# Patient Record
Sex: FEMALE | Race: WHITE | Employment: OTHER | URBAN - METROPOLITAN AREA
[De-identification: names, ages, dates, MRNs, and addresses within clinical notes are randomized per-mention and may not be internally consistent; named-entity substitution may affect disease eponyms.]

---

## 2017-01-10 ENCOUNTER — OFFICE VISIT (OUTPATIENT)
Dept: CARDIOLOGY CLINIC | Age: 77
End: 2017-01-10

## 2017-01-10 VITALS
RESPIRATION RATE: 17 BRPM | BODY MASS INDEX: 34.9 KG/M2 | WEIGHT: 197 LBS | SYSTOLIC BLOOD PRESSURE: 138 MMHG | DIASTOLIC BLOOD PRESSURE: 70 MMHG | OXYGEN SATURATION: 97 % | HEART RATE: 75 BPM

## 2017-01-10 DIAGNOSIS — G47.33 OSA (OBSTRUCTIVE SLEEP APNEA): ICD-10-CM

## 2017-01-10 DIAGNOSIS — K21.9 GASTROESOPHAGEAL REFLUX DISEASE WITHOUT ESOPHAGITIS: ICD-10-CM

## 2017-01-10 DIAGNOSIS — I51.7 LAE (LEFT ATRIAL ENLARGEMENT): Primary | ICD-10-CM

## 2017-01-10 DIAGNOSIS — I10 HTN (HYPERTENSION), BENIGN: ICD-10-CM

## 2017-01-10 DIAGNOSIS — I45.10 RBBB: ICD-10-CM

## 2017-01-10 DIAGNOSIS — I48.0 PAROXYSMAL ATRIAL FIBRILLATION (HCC): ICD-10-CM

## 2017-01-10 RX ORDER — AMOXICILLIN AND CLAVULANATE POTASSIUM 250; 62.5 MG/5ML; MG/5ML
POWDER, FOR SUSPENSION ORAL 3 TIMES DAILY
Status: ON HOLD | COMMUNITY
End: 2017-02-03 | Stop reason: CLARIF

## 2017-01-10 RX ORDER — CARVEDILOL 25 MG/1
12.5 TABLET ORAL 2 TIMES DAILY WITH MEALS
Qty: 180 TAB | Refills: 3
Start: 2017-01-10 | End: 2017-02-17 | Stop reason: DRUGHIGH

## 2017-01-10 RX ORDER — CLONIDINE HYDROCHLORIDE 0.1 MG/1
0.1 TABLET ORAL
Qty: 30 TAB | Refills: 3
Start: 2017-01-10 | End: 2017-03-15 | Stop reason: SDUPTHER

## 2017-01-10 RX ORDER — DILTIAZEM HYDROCHLORIDE EXTENDED-RELEASE TABLETS 180 MG/1
TABLET, EXTENDED RELEASE ORAL
COMMUNITY
End: 2017-05-03 | Stop reason: SDUPTHER

## 2017-01-10 NOTE — MR AVS SNAPSHOT
Visit Information Date & Time Provider Department Dept. Phone Encounter #  
 1/10/2017  1:20 PM Layla Thomas MD [facility] [phone] [id number] Follow-up Instructions Return in about 3 months (around 4/10/2017). Your Appointments 1/27/2017 10:40 AM  
New Patient with Lisseth Michael MD  
[facility] ([facility]) Appt Note: per dr Claude Rueda dx: ep eval  
 [address] [facility] [address]  
[phone]  
  
   
 [address] [facility] [address] [facility] [address]  
  
    
 4/28/2017  4:20 PM  
ESTABLISHED PATIENT with Layla Thomas MD  
[facility] ([facility]) Appt Note: 3 mo fup; 3 mo fup  
 [address] [place] [address] [facility]  
[address] [facility] [place] [address] [address] Upcoming Health Maintenance Date Due DTaP/Tdap/Td series (1 - Tdap) 2/12/1963 FOBT Q 1 YEAR AGE 50-75 2/12/1992 ZOSTER VACCINE AGE 60> 2/12/2002 GLAUCOMA SCREENING Q2Y 2/12/2007 OSTEOPOROSIS SCREENING (DEXA) 2/12/2007 Pneumococcal 65+ Low/Medium Risk (1 of 2 - PCV13) 2/12/2007 MEDICARE YEARLY EXAM 2/12/2007 INFLUENZA AGE 9 TO ADULT 8/1/2016 Allergies as of 1/10/2017  Review Complete On: 12/13/2016 By: Camila Perez  
  
 Severity Noted Reaction Type Reactions Citalopram Hydrobromide High 09/16/2014    Rash With itching. Chlorthalidone  04/03/2013    Rash Maxzide [Triamterene-hydrochlorothiazid]  04/03/2013    Palpitations Vicodin [Hydrocodone-acetaminophen]  04/03/2013    Palpitations Current Immunizations  Reviewed on 3/3/2012 No immunizations on file. Not reviewed this visit You Were Diagnosed With   
  
 Codes Comments LAE (left atrial enlargement)    -  Primary ICD-10-CM: I51.7 ICD-9-CM: 429.3 RBBB     ICD-10-CM: I45.10 ICD-9-CM: 426.4 HTN (hypertension), benign     ICD-10-CM: I10 
ICD-9-CM: 401.1 Paroxysmal atrial fibrillation (HCC)     ICD-10-CM: I48.0 ICD-9-CM: 427.31 Gastroesophageal reflux disease without esophagitis     ICD-10-CM: K21.9 ICD-9-CM: 530.81   
 ANÍBAL (obstructive sleep apnea)     ICD-10-CM: G47.33 
ICD-9-CM: 327.23 Vitals BP Pulse Resp Weight(growth percentile) SpO2 BMI  
 138/70 (BP 1 Location: Left arm, BP Patient Position: At rest) 75 17 197 lb (89.4 kg) 97% 34.9 kg/m2 OB Status Smoking Status Postmenopausal Former Smoker Vitals History BMI and BSA Data Body Mass Index Body Surface Area 34.9 kg/m 2 1.99 m 2 Preferred Pharmacy Pharmacy Name Phone [place], [address] [facility] [place] [phone] Your Updated Medication List  
  
   
This list is accurate as of: 1/10/17  2:18 PM.  Always use your most recent med list.  
  
  
  
  
 amoxicillin-clavulanate 250-62.5 mg/5 mL suspension Commonly known as:  AUGMENTIN Take  by mouth three (3) times daily. apixaban 5 mg tablet Commonly known as:  ELIQUIS  
take 1 tablet by mouth twice a day CALTRATE 600 + D PO Take  by mouth. carvedilol 25 mg tablet Commonly known as:  Macel Roberto Take 1 Tab by mouth two (2) times daily (with meals). CENTRUM SILVER PO Take  by mouth.  
  
 cloNIDine HCl 0.2 mg tablet Commonly known as:  CATAPRES Take 1 Tab by mouth two (2) times a day. Take at noon and bedtime  Indications: HYPERTENSION  
  
 diltiazem hcl 180 mg Tb24 Take  by mouth. NexIUM 40 mg capsule Generic drug:  esomeprazole Take 40 mg by mouth daily. TAKES IN AM  
  
 propafenone 225 mg tablet Commonly known as:  RYTHMOL Take 1 Tab by mouth two (2) times a day. Indications: PAROXYSMAL ATRIAL FIBRILLATION  
  
 telmisartan 80 mg tablet Commonly known as:  Luis Alberto [place] Take 80 mg by mouth daily. ZETIA 10 mg tablet Generic drug:  ezetimibe Take 10 mg by mouth daily. TAKES IN PM  
  
  
  
  
We Performed the Following AMB POC EKG ROUTINE W/ 12 LEADS, INTER & REP [40849 CPT(R)] Follow-up Instructions Return in about 3 months (around 4/10/2017). Patient Instructions 1. Change carvedilol half tablet twice daily. 2. Take 0.1mg Clonidine at bedtime if your blood pressure is greater that 383 systolic (top number). 3. Stay off Mycardis 4. Continue Diltiazem. 5. Continue Propafenone. 6. Keep a log of your blood pressure for the next couple of weeks. Call us back in 2 weeks with an update with medication adjustment and BP readings. 7. Bring in your pill bottles with you next office visit in 3 months. Introducing hospitals & HEALTH SERVICES! Dear Micah Zimmer: Thank you for requesting a [facility] account. Our records indicate that you already have an active [facility] account. You can access your account anytime at [email or web address] Did you know that you can access your hospital and ER discharge instructions at any time in [facility]? You can also review all of your test results from your hospital stay or ER visit. Additional Information If you have questions, please visit the Frequently Asked Questions section of the [facility] website at [email or web address] Remember, [facility] is NOT to be used for urgent needs. For medical emergencies, dial 911. Now available from your iPhone and Android! Please provide this summary of care documentation to your next provider. Your primary care clinician is listed as Radha Glynn. If you have any questions after today's visit, please call [phone].

## 2017-01-10 NOTE — PROGRESS NOTES
HISTORY OF PRESENT ILLNESS    Kash Brower is a 78 y.o. female. Last seen 3 weeks ago. Problem List  Date Reviewed: 11/28/2016          Codes Class Noted    Paroxysmal atrial fibrillation (HCC) ICD-10-CM: I48.0  ICD-9-CM: 427.31  8/28/2016        ANÍBAL (obstructive sleep apnea) ICD-10-CM: G47.33  ICD-9-CM: 327.23  7/26/2016        HTN (hypertension), benign ICD-10-CM: I10  ICD-9-CM: 401.1  7/26/2016        Gastroesophageal reflux disease without esophagitis ICD-10-CM: K21.9  ICD-9-CM: 530.81  7/3/2016        RBBB ICD-10-CM: I45.10  ICD-9-CM: 426.4  5/7/2015        LAE (left atrial enlargement) ICD-10-CM: I51.7  ICD-9-CM: 429.3  4/3/2013             Cardiac testing  Adenosine myoview 2011 - normal perfusion, EF 81%  Echocardiogram 2/22/13 - normal left ventricular size and function, normal appearing mitral, aortic, and tricuspid valves, with mild mitral and moderate tricuspid regurgitation, bi-atrial enlargement  Lexiscan 3/3/2014 - normal perfusion EF 83%  Echo: 5/15/15- 60%, mildly dilated LA, mild MR  Cardioversion 7/23/2015 - 200J  Lexiscan cardiolite 5/10/16 - normal perfusion, EF 78%  Renal Visceral Duplex 5/2016 - No evidence of ONOFRE    HPI    Struggling with labile BPs. She feels \"terrible\" at times. Her BPs at home have been in the 31-472Z systolic and 70-18O diastolic. She stopped taking Micardis one week ago. She reduced Coreg 25mg to once a day. She takes Clonidine (unknown dosage) one tablet daily. Dr. Sarah Cast started her on Diltiazem 180mg last week due to resting tachycardia of 101 bpm. She has ANÍBAL but wears her CPAP faithfully. Otherwise, she denies any exertional chest pain, dyspnea, syncope, orthopnea, edema or paroxysmal nocturnal dyspnea.     Past Medical History   Diagnosis Date    DDD (degenerative disc disease)     Gastroesophageal reflux disease without esophagitis 7/3/2016    GERD (gastroesophageal reflux disease)     Hiatal hernia     HTN (hypertension), benign 7/26/2016    Hypertension     ANÍBAL (obstructive sleep apnea) 7/26/2016    Paroxysmal atrial fibrillation ([facility] [address] 8/28/2016    Uterine prolapse       Current Outpatient Prescriptions on File Prior to Visit   Medication Sig Dispense Refill    propafenone (RYTHMOL) 225 mg tablet Take 1 Tab by mouth two (2) times a day. Indications: PAROXYSMAL ATRIAL FIBRILLATION 60 Tab 3    carvedilol (COREG) 25 mg tablet Take 1 Tab by mouth two (2) times daily (with meals). 180 Tab 3    cloNIDine HCl (CATAPRES) 0.2 mg tablet Take 1 Tab by mouth two (2) times a day. Take at noon and bedtime  Indications: HYPERTENSION (Patient taking differently: Take 0.2 mg by mouth See Admin Instructions. 0.1mg at 5pm. 0.2mg bedtime. 0.1mg prn. Indications: Hypertension) 200 Tab 3    apixaban (ELIQUIS) 5 mg tablet take 1 tablet by mouth twice a day 60 Tab 5    telmisartan (MICARDIS) 80 mg tablet Take 80 mg by mouth daily.  FOLIC ACID/MULTIVIT-MIN/LUTEIN (CENTRUM SILVER PO) Take  by mouth.  CALCIUM CARBONATE/VITAMIN D3 (CALTRATE 600 + D PO) Take  by mouth.  ezetimibe (ZETIA) 10 mg tablet Take 10 mg by mouth daily. TAKES IN PM      esomeprazole (NEXIUM) 40 mg capsule Take 40 mg by mouth daily. TAKES IN AM       No current facility-administered medications on file prior to visit. Allergies   Allergen Reactions    Citalopram Hydrobromide Rash     With itching.  Chlorthalidone Rash    Maxzide [Triamterene-Hydrochlorothiazid] Palpitations    Vicodin [Hydrocodone-Acetaminophen] Palpitations     , former smoker     Review of Systems  Constitutional: Negative for fever, chills, malaise/fatigue and diaphoresis. Respiratory: Negative for cough, hemoptysis, sputum production, and wheezing. Cardiovascular: Negative for chest pain, orthopnea, claudication,leg swelling and PND. Positive for palpitations. Gastrointestinal: Negative for heartburn, nausea, vomiting, blood in stool and melena.    Genitourinary: Negative for dysuria and flank pain. Musculoskeletal: Negative for joint pain and back pain. Skin: Negative for rash. Neurological: Negative for focal weakness, seizures, loss of consciousness, weakness. Positive for dizziness. Endo/Heme/Allergies: Does not bruise/bleed easily. Psychiatric/Behavioral: Negative for memory loss. Positive for anxiety. Visit Vitals    /70 (BP 1 Location: Left arm, BP Patient Position: At rest)    Pulse 75    Resp 17    Wt 197 lb (89.4 kg)    SpO2 97%    BMI 34.9 kg/m2      Wt Readings from Last 3 Encounters:   01/10/17 197 lb (89.4 kg)   12/13/16 200 lb (90.7 kg)   12/05/16 200 lb (90.7 kg)      Physical Exam   Constitutional: She is oriented to person, place, and time. She appears well-developed and well-nourished. Neck: Neck supple. No JVD present. Cardiovascular: Normal rate, regular rhythm, S1 normal, S2 normal and normal heart sounds. Exam reveals no gallop. No murmur heard. Pulses: Carotid pulses are 2+ on the right side, and 2+ on the left side. Dorsalis pedis pulses are 2+ on the right side, and 2+ on the left side. Pulmonary/Chest: Effort normal and breath sounds normal. She has no wheezes. She has no rales. Abdominal: Soft. Bowel sounds are normal. There is no tenderness. There is no rebound. Musculoskeletal: She exhibits no edema. Neurological: She is alert and oriented to person, place, and time. Skin: Skin is warm and dry. Psychiatric: She has a normal mood and affect.      Lab Results   Component Value Date/Time    Sodium 144 09/05/2016 01:34 AM    Potassium 3.8 09/05/2016 01:34 AM    Chloride 107 09/05/2016 01:34 AM    CO2 32 09/05/2016 01:34 AM    Anion gap 5 09/05/2016 01:34 AM    Glucose 122 09/05/2016 01:34 AM    BUN 12 09/05/2016 01:34 AM    Creatinine 0.86 09/05/2016 01:34 AM    BUN/Creatinine ratio 14 09/05/2016 01:34 AM    GFR est AA >60 09/05/2016 01:34 AM    GFR est non-AA >60 09/05/2016 01:34 AM    Calcium 9.3 09/05/2016 01:34 AM Bilirubin, total 0.2 04/29/2014 12:53 AM    ALT 26 04/29/2014 12:53 AM    AST 19 04/29/2014 12:53 AM    Alk. phosphatase 90 04/29/2014 12:53 AM    Protein, total 7.1 04/29/2014 12:53 AM    Albumin 3.9 04/29/2014 12:53 AM    Globulin 3.2 04/29/2014 12:53 AM    A-G Ratio 1.2 04/29/2014 12:53 AM     Cardiographics   EKG 4/13 - normal  EKG 5/6/15 - AF with ventricular rate 96, RBBB  EKG 6/3/15 - A-fib rate 70s, RBBB, Rightward axis  EKG 8/31/15 - SR, RBBB  EKG 12/7/15 - SR, RBBB  EKG 7/26/16 - AF 80s, RBBB, rightward axis, unchanged from 7/23/16   EKG 11/28/16 - SR 60   EKG 12/05/16-AF 80-90, RBBB   EKG 01/10/17- AF 70s    ASSESSMENT and PLAN  Encounter Diagnoses   Name Primary?  LAE (left atrial enlargement) Yes    RBBB     HTN (hypertension), benign     Paroxysmal atrial fibrillation (HCC)     Gastroesophageal reflux disease without esophagitis     ANÍBAL (obstructive sleep apnea)      Mrs. Ojeda has recurrent AF rate controlled in the setting of chronic HTN, minimally symptomatic. She has been adherent with Propafenone. It is unclear whether she is in and out or if this is persistent AF. She has no structural or ischemic heart disase. I favor consideration of AF ablation. She is in complete agreement - will refer to Dr. Emili Barnes. Continue Propafenone for now.,     Her BP has been somewhat liable at home, low to normal requiring multiple medication adjustment. Will change Coreg to 12.5mg BID continue Diltiazem, continue Clonidine 0.1 mg QHS, and keep her off Micardis. She will monitor her blood pressure at home anf make further adjustment as needed. Continue with CPAP. Melisa Valerio Follow-up Disposition:  Return in about 3 months (around 4/10/2017). See Dr. Emili Barnes ASAP  See me back in 3 months.      Written by Rinu Binnie Dubin, as dictated by Chitra Yoo MD.   Chitra Yoo MD

## 2017-01-10 NOTE — PATIENT INSTRUCTIONS
1. Change carvedilol half tablet twice daily. 2. Take 0.1mg Clonidine at bedtime if your blood pressure is greater that 305 systolic (top number). 3. Stay off Mycardis  4. Continue Diltiazem. 5. Continue Propafenone. 6. Keep a log of your blood pressure for the next couple of weeks. Call us back in 2 weeks with an update with medication adjustment and BP readings. 7. Bring in your pill bottles with you next office visit in 3 months.

## 2017-01-10 NOTE — PROGRESS NOTES
Extended / Orthostatic Vitals:  Patient Position 2: Supine  BP 2: 140/70  Pulse 2: 75  Patient Position 3: Sitting  BP 3: 138/70  Pulse 3: 70  Patient Position 4: Standing  BP 4: 140/70  Pulse 4: 84

## 2017-01-13 ENCOUNTER — TELEPHONE (OUTPATIENT)
Dept: CARDIOLOGY CLINIC | Age: 77
End: 2017-01-13

## 2017-01-13 NOTE — TELEPHONE ENCOUNTER
Labs received. Per ahslyn MOSELEY to d/c rythmol. Patient has an appointment with Dr. Madhuri Bardales 1/27/17.

## 2017-01-13 NOTE — TELEPHONE ENCOUNTER
Patient called regarding Rythmol, she saw her PCP and is concerned about her liver function and other side effects she is experiencing. Pt stated she is not currently taking, Please give her a call at [phone].  Thank you

## 2017-01-16 ENCOUNTER — TELEPHONE (OUTPATIENT)
Dept: CARDIOLOGY CLINIC | Age: 77
End: 2017-01-16

## 2017-01-16 NOTE — TELEPHONE ENCOUNTER
Please call the patient regarding her change in pressure medication. She stated that her NP has been running very high. Puneet clifford be reached at [phone]. Thanks!

## 2017-01-16 NOTE — TELEPHONE ENCOUNTER
BP lo/11 8am 108/76 77 -Took Rythmol 225mg, diltiazem 180mg, coreg 12.5mg   11am 90/64 73    2pm 109/69 58   10pm 157/87 78 -Took Clonidine 0.1mg, coreg 12.5mg   9am 134/89 71   12pm 125/84 71   3pm 136/81 69   9pm 151/91 80   10am 160/96 84   12pm 141/99 77   6pm 167/96        -Sx.  Headache, Took clonidine 0.1mg   9pm 170/104 81 -Took another clonidine 0.2mg (old script), coreg 12.5mg   10pm 137/90 88   -Confirmed Rythmol was d/c on 17 9am 147/90 90   -Took Coreg 25mg   3pm 138/90 94   8pm 183/115 97 -Took Clonidine 0.2mg, coreg 25mg   10pm 126/80 84  1/15 8am 173/106      -Took 1/2 tab Micardis 80mg (d/c 1/10/17), coreg 25mg   10am 128/80   5pm 159/99 82   -Took Clonidine 0.1mg   9pm 168/101 88 -Took Clonidine 0.2mg, coreg 25mg   9am 164/106 81 -Took 1/2 tab Micardis 80mg, coreg 25mg   12pm 138/88 86

## 2017-01-17 ENCOUNTER — TELEPHONE (OUTPATIENT)
Dept: CARDIOLOGY CLINIC | Age: 77
End: 2017-01-17

## 2017-01-18 ENCOUNTER — APPOINTMENT (OUTPATIENT)
Dept: CT IMAGING | Age: 77
End: 2017-01-18
Attending: PHYSICIAN ASSISTANT
Payer: MEDICARE

## 2017-01-18 ENCOUNTER — HOSPITAL ENCOUNTER (EMERGENCY)
Age: 77
Discharge: HOME | End: 2017-01-19
Attending: EMERGENCY MEDICINE
Payer: MEDICARE

## 2017-01-18 VITALS
HEART RATE: 100 BPM | BODY MASS INDEX: 34.91 KG/M2 | SYSTOLIC BLOOD PRESSURE: 166 MMHG | OXYGEN SATURATION: 94 % | TEMPERATURE: 98.2 F | WEIGHT: 197 LBS | HEIGHT: 63 IN | DIASTOLIC BLOOD PRESSURE: 98 MMHG | RESPIRATION RATE: 20 BRPM

## 2017-01-18 DIAGNOSIS — I48.91 ATRIAL FIBRILLATION, UNSPECIFIED TYPE (HCC): ICD-10-CM

## 2017-01-18 DIAGNOSIS — K44.9 HIATAL HERNIA: ICD-10-CM

## 2017-01-18 DIAGNOSIS — R10.13 ABDOMINAL PAIN, EPIGASTRIC: Primary | ICD-10-CM

## 2017-01-18 LAB
ALBUMIN SERPL BCP-MCNC: 3.7 G/DL (ref 3.5–5)
ALBUMIN/GLOB SERPL: 0.9 {RATIO} (ref 1.1–2.2)
ALP SERPL-CCNC: 109 U/L (ref 45–117)
ALT SERPL-CCNC: 40 U/L (ref 12–78)
ANION GAP BLD CALC-SCNC: 10 MMOL/L (ref 5–15)
APPEARANCE UR: CLEAR
AST SERPL W P-5'-P-CCNC: 17 U/L (ref 15–37)
BACTERIA URNS QL MICRO: NEGATIVE /HPF
BASOPHILS # BLD AUTO: 0 K/UL (ref 0–0.1)
BASOPHILS # BLD: 0 % (ref 0–1)
BILIRUB SERPL-MCNC: 0.8 MG/DL (ref 0.2–1)
BILIRUB UR QL: NEGATIVE
BUN SERPL-MCNC: 9 MG/DL (ref 6–20)
BUN/CREAT SERPL: 10 (ref 12–20)
CALCIUM SERPL-MCNC: 9 MG/DL (ref 8.5–10.1)
CHLORIDE SERPL-SCNC: 102 MMOL/L (ref 97–108)
CO2 SERPL-SCNC: 28 MMOL/L (ref 21–32)
COLOR UR: ABNORMAL
CREAT SERPL-MCNC: 0.89 MG/DL (ref 0.55–1.02)
EOSINOPHIL # BLD: 0 K/UL (ref 0–0.4)
EOSINOPHIL NFR BLD: 0 % (ref 0–7)
EPITH CASTS URNS QL MICRO: ABNORMAL /LPF
ERYTHROCYTE [DISTWIDTH] IN BLOOD BY AUTOMATED COUNT: 13.7 % (ref 11.5–14.5)
GLOBULIN SER CALC-MCNC: 3.9 G/DL (ref 2–4)
GLUCOSE SERPL-MCNC: 151 MG/DL (ref 65–100)
GLUCOSE UR STRIP.AUTO-MCNC: NEGATIVE MG/DL
HCT VFR BLD AUTO: 40.3 % (ref 35–47)
HGB BLD-MCNC: 12.9 G/DL (ref 11.5–16)
HGB UR QL STRIP: NEGATIVE
KETONES UR QL STRIP.AUTO: 15 MG/DL
LEUKOCYTE ESTERASE UR QL STRIP.AUTO: ABNORMAL
LIPASE SERPL-CCNC: 112 U/L (ref 73–393)
LYMPHOCYTES # BLD AUTO: 7 % (ref 12–49)
LYMPHOCYTES # BLD: 0.8 K/UL (ref 0.8–3.5)
MCH RBC QN AUTO: 29.9 PG (ref 26–34)
MCHC RBC AUTO-ENTMCNC: 32 G/DL (ref 30–36.5)
MCV RBC AUTO: 93.3 FL (ref 80–99)
MONOCYTES # BLD: 0.7 K/UL (ref 0–1)
MONOCYTES NFR BLD AUTO: 6 % (ref 5–13)
NEUTS SEG # BLD: 10.5 K/UL (ref 1.8–8)
NEUTS SEG NFR BLD AUTO: 87 % (ref 32–75)
NITRITE UR QL STRIP.AUTO: NEGATIVE
PH UR STRIP: 7.5 [PH] (ref 5–8)
PLATELET # BLD AUTO: 273 K/UL (ref 150–400)
POTASSIUM SERPL-SCNC: 3.5 MMOL/L (ref 3.5–5.1)
PROT SERPL-MCNC: 7.6 G/DL (ref 6.4–8.2)
PROT UR STRIP-MCNC: 30 MG/DL
RBC # BLD AUTO: 4.32 M/UL (ref 3.8–5.2)
RBC #/AREA URNS HPF: ABNORMAL /HPF (ref 0–5)
SODIUM SERPL-SCNC: 140 MMOL/L (ref 136–145)
SP GR UR REFRACTOMETRY: 1.02 (ref 1–1.03)
TROPONIN I SERPL-MCNC: <0.04 NG/ML
UA: UC IF INDICATED,UAUC: ABNORMAL
UROBILINOGEN UR QL STRIP.AUTO: 1 EU/DL (ref 0.2–1)
WBC # BLD AUTO: 12.1 K/UL (ref 3.6–11)
WBC URNS QL MICRO: ABNORMAL /HPF (ref 0–4)

## 2017-01-18 PROCEDURE — 36415 COLL VENOUS BLD VENIPUNCTURE: CPT | Performed by: EMERGENCY MEDICINE

## 2017-01-18 PROCEDURE — 83690 ASSAY OF LIPASE: CPT | Performed by: EMERGENCY MEDICINE

## 2017-01-18 PROCEDURE — 74011636320 HC RX REV CODE- 636/320: Performed by: RADIOLOGY

## 2017-01-18 PROCEDURE — 80053 COMPREHEN METABOLIC PANEL: CPT | Performed by: EMERGENCY MEDICINE

## 2017-01-18 PROCEDURE — 96361 HYDRATE IV INFUSION ADD-ON: CPT

## 2017-01-18 PROCEDURE — 74011250636 HC RX REV CODE- 250/636: Performed by: PHYSICIAN ASSISTANT

## 2017-01-18 PROCEDURE — 74177 CT ABD & PELVIS W/CONTRAST: CPT

## 2017-01-18 PROCEDURE — 93005 ELECTROCARDIOGRAM TRACING: CPT

## 2017-01-18 PROCEDURE — 81001 URINALYSIS AUTO W/SCOPE: CPT | Performed by: EMERGENCY MEDICINE

## 2017-01-18 PROCEDURE — 84484 ASSAY OF TROPONIN QUANT: CPT | Performed by: PHYSICIAN ASSISTANT

## 2017-01-18 PROCEDURE — 74011000250 HC RX REV CODE- 250: Performed by: PHYSICIAN ASSISTANT

## 2017-01-18 PROCEDURE — 85025 COMPLETE CBC W/AUTO DIFF WBC: CPT | Performed by: EMERGENCY MEDICINE

## 2017-01-18 PROCEDURE — 96374 THER/PROPH/DIAG INJ IV PUSH: CPT

## 2017-01-18 PROCEDURE — 99284 EMERGENCY DEPT VISIT MOD MDM: CPT

## 2017-01-18 RX ORDER — PANTOPRAZOLE SODIUM 40 MG/10ML
40 INJECTION, POWDER, LYOPHILIZED, FOR SOLUTION INTRAVENOUS
Status: DISCONTINUED | OUTPATIENT
Start: 2017-01-18 | End: 2017-01-18

## 2017-01-18 RX ORDER — FAMOTIDINE 10 MG/ML
20 INJECTION INTRAVENOUS
Status: COMPLETED | OUTPATIENT
Start: 2017-01-18 | End: 2017-01-18

## 2017-01-18 RX ADMIN — SODIUM CHLORIDE 1000 ML: 900 INJECTION, SOLUTION INTRAVENOUS at 22:38

## 2017-01-18 RX ADMIN — IOPAMIDOL 96 ML: 755 INJECTION, SOLUTION INTRAVENOUS at 23:26

## 2017-01-18 RX ADMIN — FAMOTIDINE 20 MG: 10 INJECTION, SOLUTION INTRAVENOUS at 22:38

## 2017-01-19 LAB
ATRIAL RATE: 85 BPM
CALCULATED R AXIS, ECG10: 69 DEGREES
CALCULATED T AXIS, ECG11: -19 DEGREES
DIAGNOSIS, 93000: NORMAL
Q-T INTERVAL, ECG07: 356 MS
QRS DURATION, ECG06: 132 MS
QTC CALCULATION (BEZET), ECG08: 452 MS
TROPONIN I BLD-MCNC: <0.04 NG/ML (ref 0–0.08)
VENTRICULAR RATE, ECG03: 97 BPM

## 2017-01-19 PROCEDURE — 84484 ASSAY OF TROPONIN QUANT: CPT

## 2017-01-19 NOTE — DISCHARGE INSTRUCTIONS
We hope that we have addressed all of your medical concerns. The examination and treatment you received in the Emergency Department were for an emergent problem and were not intended as complete care. It is important that you follow up with your healthcare provider(s) for ongoing care. If your symptoms worsen or do not improve as expected, and you are unable to reach your usual health care provider(s), you should return to the Emergency Department. Today's healthcare is undergoing tremendous change, and patient satisfaction surveys are one of the many tools to assess the quality of medical care. You may receive a survey from the [employer] regarding your experience in the Emergency Department. I hope that your experience has been completely positive, particularly the medical care that I provided. As such, please participate in the survey; anything less than excellent does not meet my expectations or intentions. [address] and [address] [place] [address] participate in nationally recognized quality of care measures. If your blood pressure is greater than 120/80, as reported below, we urge that you seek medical care to address the potential of high blood pressure, commonly known as hypertension. Hypertension can be hereditary or can be caused by certain medical conditions, pain, stress, or \"white coat syndrome. \"       Please make an appointment with your health care provider(s) for follow up of your Emergency Department visit. VITALS:   Patient Vitals for the past 8 hrs:   Temp Pulse Resp BP SpO2   01/18/17 2057 98.2 °F (36.8 °C) 100 20 (!) 166/98 94 %          Thank you for allowing us to provide you with medical care today. We realize that you have many choices for your emergency care needs. Please choose us in the future for any continued health care needs. Josey Bains, [address] [facility] [address] Office: [phone]            Recent Results (from the past 24 hour(s))   CBC WITH AUTOMATED DIFF    Collection Time: 01/18/17  9:58 PM   Result Value Ref Range    WBC 12.1 (H) 3.6 - 11.0 K/uL    RBC 4.32 3.80 - 5.20 M/uL    HGB 12.9 11.5 - 16.0 g/dL    HCT 40.3 35.0 - 47.0 %    MCV 93.3 80.0 - 99.0 FL    MCH 29.9 26.0 - 34.0 PG    MCHC 32.0 30.0 - 36.5 g/dL    RDW 13.7 11.5 - 14.5 %    PLATELET 833 789 - 876 K/uL    NEUTROPHILS 87 (H) 32 - 75 %    LYMPHOCYTES 7 (L) 12 - 49 %    MONOCYTES 6 5 - 13 %    EOSINOPHILS 0 0 - 7 %    BASOPHILS 0 0 - 1 %    ABS. NEUTROPHILS 10.5 (H) 1.8 - 8.0 K/UL    ABS. LYMPHOCYTES 0.8 0.8 - 3.5 K/UL    ABS. MONOCYTES 0.7 0.0 - 1.0 K/UL    ABS. EOSINOPHILS 0.0 0.0 - 0.4 K/UL    ABS. BASOPHILS 0.0 0.0 - 0.1 K/UL   METABOLIC PANEL, COMPREHENSIVE    Collection Time: 01/18/17  9:58 PM   Result Value Ref Range    Sodium 140 136 - 145 mmol/L    Potassium 3.5 3.5 - 5.1 mmol/L    Chloride 102 97 - 108 mmol/L    CO2 28 21 - 32 mmol/L    Anion gap 10 5 - 15 mmol/L    Glucose 151 (H) 65 - 100 mg/dL    BUN 9 6 - 20 MG/DL    Creatinine 0.89 0.55 - 1.02 MG/DL    BUN/Creatinine ratio 10 (L) 12 - 20      GFR est AA >60 >60 ml/min/1.73m2    GFR est non-AA >60 >60 ml/min/1.73m2    Calcium 9.0 8.5 - 10.1 MG/DL    Bilirubin, total 0.8 0.2 - 1.0 MG/DL    ALT 40 12 - 78 U/L    AST 17 15 - 37 U/L    Alk.  phosphatase 109 45 - 117 U/L    Protein, total 7.6 6.4 - 8.2 g/dL    Albumin 3.7 3.5 - 5.0 g/dL    Globulin 3.9 2.0 - 4.0 g/dL    A-G Ratio 0.9 (L) 1.1 - 2.2     LIPASE    Collection Time: 01/18/17  9:58 PM   Result Value Ref Range    Lipase 112 73 - 393 U/L   TROPONIN I    Collection Time: 01/18/17  9:58 PM   Result Value Ref Range    Troponin-I, Qt. <0.04 <0.05 ng/mL   URINALYSIS W/ REFLEX CULTURE    Collection Time: 01/18/17 10:43 PM   Result Value Ref Range    Color YELLOW/STRAW      Appearance CLEAR CLEAR      Specific gravity 1.023 1.003 - 1.030      pH (UA) 7.5 5.0 - 8.0      Protein 30 (A) NEG mg/dL Glucose NEGATIVE  NEG mg/dL    Ketone 15 (A) NEG mg/dL    Bilirubin NEGATIVE  NEG      Blood NEGATIVE  NEG      Urobilinogen 1.0 0.2 - 1.0 EU/dL    Nitrites NEGATIVE  NEG      Leukocyte Esterase TRACE (A) NEG      WBC 0-4 0 - 4 /hpf    RBC 0-5 0 - 5 /hpf    Epithelial cells FEW FEW /lpf    Bacteria NEGATIVE  NEG /hpf    UA:UC IF INDICATED CULTURE NOT INDICATED BY UA RESULT CNI     POC TROPONIN-I    Collection Time: 01/19/17 12:32 AM   Result Value Ref Range    Troponin-I (POC) <0.04 0.00 - 0.08 ng/mL       Ct Abd Pelv W Cont    Result Date: 1/18/2017  INDICATION: Abdominal pain; upper abd pain, nausea COMPARISON: 4/29/2014 TECHNIQUE: Following the uneventful intravenous administration of 100 cc Isovue-370, thin axial images were obtained through the abdomen and pelvis. Coronal and sagittal reconstructions were generated. Oral contrast was not administered. CT dose reduction was achieved through use of a standardized protocol tailored for this examination and automatic exposure control for dose modulation. FINDINGS: LUNG BASES: Clear. INCIDENTALLY IMAGED HEART AND MEDIASTINUM: Unremarkable. LIVER: No mass or biliary dilatation. GALLBLADDER: Unremarkable. SPLEEN: No mass. PANCREAS: No mass or ductal dilatation. ADRENALS: Unremarkable. KIDNEYS: No mass, calculus, or hydronephrosis. STOMACH: There is a large hiatal hernia. SMALL BOWEL: No dilatation or wall thickening. COLON: Sigmoid diverticulosis is noted. APPENDIX: Unremarkable. PERITONEUM: No ascites or pneumoperitoneum. RETROPERITONEUM: No lymphadenopathy or aortic aneurysm. REPRODUCTIVE ORGANS: The uterus is surgically absent. URINARY BLADDER: No mass or calculus. BONES: Degenerative changes are seen in the lumbar spine ADDITIONAL COMMENTS: N/A     IMPRESSION: Large hiatal hernia. No acute abnormality. Status post hysterectomy. Abdominal Pain: Care Instructions  Your Care Instructions    Abdominal pain has many possible causes.  Some aren't serious and get better on their own in a few days. Others need more testing and treatment. If your pain continues or gets worse, you need to be rechecked and may need more tests to find out what is wrong. You may need surgery to correct the problem. Don't ignore new symptoms, such as fever, nausea and vomiting, urination problems, pain that gets worse, and dizziness. These may be signs of a more serious problem. Your doctor may have recommended a follow-up visit in the next 8 to 12 hours. If you are not getting better, you may need more tests or treatment. The doctor has checked you carefully, but problems can develop later. If you notice any problems or new symptoms, get medical treatment right away. Follow-up care is a key part of your treatment and safety. Be sure to make and go to all appointments, and call your doctor if you are having problems. It's also a good idea to know your test results and keep a list of the medicines you take. How can you care for yourself at home? · Rest until you feel better. · To prevent dehydration, drink plenty of fluids, enough so that your urine is light yellow or clear like water. Choose water and other caffeine-free clear liquids until you feel better. If you have kidney, heart, or liver disease and have to limit fluids, talk with your doctor before you increase the amount of fluids you drink. · If your stomach is upset, eat mild foods, such as rice, dry toast or crackers, bananas, and applesauce. Try eating several small meals instead of two or three large ones. · Wait until 48 hours after all symptoms have gone away before you have spicy foods, alcohol, and drinks that contain caffeine. · Do not eat foods that are high in fat. · Avoid anti-inflammatory medicines such as aspirin, ibuprofen (Advil, Motrin), and naproxen (Aleve). These can cause stomach upset. Talk to your doctor if you take daily aspirin for another health problem. When should you call for help?   Call [phone] anytime you think you may need emergency care. For example, call if:  · You passed out (lost consciousness). · You pass maroon or very bloody stools. · You vomit blood or what looks like coffee grounds. · You have new, severe belly pain. Call your doctor now or seek immediate medical care if:  · Your pain gets worse, especially if it becomes focused in one area of your belly. · You have a new or higher fever. · Your stools are black and look like tar, or they have streaks of blood. · You have unexpected vaginal bleeding. · You have symptoms of a urinary tract infection. These may include:  ¨ Pain when you urinate. ¨ Urinating more often than usual.  ¨ Blood in your urine. · You are dizzy or lightheaded, or you feel like you may faint. Watch closely for changes in your health, and be sure to contact your doctor if:  · You are not getting better after 1 day (24 hours). Where can you learn more? Go to http://iker-isaac.info/. Enter P718 in the search box to learn more about \"Abdominal Pain: Care Instructions. \"  Current as of: May 27, 2016  Content Version: 11.1  © 5842-2188 [employer]. Care instructions adapted under license by [employer] (which disclaims liability or warranty for this information). If you have questions about a medical condition or this instruction, always ask your healthcare professional. [place] [address] any warranty or liability for your use of this information. Indigestion (Dyspepsia or Heartburn): Care Instructions  Your Care Instructions  Sometimes it can be hard to pinpoint the cause of indigestion (dyspepsia or heartburn). Most cases of an upset stomach with bloating, burning, burping, and nausea are minor and go away within several hours. Home treatment and over-the-counter medicine often are able to control symptoms.  But if you take medicine to relieve your indigestion without making diet and lifestyle changes, your symptoms are likely to return again and again. If you get indigestion often, it may be a sign of a more serious medical problem. Be sure to follow up with your doctor, who may want to do tests to be sure of the cause of your indigestion. Follow-up care is a key part of your treatment and safety. Be sure to make and go to all appointments, and call your doctor if you are having problems. Its also a good idea to know your test results and keep a list of the medicines you take. How can you care for yourself at home? · Your doctor may recommend over-the-counter medicine. For mild or occasional indigestion, antacids such as Tums, Gaviscon, Mylanta, or Maalox may help. Your doctor also may recommend over-the-counter acid reducers, such as Pepcid AC, Tagamet HB, Zantac 75, or Prilosec. Read and follow all instructions on the label. If you use these medicines often, talk with your doctor. · Change your eating habits. ¨ Its best to eat several small meals instead of two or three large meals. ¨ After you eat, wait 2 to 3 hours before you lie down. ¨ Chocolate, mint, and alcohol can make GERD worse. ¨ Spicy foods, foods that have a lot of acid (like tomatoes and oranges), and coffee can make GERD symptoms worse in some people. If your symptoms are worse after you eat a certain food, you may want to stop eating that food to see if your symptoms get better. · Do not smoke or chew tobacco. Smoking can make GERD worse. If you need help quitting, talk to your doctor about stop-smoking programs and medicines. These can increase your chances of quitting for good. · If you have GERD symptoms at night, raise the head of your bed 6 to 8 inches by putting the frame on blocks or placing a foam wedge under the head of your mattress. (Adding extra pillows does not work.)  · Do not wear tight clothing around your middle. · Lose weight if you need to. Losing just 5 to 10 pounds can help.   · Do not take anti-inflammatory medicines, such as aspirin, ibuprofen (Advil, Motrin), or naproxen (Aleve). These can irritate the stomach. If you need a pain medicine, try acetaminophen (Tylenol), which does not cause stomach upset. When should you call for help? Call 911 anytime you think you may need emergency care. For example, call if:  · You passed out (lost consciousness). · You vomit blood or what looks like coffee grounds. · You pass maroon or very bloody stools. · You have chest pain or pressure. This may occur with:  ¨ Sweating. ¨ Shortness of breath. ¨ Nausea or vomiting. ¨ Pain that spreads from the chest to the neck, jaw, or one or both shoulders or arms. ¨ Feeling dizzy or lightheaded. ¨ A fast or uneven pulse. After calling 911, chew 1 adult-strength aspirin. Wait for an ambulance. Do not try to drive yourself. Call your doctor now or seek immediate medical care if:  · You have severe belly pain. · Your stools are black and tarlike or have streaks of blood. · You have trouble swallowing. · You are losing weight and do not know why. Watch closely for changes in your health, and be sure to contact your doctor if:  · You do not get better as expected. Where can you learn more? Go to http://iker-isaac.info/. Enter W057 in the search box to learn more about \"Indigestion (Dyspepsia or Heartburn): Care Instructions. \"  Current as of: August 9, 2016  Content Version: 11.1  © 0890-7461 [employer]. Care instructions adapted under license by [email or web address] (which disclaims liability or warranty for this information). If you have questions about a medical condition or this instruction, always ask your healthcare professional. [address] any warranty or liability for your use of this information. Hiatal Hernia: Care Instructions  Your Care Instructions  A hiatal hernia occurs when part of the stomach bulges into the chest cavity.   A hiatal hernia may allow stomach acid and juices to back up into the esophagus (acid reflux). This can cause a feeling of burning, warmth, heat, or pain behind the breastbone. This feeling may often occur after you eat, soon after you lie down, or when you bend forward, and it may come and go. You also may have a sour taste in your mouth. These symptoms are commonly known as heartburn or reflux. But not all hiatal hernias cause symptoms. Follow-up care is a key part of your treatment and safety. Be sure to make and go to all appointments, and call your doctor if you are having problems. Its also a good idea to know your test results and keep a list of the medicines you take. How can you care for yourself at home? · Take your medicines exactly as prescribed. Call your doctor if you think you are having a problem with your medicine. · Do not take aspirin or other nonsteroidal anti-inflammatory drugs (NSAIDs), such as ibuprofen (Advil, Motrin) or naproxen (Aleve), unless your doctor says it is okay. Ask your doctor what you can take for pain. · Your doctor may recommend over-the-counter medicine. For mild or occasional indigestion, antacids such as Tums, Gaviscon, Maalox, or Mylanta may help. Your doctor also may recommend over-the-counter acid reducers, such as famotidine (Pepcid AC), cimetidine (Tagamet HB), ranitidine (Zantac 75 and Zantac 150), or omeprazole (Prilosec). Read and follow all instructions on the label. If you use these medicines often, talk with your doctor. · Change your eating habits. ¨ Its best to eat several small meals instead of two or three large meals. ¨ After you eat, wait 2 to 3 hours before you lie down. Late-night snacks arent a good idea. ¨ Chocolate, mint, and alcohol can make heartburn worse. They relax the valve between the esophagus and the stomach.   ¨ Spicy foods, foods that have a lot of acid (like tomatoes and oranges), and coffee can make heartburn symptoms worse in some people. If your symptoms are worse after you eat a certain food, you may want to stop eating that food to see if your symptoms get better. · Do not smoke or chew tobacco.  · If you get heartburn at night, raise the head of your bed 6 to 8 inches by putting the frame on blocks or placing a foam wedge under the head of your mattress. (Adding extra pillows does not work.)  · Do not wear tight clothing around your middle. · Lose weight if you need to. Losing just 5 to 10 pounds can help. When should you call for help? Call 911 anytime you think you may need emergency care. For example, call if:  · You have symptoms of a heart attack such as:  ¨ Chest pain or pressure. ¨ Sweating. ¨ Shortness of breath. ¨ Nausea or vomiting. ¨ Pain that spreads from the chest to the neck, jaw, or one or both shoulders or arms. ¨ Dizziness or lightheadedness. ¨ A fast or uneven pulse. After calling 911, chew 1 adult-strength aspirin. Wait for an ambulance. Do not try to drive yourself. · You vomit blood or what looks like coffee grounds. · You pass maroon or very bloody stools. Call your doctor now or seek immediate medical care if:  · You have new or different belly pain. · Your stools are black and tarlike or have streaks of blood. · Food seems to catch in your throat or chest.  Watch closely for changes in your health, and be sure to contact your doctor if:  · Your symptoms get worse. · Your symptoms have not improved after 2 days. Where can you learn more? Go to http://iker-isaac.info/. Enter Z941 in the search box to learn more about \"Hiatal Hernia: Care Instructions. \"  Current as of: August 9, 2016  Content Version: 11.1  © 6734-4791 "[employer]". Care instructions adapted under license by [employer] (which disclaims liability or warranty for this information).  If you have questions about a medical condition or this instruction, always ask your healthcare professional. [address] any warranty or liability for your use of this information.

## 2017-01-19 NOTE — ED PROVIDER NOTES
HPI Comments: Moe Singletary is a 78 y.o. Female with hx significant for GERD, hiatal hernia who presents ambulatory to ER with c/o upper abdominal pain and nausea x 4PM this afternoon. Pt states she was on her way to her PCP for her URI sx when she developed acute onset epigastric/generalized upper abdominal pain. Notes pain as been constant since onset. Notes pain seems to improving with lying down but then worsens again with standing. Notes associated nausea, denies vomiting. Denies similar pain in the past. Denies all other complaints  She specifically denies any fevers, chills, vomiting, chest pain, shortness of breath, headache, rash, diarrhea, urinary/bowel changes, sweating or weight loss. PCP: Feliberto Vanegas MD   PMHx significant for: Past Medical History:    DDD (degenerative disc disease)                               Gastroesophageal reflux disease without esopha* 7/3/2016      GERD (gastroesophageal reflux disease)                        Hiatal hernia                                                 HTN (hypertension), benign                      7/26/2016     Hypertension                                                  ANÍBAL (obstructive sleep apnea)                   7/26/2016     Paroxysmal atrial fibrillation ([facility] [address]            8/28/2016     Uterine prolapse                                              PSHx significant for: Past Surgical History:    HX GI                                                            Comment:COLONOSCOPY 7/14    HX GYN                                                           Comment:TUBAL LIGATION    HX HEENT                                                         Comment:WISDOM TEETH EXTRACTED.       -- Citalopram Hydrobromide -- Rash    --  With itching.   -- Chlorthalidone -- Rash   -- Maxzide (Triamterene-Hydrochlorothiazid) -- Palpitations   -- Vicodin (Hydrocodone-Acetaminophen) -- Palpitations    There are no other complaints, changes or physical findings at this time. The history is provided by the patient. Past Medical History:   Diagnosis Date    DDD (degenerative disc disease)     Gastroesophageal reflux disease without esophagitis 7/3/2016    GERD (gastroesophageal reflux disease)     Hiatal hernia     HTN (hypertension), benign 7/26/2016    Hypertension     ANÍBAL (obstructive sleep apnea) 7/26/2016    [facility] 8/28/2016    Uterine prolapse        Past Surgical History:   Procedure Laterality Date    Hx gi       COLONOSCOPY 7/14    Hx gyn       TUBAL LIGATION    Hx heent       WISDOM TEETH EXTRACTED. Family History:   Problem Relation Age of Onset    Diabetes Mother     Hypertension Mother     COPD Mother        Social History     Social History    Marital status:      Spouse name: N/A    Number of children: N/A    Years of education: N/A     Occupational History    Not on file. Social History Main Topics    Smoking status: Former Smoker     Packs/day: 1.50     Years: 30.00     Quit date: 3/3/1994    Smokeless tobacco: Never Used    Alcohol use 0.6 oz/week     1 Glasses of wine per week      Comment: rarely    Drug use: No    Sexual activity: Not on file     Other Topics Concern    Not on file     Social History Narrative         ALLERGIES: Citalopram hydrobromide; Chlorthalidone; Maxzide [triamterene-hydrochlorothiazid]; and Vicodin [hydrocodone-acetaminophen]    Review of Systems   Constitutional: Negative. Negative for appetite change, chills, fatigue and fever. HENT: Negative. Negative for congestion and sore throat. Eyes: Negative. Negative for visual disturbance. Respiratory: Negative. Negative for cough and shortness of breath. Cardiovascular: Negative. Negative for chest pain, palpitations and leg swelling. Gastrointestinal: Positive for abdominal pain and nausea. Negative for constipation, diarrhea and vomiting. Genitourinary: Negative.   Negative for dysuria, flank pain and hematuria. Musculoskeletal: Negative. Negative for back pain and neck pain. Skin: Negative. Negative for rash. Neurological: Negative. Negative for dizziness, syncope, weakness, numbness and headaches. Hematological: Negative. Psychiatric/Behavioral: Negative. All other systems reviewed and are negative. Vitals:    01/18/17 2057   BP: (!) 166/98   Pulse: 100   Resp: 20   Temp: 98.2 °F (36.8 °C)   SpO2: 94%   Weight: 89.4 kg (197 lb)   Height: 5' 3\" (1.6 m)            Physical Exam   Constitutional: She is oriented to person, place, and time. She appears well-developed and well-nourished. She appears distressed (uncomfortable appearing). HENT:   Head: Normocephalic and atraumatic. Mouth/Throat: Oropharynx is clear and moist.   Neck: Normal range of motion. Neck supple. Cardiovascular: Normal rate, S1 normal, S2 normal, normal heart sounds, intact distal pulses and normal pulses. Exam reveals no gallop and no friction rub. No murmur heard. Pulses:       Dorsalis pedis pulses are 2+ on the right side, and 2+ on the left side. Pulmonary/Chest: Effort normal and breath sounds normal. No accessory muscle usage. No respiratory distress. She has no decreased breath sounds. She has no wheezes. She has no rhonchi. She has no rales. Abdominal: Soft. Normal appearance and bowel sounds are normal. She exhibits no distension. There is no hepatosplenomegaly. There is tenderness (upper abd TTP, inc epigastric region). There is no rebound, no guarding and no CVA tenderness. Musculoskeletal: Normal range of motion. She exhibits no edema or tenderness. Neurological: She is alert and oriented to person, place, and time. She has normal strength. No sensory deficit. Skin: Skin is warm and dry. No rash noted. She is not diaphoretic. No erythema. No pallor. Psychiatric: She has a normal mood and affect. Her behavior is normal.   Nursing note and vitals reviewed. MDM  Number of Diagnoses or Management Options  Diagnosis management comments: DDx: GERD, PUD, pancreatitis, colitis       Amount and/or Complexity of Data Reviewed  Clinical lab tests: ordered and reviewed  Tests in the radiology section of CPT®: ordered and reviewed  Review and summarize past medical records: yes  Discuss the patient with other providers: yes (Dr. Shanel Brewer)    Patient Progress  Patient progress: stable    ED Course       Procedures              10:37 PM   Mary Rutledge PA-C discussed patient with Jennifer Preston DO who is in agreement with care plan as outlined. Will be in to see the patient. No further recommendations. Mary Rutledge PA-C        EKG interpretation: (Preliminary)  Rhythm: atrial fib and RBBB; and irregular. Rate (approx.): 97; Axis: normal; NJ interval: absent; QRS interval: normal ; ST/T wave: normal; Other findings: abnormal ekg. 12:01 AM  Jennifer Preston DO recommends POC trop and if normal, discharge home. Mary Rutledge PA-C     1:17 AM  Pt has been reevaluated. There are no new complaints, changes, or physical findings at this time. Medications have been reviewed w/ pt and/or family. Pt and/or family's questions have been answered. Pt and/or family expressed good understanding of the dx/tx/rx and is in agreement with plan of care. Pt instructed and agreed to f/u w/ PCP, cardiology and to return to ED upon further deterioration. Pt is ready for discharge.     LABORATORY TESTS:  Recent Results (from the past 12 hour(s))   CBC WITH AUTOMATED DIFF    Collection Time: 01/18/17  9:58 PM   Result Value Ref Range    WBC 12.1 (H) 3.6 - 11.0 K/uL    RBC 4.32 3.80 - 5.20 M/uL    HGB 12.9 11.5 - 16.0 g/dL    HCT 40.3 35.0 - 47.0 %    MCV 93.3 80.0 - 99.0 FL    MCH 29.9 26.0 - 34.0 PG    MCHC 32.0 30.0 - 36.5 g/dL    RDW 13.7 11.5 - 14.5 %    PLATELET 107 529 - 001 K/uL    NEUTROPHILS 87 (H) 32 - 75 %    LYMPHOCYTES 7 (L) 12 - 49 %    MONOCYTES 6 5 - 13 %    EOSINOPHILS 0 0 - 7 %    BASOPHILS 0 0 - 1 %    ABS. NEUTROPHILS 10.5 (H) 1.8 - 8.0 K/UL    ABS. LYMPHOCYTES 0.8 0.8 - 3.5 K/UL    ABS. MONOCYTES 0.7 0.0 - 1.0 K/UL    ABS. EOSINOPHILS 0.0 0.0 - 0.4 K/UL    ABS. BASOPHILS 0.0 0.0 - 0.1 K/UL   METABOLIC PANEL, COMPREHENSIVE    Collection Time: 01/18/17  9:58 PM   Result Value Ref Range    Sodium 140 136 - 145 mmol/L    Potassium 3.5 3.5 - 5.1 mmol/L    Chloride 102 97 - 108 mmol/L    CO2 28 21 - 32 mmol/L    Anion gap 10 5 - 15 mmol/L    Glucose 151 (H) 65 - 100 mg/dL    BUN 9 6 - 20 MG/DL    Creatinine 0.89 0.55 - 1.02 MG/DL    BUN/Creatinine ratio 10 (L) 12 - 20      GFR est AA >60 >60 ml/min/1.73m2    GFR est non-AA >60 >60 ml/min/1.73m2    Calcium 9.0 8.5 - 10.1 MG/DL    Bilirubin, total 0.8 0.2 - 1.0 MG/DL    ALT 40 12 - 78 U/L    AST 17 15 - 37 U/L    Alk.  phosphatase 109 45 - 117 U/L    Protein, total 7.6 6.4 - 8.2 g/dL    Albumin 3.7 3.5 - 5.0 g/dL    Globulin 3.9 2.0 - 4.0 g/dL    A-G Ratio 0.9 (L) 1.1 - 2.2     LIPASE    Collection Time: 01/18/17  9:58 PM   Result Value Ref Range    Lipase 112 73 - 393 U/L   TROPONIN I    Collection Time: 01/18/17  9:58 PM   Result Value Ref Range    Troponin-I, Qt. <0.04 <0.05 ng/mL   URINALYSIS W/ REFLEX CULTURE    Collection Time: 01/18/17 10:43 PM   Result Value Ref Range    Color YELLOW/STRAW      Appearance CLEAR CLEAR      Specific gravity 1.023 1.003 - 1.030      pH (UA) 7.5 5.0 - 8.0      Protein 30 (A) NEG mg/dL    Glucose NEGATIVE  NEG mg/dL    Ketone 15 (A) NEG mg/dL    Bilirubin NEGATIVE  NEG      Blood NEGATIVE  NEG      Urobilinogen 1.0 0.2 - 1.0 EU/dL    Nitrites NEGATIVE  NEG      Leukocyte Esterase TRACE (A) NEG      WBC 0-4 0 - 4 /hpf    RBC 0-5 0 - 5 /hpf    Epithelial cells FEW FEW /lpf    Bacteria NEGATIVE  NEG /hpf    UA:UC IF INDICATED CULTURE NOT INDICATED BY UA RESULT CNI     POC TROPONIN-I    Collection Time: 01/19/17 12:32 AM   Result Value Ref Range    Troponin-I (POC) <0.04 0.00 - 0.08 ng/mL       IMAGING RESULTS:  CT ABD PELV W CONT   Final Result        Ct Abd Pelv W Cont    Result Date: 1/18/2017  INDICATION: Abdominal pain; upper abd pain, nausea COMPARISON: 4/29/2014 TECHNIQUE: Following the uneventful intravenous administration of 100 cc Isovue-370, thin axial images were obtained through the abdomen and pelvis. Coronal and sagittal reconstructions were generated. Oral contrast was not administered. CT dose reduction was achieved through use of a standardized protocol tailored for this examination and automatic exposure control for dose modulation. FINDINGS: LUNG BASES: Clear. INCIDENTALLY IMAGED HEART AND MEDIASTINUM: Unremarkable. LIVER: No mass or biliary dilatation. GALLBLADDER: Unremarkable. SPLEEN: No mass. PANCREAS: No mass or ductal dilatation. ADRENALS: Unremarkable. KIDNEYS: No mass, calculus, or hydronephrosis. STOMACH: There is a large hiatal hernia. SMALL BOWEL: No dilatation or wall thickening. COLON: Sigmoid diverticulosis is noted. APPENDIX: Unremarkable. PERITONEUM: No ascites or pneumoperitoneum. RETROPERITONEUM: No lymphadenopathy or aortic aneurysm. REPRODUCTIVE ORGANS: The uterus is surgically absent. URINARY BLADDER: No mass or calculus. BONES: Degenerative changes are seen in the lumbar spine ADDITIONAL COMMENTS: N/A     IMPRESSION: Large hiatal hernia. No acute abnormality. Status post hysterectomy. MEDICATIONS GIVEN:  Medications   sodium chloride 0.9 % bolus infusion 1,000 mL (0 mL IntraVENous IV Completed 1/18/17 2340)   famotidine (PF) (PEPCID) injection 20 mg (20 mg IntraVENous Given 1/18/17 2238)   iopamidol (ISOVUE-370) 76 % injection 100 mL (96 mL IntraVENous Given 1/18/17 2326)       IMPRESSION:  1. Abdominal pain, epigastric    2. Hiatal hernia    3. Atrial fibrillation, unspecified type ([facility] [address]        PLAN:  1.    Current Discharge Medication List      CONTINUE these medications which have NOT CHANGED    Details   diltiazem hcl 180 mg Tb24 Take  by mouth.      cloNIDine HCl (CATAPRES) 0.1 mg tablet Take 1 Tab by mouth nightly. Hold sys BP < 656 systolic  Indications: Hypertension  Qty: 30 Tab, Refills: 3      carvedilol (COREG) 25 mg tablet Take 0.5 Tabs by mouth two (2) times daily (with meals). Qty: 180 Tab, Refills: 3    Associated Diagnoses: HTN (hypertension), benign      apixaban (ELIQUIS) 5 mg tablet take 1 tablet by mouth twice a day  Qty: 60 Tab, Refills: 5      CALCIUM CARBONATE/VITAMIN D3 (CALTRATE 600 + D PO) Take  by mouth.      esomeprazole (NEXIUM) 40 mg capsule Take 40 mg by mouth daily. TAKES IN AM      amoxicillin-clavulanate (AUGMENTIN) 250-62.5 mg/5 mL suspension Take  by mouth three (3) times daily. FOLIC ACID/MULTIVIT-MIN/LUTEIN (CENTRUM SILVER PO) Take  by mouth.      ezetimibe (ZETIA) 10 mg tablet Take 10 mg by mouth daily. TAKES IN PM           2.    Follow-up Information     Follow up With Details Comments Contact Info    Luis Asher MD Schedule an appointment as soon as possible for a visit in 3 days  [address] [address]      Gabby Walker MD Call in 1 day and schedule follow up appointment [address]      [facility] EMERGENCY DEPT  If symptoms worsen [address] [facility]  [phone]            Return to ED if worse

## 2017-01-19 NOTE — ED NOTES
I have reviewed discharge instructions with the patient. The patient verbalized understanding. Pt confirmed understanding of need for follow up with primary care provider. Pt is not in any current distress and shows no evidence of clinical instability. Pt left with  Pt family present. Pt left with all personal belongings. Pt states they are not driving. Pt states chief complaint has improved with treatment provided    PT is alert and oriented x 4, Pt is hemodynamically/respiratorily stable. Paperwork given by provider and reviewed with patient, opportunity for questions/clarification given.

## 2017-01-23 ENCOUNTER — TELEPHONE (OUTPATIENT)
Dept: CARDIOLOGY CLINIC | Age: 77
End: 2017-01-23

## 2017-01-23 NOTE — TELEPHONE ENCOUNTER
Patient states that she is returning your call. Also would like to discuss recent er visit. Can be reached at [phone]. Thanks!

## 2017-01-24 NOTE — TELEPHONE ENCOUNTER
BP lo/18 9am 156/105 92 Before meds   11am 138/87 90  After meds   2pm 141/80 80   11am 132/83 86   9pm 152/99 91 Took Clonidine 0.1mg   10am 135/90 91   6pm 131/82 92   10pm 139/86 89   11am 152/97 95 Before meds   12pm 138/88 98   11pm 130/84 91   10am 148/89 89   10pm 141/98 94   10am 147/105 93   12pm 121/83 88   8pm 120/75 92

## 2017-01-24 NOTE — TELEPHONE ENCOUNTER
Cuauhtemoc Mcguire, CANDIDO Reyna, GAGANDEEP        Caller: Unspecified (Yesterday, 11:12 AM)                     Her rate is likely a bit higher because Dr. Nan Blum just recently decreased her Coreg dose. Modesta Efrem BP's and rates look mostly within a normal range.  I would encouraged her to continue what she is currently taking and continue to monitor and record findings. Patient notified. She voices understanding.

## 2017-01-27 ENCOUNTER — OFFICE VISIT (OUTPATIENT)
Dept: CARDIOLOGY CLINIC | Age: 77
End: 2017-01-27

## 2017-01-27 VITALS
SYSTOLIC BLOOD PRESSURE: 158 MMHG | RESPIRATION RATE: 20 BRPM | HEART RATE: 73 BPM | HEIGHT: 63 IN | DIASTOLIC BLOOD PRESSURE: 92 MMHG | OXYGEN SATURATION: 94 % | BODY MASS INDEX: 34.38 KG/M2 | WEIGHT: 194 LBS

## 2017-01-27 DIAGNOSIS — I10 HTN (HYPERTENSION), BENIGN: ICD-10-CM

## 2017-01-27 DIAGNOSIS — I45.10 RBBB: ICD-10-CM

## 2017-01-27 DIAGNOSIS — K21.9 GASTROESOPHAGEAL REFLUX DISEASE WITHOUT ESOPHAGITIS: ICD-10-CM

## 2017-01-27 DIAGNOSIS — I51.7 LAE (LEFT ATRIAL ENLARGEMENT): ICD-10-CM

## 2017-01-27 DIAGNOSIS — G47.33 OSA (OBSTRUCTIVE SLEEP APNEA): ICD-10-CM

## 2017-01-27 DIAGNOSIS — I48.0 PAROXYSMAL ATRIAL FIBRILLATION (HCC): Primary | ICD-10-CM

## 2017-01-27 RX ORDER — AMIODARONE HYDROCHLORIDE 200 MG/1
200 TABLET ORAL DAILY
Qty: 30 TAB | Refills: 1 | Status: SHIPPED | OUTPATIENT
Start: 2017-01-27 | End: 2017-03-20 | Stop reason: SDUPTHER

## 2017-01-27 RX ORDER — AZITHROMYCIN 250 MG/1
250 TABLET, FILM COATED ORAL DAILY
Status: ON HOLD | COMMUNITY
End: 2017-02-03 | Stop reason: CLARIF

## 2017-01-27 NOTE — TELEPHONE ENCOUNTER
Please call Mrs. Ojeda at [phone].  She's due to start amidarone 200 mg today however her pharmacy is giving her a problem with filling it.     Thank you, Fide

## 2017-01-27 NOTE — PATIENT INSTRUCTIONS
Treatment Plan:  · Start amiodarone 200mg daily. · Cardioversion at [facility] on Friday, February 3rd at 11:00am  · Nothing eat midnight. · You will need a . · [facility] [address]  will contact you for scheduling date/time  · Afib Ablation will be scheduled for March 2nd at [facility]  · Hold Eliquis 2 days prior to procedure. Electrophysiology Study and Catheter Ablation: Before Your Procedure  What is an electrophysiology study and catheter ablation? An electrophysiology study is a test to see if there is a problem with your heart rhythm and to find out how to fix it. It is also called an EPS. Sometimes a procedure called catheter ablation is done during an EPS. This procedure destroys (ablates) small areas of your heart that are causing your heart rhythm problem. The doctor puts plastic tubes called catheters into blood vessels in your groin, arm, or neck. He or she then uses an X-ray machine to guide long wires through the tubes to your heart. The doctor can use these wires to record your heart's electrical signals. If the doctor thinks your problem can be fixed with ablation, he or she can use the wires to destroy a small part of your heart tissue. This is most often done with radio waves. You will probably be awake during the procedure. But you could be asleep. The doctor will give you medicines to help you feel relaxed and to numb the areas where the catheters go in. An EPS and ablation can take 2 to 6 hours. In rare cases, it can take longer. If you have EPS only and you do not need more treatment, you may go home the same day. But if you also have ablation, you may stay overnight in the hospital. How long you stay in the hospital depends on the type of ablation you have. Do not exercise hard or lift anything heavy for a week. You may be able to go back to work and to your normal routine in 1 or 2 days.   Follow-up care is a key part of your treatment and safety. Be sure to make and go to all appointments, and call your doctor if you are having problems. It's also a good idea to know your test results and keep a list of the medicines you take. What happens before the procedure? Procedures can be stressful. This information will help you understand what you can expect. And it will help you safely prepare for your procedure. Preparing for the procedure  · Understand exactly what procedure is planned, along with the risks, benefits, and other options. · Tell your doctors ALL the medicines, vitamins, supplements, and herbal remedies you take. Some of these can increase the risk of bleeding or interact with anesthesia. · If you take blood thinners, such as warfarin (Coumadin), clopidogrel (Plavix), or aspirin, be sure to talk to your doctor. He or she will tell you if you should stop taking these medicines before your procedure. Make sure that you understand exactly what your doctor wants you to do. · Your doctor will tell you which medicines to take or stop before your procedure. You may need to stop taking certain medicines a week or more before the procedure. So talk to your doctor as soon as you can. · If you have an advance directive, let your doctor know. It may include a living will and a durable power of  for health care. Bring a copy to the hospital. If you don't have one, you may want to prepare one. It lets your doctor and loved ones know your health care wishes. Doctors advise that everyone prepare these papers before any type of surgery or procedure. What happens on the day of the procedure? · Follow the instructions exactly about when to stop eating and drinking. If you don't, your procedure may be canceled. If your doctor told you to take your medicines on the day of the procedure, take them with only a sip of water. · Take a bath or shower before you come in for your procedure.  Do not apply lotions, perfumes, deodorants, or nail polish. · Take off all jewelry and piercings. And take out contact lenses, if you wear them. At the hospital or surgery center  · Bring a picture ID. · You will be kept comfortable and safe by your anesthesia provider. The anesthesia may make you sleep. Or it may just numb the area being worked on. · EPS by itself may take 1 to 3 hours. But if you also have ablation, the whole procedure may take 2 to 6 hours. · A nurse will apply pressure to the areas where the catheters were put in to help stop bleeding. After the bleeding stops, the doctor or nurse will put bandages on those areas. Going home  · Be sure you have someone to drive you home. Anesthesia and pain medicine make it unsafe for you to drive. · You will be given more specific instructions about recovering from your procedure. They will cover things like diet, wound care, follow-up care, driving, and getting back to your normal routine. When should you call your doctor? · You have questions or concerns. · You don't understand how to prepare for your procedure. · You become ill before the procedure (such as fever, flu, or a cold). · You need to reschedule or have changed your mind about having the procedure. Where can you learn more? Go to http://iker-isaac.info/. Enter A237 in the search box to learn more about \"Electrophysiology Study and Catheter Ablation: Before Your Procedure. \"  Current as of: January 27, 2016  Content Version: 11.1  © 5310-7804 [employer]. Care instructions adapted under license by [employer] (which disclaims liability or warranty for this information). If you have questions about a medical condition or this instruction, always ask your healthcare professional. [address] any warranty or liability for your use of this information.

## 2017-01-27 NOTE — TELEPHONE ENCOUNTER
PA completed over the phone as urgent. Representative states they have up to 24 hours to make a decision. Patient notified.  She has the option of purchasing 3 tablets for $10.

## 2017-01-27 NOTE — TELEPHONE ENCOUNTER
Please call Mrs. Ojeda at [phone]. She's due to start amidarone 200 mg today however her pharmacy is giving her a problem with filling it.      Thank you, Denis Costello

## 2017-01-27 NOTE — TELEPHONE ENCOUNTER
[facility] called and stated In order to process the prescription, physician must speak with insurance company [phone]. Thanks!

## 2017-01-27 NOTE — PROGRESS NOTES
HISTORY OF PRESENTING ILLNESS      Fatuma Beth is a 78 y.o. female with PAF, ANÍBAL, HTN, RBBB and LAE referred to discuss atrial fibrillation rhythm management options. She is on Propafenone and Eliquis and has persistent AF. Echo demonstrated preserved LV function with LA diameter of 3.4cm in May 2015. EKG today shows AF with RBBB. Patient had undergone several cardioversions in the past; she derived symptomatic improvement with restoration of sinus rhythm. She has been intolerant of other rhythm management medications in the past. She denies any bleeding issues with Eliquis and has not missed a dose in the past 30 days. ACTIVE PROBLEM LIST     Patient Active Problem List    Diagnosis Date Noted    Paroxysmal atrial fibrillation ([facility] [address] 08/28/2016    ANÍBAL (obstructive sleep apnea) 07/26/2016    HTN (hypertension), benign 07/26/2016    Gastroesophageal reflux disease without esophagitis 07/03/2016    RBBB 05/07/2015    LAE (left atrial enlargement) 04/03/2013       PAST MEDICAL HISTORY     Past Medical History   Diagnosis Date    DDD (degenerative disc disease)     Gastroesophageal reflux disease without esophagitis 7/3/2016    GERD (gastroesophageal reflux disease)     Hiatal hernia     HTN (hypertension), benign 7/26/2016    Hypertension     ANÍBAL (obstructive sleep apnea) 7/26/2016    Paroxysmal atrial fibrillation ([address] 8/28/2016    Uterine prolapse            PAST SURGICAL HISTORY     Past Surgical History   Procedure Laterality Date    Hx gi       COLONOSCOPY 7/14    Hx gyn       TUBAL LIGATION    Hx heent       WISDOM TEETH EXTRACTED. ALLERGIES     Allergies   Allergen Reactions    Citalopram Hydrobromide Rash     With itching.     Chlorthalidone Rash    Maxzide [Triamterene-Hydrochlorothiazid] Palpitations    Vicodin [Hydrocodone-Acetaminophen] Palpitations          FAMILY HISTORY     Family History   Problem Relation Age of Onset    Diabetes Mother    Ellie Bacon Hypertension Mother     COPD Mother     negative for cardiac disease       SOCIAL HISTORY     Social History     Social History    Marital status:      Spouse name: N/A    Number of children: N/A    Years of education: N/A     Social History Main Topics    Smoking status: Former Smoker     Packs/day: 1.50     Years: 30.00     Quit date: 3/3/1994    Smokeless tobacco: Never Used    Alcohol use 0.6 oz/week     1 Glasses of wine per week      Comment: rarely    Drug use: No    Sexual activity: Not Asked     Other Topics Concern    None     Social History Narrative         MEDICATIONS     Current Outpatient Prescriptions   Medication Sig    azithromycin (ZITHROMAX) 250 mg tablet Take 250 mg by mouth daily.  diltiazem hcl 180 mg Tb24 Take  by mouth.  cloNIDine HCl (CATAPRES) 0.1 mg tablet Take 1 Tab by mouth nightly. Hold sys BP < 119 systolic  Indications: Hypertension    carvedilol (COREG) 25 mg tablet Take 0.5 Tabs by mouth two (2) times daily (with meals).  apixaban (ELIQUIS) 5 mg tablet take 1 tablet by mouth twice a day    FOLIC ACID/MULTIVIT-MIN/LUTEIN (CENTRUM SILVER PO) Take  by mouth.  CALCIUM CARBONATE/VITAMIN D3 (CALTRATE 600 + D PO) Take  by mouth.  ezetimibe (ZETIA) 10 mg tablet Take 10 mg by mouth daily. TAKES IN PM    esomeprazole (NEXIUM) 40 mg capsule Take 40 mg by mouth daily. TAKES IN AM    amoxicillin-clavulanate (AUGMENTIN) 250-62.5 mg/5 mL suspension Take  by mouth three (3) times daily. No current facility-administered medications for this visit. I have reviewed the nurses notes, vitals, problem list, allergy list, medical history, family, social history and medications. REVIEW OF SYMPTOMS      General: Pt denies excessive weight gain or loss. Pt is able to conduct ADL's  HEENT: Denies blurred vision, headaches, hearing loss, epistaxis and difficulty swallowing.   Respiratory: Denies cough, congestion, shortness of breath, GOODE, wheezing or stridor. Cardiovascular: Denies precordial pain, edema or PND. Positive for palpitations. Gastrointestinal: Denies poor appetite, indigestion, abdominal pain or blood in stool  Genitourinary: Denies hematuria, dysuria, increased urinary frequency  Musculoskeletal: Denies joint pain or swelling from muscles or joints  Neurologic: Denies tremor, paresthesias, headache, or sensory motor disturbance  Psychiatric: Denies confusion, insomnia, depression  Integumentray: Denies rash, itching or ulcers. Hematologic: Denies easy bruising, bleeding     PHYSICAL EXAMINATION      Vitals:    01/27/17 1111   BP: (!) 158/92   Pulse: 73   Resp: 20   SpO2: 94%   Weight: 194 lb (88 kg)   Height: 5' 3\" (1.6 m)     General: Well developed, in no acute distress. HEENT: No jaundice, oral mucosa moist, no oral ulcers  Neck: Supple, no stiffness, no lymphadenopathy, supple  Heart:  Normal S1/S2 negative S3 or S4. Regular, no murmur, gallop or rub, no jugular venous distention  Respiratory: Clear bilaterally x 4, no wheezing or rales  Abdomen:   Soft, non-tender, bowel sounds are active.   Extremities:  No edema, normal cap refill, no cyanosis. Musculoskeletal: No clubbing, no deformities  Neuro: A&Ox3, speech clear, gait stable, cooperative, no focal neurologic deficits  Skin: Skin color is normal. No rashes or lesions.  Non diaphoretic, moist.  Vascular: 2+ pulses symmetric in all extremities     DIAGNOSTIC DATA      EKG 01/27/17: Atrial fibrillation with RBBB     LABORATORY DATA      Lab Results   Component Value Date/Time    WBC 12.1 01/18/2017 09:58 PM    HGB 12.9 01/18/2017 09:58 PM    HCT 40.3 01/18/2017 09:58 PM    PLATELET 747 52/66/8967 09:58 PM    MCV 93.3 01/18/2017 09:58 PM      Lab Results   Component Value Date/Time    Sodium 140 01/18/2017 09:58 PM    Potassium 3.5 01/18/2017 09:58 PM    Chloride 102 01/18/2017 09:58 PM    CO2 28 01/18/2017 09:58 PM    Anion gap 10 01/18/2017 09:58 PM    Glucose 151 01/18/2017 09:58 PM BUN 9 01/18/2017 09:58 PM    Creatinine 0.89 01/18/2017 09:58 PM    BUN/Creatinine ratio 10 01/18/2017 09:58 PM    GFR est AA >60 01/18/2017 09:58 PM    GFR est non-AA >60 01/18/2017 09:58 PM    Calcium 9.0 01/18/2017 09:58 PM    Bilirubin, total 0.8 01/18/2017 09:58 PM    ALT 40 01/18/2017 09:58 PM    AST 17 01/18/2017 09:58 PM    Alk. phosphatase 109 01/18/2017 09:58 PM    Protein, total 7.6 01/18/2017 09:58 PM    Albumin 3.7 01/18/2017 09:58 PM    Globulin 3.9 01/18/2017 09:58 PM    A-G Ratio 0.9 01/18/2017 09:58 PM           ASSESSMENT      1. Atrial fibrillation    A. Persistent   B. Symptomatic   2. Hiatal hernia  3. Hypertension   4. ANÍBAL  5. RBBB  6. GERD       PLAN     Discussed various options with the patient. Will plan for AF ablation. Will start a short term course of Amiodarone and plan for cardioversion with hope for LA remodeling. Will proceed with an AF ablation 4 weeks after Amiodarone loading. Plan for ANAI and cardiac MRI prior to AF ablation. Will hold Eliquis 2 days prior to AF ablation. FOLLOW-UP     Follow up in 4 weeks. Thank you,  Linda Lyles MD and Nadia Dunlap MD for involving me in the care of this extraordinarily pleasant female. Please do not hesitate to contact me for further questions/concerns. Written by rupert Swanson, as dictated by Dr. Cleveland Waters.      Cleveland Waters MD  Cardiac Electrophysiology / Cardiology    [address]  [address], [address] [facility], [address]  [facility] DamarisYuliana Burrell  [phone] / [phone] Fax   [phone] / [phone] Fax

## 2017-01-27 NOTE — PROGRESS NOTES
Visit Vitals    BP (!) 158/92 (BP 1 Location: Right arm, BP Patient Position: Sitting)    Pulse 73    Resp 20    Ht 5' 3\" (1.6 m)    Wt 194 lb (88 kg)    SpO2 94%    BMI 34.37 kg/m2

## 2017-01-27 NOTE — TELEPHONE ENCOUNTER
[address] called and stated In order to process the prescription, physician must speak with insurance company [phone]. Thanks!

## 2017-01-30 ENCOUNTER — DOCUMENTATION ONLY (OUTPATIENT)
Dept: CARDIOLOGY CLINIC | Age: 77
End: 2017-01-30

## 2017-01-30 RX ORDER — SODIUM CHLORIDE 0.9 % (FLUSH) 0.9 %
5-10 SYRINGE (ML) INJECTION AS NEEDED
Status: CANCELLED | OUTPATIENT
Start: 2017-02-03

## 2017-01-30 RX ORDER — SODIUM CHLORIDE 0.9 % (FLUSH) 0.9 %
5-10 SYRINGE (ML) INJECTION EVERY 8 HOURS
Status: CANCELLED | OUTPATIENT
Start: 2017-02-03

## 2017-01-30 NOTE — PROGRESS NOTES
Called patient, ID verified using two patient identifiers, notified patient that we had received approval from [facility] for her Amiodarone, through 04/28/2017. Patient verbalizes understanding of all information and started Amiodarone on Saturday 1/28/17.

## 2017-02-02 NOTE — PROGRESS NOTES
Pt chart accessed to review all areas including but not limited to pt history, test results, labs, and orders in preparation for possible Angio/Cath/EP procedure.

## 2017-02-03 ENCOUNTER — TELEPHONE (OUTPATIENT)
Dept: CARDIOLOGY CLINIC | Age: 77
End: 2017-02-03

## 2017-02-03 ENCOUNTER — HOSPITAL ENCOUNTER (OUTPATIENT)
Dept: NON INVASIVE DIAGNOSTICS | Age: 77
Discharge: HOME | End: 2017-02-03
Attending: INTERNAL MEDICINE | Admitting: INTERNAL MEDICINE
Payer: MEDICARE

## 2017-02-03 VITALS
OXYGEN SATURATION: 98 % | DIASTOLIC BLOOD PRESSURE: 74 MMHG | SYSTOLIC BLOOD PRESSURE: 138 MMHG | RESPIRATION RATE: 19 BRPM | BODY MASS INDEX: 34.38 KG/M2 | WEIGHT: 194 LBS | TEMPERATURE: 98.5 F | HEART RATE: 55 BPM | HEIGHT: 63 IN

## 2017-02-03 LAB — MAGNESIUM SERPL-MCNC: 1.9 MG/DL (ref 1.6–2.4)

## 2017-02-03 PROCEDURE — 83735 ASSAY OF MAGNESIUM: CPT | Performed by: INTERNAL MEDICINE

## 2017-02-03 PROCEDURE — 99152 MOD SED SAME PHYS/QHP 5/>YRS: CPT

## 2017-02-03 PROCEDURE — 77030018729 HC ELECTRD DEFIB PAD CARD -B

## 2017-02-03 PROCEDURE — 99156 MOD SED OTH PHYS/QHP 5/>YRS: CPT

## 2017-02-03 PROCEDURE — 36415 COLL VENOUS BLD VENIPUNCTURE: CPT | Performed by: INTERNAL MEDICINE

## 2017-02-03 PROCEDURE — 74011000250 HC RX REV CODE- 250: Performed by: INTERNAL MEDICINE

## 2017-02-03 PROCEDURE — 92960 CARDIOVERSION ELECTRIC EXT: CPT

## 2017-02-03 RX ORDER — SODIUM CHLORIDE 0.9 % (FLUSH) 0.9 %
5-10 SYRINGE (ML) INJECTION AS NEEDED
Status: DISCONTINUED | OUTPATIENT
Start: 2017-02-03 | End: 2017-02-03 | Stop reason: HOSPADM

## 2017-02-03 RX ORDER — SODIUM CHLORIDE 0.9 % (FLUSH) 0.9 %
5-10 SYRINGE (ML) INJECTION EVERY 8 HOURS
Status: DISCONTINUED | OUTPATIENT
Start: 2017-02-03 | End: 2017-02-03 | Stop reason: HOSPADM

## 2017-02-03 RX ADMIN — METHOHEXITAL SODIUM 52.8 MG: 500 INJECTION, POWDER, LYOPHILIZED, FOR SOLUTION INTRAMUSCULAR; INTRAVENOUS; RECTAL at 12:31

## 2017-02-03 NOTE — TELEPHONE ENCOUNTER
Patient call to verify she can take her morning medications prior to cardioversion. Informed patient to take all of her normal medications with a sip of water. Understanding expressed.

## 2017-02-03 NOTE — H&P
HISTORY OF PRESENTING ILLNESS       Charo Solis is a 78 y.o. female with PAF, ANÍBAL, HTN, RBBB and LAE referred to discuss atrial fibrillation rhythm management options. She is on Propafenone and Eliquis and has persistent AF. Echo demonstrated preserved LV function with LA diameter of 3.4cm in May 2015. EKG today shows AF with RBBB. Patient had undergone several cardioversions in the past; she derived symptomatic improvement with restoration of sinus rhythm. She has been intolerant of other rhythm management medications in the past. She denies any bleeding issues with Eliquis and has not missed a dose in the past 30 days.      ACTIVE PROBLEM LIST           Patient Active Problem List     Diagnosis Date Noted    Paroxysmal atrial fibrillation ([facility] [address] 08/28/2016    ANÍBAL (obstructive sleep apnea) 07/26/2016    HTN (hypertension), benign 07/26/2016    Gastroesophageal reflux disease without esophagitis 07/03/2016    RBBB 05/07/2015    LAE (left atrial enlargement) 04/03/2013      PAST MEDICAL HISTORY           Past Medical History   Diagnosis Date    DDD (degenerative disc disease)      Gastroesophageal reflux disease without esophagitis 7/3/2016    GERD (gastroesophageal reflux disease)      Hiatal hernia      HTN (hypertension), benign 7/26/2016    Hypertension      ANÍBAL (obstructive sleep apnea) 7/26/2016    Paroxysmal atrial fibrillation ([address] 8/28/2016    Uterine prolapse              PAST SURGICAL HISTORY             Past Surgical History   Procedure Laterality Date    Hx gi           COLONOSCOPY 7/14    Hx gyn           TUBAL LIGATION    Hx heent           WISDOM TEETH EXTRACTED.          ALLERGIES            Allergies   Allergen Reactions    Citalopram Hydrobromide Rash       With itching.     Chlorthalidone Rash    Maxzide [Triamterene-Hydrochlorothiazid] Palpitations    Vicodin [Hydrocodone-Acetaminophen] Palpitations            FAMILY HISTORY            Family History Problem Relation Age of Onset    Diabetes Mother      Hypertension Mother      COPD Mother      negative for cardiac disease        SOCIAL HISTORY       Social History                Social History    Marital status:        Spouse name: N/A    Number of children: N/A    Years of education: N/A              Social History Main Topics    Smoking status: Former Smoker       Packs/day: 1.50       Years: 30.00       Quit date: 3/3/1994    Smokeless tobacco: Never Used    Alcohol use 0.6 oz/week        1 Glasses of wine per week          Comment: rarely    Drug use: No    Sexual activity: Not Asked           Other Topics Concern    None      Social History Narrative               MEDICATIONS           Current Outpatient Prescriptions   Medication Sig    azithromycin (ZITHROMAX) 250 mg tablet Take 250 mg by mouth daily.  diltiazem hcl 180 mg Tb24 Take by mouth.  cloNIDine HCl (CATAPRES) 0.1 mg tablet Take 1 Tab by mouth nightly. Hold sys BP < 352 systolic Indications: Hypertension    carvedilol (COREG) 25 mg tablet Take 0.5 Tabs by mouth two (2) times daily (with meals).  apixaban (ELIQUIS) 5 mg tablet take 1 tablet by mouth twice a day    FOLIC ACID/MULTIVIT-MIN/LUTEIN (CENTRUM SILVER PO) Take by mouth.  CALCIUM CARBONATE/VITAMIN D3 (CALTRATE 600 + D PO) Take by mouth.  ezetimibe (ZETIA) 10 mg tablet Take 10 mg by mouth daily. TAKES IN PM    esomeprazole (NEXIUM) 40 mg capsule Take 40 mg by mouth daily. TAKES IN AM    amoxicillin-clavulanate (AUGMENTIN) 250-62.5 mg/5 mL suspension Take by mouth three (3) times daily.      No current facility-administered medications for this visit.          I have reviewed the nurses notes, vitals, problem list, allergy list, medical history, family, social history and medications.        REVIEW OF SYMPTOMS       General: Pt denies excessive weight gain or loss.  Pt is able to conduct ADL's  HEENT: Denies blurred vision, headaches, hearing loss, epistaxis and difficulty swallowing. Respiratory: Denies cough, congestion, shortness of breath, GOODE, wheezing or stridor. Cardiovascular: Denies precordial pain, edema or PND. Positive for palpitations. Gastrointestinal: Denies poor appetite, indigestion, abdominal pain or blood in stool  Genitourinary: Denies hematuria, dysuria, increased urinary frequency  Musculoskeletal: Denies joint pain or swelling from muscles or joints  Neurologic: Denies tremor, paresthesias, headache, or sensory motor disturbance  Psychiatric: Denies confusion, insomnia, depression  Integumentray: Denies rash, itching or ulcers. Hematologic: Denies easy bruising, bleeding     PHYSICAL EXAMINATION           Vitals:     01/27/17 1111   BP: (!) 158/92   Pulse: 73   Resp: 20   SpO2: 94%   Weight: 194 lb (88 kg)   Height: 5' 3\" (1.6 m)      General: Well developed, in no acute distress. HEENT: No jaundice, oral mucosa moist, no oral ulcers  Neck: Supple, no stiffness, no lymphadenopathy, supple  Heart:  Normal S1/S2 negative S3 or S4. Regular, no murmur, gallop or rub, no jugular venous distention  Respiratory: Clear bilaterally x 4, no wheezing or rales  Abdomen:   Soft, non-tender, bowel sounds are active.   Extremities: No edema, normal cap refill, no cyanosis. Musculoskeletal: No clubbing, no deformities  Neuro: A&Ox3, speech clear, gait stable, cooperative, no focal neurologic deficits  Skin: Skin color is normal. No rashes or lesions.  Non diaphoretic, moist.  Vascular: 2+ pulses symmetric in all extremities     DIAGNOSTIC DATA       EKG 01/27/17: Atrial fibrillation with RBBB     LABORATORY DATA             Lab Results   Component Value Date/Time     WBC 12.1 01/18/2017 09:58 PM     HGB 12.9 01/18/2017 09:58 PM     HCT 40.3 01/18/2017 09:58 PM     PLATELET 418 24/07/4627 09:58 PM     MCV 93.3 01/18/2017 09:58 PM            Lab Results   Component Value Date/Time     Sodium 140 01/18/2017 09:58 PM     Potassium 3.5 01/18/2017 09:58 PM     Chloride 102 01/18/2017 09:58 PM     CO2 28 01/18/2017 09:58 PM     Anion gap 10 01/18/2017 09:58 PM     Glucose 151 01/18/2017 09:58 PM     BUN 9 01/18/2017 09:58 PM     Creatinine 0.89 01/18/2017 09:58 PM     BUN/Creatinine ratio 10 01/18/2017 09:58 PM     GFR est AA >60 01/18/2017 09:58 PM     GFR est non-AA >60 01/18/2017 09:58 PM     Calcium 9.0 01/18/2017 09:58 PM     Bilirubin, total 0.8 01/18/2017 09:58 PM     ALT 40 01/18/2017 09:58 PM     AST 17 01/18/2017 09:58 PM     Alk. phosphatase 109 01/18/2017 09:58 PM     Protein, total 7.6 01/18/2017 09:58 PM     Albumin 3.7 01/18/2017 09:58 PM     Globulin 3.9 01/18/2017 09:58 PM     A-G Ratio 0.9 01/18/2017 09:58 PM            ASSESSMENT       1. Atrial fibrillation   A. Persistent  B. Symptomatic   2. Hiatal hernia  3. Hypertension   4. ANÍBAL  5. RBBB  6. GERD     PLAN      Discussed various options with the patient. Will plan for AF ablation. Will start a short term course of Amiodarone and plan for cardioversion with hope for LA remodeling. Will proceed with an AF ablation 4 weeks after Amiodarone loading. Plan for ANAI and cardiac MRI prior to AF ablation. Will hold Eliquis 2 days prior to AF ablation.      FOLLOW-UP      Follow up in 4 weeks.      Thank you, Debbie Guerrero MD and Rito Del Toro MD for involving me in the care of this extraordinarily pleasant female. Please do not hesitate to contact me for further questions/concerns.      Written by rupert Peterson, as dictated by Dr. Arpita Garvey MD  Cardiac Electrophysiology / Cardiology     [place] [address].  [address] [place] [address], [address] [place] [address], [address]  [address]  [phone] / [phone] Fax   [phone] / [phone] Fax       No changes since previous clinic visit < 1 month ago. Proceed with DCCV.      Elirodericka Gey, MD

## 2017-02-03 NOTE — IP AVS SNAPSHOT
[address] [facility], [place], operated by [facility] 
 
 
 [address] 
[phone] Patient: Jerri Dudley 
MRN: [id number] WOE:[id number] You are allergic to the following Allergen Reactions Citalopram Hydrobromide Rash With itching. Chlorthalidone Rash Maxzide (Triamterene-Hydrochlorothiazid) Palpitations Vicodin (Hydrocodone-Acetaminophen) Palpitations Recent Documentation Height Weight BMI OB Status Smoking Status 1.6 m 88 kg 34.37 kg/m2 Postmenopausal Former Smoker Emergency Contacts Name Discharge Info Relation Home Work Mobile LynetteMadhav DISCHARGE CAREGIVER [3] Spouse [3] [phone] About your hospitalization You were admitted on:  February 3, 2017 You last received care in the:  [facility] ELECTROPHYSIOLOGY You were discharged on:  February 3, 2017 Unit phone number:  [phone] Why you were hospitalized Your primary diagnosis was:  Not on File Providers Seen During Your Hospitalizations Provider Role Specialty Primary office phone Latia Aldrich MD Attending Provider Cardiology [phone] Your Primary Care Physician (PCP) Primary Care Physician Office Phone Office Fax [facility]  [phone] Follow-up Information Follow up With Details Comments Contact Info Luis Asher MD   [address] [facility] [address] [place] [address] 
[phone] Your Appointments Wednesday February 15, 2017 12:15 PM EST  
MRI CARD MORPH FUNC WWO CO with [facility] MRI 1 [facility] MRI ([address] [place] [address] Dr) [address]  
[phone] 1. Do not eat or drink after midnight.  2. No diuretics (fluid pills) the day of the exam. 3. Patient must be caffeine free 12 hours prior to exam. 4. Scan cannot be completed if patient has asthma and is actively using inhalers. 5. Please bring a list or a bag of your current medications to your appointment 6. If you have diabetes: if you take insulin to control your blood sugar, ask your physician how much insulin you should take the day of the test. Your doctor may tell you to take only half of your usual morning dose and to eat a light meal 4 hours before the test. If you take pills to control your blood sugar, do not take your medication until after the test is complete. You should skip taking your diabetes medication and skip the accompanying meal.  7. If you take heart medications: DO NOT take the following heart medications on the day of the test unless your doctor tells you otherwise: *Isosorbide mononitrate (e.g. Imdur®, ISMO®, Monoket®) *Nitorglycerin (e.g. Minitran®, Nitropatches®, Nitrostat®) 8. DO NOT take any over-the-counter medication that contains caffeine (e.g. Excedrin ®, Anacin®, diet pills, No Doz®) for 24 hours before the test. Ask your physician, if you have questions about other medications that may contain caffeine. 9. DO NOT SMOKE the day of your procedure, nicotine will interfere witht he test results. 10. Please be sure to remove ALL hair clips, pins, extensions, etc., prior to arriving for your MRI procedure. 11. Bring any non [facility] films or CDs pertaining to the area being imaged with you on the day of appointment. 12. A written order with a valid diagnosis and Physicians signature is required for all scheduled tests. 13. Check in at registration 30min before your appointment time unless you were instructed to do otherwise. Park in designated visitor/patient parking. Enter through the main entrance, which is just to the left of the fountain. Once inside, go around the corner to the left. You will register in Outpatient Registration.   
  
    
  
  
 
  
  
  
Current Discharge Medication List  
  
 CONTINUE these medications which have NOT CHANGED Dose & Instructions Dispensing Information Comments Morning Noon Evening Bedtime  
 amiodarone 200 mg tablet Commonly known as:  CORDARONE Your next dose is: Today, Tomorrow Other:  _________ Dose:  200 mg Take 1 Tab by mouth daily. Indications: PREVENTION OF RECURRENT ATRIAL FIBRILLATION Quantity:  30 Tab Refills:  1  
     
   
   
   
  
 apixaban 5 mg tablet Commonly known as:  Princella Lyon Your next dose is: Today, Tomorrow Other:  _________  
   
   
 take 1 tablet by mouth twice a day Quantity:  60 Tab Refills:  5 CALTRATE 600 + D PO Your next dose is: Today, Tomorrow Other:  _________ Take  by mouth. Refills:  0  
     
   
   
   
  
 carvedilol 25 mg tablet Commonly known as:  Mar Henderson Your next dose is: Today, Tomorrow Other:  _________ Dose:  12.5 mg Take 0.5 Tabs by mouth two (2) times daily (with meals). Quantity:  180 Tab Refills:  3 CENTRUM SILVER PO Your next dose is: Today, Tomorrow Other:  _________ Take  by mouth. Refills:  0  
     
   
   
   
  
 cloNIDine HCl 0.1 mg tablet Commonly known as:  CATAPRES Your next dose is: Today, Tomorrow Other:  _________ Dose:  0.1 mg Take 1 Tab by mouth nightly. Hold sys BP < 492 systolic  Indications: Hypertension Quantity:  30 Tab Refills:  3  
     
   
   
   
  
 diltiazem hcl 180 mg Tb24 Your next dose is: Today, Tomorrow Other:  _________ Take  by mouth. Refills:  0 NexIUM 40 mg capsule Generic drug:  esomeprazole Your next dose is: Today, Tomorrow Other:  _________ Dose:  40 mg Take 40 mg by mouth daily. TAKES IN AM  
 Refills:  0 ZETIA 10 mg tablet Generic drug:  ezetimibe Your next dose is: Today, Tomorrow Other:  _________ Dose:  10 mg Take 10 mg by mouth daily. TAKES IN PM  
 Refills:  0 Discharge Instructions Electrical Cardioversion: What to Expect at [facility] Your Recovery Electrical cardioversion is a treatment for an abnormal heartbeat, such as atrial fibrillation, supraventricular tachycardia, or ventricular tachycardia (VT). It uses a brief electrical shock to reset your heart's rhythm. After cardioversion, you may have redness, like a sunburn, where the patches were. The medicines you got to make you sleepy may make you feel drowsy for the rest of the day. Your doctor may have you take medicines to help the heart beat normally and to prevent blood clots. This care sheet gives you a general idea about how long it will take for you to recover. But each person recovers at a different pace. Follow the steps below to feel better as quickly as possible. How can you care for yourself at home? Medicines · Be safe with medicines. Take your medicines exactly as prescribed. Call your doctor if you think you are having a problem with your medicine. You may take one or more of the following medicines: 
¨ Rate-control medicines to slow the heart rate. These include beta-blockers, calcium channel blockers, and digoxin. ¨ Rhythm control medicines that help the heart keep a normal rhythm. ¨ Blood thinners, also called anticoagulants, which help prevent blood clots. You will get more details on the specific medicines your doctor prescribes. Be sure you know how to take your medicines safely. · Do not take any vitamins, over-the-counter medicines, or herbal products without talking to your doctor first. 
Exercise · Start light exercise if your doctor says that it is okay.  Even a small amount will help you get stronger, have more energy, and manage your stress. Walking is an easy way to get exercise. Start out by walking a little more than you did in the hospital. Bit by bit, increase the amount you walk. · When you exercise, watch for signs that your heart is working too hard. You are pushing too hard if you cannot talk while you are exercising. If you become short of breath or dizzy or have chest pain, sit down and rest right away. · Check your pulse regularly. Place two fingers on the artery at the palm side of your wrist in line with your thumb. If your heartbeat seems uneven or fast, talk to your doctor. Other instructions · Ask your doctor when you can drive again. · Do not smoke. If you need help quitting, talk to your doctor about stop-smoking programs and medicines. These can increase your chances of quitting for good. · Limit alcohol. Follow-up care is a key part of your treatment and safety. Be sure to make and go to all appointments, and call your doctor if you are having problems. It's also a good idea to know your test results and keep a list of the medicines you take. When should you call for help? Call 911 anytime you think you may need emergency care. For example, call if: 
· You have chest pain or pressure. This may occur with: ¨ Sweating. ¨ Shortness of breath. ¨ Nausea or vomiting. ¨ Pain that spreads from the chest to the neck, jaw, or one or both shoulders or arms. ¨ A fast or uneven pulse. After calling 911, the  may tell you to chew 1 adult-strength or 2 to 4 low-dose aspirin. Wait for an ambulance. Do not try to drive yourself. · You have symptoms of a stroke. These may include: 
¨ Sudden numbness, tingling, weakness, or loss of movement in your face, arm, or leg, especially on only one side of your body. ¨ Sudden vision changes. ¨ Sudden trouble speaking. ¨ Sudden confusion or trouble understanding simple statements. ¨ Sudden problems with walking or balance. ¨ A sudden, severe headache that is different from past headaches. · You vomit blood or what looks like coffee grounds. · You pass maroon or very bloody stools. · You passed out (lost consciousness). Call your doctor now or seek immediate medical care if: 
· You feel dizzy or lightheaded, or you feel like you may faint. · Your heart rate becomes irregular. · You have shortness of breath. · You have any unusual bleeding, such as: ¨ Bruises or blood spots under the skin. ¨ A nosebleed that you cannot stop. ¨ Bleeding gums when you brush your teeth. ¨ Blood in your urine. ¨ Vaginal bleeding when you are not having your period, or heavy period bleeding. ¨ Your stools are black and tarlike or have streaks of blood. Watch closely for any changes in your health, and be sure to contact your doctor if: 
· You do not get better as expected. Where can you learn more? Go to http://iker-isaac.info/. Enter A617 in the search box to learn more about \"Electrical Cardioversion: What to Expect at Home. \" Current as of: January 27, 2016 Content Version: 11.1 © 4629-0695 [employer]. Care instructions adapted under license by [employer] (which disclaims liability or warranty for this information). If you have questions about a medical condition or this instruction, always ask your healthcare professional. [address] any warranty or liability for your use of this information. Discharge Orders None Introducing [facility]! Dear Gerard [place]: Thank you for requesting a [employer] account. Our records indicate that you already have an active [employer] account. You can access your account anytime at [email or web address] Did you know that you can access your hospital and ER discharge instructions at any time in [employer]? You can also review all of your test results from your hospital stay or ER visit. Additional Information If you have questions, please visit the Frequently Asked Questions section of the MyChart website at [email or web address] Remember, MyChart is NOT to be used for urgent needs. For medical emergencies, dial 911. Now available from your iPhone and Android! General Information Please provide this summary of care documentation to your next provider. Patient Signature:  ____________________________________________________________ Date:  ____________________________________________________________  
  
Paulene Greener Provider Signature:  ____________________________________________________________ Date:  ____________________________________________________________

## 2017-02-03 NOTE — DISCHARGE INSTRUCTIONS
Electrical Cardioversion: What to Expect at Home  Your Recovery    Electrical cardioversion is a treatment for an abnormal heartbeat, such as atrial fibrillation, supraventricular tachycardia, or ventricular tachycardia (VT). It uses a brief electrical shock to reset your heart's rhythm. After cardioversion, you may have redness, like a sunburn, where the patches were. The medicines you got to make you sleepy may make you feel drowsy for the rest of the day. Your doctor may have you take medicines to help the heart beat normally and to prevent blood clots. This care sheet gives you a general idea about how long it will take for you to recover. But each person recovers at a different pace. Follow the steps below to feel better as quickly as possible. How can you care for yourself at home? Medicines  · Be safe with medicines. Take your medicines exactly as prescribed. Call your doctor if you think you are having a problem with your medicine. You may take one or more of the following medicines:  ¨ Rate-control medicines to slow the heart rate. These include beta-blockers, calcium channel blockers, and digoxin. ¨ Rhythm control medicines that help the heart keep a normal rhythm. ¨ Blood thinners, also called anticoagulants, which help prevent blood clots. You will get more details on the specific medicines your doctor prescribes. Be sure you know how to take your medicines safely. · Do not take any vitamins, over-the-counter medicines, or herbal products without talking to your doctor first.  Exercise  · Start light exercise if your doctor says that it is okay. Even a small amount will help you get stronger, have more energy, and manage your stress. Walking is an easy way to get exercise. Start out by walking a little more than you did in the hospital. Bit by bit, increase the amount you walk. · When you exercise, watch for signs that your heart is working too hard.  You are pushing too hard if you cannot talk while you are exercising. If you become short of breath or dizzy or have chest pain, sit down and rest right away. · Check your pulse regularly. Place two fingers on the artery at the palm side of your wrist in line with your thumb. If your heartbeat seems uneven or fast, talk to your doctor. Other instructions  · Ask your doctor when you can drive again. · Do not smoke. If you need help quitting, talk to your doctor about stop-smoking programs and medicines. These can increase your chances of quitting for good. · Limit alcohol. Follow-up care is a key part of your treatment and safety. Be sure to make and go to all appointments, and call your doctor if you are having problems. It's also a good idea to know your test results and keep a list of the medicines you take. When should you call for help? Call 911 anytime you think you may need emergency care. For example, call if:  · You have chest pain or pressure. This may occur with:  ¨ Sweating. ¨ Shortness of breath. ¨ Nausea or vomiting. ¨ Pain that spreads from the chest to the neck, jaw, or one or both shoulders or arms. ¨ A fast or uneven pulse. After calling 911, the  may tell you to chew 1 adult-strength or 2 to 4 low-dose aspirin. Wait for an ambulance. Do not try to drive yourself. · You have symptoms of a stroke. These may include:  ¨ Sudden numbness, tingling, weakness, or loss of movement in your face, arm, or leg, especially on only one side of your body. ¨ Sudden vision changes. ¨ Sudden trouble speaking. ¨ Sudden confusion or trouble understanding simple statements. ¨ Sudden problems with walking or balance. ¨ A sudden, severe headache that is different from past headaches. · You vomit blood or what looks like coffee grounds. · You pass maroon or very bloody stools. · You passed out (lost consciousness).   Call your doctor now or seek immediate medical care if:  · You feel dizzy or lightheaded, or you feel like you may faint.  · Your heart rate becomes irregular. · You have shortness of breath. · You have any unusual bleeding, such as:  ¨ Bruises or blood spots under the skin. ¨ A nosebleed that you cannot stop. ¨ Bleeding gums when you brush your teeth. ¨ Blood in your urine. ¨ Vaginal bleeding when you are not having your period, or heavy period bleeding. ¨ Your stools are black and tarlike or have streaks of blood. Watch closely for any changes in your health, and be sure to contact your doctor if:  · You do not get better as expected. Where can you learn more? Go to http://iker-isaac.info/. Enter A617 in the search box to learn more about \"Electrical Cardioversion: What to Expect at Home. \"  Current as of: January 27, 2016  Content Version: 11.1  © 7696-4129 [employer]. Care instructions adapted under license by [employer] (which disclaims liability or warranty for this information). If you have questions about a medical condition or this instruction, always ask your healthcare professional. [place] [address] any warranty or liability for your use of this information.

## 2017-02-03 NOTE — PROCEDURES
Cardiac Electrophysiology Report        PATIENT INFORMATION     Patient Name: Poornima Romero MRN: [id number]     Study Date: 2/3/2017    YOB: 1940   Age: 78 y.o. Gender: female      Procedure:  Wilmer Moeller    Referring Physician:  Olga Bernal MD and Dr. Wing Dsouza     Duty Name   Electrophysiologist Ninoska Jean-Baptiste MD   Monitor Codie Fernandez RN   Circulator Danelle Bloom RN; Jasmin Chill       PATIENT HISTORY     Poornima Romero is a 78 y.o. female with PAF, ANÍBAL, HTN, RBBB and LAE who was on Propafenone and Eliquis and has persistent AF. Echo demonstrated preserved LV function with LA diameter of 3.4cm in May 2015. EKG today shows AF with RBBB. Patient had undergone several cardioversions in the past; she derived symptomatic improvement with restoration of sinus rhythm. She has been intolerant of other rhythm management medications in the past. She denies any bleeding issues with Eliquis and has not missed a dose in the past 30 days. She is planned for AF ablation and was started on an amiodarone load with plan for cardioversion to allow for LA remodeling prior to AF ablation. PROCEDURE     The patient was brought to the Cardiac Electrophysiology laboratory in a post-absorptive, fasting state. Informed consent was obtained. A peripheral IV was in place. Continuous electrocardiographic, blood pressure, O2 saturation and  CO2 monitoring was initiated. Self-adhesive cardioversion patches were positioned on the chest and back. Once conscious sedation was achieved, a single biphasic, synchronized shock at 360 Joules was delivered with successful restoration of sinus rhythm. The patient remained hemodynamically stable, tolerated the procedure well and was transferred in stable condition. There were no immediate complications encountered during the procedure.  There was no blood loss and no specimen were removed. CARDIOVERSION DATA     Energy (Joules) Technique Result   360 Biphasic Success       MEDICATION SUMMARY     Medication Route Unit Total   Brevital IV mg 52.8             RADIOLOGY SUMMARY     N/A      CONCLUSIONS     1. Successful restoration of sinus rhythm. 2. Continue current drug therapy  3. Proceed with AF ablation as planned; plan to discontinue amiodarone post-ablation. 4. Follow up with  Deonna Meyer MD and Dr. Luz Marina Muhammad  as scheduled. 5. Follow up in EP clinic as scheduled. Mona Lorenzo MD  Cardiac Electrophysiology / Cardiology    [place] [address].  [address], [address] [facility], [place] [address] [address], [address] [place] [address]  [phone] / [phone] Fax      [phone] / [phone] Fax        Thank you, Deonna Meyer MD and Dr. Luz Marina Muhammad for involving me in the care of this extraordinarily pleasant female.

## 2017-02-03 NOTE — PROGRESS NOTES
11:16 AM Received patient from waiting area. Armband and allergies verbally confirmed with patient. Procedure explained and consents signed. 12:18 PM TRANSFER - OUT REPORT:    Verbal report given to Jovani tsang(name) on [facility]  being transferred to ep lab(unit) for routine progression of care       Report consisted of patients Situation, Background, Assessment and   Recommendations(SBAR). Information from the following report(s) Procedure Summary was reviewed with the receiving nurse. Lines:   Peripheral IV 02/03/17 Right Antecubital (Active)   Site Assessment Clean, dry, & intact 2/3/2017 11:35 AM        Opportunity for questions and clarification was provided. Patient transported with:   Registered Nurse  12:38 PM TRANSFER - IN REPORT:    Verbal report received from Saida(name) on [facility]  being received from ep lab(unit) for routine progression of care      Report consisted of patients Situation, Background, Assessment and   Recommendations(SBAR). Information from the following report(s) Procedure Summary was reviewed with the receiving nurse. Opportunity for questions and clarification was provided. Assessment completed upon patients arrival to unit and care assumed. 1350 Copy of printed discharge instructions given to patient and daughter,Patient escorted via wheelchair to entrance.  driving. Patient discharged into care of . Baldev Robles

## 2017-02-03 NOTE — IP AVS SNAPSHOT
Current Discharge Medication List  
  
Take these medications at their scheduled times Dose & Instructions Dispensing Information Comments Morning Noon Evening Bedtime  
 amiodarone 200 mg tablet Commonly known as:  CORDARONE Your next dose is: Today, Tomorrow Other:  ____________ Dose:  200 mg Take 1 Tab by mouth daily. Indications: PREVENTION OF RECURRENT ATRIAL FIBRILLATION Quantity:  30 Tab Refills:  1  
     
   
   
   
  
 carvedilol 25 mg tablet Commonly known as:  Michelle David Your next dose is: Today, Tomorrow Other:  ____________ Dose:  12.5 mg Take 0.5 Tabs by mouth two (2) times daily (with meals). Quantity:  180 Tab Refills:  3  
     
   
   
   
  
 cloNIDine HCl 0.1 mg tablet Commonly known as:  CATAPRES Your next dose is: Today, Tomorrow Other:  ____________ Dose:  0.1 mg Take 1 Tab by mouth nightly. Hold sys BP < 235 systolic  Indications: Hypertension Quantity:  30 Tab Refills:  3 NexIUM 40 mg capsule Generic drug:  esomeprazole Your next dose is: Today, Tomorrow Other:  ____________ Dose:  40 mg Take 40 mg by mouth daily. TAKES IN AM  
 Refills:  0 ZETIA 10 mg tablet Generic drug:  ezetimibe Your next dose is: Today, Tomorrow Other:  ____________ Dose:  10 mg Take 10 mg by mouth daily. TAKES IN PM  
 Refills:  0 Take these medications as directed Dose & Instructions Dispensing Information Comments Morning Noon Evening Bedtime  
 apixaban 5 mg tablet Commonly known as:  Katrina Shield Your next dose is: Today, Tomorrow Other:  ____________  
   
   
 take 1 tablet by mouth twice a day Quantity:  60 Tab Refills:  5 CALTRATE 600 + D PO Your next dose is: Today, Tomorrow Other:  ____________ Take  by mouth. Refills:  0 CENTRUM SILVER PO Your next dose is: Today, Tomorrow Other:  ____________ Take  by mouth. Refills:  0  
     
   
   
   
  
 diltiazem hcl 180 mg Tb24 Your next dose is: Today, Tomorrow Other:  ____________ Take  by mouth. Refills:  0

## 2017-02-03 NOTE — PROGRESS NOTES
TRANSFER - IN REPORT:    Verbal report received from prosper rn(name) on [facility]  being received from clpo(unit) for routine progression of care      Report consisted of patients Situation, Background, Assessment and   Recommendations(SBAR). Information from the following report(s) SBAR was reviewed with the receiving nurse. Opportunity for questions and clarification was provided. Assessment completed upon patients arrival to unit and care assumed.

## 2017-02-15 ENCOUNTER — HOSPITAL ENCOUNTER (OUTPATIENT)
Dept: MRI IMAGING | Age: 77
Discharge: HOME | End: 2017-02-15
Attending: INTERNAL MEDICINE
Payer: MEDICARE

## 2017-02-15 ENCOUNTER — TELEPHONE (OUTPATIENT)
Dept: CARDIOLOGY CLINIC | Age: 77
End: 2017-02-15

## 2017-02-15 VITALS — BODY MASS INDEX: 34.54 KG/M2 | WEIGHT: 195 LBS

## 2017-02-15 DIAGNOSIS — G47.33 OSA (OBSTRUCTIVE SLEEP APNEA): ICD-10-CM

## 2017-02-15 DIAGNOSIS — I45.10 RBBB: ICD-10-CM

## 2017-02-15 DIAGNOSIS — I51.7 LAE (LEFT ATRIAL ENLARGEMENT): ICD-10-CM

## 2017-02-15 DIAGNOSIS — K21.9 GASTROESOPHAGEAL REFLUX DISEASE WITHOUT ESOPHAGITIS: ICD-10-CM

## 2017-02-15 DIAGNOSIS — I10 HTN (HYPERTENSION), BENIGN: ICD-10-CM

## 2017-02-15 DIAGNOSIS — I10 HTN (HYPERTENSION), BENIGN: Primary | ICD-10-CM

## 2017-02-15 DIAGNOSIS — I48.0 PAROXYSMAL ATRIAL FIBRILLATION (HCC): ICD-10-CM

## 2017-02-15 PROCEDURE — 74011636320 HC RX REV CODE- 636/320: Performed by: INTERNAL MEDICINE

## 2017-02-15 PROCEDURE — 75561 CARDIAC MRI FOR MORPH W/DYE: CPT

## 2017-02-15 PROCEDURE — A9579 GAD-BASE MR CONTRAST NOS,1ML: HCPCS | Performed by: INTERNAL MEDICINE

## 2017-02-15 RX ADMIN — GADOPENTETATE DIMEGLUMINE 35 ML: 469.01 INJECTION INTRAVENOUS at 13:53

## 2017-02-15 NOTE — TELEPHONE ENCOUNTER
Patient calling to discuss her bp ad pulse readings. C/O fatigue. Requests a call back at [phone]. Thanks!

## 2017-02-16 NOTE — TELEPHONE ENCOUNTER
Patient is concerned that her HR may be dropping too low. She denies any dizziness but states her energy level has decreased. Confirmed that she is taking amiodarone 200mg daily, diltiazem 180 daily, and coreg 12.5mg BID.      BP lo/11 134/84 P53   170/88 P57 Took clonidine 0.1mg   129/71 P49   122/74 P56   139/80 P55   129/80 P51   125/76 P55   175/87 P53 Took clonidine 0.1mg   125/69 P52   123/67 P51   165/94 P51 Took clonidine 0.1mg   168/93 P54  2/15 136/77 P56   119/69 P45 After Cardiac MRI   123/59 P52  2/16 145/54 P51

## 2017-02-17 RX ORDER — CARVEDILOL 6.25 MG/1
6.25 TABLET ORAL 2 TIMES DAILY WITH MEALS
Qty: 60 TAB | Refills: 5 | Status: SHIPPED | OUTPATIENT
Start: 2017-02-17 | End: 2017-08-11 | Stop reason: SDUPTHER

## 2017-02-17 NOTE — TELEPHONE ENCOUNTER
CANDIDO Wheeler LPN        Caller: Unspecified (2 days ago,  4:35 PM)                     Please call her back and relay this change to her please.  Please have her call us back next Monday to see if her resting HR and symptoms have improved. CANDIDO Nicole NP; Carl Hester MD 21 hours ago (12:08 PM)                   Paul Payan,    Let's see if she gets improvement from Coreg 6.25BID. Let us know, thanks! Patient notified. Script sent to the pharmacy.

## 2017-02-21 DIAGNOSIS — Z01.818 PREOP TESTING: Primary | ICD-10-CM

## 2017-02-21 DIAGNOSIS — I48.0 PAROXYSMAL ATRIAL FIBRILLATION (HCC): ICD-10-CM

## 2017-02-27 RX ORDER — SODIUM CHLORIDE 0.9 % (FLUSH) 0.9 %
5-10 SYRINGE (ML) INJECTION AS NEEDED
Status: CANCELLED | OUTPATIENT
Start: 2017-03-02

## 2017-02-27 RX ORDER — SODIUM CHLORIDE 0.9 % (FLUSH) 0.9 %
5-10 SYRINGE (ML) INJECTION EVERY 8 HOURS
Status: CANCELLED | OUTPATIENT
Start: 2017-03-02

## 2017-03-01 LAB
BUN SERPL-MCNC: 12 MG/DL (ref 8–27)
BUN/CREAT SERPL: 15 (ref 11–26)
CALCIUM SERPL-MCNC: 9.1 MG/DL (ref 8.7–10.3)
CHLORIDE SERPL-SCNC: 100 MMOL/L (ref 96–106)
CO2 SERPL-SCNC: 23 MMOL/L (ref 18–29)
CREAT SERPL-MCNC: 0.82 MG/DL (ref 0.57–1)
ERYTHROCYTE [DISTWIDTH] IN BLOOD BY AUTOMATED COUNT: 14.7 % (ref 12.3–15.4)
GLUCOSE SERPL-MCNC: 99 MG/DL (ref 65–99)
HCT VFR BLD AUTO: 39.8 % (ref 34–46.6)
HGB BLD-MCNC: 12.8 G/DL (ref 11.1–15.9)
INR PPP: 1 (ref 0.8–1.2)
MCH RBC QN AUTO: 29.9 PG (ref 26.6–33)
MCHC RBC AUTO-ENTMCNC: 32.2 G/DL (ref 31.5–35.7)
MCV RBC AUTO: 93 FL (ref 79–97)
PLATELET # BLD AUTO: 219 X10E3/UL (ref 150–379)
POTASSIUM SERPL-SCNC: 4 MMOL/L (ref 3.5–5.2)
PROTHROMBIN TIME: 10.8 SEC (ref 9.1–12)
RBC # BLD AUTO: 4.28 X10E6/UL (ref 3.77–5.28)
SODIUM SERPL-SCNC: 140 MMOL/L (ref 134–144)
WBC # BLD AUTO: 9.5 X10E3/UL (ref 3.4–10.8)

## 2017-03-01 RX ORDER — APIXABAN 5 MG/1
TABLET, FILM COATED ORAL
Qty: 60 TAB | Refills: 11 | Status: SHIPPED | OUTPATIENT
Start: 2017-03-01 | End: 2018-03-03 | Stop reason: SDUPTHER

## 2017-03-02 ENCOUNTER — HOSPITAL ENCOUNTER (OUTPATIENT)
Dept: NON INVASIVE DIAGNOSTICS | Age: 77
Setting detail: OBSERVATION
Discharge: HOME | End: 2017-03-03
Attending: INTERNAL MEDICINE | Admitting: INTERNAL MEDICINE
Payer: MEDICARE

## 2017-03-02 ENCOUNTER — ANESTHESIA EVENT (OUTPATIENT)
Dept: NON INVASIVE DIAGNOSTICS | Age: 77
End: 2017-03-02
Payer: MEDICARE

## 2017-03-02 ENCOUNTER — ANESTHESIA (OUTPATIENT)
Dept: NON INVASIVE DIAGNOSTICS | Age: 77
End: 2017-03-02
Payer: MEDICARE

## 2017-03-02 LAB
ABO + RH BLD: NORMAL
ACT BLD: 167 SECS (ref 79–138)
ACT BLD: 188 SECS (ref 79–138)
ACT BLD: 188 SECS (ref 79–138)
ACT BLD: 291 SECS (ref 79–138)
ACT BLD: 317 SECS (ref 79–138)
BLOOD GROUP ANTIBODIES SERPL: NORMAL
SPECIMEN EXP DATE BLD: NORMAL

## 2017-03-02 PROCEDURE — 77030027107 HC PTCH EXT REF CARTO3 J&J -F

## 2017-03-02 PROCEDURE — 77030035291 HC TBNG PMP SMARTABLATE J&J -B

## 2017-03-02 PROCEDURE — 86900 BLOOD TYPING SEROLOGIC ABO: CPT | Performed by: INTERNAL MEDICINE

## 2017-03-02 PROCEDURE — C1731 CATH, EP, 20 OR MORE ELEC: HCPCS

## 2017-03-02 PROCEDURE — C1893 INTRO/SHEATH, FIXED,NON-PEEL: HCPCS

## 2017-03-02 PROCEDURE — 93656 COMPRE EP EVAL ABLTJ ATR FIB: CPT

## 2017-03-02 PROCEDURE — 74011250636 HC RX REV CODE- 250/636: Performed by: INTERNAL MEDICINE

## 2017-03-02 PROCEDURE — C1732 CATH, EP, DIAG/ABL, 3D/VECT: HCPCS

## 2017-03-02 PROCEDURE — C1894 INTRO/SHEATH, NON-LASER: HCPCS

## 2017-03-02 PROCEDURE — 99218 HC RM OBSERVATION: CPT

## 2017-03-02 PROCEDURE — 77030020506 HC NDL TRNSPTL NRG BAYL -F

## 2017-03-02 PROCEDURE — 77030026438 HC STYL ET INTUB CARD -A: Performed by: ANESTHESIOLOGY

## 2017-03-02 PROCEDURE — 76060000037 HC ANESTHESIA 3 TO 3.5 HR

## 2017-03-02 PROCEDURE — 36415 COLL VENOUS BLD VENIPUNCTURE: CPT | Performed by: INTERNAL MEDICINE

## 2017-03-02 PROCEDURE — 85347 COAGULATION TIME ACTIVATED: CPT

## 2017-03-02 PROCEDURE — C1776 JOINT DEVICE (IMPLANTABLE): HCPCS

## 2017-03-02 PROCEDURE — 77030034850

## 2017-03-02 PROCEDURE — 74011250637 HC RX REV CODE- 250/637: Performed by: INTERNAL MEDICINE

## 2017-03-02 PROCEDURE — 93312 ECHO TRANSESOPHAGEAL: CPT

## 2017-03-02 PROCEDURE — 74011000250 HC RX REV CODE- 250

## 2017-03-02 PROCEDURE — 94660 CPAP INITIATION&MGMT: CPT

## 2017-03-02 PROCEDURE — 77030008684 HC TU ET CUF COVD -B: Performed by: ANESTHESIOLOGY

## 2017-03-02 PROCEDURE — 74011250636 HC RX REV CODE- 250/636

## 2017-03-02 PROCEDURE — 77030013797 HC KT TRNSDUC PRSSR EDWD -A

## 2017-03-02 PROCEDURE — C1730 CATH, EP, 19 OR FEW ELECT: HCPCS

## 2017-03-02 PROCEDURE — 77030018729 HC ELECTRD DEFIB PAD CARD -B

## 2017-03-02 PROCEDURE — 77030020508 HC PD GRND GENRTR BAYL -A

## 2017-03-02 PROCEDURE — C1759 CATH, INTRA ECHOCARDIOGRAPHY: HCPCS

## 2017-03-02 RX ORDER — CLONIDINE HYDROCHLORIDE 0.1 MG/1
0.1 TABLET ORAL
Status: DISCONTINUED | OUTPATIENT
Start: 2017-03-02 | End: 2017-03-03 | Stop reason: HOSPADM

## 2017-03-02 RX ORDER — ESOMEPRAZOLE MAGNESIUM 40 MG/1
40 CAPSULE, DELAYED RELEASE ORAL DAILY
Status: DISCONTINUED | OUTPATIENT
Start: 2017-03-02 | End: 2017-03-02 | Stop reason: CLARIF

## 2017-03-02 RX ORDER — SODIUM CHLORIDE 9 MG/ML
INJECTION, SOLUTION INTRAVENOUS
Status: DISCONTINUED | OUTPATIENT
Start: 2017-03-02 | End: 2017-03-02 | Stop reason: HOSPADM

## 2017-03-02 RX ORDER — SUCCINYLCHOLINE CHLORIDE 20 MG/ML
INJECTION INTRAMUSCULAR; INTRAVENOUS AS NEEDED
Status: DISCONTINUED | OUTPATIENT
Start: 2017-03-02 | End: 2017-03-02 | Stop reason: HOSPADM

## 2017-03-02 RX ORDER — PROPOFOL 10 MG/ML
INJECTION, EMULSION INTRAVENOUS AS NEEDED
Status: DISCONTINUED | OUTPATIENT
Start: 2017-03-02 | End: 2017-03-02 | Stop reason: HOSPADM

## 2017-03-02 RX ORDER — ACETAMINOPHEN 325 MG/1
650 TABLET ORAL
Status: DISCONTINUED | OUTPATIENT
Start: 2017-03-02 | End: 2017-03-03 | Stop reason: HOSPADM

## 2017-03-02 RX ORDER — ONDANSETRON 2 MG/ML
4 INJECTION INTRAMUSCULAR; INTRAVENOUS
Status: DISCONTINUED | OUTPATIENT
Start: 2017-03-02 | End: 2017-03-03 | Stop reason: HOSPADM

## 2017-03-02 RX ORDER — HEPARIN SODIUM 1000 [USP'U]/ML
10000 INJECTION, SOLUTION INTRAVENOUS; SUBCUTANEOUS AS NEEDED
Status: DISCONTINUED | OUTPATIENT
Start: 2017-03-02 | End: 2017-03-02 | Stop reason: HOSPADM

## 2017-03-02 RX ORDER — SODIUM CHLORIDE 0.9 % (FLUSH) 0.9 %
5-10 SYRINGE (ML) INJECTION AS NEEDED
Status: DISCONTINUED | OUTPATIENT
Start: 2017-03-02 | End: 2017-03-03 | Stop reason: HOSPADM

## 2017-03-02 RX ORDER — MIDAZOLAM HYDROCHLORIDE 1 MG/ML
INJECTION, SOLUTION INTRAMUSCULAR; INTRAVENOUS AS NEEDED
Status: DISCONTINUED | OUTPATIENT
Start: 2017-03-02 | End: 2017-03-02 | Stop reason: HOSPADM

## 2017-03-02 RX ORDER — SODIUM CHLORIDE 0.9 % (FLUSH) 0.9 %
SYRINGE (ML) INJECTION
Status: DISCONTINUED
Start: 2017-03-02 | End: 2017-03-03 | Stop reason: HOSPADM

## 2017-03-02 RX ORDER — DEXAMETHASONE SODIUM PHOSPHATE 4 MG/ML
INJECTION, SOLUTION INTRA-ARTICULAR; INTRALESIONAL; INTRAMUSCULAR; INTRAVENOUS; SOFT TISSUE AS NEEDED
Status: DISCONTINUED | OUTPATIENT
Start: 2017-03-02 | End: 2017-03-02 | Stop reason: HOSPADM

## 2017-03-02 RX ORDER — EZETIMIBE 10 MG/1
10 TABLET ORAL
Status: DISCONTINUED | OUTPATIENT
Start: 2017-03-02 | End: 2017-03-03 | Stop reason: HOSPADM

## 2017-03-02 RX ORDER — ONDANSETRON 2 MG/ML
INJECTION INTRAMUSCULAR; INTRAVENOUS AS NEEDED
Status: DISCONTINUED | OUTPATIENT
Start: 2017-03-02 | End: 2017-03-02 | Stop reason: HOSPADM

## 2017-03-02 RX ORDER — CLONIDINE HYDROCHLORIDE 0.1 MG/1
0.1 TABLET ORAL
Status: DISCONTINUED | OUTPATIENT
Start: 2017-03-02 | End: 2017-03-02

## 2017-03-02 RX ORDER — FENTANYL CITRATE 50 UG/ML
INJECTION, SOLUTION INTRAMUSCULAR; INTRAVENOUS AS NEEDED
Status: DISCONTINUED | OUTPATIENT
Start: 2017-03-02 | End: 2017-03-02 | Stop reason: HOSPADM

## 2017-03-02 RX ORDER — CARVEDILOL 6.25 MG/1
6.25 TABLET ORAL 2 TIMES DAILY WITH MEALS
Status: DISCONTINUED | OUTPATIENT
Start: 2017-03-02 | End: 2017-03-03 | Stop reason: HOSPADM

## 2017-03-02 RX ORDER — ATROPINE SULFATE 0.1 MG/ML
1 INJECTION INTRAVENOUS ONCE
Status: DISCONTINUED | OUTPATIENT
Start: 2017-03-02 | End: 2017-03-02 | Stop reason: HOSPADM

## 2017-03-02 RX ORDER — ROCURONIUM BROMIDE 10 MG/ML
INJECTION, SOLUTION INTRAVENOUS AS NEEDED
Status: DISCONTINUED | OUTPATIENT
Start: 2017-03-02 | End: 2017-03-02 | Stop reason: HOSPADM

## 2017-03-02 RX ORDER — HEPARIN SODIUM 1000 [USP'U]/ML
INJECTION, SOLUTION INTRAVENOUS; SUBCUTANEOUS AS NEEDED
Status: DISCONTINUED | OUTPATIENT
Start: 2017-03-02 | End: 2017-03-02 | Stop reason: HOSPADM

## 2017-03-02 RX ORDER — HEPARIN SODIUM 200 [USP'U]/100ML
500 INJECTION, SOLUTION INTRAVENOUS CONTINUOUS
Status: DISCONTINUED | OUTPATIENT
Start: 2017-03-02 | End: 2017-03-02 | Stop reason: HOSPADM

## 2017-03-02 RX ORDER — PROTAMINE SULFATE 10 MG/ML
1-60 INJECTION, SOLUTION INTRAVENOUS ONCE
Status: COMPLETED | OUTPATIENT
Start: 2017-03-02 | End: 2017-03-02

## 2017-03-02 RX ORDER — SODIUM CHLORIDE 0.9 % (FLUSH) 0.9 %
5-10 SYRINGE (ML) INJECTION EVERY 8 HOURS
Status: DISCONTINUED | OUTPATIENT
Start: 2017-03-02 | End: 2017-03-03 | Stop reason: HOSPADM

## 2017-03-02 RX ORDER — HEPARIN SODIUM 200 [USP'U]/100ML
500 INJECTION, SOLUTION INTRAVENOUS ONCE
Status: COMPLETED | OUTPATIENT
Start: 2017-03-02 | End: 2017-03-02

## 2017-03-02 RX ORDER — PANTOPRAZOLE SODIUM 40 MG/1
40 TABLET, DELAYED RELEASE ORAL
Status: DISCONTINUED | OUTPATIENT
Start: 2017-03-03 | End: 2017-03-03 | Stop reason: HOSPADM

## 2017-03-02 RX ORDER — DILTIAZEM HYDROCHLORIDE 180 MG/1
180 CAPSULE, COATED, EXTENDED RELEASE ORAL DAILY
Status: DISCONTINUED | OUTPATIENT
Start: 2017-03-02 | End: 2017-03-03 | Stop reason: HOSPADM

## 2017-03-02 RX ORDER — FUROSEMIDE 10 MG/ML
40 INJECTION INTRAMUSCULAR; INTRAVENOUS ONCE
Status: COMPLETED | OUTPATIENT
Start: 2017-03-02 | End: 2017-03-02

## 2017-03-02 RX ORDER — SODIUM CHLORIDE, SODIUM LACTATE, POTASSIUM CHLORIDE, CALCIUM CHLORIDE 600; 310; 30; 20 MG/100ML; MG/100ML; MG/100ML; MG/100ML
INJECTION, SOLUTION INTRAVENOUS
Status: DISCONTINUED | OUTPATIENT
Start: 2017-03-02 | End: 2017-03-02 | Stop reason: HOSPADM

## 2017-03-02 RX ORDER — ADENOSINE 3 MG/ML
6 INJECTION, SOLUTION INTRAVENOUS
Status: DISCONTINUED | OUTPATIENT
Start: 2017-03-02 | End: 2017-03-02 | Stop reason: HOSPADM

## 2017-03-02 RX ORDER — LIDOCAINE HYDROCHLORIDE 20 MG/ML
INJECTION, SOLUTION EPIDURAL; INFILTRATION; INTRACAUDAL; PERINEURAL AS NEEDED
Status: DISCONTINUED | OUTPATIENT
Start: 2017-03-02 | End: 2017-03-02 | Stop reason: HOSPADM

## 2017-03-02 RX ADMIN — Medication 10 ML: at 21:26

## 2017-03-02 RX ADMIN — Medication 10 ML: at 14:00

## 2017-03-02 RX ADMIN — ONDANSETRON 4 MG: 2 INJECTION INTRAMUSCULAR; INTRAVENOUS at 08:57

## 2017-03-02 RX ADMIN — APIXABAN 5 MG: 5 TABLET, FILM COATED ORAL at 21:26

## 2017-03-02 RX ADMIN — FENTANYL CITRATE 50 MCG: 50 INJECTION, SOLUTION INTRAMUSCULAR; INTRAVENOUS at 08:36

## 2017-03-02 RX ADMIN — HEPARIN SODIUM 5000 UNITS: 1000 INJECTION, SOLUTION INTRAVENOUS; SUBCUTANEOUS at 10:13

## 2017-03-02 RX ADMIN — HEPARIN SODIUM 4000 UNITS: 1000 INJECTION, SOLUTION INTRAVENOUS; SUBCUTANEOUS at 10:38

## 2017-03-02 RX ADMIN — EZETIMIBE 10 MG: 10 TABLET ORAL at 21:26

## 2017-03-02 RX ADMIN — PROPOFOL 100 MG: 10 INJECTION, EMULSION INTRAVENOUS at 08:36

## 2017-03-02 RX ADMIN — ROCURONIUM BROMIDE 5 MG: 10 INJECTION, SOLUTION INTRAVENOUS at 08:36

## 2017-03-02 RX ADMIN — HEPARIN SODIUM 1000 UNITS: 200 INJECTION, SOLUTION INTRAVENOUS at 09:01

## 2017-03-02 RX ADMIN — SODIUM CHLORIDE, SODIUM LACTATE, POTASSIUM CHLORIDE, CALCIUM CHLORIDE: 600; 310; 30; 20 INJECTION, SOLUTION INTRAVENOUS at 09:41

## 2017-03-02 RX ADMIN — HEPARIN SODIUM 8000 UNITS: 1000 INJECTION, SOLUTION INTRAVENOUS; SUBCUTANEOUS at 09:20

## 2017-03-02 RX ADMIN — CARVEDILOL 6.25 MG: 6.25 TABLET, FILM COATED ORAL at 17:48

## 2017-03-02 RX ADMIN — FUROSEMIDE 40 MG: 10 INJECTION INTRAVENOUS at 11:13

## 2017-03-02 RX ADMIN — LIDOCAINE HYDROCHLORIDE 100 MG: 20 INJECTION, SOLUTION EPIDURAL; INFILTRATION; INTRACAUDAL; PERINEURAL at 08:36

## 2017-03-02 RX ADMIN — CLONIDINE HYDROCHLORIDE 0.1 MG: 0.1 TABLET ORAL at 15:13

## 2017-03-02 RX ADMIN — SODIUM CHLORIDE: 9 INJECTION, SOLUTION INTRAVENOUS at 08:26

## 2017-03-02 RX ADMIN — MIDAZOLAM HYDROCHLORIDE 2 MG: 1 INJECTION, SOLUTION INTRAMUSCULAR; INTRAVENOUS at 08:26

## 2017-03-02 RX ADMIN — DEXAMETHASONE SODIUM PHOSPHATE 6 MG: 4 INJECTION, SOLUTION INTRA-ARTICULAR; INTRALESIONAL; INTRAMUSCULAR; INTRAVENOUS; SOFT TISSUE at 08:57

## 2017-03-02 RX ADMIN — PROTAMINE SULFATE 40 MG: 10 INJECTION, SOLUTION INTRAVENOUS at 11:08

## 2017-03-02 RX ADMIN — HEPARIN SODIUM 5000 UNITS: 1000 INJECTION, SOLUTION INTRAVENOUS; SUBCUTANEOUS at 09:46

## 2017-03-02 RX ADMIN — SUCCINYLCHOLINE CHLORIDE 120 MG: 20 INJECTION INTRAMUSCULAR; INTRAVENOUS at 08:37

## 2017-03-02 NOTE — PROGRESS NOTES
1530: Bedside and Verbal shift change report given to SRUTHI Ashby  (oncoming nurse) by Anali Clemons RN (offgoing nurse). Report included the following information SBAR, Kardex, Intake/Output, MAR and Recent Results. 1800: Patient off bedrest. Walked around room. No pain or bleeding at groin site. Will continue to monitor. 1930: Bedside and Verbal shift change report given to Dereck Ashby RN (oncoming nurse) by Catia Kennedy RN (offgoing nurse). Report included the following information SBAR, Kardex, Intake/Output, MAR and Recent Results.

## 2017-03-02 NOTE — ROUTINE PROCESS
TRANSFER - OUT REPORT:    Verbal report given to SRUTHI Sheriff(name) on Radha Craig  being transferred to [facility](unit) for routine progression of care       Report consisted of patients Situation, Background, Assessment and   Recommendations(SBAR). Information from the following report(s) Procedure Summary, Intake/Output, MAR and Recent Results was reviewed with the receiving nurse. Lines:   Peripheral IV 03/02/17 Right Antecubital (Active)   Site Assessment Clean, dry, & intact 3/2/2017  7:57 AM   Phlebitis Assessment 0 3/2/2017  7:57 AM   Infiltration Assessment 0 3/2/2017  7:57 AM   Dressing Status Clean, dry, & intact 3/2/2017  7:57 AM   Dressing Type Tape;Transparent 3/2/2017  7:57 AM   Hub Color/Line Status Green;Flushed 3/2/2017  7:57 AM       Peripheral IV 03/02/17 Left Antecubital (Active)   Site Assessment Clean, dry, & intact 3/2/2017  7:57 AM   Phlebitis Assessment 0 3/2/2017  7:57 AM   Infiltration Assessment 0 3/2/2017  7:57 AM   Dressing Status Clean, dry, & intact 3/2/2017  7:57 AM   Dressing Type Tape;Transparent 3/2/2017  7:57 AM   Hub Color/Line Status Pink;Capped;Flushed 3/2/2017  7:57 AM        Opportunity for questions and clarification was provided. Patient transported with:   Monitor  Registered Nurse    [phone]:  Vasquez Will received patient in patient's room. Patient slide over to bed with a slide board. Right groin site without bleeding and no hematoma.

## 2017-03-02 NOTE — PROGRESS NOTES
1545: Bedside and Verbal shift change report given to Sony Ventura RN (oncoming nurse) by Teresa Ayon RN (offgoing nurse). Report included the following information SBAR, Kardex, OR Summary, Procedure Summary, Intake/Output, MAR, Recent Results, Med Rec Status and Cardiac Rhythm NSR.

## 2017-03-02 NOTE — IP AVS SNAPSHOT
Current Discharge Medication List  
  
Take these medications at their scheduled times Dose & Instructions Dispensing Information Comments Morning Noon Evening Bedtime  
 amiodarone 200 mg tablet Commonly known as:  CORDARONE Your next dose is: Today, Tomorrow Other:  ____________ Dose:  200 mg Take 1 Tab by mouth daily. Indications: PREVENTION OF RECURRENT ATRIAL FIBRILLATION Quantity:  30 Tab Refills:  1  
     
   
   
   
  
 carvedilol 6.25 mg tablet Commonly known as:  Mar [place] Your next dose is: Today, Tomorrow Other:  ____________ Dose:  6.25 mg Take 1 Tab by mouth two (2) times daily (with meals). Quantity:  60 Tab Refills:  5 This is a dose adjustment. cloNIDine HCl 0.1 mg tablet Commonly known as:  CATAPRES Your next dose is: Today, Tomorrow Other:  ____________ Dose:  0.1 mg Take 1 Tab by mouth nightly. Hold sys BP < 992 systolic  Indications: Hypertension Quantity:  30 Tab Refills:  3 NexIUM 40 mg capsule Generic drug:  esomeprazole Your next dose is: Today, Tomorrow Other:  ____________ Dose:  40 mg Take 40 mg by mouth daily. TAKES IN AM  
 Refills:  0 ZETIA 10 mg tablet Generic drug:  ezetimibe Your next dose is: Today, Tomorrow Other:  ____________ Dose:  10 mg Take 10 mg by mouth daily. TAKES IN PM  
 Refills:  0 Take these medications as directed Dose & Instructions Dispensing Information Comments Morning Noon Evening Bedtime CALTRATE 600 + D PO Your next dose is: Today, Tomorrow Other:  ____________ Take  by mouth. Refills:  0 CENTRUM SILVER PO Your next dose is: Today, Tomorrow Other:  ____________ Take  by mouth. Refills:  0 diltiazem hcl 180 mg Tb24 Your next dose is: Today, Tomorrow Other:  ____________ Take  by mouth. Refills:  0  
     
   
   
   
  
 ELIQUIS 5 mg tablet Generic drug:  apixaban Your next dose is: Today, Tomorrow Other:  ____________  
   
   
 take 1 tablet by mouth twice a day Quantity:  60 Tab Refills:  11

## 2017-03-02 NOTE — PROGRESS NOTES
Cardiac Cath Lab Procedure Area Arrival Note:    Dayanara Bundy arrived to Cardiac Cath Lab, Procedure Area. Patient identifiers verified with NAME and DATE OF BIRTH. Procedure verified with patient. Consent forms verified. Allergies verified. Patient informed of procedure and plan of care. Questions answered with review. Patient voiced understanding of procedure and plan of care. Patient on cardiac monitor, non-invasive blood pressure, SPO2 monitor. On roomair or O2 @ 2 lpm via NC.  IV of 0.9NS on pump at 25 ml/hr. Patient status doing well without problems. Patient is A&Ox 3. Patient reports no discomfort. Patient medicated during procedure with orders obtained and verified by Dr. Michelle Henley. Refer to patients Cardiac Cath Lab PROCEDURE REPORT for vital signs, assessment, status, and response during procedure, printed at end of case. Printed report on chart or scanned into chart.

## 2017-03-02 NOTE — ANESTHESIA PROCEDURE NOTES
Arterial Line Placement    Start time: 3/2/2017 8:01 AM  End time: 3/2/2017 8:10 AM  Performed by: Vivek Kohli  Authorized by: Vivek Kohli     Pre-Procedure  Indications:  Arterial pressure monitoring and blood sampling  Preanesthetic Checklist: patient identified, risks and benefits discussed, anesthesia consent, site marked, patient being monitored, timeout performed and patient being monitored      Procedure:   Prep:  Chlorhexidine  Seldinger Technique?: Yes    Orientation:  Left  Location:  Radial artery  Catheter size:  20 G  Number of attempts:  2  Cont Cardiac Output Sensor: Yes      Assessment:   Post-procedure:  Line secured and sterile dressing applied  Patient Tolerance:  Patient tolerated the procedure well with no immediate complications

## 2017-03-02 NOTE — ANESTHESIA POSTPROCEDURE EVALUATION
Post-Anesthesia Evaluation and Assessment    Patient: Kayli Rosas MRN: [id number]  SSN: [id number]    YOB: 1940  Age: 78 y.o. Sex: female       Cardiovascular Function/Vital Signs  Visit Vitals    /66 (BP 1 Location: Right arm, BP Patient Position: At rest)    Pulse 61    Temp 36.3 °C (97.4 °F)    Resp 16    Ht 5' 3\" (1.6 m)    Wt 88.5 kg (195 lb)    SpO2 96%    Breastfeeding No    BMI 34.54 kg/m2       Patient is status post general anesthesia for * No procedures listed *. Nausea/Vomiting: None    Postoperative hydration reviewed and adequate. Pain:  Pain Scale 1: Numeric (0 - 10) (03/02/17 1231)  Pain Intensity 1: 0 (03/02/17 1231)   Managed    Neurological Status: At baseline    Mental Status and Level of Consciousness: Arousable    Pulmonary Status:   O2 Device: Nasal cannula (03/02/17 1231)   Adequate oxygenation and airway patent    Complications related to anesthesia: None    Post-anesthesia assessment completed.  No concerns    Signed By: Amy Chappell MD     March 2, 2017

## 2017-03-02 NOTE — H&P
HISTORY OF PRESENTING ILLNESS       Cora Almonte is a 78 y.o. female with persistent AF, ANÍBAL, HTN, RBBB referred to discuss atrial fibrillation rhythm management options. She is on Propafenone and Eliquis and has persistent AF. Echo demonstrated preserved LV function with LA diameter of 3.4cm in May 2015. Patient had undergone several cardioversions in the past; she derived symptomatic improvement with restoration of sinus rhythm. She has been intolerant of other rhythm management medications in the past. She underwent cardioversion to restore sinus rhythm in preparation for her AF ablation and now presents for AF ablation to allow enhanced rhythm control as well as avoidance of long-term AAD therapy.        ACTIVE PROBLEM LIST           Patient Active Problem List     Diagnosis Date Noted    Paroxysmal atrial fibrillation ([facility] [address] 08/28/2016    ANÍBAL (obstructive sleep apnea) 07/26/2016    HTN (hypertension), benign 07/26/2016    Gastroesophageal reflux disease without esophagitis 07/03/2016    RBBB 05/07/2015    LAE (left atrial enlargement) 04/03/2013      PAST MEDICAL HISTORY           Past Medical History   Diagnosis Date    DDD (degenerative disc disease)      Gastroesophageal reflux disease without esophagitis 7/3/2016    GERD (gastroesophageal reflux disease)      Hiatal hernia      HTN (hypertension), benign 7/26/2016    Hypertension      ANÍBAL (obstructive sleep apnea) 7/26/2016    Paroxysmal atrial fibrillation ([address] 8/28/2016    Uterine prolapse              PAST SURGICAL HISTORY             Past Surgical History   Procedure Laterality Date    Hx gi           COLONOSCOPY 7/14    Hx gyn           TUBAL LIGATION    Hx heent           WISDOM TEETH EXTRACTED.          ALLERGIES            Allergies   Allergen Reactions    Citalopram Hydrobromide Rash       With itching.     Chlorthalidone Rash    Maxzide [Triamterene-Hydrochlorothiazid] Palpitations    Vicodin [Hydrocodone-Acetaminophen] Palpitations            FAMILY HISTORY            Family History   Problem Relation Age of Onset    Diabetes Mother      Hypertension Mother      COPD Mother      negative for cardiac disease        SOCIAL HISTORY       Social History                Social History    Marital status:        Spouse name: N/A    Number of children: N/A    Years of education: N/A              Social History Main Topics    Smoking status: Former Smoker       Packs/day: 1.50       Years: 30.00       Quit date: 3/3/1994    Smokeless tobacco: Never Used    Alcohol use 0.6 oz/week        1 Glasses of wine per week          Comment: rarely    Drug use: No    Sexual activity: Not Asked           Other Topics Concern    None      Social History Narrative               MEDICATIONS           Current Outpatient Prescriptions   Medication Sig    azithromycin (ZITHROMAX) 250 mg tablet Take 250 mg by mouth daily.  diltiazem hcl 180 mg Tb24 Take by mouth.  cloNIDine HCl (CATAPRES) 0.1 mg tablet Take 1 Tab by mouth nightly. Hold sys BP < 042 systolic Indications: Hypertension    carvedilol (COREG) 25 mg tablet Take 0.5 Tabs by mouth two (2) times daily (with meals).  apixaban (ELIQUIS) 5 mg tablet take 1 tablet by mouth twice a day    FOLIC ACID/MULTIVIT-MIN/LUTEIN (CENTRUM SILVER PO) Take by mouth.  CALCIUM CARBONATE/VITAMIN D3 (CALTRATE 600 + D PO) Take by mouth.  ezetimibe (ZETIA) 10 mg tablet Take 10 mg by mouth daily. TAKES IN PM    esomeprazole (NEXIUM) 40 mg capsule Take 40 mg by mouth daily.  TAKES IN AM    amoxicillin-clavulanate (AUGMENTIN) 250-62.5 mg/5 mL suspension Take by mouth three (3) times daily.      No current facility-administered medications for this visit.          I have reviewed the nurses notes, vitals, problem list, allergy list, medical history, family, social history and medications.        REVIEW OF SYMPTOMS       General: Pt denies excessive weight gain or loss. Pt is able to conduct ADL's  HEENT: Denies blurred vision, headaches, hearing loss, epistaxis and difficulty swallowing. Respiratory: Denies cough, congestion, shortness of breath, GOODE, wheezing or stridor. Cardiovascular: Denies precordial pain, edema or PND. Positive for palpitations. Gastrointestinal: Denies poor appetite, indigestion, abdominal pain or blood in stool  Genitourinary: Denies hematuria, dysuria, increased urinary frequency  Musculoskeletal: Denies joint pain or swelling from muscles or joints  Neurologic: Denies tremor, paresthesias, headache, or sensory motor disturbance  Psychiatric: Denies confusion, insomnia, depression  Integumentray: Denies rash, itching or ulcers. Hematologic: Denies easy bruising, bleeding     PHYSICAL EXAMINATION           Vitals:     01/27/17 1111   BP: (!) 158/92   Pulse: 73   Resp: 20   SpO2: 94%   Weight: 194 lb (88 kg)   Height: 5' 3\" (1.6 m)      General: Well developed, in no acute distress. HEENT: No jaundice, oral mucosa moist, no oral ulcers  Neck: Supple, no stiffness, no lymphadenopathy, supple  Heart:  Normal S1/S2 negative S3 or S4. Regular, no murmur, gallop or rub, no jugular venous distention  Respiratory: Clear bilaterally x 4, no wheezing or rales  Abdomen:   Soft, non-tender, bowel sounds are active.   Extremities: No edema, normal cap refill, no cyanosis. Musculoskeletal: No clubbing, no deformities  Neuro: A&Ox3, speech clear, gait stable, cooperative, no focal neurologic deficits  Skin: Skin color is normal. No rashes or lesions.  Non diaphoretic, moist.  Vascular: 2+ pulses symmetric in all extremities          LABORATORY DATA             Lab Results   Component Value Date/Time     WBC 12.1 01/18/2017 09:58 PM     HGB 12.9 01/18/2017 09:58 PM     HCT 40.3 01/18/2017 09:58 PM     PLATELET 686 84/75/7785 09:58 PM     MCV 93.3 01/18/2017 09:58 PM            Lab Results   Component Value Date/Time     Sodium 140 01/18/2017 09:58 PM     Potassium 3.5 01/18/2017 09:58 PM     Chloride 102 01/18/2017 09:58 PM     CO2 28 01/18/2017 09:58 PM     Anion gap 10 01/18/2017 09:58 PM     Glucose 151 01/18/2017 09:58 PM     BUN 9 01/18/2017 09:58 PM     Creatinine 0.89 01/18/2017 09:58 PM     BUN/Creatinine ratio 10 01/18/2017 09:58 PM     GFR est AA >60 01/18/2017 09:58 PM     GFR est non-AA >60 01/18/2017 09:58 PM     Calcium 9.0 01/18/2017 09:58 PM     Bilirubin, total 0.8 01/18/2017 09:58 PM     ALT 40 01/18/2017 09:58 PM     AST 17 01/18/2017 09:58 PM     Alk. phosphatase 109 01/18/2017 09:58 PM     Protein, total 7.6 01/18/2017 09:58 PM     Albumin 3.7 01/18/2017 09:58 PM     Globulin 3.9 01/18/2017 09:58 PM     A-G Ratio 0.9 01/18/2017 09:58 PM            ASSESSMENT       1. Atrial fibrillation    A. Persistent   B. Symptomatic   2. Hiatal hernia  3. Hypertension   4. ANÍBAL  5. RBBB  6. GERD     PLAN      Discussed various options with the patient. Will plan for AF ablation 4 weeks after Amiodarone loading.  Plan for ANAI and cardiac MRI prior to AF ablation.          Wes Vergara MD  Cardiac Electrophysiology / Cardiology     [facility]  [address] [place] [address]  [phone] / [phone] Fax   [phone] / [phone] Fax

## 2017-03-02 NOTE — PROGRESS NOTES
TRANSFER - IN REPORT:    Verbal [facility] received [facility] on [facility]aud  being received from procedure room for routine care. Report consisted of patients Situation, Background, Assessment and Recommendations(SBAR). Information from the following report(s) SBAR was reviewed with the receiving clinician. Opportunity for questions and clarification was provided. Assessment completed upon patients arrival to [address] [place] [address] care assumed. Cardiac Cath Lab Recovery Arrival Note:    Valery Michaud arrived to [facility] recovery area. Patient procedure= A-Fib ablation. Patient on cardiac monitor, non-invasive blood pressure, SPO2 monitor. On  O2 @ 3 lpm via NC.  IV  of NS on pump at [facility] ml/hr. Patient status doing well without problems. Patient is A&Ox 3. Patient reports no CP. PROCEDURE SITE CHECK:    Procedure site:without any bleeding and no hematoma, No pain/discomfort reported at procedure site. No change in patient status. Continue to monitor patient and status. 2 #8 1/2 long sheaths and 1 short # 8 sheath in right femoral vein noted.

## 2017-03-02 NOTE — DISCHARGE SUMMARY
Cardiology Discharge Summary     Patient ID:  Brendan Vincent  [id number]  60 y.o.  1940    Admit Date: 3/2/2017    Discharge Date: 03/03/17    Admitting Physician: Lisa Diana MD     Discharge Physician: Lisandro Phan. Sanjay Pruitt MD    Admission Diagnoses:    Atrial Fibrillation    Discharge Diagnoses:   Atrial fibrillation, resolved    Discharge Condition: Good    Cardiology Procedures this Admission:    3/2/17:   Atrial fibrillation ablation performed   Additional line of ablation performed   ANAI performed   Intracardiac echocardiogram performed   3d mapping peformed   Left atrial pacing/recording performed   Comprehensive electrophysiology study performed   Transseptal access x 2     Consults: None    Hospital Course:   Admitted to observation following successful ablation of afib  Without Afib or ventricular arrhythmias overnight. Occasional PACs noted on telemetry  Stable on morning of discharge. She did offer complaints for some discomfort in a band like fashion around her chest.  She was given 650 mg Tylenol PO, for which the discomfort resolved. Ambulated the hallways with NSG without difficulty or discomfort. Visit Vitals    /64 (BP 1 Location: Right arm, BP Patient Position: At rest)    Pulse 73    Temp 97.9 °F (36.6 °C)    Resp 16    Ht 5' 3\" (1.6 m)    Wt 88.5 kg (195 lb 1.7 oz)    SpO2 96%    Breastfeeding No    BMI 34.56 kg/m2       Physical Exam  Abdomen: soft, non-tender.  Bowel sounds normal. No masses,  no organomegaly  Extremities: no LE edema, + PP bilaterally   Heart: regular rate and rhythm, S1, S2 normal, no murmurs, clicks, rubs or gallops  Lungs: clear to auscultation bilaterally  Neck: supple, symmetrical, trachea midline, no adenopathy, no JVD, no carotid bruits  Neurologic: Grossly normal  Pulses: 2+ and symmetrical    Labs:   Recent Labs      02/28/17   1637   WBC  9.5   HGB  12.8   HCT  39.8   PLT  219     Recent Labs      02/28/17   1637   NA  140   K  4.0   CL 100   CO2  23   GLU  99   BUN  12   CREA  0.82   CA  9.1   INR  1.0       No results for input(s): TROIQ, CPK, CKMB in the last 72 hours. Disposition: home    Patient Instructions:   Current Discharge Medication List      CONTINUE these medications which have NOT CHANGED    Details   carvedilol (COREG) 6.25 mg tablet Take 1 Tab by mouth two (2) times daily (with meals). Qty: 60 Tab, Refills: 5    Comments: This is a dose adjustment. amiodarone (CORDARONE) 200 mg tablet Take 1 Tab by mouth daily. Indications: PREVENTION OF RECURRENT ATRIAL FIBRILLATION  Qty: 30 Tab, Refills: 1    Associated Diagnoses: Paroxysmal atrial fibrillation ([address] RBBB; HTN (hypertension), benign; ANÍBAL (obstructive sleep apnea); LAE (left atrial enlargement); Gastroesophageal reflux disease without esophagitis      diltiazem hcl 180 mg Tb24 Take  by mouth.      cloNIDine HCl (CATAPRES) 0.1 mg tablet Take 1 Tab by mouth nightly. Hold sys BP < 408 systolic  Indications: Hypertension  Qty: 30 Tab, Refills: 3      FOLIC ACID/MULTIVIT-MIN/LUTEIN (CENTRUM SILVER PO) Take  by mouth. CALCIUM CARBONATE/VITAMIN D3 (CALTRATE 600 + D PO) Take  by mouth.      ezetimibe (ZETIA) 10 mg tablet Take 10 mg by mouth daily. TAKES IN PM      esomeprazole (NEXIUM) 40 mg capsule Take 40 mg by mouth daily. TAKES IN AM      ELIQUIS 5 mg tablet take 1 tablet by mouth twice a day  Qty: 60 Tab, Refills: 11             Reference discharge instructions provided by nursing for diet and activity.     Follow-up with   Future Appointments  Date Time Provider Ray Zamora   4/19/2017 11:00 AM MD VENKAT Acevedo [facility]ED   4/28/2017 4:20 PM Thamas Dakin, MD [facility]ED       Signed:  Kendal Yao PA-C

## 2017-03-02 NOTE — ANESTHESIA PREPROCEDURE EVALUATION
Anesthetic History   No history of anesthetic complications            Review of Systems / Medical History  Patient summary reviewed, nursing notes reviewed and pertinent labs reviewed    Pulmonary        Sleep apnea           Neuro/Psych   Within defined limits           Cardiovascular    Hypertension        Dysrhythmias            GI/Hepatic/Renal     GERD           Endo/Other        Arthritis     Other Findings              Physical Exam    Airway  Mallampati: II  TM Distance: > 6 cm  Neck ROM: normal range of motion   Mouth opening: Normal     Cardiovascular  Regular rate and rhythm,  S1 and S2 normal,  no murmur, click, rub, or gallop             Dental  No notable dental hx       Pulmonary  Breath sounds clear to auscultation               Abdominal  GI exam deferred       Other Findings            Anesthetic Plan    ASA: 4  Anesthesia type: general    Monitoring Plan: Arterial line      Induction: Intravenous  Anesthetic plan and risks discussed with: Patient

## 2017-03-02 NOTE — PROCEDURES
Cardiac Electrophysiology Report      PATIENT INFORMATION        Patient Name: Charo Solis MRN: [id number]              Study Date: 3/2/2017    YOB: 1940   Age: 78 y.o. Gender: female      Procedure:  Atrial Fibrillation AblationRa    Referring Physician:  Nani Mathew MD and Dr. Joaquin Ruiz Name   Electrophysiologist Ben Terry MD   Monitor Anesthesia   Circulator Jose Guadalupe Pastrana RN; Angel Mueller RN; Danette Roberts RN; Salvatore Mckenzie RN; Paras Correia RN; Nick Kelly RN       PATIENT HISTORY     Charo Solis is a 78 y.o. female with persistent AF, ANÍBAL, HTN, RBBB referred to discuss atrial fibrillation rhythm management options. She is on Propafenone and Eliquis and has persistent AF. Echo demonstrated preserved LV function with LA diameter of 3.4cm in May 2015. Patient had undergone several cardioversions in the past; she derived symptomatic improvement with restoration of sinus rhythm. She has been intolerant of other rhythm management medications in the past. She underwent cardioversion to restore sinus rhythm in preparation for her AF ablation and now presents for AF ablation to allow enhanced rhythm control as well as avoidance of long-term AAD therapy. A previously performed transesophageal echocardiogram demonstrated the left atrium and left atrial appendage were free of visualized thrombus. PROCEDURE     TThe patient was brought to the Cardiac Electrophysiology laboratory in a post-absorptive, fasting state. Informed consent was obtained. A peripheral IV was in place. Continuous electrocardiographic, blood pressure, O2 saturation and  CO2 monitoring was initiated. Self-adhesive cardioversion patches were positioned on the chest. General anesthesia was effectuated by the anesthesia service. The patient was then prepped and draped in the usual sterile fashion.  Both groins were infiltrated with a 50/50 mixture of Lidocaine (1%) and bupivicaine (0.5%). Vascular access was obtained and an SL1 sheath, steerable sheath and an 8F sheath were placed in the right common femoral vein using the modified Seldinger technique. Through these sheaths, an 8F phased-array intracardiac ultrasound catheter was advanced to the right atrium where the surrounding structures were visualized and the fossa ovalis was localized. Through the long sheaths, two 0.032, 145-cm Risk Ident wires were advanced to the level of the superior vena cava. After the guidewire was withdrawn, a [place] transseptal needle was introduced into the sheath. The needle/sheath assembly was withdrawn under fluoroscopic and ICE guidance until the tip of the sheath prolapsed into the fossa ovalis. Appropriate positioning was confirmed with multiple fluoroscopic views and ICE imaging. Transseptal access was obtained following delivery of RF energy with the [facility] needle. The dilator was advanced over the wire, followed by the sheath. The dilator and wire were removed. A circular duodecapolar Lasso mapping catheter was advanced into the left atrium. Systemic heparinization was initiated and the sheath was flushed continuously with heparinized saline. Anticoagulation status was monitored with frequent ACT measurements. Heparin was given in interrupted doses to maintain an ACT > 300 sec. Using the CARTO3 mapping system, detailed mapping was performed and a 3D electroanatomical, geometric rendering of the left atrium was created. The Lasso catheter was then exchanged for a 3.5 mm, bi-directional, externally-irrigated contact-force sensing ablation catheter and further detailed mapping was completed. This electroanatomic map was then merged with a previously obtained cardiac MRI. Over the second 0.032  145-cm wire, a second transseptal access was obtained. Esophageal temperatures were monitored using a temperature probe.  The height of the esophageal temperature probe was adjusted throughout the procedure in relation to the position of the ablation catheter. Wide antral circumferential ablation around the left and right pulmonary veins was performed. Using the Lasso mapping catheter, sites of persistent conduction were sought and further ablation was performed when necessary. Pacing was then performed along the line of ablation around the pulmonary veins and further ablation was performed at sites where persistent atrial capture was demontrated until capture was no longer evident. Entrance and exit block across all pulmonary veins was demonstrated except the LSPV. Georgette ablation was performed which resulted in isolation of the LIPV. Pacing along the ablation line around the LSPV failed to demonstrate capture. Further ablation was deferred due to concern for heightened risk for PV stenosis. At the completion of the study, both transseptal sheaths were withdrawn to the RA. Catheters were placed in the RA and RV for recording and pacing as well as the His position for recording. A comprehensive electrophysiology study was performed. There was no pericardial effusion noted at the conclusion of the procedure. Following a test dose, protamine 50 mg was administered and once the ACT was found to be < 180 seconds, all catheters and sheaths were removed and hemostasis obtained by direct manual compression. The patient was extubated, hemodynamically stable, tolerated the procedure well and was transferred in stable condition. There were no immediate complications encountered during the procedure. There was minimal blood loss and no specimen were removed. CONDUCTION INTERVALS      See attached report      FINDINGS     1. The baseline ECG revealed sinus rhythm  2. All pulmonary veins were successfully mapped using 3D mapping and ICE imaging. 3. Wide antral circumferential ablation was performed sucessfully.   4. Entrance and exit block across all four pulmonary veins was demonstrated except the LSPV. Isolation of this vein could not be achieved despite extensive ablation including john ablation. RADIOLOGY SUMMARY     Total    Fluoro Time (minutes)  4.06    Dose Area Product (mGy)  206        CONCLUSIONS      1. Successful atrial fibrillation ablation however unable to achieve entrance/exit block of LSPV. 2. Monitor on telemetry overnight. 3. Resume anticoagulation with eliquis 5 mg q12h, first dose tonight  4. GI acid suppression for prophylactic esophageal protection (continue Nexium). 5. Resume all other home medications; consider discontinuation of amiodarone following healing phase. 6. For recurrent AF, will consider hybrid ablation/cryoballon ablation. 7. Follow up in 1 month in EP clinic with Dr. Gordo Nova   8. Follow up with Sandeep Stanton MD and Dr. Lizzy Anderson as scheduled. Thank you, Sandeep Stanton MD and Dr. Lizzy Anderson for allowing me to participate in the care of this extraordinarily pleasant female.        Nikhil Webb MD  Cardiac Electrophysiology / Cardiology    [address]  [address], [address], [address]  [facility]                Yuliana Rivera  [phone] / [phone] Fax   [phone] / [phone] Fax

## 2017-03-02 NOTE — IP AVS SNAPSHOT
[address] 
[phone] Patient: Kiara Ybarra 
MRN: [id number] CRK:[id number] You are allergic to the following Allergen Reactions Citalopram Hydrobromide Rash With itching. Chlorthalidone Rash Maxzide (Triamterene-Hydrochlorothiazid) Palpitations Vicodin (Hydrocodone-Acetaminophen) Palpitations Recent Documentation Height 1.6 m Emergency Contacts Name Discharge Info Relation Home Work Mobile WallerMadhav DISCHARGE CAREGIVER [3] Spouse [3] [phone] About your hospitalization You were admitted on:  March 2, 2017 You last received care in the:  [facility] 4 CV SERVICES UNIT You were discharged on:  March 3, 2017 Unit phone number:  [phone] Why you were hospitalized Your primary diagnosis was:  Not on File Providers Seen During Your Hospitalizations Provider Role Specialty Primary office phone Mimi Stephens MD Attending Provider Cardiology [phone] Your Primary Care Physician (PCP) Primary Care Physician Office Phone Office Fax [facility]  [phone] Follow-up Information Follow up With Details Comments Contact Info Mimi Stephens MD On 4/19/2017 11 AM [address] [place] [address] [address] [place] [address] 
[phone] Gerald Jay MD On 3/28/2017 4:20 PM [address] [place] [address] Rohith [phone] 1007 [facility] 
[phone] Sharon Shah MD On 3/8/2017 Hospital follow up PCP appointment on Wednesday, 3/8/17 @ 2:30 p.m. [address] [facility] [address] [place] [address] 
[phone] Current Discharge Medication List  
  
CONTINUE these medications which have NOT CHANGED Dose & Instructions Dispensing Information Comments Morning Noon Evening Bedtime amiodarone 200 mg tablet Commonly known as:  CORDARONE Your next dose is: Today, Tomorrow Other:  _________ Dose:  200 mg Take 1 Tab by mouth daily. Indications: PREVENTION OF RECURRENT ATRIAL FIBRILLATION Quantity:  30 Tab Refills:  1 CALTRATE 600 + D PO Your next dose is: Today, Tomorrow Other:  _________ Take  by mouth. Refills:  0  
     
   
   
   
  
 carvedilol 6.25 mg tablet Commonly known as:  Josselin Spry Your next dose is: Today, Tomorrow Other:  _________ Dose:  6.25 mg Take 1 Tab by mouth two (2) times daily (with meals). Quantity:  60 Tab Refills:  5 This is a dose adjustment. CENTRUM SILVER PO Your next dose is: Today, Tomorrow Other:  _________ Take  by mouth. Refills:  0  
     
   
   
   
  
 cloNIDine HCl 0.1 mg tablet Commonly known as:  CATAPRES Your next dose is: Today, Tomorrow Other:  _________ Dose:  0.1 mg Take 1 Tab by mouth nightly. Hold sys BP < 955 systolic  Indications: Hypertension Quantity:  30 Tab Refills:  3  
     
   
   
   
  
 diltiazem hcl 180 mg Tb24 Your next dose is: Today, Tomorrow Other:  _________ Take  by mouth. Refills:  0  
     
   
   
   
  
 ELIQUIS 5 mg tablet Generic drug:  apixaban Your next dose is: Today, Tomorrow Other:  _________  
   
   
 take 1 tablet by mouth twice a day Quantity:  60 Tab Refills:  11 NexIUM 40 mg capsule Generic drug:  esomeprazole Your next dose is: Today, Tomorrow Other:  _________ Dose:  40 mg Take 40 mg by mouth daily. TAKES IN AM  
 Refills:  0 ZETIA 10 mg tablet Generic drug:  ezetimibe Your next dose is: Today, Tomorrow Other:  _________  Dose:  10 mg  
 Take 10 mg by mouth daily. TAKES IN PM  
 Refills:  0 Discharge Instructions None Discharge Orders None Gojee Announcement We are excited to announce that we are making your provider's discharge notes available to you in Gojee. You will see these notes when they are completed and signed by the physician that discharged you from your recent hospital stay. If you have any questions or concerns about any information you see in Gojee, please call the Health Information Department where you were seen or reach out to your Primary Care Provider for more information about your plan of care. Introducing [facility]! Dear Eileen Bobby: Thank you for requesting a Gojee account. Our records indicate that you already have an active Gojee account. You can access your account anytime at [email or web address] Did you know that you can access your hospital and ER discharge instructions at any time in Gojee? You can also review all of your test results from your hospital stay or ER visit. Additional Information If you have questions, please visit the Frequently Asked Questions section of the Gojee website at [email or web address] Remember, Gojee is NOT to be used for urgent needs. For medical emergencies, dial 911. Now available from your iPhone and Android! General Information Please provide this summary of care documentation to your next provider. Patient Signature:  ____________________________________________________________ Date:  ____________________________________________________________  
  
Erin Rios Provider Signature:  ____________________________________________________________ Date:  ____________________________________________________________

## 2017-03-02 NOTE — PROGRESS NOTES
Cardiac Cath Lab Recovery Arrival Note:      Poornima Romero arrived to Cardiac Cath Lab, Recovery Area. Staff introduced to patient. Patient identifiers verified with NAME and DATE OF BIRTH. Procedure verified with patient. Consent forms reviewed and signed by patient or authorized representative and verified. Allergies verified. Patient informed of procedure and plan of care. Questions answered with review. Patient prepped for procedure, per orders from physician, prior to arrival.    Patient on cardiac monitor, non-invasive blood pressure, SPO2 monitor. Patient is A&Ox 4. Patient reports no complaints. Patient in stretcher, in low position, with side rails up, call bell within reach, patient instructed to call of assistance as needed. Patient prep in: [facility] [place] Recovery [facility], Bed# 6. Family in: waiting room. Prep by: GIN Cardona

## 2017-03-02 NOTE — ROUTINE PROCESS
1210:  SHEATH PULL NOTE:    Patient informed of procedure with questions answered with review. Sheath site prepped with Chloraprep swab. 8 fr and two 8.5 fr sheaths in right femoral vein pulled by GIN Sal Rn,RN. Hand hold and quick clot pad, with manual compression to site. No bleeding, no hematoma, no pain at site. Hemostasis obtained with hand hold/manual compression at site. Patient tolerated well. No change in status. Handhold for 10 minutes. No change at site. Sterile guaze and transparent dressing applied to site. No bleeding, no hematoma, no pain/discomfort at site. Groin instructions provided with review. Continue to monitor procedure site and patient status. *Advised patient to keep head flat and extremity flat to decrease risk of bleeding. *Recommended that patient not drink for ONE HOUR post sheath pull completion. *Recommended that patient not eat for TWO HOURS post sheath pull completion. *Instructed patient on rationale for delay of PO products to decrease risk for aspiration and if additional treatment to procedure site is required. Patient verbalized understanding of instructions with review.

## 2017-03-03 VITALS
BODY MASS INDEX: 34.57 KG/M2 | DIASTOLIC BLOOD PRESSURE: 64 MMHG | WEIGHT: 195.11 LBS | RESPIRATION RATE: 16 BRPM | OXYGEN SATURATION: 96 % | TEMPERATURE: 97.9 F | SYSTOLIC BLOOD PRESSURE: 146 MMHG | HEART RATE: 73 BPM | HEIGHT: 63 IN

## 2017-03-03 PROCEDURE — 99218 HC RM OBSERVATION: CPT

## 2017-03-03 PROCEDURE — 74011250637 HC RX REV CODE- 250/637: Performed by: INTERNAL MEDICINE

## 2017-03-03 PROCEDURE — 74011250637 HC RX REV CODE- 250/637: Performed by: SPECIALIST

## 2017-03-03 PROCEDURE — 94660 CPAP INITIATION&MGMT: CPT

## 2017-03-03 RX ORDER — IBUPROFEN 600 MG/1
600 TABLET ORAL ONCE
Status: DISCONTINUED | OUTPATIENT
Start: 2017-03-03 | End: 2017-03-03 | Stop reason: HOSPADM

## 2017-03-03 RX ADMIN — ACETAMINOPHEN 650 MG: 325 TABLET, FILM COATED ORAL at 10:52

## 2017-03-03 RX ADMIN — Medication 10 ML: at 07:03

## 2017-03-03 RX ADMIN — APIXABAN 5 MG: 5 TABLET, FILM COATED ORAL at 08:38

## 2017-03-03 RX ADMIN — CLONIDINE HYDROCHLORIDE 0.1 MG: 0.1 TABLET ORAL at 04:02

## 2017-03-03 RX ADMIN — PANTOPRAZOLE SODIUM 40 MG: 40 TABLET, DELAYED RELEASE ORAL at 07:03

## 2017-03-03 RX ADMIN — DILTIAZEM HYDROCHLORIDE 180 MG: 180 CAPSULE, COATED, EXTENDED RELEASE ORAL at 08:38

## 2017-03-03 RX ADMIN — CARVEDILOL 6.25 MG: 6.25 TABLET, FILM COATED ORAL at 08:38

## 2017-03-03 NOTE — PROGRESS NOTES
Patient with a pmh for afib, admitted for cardioversion. Care manager met with patient to explain role and discuss transitions of care. Per patient she was independent , no home health or equipment needs. Lives with her  Abel Villeda who will transport her home at discharge. No discharge needs identified. Lyubov Oliver RN,CRM  Care Management Interventions  PCP Verified by CM:  Yes (Dr Yolis Hinojosa)  [address] [place]: No  Discharge Durable Medical Equipment: No  Physical Therapy Consult: No  Occupational Therapy Consult: No  Speech Therapy Consult: No  Current Support Network: Lives with Spouse ([address] [place] [address] Road [phone])  Confirm Follow Up Transport: Family ( will transport)  Plan discussed with Pt/Family/Caregiver: Yes  Discharge Location  Discharge Placement: Home

## 2017-03-03 NOTE — PROGRESS NOTES
1930: Bedside shift change report given to Piter West (oncoming nurse) by Robin Carrasquillo (offgoing nurse). Report included the following information SBAR, Kardex, Procedure Summary, Intake/Output, MAR, Recent Results and Cardiac Rhythm NSR. Care of pt assumed. VSS. Uneventful night. 0730: Bedside shift change report given to Carolina (oncoming nurse) by [address] (offgoing nurse). Report included the following information SBAR, Kardex, Procedure Summary, Intake/Output, MAR, Recent Results and Cardiac Rhythm NSR;BBB; rare PAC's.

## 2017-03-03 NOTE — PROGRESS NOTES
Spiritual Care Partner Volunteer visited patient in [address] [place] [address] on 3/3/17.   Documented by:  Bijal Kilpatrick [address] ([id number])

## 2017-03-03 NOTE — ROUTINE PROCESS
Hospital follow up PCP appointment has been scheduled with Dr. Joshua Marmolejo on Wednesday, 3/8/17 at 2:30 p.m. Pending patient discharge.   Trisha Beckman, Care Management Specialist.

## 2017-03-03 NOTE — PROGRESS NOTES
0730: Bedside shift change report given to Octavia Amezquita (oncoming nurse) by Kristina Pemberton (offgoing nurse). Report included the following information SBAR, Kardex, MAR and Recent Results. 1155: I have reviewed discharge instructions with the patient. The patient verbalized understanding. Discharge medications reviewed with patient and appropriate educational materials and side effects teaching were provided.

## 2017-03-15 RX ORDER — CLONIDINE HYDROCHLORIDE 0.1 MG/1
0.1 TABLET ORAL
Qty: 30 TAB | Refills: 3 | Status: SHIPPED | OUTPATIENT
Start: 2017-03-15 | End: 2017-08-25 | Stop reason: SDUPTHER

## 2017-03-15 RX ORDER — CLONIDINE HYDROCHLORIDE 0.1 MG/1
TABLET ORAL
Qty: 60 TAB | Refills: 11 | OUTPATIENT
Start: 2017-03-15

## 2017-03-20 DIAGNOSIS — I45.10 RBBB: ICD-10-CM

## 2017-03-20 DIAGNOSIS — I51.7 LAE (LEFT ATRIAL ENLARGEMENT): ICD-10-CM

## 2017-03-20 DIAGNOSIS — K21.9 GASTROESOPHAGEAL REFLUX DISEASE WITHOUT ESOPHAGITIS: ICD-10-CM

## 2017-03-20 DIAGNOSIS — G47.33 OSA (OBSTRUCTIVE SLEEP APNEA): ICD-10-CM

## 2017-03-20 DIAGNOSIS — I10 HTN (HYPERTENSION), BENIGN: ICD-10-CM

## 2017-03-20 DIAGNOSIS — I48.0 PAROXYSMAL ATRIAL FIBRILLATION (HCC): ICD-10-CM

## 2017-03-20 RX ORDER — AMIODARONE HYDROCHLORIDE 200 MG/1
TABLET ORAL
Qty: 30 TAB | Refills: 1 | Status: SHIPPED | OUTPATIENT
Start: 2017-03-20 | End: 2017-04-10 | Stop reason: SINTOL

## 2017-03-20 NOTE — TELEPHONE ENCOUNTER
Requested Prescriptions     Signed Prescriptions Disp Refills    amiodarone (CORDARONE) 200 mg tablet 30 Tab 1     Sig: take 1 tablet by mouth once daily INDICATIONS: PREVENTION OF RECURRENT ATRIAL FIBRILLATION     Authorizing Provider: Cherelle Ureña     Ordering User: Leyla Mckinney

## 2017-03-30 ENCOUNTER — TELEPHONE (OUTPATIENT)
Dept: CARDIOLOGY CLINIC | Age: 77
End: 2017-03-30

## 2017-03-30 NOTE — TELEPHONE ENCOUNTER
Please call Mrs. Ojeda at [phone]. She's having side effects of amiodarone: sob, dizziness, lowered BP, constipation. Please call to advise. She'll be available after 3:00 today.      Thank you, Benigno Germain

## 2017-03-31 NOTE — TELEPHONE ENCOUNTER
Returned patient call, ID verified using two patient identifiers, advised patient per Dr. Joaquin Carlos order she is to stop Amiodarone. She will call our office if symptoms do not subside once Amiodarone is stopped. Patient verbalizes understanding of all information and is agreeable to this plan.

## 2017-04-10 ENCOUNTER — TELEPHONE (OUTPATIENT)
Dept: CARDIOLOGY CLINIC | Age: 77
End: 2017-04-10

## 2017-04-10 NOTE — TELEPHONE ENCOUNTER
Patient calling to discuss her recent lab work that was ordered by her pcp. Has medication questions. Requests a call back at [phone]. Thanks!

## 2017-04-10 NOTE — TELEPHONE ENCOUNTER
Returned call, ID verified using two patient identifiers, patient states she saw Dr. Renaldo Kaur at her PCP office on Saturday and she had labwork done which showed decreased kidney function and abnormal thyroid function. She wanted Dr. Pushpa Courtney to be aware. Patient has an appointment with Dr. Pushpa Courtney on 4/19/17. Advised patient that I would request lab results and office notes from Dr. Renaldo Kaur for Dr. Pushpa Courtney to review at  Her upcoming appointment. Patient verbalizes understanding and is agreeable to this plan.

## 2017-04-19 ENCOUNTER — OFFICE VISIT (OUTPATIENT)
Dept: CARDIOLOGY CLINIC | Age: 77
End: 2017-04-19

## 2017-04-19 VITALS
HEIGHT: 63 IN | RESPIRATION RATE: 20 BRPM | BODY MASS INDEX: 35.08 KG/M2 | DIASTOLIC BLOOD PRESSURE: 70 MMHG | WEIGHT: 198 LBS | SYSTOLIC BLOOD PRESSURE: 122 MMHG | HEART RATE: 72 BPM | OXYGEN SATURATION: 98 %

## 2017-04-19 DIAGNOSIS — I48.0 PAROXYSMAL ATRIAL FIBRILLATION (HCC): Primary | ICD-10-CM

## 2017-04-19 RX ORDER — LANSOPRAZOLE 30 MG/1
30 CAPSULE, DELAYED RELEASE ORAL AS NEEDED
COMMUNITY
End: 2021-09-14

## 2017-04-19 RX ORDER — LEVOTHYROXINE SODIUM 50 UG/1
TABLET ORAL
Refills: 0 | Status: ON HOLD | COMMUNITY
Start: 2017-04-10 | End: 2018-06-29

## 2017-04-19 RX ORDER — FUROSEMIDE 20 MG/1
TABLET ORAL
Refills: 0 | COMMUNITY
Start: 2017-04-10 | End: 2018-03-07

## 2017-04-19 NOTE — PROGRESS NOTES
HISTORY OF PRESENTING ILLNESS      Gracie Maher is a 78 y.o. female with persistent AF, ANÍBAL, HTN, RBBB referred to discuss atrial fibrillation rhythm management options. She was on Propafenone and Eliquis and has persistent AF. Echo demonstrated preserved LV function with LA diameter of 3.4cm in May 2015. Patient had undergone several cardioversions in the past; she derived symptomatic improvement with restoration of sinus rhythm. She has been intolerant of other rhythm management medications in the past. She underwent cardioversion to restore sinus rhythm in preparation for her AF ablation subsequently underwent AF ablation on 3/2/2017 and now presents for follow-up. She reported shortness of breath post ablation and her amiodarone was discontinued. EKG today shows sinus rhythm with a right bundle branch block and occasional PAC. She denies symptomatic recurrence of atrial fibrillation. She was recently started on levothyroxine for an elevated TSH level. She has had some bilateral lower extremity edema for which she was started on Lasix as well. She does have telangiectasias along both lower extremities. She wishes to discontinue diltiazem if possible as well. She has been compliant with her apixaban thus far. She otherwise denies chest pain, syncope, shortness of breath.      ACTIVE PROBLEM LIST     Patient Active Problem List    Diagnosis Date Noted    Paroxysmal atrial fibrillation ([facility] [address] 08/28/2016    ANÍBAL (obstructive sleep apnea) 07/26/2016    HTN (hypertension), benign 07/26/2016    Gastroesophageal reflux disease without esophagitis 07/03/2016    RBBB 05/07/2015    LAE (left atrial enlargement) 04/03/2013           PAST MEDICAL HISTORY     Past Medical History:   Diagnosis Date    DDD (degenerative disc disease)     Gastroesophageal reflux disease without esophagitis 7/3/2016    GERD (gastroesophageal reflux disease)     Hiatal hernia     HTN (hypertension), benign 7/26/2016    Hypertension     ANÍBAL (obstructive sleep apnea) 7/26/2016    Paroxysmal atrial fibrillation ([address] 8/28/2016    Uterine prolapse            PAST SURGICAL HISTORY     Past Surgical History:   Procedure Laterality Date    HX GI      COLONOSCOPY 7/14    HX GYN      TUBAL LIGATION    HX HEENT      WISDOM TEETH EXTRACTED. ALLERGIES     Allergies   Allergen Reactions    Citalopram Hydrobromide Rash     With itching.  Chlorthalidone Rash    Maxzide [Triamterene-Hydrochlorothiazid] Palpitations    Vicodin [Hydrocodone-Acetaminophen] Palpitations          FAMILY HISTORY     Family History   Problem Relation Age of Onset    Diabetes Mother     Hypertension Mother    [facility] COPD Mother     negative for cardiac disease       SOCIAL HISTORY     Social History     Social History    Marital status:      Spouse name: N/A    Number of children: N/A    Years of education: N/A     Social History Main Topics    Smoking status: Former Smoker     Packs/day: 1.50     Years: 30.00     Quit date: 3/3/1994    Smokeless tobacco: Never Used    Alcohol use No    Drug use: No    Sexual activity: Not Asked     Other Topics Concern    None     Social History Narrative         MEDICATIONS     Current Outpatient Prescriptions   Medication Sig    levothyroxine (SYNTHROID) 50 mcg tablet take 1 tablet by mouth once daily    furosemide (LASIX) 20 mg tablet take 1 tablet by mouth once daily    lansoprazole (PREVACID) 30 mg capsule Take 30 mg by mouth nightly.  cloNIDine HCl (CATAPRES) 0.1 mg tablet Take 1 Tab by mouth nightly. Hold sys BP < 737 systolic  Indications: hypertension    ELIQUIS 5 mg tablet take 1 tablet by mouth twice a day    carvedilol (COREG) 6.25 mg tablet Take 1 Tab by mouth two (2) times daily (with meals).  diltiazem hcl 180 mg Tb24 Take  by mouth.  FOLIC ACID/MULTIVIT-MIN/LUTEIN (CENTRUM SILVER PO) Take  by mouth.  CALCIUM CARBONATE/VITAMIN D3 (CALTRATE 600 + D PO) Take  by mouth.     ezetimibe (ZETIA) 10 mg tablet Take 10 mg by mouth daily. TAKES IN PM     No current facility-administered medications for this visit. I have reviewed the nurses notes, vitals, problem list, allergy list, medical history, family, social history and medications. REVIEW OF SYMPTOMS      General: Pt denies excessive weight gain or loss. Pt is able to conduct ADL's  HEENT: Denies blurred vision, headaches, hearing loss, epistaxis and difficulty swallowing. Respiratory: Denies cough, congestion, shortness of breath, GOODE, wheezing or stridor. Cardiovascular: Denies precordial pain, palpitations, edema or PND  Gastrointestinal: Denies poor appetite, indigestion, abdominal pain or blood in stool  Genitourinary: Denies hematuria, dysuria, increased urinary frequency  Musculoskeletal: Denies joint pain or swelling from muscles or joints  Neurologic: Denies tremor, paresthesias, headache, or sensory motor disturbance  Psychiatric: Denies confusion, insomnia, depression  Integumentray: Denies rash, itching or ulcers. Hematologic: Denies easy bruising, bleeding     PHYSICAL EXAMINATION      Vitals:    04/19/17 1128   BP: 122/70   Pulse: 72   Resp: 20   SpO2: 98%   Weight: 198 lb (89.8 kg)   Height: 5' 3\" (1.6 m)     General: Well developed, in no acute distress. HEENT: No jaundice, oral mucosa moist, no oral ulcers  Neck: Supple, no stiffness, no lymphadenopathy, supple  Heart:  Normal S1/S2 negative S3 or S4. Regular, no murmur, gallop or rub, no jugular venous distention  Respiratory: Clear bilaterally x 4, no wheezing or rales  Abdomen:   Soft, non-tender, bowel sounds are active.   Extremities:  No edema, normal cap refill, no cyanosis. Musculoskeletal: No clubbing, no deformities  Neuro: A&Ox3, speech clear, gait stable, cooperative, no focal neurologic deficits  Skin: Skin color is normal. No rashes or lesions.  Non diaphoretic, moist.  Vascular: 2+ pulses symmetric in all extremities       DIAGNOSTIC DATA EKG: Sinus rhythm with PAC and right bundle branch block       LABORATORY DATA      Lab Results   Component Value Date/Time    WBC 9.5 02/28/2017 04:37 PM    HGB 12.8 02/28/2017 04:37 PM    HCT 39.8 02/28/2017 04:37 PM    PLATELET 008 82/71/9741 04:37 PM    MCV 93 02/28/2017 04:37 PM      Lab Results   Component Value Date/Time    Sodium 140 02/28/2017 04:37 PM    Potassium 4.0 02/28/2017 04:37 PM    Chloride 100 02/28/2017 04:37 PM    CO2 23 02/28/2017 04:37 PM    Anion gap 10 01/18/2017 09:58 PM    Glucose 99 02/28/2017 04:37 PM    BUN 12 02/28/2017 04:37 PM    Creatinine 0.82 02/28/2017 04:37 PM    BUN/Creatinine ratio 15 02/28/2017 04:37 PM    GFR est AA 81 02/28/2017 04:37 PM    GFR est non-AA 70 02/28/2017 04:37 PM    Calcium 9.1 02/28/2017 04:37 PM    Bilirubin, total 0.8 01/18/2017 09:58 PM    AST (SGOT) 17 01/18/2017 09:58 PM    Alk. phosphatase 109 01/18/2017 09:58 PM    Protein, total 7.6 01/18/2017 09:58 PM    Albumin 3.7 01/18/2017 09:58 PM    Globulin 3.9 01/18/2017 09:58 PM    A-G Ratio 0.9 01/18/2017 09:58 PM    ALT (SGPT) 40 01/18/2017 09:58 PM           ASSESSMENT      1. Atrial fibrillation    A. Persistent   B. Symptomatic   C.  AF ablation on 3/2/2017 (unable to achieve LSPV block)  2. Hiatal hernia  3. Hypertension   4. ANÍBAL  5. RBBB  6. GERD        PLAN     Continue monitoring clinically for recurrence of atrial fibrillation during the healing phase. May consider discontinuation of diltiazem in the future however given our inability to achieve L SPV block, will likely favor continuing this for now. Continue Eliquis. Suspect patient has venous insufficiency therefore recommended compression socks     FOLLOW-UP     2 months      Thank you,  Jose Ovalle MD and Dr. Nae Wall involving me in the care of this extraordinarily pleasant female. Please do not hesitate to contact me for further questions/concerns.          Liz Kumari MD  Cardiac Electrophysiology / Cardiology    [facility], [address], [address] [place] [address] Drive    Lisa Riveramojocelyn  [phone] / [phone] Fax   [phone] / [phone] Fax

## 2017-04-28 ENCOUNTER — OFFICE VISIT (OUTPATIENT)
Dept: CARDIOLOGY CLINIC | Age: 77
End: 2017-04-28

## 2017-04-28 VITALS
DIASTOLIC BLOOD PRESSURE: 84 MMHG | HEIGHT: 63 IN | OXYGEN SATURATION: 94 % | RESPIRATION RATE: 20 BRPM | BODY MASS INDEX: 35.22 KG/M2 | SYSTOLIC BLOOD PRESSURE: 166 MMHG | WEIGHT: 198.8 LBS

## 2017-04-28 DIAGNOSIS — I48.0 PAROXYSMAL ATRIAL FIBRILLATION (HCC): Primary | ICD-10-CM

## 2017-04-28 DIAGNOSIS — I87.2 VENOUS INSUFFICIENCY: ICD-10-CM

## 2017-04-28 DIAGNOSIS — Z98.890 S/P ABLATION OF ATRIAL FIBRILLATION: ICD-10-CM

## 2017-04-28 DIAGNOSIS — Z86.79 S/P ABLATION OF ATRIAL FIBRILLATION: ICD-10-CM

## 2017-04-28 DIAGNOSIS — E03.9 ACQUIRED HYPOTHYROIDISM: ICD-10-CM

## 2017-04-28 DIAGNOSIS — I10 HTN (HYPERTENSION), BENIGN: ICD-10-CM

## 2017-04-28 DIAGNOSIS — I51.7 LAE (LEFT ATRIAL ENLARGEMENT): ICD-10-CM

## 2017-04-28 DIAGNOSIS — G47.33 OSA (OBSTRUCTIVE SLEEP APNEA): ICD-10-CM

## 2017-04-28 DIAGNOSIS — I45.10 RBBB: ICD-10-CM

## 2017-04-28 NOTE — PATIENT INSTRUCTIONS
Please continue your current medication regimen. Elevate your legs when you are seated. Keep eliminating your salt in your diet. Wear compression stockings (use 20-30mmHg). We will give you a Rx to [employer] and this may be covered by insurance. Keep using your CPAP machine. Stay active with exercise and eating a heart healthy diet to lose weight.

## 2017-04-28 NOTE — PROGRESS NOTES
Visit Vitals    /84 (BP 1 Location: Left arm, BP Patient Position: Sitting)    Resp 20    Ht 5' 3\" (1.6 m)    Wt 198 lb 12.8 oz (90.2 kg)    SpO2 94%    BMI 35.22 kg/m2

## 2017-04-28 NOTE — MR AVS SNAPSHOT
Visit Information Date & Time Provider Department Dept. Phone Encounter #  
 4/28/2017  4:20 PM Cristiano Ruffin MD [facility] [phone] [id number] Follow-up Instructions Return in about 6 months (around 10/28/2017). Your Appointments 6/28/2017 11:40 AM  
ESTABLISHED PATIENT with Roc Agarwal MD  
[facility] ([address]) Appt Note: 2 month follow up  
 [address] [facility] 600 1007 [facility]  
54 [facility] [id number] [address] Upcoming Health Maintenance Date Due DTaP/Tdap/Td series (1 - Tdap) 2/12/1963 ZOSTER VACCINE AGE 60> 2/12/2002 GLAUCOMA SCREENING Q2Y 2/12/2007 OSTEOPOROSIS SCREENING (DEXA) 2/12/2007 Pneumococcal 65+ Low/Medium Risk (1 of 2 - PCV13) 2/12/2007 MEDICARE YEARLY EXAM 2/12/2007 INFLUENZA AGE 9 TO ADULT 8/1/2016 Allergies as of 4/28/2017  Review Complete On: 4/28/2017 By: Shanell Tran NP Severity Noted Reaction Type Reactions Citalopram Hydrobromide High 09/16/2014    Rash With itching. Chlorthalidone  04/03/2013    Rash Maxzide [Triamterene-hydrochlorothiazid]  04/03/2013    Palpitations Vicodin [Hydrocodone-acetaminophen]  04/03/2013    Palpitations Current Immunizations  Reviewed on 3/3/2012 No immunizations on file. Not reviewed this visit You Were Diagnosed With   
  
 Codes Comments LAE (left atrial enlargement)    -  Primary ICD-10-CM: I51.7 ICD-9-CM: 429.3 RBBB     ICD-10-CM: I45.10 ICD-9-CM: 426.4 HTN (hypertension), benign     ICD-10-CM: I10 
ICD-9-CM: 401.1 ANÍBAL (obstructive sleep apnea)     ICD-10-CM: G47.33 
ICD-9-CM: 327.23 Paroxysmal atrial fibrillation (HCC)     ICD-10-CM: I48.0 ICD-9-CM: 427.31 Vitals BP Resp Height(growth percentile) Weight(growth percentile) SpO2 BMI 166/84 (BP 1 Location: Left arm, BP Patient Position: Sitting) 20 5' 3\" (1.6 m) 198 lb 12.8 oz (90.2 kg) 94% 35.22 kg/m2 OB Status Smoking Status Postmenopausal Former Smoker Vitals History BMI and BSA Data Body Mass Index Body Surface Area  
 35.22 kg/m 2 2 m 2 Preferred Pharmacy Pharmacy Name Phone [place], Lilian Ramos [phone] Your Updated Medication List  
  
   
This list is accurate as of: 4/28/17  4:51 PM.  Always use your most recent med list.  
  
  
  
  
 CALTRATE 600 + D PO Take  by mouth. carvedilol 6.25 mg tablet Commonly known as:  Yulisa Tsaile Take 1 Tab by mouth two (2) times daily (with meals). CENTRUM SILVER PO Take  by mouth.  
  
 cloNIDine HCl 0.1 mg tablet Commonly known as:  CATAPRES Take 1 Tab by mouth nightly. Hold sys BP < 953 systolic  Indications: hypertension  
  
 dilTIAZem  mg Tb24 tablet Commonly known as:  CARDIZEM LA Take  by mouth. ELIQUIS 5 mg tablet Generic drug:  apixaban  
take 1 tablet by mouth twice a day  
  
 furosemide 20 mg tablet Commonly known as:  LASIX  
take 1 tablet by mouth once daily  
  
 levothyroxine 50 mcg tablet Commonly known as:  SYNTHROID  
take 1 tablet by mouth once daily PREVACID 30 mg capsule Generic drug:  lansoprazole Take 30 mg by mouth nightly. ZETIA 10 mg tablet Generic drug:  ezetimibe Take 10 mg by mouth daily. TAKES IN PM  
  
  
  
  
Follow-up Instructions Return in about 6 months (around 10/28/2017). Introducing [facility]! Yue Wright Mode: Thank you for requesting a [employer] account. Our records indicate that you already have an active [employer] account. You can access your account anytime at [email or web address] Did you know that you can access your hospital and ER discharge instructions at any time in anfix? You can also review all of your test results from your hospital stay or ER visit. Additional Information If you have questions, please visit the Frequently Asked Questions section of the anfix website at [email or web address] Remember, anfix is NOT to be used for urgent needs. For medical emergencies, dial 911. Now available from your iPhone and Android! Please provide this summary of care documentation to your next provider. Your primary care clinician is listed as Brody Whitaker. If you have any questions after today's visit, please call [phone].

## 2017-04-28 NOTE — PROGRESS NOTES
HISTORY OF PRESENT ILLNESS    Robb Vincent is a 78 y.o. female. Last seen 3 months ago by Dr. Lam Martínez. Admitted to [facility] 3/2/17 for AF ablation - Amiodarone discontinued at discharge. Seen by Dr. Megan Zapata for follow up 4/19/17. Problem List  Date Reviewed: 4/28/2017          Codes Class Noted    S/P ablation of atrial fibrillation ICD-10-CM: Z98.890, Z86.79  ICD-9-CM: V45.89  4/28/2017        Paroxysmal atrial fibrillation (HCC) ICD-10-CM: I48.0  ICD-9-CM: 427.31  8/28/2016        ANÍBAL (obstructive sleep apnea) ICD-10-CM: G47.33  ICD-9-CM: 327.23  7/26/2016        HTN (hypertension), benign ICD-10-CM: I10  ICD-9-CM: 401.1  7/26/2016        Gastroesophageal reflux disease without esophagitis ICD-10-CM: K21.9  ICD-9-CM: 530.81  7/3/2016        RBBB ICD-10-CM: I45.10  ICD-9-CM: 426.4  5/7/2015        LAE (left atrial enlargement) ICD-10-CM: I51.7  ICD-9-CM: 429.3  4/3/2013             Cardiac testing  Adenosine myoview 2011 - normal perfusion, EF 81%  Echocardiogram 2/22/13 - normal left ventricular size and function, normal appearing mitral, aortic, and tricuspid valves, with mild mitral and moderate tricuspid regurgitation, bi-atrial enlargement  Lexiscan 3/3/2014 - normal perfusion EF 83%  Echo: 5/15/15- 60%, mildly dilated LA, mild MR  Cardioversion 7/23/2015 - 200J  Lexiscan cardiolite 5/10/16 - normal perfusion, EF 78%  Renal Visceral Duplex 5/2016 - No evidence of ONOFRE  3/2/17-. Successful atrial fibrillation ablation however unable to achieve entrance/exit block of LSPV per Dr. Amari Stockton    HPI  Ms. Ojeda denies any recurrent palpitations or tachycardia post ablation. She denies any bleeding concerns on Eliquis. She reports good BP control at home. She begrudgingly wears her CPAP nightly. She reports new onset ankle edema right more than left - Dr. Mellisa Smalls started Lasix which she takes every other day. She denies any improvement in edema with addition of lasix.  She denies any leg pain at rest or with exertion. Also, recently diagnosed with hypothyroidism and started on LTX. She denies any exertional chest pain, dyspnea, syncope, orthopnea, or paroxysmal nocturnal dyspnea. Past Medical History:   Diagnosis Date    DDD (degenerative disc disease)     Gastroesophageal reflux disease without esophagitis 7/3/2016    GERD (gastroesophageal reflux disease)     Hiatal hernia     HTN (hypertension), benign 7/26/2016    Hypertension     ANÍBAL (obstructive sleep apnea) 7/26/2016    Paroxysmal atrial fibrillation ([address] 8/28/2016    Uterine prolapse       Current Outpatient Prescriptions on File Prior to Visit   Medication Sig Dispense Refill    levothyroxine (SYNTHROID) 50 mcg tablet take 1 tablet by mouth once daily  0    furosemide (LASIX) 20 mg tablet take 1 tablet by mouth once daily  0    lansoprazole (PREVACID) 30 mg capsule Take 30 mg by mouth nightly.  cloNIDine HCl (CATAPRES) 0.1 mg tablet Take 1 Tab by mouth nightly. Hold sys BP < 637 systolic  Indications: hypertension 30 Tab 3    ELIQUIS 5 mg tablet take 1 tablet by mouth twice a day 60 Tab 11    carvedilol (COREG) 6.25 mg tablet Take 1 Tab by mouth two (2) times daily (with meals). 60 Tab 5    diltiazem hcl 180 mg Tb24 Take  by mouth.  FOLIC ACID/MULTIVIT-MIN/LUTEIN (CENTRUM SILVER PO) Take  by mouth.  CALCIUM CARBONATE/VITAMIN D3 (CALTRATE 600 + D PO) Take  by mouth.  ezetimibe (ZETIA) 10 mg tablet Take 10 mg by mouth daily. TAKES IN PM       No current facility-administered medications on file prior to visit. Allergies   Allergen Reactions    Citalopram Hydrobromide Rash     With itching.  Chlorthalidone Rash    Maxzide [Triamterene-Hydrochlorothiazid] Palpitations    Vicodin [Hydrocodone-Acetaminophen] Palpitations     , former smoker     Review of Systems  Constitutional: Negative for fever, chills, malaise/fatigue and diaphoresis.    Respiratory: Negative for cough, hemoptysis, sputum production, and wheezing. Cardiovascular: Negative for chest pain, orthopnea, claudication,leg swelling and PND. Gastrointestinal: Negative for heartburn, nausea, vomiting, blood in stool and melena. Genitourinary: Negative for dysuria and flank pain. Musculoskeletal: Negative for joint pain and back pain. Skin: Negative for rash. Neurological: Negative for focal weakness, seizures, loss of consciousness, weakness. Endo/Heme/Allergies: Does not bruise/bleed easily. Psychiatric/Behavioral: Negative for memory loss. Positive for anxiety. Visit Vitals    /84 (BP 1 Location: Left arm, BP Patient Position: Sitting)    Resp 20    Ht 5' 3\" (1.6 m)    Wt 198 lb 12.8 oz (90.2 kg)    SpO2 94%    BMI 35.22 kg/m2      Wt Readings from Last 3 Encounters:   04/28/17 198 lb 12.8 oz (90.2 kg)   04/19/17 198 lb (89.8 kg)   03/03/17 195 lb 1.7 oz (88.5 kg)      Physical Exam   Constitutional: She is oriented to person, place, and time. She appears well-developed and well-nourished. Neck: Neck supple. No JVD present. Cardiovascular: Normal rate, regular rhythm, S1 normal, S2 normal and normal heart sounds. Exam reveals no gallop. No murmur heard. Pulses: Carotid pulses are 2+ on the right side, and 2+ on the left side. Dorsalis pedis pulses are 2+ on the right side, and 2+ on the left side. Pulmonary/Chest: Effort normal and breath sounds normal. She has no wheezes. She has no rales. Abdominal: Soft. Bowel sounds are normal. There is no tenderness. There is no rebound. Musculoskeletal: She exhibits no edema. Neurological: She is alert and oriented to person, place, and time. Skin: Skin is warm and dry. Psychiatric: She has a normal mood and affect.      Lab Results   Component Value Date/Time    Sodium 140 02/28/2017 04:37 PM    Potassium 4.0 02/28/2017 04:37 PM    Chloride 100 02/28/2017 04:37 PM    CO2 23 02/28/2017 04:37 PM    Anion gap 10 01/18/2017 09:58 PM    Glucose 99 02/28/2017 04:37 PM BUN 12 02/28/2017 04:37 PM    Creatinine 0.82 02/28/2017 04:37 PM    BUN/Creatinine ratio 15 02/28/2017 04:37 PM    GFR est AA 81 02/28/2017 04:37 PM    GFR est non-AA 70 02/28/2017 04:37 PM    Calcium 9.1 02/28/2017 04:37 PM    Bilirubin, total 0.8 01/18/2017 09:58 PM    AST (SGOT) 17 01/18/2017 09:58 PM    Alk. phosphatase 109 01/18/2017 09:58 PM    Protein, total 7.6 01/18/2017 09:58 PM    Albumin 3.7 01/18/2017 09:58 PM    Globulin 3.9 01/18/2017 09:58 PM    A-G Ratio 0.9 01/18/2017 09:58 PM    ALT (SGPT) 40 01/18/2017 09:58 PM     Cardiographics   EKG 4/13 - normal  EKG 5/6/15 - AF with ventricular rate 96, RBBB  EKG 6/3/15 - A-fib rate 70s, RBBB, Rightward axis  EKG 8/31/15 - SR, RBBB  EKG 12/7/15 - SR, RBBB  EKG 7/26/16 - AF 80s, RBBB, rightward axis, unchanged from 7/23/16   EKG 11/28/16 - SR 60   EKG 12/05/16-AF 80-90, RBBB   EKG 01/10/17- AF 70s    ASSESSMENT and PLAN  Encounter Diagnoses   Name Primary?  LAE (left atrial enlargement)     RBBB     HTN (hypertension), benign     ANÍBAL (obstructive sleep apnea)     Paroxysmal atrial fibrillation (HCC) Yes    Acquired hypothyroidism     Venous insufficiency     S/P ablation of atrial fibrillation      Mrs. Bryn Coley has a hx of PAF in the setting of ANÍBAL and poorly controlled HTN. She is now s/p AF ablation. She remains in SR by exam today and denies any sxs concerning for recurrent AF. Continue Eliquis for stroke prevention. Follow up with Dr. Paola Johnson, as previously scheduled. She remains hypertensive in th office today, but reports stable BP's per home monitoring. Plan to continue current regimen. Encouraged low sodium diet and continued monitoring at home. Continue with CPAP. New onset edema is likely due to venous insufficiency. EF normal per stress imaging 5/2016. No other s/sxs of HF. Discussed holding Lasix and working on sodium restriction and leg elevation. Advised knee high compression stockings; Rx provided.       The patient was encouraged to continue current medications, exercise, lose weight and call with any new complaints or concerns. Follow-up Disposition:  Return in about 6 months (around 10/28/2017).     CANDIDO Jones MD

## 2017-05-01 PROBLEM — E03.9 ACQUIRED HYPOTHYROIDISM: Status: ACTIVE | Noted: 2017-05-01

## 2017-05-04 PROBLEM — I87.2 VENOUS INSUFFICIENCY: Status: ACTIVE | Noted: 2017-05-04

## 2017-06-28 ENCOUNTER — OFFICE VISIT (OUTPATIENT)
Dept: CARDIOLOGY CLINIC | Age: 77
End: 2017-06-28

## 2017-06-28 VITALS
WEIGHT: 203.8 LBS | DIASTOLIC BLOOD PRESSURE: 94 MMHG | HEIGHT: 63 IN | HEART RATE: 62 BPM | SYSTOLIC BLOOD PRESSURE: 140 MMHG | OXYGEN SATURATION: 97 % | RESPIRATION RATE: 16 BRPM | BODY MASS INDEX: 36.11 KG/M2

## 2017-06-28 DIAGNOSIS — I48.0 PAROXYSMAL ATRIAL FIBRILLATION (HCC): Primary | ICD-10-CM

## 2017-06-28 NOTE — PROGRESS NOTES
HISTORY OF PRESENTING ILLNESS      Hailey Cordon is a 78 y.o. female with ANÍBAL, HTN, RBBB and persistent AF who underwent AF ablation 3/2/2017. She had previously been on Propafenone and underwent several cardioversions resulting in symptomatic improvement with NSR. Echo demonstrated preserved LV function with LA diameter of 3.4cm in May 2015. She reported shortness of breath post ablation and her amiodarone was discontinued. She has had some bilateral lower extremity edema for which she was started on Lasix as well. EKG demonstrates sinus bradycardia with RBB. She reports fatigue and wishes to discontinue diltiazem if possible as well. She otherwise denies chest pain, syncope, shortness of breath. ACTIVE PROBLEM LIST     Patient Active Problem List    Diagnosis Date Noted    Venous insufficiency 05/04/2017    Acquired hypothyroidism 05/01/2017    S/P ablation of atrial fibrillation 04/28/2017    Paroxysmal atrial fibrillation ([facility] [address] 08/28/2016    ANÍBAL (obstructive sleep apnea) 07/26/2016    HTN (hypertension), benign 07/26/2016    Gastroesophageal reflux disease without esophagitis 07/03/2016    RBBB 05/07/2015    LAE (left atrial enlargement) 04/03/2013           PAST MEDICAL HISTORY     Past Medical History:   Diagnosis Date    DDD (degenerative disc disease)     Gastroesophageal reflux disease without esophagitis 7/3/2016    GERD (gastroesophageal reflux disease)     Hiatal hernia     HTN (hypertension), benign 7/26/2016    Hypertension     ANÍBAL (obstructive sleep apnea) 7/26/2016    Paroxysmal atrial fibrillation ([facility] [address] 8/28/2016    Uterine prolapse            PAST SURGICAL HISTORY     Past Surgical History:   Procedure Laterality Date    HX GI      COLONOSCOPY 7/14    HX GYN      TUBAL LIGATION    HX HEENT      WISDOM TEETH EXTRACTED. ALLERGIES     Allergies   Allergen Reactions    Citalopram Hydrobromide Rash     With itching.     Chlorthalidone Rash    Maxzide [Triamterene-Hydrochlorothiazid] Palpitations    Vicodin [Hydrocodone-Acetaminophen] Palpitations          FAMILY HISTORY     Family History   Problem Relation Age of Onset    Diabetes Mother     Hypertension Mother    [place] Amen COPD Mother     negative for cardiac disease       SOCIAL HISTORY     Social History     Social History    Marital status:      Spouse name: N/A    Number of children: N/A    Years of education: N/A     Social History Main Topics    Smoking status: Former Smoker     Packs/day: 1.50     Years: 30.00     Quit date: 3/3/1994    Smokeless tobacco: Never Used    Alcohol use No    Drug use: No    Sexual activity: Not Asked     Other Topics Concern    None     Social History Narrative         MEDICATIONS     Current Outpatient Prescriptions   Medication Sig    levothyroxine (SYNTHROID) 50 mcg tablet take 1 tablet by mouth once daily    furosemide (LASIX) 20 mg tablet take 1 tablet by mouth once daily    lansoprazole (PREVACID) 30 mg capsule Take 30 mg by mouth nightly.  cloNIDine HCl (CATAPRES) 0.1 mg tablet Take 1 Tab by mouth nightly. Hold sys BP < 298 systolic  Indications: hypertension    ELIQUIS 5 mg tablet take 1 tablet by mouth twice a day    carvedilol (COREG) 6.25 mg tablet Take 1 Tab by mouth two (2) times daily (with meals).  FOLIC ACID/MULTIVIT-MIN/LUTEIN (CENTRUM SILVER PO) Take  by mouth daily.  CALCIUM CARBONATE/VITAMIN D3 (CALTRATE 600 + D PO) Take  by mouth daily.  ezetimibe (ZETIA) 10 mg tablet Take 10 mg by mouth every evening. No current facility-administered medications for this visit. I have reviewed the nurses notes, vitals, problem list, allergy list, medical history, family, social history and medications. REVIEW OF SYMPTOMS      General: +fatigue, Pt denies excessive weight gain or loss. Pt is able to conduct ADL's  HEENT: Denies blurred vision, headaches, hearing loss, epistaxis and difficulty swallowing.   Respiratory: Denies cough, congestion, shortness of breath, GOODE, wheezing or stridor. Cardiovascular: Denies precordial pain, palpitations, edema or PND  Gastrointestinal: Denies poor appetite, indigestion, abdominal pain or blood in stool  Genitourinary: Denies hematuria, dysuria, increased urinary frequency  Musculoskeletal: Denies joint pain or swelling from muscles or joints  Neurologic: Denies tremor, paresthesias, headache, or sensory motor disturbance  Psychiatric: Denies confusion, insomnia, depression  Integumentray: Denies rash, itching or ulcers. Hematologic: Denies easy bruising, bleeding       PHYSICAL EXAMINATION      Vitals:    06/28/17 1132   BP: (!) 140/94   Pulse: 62   Resp: 16   SpO2: 97%   Weight: 203 lb 12.8 oz (92.4 kg)   Height: 5' 3\" (1.6 m)     General: Well developed, in no acute distress. HEENT: No jaundice, oral mucosa moist, no oral ulcers  Neck: Supple, no stiffness, no lymphadenopathy, supple  Heart:  Normal S1/S2 negative S3 or S4. Regular, no murmur, gallop or rub, no jugular venous distention  Respiratory: Clear bilaterally x 4, no wheezing or rales  Abdomen:   Soft, non-tender, bowel sounds are active.   Extremities:  No edema, normal cap refill, no cyanosis. Musculoskeletal: No clubbing, no deformities  Neuro: A&Ox3, speech clear, gait stable, cooperative, no focal neurologic deficits  Skin: Skin color is normal. No rashes or lesions.  Non diaphoretic, moist.  Vascular: 2+ pulses symmetric in all extremities       DIAGNOSTIC DATA      EKG: sinus bradycardia with RBBB       LABORATORY DATA      Lab Results   Component Value Date/Time    WBC 9.5 02/28/2017 04:37 PM    HGB 12.8 02/28/2017 04:37 PM    HCT 39.8 02/28/2017 04:37 PM    PLATELET 231 09/39/4427 04:37 PM    MCV 93 02/28/2017 04:37 PM      Lab Results   Component Value Date/Time    Sodium 140 02/28/2017 04:37 PM    Potassium 4.0 02/28/2017 04:37 PM    Chloride 100 02/28/2017 04:37 PM    CO2 23 02/28/2017 04:37 PM    Anion gap 10 01/18/2017 09:58 PM    Glucose 99 02/28/2017 04:37 PM    BUN 12 02/28/2017 04:37 PM    Creatinine 0.82 02/28/2017 04:37 PM    BUN/Creatinine ratio 15 02/28/2017 04:37 PM    GFR est AA 81 02/28/2017 04:37 PM    GFR est non-AA 70 02/28/2017 04:37 PM    Calcium 9.1 02/28/2017 04:37 PM    Bilirubin, total 0.8 01/18/2017 09:58 PM    AST (SGOT) 17 01/18/2017 09:58 PM    Alk. phosphatase 109 01/18/2017 09:58 PM    Protein, total 7.6 01/18/2017 09:58 PM    Albumin 3.7 01/18/2017 09:58 PM    Globulin 3.9 01/18/2017 09:58 PM    A-G Ratio 0.9 01/18/2017 09:58 PM    ALT (SGPT) 40 01/18/2017 09:58 PM           ASSESSMENT      1. Atrial fibrillation                         A. Persistent                        B. Symptomatic                        C.  AF ablation on 3/2/2017 (unable to achieve LSPV block)  2. Hiatal hernia  3. Hypertension   4. ANÍBAL  5. RBBB  6. GERD      PLAN     Will plan for trial of discontinuing Diltiazem and monitor clinically for recurrence of AF as well as improvement of fatigue. Will re-introduce this should she have breakthrough. She would like a second opinion regarding sleep apnea and her current CPAP settings, will refer her to Dr. Floyd Saunders for second evaluation. We discussed the option of Watchman device as an alternative to oral anticoagulation as she expressed the desire to eliminate medication if possible. She will d/w family and notify us if she would like to proceed. FOLLOW-UP   3 months. Thank you, Rehan Kinsey MD and Dr. Kaylyn Blancas for allowing me to participate in the care of this extraordinarily pleasant female. Please do not hesitate to contact me for further questions/concerns.      CANDIDO Le MD  Cardiac Electrophysiology / Cardiology    [facility]  [address] [facility], [facility], [address]  [phone] / [phone] Fax   [phone] / [phone] Fax

## 2017-08-08 ENCOUNTER — OFFICE VISIT (OUTPATIENT)
Dept: SLEEP MEDICINE | Age: 77
End: 2017-08-08

## 2017-08-08 VITALS
SYSTOLIC BLOOD PRESSURE: 125 MMHG | WEIGHT: 200 LBS | OXYGEN SATURATION: 91 % | DIASTOLIC BLOOD PRESSURE: 70 MMHG | BODY MASS INDEX: 35.44 KG/M2 | HEIGHT: 63 IN | TEMPERATURE: 98.9 F | HEART RATE: 58 BPM

## 2017-08-08 DIAGNOSIS — G47.33 OBSTRUCTIVE SLEEP APNEA (ADULT) (PEDIATRIC): Primary | ICD-10-CM

## 2017-08-08 DIAGNOSIS — I48.91 ATRIAL FIBRILLATION, UNSPECIFIED TYPE (HCC): ICD-10-CM

## 2017-08-08 NOTE — PROGRESS NOTES
[address] [facility] [place]. [place], [address]  Tel.  [phone]  Fax. [address] [facility] [address]  [place], [address] [facility]  Tel.  [phone]  Fax. [phone] [address] [facility] [address]  Tel.  [phone]  Fax. [phone]         Subjective:      Parris Pozo is an 78 y.o. female referred for evaluation for a sleep disorder. She complains of snoring, snorting, periods of not breathing associated with previous diagnosis of sleep apnea but she says she does not feel that CPAP helps her sleep quality and during her sleep study she felt that she did not sleep at all. She would like make sure she really has sleep apnea;. Symptoms began 3 years ago, stable since that time. She usually can fall asleep in 20 minutes. Family or house members note snoring, snorting, periods of not breathing. She denies falling asleep while driving. Parris Pozo   wake up frequently at night. She is (sometimes) bothered by waking up too early and left unable to get back to sleep. She actually sleeps about 6 (between 5-7 hours) hours at night and wakes up about 3 (between 1-3 (bathroom)) times during the night. She   work shifts:  .   Jaya Perkins indicates she does (sometimes) get too little sleep at night. Her bedtime is 2300. Carrolyn Grange She does not take naps. . She has the following observed behaviors:  ;  .  Other remarks:   she says she did not sleep at all the night of her sleep study so she is not sure if she really needs it. She does not like to use it and only started back using it because of her atrial fibrillation. She would like to repeat a sleep study to make sure she needs it. At the original pressure she found intolerable and it was turned down. she was diagnosed with sleep apnea 2 years ago. she was started on CPAP at a setting of 14 cm H20.  she is been using it regularly. Download from PAP machine reviewed.   Brand: resmed  Setting 9 cm H20    Estimated AHI 1/hour  Adherence 53% over past 3 month(s)      Winslow Sleepiness Score: 2         Allergies   Allergen Reactions    Citalopram Hydrobromide Rash     With itching.  Chlorthalidone Rash    Maxzide [Triamterene-Hydrochlorothiazid] Palpitations    Vicodin [Hydrocodone-Acetaminophen] Palpitations         Current Outpatient Prescriptions:     levothyroxine (SYNTHROID) 50 mcg tablet, take 1 tablet by mouth once daily, Disp: , Rfl: 0    lansoprazole (PREVACID) 30 mg capsule, Take 30 mg by mouth nightly., Disp: , Rfl:     cloNIDine HCl (CATAPRES) 0.1 mg tablet, Take 1 Tab by mouth nightly. Hold sys BP < 613 systolic  Indications: hypertension, Disp: 30 Tab, Rfl: 3    ELIQUIS 5 mg tablet, take 1 tablet by mouth twice a day, Disp: 60 Tab, Rfl: 11    carvedilol (COREG) 6.25 mg tablet, Take 1 Tab by mouth two (2) times daily (with meals). , Disp: 60 Tab, Rfl: 5    FOLIC ACID/MULTIVIT-MIN/LUTEIN (CENTRUM SILVER PO), Take  by mouth daily. , Disp: , Rfl:     CALCIUM CARBONATE/VITAMIN D3 (CALTRATE 600 + D PO), Take  by mouth daily. , Disp: , Rfl:     ezetimibe (ZETIA) 10 mg tablet, Take 10 mg by mouth every evening., Disp: , Rfl:     furosemide (LASIX) 20 mg tablet, take 1 tablet by mouth once daily, Disp: , Rfl: 0     She  has a past medical history of DDD (degenerative disc disease); Gastroesophageal reflux disease without esophagitis (7/3/2016); GERD (gastroesophageal reflux disease); Hiatal hernia; HTN (hypertension), benign (7/26/2016); Hypertension; ANÍBAL (obstructive sleep apnea) (7/26/2016); Paroxysmal atrial fibrillation (HCC) (8/28/2016); and Uterine prolapse. She  has a past surgical history that includes gi; gyn; heent; and afib ablation (03/2017). She family history includes COPD in her mother; Diabetes in her mother; Hypertension in her mother. She  reports that she quit smoking about 23 years ago. She has a 45.00 pack-year smoking history.  She has never used smokeless tobacco. She reports that she does not drink alcohol or use illicit drugs. Review of Systems:  Constitutional:  No significant weight loss or weight gain. Eyes:  No blurred vision. CVS:  No significant chest pain  Pulm:  No significant shortness of breath  GI:  No significant nausea or vomiting  :  No significant nocturia  Musculoskeletal:  No significant joint pain at night  Skin:  No significant rashes  Neuro:  No significant dizziness   Psych:  No active mood issues    Sleep Review of Systems: notable for no difficulty falling asleep; +frequent awakenings at night;  regular dreaming noted; no nightmares ; no early morning headaches; no memory problems; no concentration issues; no history of any automobile or occupational accidents due to daytime drowsiness. Objective:     Visit Vitals    /70    Pulse (!) 58    Temp 98.9 °F (37.2 °C)    Ht 5' 3\" (1.6 m)    Wt 200 lb (90.7 kg)    SpO2 91%    BMI 35.43 kg/m2         General:   Not in acute distress   Eyes:  Anicteric sclerae, no obvious strabismus   Nose:  No obvious nasal septum deviation    Oropharynx:   Class 3 oropharyngeal outlet, thick tongue base, enlarged and boggy uvula, low-lying soft palate, narrow tonsilo-pharyngeal pilars   Tonsils:   tonsils are present and normal   Neck:   Neck circ. in \"inches\": 15.5; midline trachea   Chest/Lungs:  Equal lung expansion, clear on auscultation    CVS:  Normal rate, regular rhythm; no JVD   Skin:  Warm to touch; no obvious rashes   Neuro:  No focal deficits ; no obvious tremor    Psych:  Normal affect,  normal countenance;          Assessment:       ICD-10-CM ICD-9-CM    1. Obstructive sleep apnea (adult) (pediatric) G47.33 327.23 POLYSOMNOGRAPHY 1 NIGHT   2. Atrial fibrillation, unspecified type ([facility] [address] I48.91 427.31          Plan:       Download today. PAP education on current machine. Humidification education today. * The patient currently has a High Risk for having sleep apnea.   STOP-BANG score 6.  * PSG was ordered for initial evaluation. We will follow the American Academy of Sleep Medicine protocol regarding split-night procedures and offering a trial of Positive Airway Pressure (CPAP, BPAP, etc.)  She will continue on her current setting for now. * She was provided information on sleep apnea including coresponding risk factors and the importance of proper treatment. * Counseling was provided regarding proper sleep hygiene and safe driving. * Return for follow-up shortly after sleep study to go over results and to discuss treatment options if indicated. 2. . Atrial fibrillation - he continues on his current regimen. I have reviewed the relationship between arrhythmias and sleep disordered breathing    Thank you for allowing us to participate in your patient's medical care. We'll keep you updated on these investigations.   Maryanne Arroyo MD  Diplomate in Sleep Medicine  [place]

## 2017-08-08 NOTE — Clinical Note
Thank you for the referral.  I will keep you informed of her progress.  [address], Radha Morales

## 2017-08-08 NOTE — PATIENT INSTRUCTIONS
[address] [facility] [place]. [place], [address]  Tel.  [phone]  Fax. [address] [facility] [address]  [place], [address] [facility]  Tel.  [phone]  Fax. [phone] [address] [facility] [address]  Tel.  [phone]  Fax. [phone]     Sleep Apnea: After Your Visit  Your Care Instructions  Sleep apnea occurs when you frequently stop breathing for 10 seconds or longer during sleep. It can be mild to severe, based on the number of times per hour that you stop breathing or have slowed breathing. Blocked or narrowed airways in your nose, mouth, or throat can cause sleep apnea. Your airway can become blocked when your throat muscles and tongue relax during sleep. Sleep apnea is common, occurring in 1 out of 20 individuals. Individuals having any of the following characteristics should be evaluated and treated right away due to high risk and detrimental consequences from untreated sleep apnea:  1. Obesity  2. Congestive Heart failure  3. Atrial Fibrillation  4. Uncontrolled Hypertension  5. Type II Diabetes  6. Night-time Arrhythmias  7. Stroke  8. Pulmonary Hypertension  9. High-risk Driving Populations (pilots, truck drivers, etc.)  10. Patients Considering Weight-loss Surgery    How do you know you have sleep apnea? You probably have sleep apnea if you answer 'yes' to 3 or more of the following questions:  S - Have you been told that you Snore? T - Are you often Tired during the day? O - Has anyone Observed you stop breathing while sleeping? P- Do you have (or are being treated for) high blood Pressure? B - Are you obese (Body Mass Index > 35)? A - Is your Age 48years old or older? N - Is your Neck size greater than 16 inches? G - Are you male Gender? A sleep physician can prescribe a breathing device that prevents tissues in the throat from blocking your airway.  Or your doctor may recommend using a dental device (oral breathing device) to help keep your airway open. In some cases, surgery may be needed to remove enlarged tissues in the throat. Follow-up care is a key part of your treatment and safety. Be sure to make and go to all appointments, and call your doctor if you are having problems. It's also a good idea to know your test results and keep a list of the medicines you take. How can you care for yourself at home? · Lose weight, if needed. It may reduce the number of times you stop breathing or have slowed breathing. · Go to bed at the same time every night. · Sleep on your side. It may stop mild apnea. If you tend to roll onto your back, sew a pocket in the back of your pajama top. Put a tennis ball into the pocket, and stitch the pocket shut. This will help keep you from sleeping on your back. · Avoid alcohol and medicines such as sleeping pills and sedatives before bed. · Do not smoke. Smoking can make sleep apnea worse. If you need help quitting, talk to your doctor about stop-smoking programs and medicines. These can increase your chances of quitting for good. · Prop up the head of your bed 4 to 6 inches by putting bricks under the legs of the bed. · Treat breathing problems, such as a stuffy nose, caused by a cold or allergies. · Use a continuous positive airway pressure (CPAP) breathing machine if lifestyle changes do not help your apnea and your doctor recommends it. The machine keeps your airway from closing when you sleep. · If CPAP does not help you, ask your doctor whether you should try other breathing machines. A bilevel positive airway pressure machine has two types of air pressureâone for breathing in and one for breathing out. Another device raises or lowers air pressure as needed while you breathe. · If your nose feels dry or bleeds when using one of these machines, talk with your doctor about increasing moisture in the air. A humidifier may help.   · If your nose is runny or stuffy from using a breathing machine, talk with your doctor about using decongestants or a corticosteroid nasal spray. When should you call for help? Watch closely for changes in your health, and be sure to contact your doctor if:  · You still have sleep apnea even though you have made lifestyle changes. · You are thinking of trying a device such as CPAP. · You are having problems using a CPAP or similar machine. Where can you learn more? Go to [employer]. Enter C207 in the search box to learn more about \"Sleep Apnea: After Your Visit. \"   © 8205-2619 Healthwise, [employer]orporated. Care instructions adapted under license by [facility] (which disclaims liability or warranty for this information). This care instruction is for use with your licensed healthcare professional. If you have questions about a medical condition or this instruction, always ask your healthcare professional. Michael Search any warranty or liability for your use of this information. PROPER SLEEP HYGIENE    What to avoid  · Do not have drinks with caffeine, such as coffee or black tea, for 8 hours before bed. · Do not smoke or use other types of tobacco near bedtime. Nicotine is a stimulant and can keep you awake. · Avoid drinking alcohol late in the evening, because it can cause you to wake in the middle of the night. · Do not eat a big meal close to bedtime. If you are hungry, eat a light snack. · Do not drink a lot of water close to bedtime, because the need to urinate may wake you up during the night. · Do not read or watch TV in bed. Use the bed only for sleeping and sexual activity. What to try  · Go to bed at the same time every night, and wake up at the same time every morning. Do not take naps during the day. · Keep your bedroom quiet, dark, and cool. · Get regular exercise, but not within 3 to 4 hours of your bedtime. .  · Sleep on a comfortable pillow and mattress.   · If watching the clock makes you anxious, turn it facing away from you so you cannot see the time. · If you worry when you lie down, start a worry book. Well before bedtime, write down your worries, and then set the book and your concerns aside. · Try meditation or other relaxation techniques before you go to bed. · If you cannot fall asleep, get up and go to another room until you feel sleepy. Do something relaxing. Repeat your bedtime routine before you go to bed again. · Make your house quiet and calm about an hour before bedtime. Turn down the lights, turn off the TV, log off the computer, and turn down the volume on music. This can help you relax after a busy day. Drowsy Driving  The [address] [place] [address] Road Traffic Safety Administration cites drowsiness as a causing factor in more than 691,243 police reported crashes annually, resulting in 76,000 injuries and 1,500 deaths. Other surveys suggest 55% of people polled have driven while drowsy in the past year, 23% had fallen asleep but not crashed, 3% crashed, and 2% had and accident due to drowsy driving. Who is at risk? Young Drivers: One study of drowsy driving accidents states that 55% of the drivers were under 25 years. Of those, 75% were male. Shift Workers and Travelers: People who work overnight or travel across time zones frequently are at higher risk of experiencing Circadian Rhythm Disorders. They are trying to work and function when their body is programed to sleep. Sleep Deprived: Lack of sleep has a serious impact on your ability to pay attention or focus on a task. Consistently getting less than the average of 8 hours your body needs creates partial or cumulative sleep deprivation. Untreated Sleep Disorders: Sleep Apnea, Narcolepsy, R.L.S., and other sleep disorders (untreated) prevent a person from getting enough restful sleep. This leads to excessive daytime sleepiness and increases the risk for drowsy driving accidents by up to 7 times.   Medications / Alcohol: Even over the counter medications can cause drowsiness. Medications that impair a drivers attention should have a warning label. Alcohol naturally makes you sleepy and on its own can cause accidents. Combined with excessive drowsiness its effects are amplified. Signs of Drowsy Driving:   * You don't remember driving the last few miles   * You may drift out of your nadir   * You are unable to focus and your thoughts wander   * You may yawn more often than normal   * You have difficulty keeping your eyes open / nodding off   * Missing traffic signs, speeding, or tailgating  Prevention-   Good sleep hygiene, lifestyle and behavioral choices have the most impact on drowsy driving. There is no substitute for sleep and the average person requires 8 hours nightly. If you find yourself driving drowsy, stop and sleep. Consider the sleep hygiene tips provided during your visit as well. Medication Refill Policy: Refills for all medications require 1 week advance notice. Please have your pharmacy fax a refill request. We are unable to fax, or call in \"controled substance\" medications and you will need to pick these prescriptions up from our office. [employer] Activation    Thank you for requesting access to [employer]. Please follow the instructions below to securely access and download your online medical record. [employer] allows you to send messages to your doctor, view your test results, renew your prescriptions, schedule appointments, and more. How Do I Sign Up? 1. In your internet browser, go to [email or web address]. 2. Click on the First Time User? Click Here link in the Sign In box. You will see the New Member Sign Up page. 3. Enter your [employer] No[employer] Access Code exactly as it appears below. You will not need to use this code after youve completed the sign-up process. If you do not sign up before the expiration date, you must request a new code. [employer] Access Code:  Activation code not generated  Current En Noir Status: Active (This is the date your [email or web address] access code will )    4. Enter the last four digits of your Social Security Number (xxxx) and Date of Birth (mm/dd/yyyy) as indicated and click Submit. You will be taken to the next sign-up page. 5. Create a [email or web address] ID. This will be your [email or web address] login ID and cannot be changed, so think of one that is secure and easy to remember. 6. Create a [email or web address] password. You can change your password at any time. 7. Enter your Password Reset Question and Answer. This can be used at a later time if you forget your password. 8. Enter your e-mail address. You will receive e-mail notification when new information is available in [address] 9. Click Sign Up. You can now view and download portions of your medical record. 10. Click the Download Summary menu link to download a portable copy of your medical information. Additional Information    If you have questions, please call [phone]. Remember, [email or web address] is NOT to be used for urgent needs. For medical emergencies, dial 911.

## 2017-08-13 ENCOUNTER — APPOINTMENT (OUTPATIENT)
Dept: CT IMAGING | Age: 77
End: 2017-08-13
Attending: EMERGENCY MEDICINE
Payer: MEDICARE

## 2017-08-13 ENCOUNTER — APPOINTMENT (OUTPATIENT)
Dept: GENERAL RADIOLOGY | Age: 77
End: 2017-08-13
Attending: EMERGENCY MEDICINE
Payer: MEDICARE

## 2017-08-13 ENCOUNTER — HOSPITAL ENCOUNTER (EMERGENCY)
Age: 77
Discharge: HOME | End: 2017-08-13
Attending: EMERGENCY MEDICINE
Payer: MEDICARE

## 2017-08-13 VITALS
RESPIRATION RATE: 25 BRPM | TEMPERATURE: 97.9 F | DIASTOLIC BLOOD PRESSURE: 84 MMHG | HEIGHT: 63 IN | OXYGEN SATURATION: 92 % | WEIGHT: 199.74 LBS | BODY MASS INDEX: 35.39 KG/M2 | HEART RATE: 65 BPM | SYSTOLIC BLOOD PRESSURE: 185 MMHG

## 2017-08-13 DIAGNOSIS — I10 ESSENTIAL HYPERTENSION: Primary | ICD-10-CM

## 2017-08-13 LAB
ALBUMIN SERPL BCP-MCNC: 4 G/DL (ref 3.5–5)
ALBUMIN/GLOB SERPL: 1.1 {RATIO} (ref 1.1–2.2)
ALP SERPL-CCNC: 85 U/L (ref 45–117)
ALT SERPL-CCNC: 27 U/L (ref 12–78)
ANION GAP BLD CALC-SCNC: 5 MMOL/L (ref 5–15)
AST SERPL W P-5'-P-CCNC: 21 U/L (ref 15–37)
BASOPHILS # BLD AUTO: 0 K/UL (ref 0–0.1)
BASOPHILS # BLD: 0 % (ref 0–1)
BILIRUB SERPL-MCNC: 0.5 MG/DL (ref 0.2–1)
BNP SERPL-MCNC: 270 PG/ML (ref 0–450)
BUN SERPL-MCNC: 15 MG/DL (ref 6–20)
BUN/CREAT SERPL: 13 (ref 12–20)
CALCIUM SERPL-MCNC: 9.3 MG/DL (ref 8.5–10.1)
CHLORIDE SERPL-SCNC: 105 MMOL/L (ref 97–108)
CO2 SERPL-SCNC: 32 MMOL/L (ref 21–32)
CREAT SERPL-MCNC: 1.18 MG/DL (ref 0.55–1.02)
EOSINOPHIL # BLD: 0.2 K/UL (ref 0–0.4)
EOSINOPHIL NFR BLD: 4 % (ref 0–7)
ERYTHROCYTE [DISTWIDTH] IN BLOOD BY AUTOMATED COUNT: 13.5 % (ref 11.5–14.5)
GLOBULIN SER CALC-MCNC: 3.5 G/DL (ref 2–4)
GLUCOSE SERPL-MCNC: 115 MG/DL (ref 65–100)
HCT VFR BLD AUTO: 40.8 % (ref 35–47)
HGB BLD-MCNC: 13.5 G/DL (ref 11.5–16)
LYMPHOCYTES # BLD AUTO: 32 % (ref 12–49)
LYMPHOCYTES # BLD: 1.7 K/UL (ref 0.8–3.5)
MCH RBC QN AUTO: 30.4 PG (ref 26–34)
MCHC RBC AUTO-ENTMCNC: 33.1 G/DL (ref 30–36.5)
MCV RBC AUTO: 91.9 FL (ref 80–99)
MONOCYTES # BLD: 0.6 K/UL (ref 0–1)
MONOCYTES NFR BLD AUTO: 11 % (ref 5–13)
NEUTS SEG # BLD: 2.9 K/UL (ref 1.8–8)
NEUTS SEG NFR BLD AUTO: 53 % (ref 32–75)
PLATELET # BLD AUTO: 189 K/UL (ref 150–400)
POTASSIUM SERPL-SCNC: 3.8 MMOL/L (ref 3.5–5.1)
PROT SERPL-MCNC: 7.5 G/DL (ref 6.4–8.2)
RBC # BLD AUTO: 4.44 M/UL (ref 3.8–5.2)
SODIUM SERPL-SCNC: 142 MMOL/L (ref 136–145)
TROPONIN I SERPL-MCNC: <0.04 NG/ML
WBC # BLD AUTO: 5.4 K/UL (ref 3.6–11)

## 2017-08-13 PROCEDURE — 84484 ASSAY OF TROPONIN QUANT: CPT | Performed by: EMERGENCY MEDICINE

## 2017-08-13 PROCEDURE — 36415 COLL VENOUS BLD VENIPUNCTURE: CPT | Performed by: EMERGENCY MEDICINE

## 2017-08-13 PROCEDURE — 85025 COMPLETE CBC W/AUTO DIFF WBC: CPT | Performed by: EMERGENCY MEDICINE

## 2017-08-13 PROCEDURE — 74011250637 HC RX REV CODE- 250/637: Performed by: EMERGENCY MEDICINE

## 2017-08-13 PROCEDURE — 93005 ELECTROCARDIOGRAM TRACING: CPT

## 2017-08-13 PROCEDURE — 71020 XR CHEST PA LAT: CPT

## 2017-08-13 PROCEDURE — 83880 ASSAY OF NATRIURETIC PEPTIDE: CPT | Performed by: EMERGENCY MEDICINE

## 2017-08-13 PROCEDURE — 99284 EMERGENCY DEPT VISIT MOD MDM: CPT

## 2017-08-13 PROCEDURE — 80053 COMPREHEN METABOLIC PANEL: CPT | Performed by: EMERGENCY MEDICINE

## 2017-08-13 PROCEDURE — 70450 CT HEAD/BRAIN W/O DYE: CPT

## 2017-08-13 RX ORDER — CLONIDINE HYDROCHLORIDE 0.1 MG/1
0.2 TABLET ORAL
Status: COMPLETED | OUTPATIENT
Start: 2017-08-13 | End: 2017-08-13

## 2017-08-13 RX ADMIN — CLONIDINE HYDROCHLORIDE 0.2 MG: 0.1 TABLET ORAL at 19:09

## 2017-08-13 NOTE — ED PROVIDER NOTES
HPI Comments: 78 y.o. female with past medical history significant for HTN, hiatal hernia, GERD, DDD, uterine prolapse and a-fib who presents with chief complaint of hypertension. Pt presents with BP of 232/98 upon arriving to the ED. Pt states she does not know the cause of the hypertension and explains that her BP has been recently well controlled. Pt states she is currently feeling at her baseline at rest in the ED. Pt reports today she woke up with a HA and then realized she was hypertensive after taking BP reading. Pt states her pressure was up yesterday so she took an additional dose of antihypertensives, which brought her BP down. Pt reports that she remained hypertensive throughout today and she was prompted by her doctor to visit the ED. Pt reports she has been taking . 1 mg of clonidine when her systolic is >789, but states BP was well controlled before current onset of hypertension. Pt reports that since cardiac ablation 5 months PTA she has been taking her BP reading daily. Pt reports chronic SOB which remains unchanged today. Pt denies CP, visual disturbances and vomiting. There are no other acute medical concerns at this time. Social hx: former smoker (quit 1994), +  PCP: Angela Mosquera MD  Cardiologist: Aditi Lott MD     Old Chart Review:  3/2/17: a-fib and additional line of ablation preformed. Note written by Jannet Bland, as dictated by Sona Diaz MD 6:40 PM      The history is provided by the patient.         Past Medical History:   Diagnosis Date    DDD (degenerative disc disease)     Gastroesophageal reflux disease without esophagitis 7/3/2016    GERD (gastroesophageal reflux disease)     Hiatal hernia     HTN (hypertension), benign 7/26/2016    Hypertension     ANÍBAL (obstructive sleep apnea) 7/26/2016    Paroxysmal atrial fibrillation ([facility] [address] 8/28/2016    Uterine prolapse        Past Surgical History:   Procedure Laterality Date    HX AFIB ABLATION 03/2017    HX GI      COLONOSCOPY 7/14    HX GYN      TUBAL LIGATION    HX HEENT      WISDOM TEETH EXTRACTED. Family History:   Problem Relation Age of Onset    Diabetes Mother     Hypertension Mother     COPD Mother        Social History     Social History    Marital status:      Spouse name: N/A    Number of children: N/A    Years of education: N/A     Occupational History    Not on file. Social History Main Topics    Smoking status: Former Smoker     Packs/day: 1.50     Years: 30.00     Quit date: 3/3/1994    Smokeless tobacco: Never Used    Alcohol use No    Drug use: No    Sexual activity: Not on file     Other Topics Concern    Not on file     Social History Narrative         ALLERGIES: Citalopram hydrobromide; Chlorthalidone; Maxzide [triamterene-hydrochlorothiazid]; and Vicodin [hydrocodone-acetaminophen]    Review of Systems   Constitutional: Negative for activity change, chills and fever. HENT: Negative for nosebleeds, sore throat, trouble swallowing and voice change. Eyes: Negative for visual disturbance. Respiratory: Positive for shortness of breath. Cardiovascular: Negative for chest pain and palpitations. Gastrointestinal: Negative for abdominal pain, constipation, diarrhea and nausea. Genitourinary: Negative for difficulty urinating, dysuria, hematuria and urgency. Musculoskeletal: Negative for back pain, neck pain and neck stiffness. Skin: Negative for color change. Allergic/Immunologic: Negative for immunocompromised state. Neurological: Positive for headaches. Negative for dizziness, seizures, syncope, weakness, light-headedness and numbness. Psychiatric/Behavioral: Negative for behavioral problems, confusion, hallucinations, self-injury and suicidal ideas. All other systems reviewed and are negative.       Vitals:    08/13/17 1818 08/13/17 1832   BP: (!) 244/109 (!) 232/98   Pulse: 62 64   Resp: 18 15   Temp: 97.9 °F (36.6 °C)    SpO2: 97% 98%   Weight: 90.6 kg (199 lb 11.8 oz)    Height: 5' 3\" (1.6 m)             Physical Exam   Constitutional: She is oriented to person, place, and time. She appears well-developed and well-nourished. No distress. HENT:   Head: Normocephalic and atraumatic. Eyes: Pupils are equal, round, and reactive to light. Neck: Normal range of motion. Neck supple. Cardiovascular: Normal rate, regular rhythm and normal heart sounds. Exam reveals no gallop and no friction rub. No murmur heard. Pulmonary/Chest: Effort normal and breath sounds normal. No respiratory distress. She has no wheezes. Abdominal: Soft. Bowel sounds are normal. She exhibits no distension. There is no tenderness. There is no rebound and no guarding. Musculoskeletal: Normal range of motion. Neurological: She is alert and oriented to person, place, and time. Neurologically exam nl    Skin: Skin is warm. No rash noted. She is not diaphoretic. Psychiatric: She has a normal mood and affect. Her behavior is normal. Judgment and thought content normal.   Nursing note and vitals reviewed. Note written by Jannet De La Cruz, as dictated by Deirdre Alan MD 6:40 PM    [facility]  ED Course   This is a 81-year-old female past medical history hypertension, hernia, GERD, failed, presenting with complaints of hypertension. She states she is compliant with her antihypertensives, states that she checks her pressures several times a day, noticed increasing his and her blood pressure earlier this morning. Patient states she contacted her cardiologist who recommended she take additional doses of her antihypertensives, with her but pressures continued to rise upon which time she was referred to the emergency department. Patient states she woke with a headache this morning, which is now gone, she denies chest pain, shortness of breath, difficulty ambulating, focal weakness, or paresthesias.  Distal exam remarkable for well-appearing individual, in no acute distress, with normal physical and neurologic exam, noted to be profoundly hypertensive. Obtain CMP, CBC, BMP, cardiac enzymes, EKG, chest x-ray and head CT for evaluating end organ dysfunction. Will attempt to administer her one additional dose of oral antihypertensives, before resorting to IV antihypertensives and admission. Procedures    PROGRESS NOTE:  7:53 PM  BP has improved to 185/84. No sign of end organ dysfunction.  Planned discharge to f/u with cardiologist.

## 2017-08-13 NOTE — DISCHARGE INSTRUCTIONS
High Blood Pressure: Care Instructions  Your Care Instructions  If your blood pressure is usually above 140/90, you have high blood pressure, or hypertension. That means the top number is 140 or higher or the bottom number is 90 or higher, or both. Despite what a lot of people think, high blood pressure usually doesn't cause headaches or make you feel dizzy or lightheaded. It usually has no symptoms. But it does increase your risk for heart attack, stroke, and kidney or eye damage. The higher your blood pressure, the more your risk increases. Your doctor will give you a goal for your blood pressure. Your goal will be based on your health and your age. An example of a goal is to keep your blood pressure below 140/90. Lifestyle changes, such as eating healthy and being active, are always important to help lower blood pressure. You might also take medicine to reach your blood pressure goal.  Follow-up care is a key part of your treatment and safety. Be sure to make and go to all appointments, and call your doctor if you are having problems. It's also a good idea to know your test results and keep a list of the medicines you take. How can you care for yourself at home? Medical treatment  · If you stop taking your medicine, your blood pressure will go back up. You may take one or more types of medicine to lower your blood pressure. Be safe with medicines. Take your medicine exactly as prescribed. Call your doctor if you think you are having a problem with your medicine. · Talk to your doctor before you start taking aspirin every day. Aspirin can help certain people lower their risk of a heart attack or stroke. But taking aspirin isn't right for everyone, because it can cause serious bleeding. · See your doctor regularly. You may need to see the doctor more often at first or until your blood pressure comes down.   · If you are taking blood pressure medicine, talk to your doctor before you take decongestants or anti-inflammatory medicine, such as ibuprofen. Some of these medicines can raise blood pressure. · Learn how to check your blood pressure at home. Lifestyle changes  · Stay at a healthy weight. This is especially important if you put on weight around the waist. Losing even 10 pounds can help you lower your blood pressure. · If your doctor recommends it, get more exercise. Walking is a good choice. Bit by bit, increase the amount you walk every day. Try for at least 30 minutes on most days of the week. You also may want to swim, bike, or do other activities. · Avoid or limit alcohol. Talk to your doctor about whether you can drink any alcohol. · Try to limit how much sodium you eat to less than 2,300 milligrams (mg) a day. Your doctor may ask you to try to eat less than 1,500 mg a day. · Eat plenty of fruits (such as bananas and oranges), vegetables, legumes, whole grains, and low-fat dairy products. · Lower the amount of saturated fat in your diet. Saturated fat is found in animal products such as milk, cheese, and meat. Limiting these foods may help you lose weight and also lower your risk for heart disease. · Do not smoke. Smoking increases your risk for heart attack and stroke. If you need help quitting, talk to your doctor about stop-smoking programs and medicines. These can increase your chances of quitting for good. When should you call for help? Call 911 anytime you think you may need emergency care. This may mean having symptoms that suggest that your blood pressure is causing a serious heart or blood vessel problem. Your blood pressure may be over 180/110. For example, call 911 if:  · You have symptoms of a heart attack. These may include:  ¨ Chest pain or pressure, or a strange feeling in the chest.  ¨ Sweating. ¨ Shortness of breath. ¨ Nausea or vomiting. ¨ Pain, pressure, or a strange feeling in the back, neck, jaw, or upper belly or in one or both shoulders or arms.   ¨ Lightheadedness or sudden weakness. ¨ A fast or irregular heartbeat. · You have symptoms of a stroke. These may include:  ¨ Sudden numbness, tingling, weakness, or loss of movement in your face, arm, or leg, especially on only one side of your body. ¨ Sudden vision changes. ¨ Sudden trouble speaking. ¨ Sudden confusion or trouble understanding simple statements. ¨ Sudden problems with walking or balance. ¨ A sudden, severe headache that is different from past headaches. · You have severe back or belly pain. Do not wait until your blood pressure comes down on its own. Get help right away. Call your doctor now or seek immediate care if:  · Your blood pressure is much higher than normal (such as 180/110 or higher), but you don't have symptoms. · You think high blood pressure is causing symptoms, such as:  ¨ Severe headache. ¨ Blurry vision. Watch closely for changes in your health, and be sure to contact your doctor if:  · Your blood pressure measures 140/90 or higher at least 2 times. That means the top number is 140 or higher or the bottom number is 90 or higher, or both. · You think you may be having side effects from your blood pressure medicine. · Your blood pressure is usually normal, but it goes above normal at least 2 times. Where can you learn more? Go to http://iker-isaac.info/. Enter R582 in the search box to learn more about \"High Blood Pressure: Care Instructions. \"  Current as of: August 8, 2016  Content Version: 11.3  © 8675-4175 [employer]. Care instructions adapted under license by [employer] (which disclaims liability or warranty for this information). If you have questions about a medical condition or this instruction, always ask your healthcare professional. [place] [address] any warranty or liability for your use of this information. We hope that we have addressed all of your medical concerns.  The examination and treatment you received in the Emergency Department were for an emergent problem and were not intended as complete care. It is important that you follow up with your healthcare provider(s) for ongoing care. If your symptoms worsen or do not improve as expected, and you are unable to reach your usual health care provider(s), you should return to the Emergency Department. Today's healthcare is undergoing tremendous change, and patient satisfaction surveys are one of the many tools to assess the quality of medical care. You may receive a survey from the [employer] regarding your experience in the Emergency Department. I hope that your experience has been completely positive, particularly the medical care that I provided. As such, please participate in the survey; anything less than excellent does not meet my expectations or intentions. [facility] and [facility] participate in nationally recognized quality of care measures. If your blood pressure is greater than 120/80, as reported below, we urge that you seek medical care to address the potential of high blood pressure, commonly known as hypertension. Hypertension can be hereditary or can be caused by certain medical conditions, pain, stress, or \"white coat syndrome. \"       Please make an appointment with your health care provider(s) for follow up of your Emergency Department visit. VITALS:   Patient Vitals for the past 8 hrs:   Temp Pulse Resp BP SpO2   08/13/17 1909 - 68 - (!) 216/95 -   08/13/17 1906 - 74 (!) 33 (!) 216/95 97 %   08/13/17 1832 - 64 15 (!) 232/98 98 %   08/13/17 1818 97.9 °F (36.6 °C) 62 18 (!) 244/109 97 %          Thank you for allowing us to provide you with medical care today. We realize that you have many choices for your emergency care needs. Please choose us in the future for any continued health care needs. Risa Reza  [address], [address] [facility] [address] [facility].   Office: [phone]            Recent Results (from the past 24 hour(s))   CBC WITH AUTOMATED DIFF    Collection Time: 08/13/17  6:40 PM   Result Value Ref Range    WBC 5.4 3.6 - 11.0 K/uL    RBC 4.44 3.80 - 5.20 M/uL    HGB 13.5 11.5 - 16.0 g/dL    HCT 40.8 35.0 - 47.0 %    MCV 91.9 80.0 - 99.0 FL    MCH 30.4 26.0 - 34.0 PG    MCHC 33.1 30.0 - 36.5 g/dL    RDW 13.5 11.5 - 14.5 %    PLATELET 405 168 - 631 K/uL    NEUTROPHILS 53 32 - 75 %    LYMPHOCYTES 32 12 - 49 %    MONOCYTES 11 5 - 13 %    EOSINOPHILS 4 0 - 7 %    BASOPHILS 0 0 - 1 %    ABS. NEUTROPHILS 2.9 1.8 - 8.0 K/UL    ABS. LYMPHOCYTES 1.7 0.8 - 3.5 K/UL    ABS. MONOCYTES 0.6 0.0 - 1.0 K/UL    ABS. EOSINOPHILS 0.2 0.0 - 0.4 K/UL    ABS. BASOPHILS 0.0 0.0 - 0.1 K/UL   METABOLIC PANEL, COMPREHENSIVE    Collection Time: 08/13/17  6:40 PM   Result Value Ref Range    Sodium 142 136 - 145 mmol/L    Potassium 3.8 3.5 - 5.1 mmol/L    Chloride 105 97 - 108 mmol/L    CO2 32 21 - 32 mmol/L    Anion gap 5 5 - 15 mmol/L    Glucose 115 (H) 65 - 100 mg/dL    BUN 15 6 - 20 MG/DL    Creatinine 1.18 (H) 0.55 - 1.02 MG/DL    BUN/Creatinine ratio 13 12 - 20      GFR est AA 54 (L) >60 ml/min/1.73m2    GFR est non-AA 45 (L) >60 ml/min/1.73m2    Calcium 9.3 8.5 - 10.1 MG/DL    Bilirubin, total 0.5 0.2 - 1.0 MG/DL    ALT (SGPT) 27 12 - 78 U/L    AST (SGOT) 21 15 - 37 U/L    Alk.  phosphatase 85 45 - 117 U/L    Protein, total 7.5 6.4 - 8.2 g/dL    Albumin 4.0 3.5 - 5.0 g/dL    Globulin 3.5 2.0 - 4.0 g/dL    A-G Ratio 1.1 1.1 - 2.2     TROPONIN I    Collection Time: 08/13/17  6:40 PM   Result Value Ref Range    Troponin-I, Qt. <0.04 <0.05 ng/mL   NT-PRO BNP    Collection Time: 08/13/17  6:40 PM   Result Value Ref Range    NT pro- 0 - 450 PG/ML   EKG, 12 LEAD, INITIAL    Collection Time: 08/13/17  7:00 PM   Result Value Ref Range    Ventricular Rate 65 BPM    Atrial Rate 65 BPM    P-R Interval 166 ms    QRS Duration 126 ms    Q-T Interval 454 ms    QTC Calculation (Bezet) 472 ms    Calculated P Axis 111 degrees    Calculated R Axis 67 degrees    Calculated T Axis 9 degrees    Diagnosis       Sinus rhythm with premature supraventricular complexes  Right bundle branch block  Abnormal ECG  When compared with ECG of 18-JAN-2017 23:10,  Sinus rhythm has replaced Atrial fibrillation  Vent. rate has decreased BY  32 BPM  Nonspecific T wave abnormality has replaced inverted T waves in Anterior   leads         Xr Chest Pa Lat    Result Date: 8/13/2017  Clinical indication: Fever and shortness of breath. Frontal and lateral views of the chest obtained and compared to 2016. There is chronic elevation of the right hemidiaphragm. No focal infiltrate is suspected. impression: No acute findings suspected. Ct Head Wo Cont    Result Date: 8/13/2017  EXAM:  CT HEAD WO CONT INDICATION:   HTN COMPARISON: 2012. CONTRAST:  None. TECHNIQUE: Unenhanced CT of the head was performed using 5 mm images. Brain and bone windows were generated. CT dose reduction was achieved through use of a standardized protocol tailored for this examination and automatic exposure control for dose modulation. FINDINGS: There is no extra-axial fluid collection hemorrhage or shift. There Is mild atrophy. No masses. impression: No acute changes .

## 2017-08-13 NOTE — ED NOTES
Assumed care of patient. Patient resting in stretcher in low and locked position, call bell within reach. Introduced self as primary nurse. Discussed plan of care with patient. Opportunity to ask questions given. Patient advised that medical information will be discussed and it is their own responsibility to let nurse know if such conversation should not take place in presence of visitors. Patient verbalizes understanding and denies any additional complaints at this time, family at bedside.

## 2017-08-14 ENCOUNTER — TELEPHONE (OUTPATIENT)
Dept: CARDIOLOGY CLINIC | Age: 77
End: 2017-08-14

## 2017-08-14 NOTE — ED NOTES
Patient given discharge instructions & prescription (if applicable) by provider. Patient ambulatory out of facility.

## 2017-08-14 NOTE — TELEPHONE ENCOUNTER
Please call Mrs. Ojeda at [phone]. She was seen at [facility]. ED yesterday, 8/13/17. She's presently scheduled for tomorrow, 8/15/17 at 3:20 with Dr. Lucero Kimball however she'd also like a call today, 8/14/17 if possible due to the up and down extremes of her BP.      Thank you, Aida Pisano

## 2017-08-15 ENCOUNTER — OFFICE VISIT (OUTPATIENT)
Dept: CARDIOLOGY CLINIC | Age: 77
End: 2017-08-15

## 2017-08-15 VITALS
SYSTOLIC BLOOD PRESSURE: 180 MMHG | WEIGHT: 197 LBS | DIASTOLIC BLOOD PRESSURE: 90 MMHG | BODY MASS INDEX: 34.9 KG/M2 | OXYGEN SATURATION: 97 % | HEART RATE: 62 BPM

## 2017-08-15 DIAGNOSIS — Z86.79 S/P ABLATION OF ATRIAL FIBRILLATION: ICD-10-CM

## 2017-08-15 DIAGNOSIS — I51.7 LAE (LEFT ATRIAL ENLARGEMENT): Primary | ICD-10-CM

## 2017-08-15 DIAGNOSIS — G47.33 OSA (OBSTRUCTIVE SLEEP APNEA): ICD-10-CM

## 2017-08-15 DIAGNOSIS — Z98.890 S/P ABLATION OF ATRIAL FIBRILLATION: ICD-10-CM

## 2017-08-15 DIAGNOSIS — I87.2 VENOUS INSUFFICIENCY: ICD-10-CM

## 2017-08-15 DIAGNOSIS — I45.10 RBBB: ICD-10-CM

## 2017-08-15 DIAGNOSIS — I48.0 PAROXYSMAL ATRIAL FIBRILLATION (HCC): ICD-10-CM

## 2017-08-15 DIAGNOSIS — I10 HTN (HYPERTENSION), BENIGN: ICD-10-CM

## 2017-08-15 LAB
ATRIAL RATE: 65 BPM
CALCULATED P AXIS, ECG09: 111 DEGREES
CALCULATED R AXIS, ECG10: 67 DEGREES
CALCULATED T AXIS, ECG11: 9 DEGREES
DIAGNOSIS, 93000: NORMAL
P-R INTERVAL, ECG05: 166 MS
Q-T INTERVAL, ECG07: 454 MS
QRS DURATION, ECG06: 126 MS
QTC CALCULATION (BEZET), ECG08: 472 MS
VENTRICULAR RATE, ECG03: 65 BPM

## 2017-08-15 RX ORDER — CARVEDILOL 6.25 MG/1
TABLET ORAL
Qty: 60 TAB | Refills: 11 | Status: SHIPPED | OUTPATIENT
Start: 2017-08-15 | End: 2017-11-13 | Stop reason: SDUPTHER

## 2017-08-15 RX ORDER — VALSARTAN 80 MG/1
80 TABLET ORAL 2 TIMES DAILY
Qty: 60 TAB | Refills: 3 | Status: CANCELLED | OUTPATIENT
Start: 2017-08-15

## 2017-08-15 NOTE — PROGRESS NOTES
HISTORY OF PRESENT ILLNESS    Vanesa Mcghee is a 78 y.o. female. Last seen 4 months ago    Problem List  Date Reviewed: 8/8/2017          Codes Class Noted    Venous insufficiency ICD-10-CM: I87.2  ICD-9-CM: 459.81  5/4/2017        Acquired hypothyroidism ICD-10-CM: E03.9  ICD-9-CM: 244.9  5/1/2017        S/P ablation of atrial fibrillation ICD-10-CM: Z98.890, Z86.79  ICD-9-CM: V45.89  4/28/2017        Paroxysmal atrial fibrillation (HCC) ICD-10-CM: I48.0  ICD-9-CM: 427.31  8/28/2016        ANÍBAL (obstructive sleep apnea) ICD-10-CM: G47.33  ICD-9-CM: 327.23  7/26/2016        HTN (hypertension), benign ICD-10-CM: I10  ICD-9-CM: 401.1  7/26/2016        Gastroesophageal reflux disease without esophagitis ICD-10-CM: K21.9  ICD-9-CM: 530.81  7/3/2016        RBBB ICD-10-CM: I45.10  ICD-9-CM: 426.4  5/7/2015        LAE (left atrial enlargement) ICD-10-CM: I51.7  ICD-9-CM: 429.3  4/3/2013             Cardiac testing  Adenosine myoview 2011 - normal perfusion, EF 81%  Echocardiogram 2/22/13 - normal left ventricular size and function, normal appearing mitral, aortic, and tricuspid valves, with mild mitral and moderate tricuspid regurgitation, bi-atrial enlargement  Lexiscan 3/3/2014 - normal perfusion EF 83%  Echo: 5/15/15- 60%, mildly dilated LA, mild MR  Cardioversion 7/23/2015 - 200J  Lexiscan cardiolite 5/10/16 - normal perfusion, EF 78%  Renal Visceral Duplex 5/2016 - No evidence of ONOFRE  3/2/17-. Successful atrial fibrillation ablation however unable to achieve entrance/exit block of LSPV per Dr. Michelle Hahn    HPI  Ms. Ojeda saw Dr. Terrance Daley in late June- Diltiazem was discontinued due to fatigue. However, she underwent ED evaluation on 8/13/17 due to accelerated HTN (232/98). ED evaluation including CT scan was negative. Patient was discharged with a Clonidine rx which she has been taking as needed when BP is over 150. She reports having a headache the day before going to ED. Pt went home from ED with BP of 185/85. Pt states she has not tried Losartan in the past. Patient is adherent with CPAP. She is scheduled to see Dr. Tello Mcqueen on 9/13 for second evaluation of ANÍBAL. Patient denies any exertional chest pain, , palpitations, syncope, orthopnea, edema or paroxysmal nocturnal dyspnea. Past Medical History:   Diagnosis Date    DDD (degenerative disc disease)     Gastroesophageal reflux disease without esophagitis 7/3/2016    GERD (gastroesophageal reflux disease)     Hiatal hernia     HTN (hypertension), benign 7/26/2016    Hypertension     ANÍBAL (obstructive sleep apnea) 7/26/2016    Paroxysmal atrial fibrillation ([facility] [address] 8/28/2016    Uterine prolapse       Current Outpatient Prescriptions on File Prior to Visit   Medication Sig Dispense Refill    levothyroxine (SYNTHROID) 50 mcg tablet take 1 tablet by mouth once daily  0    lansoprazole (PREVACID) 30 mg capsule Take 30 mg by mouth nightly.  cloNIDine HCl (CATAPRES) 0.1 mg tablet Take 1 Tab by mouth nightly. Hold sys BP < 476 systolic  Indications: hypertension 30 Tab 3    ELIQUIS 5 mg tablet take 1 tablet by mouth twice a day 60 Tab 11    carvedilol (COREG) 6.25 mg tablet Take 1 Tab by mouth two (2) times daily (with meals). 60 Tab 5    FOLIC ACID/MULTIVIT-MIN/LUTEIN (CENTRUM SILVER PO) Take  by mouth daily.  CALCIUM CARBONATE/VITAMIN D3 (CALTRATE 600 + D PO) Take  by mouth daily.  ezetimibe (ZETIA) 10 mg tablet Take 10 mg by mouth every evening.  furosemide (LASIX) 20 mg tablet take 1 tablet by mouth once daily  0     No current facility-administered medications on file prior to visit. Allergies   Allergen Reactions    Citalopram Hydrobromide Rash     With itching.  Chlorthalidone Rash    Maxzide [Triamterene-Hydrochlorothiazid] Palpitations    Vicodin [Hydrocodone-Acetaminophen] Palpitations     , former smoker     Review of Systems  Constitutional: Negative for fever, chills, malaise/fatigue and diaphoresis.    Respiratory: Negative for cough, hemoptysis, sputum production, and wheezing. Cardiovascular: Negative for chest pain, orthopnea, claudication,leg swelling and PND. Gastrointestinal: Negative for heartburn, nausea, vomiting, blood in stool and melena. Genitourinary: Negative for dysuria and flank pain. Musculoskeletal: Negative for joint pain and back pain. Skin: Negative for rash. Neurological: Negative for focal weakness, seizures, loss of consciousness, weakness. Endo/Heme/Allergies: Does not bruise/bleed easily. Psychiatric/Behavioral: Negative for memory loss. Positive for anxiety. Visit Vitals    /90 (BP 1 Location: Left arm, BP Patient Position: Sitting)    Pulse 62    Wt 197 lb (89.4 kg)    SpO2 97%    BMI 34.9 kg/m2      Wt Readings from Last 3 Encounters:   08/15/17 197 lb (89.4 kg)   08/13/17 199 lb 11.8 oz (90.6 kg)   08/08/17 200 lb (90.7 kg)      Physical Exam   Constitutional: She is oriented to person, place, and time. She appears well-developed and well-nourished. Neck: Neck supple. No JVD present. Cardiovascular: Normal rate, regular rhythm, S1 normal, S2 normal and normal heart sounds. Exam reveals no gallop. No murmur heard. Pulses: Carotid pulses are 2+ on the right side, and 2+ on the left side. Dorsalis pedis pulses are 2+ on the right side, and 2+ on the left side. Pulmonary/Chest: Effort normal and breath sounds normal. She has no wheezes. She has no rales. Abdominal: Soft. Bowel sounds are normal. There is no tenderness. There is no rebound. Musculoskeletal: She exhibits no edema. Neurological: She is alert and oriented to person, place, and time. Skin: Skin is warm and dry. Psychiatric: She has a normal mood and affect.      Lab Results   Component Value Date/Time    Sodium 142 08/13/2017 06:40 PM    Potassium 3.8 08/13/2017 06:40 PM    Chloride 105 08/13/2017 06:40 PM    CO2 32 08/13/2017 06:40 PM    Anion gap 5 08/13/2017 06:40 PM    Glucose 115 08/13/2017 06:40 PM    BUN 15 08/13/2017 06:40 PM    Creatinine 1.18 08/13/2017 06:40 PM    BUN/Creatinine ratio 13 08/13/2017 06:40 PM    GFR est AA 54 08/13/2017 06:40 PM    GFR est non-AA 45 08/13/2017 06:40 PM    Calcium 9.3 08/13/2017 06:40 PM    Bilirubin, total 0.5 08/13/2017 06:40 PM    AST (SGOT) 21 08/13/2017 06:40 PM    Alk. phosphatase 85 08/13/2017 06:40 PM    Protein, total 7.5 08/13/2017 06:40 PM    Albumin 4.0 08/13/2017 06:40 PM    Globulin 3.5 08/13/2017 06:40 PM    A-G Ratio 1.1 08/13/2017 06:40 PM    ALT (SGPT) 27 08/13/2017 06:40 PM     Cardiographics   EKG 4/13 - normal  EKG 5/6/15 - AF with ventricular rate 96, RBBB  EKG 6/3/15 - A-fib rate 70s, RBBB, Rightward axis  EKG 8/31/15 - SR, RBBB  EKG 12/7/15 - SR, RBBB  EKG 7/26/16 - AF 80s, RBBB, rightward axis, unchanged from 7/23/16   EKG 11/28/16 - SR 60   EKG 12/05/16-AF 80-90, RBBB   EKG 01/10/17- AF 70s    ASSESSMENT and PLAN  Encounter Diagnoses   Name Primary?  LAE (left atrial enlargement) Yes    RBBB     HTN (hypertension), benign     Paroxysmal atrial fibrillation (HCC)     ANÍBAL (obstructive sleep apnea)     S/P ablation of atrial fibrillation     Venous insufficiency      Mrs. Ojeda has chronic HTN poorly controlled on a multidrug regimen. She has ANÍBAL on CPAP and is adherent with a low sodium diet. Will start Valsartan 80 mg BID. Continue Coreg. I have asked her to take Clonidine PRN systolic BP greater than 331. She will continue to track BPs at home. She has no sxs of HF. She has no sxs of AF s/p ablation 5 months ago. Continue anticoagulation with Eliquis. Follow up as scheduled in October.      Written by Fanny Hendricks, dictated by Fadumo Shin MD.    Fadumo Shin MD

## 2017-08-15 NOTE — TELEPHONE ENCOUNTER
Patient states she has not checked her BP this morning yet. She is due for her morning medications. Confirmed that she will keep her appointment this afternoon.

## 2017-08-15 NOTE — PATIENT INSTRUCTIONS
Start Valsartan 80 mg twice daily- take first dose at noon and second dose with evening Coreg. If top number on blood pressure is less than 100, hold Valsartan. Take Clonidine only if your top number is greater than 180.

## 2017-08-15 NOTE — MR AVS SNAPSHOT
Visit Information Date & Time Provider Department Dept. Phone Encounter #  
 8/15/2017  3:20 PM Gareth Dougherty MD [facility] Jazmyne Spangler [phone] [id number] Your Appointments 2017 11:20 AM  
Any with Jose F Chappell MD  
[address] [facility] ([address]) Appt Note: Discuss PSG results [address] [place] [phone] 9191 [facility] [phone]  
  
    
 10/27/2017  1:40 PM  
ESTABLISHED PATIENT with Gareth Dougherty MD  
[facility] ([facility] SCHEDULING) Appt Note: 6 mo fup  
 [address] [facility] [address] [place] [address]  
[phone]  
  
   
 [address] [facility] [address]  
  
    
 1/3/2018 11:40 AM  
ESTABLISHED PATIENT with Amadeo Zhu MD  
[facility] ([address]) Appt Note: 6 mo fu  
 [address] [facility] [address] [place] [address] [id number] [place] [address] Upcoming Health Maintenance Date Due DTaP/Tdap/Td series (1 - Tdap) 1961 ZOSTER VACCINE AGE 60> 2001 GLAUCOMA SCREENING Q2Y 2007 OSTEOPOROSIS SCREENING (DEXA) 2007 Pneumococcal 65+ Low/Medium Risk (1 of 2 - PCV13) 2007 MEDICARE YEARLY EXAM 2007 INFLUENZA AGE 9 TO ADULT 2017 Allergies as of 8/15/2017  Review Complete On: 8/15/2017 By: Jj Portillo LPN Severity Noted Reaction Type Reactions Citalopram Hydrobromide High 2014    Rash With itching. Chlorthalidone  2013    Rash Maxzide [Triamterene-hydrochlorothiazid]  2013    Palpitations Vicodin [Hydrocodone-acetaminophen]  2013    Palpitations Current Immunizations  Reviewed on 3/3/2012 No immunizations on file. Not reviewed this visit You Were Diagnosed With   
  
 Codes Comments LAE (left atrial enlargement)    -  Primary ICD-10-CM: I51.7 ICD-9-CM: 429.3 RBBB     ICD-10-CM: I45.10 ICD-9-CM: 426.4 HTN (hypertension), benign     ICD-10-CM: I10 
ICD-9-CM: 401.1 Paroxysmal atrial fibrillation (HCC)     ICD-10-CM: I48.0 ICD-9-CM: 427.31   
 ANÍBAL (obstructive sleep apnea)     ICD-10-CM: G47.33 
ICD-9-CM: 327.23 S/P ablation of atrial fibrillation     ICD-10-CM: Z98.890, Z86.79 
ICD-9-CM: V45.89 Venous insufficiency     ICD-10-CM: I87.2 ICD-9-CM: 459.81 Vitals BP Pulse Weight(growth percentile) SpO2 BMI OB Status 180/90 (BP 1 Location: Left arm, BP Patient Position: Sitting) 62 197 lb (89.4 kg) 97% 34.9 kg/m2 Postmenopausal  
 Smoking Status Former Smoker Vitals History BMI and BSA Data Body Mass Index Body Surface Area 34.9 kg/m 2 1.99 m 2 Preferred Pharmacy Pharmacy Name Phone [place], [facility] [phone] Your Updated Medication List  
  
   
This list is accurate as of: 8/15/17  4:07 PM.  Always use your most recent med list.  
  
  
  
  
 CALTRATE 600 + D PO Take  by mouth daily. carvedilol 6.25 mg tablet Commonly known as:  Chaz Damico Take 1 Tab by mouth two (2) times daily (with meals). CENTRUM SILVER PO Take  by mouth daily. cloNIDine HCl 0.1 mg tablet Commonly known as:  CATAPRES Take 1 Tab by mouth nightly. Hold sys BP < 407 systolic  Indications: hypertension ELIQUIS 5 mg tablet Generic drug:  apixaban  
take 1 tablet by mouth twice a day  
  
 furosemide 20 mg tablet Commonly known as:  LASIX  
take 1 tablet by mouth once daily  
  
 levothyroxine 50 mcg tablet Commonly known as:  SYNTHROID  
take 1 tablet by mouth once daily PREVACID 30 mg capsule Generic drug:  lansoprazole Take 30 mg by mouth nightly. ZETIA 10 mg tablet Generic drug:  ezetimibe Take 10 mg by mouth every evening. To-Do List   
 09/13/2017 9:30 PM  
  Appointment with BEDROOM [address] [place] [address] at [address] ([phone]) 1. Do not take a nap the day of the study  2. No caffeine after 12 noon the day of the study  3. Bring a 2 piece sleeping garment  4. Hair should be clean and dry, no oils, sprays, powders and remove wigs, weaves or other hair accessories  6. Patient should eat dinner prior to arriving for the test and a light breakfast will be provided upon discharge in the morning  7. Patient encouraged to bring activity items such as books, magazines, laptop, IPAD, etc, as well as toiletry items needed for the next morning  8. Bring all medications with you to the center  9. For specific questions please contact the sleep center directly, 830a to 5p  10. Do not arrive more than 15 minutes prior to appt time and use the doorbell to enter the sleep center. Patient Instructions Start Valsartan 80 mg twice daily- take first dose at noon and second dose with evening Coreg. If top number on blood pressure is less than 100, hold Valsartan. Take Clonidine only if your top number is greater than 180. Introducing [facility] & HEALTH SERVICES! Dear [facility]: Thank you for requesting a TechPepper account. Our records indicate that you already have an active TechPepper account. You can access your account anytime at [email or web address] Did you know that you can access your hospital and ER discharge instructions at any time in TechPepper? You can also review all of your test results from your hospital stay or ER visit. Additional Information If you have questions, please visit the Frequently Asked Questions section of the TechPepper website at [email or web address] Remember, TechPepper is NOT to be used for urgent needs. For medical emergencies, dial 911. Now available from your iPhone and Android! Please provide this summary of care documentation to your next provider. Your primary care clinician is listed as Gautam Jones. If you have any questions after today's visit, please call [phone].

## 2017-08-16 ENCOUNTER — TELEPHONE (OUTPATIENT)
Dept: CARDIOLOGY CLINIC | Age: 77
End: 2017-08-16

## 2017-08-16 RX ORDER — VALSARTAN 80 MG/1
80 TABLET ORAL 2 TIMES DAILY
Qty: 60 TAB | Refills: 5 | Status: SHIPPED | OUTPATIENT
Start: 2017-08-16 | End: 2017-08-29 | Stop reason: SDUPTHER

## 2017-08-16 NOTE — TELEPHONE ENCOUNTER
Mrs. Musa Imelda states and notes also state that RX should've been sent for Valsartan. Please send to [facility] for 30 day supply with refills.      Thank you, Wisam Toney

## 2017-08-18 ENCOUNTER — TELEPHONE (OUTPATIENT)
Dept: CARDIOLOGY CLINIC | Age: 77
End: 2017-08-18

## 2017-08-18 NOTE — TELEPHONE ENCOUNTER
Patient states we need to contact the pharmacy due to hx of reaction to benicar. Patient states she only developed a small rash. Per MD, patient is to begin diovan 80mg BID. She will monitor for symptoms and notify us of any changes. Pharmacy notified.

## 2017-08-25 ENCOUNTER — CLINICAL SUPPORT (OUTPATIENT)
Dept: CARDIOLOGY CLINIC | Age: 77
End: 2017-08-25

## 2017-08-25 VITALS
RESPIRATION RATE: 18 BRPM | OXYGEN SATURATION: 98 % | HEART RATE: 60 BPM | BODY MASS INDEX: 34.8 KG/M2 | HEIGHT: 63 IN | WEIGHT: 196.4 LBS | DIASTOLIC BLOOD PRESSURE: 84 MMHG | SYSTOLIC BLOOD PRESSURE: 146 MMHG

## 2017-08-25 DIAGNOSIS — I48.0 PAROXYSMAL ATRIAL FIBRILLATION (HCC): Primary | ICD-10-CM

## 2017-08-25 RX ORDER — CLONIDINE HYDROCHLORIDE 0.1 MG/1
TABLET ORAL
Qty: 30 TAB | Refills: 3 | Status: SHIPPED | OUTPATIENT
Start: 2017-08-25 | End: 2017-12-13 | Stop reason: SDUPTHER

## 2017-08-25 RX ORDER — DILTIAZEM HYDROCHLORIDE 120 MG/1
CAPSULE, EXTENDED RELEASE ORAL DAILY
COMMUNITY
End: 2017-09-18 | Stop reason: SDUPTHER

## 2017-08-25 NOTE — PROGRESS NOTES
Visit Vitals    /84 (BP 1 Location: Left arm, BP Patient Position: Sitting)    Pulse 60    Resp 18    Ht 5' 3\" (1.6 m)    Wt 196 lb 6.4 oz (89.1 kg)    SpO2 98%    BMI 34.79 kg/m2

## 2017-08-28 ENCOUNTER — OFFICE VISIT (OUTPATIENT)
Dept: CARDIOLOGY CLINIC | Age: 77
End: 2017-08-28

## 2017-08-28 VITALS
HEIGHT: 63 IN | WEIGHT: 196.4 LBS | HEART RATE: 58 BPM | RESPIRATION RATE: 18 BRPM | DIASTOLIC BLOOD PRESSURE: 94 MMHG | BODY MASS INDEX: 34.8 KG/M2 | OXYGEN SATURATION: 98 % | SYSTOLIC BLOOD PRESSURE: 182 MMHG

## 2017-08-28 DIAGNOSIS — Z98.890 S/P ABLATION OF ATRIAL FIBRILLATION: ICD-10-CM

## 2017-08-28 DIAGNOSIS — G47.33 OSA (OBSTRUCTIVE SLEEP APNEA): ICD-10-CM

## 2017-08-28 DIAGNOSIS — I51.7 LAE (LEFT ATRIAL ENLARGEMENT): ICD-10-CM

## 2017-08-28 DIAGNOSIS — I10 HTN (HYPERTENSION), BENIGN: ICD-10-CM

## 2017-08-28 DIAGNOSIS — I87.2 VENOUS INSUFFICIENCY: ICD-10-CM

## 2017-08-28 DIAGNOSIS — I47.1 SVT (SUPRAVENTRICULAR TACHYCARDIA) (HCC): ICD-10-CM

## 2017-08-28 DIAGNOSIS — K44.9 HIATAL HERNIA: ICD-10-CM

## 2017-08-28 DIAGNOSIS — Z86.79 S/P ABLATION OF ATRIAL FIBRILLATION: ICD-10-CM

## 2017-08-28 DIAGNOSIS — I45.10 RBBB: ICD-10-CM

## 2017-08-28 DIAGNOSIS — I48.0 PAROXYSMAL ATRIAL FIBRILLATION (HCC): Primary | ICD-10-CM

## 2017-08-28 NOTE — PATIENT INSTRUCTIONS
Increase Diovan to 80 mg twice daily. Take clonidine at night only if your blood pressure is greater than 180.

## 2017-08-28 NOTE — PROGRESS NOTES
[id number] [facility], [address] [facility]    Office [phone]  Fax [phone]        Sheri Chicas is a 78 y.o. female. Last seen 2 weeks ago. Follow up from hospitalization. 8/21-8/22/17 at [address] [place] [address] with accelerated HTN and chest pain. Problem List  Date Reviewed: 8/8/2017          Codes Class Noted    SVT (supraventricular tachycardia) ([facility] [address] ICD-10-CM: I47.1  ICD-9-CM: 427.89  8/28/2017        Hiatal hernia ICD-10-CM: K44.9  ICD-9-CM: 553.3  8/28/2017        Venous insufficiency ICD-10-CM: I87.2  ICD-9-CM: 459.81  5/4/2017        Acquired hypothyroidism ICD-10-CM: E03.9  ICD-9-CM: 244.9  5/1/2017        S/P ablation of atrial fibrillation ICD-10-CM: Z98.890, Z86.79  ICD-9-CM: V45.89  4/28/2017        Paroxysmal atrial fibrillation (HCC) ICD-10-CM: I48.0  ICD-9-CM: 427.31  8/28/2016        ANÍBAL (obstructive sleep apnea) ICD-10-CM: G47.33  ICD-9-CM: 327.23  7/26/2016        HTN (hypertension), benign ICD-10-CM: I10  ICD-9-CM: 401.1  7/26/2016        Gastroesophageal reflux disease without esophagitis ICD-10-CM: K21.9  ICD-9-CM: 530.81  7/3/2016        RBBB ICD-10-CM: I45.10  ICD-9-CM: 426.4  5/7/2015        LAE (left atrial enlargement) ICD-10-CM: I51.7  ICD-9-CM: 429.3  4/3/2013             Cardiac testing  Adenosine myoview 2011 - normal perfusion, EF 81%  Echocardiogram 2/22/13 - normal left ventricular size and function, normal appearing mitral, aortic, and tricuspid valves, with mild mitral and moderate tricuspid regurgitation, bi-atrial enlargement  Lexiscan 3/3/2014 - normal perfusion EF 83%  Echo: 5/15/15- 60%, mildly dilated LA, mild MR  Cardioversion 7/23/2015 - 200J  Lexiscan cardiolite 5/10/16 - normal perfusion, EF 78%  Renal Visceral Duplex 5/2016 - No evidence of ONOFRE  3/2/17-. Successful atrial fibrillation ablation however unable to achieve entrance/exit block of LSPV per Dr. Candice Cason    HPI  Records reviewed.  Her chest pain was felt to be due to a large hiatal hernia. She was evaluated by surgery with plans for outpatient follow up. She was noted to have a run of SVT that self terminated. Diltiazem 120 mg was added and Coreg increased  to 12.5 mg BID. Patient states she has two hiatal hernias and is unsure if she wants to have surgery. She takes medication for GERD with relief. BPs at home have been largely in the 609-175 systolic range. Patient states that she feels okay overall. She has been taking Diovan once daily. Patient denies any exertional chest pain, palpitations, syncope, orthopnea, edema or paroxysmal nocturnal dyspnea. She is here with her  today. Past Medical History:   Diagnosis Date    DDD (degenerative disc disease)     Gastroesophageal reflux disease without esophagitis 7/3/2016    GERD (gastroesophageal reflux disease)     Hiatal hernia     HTN (hypertension), benign 7/26/2016    Hypertension     ANÍBAL (obstructive sleep apnea) 7/26/2016    Paroxysmal atrial fibrillation ([facility] [address] 8/28/2016    Uterine prolapse       Current Outpatient Prescriptions on File Prior to Visit   Medication Sig Dispense Refill    dilTIAZem XR (DILACOR XR) 120 mg XR capsule Take  by mouth daily.  cloNIDine HCl (CATAPRES) 0.1 mg tablet take 1 tablet by mouth NIGHTLY. HOLD IF SYSTOLIC BLOOD PRESSURE IS LESS THAN 150 30 Tab 3    valsartan (DIOVAN) 80 mg tablet Take 1 Tab by mouth two (2) times a day. (Patient taking differently: Take 80 mg by mouth daily.) 60 Tab 5    carvedilol (COREG) 6.25 mg tablet take 1 tablet by mouth twice a day with food (Patient taking differently: take 12.5mg tablet by mouth twice a day with food) 60 Tab 11    levothyroxine (SYNTHROID) 50 mcg tablet take 1 tablet by mouth once daily  0    furosemide (LASIX) 20 mg tablet take 1 tablet by mouth once daily  0    lansoprazole (PREVACID) 30 mg capsule Take 30 mg by mouth nightly.       ELIQUIS 5 mg tablet take 1 tablet by mouth twice a day 60 Tab 11    FOLIC ACID/MULTIVIT-MIN/LUTEIN (CENTRUM SILVER PO) Take  by mouth daily.  CALCIUM CARBONATE/VITAMIN D3 (CALTRATE 600 + D PO) Take  by mouth daily.  ezetimibe (ZETIA) 10 mg tablet Take 10 mg by mouth every evening. No current facility-administered medications on file prior to visit. Allergies   Allergen Reactions    Citalopram Hydrobromide Rash     With itching.  Chlorthalidone Rash    Maxzide [Triamterene-Hydrochlorothiazid] Palpitations    Vicodin [Hydrocodone-Acetaminophen] Palpitations     , former smoker     Review of Systems  Constitutional: Negative for fever, chills, malaise/fatigue and diaphoresis. Respiratory: Negative for cough, hemoptysis, sputum production, and wheezing. Cardiovascular: Negative for orthopnea, claudication,leg swelling and PND. Positive for chest pain. Gastrointestinal: Negative for heartburn, nausea, vomiting, blood in stool and melena. Genitourinary: Negative for dysuria and flank pain. Musculoskeletal: Negative for joint pain and back pain. Skin: Negative for rash. Neurological: Negative for focal weakness, seizures, loss of consciousness, weakness. Endo/Heme/Allergies: Does not bruise/bleed easily. Psychiatric/Behavioral: Negative for memory loss. Positive for anxiety. Visit Vitals    BP (!) 182/94 (BP 1 Location: Left arm, BP Patient Position: Sitting)    Pulse (!) 58    Resp 18    Ht 5' 3\" (1.6 m)    Wt 196 lb 6.4 oz (89.1 kg)    SpO2 98%    BMI 34.79 kg/m2      Wt Readings from Last 3 Encounters:   08/28/17 196 lb 6.4 oz (89.1 kg)   08/25/17 196 lb 6.4 oz (89.1 kg)   08/15/17 197 lb (89.4 kg)      Physical Exam   Constitutional: She is oriented to person, place, and time. She appears well-developed and well-nourished. Neck: Neck supple. No JVD present. Cardiovascular: Normal rate, regular rhythm, S1 normal, S2 normal and normal heart sounds. Exam reveals no gallop. No murmur heard.   Pulses: Carotid pulses are 2+ on the right side, and 2+ on the left side. Dorsalis pedis pulses are 2+ on the right side, and 2+ on the left side. Pulmonary/Chest: Effort normal and breath sounds normal. She has no wheezes. She has no rales. Abdominal: Soft. Bowel sounds are normal. There is no tenderness. There is no rebound. Musculoskeletal: She exhibits no edema. Neurological: She is alert and oriented to person, place, and time. Skin: Skin is warm and dry. Psychiatric: She has a normal mood and affect. Lab Results   Component Value Date/Time    Sodium 142 08/13/2017 06:40 PM    Potassium 3.8 08/13/2017 06:40 PM    Chloride 105 08/13/2017 06:40 PM    CO2 32 08/13/2017 06:40 PM    Anion gap 5 08/13/2017 06:40 PM    Glucose 115 08/13/2017 06:40 PM    BUN 15 08/13/2017 06:40 PM    Creatinine 1.18 08/13/2017 06:40 PM    BUN/Creatinine ratio 13 08/13/2017 06:40 PM    GFR est AA 54 08/13/2017 06:40 PM    GFR est non-AA 45 08/13/2017 06:40 PM    Calcium 9.3 08/13/2017 06:40 PM    Bilirubin, total 0.5 08/13/2017 06:40 PM    AST (SGOT) 21 08/13/2017 06:40 PM    Alk. phosphatase 85 08/13/2017 06:40 PM    Protein, total 7.5 08/13/2017 06:40 PM    Albumin 4.0 08/13/2017 06:40 PM    Globulin 3.5 08/13/2017 06:40 PM    A-G Ratio 1.1 08/13/2017 06:40 PM    ALT (SGPT) 27 08/13/2017 06:40 PM     Cardiographics   EKG 4/13 - normal  EKG 5/6/15 - AF with ventricular rate 96, RBBB  EKG 6/3/15 - A-fib rate 70s, RBBB, Rightward axis  EKG 8/31/15 - SR, RBBB  EKG 12/7/15 - SR, RBBB  EKG 7/26/16 - AF 80s, RBBB, rightward axis, unchanged from 7/23/16   EKG 11/28/16 - SR 60   EKG 12/05/16-AF 80-90, RBBB   EKG 01/10/17- AF 70s  EKG 8/25/17- SR 60, RBBB rightward axis. ASSESSMENT and PLAN  Encounter Diagnoses   Name Primary?     Paroxysmal atrial fibrillation (HCC) Yes    HTN (hypertension), benign     RBBB     LAE (left atrial enlargement)     Venous insufficiency     S/P ablation of atrial fibrillation     ANÍBAL (obstructive sleep apnea)     SVT (supraventricular tachycardia) ([facility] [address]     Hiatal hernia      Mrs. Ojeda has chronic HTN in the setting of ANÍBAL complicated by PAF, most recently SVT. Her BP seems to be getting better on her multidrug regimen, recently intensified. Encouraged her to take Diovan 80 mg bid. She is following a low sodium diet. She takes low dose Lasix for venous insufficieny but has no hx of HF. She is s/p AF ablation March 2017. She had isolated SVT last week. Will need to watch for bradycardia given recent addition of Diltiazem and increased Coreg. Continue anticoagulation with Eliquis. Her hiatal hernia may have aggravated her BP and caused her chest pain. She would like to continue with conservative management for now. Follow up as scheduled in October.      Written by Anel Montero, dictated by Javan Erwin MD.    Javan Erwin MD

## 2017-08-28 NOTE — MR AVS SNAPSHOT
Visit Information Date & Time Provider Department Dept. Phone Encounter #  
 8/28/2017  9:20 AM Keenan Sadler MD [facility] [phone] [id number] Your Appointments 9/19/2017 11:20 AM  
Any with Carolyn Ragland MD  
454 [employer] ([address]) Appt Note: Discuss PSG results [address] [place] [id number] 9[address] [facility] [phone]  
  
    
 10/27/2017  1:40 PM  
ESTABLISHED PATIENT with Keenan Sadler MD  
[facility] ([facility] SCHEDULING) Appt Note: 6 mo fup  
 [address] [facility] [address] [facility] [address]  
[phone]  
  
   
 [address] [place] [address]  
  
    
 1/3/2018 11:40 AM  
ESTABLISHED PATIENT with Buck Langley MD  
[facility] ([address]) Appt Note: 6 mo fu  
 [address] [facility] [address] [place] [address] [id number] [place] [address] Upcoming Health Maintenance Date Due DTaP/Tdap/Td series (1 - Tdap) 2/12/1961 ZOSTER VACCINE AGE 60> 12/12/2001 GLAUCOMA SCREENING Q2Y 2/12/2007 OSTEOPOROSIS SCREENING (DEXA) 2/12/2007 Pneumococcal 65+ Low/Medium Risk (1 of 2 - PCV13) 2/12/2007 MEDICARE YEARLY EXAM 2/12/2007 INFLUENZA AGE 9 TO ADULT 8/1/2017 Allergies as of 8/28/2017  Review Complete On: 8/28/2017 By: Dallas Montana LPN Severity Noted Reaction Type Reactions Citalopram Hydrobromide High 09/16/2014    Rash With itching. Chlorthalidone  04/03/2013    Rash Maxzide [Triamterene-hydrochlorothiazid]  04/03/2013    Palpitations Vicodin [Hydrocodone-acetaminophen]  04/03/2013    Palpitations Current Immunizations  Reviewed on 3/3/2012 No immunizations on file. Not reviewed this visit You Were Diagnosed With   
  
 Codes Comments Paroxysmal atrial fibrillation (HCC)    -  Primary ICD-10-CM: I48.0 ICD-9-CM: 427.31   
 HTN (hypertension), benign     ICD-10-CM: I10 
ICD-9-CM: 401.1 RBBB     ICD-10-CM: I45.10 ICD-9-CM: 426.4 LAE (left atrial enlargement)     ICD-10-CM: I51.7 ICD-9-CM: 429.3 Venous insufficiency     ICD-10-CM: I87.2 ICD-9-CM: 459.81 S/P ablation of atrial fibrillation     ICD-10-CM: Z98.890, Z86.79 
ICD-9-CM: V45.89   
 ANÍBAL (obstructive sleep apnea)     ICD-10-CM: G47.33 
ICD-9-CM: 327.23   
 SVT (supraventricular tachycardia) (HCC)     ICD-10-CM: I47.1 ICD-9-CM: 427.89 Hiatal hernia     ICD-10-CM: K44.9 ICD-9-CM: 859. 3 Vitals BP Pulse Resp Height(growth percentile) Weight(growth percentile) SpO2  
 (!) 182/94 (BP 1 Location: Left arm, BP Patient Position: Sitting) (!) 58 18 5' 3\" (1.6 m) 196 lb 6.4 oz (89.1 kg) 98% BMI OB Status Smoking Status 34.79 kg/m2 Postmenopausal Former Smoker BMI and BSA Data Body Mass Index Body Surface Area 34.79 kg/m 2 1.99 m 2 Preferred Pharmacy Pharmacy Name Phone [place], [address] [place] [address] [facility] [phone] Your Updated Medication List  
  
   
This list is accurate as of: 8/28/17  9:57 AM.  Always use your most recent med list.  
  
  
  
  
 CALTRATE 600 + D PO Take  by mouth daily. carvedilol 6.25 mg tablet Commonly known as:  COREG  
take 1 tablet by mouth twice a day with food CENTRUM SILVER PO Take  by mouth daily. cloNIDine HCl 0.1 mg tablet Commonly known as:  CATAPRES  
take 1 tablet by mouth NIGHTLY. HOLD IF SYSTOLIC BLOOD PRESSURE IS LESS THAN 150  
  
 dilTIAZem  mg XR capsule Commonly known as:  DILACOR XR Take  by mouth daily. ELIQUIS 5 mg tablet Generic drug:  apixaban  
take 1 tablet by mouth twice a day  
  
 furosemide 20 mg tablet Commonly known as:  LASIX take 1 tablet by mouth once daily  
  
 levothyroxine 50 mcg tablet Commonly known as:  SYNTHROID  
take 1 tablet by mouth once daily PREVACID 30 mg capsule Generic drug:  lansoprazole Take 30 mg by mouth nightly. valsartan 80 mg tablet Commonly known as:  DIOVAN Take 1 Tab by mouth two (2) times a day. ZETIA 10 mg tablet Generic drug:  ezetimibe Take 10 mg by mouth every evening. To-Do List   
 09/13/2017 9:30 PM  
  Appointment with BEDROOM [address] [place] [address] at [address] ([phone]) 1. Do not take a nap the day of the study  2. No caffeine after 12 noon the day of the study  3. Bring a 2 piece sleeping garment  4. Hair should be clean and dry, no oils, sprays, powders and remove wigs, weaves or other hair accessories  6. Patient should eat dinner prior to arriving for the test and a light breakfast will be provided upon discharge in the morning  7. Patient encouraged to bring activity items such as books, magazines, laptop, IPAD, etc, as well as toiletry items needed for the next morning  8. Bring all medications with you to the center  9. For specific questions please contact the sleep center directly, 830a to 5p  10. Do not arrive more than 15 minutes prior to appt time and use the doorbell to enter the sleep center. Patient Instructions Increase Diovan to 80 mg twice daily. Take Coumadin at night only if your blood pressure is greater than 180. Introducing [facility] & HEALTH [facility]! Dear Tj Leger: Thank you for requesting a SwapBeats account. Our records indicate that you already have an active SwapBeats account. You can access your account anytime at [email or web address] Did you know that you can access your hospital and ER discharge instructions at any time in SwapBeats? You can also review all of your test results from your hospital stay or ER visit. Additional Information If you have questions, please visit the Frequently Asked Questions section of the Art[employer] website at [email or web address] Remember, "[employer] is NOT to be used for urgent needs. For medical emergencies, dial 911. Now available from your iPhone and Android! Please provide this summary of care documentation to your next provider. Your primary care clinician is listed as Amber Mckenzie. If you have any questions after today's visit, please call [phone].

## 2017-08-29 RX ORDER — VALSARTAN 80 MG/1
80 TABLET ORAL 2 TIMES DAILY
Qty: 60 TAB | Refills: 5
Start: 2017-08-29 | End: 2017-11-11 | Stop reason: SDUPTHER

## 2017-09-13 ENCOUNTER — HOSPITAL ENCOUNTER (OUTPATIENT)
Dept: SLEEP MEDICINE | Age: 77
Discharge: HOME | End: 2017-09-13
Payer: MEDICARE

## 2017-09-13 DIAGNOSIS — G47.33 OBSTRUCTIVE SLEEP APNEA (ADULT) (PEDIATRIC): ICD-10-CM

## 2017-09-13 PROCEDURE — 95810 POLYSOM 6/> YRS 4/> PARAM: CPT | Performed by: INTERNAL MEDICINE

## 2017-09-14 VITALS
WEIGHT: 197.8 LBS | DIASTOLIC BLOOD PRESSURE: 84 MMHG | OXYGEN SATURATION: 91 % | BODY MASS INDEX: 35.05 KG/M2 | HEART RATE: 68 BPM | SYSTOLIC BLOOD PRESSURE: 182 MMHG | TEMPERATURE: 100.1 F | HEIGHT: 63 IN

## 2017-09-15 ENCOUNTER — DOCUMENTATION ONLY (OUTPATIENT)
Dept: SLEEP MEDICINE | Age: 77
End: 2017-09-15

## 2017-09-15 ENCOUNTER — TELEPHONE (OUTPATIENT)
Dept: SLEEP MEDICINE | Age: 77
End: 2017-09-15

## 2017-09-15 NOTE — PROGRESS NOTES
This note is being routed to Denisse Jackson NP. Sleep Medicine consult note and sleep study report in patient's chart for review.     Thank you for the referral.

## 2017-09-18 RX ORDER — DILTIAZEM HYDROCHLORIDE 120 MG/1
120 CAPSULE, EXTENDED RELEASE ORAL DAILY
Qty: 90 CAP | Refills: 3 | Status: SHIPPED | OUTPATIENT
Start: 2017-09-18 | End: 2017-10-27 | Stop reason: ALTCHOICE

## 2017-10-27 ENCOUNTER — OFFICE VISIT (OUTPATIENT)
Dept: CARDIOLOGY CLINIC | Age: 77
End: 2017-10-27

## 2017-10-27 VITALS
DIASTOLIC BLOOD PRESSURE: 84 MMHG | BODY MASS INDEX: 34.73 KG/M2 | OXYGEN SATURATION: 97 % | HEART RATE: 82 BPM | SYSTOLIC BLOOD PRESSURE: 182 MMHG | WEIGHT: 196 LBS | HEIGHT: 63 IN | RESPIRATION RATE: 18 BRPM

## 2017-10-27 DIAGNOSIS — I51.7 LAE (LEFT ATRIAL ENLARGEMENT): ICD-10-CM

## 2017-10-27 DIAGNOSIS — I87.2 VENOUS INSUFFICIENCY: ICD-10-CM

## 2017-10-27 DIAGNOSIS — I10 HTN (HYPERTENSION), BENIGN: Primary | ICD-10-CM

## 2017-10-27 DIAGNOSIS — Z86.79 S/P ABLATION OF ATRIAL FIBRILLATION: ICD-10-CM

## 2017-10-27 DIAGNOSIS — E03.9 ACQUIRED HYPOTHYROIDISM: ICD-10-CM

## 2017-10-27 DIAGNOSIS — I48.0 PAROXYSMAL ATRIAL FIBRILLATION (HCC): ICD-10-CM

## 2017-10-27 DIAGNOSIS — Z98.890 S/P ABLATION OF ATRIAL FIBRILLATION: ICD-10-CM

## 2017-10-27 DIAGNOSIS — I45.10 RBBB: ICD-10-CM

## 2017-10-27 DIAGNOSIS — G47.33 OSA (OBSTRUCTIVE SLEEP APNEA): ICD-10-CM

## 2017-10-27 DIAGNOSIS — I47.1 SVT (SUPRAVENTRICULAR TACHYCARDIA) (HCC): ICD-10-CM

## 2017-10-27 NOTE — MR AVS SNAPSHOT
Visit Information Date & Time Provider Department Dept. Phone Encounter #  
 10/27/2017  1:40 PM Feroz Davis MD [facility] [place] [phone] [id number] Follow-up Instructions Return in about 4 months (around 2/27/2018). Your Appointments 1/3/2018 11:40 AM  
ESTABLISHED PATIENT with Marie Nunn MD  
[facility] ([address]) Appt Note: 6 mo fu  
 [address] [facility] 600 1007 [address] [address] [address] Upcoming Health Maintenance Date Due DTaP/Tdap/Td series (1 - Tdap) 2/12/1963 ZOSTER VACCINE AGE 60> 12/12/2001 GLAUCOMA SCREENING Q2Y 2/12/2007 OSTEOPOROSIS SCREENING (DEXA) 2/12/2007 Pneumococcal 65+ Low/Medium Risk (1 of 2 - PCV13) 2/12/2007 MEDICARE YEARLY EXAM 2/12/2007 INFLUENZA AGE 9 TO ADULT 8/1/2017 Allergies as of 10/27/2017  Review Complete On: 10/27/2017 By: Randolph Banks NP Severity Noted Reaction Type Reactions Citalopram Hydrobromide High 09/16/2014    Rash With itching. Chlorthalidone  04/03/2013    Rash Maxzide [Triamterene-hydrochlorothiazid]  04/03/2013    Palpitations Vicodin [Hydrocodone-acetaminophen]  04/03/2013    Palpitations Current Immunizations  Reviewed on 3/3/2012 No immunizations on file. Not reviewed this visit You Were Diagnosed With   
  
 Codes Comments HTN (hypertension), benign    -  Primary ICD-10-CM: I10 
ICD-9-CM: 401.1 LAE (left atrial enlargement)     ICD-10-CM: I51.7 ICD-9-CM: 429.3 Paroxysmal atrial fibrillation (HCC)     ICD-10-CM: I48.0 ICD-9-CM: 427.31 S/P ablation of atrial fibrillation     ICD-10-CM: Z98.890, Z86.79 
ICD-9-CM: V45.89   
 ANÍBAL (obstructive sleep apnea)     ICD-10-CM: G47.33 
ICD-9-CM: 327.23 Acquired hypothyroidism     ICD-10-CM: E03.9 ICD-9-CM: 244.9 Venous insufficiency     ICD-10-CM: I87.2 ICD-9-CM: 459.81   
 SVT (supraventricular tachycardia) (HCC)     ICD-10-CM: I47.1 ICD-9-CM: 427.89 RBBB     ICD-10-CM: I45.10 ICD-9-CM: 426.4 Vitals BP Pulse Resp Height(growth percentile) Weight(growth percentile) SpO2  
 182/84 (BP 1 Location: Left arm, BP Patient Position: Sitting) 82 18 5' 3\" (1.6 m) 196 lb (88.9 kg) 97% BMI OB Status Smoking Status 34.72 kg/m2 Postmenopausal Former Smoker BMI and BSA Data Body Mass Index Body Surface Area 34.72 kg/m 2 1.99 m 2 Preferred Pharmacy Pharmacy Name Phone [place], [facility] [phone] Your Updated Medication List  
  
   
This list is accurate as of: 10/27/17  3:02 PM.  Always use your most recent med list.  
  
  
  
  
 CALTRATE 600 + D PO Take  by mouth daily. carvedilol 6.25 mg tablet Commonly known as:  COREG  
take 1 tablet by mouth twice a day with food CENTRUM SILVER PO Take  by mouth daily. cloNIDine HCl 0.1 mg tablet Commonly known as:  CATAPRES  
take 1 tablet by mouth NIGHTLY. HOLD IF SYSTOLIC BLOOD PRESSURE IS LESS THAN 150  
  
 dilTIAZem  mg XR capsule Commonly known as:  DILACOR XR Take 1 Cap by mouth daily. ELIQUIS 5 mg tablet Generic drug:  apixaban  
take 1 tablet by mouth twice a day  
  
 furosemide 20 mg tablet Commonly known as:  LASIX  
take 1 tablet by mouth PRN  
  
 levothyroxine 50 mcg tablet Commonly known as:  SYNTHROID  
take 1 tablet by mouth once daily PREVACID 30 mg capsule Generic drug:  lansoprazole Take 30 mg by mouth nightly. valsartan 80 mg tablet Commonly known as:  DIOVAN Take 1 Tab by mouth two (2) times a day. ZETIA 10 mg tablet Generic drug:  ezetimibe Take 10 mg by mouth every evening. Follow-up Instructions Return in about 4 months (around 2/27/2018). Patient Instructions Continue Coreg to 6.25 twice daily, but change it to with meals. Stop Dilitazem. If you have any recurrence of a fast heart rhythms, please call us or Dr. Olga Schultz. Take Diovan 80 mg mid-day and then again at night. Check your blood 1-2 times per day for the next 2 weeks - check mid day and then intermittently either in morning or night for the 2nd check. Call me with any questions or concerns prior to your next visit. Introducing [facility] SERVICES! Dear Gerard [place]: Thank you for requesting a [employer] account. Our records indicate that you already have an active [employer] account. You can access your account anytime at [email or web address] Did you know that you can access your hospital and ER discharge instructions at any time in [employer]? You can also review all of your test results from your hospital stay or ER visit. Additional Information If you have questions, please visit the Frequently Asked Questions section of the [employer] website at [email or web address] Remember, [employer] is NOT to be used for urgent needs. For medical emergencies, dial 911. Now available from your iPhone and Android! Please provide this summary of care documentation to your next provider. Your primary care clinician is listed as Edelmira Henry. If you have any questions after today's visit, please call [phone].

## 2017-10-27 NOTE — PATIENT INSTRUCTIONS
Continue Coreg to 6.25 twice daily, but change it to with meals. Stop Dilitazem. If you have any recurrence of a fast heart rhythms, please call us or Dr. Abel Gardner. Take Diovan 80 mg mid-day and then again at night. Check your blood 1-2 times per day for the next 2 weeks - check mid day and then intermittently either in morning or night for the 2nd check. Call me with any questions or concerns prior to your next visit.

## 2017-11-08 ENCOUNTER — TELEPHONE (OUTPATIENT)
Dept: CARDIOLOGY CLINIC | Age: 77
End: 2017-11-08

## 2017-11-08 NOTE — TELEPHONE ENCOUNTER
Pt stated that she is using a new machine for her BP and it detects irregular HR and she stated both times today it showed it when she checked her BP. Pt can be reached at [phone].     Thank you,  Ascencion Neal

## 2017-11-13 RX ORDER — VALSARTAN 80 MG/1
TABLET ORAL
Qty: 180 TAB | Refills: 0 | Status: SHIPPED | OUTPATIENT
Start: 2017-11-13 | End: 2017-12-13 | Stop reason: ALTCHOICE

## 2017-11-14 RX ORDER — CARVEDILOL 6.25 MG/1
TABLET ORAL
Qty: 180 TAB | Refills: 3 | Status: SHIPPED | OUTPATIENT
Start: 2017-11-14 | End: 2018-02-28

## 2017-12-11 ENCOUNTER — TELEPHONE (OUTPATIENT)
Dept: CARDIOLOGY CLINIC | Age: 77
End: 2017-12-11

## 2017-12-11 NOTE — TELEPHONE ENCOUNTER
Patient states her BP yesterday morning was 138/79. She did not take diovan 80mg until 2pm due to lower morning BP. 1 hour after taking diovan, her BP was 176/89. She took clonidine 0.1mg that evening, and her second dose of diovan. BP was still elevated at 182/101, so she took clonidine 0.2mg. Her BP was 148/86 at 4am this morning. After she woke up, her BP was 156/98. She has not taken diovan today. She thinks this is causing her BP to be elevated. Reiterated that patient is to take medications as prescribed based off of physician's recommendation. Attempted to schedule an appointment for further evaluation. She is requesting to follow-up with her PCP for a sooner appointment.

## 2017-12-12 ENCOUNTER — HOSPITAL ENCOUNTER (EMERGENCY)
Age: 77
Discharge: HOME | End: 2017-12-12
Attending: EMERGENCY MEDICINE
Payer: MEDICARE

## 2017-12-12 ENCOUNTER — APPOINTMENT (OUTPATIENT)
Dept: GENERAL RADIOLOGY | Age: 77
End: 2017-12-12
Attending: FAMILY MEDICINE
Payer: MEDICARE

## 2017-12-12 VITALS
TEMPERATURE: 97.5 F | RESPIRATION RATE: 14 BRPM | SYSTOLIC BLOOD PRESSURE: 216 MMHG | DIASTOLIC BLOOD PRESSURE: 86 MMHG | WEIGHT: 194 LBS | HEART RATE: 66 BPM | OXYGEN SATURATION: 97 % | HEIGHT: 62 IN | BODY MASS INDEX: 35.7 KG/M2

## 2017-12-12 DIAGNOSIS — I16.0 HYPERTENSIVE URGENCY: Primary | ICD-10-CM

## 2017-12-12 LAB
ALBUMIN SERPL-MCNC: 3.9 G/DL (ref 3.5–5)
ALBUMIN/GLOB SERPL: 1.1 {RATIO} (ref 1.1–2.2)
ALP SERPL-CCNC: 82 U/L (ref 45–117)
ALT SERPL-CCNC: 37 U/L (ref 12–78)
ANION GAP SERPL CALC-SCNC: 13 MMOL/L (ref 5–15)
AST SERPL-CCNC: 28 U/L (ref 15–37)
ATRIAL RATE: 66 BPM
BASOPHILS # BLD: 0 K/UL (ref 0–0.1)
BASOPHILS NFR BLD: 0 % (ref 0–1)
BILIRUB SERPL-MCNC: 0.5 MG/DL (ref 0.2–1)
BUN SERPL-MCNC: 13 MG/DL (ref 6–20)
BUN/CREAT SERPL: 13 (ref 12–20)
CALCIUM SERPL-MCNC: 9.7 MG/DL (ref 8.5–10.1)
CALCULATED P AXIS, ECG09: 110 DEGREES
CALCULATED R AXIS, ECG10: 38 DEGREES
CALCULATED T AXIS, ECG11: 17 DEGREES
CHLORIDE SERPL-SCNC: 105 MMOL/L (ref 97–108)
CO2 SERPL-SCNC: 23 MMOL/L (ref 21–32)
CREAT SERPL-MCNC: 1.03 MG/DL (ref 0.55–1.02)
DIAGNOSIS, 93000: NORMAL
EOSINOPHIL # BLD: 0.1 K/UL (ref 0–0.4)
EOSINOPHIL NFR BLD: 2 % (ref 0–7)
ERYTHROCYTE [DISTWIDTH] IN BLOOD BY AUTOMATED COUNT: 13.1 % (ref 11.5–14.5)
GLOBULIN SER CALC-MCNC: 3.7 G/DL (ref 2–4)
GLUCOSE SERPL-MCNC: 125 MG/DL (ref 65–100)
HCT VFR BLD AUTO: 40.1 % (ref 35–47)
HGB BLD-MCNC: 13.2 G/DL (ref 11.5–16)
LYMPHOCYTES # BLD: 1.5 K/UL (ref 0.8–3.5)
LYMPHOCYTES NFR BLD: 21 % (ref 12–49)
MCH RBC QN AUTO: 30.3 PG (ref 26–34)
MCHC RBC AUTO-ENTMCNC: 32.9 G/DL (ref 30–36.5)
MCV RBC AUTO: 92.2 FL (ref 80–99)
MONOCYTES # BLD: 0.5 K/UL (ref 0–1)
MONOCYTES NFR BLD: 7 % (ref 5–13)
NEUTS SEG # BLD: 5.2 K/UL (ref 1.8–8)
NEUTS SEG NFR BLD: 70 % (ref 32–75)
P-R INTERVAL, ECG05: 150 MS
PLATELET # BLD AUTO: 185 K/UL (ref 150–400)
POTASSIUM SERPL-SCNC: 3.8 MMOL/L (ref 3.5–5.1)
PROT SERPL-MCNC: 7.6 G/DL (ref 6.4–8.2)
Q-T INTERVAL, ECG07: 464 MS
QRS DURATION, ECG06: 122 MS
QTC CALCULATION (BEZET), ECG08: 486 MS
RBC # BLD AUTO: 4.35 M/UL (ref 3.8–5.2)
SODIUM SERPL-SCNC: 141 MMOL/L (ref 136–145)
TROPONIN I SERPL-MCNC: <0.04 NG/ML
VENTRICULAR RATE, ECG03: 66 BPM
WBC # BLD AUTO: 7.3 K/UL (ref 3.6–11)

## 2017-12-12 PROCEDURE — 85025 COMPLETE CBC W/AUTO DIFF WBC: CPT | Performed by: FAMILY MEDICINE

## 2017-12-12 PROCEDURE — 36415 COLL VENOUS BLD VENIPUNCTURE: CPT | Performed by: FAMILY MEDICINE

## 2017-12-12 PROCEDURE — 84484 ASSAY OF TROPONIN QUANT: CPT | Performed by: FAMILY MEDICINE

## 2017-12-12 PROCEDURE — 71020 XR CHEST PA LAT: CPT

## 2017-12-12 PROCEDURE — 93005 ELECTROCARDIOGRAM TRACING: CPT

## 2017-12-12 PROCEDURE — 74011250636 HC RX REV CODE- 250/636: Performed by: FAMILY MEDICINE

## 2017-12-12 PROCEDURE — 96374 THER/PROPH/DIAG INJ IV PUSH: CPT

## 2017-12-12 PROCEDURE — 99283 EMERGENCY DEPT VISIT LOW MDM: CPT

## 2017-12-12 PROCEDURE — 80053 COMPREHEN METABOLIC PANEL: CPT | Performed by: FAMILY MEDICINE

## 2017-12-12 RX ORDER — FUROSEMIDE 10 MG/ML
20 INJECTION INTRAMUSCULAR; INTRAVENOUS
Status: COMPLETED | OUTPATIENT
Start: 2017-12-12 | End: 2017-12-12

## 2017-12-12 RX ADMIN — FUROSEMIDE 20 MG: 10 INJECTION, SOLUTION INTRAMUSCULAR; INTRAVENOUS at 16:19

## 2017-12-12 NOTE — DISCHARGE INSTRUCTIONS
Acute High Blood Pressure: Care Instructions  Your Care Instructions    Acute high blood pressure is very high blood pressure. It's a serious problem. Very high blood pressure can damage your brain, heart, eyes, and kidneys. You may have been given medicines to lower your blood pressure. You may have gotten them as pills or through a needle in one of your veins. This is called an IV. And maybe you were given other medicines too. These can be needed when high blood pressure causes other problems. To keep your blood pressure at a lower level, you may need to make healthy lifestyle changes. And you will probably need to take medicines. Be sure to follow up with your doctor about your blood pressure and what you can do about it. Follow-up care is a key part of your treatment and safety. Be sure to make and go to all appointments, and call your doctor if you are having problems. It's also a good idea to know your test results and keep a list of the medicines you take. How can you care for yourself at home? · See your doctor as often as he or she recommends. This is to make sure your blood pressure is under control. You will probably go at least 2 times a year. But it may be more often at first.  · Take your blood pressure medicine exactly as prescribed. You may take one or more types. They include diuretics, beta-blockers, ACE inhibitors, calcium channel blockers, and angiotensin II receptor blockers. Call your doctor if you think you are having a problem with your medicine. · If you take blood pressure medicine, talk to your doctor before you take decongestants or anti-inflammatory medicine, such as ibuprofen. These can raise blood pressure. · Learn how to check your blood pressure at home. Check it often. · Ask your doctor if it's okay to drink alcohol. · Talk to your doctor about lifestyle changes that can help blood pressure. These include being active and not smoking.   When should you call for help?  Call 911 anytime you think you may need emergency care. This may mean having symptoms that suggest that your blood pressure is causing a serious heart or blood vessel problem. Your blood pressure may be over 180/110. ? For example, call 911 if:  ? · You have symptoms of a heart attack. These may include:  ¨ Chest pain or pressure, or a strange feeling in the chest.  ¨ Sweating. ¨ Shortness of breath. ¨ Nausea or vomiting. ¨ Pain, pressure, or a strange feeling in the back, neck, jaw, or upper belly or in one or both shoulders or arms. ¨ Lightheadedness or sudden weakness. ¨ A fast or irregular heartbeat. ? · You have symptoms of a stroke. These may include:  ¨ Sudden numbness, tingling, weakness, or loss of movement in your face, arm, or leg, especially on only one side of your body. ¨ Sudden vision changes. ¨ Sudden trouble speaking. ¨ Sudden confusion or trouble understanding simple statements. ¨ Sudden problems with walking or balance. ¨ A sudden, severe headache that is different from past headaches. ? · You have severe back or belly pain. ?Do not wait until your blood pressure comes down on its own. Get help right away. ?Call your doctor now or seek immediate care if:  ? · Your blood pressure is much higher than normal (such as 180/110 or higher), but you don't have symptoms. ? · You think high blood pressure is causing symptoms, such as:  ¨ Severe headache. ¨ Blurry vision. ? Watch closely for changes in your health, and be sure to contact your doctor if:  ? · Your blood pressure measures 140/90 or higher at least 2 times. That means the top number is 140 or higher or the bottom number is 90 or higher, or both. ? · You think you may be having side effects from your blood pressure medicine. ? · Your blood pressure is usually normal, but it goes above normal at least 2 times. Where can you learn more? Go to http://iker-isaac.info/.   Enter L350 in the search box to learn more about \"Acute High Blood Pressure: Care Instructions. \"  Current as of: September 21, 2016  Content Version: 11.4  © 2708-9731 Healthwise, [employer] Care instructions adapted under license by [employer] (which disclaims liability or warranty for this information). If you have questions about a medical condition or this instruction, always ask your healthcare professional. [place] [address] any warranty or liability for your use of this information.

## 2017-12-12 NOTE — ED PROVIDER NOTES
HPI   Ms. Dennis Mercado is a 78year old female with medical history of fluctuating blood pressures and Afib s/p ablation who presents for hypertension. Her current BP regimen is carvedilol 6.25mg BID, valsartan 80mg in the afternoon and clonidine 0.2mg in the afternoon. Her valsartan is prescribed as BID (one in the afternoon and one in the evening due to her BP fluctuations), however patient is only taking it once daily because she thinks it causes her to be hypertensive. her bumex is prescribed as PRN and patient has not taken this in months. She was seen at her PCP's office today for HTN and was given an extra dose of 0.1mg clonidine but BP did not improve, hence patient was sent to the ER. She denies headache, vision changes, new onset SOB, chest pain, nausea or vomiting. On BP review, it appears that her BP peaks in there afternoon. History is provided by the patient. Past Medical History:   Diagnosis Date    DDD (degenerative disc disease)     Gastroesophageal reflux disease without esophagitis 7/3/2016    GERD (gastroesophageal reflux disease)     Hiatal hernia     HTN (hypertension), benign 7/26/2016    Hypertension     ANÍBAL (obstructive sleep apnea) 7/26/2016    Paroxysmal atrial fibrillation ([address] 8/28/2016    Uterine prolapse        Past Surgical History:   Procedure Laterality Date    HX AFIB ABLATION  03/2017    HX GI      COLONOSCOPY 7/14    HX GYN      TUBAL LIGATION    HX HEENT      WISDOM TEETH EXTRACTED. Family History:   Problem Relation Age of Onset    Diabetes Mother     Hypertension Mother     COPD Mother        Social History     Social History    Marital status:      Spouse name: N/A    Number of children: N/A    Years of education: N/A     Occupational History    Not on file.      Social History Main Topics    Smoking status: Former Smoker     Packs/day: 1.50     Years: 30.00     Quit date: 3/3/1994    Smokeless tobacco: Never Used    Alcohol use 0.6 oz/week     1 Glasses of wine per week      Comment: occ    Drug use: No    Sexual activity: Not on file     Other Topics Concern    Not on file     Social History Narrative         ALLERGIES: Citalopram hydrobromide; Chlorthalidone; Maxzide [triamterene-hydrochlorothiazid]; and Vicodin [hydrocodone-acetaminophen]    Review of Systems   Constitutional: Negative for activity change and fatigue. HENT: Negative. Negative for sinus pain and sinus pressure. Eyes: Negative for visual disturbance. Respiratory: Negative for chest tightness, shortness of breath and wheezing. Cardiovascular: Negative for chest pain, palpitations and leg swelling. Gastrointestinal: Negative for abdominal pain, diarrhea, nausea and vomiting. Genitourinary: Negative. Musculoskeletal: Negative for back pain and neck pain. Skin: Negative for color change. Neurological: Negative for dizziness, tremors, seizures, syncope, facial asymmetry, speech difficulty, weakness, light-headedness, numbness and headaches. Psychiatric/Behavioral: The patient is nervous/anxious. All other systems reviewed and are negative. Vitals:    12/12/17 1519 12/12/17 1619 12/12/17 1736   BP: (!) 201/108 (!) 216/86    Pulse: 72 66    Resp: 14     Temp: 97.5 °F (36.4 °C)     SpO2: 97%  97%   Weight: 88 kg (194 lb)     Height: 5' 2\" (1.575 m)              Physical Exam   Constitutional: She is oriented to person, place, and time. She appears well-developed and well-nourished. No distress. HENT:   Head: Normocephalic and atraumatic. Mouth/Throat: Oropharynx is clear and moist.   Eyes: Conjunctivae are normal.   Neck: Neck supple. Cardiovascular: Normal rate, regular rhythm and normal heart sounds. Pulmonary/Chest: Effort normal and breath sounds normal. No respiratory distress. She has no wheezes. Abdominal: Bowel sounds are normal. She exhibits no distension. There is no tenderness. Musculoskeletal: She exhibits no deformity. Neurological: She is alert and oriented to person, place, and time. She has normal strength. No cranial nerve deficit. Skin: Skin is warm and dry. Psychiatric: Her mood appears anxious. Nursing note and vitals reviewed. MDM   78year old female with hypertensive urgency  - CBC, CMP, EKG, troponins, CXR  - Give IV lasix 20 mg now      5:44 PM  BP much improved. 's, DBP 80's. Labs normal.  CXR normal.  EKG unremarkable. Follow up with PCP and Cardiology.     ED Course       Procedures

## 2017-12-12 NOTE — ED TRIAGE NOTES
Patient sent by PCP for HTN, patient reports no s/s. Patient keeps a log of BP and reports its hi. Patient received clonidine at PCP office. Patient appears anxious over BP and reports it keeps her awake at night.

## 2017-12-13 ENCOUNTER — OFFICE VISIT (OUTPATIENT)
Dept: CARDIOLOGY CLINIC | Age: 77
End: 2017-12-13

## 2017-12-13 VITALS
HEART RATE: 64 BPM | OXYGEN SATURATION: 98 % | HEIGHT: 62 IN | BODY MASS INDEX: 34.6 KG/M2 | RESPIRATION RATE: 16 BRPM | DIASTOLIC BLOOD PRESSURE: 86 MMHG | SYSTOLIC BLOOD PRESSURE: 192 MMHG | WEIGHT: 188 LBS

## 2017-12-13 DIAGNOSIS — I48.0 PAROXYSMAL ATRIAL FIBRILLATION (HCC): ICD-10-CM

## 2017-12-13 DIAGNOSIS — I47.1 SVT (SUPRAVENTRICULAR TACHYCARDIA) (HCC): ICD-10-CM

## 2017-12-13 DIAGNOSIS — I87.2 VENOUS INSUFFICIENCY: ICD-10-CM

## 2017-12-13 DIAGNOSIS — I45.10 RBBB: ICD-10-CM

## 2017-12-13 DIAGNOSIS — I10 HTN (HYPERTENSION), BENIGN: Primary | ICD-10-CM

## 2017-12-13 DIAGNOSIS — Z86.79 S/P ABLATION OF ATRIAL FIBRILLATION: ICD-10-CM

## 2017-12-13 DIAGNOSIS — I51.7 LAE (LEFT ATRIAL ENLARGEMENT): ICD-10-CM

## 2017-12-13 DIAGNOSIS — Z98.890 S/P ABLATION OF ATRIAL FIBRILLATION: ICD-10-CM

## 2017-12-13 RX ORDER — CLONIDINE HYDROCHLORIDE 0.1 MG/1
0.1 TABLET ORAL
Qty: 90 TAB | Refills: 5 | Status: SHIPPED | OUTPATIENT
Start: 2017-12-13 | End: 2018-01-23 | Stop reason: SDUPTHER

## 2017-12-13 NOTE — PROGRESS NOTES
[id number] [facility], [address] [facility]    Office [phone]  Fax [phone]        Nathalia Ernandez is a 78 y.o. female. Last seen 2 months ago. Problem List  Date Reviewed: 10/27/2017          Codes Class Noted    SVT (supraventricular tachycardia) ([address] ICD-10-CM: I47.1  ICD-9-CM: 427.89  8/28/2017        Hiatal hernia ICD-10-CM: K44.9  ICD-9-CM: 553.3  8/28/2017        Venous insufficiency ICD-10-CM: I87.2  ICD-9-CM: 459.81  5/4/2017        Acquired hypothyroidism ICD-10-CM: E03.9  ICD-9-CM: 244.9  5/1/2017        S/P ablation of atrial fibrillation ICD-10-CM: Z98.890, Z86.79  ICD-9-CM: V45.89  4/28/2017        Paroxysmal atrial fibrillation (HCC) ICD-10-CM: I48.0  ICD-9-CM: 427.31  8/28/2016        HTN (hypertension), benign ICD-10-CM: I10  ICD-9-CM: 401.1  7/26/2016        Gastroesophageal reflux disease without esophagitis ICD-10-CM: K21.9  ICD-9-CM: 530.81  7/3/2016        RBBB ICD-10-CM: I45.10  ICD-9-CM: 426.4  5/7/2015        LAE (left atrial enlargement) ICD-10-CM: I51.7  ICD-9-CM: 429.3  4/3/2013             Cardiac testing  Adenosine myoview 2011 - normal perfusion, EF 81%  Echocardiogram 2/22/13 - normal left ventricular size and function, normal appearing mitral, aortic, and tricuspid valves, with mild mitral and moderate tricuspid regurgitation, bi-atrial enlargement  Lexiscan 3/3/2014 - normal perfusion EF 83%  Echo: 5/15/15- 60%, mildly dilated LA, mild MR  Cardioversion 7/23/2015 - 200J  Lexiscan cardiolite 5/10/16 - normal perfusion, EF 78%  Renal Visceral Duplex 5/2016 - No evidence of ONOFRE  3/2/17-. Successful atrial fibrillation ablation however unable to achieve entrance/exit block of LSPV per Dr. Adán Bernal    HPI  Ms. Matias Patino was seen in the ER yesterday with SBP greater than 200. She received Lasix in ER. She is frequently anxious and reports multiple domestic stressors.  She has not been able to sleep well at night as she states she is scared her BP is high. She does not believe Diovan is working, states her BP increases 1/2 -1 hours after taking it. She does not add salt to her foods. She had a sleep study and does not have sleep apnea. Patient denies any exertional chest pain, dyspnea, palpitations, syncope, orthopnea, edema or paroxysmal nocturnal dyspnea. Past Medical History:   Diagnosis Date    DDD (degenerative disc disease)     Gastroesophageal reflux disease without esophagitis 7/3/2016    GERD (gastroesophageal reflux disease)     Hiatal hernia     HTN (hypertension), benign 7/26/2016    Hypertension     ANÍBAL (obstructive sleep apnea) 7/26/2016    Paroxysmal atrial fibrillation ([facility] [address] [facility] 8/28/2016    Uterine prolapse       Current Outpatient Prescriptions on File Prior to Visit   Medication Sig Dispense Refill    carvedilol (COREG) 6.25 mg tablet take 1 tablet by mouth twice a day with food 180 Tab 3    levothyroxine (SYNTHROID) 50 mcg tablet take 1 tablet by mouth once daily  0    furosemide (LASIX) 20 mg tablet take 1 tablet by mouth PRN  0    lansoprazole (PREVACID) 30 mg capsule Take 30 mg by mouth nightly.  FOLIC ACID/MULTIVIT-MIN/LUTEIN (CENTRUM SILVER PO) Take  by mouth daily.  CALCIUM CARBONATE/VITAMIN D3 (CALTRATE 600 + D PO) Take  by mouth daily.  ezetimibe (ZETIA) 10 mg tablet Take 10 mg by mouth every evening.  cloNIDine HCl (CATAPRES) 0.1 mg tablet take 1 tablet by mouth NIGHTLY. HOLD IF SYSTOLIC BLOOD PRESSURE IS LESS THAN 150 30 Tab 3    ELIQUIS 5 mg tablet take 1 tablet by mouth twice a day 60 Tab 11     Current Facility-Administered Medications on File Prior to Visit   Medication Dose Route Frequency Provider Last Rate Last Dose    [COMPLETED] furosemide (LASIX) injection 20 mg  20 mg IntraVENous NOW Gely Garcia MD   20 mg at 12/12/17 1619      Allergies   Allergen Reactions    Citalopram Hydrobromide Rash     With itching.     Chlorthalidone Rash    Maxzide [Triamterene-Hydrochlorothiazid] Palpitations    Vicodin [Hydrocodone-Acetaminophen] Palpitations     , former smoker     Review of Systems  Constitutional: Negative for fever, chills, malaise/fatigue and diaphoresis. Respiratory: Negative for cough, hemoptysis, sputum production, and wheezing. Cardiovascular: Negative for orthopnea, claudication,leg swelling and PND. Gastrointestinal: Negative for heartburn, nausea, vomiting, blood in stool and melena. Genitourinary: Negative for dysuria and flank pain. Musculoskeletal: Negative for joint pain and back pain. Skin: Negative for rash. Neurological: Negative for focal weakness, seizures, loss of consciousness, weakness. Endo/Heme/Allergies: Does not bruise/bleed easily. Psychiatric/Behavioral: Negative for memory loss. Positive for anxiety. Visit Vitals    /86 (BP 1 Location: Right arm, BP Patient Position: Sitting)    Pulse 64    Resp 16    Ht 5' 2\" (1.575 m)    Wt 188 lb (85.3 kg)    SpO2 98%    BMI 34.39 kg/m2      Wt Readings from Last 3 Encounters:   12/13/17 188 lb (85.3 kg)   12/12/17 194 lb (88 kg)   10/27/17 196 lb (88.9 kg)      Physical Exam   Constitutional: She is oriented to person, place, and time. She appears well-developed and well-nourished. Neck: Neck supple. No JVD present. Cardiovascular: Normal rate, regular rhythm, S1 normal, S2 normal and normal heart sounds. Exam reveals no gallop. No murmur heard. Pulses: Carotid pulses are 2+ on the right side, and 2+ on the left side. Dorsalis pedis pulses are 2+ on the right side, and 2+ on the left side. Pulmonary/Chest: Effort normal and breath sounds normal. She has no wheezes. She has no rales. Abdominal: Soft. Bowel sounds are normal. There is no tenderness. There is no rebound. Musculoskeletal: She exhibits no edema. Neurological: She is alert and oriented to person, place, and time. Skin: Skin is warm and dry.    Psychiatric: She has a normal mood and affect. Lab Results   Component Value Date/Time    Sodium 141 12/12/2017 04:15 PM    Potassium 3.8 12/12/2017 04:15 PM    Chloride 105 12/12/2017 04:15 PM    CO2 23 12/12/2017 04:15 PM    Anion gap 13 12/12/2017 04:15 PM    Glucose 125 12/12/2017 04:15 PM    BUN 13 12/12/2017 04:15 PM    Creatinine 1.03 12/12/2017 04:15 PM    BUN/Creatinine ratio 13 12/12/2017 04:15 PM    GFR est AA >60 12/12/2017 04:15 PM    GFR est non-AA 52 12/12/2017 04:15 PM    Calcium 9.7 12/12/2017 04:15 PM    Bilirubin, total 0.5 12/12/2017 04:15 PM    AST (SGOT) 28 12/12/2017 04:15 PM    Alk. phosphatase 82 12/12/2017 04:15 PM    Protein, total 7.6 12/12/2017 04:15 PM    Albumin 3.9 12/12/2017 04:15 PM    Globulin 3.7 12/12/2017 04:15 PM    A-G Ratio 1.1 12/12/2017 04:15 PM    ALT (SGPT) 37 12/12/2017 04:15 PM     Cardiographics   EKG 4/13 - normal  EKG 5/6/15 - AF with ventricular rate 96, RBBB  EKG 6/3/15 - A-fib rate 70s, RBBB, Rightward axis  EKG 8/31/15 - SR, RBBB  EKG 12/7/15 - SR, RBBB  EKG 7/26/16 - AF 80s, RBBB, rightward axis, unchanged from 7/23/16   EKG 11/28/16 - SR 60   EKG 12/05/16-AF 80-90, RBBB   EKG 01/10/17- AF 70s  EKG 8/25/17- SR 60, RBBB rightward axis. ASSESSMENT and PLAN  Encounter Diagnoses   Name Primary?  SVT (supraventricular tachycardia) (HCC) Yes    Paroxysmal atrial fibrillation (HCC)     HTN (hypertension), benign     RBBB     LAE (left atrial enlargement)     S/P ablation of atrial fibrillation     Venous insufficiency      Mrs. Ojeda has refractory HTN, likely aggravated by \"stress. \" She has been taking her medications somewhat erratically. She does not have ANÍBAL. It does not appear Valsartan is not working - stop. Continue Coreg 6.25 BID and start Clonidine on a scheduled basis- 0.1 mg TID, holding for systolic less than 222. We had a long discussion about stress management. We discussed daily walks. She will see Dr. Kellie Meckel to discuss pharm management as necessary. Follow-up Disposition:  Return in about 1 month (around 1/13/2018).     Written by Tobin Tijerina, dictated by Maylin Brasher MD.  Maylin Brasher MD

## 2017-12-13 NOTE — MR AVS SNAPSHOT
Visit Information Date & Time Provider Department Dept. Phone Encounter #  
 12/13/2017 11:00 AM Feroz Davis MD [facility] [place] [phone] [id number] Follow-up Instructions Return in about 1 month (around 1/13/2018). Your Appointments 1/3/2018 11:40 AM  
ESTABLISHED PATIENT with Marie Nunn MD  
[facility] ([address]) Appt Note: 6 mo fu  
 [address] [facility] [address] [place] [address] [facility] [address]  
[phone]  
  
   
 [address] [facility] [address] [place] [id number]  
  
    
 2/13/2018  2:00 PM  
ESTABLISHED PATIENT with Feroz Davis MD  
[facility] ([address]) Appt Note: 4 mo fup  
 [address] [facility] [address] [address] [place] [address]  
[facility] [address] [address] Upcoming Health Maintenance Date Due DTaP/Tdap/Td series (1 - Tdap) 2/12/1961 ZOSTER VACCINE AGE 60> 12/12/2001 GLAUCOMA SCREENING Q2Y 2/12/2007 OSTEOPOROSIS SCREENING (DEXA) 2/12/2007 Pneumococcal 65+ Low/Medium Risk (1 of 2 - PCV13) 2/12/2007 MEDICARE YEARLY EXAM 2/12/2007 Influenza Age 5 to Adult 8/1/2017 Allergies as of 12/13/2017  Review Complete On: 12/12/2017 By: Butch Yadav RN Severity Noted Reaction Type Reactions Citalopram Hydrobromide High 09/16/2014    Rash With itching. Chlorthalidone  04/03/2013    Rash Maxzide [Triamterene-hydrochlorothiazid]  04/03/2013    Palpitations Vicodin [Hydrocodone-acetaminophen]  04/03/2013    Palpitations Current Immunizations  Reviewed on 3/3/2012 No immunizations on file. Not reviewed this visit You Were Diagnosed With   
  
 Codes Comments SVT (supraventricular tachycardia) ([facility]    -  Primary ICD-10-CM: I47.1 ICD-9-CM: 427.89 Paroxysmal atrial fibrillation (HCC)     ICD-10-CM: I48.0 ICD-9-CM: 427.31   
 HTN (hypertension), benign     ICD-10-CM: I10 
ICD-9-CM: 401.1 RBBB     ICD-10-CM: I45.10 ICD-9-CM: 426.4 LAE (left atrial enlargement)     ICD-10-CM: I51.7 ICD-9-CM: 429.3 S/P ablation of atrial fibrillation     ICD-10-CM: Z98.890, Z86.79 
ICD-9-CM: V45.89 Venous insufficiency     ICD-10-CM: I87.2 ICD-9-CM: 459.81 Vitals BP Pulse Resp Height(growth percentile) Weight(growth percentile) SpO2  
 192/86 (BP 1 Location: Right arm, BP Patient Position: Sitting) 64 16 5' 2\" (1.575 m) 188 lb (85.3 kg) 98% BMI OB Status Smoking Status 34.39 kg/m2 Postmenopausal Former Smoker BMI and BSA Data Body Mass Index Body Surface Area  
 34.39 kg/m 2 1.93 m 2 Preferred Pharmacy Pharmacy Name Phone [place] [phone] Your Updated Medication List  
  
   
This list is accurate as of: 12/13/17 12:10 PM.  Always use your most recent med list.  
  
  
  
  
 CALTRATE 600 + D PO Take  by mouth daily. carvedilol 6.25 mg tablet Commonly known as:  COREG  
take 1 tablet by mouth twice a day with food CENTRUM SILVER PO Take  by mouth daily. cloNIDine HCl 0.1 mg tablet Commonly known as:  CATAPRES Take 1 Tab by mouth three (3) times daily (with meals). ELIQUIS 5 mg tablet Generic drug:  apixaban  
take 1 tablet by mouth twice a day  
  
 furosemide 20 mg tablet Commonly known as:  LASIX  
take 1 tablet by mouth PRN  
  
 levothyroxine 50 mcg tablet Commonly known as:  SYNTHROID  
take 1 tablet by mouth once daily PREVACID 30 mg capsule Generic drug:  lansoprazole Take 30 mg by mouth nightly. ZETIA 10 mg tablet Generic drug:  ezetimibe Take 10 mg by mouth every evening. Prescriptions Sent to Pharmacy Refills  
 cloNIDine HCl (CATAPRES) 0.1 mg tablet 5 Sig: Take 1 Tab by mouth three (3) times daily (with meals). Class: Normal  
 Pharmacy: [address]  #: [phone] Route: Oral  
  
Follow-up Instructions Return in about 1 month (around 1/13/2018). Patient Instructions Stop Diovan. Take Clonidine 0.1 mg three times daily with meals. Hold if systolic BP (top number) is less than 120. Introducing [facility] SERVICES! Dear Shantal Hardy: Thank you for requesting a [employer] account. Our records indicate that you already have an active [employer] account. You can access your account anytime at [email or web address] Did you know that you can access your hospital and ER discharge instructions at any time in [employer]? You can also review all of your test results from your hospital stay or ER visit. Additional Information If you have questions, please visit the Frequently Asked Questions section of the [employer] website at [email or web address] Remember, [employer] is NOT to be used for urgent needs. For medical emergencies, dial 911. Now available from your iPhone and Android! Please provide this summary of care documentation to your next provider. Your primary care clinician is listed as Aicha Leos. If you have any questions after today's visit, please call [phone].

## 2017-12-13 NOTE — PATIENT INSTRUCTIONS
Stop Diovan. Take Clonidine 0.1 mg three times daily with meals. Hold if systolic BP (top number) is less than 120.

## 2017-12-20 ENCOUNTER — HOSPITAL ENCOUNTER (EMERGENCY)
Age: 77
Discharge: HOME | End: 2017-12-21
Attending: EMERGENCY MEDICINE
Payer: MEDICARE

## 2017-12-20 DIAGNOSIS — I10 ESSENTIAL HYPERTENSION: Primary | ICD-10-CM

## 2017-12-20 LAB
ALBUMIN SERPL-MCNC: 3.9 G/DL (ref 3.5–5)
ALBUMIN/GLOB SERPL: 1.1 {RATIO} (ref 1.1–2.2)
ALP SERPL-CCNC: 83 U/L (ref 45–117)
ALT SERPL-CCNC: 36 U/L (ref 12–78)
ANION GAP SERPL CALC-SCNC: 5 MMOL/L (ref 5–15)
APTT PPP: 25.7 SEC (ref 22.1–32.5)
AST SERPL-CCNC: 27 U/L (ref 15–37)
BASOPHILS # BLD: 0 K/UL (ref 0–0.1)
BASOPHILS NFR BLD: 0 % (ref 0–1)
BILIRUB SERPL-MCNC: 0.5 MG/DL (ref 0.2–1)
BUN SERPL-MCNC: 13 MG/DL (ref 6–20)
BUN/CREAT SERPL: 11 (ref 12–20)
CALCIUM SERPL-MCNC: 9.5 MG/DL (ref 8.5–10.1)
CHLORIDE SERPL-SCNC: 103 MMOL/L (ref 97–108)
CO2 SERPL-SCNC: 29 MMOL/L (ref 21–32)
CREAT SERPL-MCNC: 1.19 MG/DL (ref 0.55–1.02)
DIFFERENTIAL METHOD BLD: NORMAL
EOSINOPHIL # BLD: 0.1 K/UL (ref 0–0.4)
EOSINOPHIL NFR BLD: 2 % (ref 0–7)
ERYTHROCYTE [DISTWIDTH] IN BLOOD BY AUTOMATED COUNT: 13 % (ref 11.5–14.5)
GLOBULIN SER CALC-MCNC: 3.6 G/DL (ref 2–4)
GLUCOSE SERPL-MCNC: 116 MG/DL (ref 65–100)
HCT VFR BLD AUTO: 40.9 % (ref 35–47)
HGB BLD-MCNC: 13.3 G/DL (ref 11.5–16)
INR PPP: 1.2 (ref 0.9–1.1)
LYMPHOCYTES # BLD: 2.4 K/UL (ref 0.8–3.5)
LYMPHOCYTES NFR BLD: 35 % (ref 12–49)
MCH RBC QN AUTO: 30.5 PG (ref 26–34)
MCHC RBC AUTO-ENTMCNC: 32.5 G/DL (ref 30–36.5)
MCV RBC AUTO: 93.8 FL (ref 80–99)
MONOCYTES # BLD: 0.8 K/UL (ref 0–1)
MONOCYTES NFR BLD: 12 % (ref 5–13)
NEUTS SEG # BLD: 3.5 K/UL (ref 1.8–8)
NEUTS SEG NFR BLD: 51 % (ref 32–75)
PLATELET # BLD AUTO: 177 K/UL (ref 150–400)
POTASSIUM SERPL-SCNC: 3.4 MMOL/L (ref 3.5–5.1)
PROT SERPL-MCNC: 7.5 G/DL (ref 6.4–8.2)
PROTHROMBIN TIME: 11.5 SEC (ref 9–11.1)
RBC # BLD AUTO: 4.36 M/UL (ref 3.8–5.2)
SODIUM SERPL-SCNC: 137 MMOL/L (ref 136–145)
THERAPEUTIC RANGE,PTTT: NORMAL SECS (ref 58–77)
TROPONIN I BLD-MCNC: <0.04 NG/ML (ref 0–0.08)
WBC # BLD AUTO: 6.8 K/UL (ref 3.6–11)

## 2017-12-20 PROCEDURE — 93005 ELECTROCARDIOGRAM TRACING: CPT

## 2017-12-20 PROCEDURE — 85730 THROMBOPLASTIN TIME PARTIAL: CPT | Performed by: EMERGENCY MEDICINE

## 2017-12-20 PROCEDURE — 84484 ASSAY OF TROPONIN QUANT: CPT

## 2017-12-20 PROCEDURE — 99285 EMERGENCY DEPT VISIT HI MDM: CPT

## 2017-12-20 PROCEDURE — 85610 PROTHROMBIN TIME: CPT | Performed by: EMERGENCY MEDICINE

## 2017-12-20 PROCEDURE — 36415 COLL VENOUS BLD VENIPUNCTURE: CPT | Performed by: EMERGENCY MEDICINE

## 2017-12-20 PROCEDURE — 85025 COMPLETE CBC W/AUTO DIFF WBC: CPT | Performed by: EMERGENCY MEDICINE

## 2017-12-20 PROCEDURE — 80053 COMPREHEN METABOLIC PANEL: CPT | Performed by: EMERGENCY MEDICINE

## 2017-12-20 NOTE — Clinical Note
- Please discuss your blood pressure at your PCP appointment today. - Please also discuss how anxiety could be contributing to your elevated blood pressure. 
- Return to ED for any concerns.

## 2017-12-21 ENCOUNTER — TELEPHONE (OUTPATIENT)
Dept: OTHER | Age: 77
End: 2017-12-21

## 2017-12-21 VITALS
TEMPERATURE: 98.1 F | DIASTOLIC BLOOD PRESSURE: 73 MMHG | HEART RATE: 59 BPM | SYSTOLIC BLOOD PRESSURE: 174 MMHG | WEIGHT: 194 LBS | RESPIRATION RATE: 14 BRPM | HEIGHT: 63 IN | OXYGEN SATURATION: 96 % | BODY MASS INDEX: 34.38 KG/M2

## 2017-12-21 LAB
ATRIAL RATE: 71 BPM
CALCULATED P AXIS, ECG09: 75 DEGREES
CALCULATED R AXIS, ECG10: 51 DEGREES
CALCULATED T AXIS, ECG11: 7 DEGREES
DIAGNOSIS, 93000: NORMAL
P-R INTERVAL, ECG05: 152 MS
Q-T INTERVAL, ECG07: 438 MS
QRS DURATION, ECG06: 130 MS
QTC CALCULATION (BEZET), ECG08: 475 MS
VENTRICULAR RATE, ECG03: 71 BPM

## 2017-12-21 PROCEDURE — 74011250636 HC RX REV CODE- 250/636

## 2017-12-21 PROCEDURE — 96374 THER/PROPH/DIAG INJ IV PUSH: CPT

## 2017-12-21 RX ORDER — LORAZEPAM 2 MG/ML
0.5 INJECTION INTRAMUSCULAR
Status: COMPLETED | OUTPATIENT
Start: 2017-12-21 | End: 2017-12-21

## 2017-12-21 RX ORDER — LORAZEPAM 2 MG/ML
INJECTION INTRAMUSCULAR
Status: COMPLETED
Start: 2017-12-21 | End: 2017-12-21

## 2017-12-21 RX ORDER — LORAZEPAM 2 MG/ML
1 INJECTION INTRAMUSCULAR
Status: DISCONTINUED | OUTPATIENT
Start: 2017-12-21 | End: 2017-12-21

## 2017-12-21 RX ADMIN — LORAZEPAM 0.5 MG: 2 INJECTION, SOLUTION INTRAMUSCULAR; INTRAVENOUS at 01:34

## 2017-12-21 RX ADMIN — LORAZEPAM 0.5 MG: 2 INJECTION INTRAMUSCULAR at 01:34

## 2017-12-21 NOTE — DISCHARGE INSTRUCTIONS
High Blood Pressure: Care Instructions  Your Care Instructions    If your blood pressure is usually above 140/90, you have high blood pressure, or hypertension. That means the top number is 140 or higher or the bottom number is 90 or higher, or both. Despite what a lot of people think, high blood pressure usually doesn't cause headaches or make you feel dizzy or lightheaded. It usually has no symptoms. But it does increase your risk for heart attack, stroke, and kidney or eye damage. The higher your blood pressure, the more your risk increases. Your doctor will give you a goal for your blood pressure. Your goal will be based on your health and your age. An example of a goal is to keep your blood pressure below 140/90. Lifestyle changes, such as eating healthy and being active, are always important to help lower blood pressure. You might also take medicine to reach your blood pressure goal.  Follow-up care is a key part of your treatment and safety. Be sure to make and go to all appointments, and call your doctor if you are having problems. It's also a good idea to know your test results and keep a list of the medicines you take. How can you care for yourself at home? Medical treatment  · If you stop taking your medicine, your blood pressure will go back up. You may take one or more types of medicine to lower your blood pressure. Be safe with medicines. Take your medicine exactly as prescribed. Call your doctor if you think you are having a problem with your medicine. · Talk to your doctor before you start taking aspirin every day. Aspirin can help certain people lower their risk of a heart attack or stroke. But taking aspirin isn't right for everyone, because it can cause serious bleeding. · See your doctor regularly. You may need to see the doctor more often at first or until your blood pressure comes down.   · If you are taking blood pressure medicine, talk to your doctor before you take decongestants or anti-inflammatory medicine, such as ibuprofen. Some of these medicines can raise blood pressure. · Learn how to check your blood pressure at home. Lifestyle changes  · Stay at a healthy weight. This is especially important if you put on weight around the waist. Losing even 10 pounds can help you lower your blood pressure. · If your doctor recommends it, get more exercise. Walking is a good choice. Bit by bit, increase the amount you walk every day. Try for at least 30 minutes on most days of the week. You also may want to swim, bike, or do other activities. · Avoid or limit alcohol. Talk to your doctor about whether you can drink any alcohol. · Try to limit how much sodium you eat to less than 2,300 milligrams (mg) a day. Your doctor may ask you to try to eat less than 1,500 mg a day. · Eat plenty of fruits (such as bananas and oranges), vegetables, legumes, whole grains, and low-fat dairy products. · Lower the amount of saturated fat in your diet. Saturated fat is found in animal products such as milk, cheese, and meat. Limiting these foods may help you lose weight and also lower your risk for heart disease. · Do not smoke. Smoking increases your risk for heart attack and stroke. If you need help quitting, talk to your doctor about stop-smoking programs and medicines. These can increase your chances of quitting for good. When should you call for help? Call 911 anytime you think you may need emergency care. This may mean having symptoms that suggest that your blood pressure is causing a serious heart or blood vessel problem. Your blood pressure may be over 180/110. ? For example, call 911 if:  ? · You have symptoms of a heart attack. These may include:  ¨ Chest pain or pressure, or a strange feeling in the chest.  ¨ Sweating. ¨ Shortness of breath. ¨ Nausea or vomiting.   ¨ Pain, pressure, or a strange feeling in the back, neck, jaw, or upper belly or in one or both shoulders or arms.  ¨ Lightheadedness or sudden weakness. ¨ A fast or irregular heartbeat. ? · You have symptoms of a stroke. These may include:  ¨ Sudden numbness, tingling, weakness, or loss of movement in your face, arm, or leg, especially on only one side of your body. ¨ Sudden vision changes. ¨ Sudden trouble speaking. ¨ Sudden confusion or trouble understanding simple statements. ¨ Sudden problems with walking or balance. ¨ A sudden, severe headache that is different from past headaches. ? · You have severe back or belly pain. ?Do not wait until your blood pressure comes down on its own. Get help right away. ?Call your doctor now or seek immediate care if:  ? · Your blood pressure is much higher than normal (such as 180/110 or higher), but you don't have symptoms. ? · You think high blood pressure is causing symptoms, such as:  ¨ Severe headache. ¨ Blurry vision. ? Watch closely for changes in your health, and be sure to contact your doctor if:  ? · Your blood pressure measures 140/90 or higher at least 2 times. That means the top number is 140 or higher or the bottom number is 90 or higher, or both. ? · You think you may be having side effects from your blood pressure medicine. ? · Your blood pressure is usually normal, but it goes above normal at least 2 times. Where can you learn more? Go to http://iker-isaac.info/. Enter D328 in the search box to learn more about \"High Blood Pressure: Care Instructions. \"  Current as of: September 21, 2016  Content Version: 11.4  © 7993-7907 [employer]. Care instructions adapted under license by [employer] (which disclaims liability or warranty for this information). If you have questions about a medical condition or this instruction, always ask your healthcare professional. [place] [address] any warranty or liability for your use of this information.        Anxiety Disorder: Care Instructions  Your Care Instructions    Anxiety is a normal reaction to stress. Difficult situations can cause you to have symptoms such as sweaty palms and a nervous feeling. In an anxiety disorder, the symptoms are far more severe. Constant worry, muscle tension, trouble sleeping, nausea and diarrhea, and other symptoms can make normal daily activities difficult or impossible. These symptoms may occur for no reason, and they can affect your work, school, or social life. Medicines, counseling, and self-care can all help. Follow-up care is a key part of your treatment and safety. Be sure to make and go to all appointments, and call your doctor if you are having problems. It's also a good idea to know your test results and keep a list of the medicines you take. How can you care for yourself at home? · Take medicines exactly as directed. Call your doctor if you think you are having a problem with your medicine. · Go to your counseling sessions and follow-up appointments. · Recognize and accept your anxiety. Then, when you are in a situation that makes you anxious, say to yourself, \"This is not an emergency. I feel uncomfortable, but I am not in danger. I can keep going even if I feel anxious. \"  · Be kind to your body:  ¨ Relieve tension with exercise or a massage. ¨ Get enough rest.  ¨ Avoid alcohol, caffeine, nicotine, and illegal drugs. They can increase your anxiety level and cause sleep problems. ¨ Learn and do relaxation techniques. See below for more about these techniques. · Engage your mind. Get out and do something you enjoy. Go to a funny movie, or take a walk or hike. Plan your day. Having too much or too little to do can make you anxious. · Keep a record of your symptoms. Discuss your fears with a good friend or family member, or join a support group for people with similar problems. Talking to others sometimes relieves stress. · Get involved in social groups, or volunteer to help others.  Being alone sometimes makes things seem worse than they are. · Get at least 30 minutes of exercise on most days of the week to relieve stress. Walking is a good choice. You also may want to do other activities, such as running, swimming, cycling, or playing tennis or team sports. Relaxation techniques  Do relaxation exercises 10 to 20 minutes a day. You can play soothing, relaxing music while you do them, if you wish. · Tell others in your house that you are going to do your relaxation exercises. Ask them not to disturb you. · Find a comfortable place, away from all distractions and noise. · Lie down on your back, or sit with your back straight. · Focus on your breathing. Make it slow and steady. · Breathe in through your nose. Breathe out through either your nose or mouth. · Breathe deeply, filling up the area between your navel and your rib cage. Breathe so that your belly goes up and down. · Do not hold your breath. · Breathe like this for 5 to 10 minutes. Notice the feeling of calmness throughout your whole body. As you continue to breathe slowly and deeply, relax by doing the following for another 5 to 10 minutes:  · Tighten and relax each muscle group in your body. You can begin at your toes and work your way up to your head. · Imagine your muscle groups relaxing and becoming heavy. · Empty your mind of all thoughts. · Let yourself relax more and more deeply. · Become aware of the state of calmness that surrounds you. · When your relaxation time is over, you can bring yourself back to alertness by moving your fingers and toes and then your hands and feet and then stretching and moving your entire body. Sometimes people fall asleep during relaxation, but they usually wake up shortly afterward. · Always give yourself time to return to full alertness before you drive a car or do anything that might cause an accident if you are not fully alert. Never play a relaxation tape while you drive a car.   When should you call for help? Call 911 anytime you think you may need emergency care. For example, call if:  ? · You feel you cannot stop from hurting yourself or someone else. ? Keep the numbers for these national suicide hotlines: 6-420-024-TALK (5-198.516.1801) and 5-930-HGCLAZJ (8-787.650.4298). If you or someone you know talks about suicide or feeling hopeless, get help right away. ? Watch closely for changes in your health, and be sure to contact your doctor if:  ? · You have anxiety or fear that affects your life. ? · You have symptoms of anxiety that are new or different from those you had before. Where can you learn more? Go to http://iker-isaac.info/. Enter P754 in the search box to learn more about \"Anxiety Disorder: Care Instructions. \"  Current as of: May 12, 2017  Content Version: 11.4  © 9552-9848 [employer]. Care instructions adapted under license by [employer] (which disclaims liability or warranty for this information). If you have questions about a medical condition or this instruction, always ask your healthcare professional. [address] any warranty or liability for your use of this information.

## 2017-12-21 NOTE — ED TRIAGE NOTES
Pt presents with family with complaint of elevated bp readings despite taking her bp meds as prescribed. Pt brings a dairy of bp readings throughout the month. Pt states that she does not understand why her bp is high. Pt denies cp, and shortness of breath. Family at bedside. Call bell within reach.

## 2017-12-21 NOTE — TELEPHONE ENCOUNTER
Patient is calling because her blood pressure is high and she is wondering if her prescriptions may be interfering. Patient is visiting her PCP today, but would like to know what Karina Cornelius suggest. Patient can be reached at [phone]. Thanks !

## 2017-12-21 NOTE — ED PROVIDER NOTES
HPI Comments: The patient presents to the ED with concern about her blood pressure being elevated. She has known history of hypertension and is followed by Ami Naqvi. She notes increasing trouble with blood pressure over the last 2 years. She saw her PCP on 12/12 and was referred to the ED for HTN. She followed up with Dr. Candance Corning on 12/13. He stopped her Diovan and placed her on Clonidine 0.1 mg tid. She notes BP is still elevated, so she has been taking Clonidine 0.2 mg po tid. She does have a follow-up appointment with her PCP tomorrow. She also notes having some anxiety and states she feels she may benefit from anxiety medications. SBP was >200 at home. She notes mild headache. She denies any chest pain or shortness of breath. She has no other complaints. Patient is a 78 y.o. female presenting with hypertension. The history is provided by the spouse and the patient. Hypertension    Associated symptoms include headaches. Pertinent negatives include no chest pain, no shortness of breath, no nausea and no vomiting. Past Medical History:   Diagnosis Date    DDD (degenerative disc disease)     Gastroesophageal reflux disease without esophagitis 7/3/2016    GERD (gastroesophageal reflux disease)     Hiatal hernia     HTN (hypertension), benign 7/26/2016    Hypertension     ANÍBAL (obstructive sleep apnea) 7/26/2016    Paroxysmal atrial fibrillation ([address] 8/28/2016    Uterine prolapse        Past Surgical History:   Procedure Laterality Date    HX AFIB ABLATION  03/2017    HX GI      COLONOSCOPY 7/14    HX GYN      TUBAL LIGATION    HX HEENT      WISDOM TEETH EXTRACTED. Family History:   Problem Relation Age of Onset    Diabetes Mother     Hypertension Mother     COPD Mother        Social History     Social History    Marital status:      Spouse name: N/A    Number of children: N/A    Years of education: N/A     Occupational History    Not on file.      Social History Main Topics    Smoking status: Former Smoker     Packs/day: 1.50     Years: 30.00     Quit date: 3/3/1994    Smokeless tobacco: Never Used    Alcohol use 0.6 oz/week     1 Glasses of wine per week      Comment: occ    Drug use: No    Sexual activity: Not on file     Other Topics Concern    Not on file     Social History Narrative         ALLERGIES: Citalopram hydrobromide; Chlorthalidone; Maxzide [triamterene-hydrochlorothiazid]; and Vicodin [hydrocodone-acetaminophen]    Review of Systems   Constitutional: Negative for appetite change, chills and fever. HENT: Negative for congestion, nosebleeds and sore throat. Eyes: Negative for pain and discharge. Respiratory: Negative for cough and shortness of breath. Cardiovascular: Negative for chest pain. Gastrointestinal: Negative for abdominal pain, diarrhea, nausea and vomiting. Genitourinary: Negative for dysuria. Musculoskeletal: Negative. Skin: Negative for rash. Neurological: Positive for headaches. Negative for weakness. Hematological: Negative for adenopathy. Psychiatric/Behavioral: The patient is nervous/anxious. All other systems reviewed and are negative. Vitals:    12/21/17 0115 12/21/17 0130 12/21/17 0145 12/21/17 0200   BP: (!) 201/81 183/80 173/74 174/73   Pulse: 70 64 66 (!) 59   Resp: 16 22 17 14   Temp:       SpO2: 99% 95% 95% 96%   Weight:       Height:                Physical Exam   Constitutional: She is oriented to person, place, and time. She appears well-developed and well-nourished. HENT:   Head: Normocephalic and atraumatic. Mouth/Throat: Oropharynx is clear and moist.   Eyes: Conjunctivae and EOM are normal. Pupils are equal, round, and reactive to light. Neck: Normal range of motion. Neck supple. Cardiovascular: Normal rate, regular rhythm and normal heart sounds. Pulmonary/Chest: Effort normal and breath sounds normal.   Abdominal: Soft. Bowel sounds are normal. There is no tenderness. Musculoskeletal: Normal range of motion. She exhibits no edema or tenderness. Neurological: She is alert and oriented to person, place, and time. Skin: Skin is warm and dry. Psychiatric: She has a normal mood and affect. Her behavior is normal.   Nursing note and vitals reviewed. [facility]  ED Course       Procedures    ED EKG interpretation:  Rhythm: normal sinus rhythm; and regular . Rate (approx.): 71; Axis: normal; P wave: RBBB; QRS interval: normal ; ST/T wave: normal; No significant change from 12/12/17. This EKG was interpreted by Rajendra Lainez MD,ED Provider. A/P:  1. HTN - improved in ED s/p 0.5 mg Ativan. I suspect anxiety is contributing. BP, however, is still elevated. I advised her to follow-up with Dr. Akin Trujillo for further recommendations. 2. Anxiety - advised to discuss if she would benefit from an SSRI at her PCP appointment tomorrow. 3. HA - resolved. Patient's results have been reviewed with them. Patient and/or family have verbally conveyed their understanding and agreement of the patient's signs, symptoms, diagnosis, treatment and prognosis and additionally agree to follow up as recommended or return to the Emergency Room should their condition change prior to follow-up. Discharge instructions have also been provided to the patient with some educational information regarding their diagnosis as well a list of reasons why they would want to return to the ER prior to their follow-up appointment should their condition change.

## 2017-12-21 NOTE — ED NOTES
The patient was discharged home by Dr. Michelet Finnegan and Mitali Smalls rn in stable condition, accompanied by family. The patient is alert and oriented, is in no respiratory distress and has vital signs within normal limits . The patient's diagnosis, condition and treatment were explained to patient. The patient expressed understanding. No prescriptions given to pt. No work/school note given to pt. A discharge plan has been developed. A  was not involved in the process. Aftercare instructions were given to the patient. pt's saline lock removed without complications. Family will transport pt home.

## 2017-12-21 NOTE — TELEPHONE ENCOUNTER
Pt calling again to see if she should start taking \"deltasone\" or \"deltisin\" due to high blood pressure? She says she's taken this medication before but I don't see it on her med list. Can you please give her a call back @ [phone]. Thanks!   Doris Barnhart

## 2017-12-21 NOTE — ED NOTES
Bedside, Verbal and Written shift change report given to Jeny sher by Grady Day rn. Report included the following information SBAR, Kardex, ED Summary, [facility] and Recent Results.

## 2017-12-22 NOTE — TELEPHONE ENCOUNTER
Pt just missed Clementina's phone call and is returning it. Please give her a call back @ [phone]. Thanks!   Mikie Reddy

## 2017-12-22 NOTE — TELEPHONE ENCOUNTER
Marica Lesch, NP Kallie Maul, LPN        Caller: Unspecified (Yesterday, 11:32 AM)                     Known AF, as PCP states in progress note from 12/21/17, she is rate controlled on current BB regimen. Anticoagulated on Eliquis.  No additional medication adjustment or interventions are needed at this time. Please reiterate that she should not take medications that are not currently prescribed to her. Detailed message left.

## 2017-12-22 NOTE — TELEPHONE ENCOUNTER
Patient's BP this morning is 157/93, P84. Last night her /83, P70. She states she took diltiazem XR 120mg this morning. Confirmed that she took clonidine 0.1mg and coreg 6.25mg this morning. She saw Dr. Eduardo MILLER yesterday. Will obtain records and follow-up.

## 2017-12-27 NOTE — TELEPHONE ENCOUNTER
Patient states she is still taking diltiazem XR 120mg in the mornings due to elevated HR. /87. HR 71. Patient notified to take prescriptions only as prescribed. She will continue to monitor BP.

## 2017-12-29 ENCOUNTER — HOSPITAL ENCOUNTER (EMERGENCY)
Age: 77
Discharge: HOME | End: 2017-12-29
Attending: EMERGENCY MEDICINE | Admitting: EMERGENCY MEDICINE
Payer: MEDICARE

## 2017-12-29 VITALS
OXYGEN SATURATION: 97 % | HEART RATE: 85 BPM | DIASTOLIC BLOOD PRESSURE: 82 MMHG | RESPIRATION RATE: 22 BRPM | HEIGHT: 63 IN | SYSTOLIC BLOOD PRESSURE: 125 MMHG | BODY MASS INDEX: 34.26 KG/M2 | TEMPERATURE: 97.7 F | WEIGHT: 193.34 LBS

## 2017-12-29 DIAGNOSIS — F41.9 ANXIETY: ICD-10-CM

## 2017-12-29 DIAGNOSIS — I10 HYPERTENSION, UNSPECIFIED TYPE: ICD-10-CM

## 2017-12-29 DIAGNOSIS — I48.0 PAROXYSMAL ATRIAL FIBRILLATION (HCC): Primary | ICD-10-CM

## 2017-12-29 LAB
ALBUMIN SERPL-MCNC: 3.9 G/DL (ref 3.5–5)
ALBUMIN/GLOB SERPL: 1.1 {RATIO} (ref 1.1–2.2)
ALP SERPL-CCNC: 94 U/L (ref 45–117)
ALT SERPL-CCNC: 34 U/L (ref 12–78)
ANION GAP SERPL CALC-SCNC: 9 MMOL/L (ref 5–15)
APPEARANCE UR: CLEAR
AST SERPL-CCNC: 22 U/L (ref 15–37)
BACTERIA URNS QL MICRO: NEGATIVE /HPF
BASOPHILS # BLD: 0 K/UL (ref 0–0.1)
BASOPHILS NFR BLD: 0 % (ref 0–1)
BILIRUB SERPL-MCNC: 0.3 MG/DL (ref 0.2–1)
BILIRUB UR QL: NEGATIVE
BUN SERPL-MCNC: 15 MG/DL (ref 6–20)
BUN/CREAT SERPL: 13 (ref 12–20)
CALCIUM SERPL-MCNC: 9 MG/DL (ref 8.5–10.1)
CHLORIDE SERPL-SCNC: 106 MMOL/L (ref 97–108)
CO2 SERPL-SCNC: 29 MMOL/L (ref 21–32)
COLOR UR: ABNORMAL
CREAT SERPL-MCNC: 1.14 MG/DL (ref 0.55–1.02)
DIFFERENTIAL METHOD BLD: NORMAL
EOSINOPHIL # BLD: 0.2 K/UL (ref 0–0.4)
EOSINOPHIL NFR BLD: 3 % (ref 0–7)
EPITH CASTS URNS QL MICRO: ABNORMAL /LPF
ERYTHROCYTE [DISTWIDTH] IN BLOOD BY AUTOMATED COUNT: 13.1 % (ref 11.5–14.5)
GLOBULIN SER CALC-MCNC: 3.5 G/DL (ref 2–4)
GLUCOSE SERPL-MCNC: 140 MG/DL (ref 65–100)
GLUCOSE UR STRIP.AUTO-MCNC: NEGATIVE MG/DL
HCT VFR BLD AUTO: 44.2 % (ref 35–47)
HGB BLD-MCNC: 14.1 G/DL (ref 11.5–16)
HGB UR QL STRIP: ABNORMAL
KETONES UR QL STRIP.AUTO: NEGATIVE MG/DL
LEUKOCYTE ESTERASE UR QL STRIP.AUTO: NEGATIVE
LYMPHOCYTES # BLD: 1.9 K/UL (ref 0.8–3.5)
LYMPHOCYTES NFR BLD: 29 % (ref 12–49)
MAGNESIUM SERPL-MCNC: 1.9 MG/DL (ref 1.6–2.4)
MCH RBC QN AUTO: 30.5 PG (ref 26–34)
MCHC RBC AUTO-ENTMCNC: 31.9 G/DL (ref 30–36.5)
MCV RBC AUTO: 95.5 FL (ref 80–99)
MONOCYTES # BLD: 0.7 K/UL (ref 0–1)
MONOCYTES NFR BLD: 10 % (ref 5–13)
MUCOUS THREADS URNS QL MICRO: ABNORMAL /LPF
NEUTS SEG # BLD: 3.9 K/UL (ref 1.8–8)
NEUTS SEG NFR BLD: 58 % (ref 32–75)
NITRITE UR QL STRIP.AUTO: NEGATIVE
PH UR STRIP: 6 [PH] (ref 5–8)
PLATELET # BLD AUTO: 186 K/UL (ref 150–400)
POTASSIUM SERPL-SCNC: 4.1 MMOL/L (ref 3.5–5.1)
PROT SERPL-MCNC: 7.4 G/DL (ref 6.4–8.2)
PROT UR STRIP-MCNC: NEGATIVE MG/DL
RBC # BLD AUTO: 4.63 M/UL (ref 3.8–5.2)
RBC #/AREA URNS HPF: ABNORMAL /HPF (ref 0–5)
SODIUM SERPL-SCNC: 144 MMOL/L (ref 136–145)
SP GR UR REFRACTOMETRY: 1.01 (ref 1–1.03)
TROPONIN I BLD-MCNC: <0.04 NG/ML (ref 0–0.08)
UR CULT HOLD, URHOLD: NORMAL
UROBILINOGEN UR QL STRIP.AUTO: 0.2 EU/DL (ref 0.2–1)
WBC # BLD AUTO: 6.7 K/UL (ref 3.6–11)
WBC URNS QL MICRO: ABNORMAL /HPF (ref 0–4)

## 2017-12-29 PROCEDURE — 99285 EMERGENCY DEPT VISIT HI MDM: CPT

## 2017-12-29 PROCEDURE — 74011000250 HC RX REV CODE- 250: Performed by: EMERGENCY MEDICINE

## 2017-12-29 PROCEDURE — 83735 ASSAY OF MAGNESIUM: CPT | Performed by: EMERGENCY MEDICINE

## 2017-12-29 PROCEDURE — 96374 THER/PROPH/DIAG INJ IV PUSH: CPT

## 2017-12-29 PROCEDURE — 74011250637 HC RX REV CODE- 250/637: Performed by: EMERGENCY MEDICINE

## 2017-12-29 PROCEDURE — 84484 ASSAY OF TROPONIN QUANT: CPT

## 2017-12-29 PROCEDURE — 85025 COMPLETE CBC W/AUTO DIFF WBC: CPT | Performed by: EMERGENCY MEDICINE

## 2017-12-29 PROCEDURE — 36415 COLL VENOUS BLD VENIPUNCTURE: CPT | Performed by: EMERGENCY MEDICINE

## 2017-12-29 PROCEDURE — 93005 ELECTROCARDIOGRAM TRACING: CPT

## 2017-12-29 PROCEDURE — 96361 HYDRATE IV INFUSION ADD-ON: CPT

## 2017-12-29 PROCEDURE — 80053 COMPREHEN METABOLIC PANEL: CPT | Performed by: EMERGENCY MEDICINE

## 2017-12-29 PROCEDURE — 74011250636 HC RX REV CODE- 250/636: Performed by: EMERGENCY MEDICINE

## 2017-12-29 PROCEDURE — 81001 URINALYSIS AUTO W/SCOPE: CPT | Performed by: EMERGENCY MEDICINE

## 2017-12-29 RX ORDER — ALPRAZOLAM 0.5 MG/1
0.25 TABLET ORAL
Status: COMPLETED | OUTPATIENT
Start: 2017-12-29 | End: 2017-12-29

## 2017-12-29 RX ORDER — DILTIAZEM HYDROCHLORIDE 5 MG/ML
20 INJECTION INTRAVENOUS ONCE
Status: COMPLETED | OUTPATIENT
Start: 2017-12-29 | End: 2017-12-29

## 2017-12-29 RX ADMIN — DILTIAZEM HYDROCHLORIDE 20 MG: 5 INJECTION INTRAVENOUS at 01:47

## 2017-12-29 RX ADMIN — ALPRAZOLAM 0.25 MG: 0.5 TABLET ORAL at 02:10

## 2017-12-29 RX ADMIN — SODIUM CHLORIDE 500 ML: 900 INJECTION, SOLUTION INTRAVENOUS at 01:48

## 2017-12-29 NOTE — ED PROVIDER NOTES
HPI Comments: Shavonne Byers is a 77 yo F with history of paroxysmal atrial fibrillation and hypertension who presents to the ED with increased anxiety, tachycardia and atrial fibrillation. She states that she has been having problems lately with her blood pressure and anxiety and atrial fibrillation that comes and goes. She was seen here on the 12th and 20th with similar symptoms. At the second visit it was recommended that she follow-up with PCP to be prescribed medication for anxiety. She now has a prescription for 0.25mg alprazolam but did not take any tonight when she started to feel anxious because she was not sure that it might interact with the clonidine that she took tonight. Patient states that she cannot tell when she is in a -fib but she states that she was last week when she saw her PCP and they had told her to follow-up with Dr. Patsie Boeck. She denies chest pain or shortness of breath or headache. Past Medical History:   Diagnosis Date    DDD (degenerative disc disease)     Gastroesophageal reflux disease without esophagitis 7/3/2016    GERD (gastroesophageal reflux disease)     Hiatal hernia     HTN (hypertension), benign 7/26/2016    Hypertension     ANÍBAL (obstructive sleep apnea) 7/26/2016    Paroxysmal atrial fibrillation ([facility] [address] 8/28/2016    Uterine prolapse        Past Surgical History:   Procedure Laterality Date    HX AFIB ABLATION  03/2017    HX GI      COLONOSCOPY 7/14    HX GYN      TUBAL LIGATION    HX HEENT      WISDOM TEETH EXTRACTED. Family History:   Problem Relation Age of Onset    Diabetes Mother     Hypertension Mother     COPD Mother        Social History     Social History    Marital status:      Spouse name: N/A    Number of children: N/A    Years of education: N/A     Occupational History    Not on file.      Social History Main Topics    Smoking status: Former Smoker     Packs/day: 1.50     Years: 30.00     Quit date: 3/3/1994   [facility] Smokeless tobacco: Never Used    Alcohol use 0.6 oz/week     1 Glasses of wine per week      Comment: occ    Drug use: No    Sexual activity: Not on file     Other Topics Concern    Not on file     Social History Narrative         ALLERGIES: Citalopram hydrobromide; Chlorthalidone; Maxzide [triamterene-hydrochlorothiazid]; and Vicodin [hydrocodone-acetaminophen]    Review of Systems   Constitutional: Negative for fever. HENT: Negative for sore throat. Eyes: Negative for visual disturbance. Respiratory: Negative for cough. Cardiovascular: Positive for palpitations. Negative for chest pain. Gastrointestinal: Negative for abdominal pain. Genitourinary: Negative for dysuria. Musculoskeletal: Negative for back pain. Skin: Negative for rash. Neurological: Negative for headaches. Psychiatric/Behavioral: The patient is nervous/anxious. Vitals:    12/29/17 0215 12/29/17 0230 12/29/17 0245 12/29/17 0300   BP: (!) 162/95 161/81 146/83 125/82   Pulse: 74 80 82 85   Resp: 23 25 21 22   Temp:       SpO2: 97% 96% 95% 97%   Weight:       Height:                Physical Exam   Constitutional: She appears well-developed and well-nourished. No distress. HENT:   Head: Normocephalic and atraumatic. Mouth/Throat: Oropharynx is clear and moist.   Eyes: Conjunctivae and EOM are normal.   Neck: Normal range of motion and phonation normal.   Cardiovascular: Intact distal pulses. An irregularly irregular rhythm present. Tachycardia present. Pulmonary/Chest: Effort normal. No respiratory distress. She has no wheezes. She has no rales. Abdominal: She exhibits no distension. Musculoskeletal: Normal range of motion. She exhibits no tenderness. Neurological: She is alert. She is not disoriented. She exhibits normal muscle tone. Skin: Skin is warm and dry. Nursing note and vitals reviewed. MDM  ED Course     A-fib with RVR - HR controled after 20mg diltiazem.   Electrolytes normal.  Uncertain how long patient has been in atrial fibrillation. Is on Eliquis. Hypertension - improved after diltiazem and alprazolam.    Anxiety - improved after giving patient 0.25mg alprazolam, which she was prescribed previously by PCP but did not take.   Advised patient to take medications as prescribed and follow-up with PCP    Procedures

## 2017-12-29 NOTE — ED NOTES
Patient discharged by MD. RN reviewed discharge instructions with patient. Patient and spouse able to verbalize understanding. VSS. No prescriptions given, no work note given. RN offered patient assistance with getting dressed. Patient declined. RN offered patient wheelchair, patient declined. Pt in stable condition and pain free at time of discharge. Patient's  will be driving her home.

## 2017-12-29 NOTE — DISCHARGE INSTRUCTIONS
Atrial Fibrillation: Care Instructions  Your Care Instructions    Atrial fibrillation is an irregular and often fast heartbeat. Treating this condition is important for several reasons. It can cause blood clots, which can travel from your heart to your brain and cause a stroke. If you have a fast heartbeat, you may feel lightheaded, dizzy, and weak. An irregular heartbeat can also increase your risk for heart failure. Atrial fibrillation is often the result of another heart condition, such as high blood pressure or coronary artery disease. Making changes to improve your heart condition will help you stay healthy and active. Follow-up care is a key part of your treatment and safety. Be sure to make and go to all appointments, and call your doctor if you are having problems. It's also a good idea to know your test results and keep a list of the medicines you take. How can you care for yourself at home? Medicines  ? · Take your medicines exactly as prescribed. Call your doctor if you think you are having a problem with your medicine. You will get more details on the specific medicines your doctor prescribes. ? · If your doctor has given you a blood thinner to prevent a stroke, be sure you get instructions about how to take your medicine safely. Blood thinners can cause serious bleeding problems. ? · Do not take any vitamins, over-the-counter drugs, or herbal products without talking to your doctor first.   ? Lifestyle changes  ? · Do not smoke. Smoking can increase your chance of a stroke and heart attack. If you need help quitting, talk to your doctor about stop-smoking programs and medicines. These can increase your chances of quitting for good. ? · Eat a heart-healthy diet. ? · Stay at a healthy weight. Lose weight if you need to.   ? · Limit alcohol to 2 drinks a day for men and 1 drink a day for women. Too much alcohol can cause health problems. ? · Avoid colds and flu.  Get a pneumococcal vaccine shot. If you have had one before, ask your doctor whether you need another dose. Get a flu shot every year. If you must be around people with colds or flu, wash your hands often. Activity  ? · If your doctor recommends it, get more exercise. Walking is a good choice. Bit by bit, increase the amount you walk every day. Try for at least 30 minutes on most days of the week. You also may want to swim, bike, or do other activities. Your doctor may suggest that you join a cardiac rehabilitation program so that you can have help increasing your physical activity safely. ? · Start light exercise if your doctor says it is okay. Even a small amount will help you get stronger, have more energy, and manage stress. Walking is an easy way to get exercise. Start out by walking a little more than you did in the hospital. Gradually increase the amount you walk. ? · When you exercise, watch for signs that your heart is working too hard. You are pushing too hard if you cannot talk while you are exercising. If you become short of breath or dizzy or have chest pain, sit down and rest immediately. ? · Check your pulse regularly. Place two fingers on the artery at the palm side of your wrist, in line with your thumb. If your heartbeat seems uneven or fast, talk to your doctor. When should you call for help? Call 911 anytime you think you may need emergency care. For example, call if:  ? · You have symptoms of a heart attack. These may include:  ¨ Chest pain or pressure, or a strange feeling in the chest.  ¨ Sweating. ¨ Shortness of breath. ¨ Nausea or vomiting. ¨ Pain, pressure, or a strange feeling in the back, neck, jaw, or upper belly or in one or both shoulders or arms. ¨ Lightheadedness or sudden weakness. ¨ A fast or irregular heartbeat. After you call 911, the  may tell you to chew 1 adult-strength or 2 to 4 low-dose aspirin. Wait for an ambulance. Do not try to drive yourself.    ? · You have symptoms of a stroke. These may include:  ¨ Sudden numbness, tingling, weakness, or loss of movement in your face, arm, or leg, especially on only one side of your body. ¨ Sudden vision changes. ¨ Sudden trouble speaking. ¨ Sudden confusion or trouble understanding simple statements. ¨ Sudden problems with walking or balance. ¨ A sudden, severe headache that is different from past headaches. ? · You passed out (lost consciousness). ?Call your doctor now or seek immediate medical care if:  ? · You have new or increased shortness of breath. ? · You feel dizzy or lightheaded, or you feel like you may faint. ? · Your heart rate becomes irregular. ? · You can feel your heart flutter in your chest or skip heartbeats. Tell your doctor if these symptoms are new or worse. ? Watch closely for changes in your health, and be sure to contact your doctor if you have any problems. Where can you learn more? Go to http://iker-isaac.info/. Enter U020 in the search box to learn more about \"Atrial Fibrillation: Care Instructions. \"  Current as of: September 21, 2016  Content Version: 11.4  © 5112-3971 [email or web address]. Care instructions adapted under license by [employer] (which disclaims liability or warranty for this information). If you have questions about a medical condition or this instruction, always ask your healthcare professional. [address] any warranty or liability for your use of this information. High Blood Pressure: Care Instructions  Your Care Instructions    If your blood pressure is usually above 140/90, you have high blood pressure, or hypertension. That means the top number is 140 or higher or the bottom number is 90 or higher, or both. Despite what a lot of people think, high blood pressure usually doesn't cause headaches or make you feel dizzy or lightheaded. It usually has no symptoms.  But it does increase your risk for heart attack, stroke, and kidney or eye damage. The higher your blood pressure, the more your risk increases. Your doctor will give you a goal for your blood pressure. Your goal will be based on your health and your age. An example of a goal is to keep your blood pressure below 140/90. Lifestyle changes, such as eating healthy and being active, are always important to help lower blood pressure. You might also take medicine to reach your blood pressure goal.  Follow-up care is a key part of your treatment and safety. Be sure to make and go to all appointments, and call your doctor if you are having problems. It's also a good idea to know your test results and keep a list of the medicines you take. How can you care for yourself at home? Medical treatment  · If you stop taking your medicine, your blood pressure will go back up. You may take one or more types of medicine to lower your blood pressure. Be safe with medicines. Take your medicine exactly as prescribed. Call your doctor if you think you are having a problem with your medicine. · Talk to your doctor before you start taking aspirin every day. Aspirin can help certain people lower their risk of a heart attack or stroke. But taking aspirin isn't right for everyone, because it can cause serious bleeding. · See your doctor regularly. You may need to see the doctor more often at first or until your blood pressure comes down. · If you are taking blood pressure medicine, talk to your doctor before you take decongestants or anti-inflammatory medicine, such as ibuprofen. Some of these medicines can raise blood pressure. · Learn how to check your blood pressure at home. Lifestyle changes  · Stay at a healthy weight. This is especially important if you put on weight around the waist. Losing even 10 pounds can help you lower your blood pressure. · If your doctor recommends it, get more exercise. Walking is a good choice. Bit by bit, increase the amount you walk every day.  Try for at least 30 minutes on most days of the week. You also may want to swim, bike, or do other activities. · Avoid or limit alcohol. Talk to your doctor about whether you can drink any alcohol. · Try to limit how much sodium you eat to less than 2,300 milligrams (mg) a day. Your doctor may ask you to try to eat less than 1,500 mg a day. · Eat plenty of fruits (such as bananas and oranges), vegetables, legumes, whole grains, and low-fat dairy products. · Lower the amount of saturated fat in your diet. Saturated fat is found in animal products such as milk, cheese, and meat. Limiting these foods may help you lose weight and also lower your risk for heart disease. · Do not smoke. Smoking increases your risk for heart attack and stroke. If you need help quitting, talk to your doctor about stop-smoking programs and medicines. These can increase your chances of quitting for good. When should you call for help? Call 911 anytime you think you may need emergency care. This may mean having symptoms that suggest that your blood pressure is causing a serious heart or blood vessel problem. Your blood pressure may be over 180/110. ? For example, call 911 if:  ? · You have symptoms of a heart attack. These may include:  ¨ Chest pain or pressure, or a strange feeling in the chest.  ¨ Sweating. ¨ Shortness of breath. ¨ Nausea or vomiting. ¨ Pain, pressure, or a strange feeling in the back, neck, jaw, or upper belly or in one or both shoulders or arms. ¨ Lightheadedness or sudden weakness. ¨ A fast or irregular heartbeat. ? · You have symptoms of a stroke. These may include:  ¨ Sudden numbness, tingling, weakness, or loss of movement in your face, arm, or leg, especially on only one side of your body. ¨ Sudden vision changes. ¨ Sudden trouble speaking. ¨ Sudden confusion or trouble understanding simple statements. ¨ Sudden problems with walking or balance.   ¨ A sudden, severe headache that is different from past headaches. ? · You have severe back or belly pain. ?Do not wait until your blood pressure comes down on its own. Get help right away. ?Call your doctor now or seek immediate care if:  ? · Your blood pressure is much higher than normal (such as 180/110 or higher), but you don't have symptoms. ? · You think high blood pressure is causing symptoms, such as:  ¨ Severe headache. ¨ Blurry vision. ? Watch closely for changes in your health, and be sure to contact your doctor if:  ? · Your blood pressure measures 140/90 or higher at least 2 times. That means the top number is 140 or higher or the bottom number is 90 or higher, or both. ? · You think you may be having side effects from your blood pressure medicine. ? · Your blood pressure is usually normal, but it goes above normal at least 2 times. Where can you learn more? Go to http://iker-isaac.info/. Enter F253 in the search box to learn more about \"High Blood Pressure: Care Instructions. \"  Current as of: September 21, 2016  Content Version: 11.4  © 4776-6962 [employer]. Care instructions adapted under license by [employer] (which disclaims liability or warranty for this information). If you have questions about a medical condition or this instruction, always ask your healthcare professional. [place] [address] any warranty or liability for your use of this information.

## 2017-12-29 NOTE — ED NOTES
Pt sitting on stretcher in no distress. VSS. Pt assisted by RN with repositioning stretcher. Patient denies pain. Patient denies needs at this time.  at bedside. Call light within reach.  Will continue to monitor

## 2017-12-29 NOTE — ED NOTES
Patient lying on stretcher at this time. Pt awake and alert. Denies pain. Pt given warm blanket for comfort. Lights dimmed.  at bedside. Pt denies additional needs at this time.

## 2017-12-30 LAB
ATRIAL RATE: 84 BPM
CALCULATED R AXIS, ECG10: 85 DEGREES
CALCULATED T AXIS, ECG11: -2 DEGREES
DIAGNOSIS, 93000: NORMAL
Q-T INTERVAL, ECG07: 340 MS
QRS DURATION, ECG06: 124 MS
QTC CALCULATION (BEZET), ECG08: 438 MS
VENTRICULAR RATE, ECG03: 100 BPM

## 2018-01-03 ENCOUNTER — OFFICE VISIT (OUTPATIENT)
Dept: CARDIOLOGY CLINIC | Age: 78
End: 2018-01-03

## 2018-01-03 ENCOUNTER — TELEPHONE (OUTPATIENT)
Dept: CARDIOLOGY CLINIC | Age: 78
End: 2018-01-03

## 2018-01-03 VITALS
HEIGHT: 63 IN | HEART RATE: 88 BPM | RESPIRATION RATE: 20 BRPM | WEIGHT: 193.2 LBS | DIASTOLIC BLOOD PRESSURE: 74 MMHG | SYSTOLIC BLOOD PRESSURE: 132 MMHG | OXYGEN SATURATION: 98 % | BODY MASS INDEX: 34.23 KG/M2

## 2018-01-03 DIAGNOSIS — F41.9 ANXIETY: ICD-10-CM

## 2018-01-03 DIAGNOSIS — Z86.79 S/P ABLATION OF ATRIAL FIBRILLATION: ICD-10-CM

## 2018-01-03 DIAGNOSIS — I48.0 PAROXYSMAL ATRIAL FIBRILLATION (HCC): Primary | ICD-10-CM

## 2018-01-03 DIAGNOSIS — I10 HTN (HYPERTENSION), BENIGN: ICD-10-CM

## 2018-01-03 DIAGNOSIS — Z98.890 S/P ABLATION OF ATRIAL FIBRILLATION: ICD-10-CM

## 2018-01-03 DIAGNOSIS — I47.1 SVT (SUPRAVENTRICULAR TACHYCARDIA) (HCC): ICD-10-CM

## 2018-01-03 RX ORDER — DILTIAZEM HYDROCHLORIDE 180 MG/1
180 CAPSULE, COATED, EXTENDED RELEASE ORAL DAILY
Qty: 30 CAP | Refills: 3 | Status: SHIPPED | OUTPATIENT
Start: 2018-01-03 | End: 2018-03-05

## 2018-01-03 NOTE — PROGRESS NOTES
HISTORY OF PRESENTING ILLNESS      Natalie Peng is a 78 y.o. female with ANÍBAL, HTN, RBBB and persistent AF s/p AF ablation 3/2/2017. She had previously been on Propafenone and underwent several cardioversions resulting in symptomatic improvement with NSR. Echo demonstrated preserved LV function with LA diameter of 3.4cm in May 2015.  She reported shortness of breath post ablation and her amiodarone was discontinued. Ishaan Never has had some bilateral lower extremity edema for which she was started on Lasix as well. EKG demonstrates sinus bradycardia with RBBB. Last visit, she reported fatigue and wished to discontinue diltiazem. Recently, she has had uncontrolled hypertension and ultimately experience recurrence of atrial fibrillation requiring presentation to the emergency room. She was given Cardizem IV for this and is now here for follow up.  She has restarted       ACTIVE PROBLEM LIST     Patient Active Problem List    Diagnosis Date Noted    SVT (supraventricular tachycardia) ([facility] 08/28/2017    Hiatal hernia 08/28/2017    Venous insufficiency 05/04/2017    Acquired hypothyroidism 05/01/2017    S/P ablation of atrial fibrillation 04/28/2017    [facility]ox[facility] ([facility] 08/28/2016    HTN (hypertension), benign 07/26/2016    Gastroesophageal reflux disease without esophagitis 07/03/2016    RBBB 05/07/2015    LAE (left atrial enlargement) 04/03/2013           PAST MEDICAL HISTORY     Past Medical History:   Diagnosis Date    DDD (degenerative disc disease)     Gastroesophageal reflux disease without esophagitis 7/3/2016    GERD (gastroesophageal reflux disease)     Hiatal hernia     HTN (hypertension), benign 7/26/2016    Hypertension     ANÍBAL (obstructive sleep apnea) 7/26/2016    Paroxysmal atrial fibrillation ([facility] 8/28/2016    Uterine prolapse            PAST SURGICAL HISTORY     Past Surgical History:   Procedure Laterality Date    HX AFIB ABLATION  03/2017    HX GI COLONOSCOPY 7/14    HX GYN      TUBAL LIGATION    HX HEENT      WISDOM TEETH EXTRACTED. ALLERGIES     Allergies   Allergen Reactions    Citalopram Hydrobromide Rash     With itching.  Chlorthalidone Rash    Maxzide [Triamterene-Hydrochlorothiazid] Palpitations    Vicodin [Hydrocodone-Acetaminophen] Palpitations          FAMILY HISTORY     Family History   Problem Relation Age of Onset    Diabetes Mother     Hypertension Mother    [facility] COPD Mother     negative for cardiac disease       SOCIAL HISTORY     Social History     Social History    Marital status:      Spouse name: N/A    Number of children: N/A    Years of education: N/A     Social History Main Topics    Smoking status: Former Smoker     Packs/day: 1.50     Years: 30.00     Quit date: 3/3/1994    Smokeless tobacco: Never Used    Alcohol use 0.6 oz/week     1 Glasses of wine per week      Comment: occ    Drug use: No    Sexual activity: Not on file     Other Topics Concern    Not on file     Social History Narrative         MEDICATIONS     Current Outpatient Prescriptions   Medication Sig    DILTIAZEM HCL PO Take 120 mg by mouth.  cloNIDine HCl (CATAPRES) 0.1 mg tablet Take 1 Tab by mouth three (3) times daily (with meals).  carvedilol (COREG) 6.25 mg tablet take 1 tablet by mouth twice a day with food    levothyroxine (SYNTHROID) 50 mcg tablet take 1 tablet by mouth once daily    furosemide (LASIX) 20 mg tablet take 1 tablet by mouth PRN    lansoprazole (PREVACID) 30 mg capsule Take 30 mg by mouth nightly.  ELIQUIS 5 mg tablet take 1 tablet by mouth twice a day    FOLIC ACID/MULTIVIT-MIN/LUTEIN (CENTRUM SILVER PO) Take  by mouth daily.  CALCIUM CARBONATE/VITAMIN D3 (CALTRATE 600 + D PO) Take  by mouth daily.  ezetimibe (ZETIA) 10 mg tablet Take 10 mg by mouth every evening. No current facility-administered medications for this visit.         I have reviewed the nurses notes, vitals, problem list, allergy list, medical history, family, social history and medications. REVIEW OF SYMPTOMS      General: Pt denies excessive weight gain or loss. Pt is able to conduct ADL's  HEENT: Denies blurred vision, headaches, hearing loss, epistaxis and difficulty swallowing. Respiratory: Denies cough, congestion, shortness of breath, GOODE, wheezing or stridor. Cardiovascular: Denies precordial pain, palpitations, edema or PND  Gastrointestinal: Denies poor appetite, indigestion, abdominal pain or blood in stool  Genitourinary: Denies hematuria, dysuria, increased urinary frequency  Musculoskeletal: Denies joint pain or swelling from muscles or joints  Neurologic: Denies tremor, paresthesias, headache, or sensory motor disturbance  Psychiatric: Denies confusion, insomnia, depression  Integumentray: Denies rash, itching or ulcers. Hematologic: Denies easy bruising, bleeding     PHYSICAL EXAMINATION      There were no vitals filed for this visit. General: Well developed, in no acute distress. HEENT: No jaundice, oral mucosa moist, no oral ulcers  Neck: Supple, no stiffness, no lymphadenopathy, supple  Heart:  Normal S1/S2 negative S3 or S4. Regular, no murmur, gallop or rub, no jugular venous distention  Respiratory: Clear bilaterally x 4, no wheezing or rales  Abdomen:   Soft, non-tender, bowel sounds are active.   Extremities:  No edema, normal cap refill, no cyanosis. Musculoskeletal: No clubbing, no deformities  Neuro: A&Ox3, speech clear, gait stable, cooperative, no focal neurologic deficits  Skin: Skin color is normal. No rashes or lesions.  Non diaphoretic, moist.  Vascular: 2+ pulses symmetric in all extremities       DIAGNOSTIC DATA      EKG: Atrial fibrillation        LABORATORY DATA      Lab Results   Component Value Date/Time    WBC 6.7 12/29/2017 01:43 AM    HGB 14.1 12/29/2017 01:43 AM    HCT 44.2 12/29/2017 01:43 AM    PLATELET 755 41/18/8272 01:43 AM    MCV 95.5 12/29/2017 01:43 AM      Lab Results Component Value Date/Time    Sodium 144 12/29/2017 01:43 AM    Potassium 4.1 12/29/2017 01:43 AM    Chloride 106 12/29/2017 01:43 AM    CO2 29 12/29/2017 01:43 AM    Anion gap 9 12/29/2017 01:43 AM    Glucose 140 12/29/2017 01:43 AM    BUN 15 12/29/2017 01:43 AM    Creatinine 1.14 12/29/2017 01:43 AM    BUN/Creatinine ratio 13 12/29/2017 01:43 AM    GFR est AA 56 12/29/2017 01:43 AM    GFR est non-AA 46 12/29/2017 01:43 AM    Calcium 9.0 12/29/2017 01:43 AM    Bilirubin, total 0.3 12/29/2017 01:43 AM    AST (SGOT) 22 12/29/2017 01:43 AM    Alk. phosphatase 94 12/29/2017 01:43 AM    Protein, total 7.4 12/29/2017 01:43 AM    Albumin 3.9 12/29/2017 01:43 AM    Globulin 3.5 12/29/2017 01:43 AM    A-G Ratio 1.1 12/29/2017 01:43 AM    ALT (SGPT) 34 12/29/2017 01:43 AM           ASSESSMENT      1. Atrial fibrillation                         A. Persistent                        W. Symptomatic                        C.  AF ablation on 3/2/2017 (unable to achieve LSPV block)  2. Hiatal hernia  3. Hypertension   4. AÍNBAL  5. RBBB  6. GERD        PLAN     After discussion re: treatment options, she would prefer to trial increased dose of Cardizem (she was previously on 180mg daily prior to discontinuation). Will consider repeat ablation should increased medical therapy fail to improve symptoms. FOLLOW-UP     1 month    Thank you,  Rayna Brooke MD and Dr. Matias Miller for involving me in the care of this extraordinarily pleasant female. Please do not hesitate to contact me for further questions/concerns.      Nehal Priyank, NP    [facility]  [address] [facility], [facility], [address], [address]    [facility]  [phone] / [phone] Fax   [phone] / [phone] Fax

## 2018-01-03 NOTE — PATIENT INSTRUCTIONS
Increase diltiazem to 180mg daily. Follow up in 1 month. Contact our office with any concerns. Atrial Fibrillation: Care Instructions  Your Care Instructions    Atrial fibrillation is an irregular and often fast heartbeat. Treating this condition is important for several reasons. It can cause blood clots, which can travel from your heart to your brain and cause a stroke. If you have a fast heartbeat, you may feel lightheaded, dizzy, and weak. An irregular heartbeat can also increase your risk for heart failure. Atrial fibrillation is often the result of another heart condition, such as high blood pressure or coronary artery disease. Making changes to improve your heart condition will help you stay healthy and active. Follow-up care is a key part of your treatment and safety. Be sure to make and go to all appointments, and call your doctor if you are having problems. It's also a good idea to know your test results and keep a list of the medicines you take. How can you care for yourself at home? Medicines  ? · Take your medicines exactly as prescribed. Call your doctor if you think you are having a problem with your medicine. You will get more details on the specific medicines your doctor prescribes. ? · If your doctor has given you a blood thinner to prevent a stroke, be sure you get instructions about how to take your medicine safely. Blood thinners can cause serious bleeding problems. ? · Do not take any vitamins, over-the-counter drugs, or herbal products without talking to your doctor first.   ? Lifestyle changes  ? · Do not smoke. Smoking can increase your chance of a stroke and heart attack. If you need help quitting, talk to your doctor about stop-smoking programs and medicines. These can increase your chances of quitting for good. ? · Eat a heart-healthy diet. ? · Stay at a healthy weight.  Lose weight if you need to.   ? · Limit alcohol to 2 drinks a day for men and 1 drink a day for women. Too much alcohol can cause health problems. ? · Avoid colds and flu. Get a pneumococcal vaccine shot. If you have had one before, ask your doctor whether you need another dose. Get a flu shot every year. If you must be around people with colds or flu, wash your hands often. Activity  ? · If your doctor recommends it, get more exercise. Walking is a good choice. Bit by bit, increase the amount you walk every day. Try for at least 30 minutes on most days of the week. You also may want to swim, bike, or do other activities. Your doctor may suggest that you join a cardiac rehabilitation program so that you can have help increasing your physical activity safely. ? · Start light exercise if your doctor says it is okay. Even a small amount will help you get stronger, have more energy, and manage stress. Walking is an easy way to get exercise. Start out by walking a little more than you did in the hospital. Gradually increase the amount you walk. ? · When you exercise, watch for signs that your heart is working too hard. You are pushing too hard if you cannot talk while you are exercising. If you become short of breath or dizzy or have chest pain, sit down and rest immediately. ? · Check your pulse regularly. Place two fingers on the artery at the palm side of your wrist, in line with your thumb. If your heartbeat seems uneven or fast, talk to your doctor. When should you call for help? Call 911 anytime you think you may need emergency care. For example, call if:  ? · You have symptoms of a heart attack. These may include:  ¨ Chest pain or pressure, or a strange feeling in the chest.  ¨ Sweating. ¨ Shortness of breath. ¨ Nausea or vomiting. ¨ Pain, pressure, or a strange feeling in the back, neck, jaw, or upper belly or in one or both shoulders or arms. ¨ Lightheadedness or sudden weakness. ¨ A fast or irregular heartbeat.   After you call 911, the  may tell you to chew 1 adult-strength or 2 to 4 low-dose aspirin. Wait for an ambulance. Do not try to drive yourself. ? · You have symptoms of a stroke. These may include:  ¨ Sudden numbness, tingling, weakness, or loss of movement in your face, arm, or leg, especially on only one side of your body. ¨ Sudden vision changes. ¨ Sudden trouble speaking. ¨ Sudden confusion or trouble understanding simple statements. ¨ Sudden problems with walking or balance. ¨ A sudden, severe headache that is different from past headaches. ? · You passed out (lost consciousness). ?Call your doctor now or seek immediate medical care if:  ? · You have new or increased shortness of breath. ? · You feel dizzy or lightheaded, or you feel like you may faint. ? · Your heart rate becomes irregular. ? · You can feel your heart flutter in your chest or skip heartbeats. Tell your doctor if these symptoms are new or worse. ? Watch closely for changes in your health, and be sure to contact your doctor if you have any problems. Where can you learn more? Go to http://iekr-isaac.info/. Enter U020 in the search box to learn more about \"Atrial Fibrillation: Care Instructions. \"  Current as of: September 21, 2016  Content Version: 11.4  © 8295-6830 [employer]. Care instructions adapted under license by [employer] (which disclaims liability or warranty for this information). If you have questions about a medical condition or this instruction, always ask your healthcare professional. [place] [address] any warranty or liability for your use of this information.

## 2018-01-03 NOTE — TELEPHONE ENCOUNTER
Patient's pharmacist called stating that patient is waiting at the pharmacy for her prescription Diltiazem to be filled.    Pharmacist-(Daria) [phone]  SK

## 2018-01-03 NOTE — PROGRESS NOTES
Visit Vitals    /74 (BP 1 Location: Left arm, BP Patient Position: Sitting)    Pulse 88    Resp 20    Ht 5' 3\" (1.6 m)    Wt 193 lb 3.2 oz (87.6 kg)    SpO2 98%    BMI 34.22 kg/m2

## 2018-02-13 ENCOUNTER — OFFICE VISIT (OUTPATIENT)
Dept: CARDIOLOGY CLINIC | Age: 78
End: 2018-02-13

## 2018-02-13 VITALS
DIASTOLIC BLOOD PRESSURE: 84 MMHG | HEIGHT: 63 IN | WEIGHT: 192 LBS | RESPIRATION RATE: 16 BRPM | SYSTOLIC BLOOD PRESSURE: 152 MMHG | OXYGEN SATURATION: 98 % | BODY MASS INDEX: 34.02 KG/M2 | HEART RATE: 96 BPM

## 2018-02-13 DIAGNOSIS — Z86.79 S/P ABLATION OF ATRIAL FIBRILLATION: ICD-10-CM

## 2018-02-13 DIAGNOSIS — I45.10 RBBB: ICD-10-CM

## 2018-02-13 DIAGNOSIS — I48.0 PAROXYSMAL ATRIAL FIBRILLATION (HCC): ICD-10-CM

## 2018-02-13 DIAGNOSIS — I51.7 LAE (LEFT ATRIAL ENLARGEMENT): Primary | ICD-10-CM

## 2018-02-13 DIAGNOSIS — I10 HTN (HYPERTENSION), BENIGN: ICD-10-CM

## 2018-02-13 DIAGNOSIS — I47.1 SVT (SUPRAVENTRICULAR TACHYCARDIA) (HCC): ICD-10-CM

## 2018-02-13 DIAGNOSIS — Z98.890 S/P ABLATION OF ATRIAL FIBRILLATION: ICD-10-CM

## 2018-02-13 DIAGNOSIS — I87.2 VENOUS INSUFFICIENCY: ICD-10-CM

## 2018-02-13 NOTE — PROGRESS NOTES
Patient is here for 4 month f/u on AFIB. Visit Vitals    /84 (BP 1 Location: Left arm, BP Patient Position: Sitting)    Pulse 96    Resp 16    Ht 5' 3\" (1.6 m)    Wt 192 lb (87.1 kg)    SpO2 98%    BMI 34.01 kg/m2     1. Have you been to the ER, urgent care clinic since your last visit? Hospitalized since your last visit? 3 times in Dec for high b/p    2. Have you seen or consulted any other health care providers outside of the [address] [place] [address] since your last visit? Include any pap smears or colon screening.  no

## 2018-02-13 NOTE — PROGRESS NOTES
[address] [place] [facility], [address] [facility]    Office [phone]  Fax [phone]        Harmony Roblero is a 70 y.o. female. Last seen 2 months ago. Problem List  Date Reviewed: 2/14/2018          Codes Class Noted    SVT (supraventricular tachycardia) ([address] ICD-10-CM: I47.1  ICD-9-CM: 427.89  8/28/2017        Hiatal hernia ICD-10-CM: K44.9  ICD-9-CM: 553.3  8/28/2017        Venous insufficiency ICD-10-CM: I87.2  ICD-9-CM: 459.81  5/4/2017        Acquired hypothyroidism ICD-10-CM: E03.9  ICD-9-CM: 244.9  5/1/2017        S/P ablation of atrial fibrillation ICD-10-CM: Z98.890, Z86.79  ICD-9-CM: V45.89  4/28/2017        Paroxysmal atrial fibrillation (HCC) ICD-10-CM: I48.0  ICD-9-CM: 427.31  8/28/2016        HTN (hypertension), benign ICD-10-CM: I10  ICD-9-CM: 401.1  7/26/2016        Gastroesophageal reflux disease without esophagitis ICD-10-CM: K21.9  ICD-9-CM: 530.81  7/3/2016        RBBB ICD-10-CM: I45.10  ICD-9-CM: 426.4  5/7/2015        LAE (left atrial enlargement) ICD-10-CM: I51.7  ICD-9-CM: 429.3  4/3/2013             Cardiac testing  Adenosine myoview 2011 - normal perfusion, EF 81%  Echocardiogram 2/22/13 - normal left ventricular size and function, normal appearing mitral, aortic, and tricuspid valves, with mild mitral and moderate tricuspid regurgitation, bi-atrial enlargement  Lexiscan 3/3/2014 - normal perfusion EF 83%  Echo: 5/15/15- 60%, mildly dilated LA, mild MR  Cardioversion 7/23/2015 - 200J  Lexiscan cardiolite 5/10/16 - normal perfusion, EF 78%  Renal Visceral Duplex 5/2016 - No evidence of ONOFRE  3/2/17-. Successful atrial fibrillation ablation however unable to achieve entrance/exit block of LSPV per Dr. Sin Marking    HPI  She was in the ER twice in December with accelerated HTN. The second time she also had recurrent AF. She was subsequently seen by Preet Louis NP from  on 1/3/18. Diltiazem CD was added to her regimen.  She will be seeing Dr. William Bloom tomorrow. She continues to be in AF today. She is aware when she is in AF- GOODE and fatigue. SBPs at home have mostly been in the 120-140 range with occasional spikes in the 150-160 range. She does deep breathing and goes on walks to help manage her stress. She was prescribed Lexapro by Dr. Ayaz Hammer, but she has not started it yet. Patient denies any exertional chest pain, palpitations, syncope, orthopnea, edema or paroxysmal nocturnal dyspnea. Past Medical History:   Diagnosis Date    DDD (degenerative disc disease)     Gastroesophageal reflux disease without esophagitis 7/3/2016    GERD (gastroesophageal reflux disease)     Hiatal hernia     HTN (hypertension), benign 7/26/2016    Hypertension     ANÍBAL (obstructive sleep apnea) 7/26/2016    Paroxysmal atrial fibrillation ([facility] 8/28/2016    Uterine prolapse       Current Outpatient Prescriptions on File Prior to Visit   Medication Sig Dispense Refill    cloNIDine HCl (CATAPRES) 0.1 mg tablet Take 1 Tab by mouth three (3) times daily. Hold for SBP <120  Indications: hypertension 270 Tab 3    dilTIAZem CD (CARDIZEM CD) 180 mg ER capsule Take 1 Cap by mouth daily. 30 Cap 3    carvedilol (COREG) 6.25 mg tablet take 1 tablet by mouth twice a day with food 180 Tab 3    levothyroxine (SYNTHROID) 50 mcg tablet take 1 tablet by mouth once daily  0    furosemide (LASIX) 20 mg tablet take 1 tablet by mouth PRN  0    lansoprazole (PREVACID) 30 mg capsule Take 30 mg by mouth nightly.  ELIQUIS 5 mg tablet take 1 tablet by mouth twice a day 60 Tab 11    FOLIC ACID/MULTIVIT-MIN/LUTEIN (CENTRUM SILVER PO) Take  by mouth daily.  CALCIUM CARBONATE/VITAMIN D3 (CALTRATE 600 + D PO) Take  by mouth daily.  ezetimibe (ZETIA) 10 mg tablet Take 10 mg by mouth every evening. No current facility-administered medications on file prior to visit. Allergies   Allergen Reactions    Citalopram Hydrobromide Rash     With itching.     Chlorthalidone Rash    Maxzide [Triamterene-Hydrochlorothiazid] Palpitations    Vicodin [Hydrocodone-Acetaminophen] Palpitations     , former smoker     Review of Systems  Constitutional: Negative for fever, chills, and diaphoresis. +fatigue  Respiratory: Negative for cough, hemoptysis, sputum production, and wheezing. +GOODE  Cardiovascular: Negative for orthopnea, claudication,leg swelling and PND. Gastrointestinal: Negative for heartburn, nausea, vomiting, blood in stool and melena. Genitourinary: Negative for dysuria and flank pain. Musculoskeletal: Negative for joint pain and back pain. Skin: Negative for rash. Neurological: Negative for focal weakness, seizures, loss of consciousness, weakness. Endo/Heme/Allergies: Does not bruise/bleed easily. Psychiatric/Behavioral: Negative for memory loss. Visit Vitals    /84 (BP 1 Location: Left arm, BP Patient Position: Sitting)    Pulse 96    Resp 16    Ht 5' 3\" (1.6 m)    Wt 192 lb (87.1 kg)    SpO2 98%    BMI 34.01 kg/m2      Wt Readings from Last 3 Encounters:   02/14/18 191 lb (86.6 kg)   02/13/18 192 lb (87.1 kg)   01/03/18 193 lb 3.2 oz (87.6 kg)      Physical Exam   Constitutional: She is oriented to person, place, and time. She appears well-developed and well-nourished. Neck: Neck supple. No JVD present. Cardiovascular: Irregularly irregular  Pulses: Carotid pulses are 2+ on the right side, and 2+ on the left side. Dorsalis pedis pulses are 2+ on the right side, and 2+ on the left side. Pulmonary/Chest: Effort normal and breath sounds normal. She has no wheezes. She has no rales. Abdominal: Soft. Bowel sounds are normal. There is no tenderness. There is no rebound. Musculoskeletal: She exhibits no edema. Neurological: She is alert and oriented to person, place, and time. Skin: Skin is warm and dry. Psychiatric: She has a normal mood and affect.      Lab Results   Component Value Date/Time    Sodium 144 12/29/2017 01:43 AM    Potassium 4.1 12/29/2017 01:43 AM    Chloride 106 12/29/2017 01:43 AM    CO2 29 12/29/2017 01:43 AM    Anion gap 9 12/29/2017 01:43 AM    Glucose 140 (H) 12/29/2017 01:43 AM    BUN 15 12/29/2017 01:43 AM    Creatinine 1.14 (H) 12/29/2017 01:43 AM    BUN/Creatinine ratio 13 12/29/2017 01:43 AM    GFR est AA 56 (L) 12/29/2017 01:43 AM    GFR est non-AA 46 (L) 12/29/2017 01:43 AM    Calcium 9.0 12/29/2017 01:43 AM    Bilirubin, total 0.3 12/29/2017 01:43 AM    AST (SGOT) 22 12/29/2017 01:43 AM    Alk. phosphatase 94 12/29/2017 01:43 AM    Protein, total 7.4 12/29/2017 01:43 AM    Albumin 3.9 12/29/2017 01:43 AM    Globulin 3.5 12/29/2017 01:43 AM    A-G Ratio 1.1 12/29/2017 01:43 AM    ALT (SGPT) 34 12/29/2017 01:43 AM     Cardiographics   EKG 4/13 - normal  EKG 5/6/15 - AF with ventricular rate 96, RBBB  EKG 6/3/15 - A-fib rate 70s, RBBB, Rightward axis  EKG 8/31/15 - SR, RBBB  EKG 12/7/15 - SR, RBBB  EKG 7/26/16 - AF 80s, RBBB, rightward axis, unchanged from 7/23/16   EKG 11/28/16 - SR 60   EKG 12/05/16-AF 80-90, RBBB   EKG 01/10/17- AF 70s  EKG 8/25/17- SR 60, RBBB rightward axis. ASSESSMENT and PLAN  Encounter Diagnoses   Name Primary?  LAE (left atrial enlargement) Yes    RBBB     HTN (hypertension), benign     Paroxysmal atrial fibrillation (HCC)     SVT (supraventricular tachycardia) (HCC)     S/P ablation of atrial fibrillation     Venous insufficiency      1. Chronic HTN. Reasonably well-controlled on a multidrug regimen. Stress/anxiety do play a role. She does not have ANÍBAL. I advised her to start Lexapro prescribed by Dr. Isma Ceja. Continue home BP monitoring. 2. Recurrent, persistent AF, rate-controlled after recent addition of Diltiazem almost 1 year following AF ablation. She will be seeing Dr. Fatmata Roberts tomorrow to discuss options. Continue Eliquis. She clearly is symptomatic with AF- GOODE and fatigue.        Follow-up Disposition:  Return in about 3 months (around 5/13/2018).     Written by Juana Núñez, dictated by Yevgeniy Turner MD.  Yevgeniy Turner MD

## 2018-02-14 ENCOUNTER — OFFICE VISIT (OUTPATIENT)
Dept: CARDIOLOGY CLINIC | Age: 78
End: 2018-02-14

## 2018-02-14 VITALS
DIASTOLIC BLOOD PRESSURE: 80 MMHG | OXYGEN SATURATION: 96 % | WEIGHT: 191 LBS | SYSTOLIC BLOOD PRESSURE: 140 MMHG | BODY MASS INDEX: 33.84 KG/M2 | HEART RATE: 83 BPM | HEIGHT: 63 IN | RESPIRATION RATE: 16 BRPM

## 2018-02-14 DIAGNOSIS — I48.0 PAROXYSMAL ATRIAL FIBRILLATION (HCC): Primary | ICD-10-CM

## 2018-02-14 RX ORDER — ALPRAZOLAM 0.25 MG/1
TABLET ORAL
Refills: 0 | COMMUNITY
Start: 2017-12-21 | End: 2018-03-07

## 2018-02-14 RX ORDER — ESCITALOPRAM OXALATE 10 MG/1
TABLET ORAL
Refills: 0 | COMMUNITY
Start: 2018-01-31 | End: 2018-03-07

## 2018-02-14 RX ORDER — FLECAINIDE ACETATE 50 MG/1
50 TABLET ORAL 2 TIMES DAILY
Qty: 60 TAB | Refills: 3 | Status: ON HOLD | OUTPATIENT
Start: 2018-02-14 | End: 2018-02-28

## 2018-02-14 NOTE — PROGRESS NOTES
Chief Complaint   Patient presents with    Follow-up     Persisitent Atrial Fibrillation     1. Have you been to the ER, urgent care clinic since your last visit? Hospitalized since your last visit? No    2. Have you seen or consulted any other health care providers outside of the [address] [place] [address] since your last visit? Include any pap smears or colon screening.  No

## 2018-02-14 NOTE — PATIENT INSTRUCTIONS
Treatment Plan:  Start Flecainide 50mg twice daily. Cardioversion    You are scheduled for a cardioversion at [facility] on Wednesday, February 28th. Please arrive at the patient registration desk located on the 2nd floor of the main building at 10:30am.    Do not eat or drink anything after midnight the night before your procedure. You will need a . You may take your normal medications with a sip of water the morning of your procedure. Lab instructions: Please have labs drawn 3-4 days prior to scheduled procedure at any [facility] office. Please call Jessica Blum or Olena Mcadams if you have any questions at [phone]. Please call the office if you develop any type of illness prior to your procedure. Electrical Cardioversion: Before Your Procedure  What is electrical cardioversion? Electrical cardioversion is a treatment for a heartbeat that isn't normal, such as atrial fibrillation. It uses a brief electric shock to reset your heart's rhythm. Before the treatment, you will get medicine to make you sleepy. You should not feel any pain. Your doctor will put patches on your chest. Or you might get them on both your chest and back. They send a brief electric current to your heart. In most cases, this restores the heart's normal rhythm right away. Cardioversion itself takes about 5 minutes. But the whole procedure will likely take about 30 to 45 minutes. That includes time to recover. Abnormal heart rhythms sometimes come back after the treatment. You may need to take medicines. These may help your heart keep its normal rhythm. Follow-up care is a key part of your treatment and safety. Be sure to make and go to all appointments, and call your doctor if you are having problems. It's also a good idea to know your test results and keep a list of the medicines you take. What happens before the procedure? ?Preparing for the procedure  ?  · Understand exactly what procedure is planned, along with the risks, benefits, and other options. · Tell your doctors ALL the medicines, vitamins, supplements, and herbal remedies you take. Some of these can increase the risk of bleeding or interact with anesthesia. ? · If you take blood thinners, such as warfarin (Coumadin), clopidogrel (Plavix), or aspirin, be sure to talk to your doctor. He or she will tell you if you should stop taking these medicines before your procedure. Make sure that you understand exactly what your doctor wants you to do.   ? · Your doctor will tell you which medicines to take or stop before your procedure. You may need to stop taking certain medicines a week or more before the procedure. So talk to your doctor as soon as you can.   ? · If you have an advance directive, let your doctor know. It may include a living will and a durable power of  for health care. Bring a copy to the hospital. If you don't have one, you may want to prepare one. It lets your doctor and loved ones know your health care wishes. Doctors advise that everyone prepare these papers before any type of surgery or procedure. Procedures can be stressful. This information will help you understand what you can expect. And it will help you safely prepare for your procedure. What happens on the day of the procedure? · Follow the instructions exactly about when to stop eating and drinking. If you don't, your procedure may be canceled. If your doctor told you to take your medicines on the day of the procedure, take them with only a sip of water. ? · Take a bath or shower before you come in for your procedure. Do not apply lotions, perfumes, deodorants, or nail polish. ? · Take off all jewelry and piercings. And take out contact lenses, if you wear them. ? At the hospital or surgery center   · Bring a picture ID. ? · You will get medicine to make you sleepy. ? · The procedure will take about 30 to 45 minutes.    Going home   · Be sure you have someone to drive you home. Anesthesia and pain medicine make it unsafe for you to drive. ? · Your doctor may have prescribed medicines to prevent blood clots (blood thinners). If so, you may need to take them for several months or longer. ? · You will be given more specific instructions about recovering from your procedure. They will cover things like diet, wound care, follow-up care, driving, and getting back to your normal routine. When should you call your doctor? · You have questions or concerns. ? · You don't understand how to prepare for your procedure. ? · You become ill before the procedure (such as fever, flu, or a cold). ? · You need to reschedule or have changed your mind about having the procedure. Where can you learn more? Go to http://iker-isaac.info/. Enter S549 in the search box to learn more about \"Electrical Cardioversion: Before Your Procedure. \"  Current as of: September 21, 2016  Content Version: 11.4  © 3856-8678 [employer]. Care instructions adapted under license by [employer] (which disclaims liability or warranty for this information). If you have questions about a medical condition or this instruction, always ask your healthcare professional. [place] [address] any warranty or liability for your use of this information.

## 2018-02-14 NOTE — PROGRESS NOTES
HISTORY OF PRESENTING ILLNESS      Jim Ojeda is a 70 y. o. female with ANÍBAL, HTN, RBBB and persistent AF s/p AF ablation 3/2/2017 (unable to isolate LSPV). She had previously been on Propafenone and underwent several cardioversions resulting in symptomatic improvement with NSR.  Echo demonstrated preserved LV function with LA diameter of 3.4cm in May 2015.  She reported shortness of breath post ablation and her amiodarone was discontinued. Ct Bell has had some bilateral lower extremity edema for which she was started on Lasix as well. EKG shows AF. ACTIVE PROBLEM LIST     Patient Active Problem List    Diagnosis Date Noted    SVT (supraventricular tachycardia) ([facility] [address] 08/28/2017    Hiatal hernia 08/28/2017    Venous insufficiency 05/04/2017    Acquired hypothyroidism 05/01/2017    S/P ablation of atrial fibrillation 04/28/2017    Paroxysmal atrial fibrillation ([facility] [address] 08/28/2016    HTN (hypertension), benign 07/26/2016    Gastroesophageal reflux disease without esophagitis 07/03/2016    RBBB 05/07/2015    LAE (left atrial enlargement) 04/03/2013           PAST MEDICAL HISTORY     Past Medical History:   Diagnosis Date    DDD (degenerative disc disease)     Gastroesophageal reflux disease without esophagitis 7/3/2016    GERD (gastroesophageal reflux disease)     Hiatal hernia     HTN (hypertension), benign 7/26/2016    Hypertension     ANÍBAL (obstructive sleep apnea) 7/26/2016    Paroxysmal atrial fibrillation ([facility] [address] 8/28/2016    Uterine prolapse            PAST SURGICAL HISTORY     Past Surgical History:   Procedure Laterality Date    HX AFIB ABLATION  03/2017    HX GI      COLONOSCOPY 7/14    HX GYN      TUBAL LIGATION    HX HEENT      WISDOM TEETH EXTRACTED. ALLERGIES     Allergies   Allergen Reactions    Citalopram Hydrobromide Rash     With itching.     Chlorthalidone Rash    Maxzide [Triamterene-Hydrochlorothiazid] Palpitations    Vicodin [Hydrocodone-Acetaminophen] Palpitations          FAMILY HISTORY     Family History   Problem Relation Age of Onset    Diabetes Mother     Hypertension Mother    [facility] COPD Mother     negative for cardiac disease       SOCIAL HISTORY     Social History     Social History    Marital status:      Spouse name: N/A    Number of children: N/A    Years of education: N/A     Social History Main Topics    Smoking status: Former Smoker     Packs/day: 1.50     Years: 30.00     Quit date: 3/3/1994    Smokeless tobacco: Never Used    Alcohol use No    Drug use: No    Sexual activity: Not on file     Other Topics Concern    Not on file     Social History Narrative         MEDICATIONS     Current Outpatient Prescriptions   Medication Sig    cloNIDine HCl (CATAPRES) 0.1 mg tablet Take 1 Tab by mouth three (3) times daily. Hold for SBP <120  Indications: hypertension    dilTIAZem CD (CARDIZEM CD) 180 mg ER capsule Take 1 Cap by mouth daily.  carvedilol (COREG) 6.25 mg tablet take 1 tablet by mouth twice a day with food    levothyroxine (SYNTHROID) 50 mcg tablet take 1 tablet by mouth once daily    furosemide (LASIX) 20 mg tablet take 1 tablet by mouth PRN    lansoprazole (PREVACID) 30 mg capsule Take 30 mg by mouth nightly.  ELIQUIS 5 mg tablet take 1 tablet by mouth twice a day    FOLIC ACID/MULTIVIT-MIN/LUTEIN (CENTRUM SILVER PO) Take  by mouth daily.  CALCIUM CARBONATE/VITAMIN D3 (CALTRATE 600 + D PO) Take  by mouth daily.  ezetimibe (ZETIA) 10 mg tablet Take 10 mg by mouth every evening. No current facility-administered medications for this visit. I have reviewed the nurses notes, vitals, problem list, allergy list, medical history, family, social history and medications. REVIEW OF SYMPTOMS      General: Pt denies excessive weight gain or loss. Pt is able to conduct ADL's  HEENT: Denies blurred vision, headaches, hearing loss, epistaxis and difficulty swallowing.   Respiratory: Denies cough, congestion, shortness of breath, GOODE, wheezing or stridor. Cardiovascular: Denies precordial pain, palpitations, edema or PND  Gastrointestinal: Denies poor appetite, indigestion, abdominal pain or blood in stool  Genitourinary: Denies hematuria, dysuria, increased urinary frequency  Musculoskeletal: Denies joint pain or swelling from muscles or joints  Neurologic: Denies tremor, paresthesias, headache, or sensory motor disturbance  Psychiatric: Denies confusion, insomnia, depression  Integumentray: Denies rash, itching or ulcers. Hematologic: Denies easy bruising, bleeding       PHYSICAL EXAMINATION      There were no vitals filed for this visit. General: Well developed, in no acute distress. HEENT: No jaundice, oral mucosa moist, no oral ulcers  Neck: Supple, no stiffness, no lymphadenopathy, supple  Heart:  irreg irregular, no murmur, gallop or rub, no jugular venous distention  Respiratory: Clear bilaterally x 4, no wheezing or rales  Abdomen:   Soft, non-tender, bowel sounds are active.   Extremities:  No edema, normal cap refill, no cyanosis. Musculoskeletal: No clubbing, no deformities  Neuro: A&Ox3, speech clear, gait stable, cooperative, no focal neurologic deficits  Skin: Skin color is normal. No rashes or lesions.  Non diaphoretic, moist.  Vascular: 2+ pulses symmetric in all extremities       DIAGNOSTIC DATA      EKG: atrial fibrillation       LABORATORY DATA      Lab Results   Component Value Date/Time    WBC 6.7 12/29/2017 01:43 AM    HGB 14.1 12/29/2017 01:43 AM    HCT 44.2 12/29/2017 01:43 AM    PLATELET 125 26/26/5297 01:43 AM    MCV 95.5 12/29/2017 01:43 AM      Lab Results   Component Value Date/Time    Sodium 144 12/29/2017 01:43 AM    Potassium 4.1 12/29/2017 01:43 AM    Chloride 106 12/29/2017 01:43 AM    CO2 29 12/29/2017 01:43 AM    Anion gap 9 12/29/2017 01:43 AM    Glucose 140 (H) 12/29/2017 01:43 AM    BUN 15 12/29/2017 01:43 AM    Creatinine 1.14 (H) 12/29/2017 01:43 AM BUN/Creatinine ratio 13 12/29/2017 01:43 AM    GFR est AA 56 (L) 12/29/2017 01:43 AM    GFR est non-AA 46 (L) 12/29/2017 01:43 AM    Calcium 9.0 12/29/2017 01:43 AM    Bilirubin, total 0.3 12/29/2017 01:43 AM    AST (SGOT) 22 12/29/2017 01:43 AM    Alk. phosphatase 94 12/29/2017 01:43 AM    Protein, total 7.4 12/29/2017 01:43 AM    Albumin 3.9 12/29/2017 01:43 AM    Globulin 3.5 12/29/2017 01:43 AM    A-G Ratio 1.1 12/29/2017 01:43 AM    ALT (SGPT) 34 12/29/2017 01:43 AM           ASSESSMENT      1. Atrial fibrillation                         A. Persistent                        W. Symptomatic                        C.  AF ablation on 3/2/2017 (unable to achieve LSPV block)  2. Hiatal hernia  3. Hypertension   4. ANÍBAL  5. RBBB  6. GERD        PLAN     Will start flecainide 50 mg bid and plan for DCCV. Has not missed eliquis in over 30 days. Will attempt to uptitrate dose if possible. May consider repeat ablation/hybrid ablation in future. FOLLOW-UP     After DCCV      Thank you, Katheryn Alexis MD and Dr. Saul Amos for allowing me to participate in the care of this extraordinarily pleasant female. Please do not hesitate to contact me for further questions/concerns.          Urban Majano MD  Cardiac Electrophysiology / Cardiology    [place] [id number].  [address] [place] [address], [address] Anthony JudiFult[facility] Hospitalsaritha[address] [facility]  [phone] / [phone] Fax   [phone] / [phone] Fax

## 2018-02-23 RX ORDER — SODIUM CHLORIDE 0.9 % (FLUSH) 0.9 %
5-10 SYRINGE (ML) INJECTION AS NEEDED
Status: CANCELLED | OUTPATIENT
Start: 2018-02-23

## 2018-02-23 RX ORDER — SODIUM CHLORIDE 0.9 % (FLUSH) 0.9 %
5-10 SYRINGE (ML) INJECTION EVERY 8 HOURS
Status: CANCELLED | OUTPATIENT
Start: 2018-02-23

## 2018-02-25 LAB
BUN SERPL-MCNC: 11 MG/DL (ref 8–27)
BUN/CREAT SERPL: 11 (ref 12–28)
CALCIUM SERPL-MCNC: 9.2 MG/DL (ref 8.7–10.3)
CHLORIDE SERPL-SCNC: 101 MMOL/L (ref 96–106)
CO2 SERPL-SCNC: 28 MMOL/L (ref 18–29)
CREAT SERPL-MCNC: 1 MG/DL (ref 0.57–1)
GFR SERPLBLD CREATININE-BSD FMLA CKD-EPI: 55 ML/MIN/1.73
GFR SERPLBLD CREATININE-BSD FMLA CKD-EPI: 63 ML/MIN/1.73
GLUCOSE SERPL-MCNC: 109 MG/DL (ref 65–99)
INTERPRETATION: NORMAL
MAGNESIUM SERPL-MCNC: 2.2 MG/DL (ref 1.6–2.3)
POTASSIUM SERPL-SCNC: 4.2 MMOL/L (ref 3.5–5.2)
SODIUM SERPL-SCNC: 143 MMOL/L (ref 134–144)

## 2018-02-26 ENCOUNTER — TELEPHONE (OUTPATIENT)
Dept: CARDIOLOGY CLINIC | Age: 78
End: 2018-02-26

## 2018-02-26 NOTE — TELEPHONE ENCOUNTER
Returned call, ID verified using two patient identifiers, patient asking if there is a limit to how many cardioversions you can have. Advised patient that there was not a limit to the number of Cardioversions but that the goal was to have her maintain a normal sinus rhythm. She has started Flecainide as prescribed. Reviewed pre procedure instructions with patient and answered all patient questions to her satisfaction.

## 2018-02-26 NOTE — TELEPHONE ENCOUNTER
Patient said shes having a procedure done this Wednesday and she has a few questions   Phone: [phone]

## 2018-02-28 ENCOUNTER — HOSPITAL ENCOUNTER (OUTPATIENT)
Dept: NON INVASIVE DIAGNOSTICS | Age: 78
Discharge: HOME | End: 2018-02-28
Attending: INTERNAL MEDICINE | Admitting: INTERNAL MEDICINE
Payer: MEDICARE

## 2018-02-28 VITALS
HEART RATE: 54 BPM | HEIGHT: 63 IN | BODY MASS INDEX: 33.59 KG/M2 | TEMPERATURE: 97.4 F | SYSTOLIC BLOOD PRESSURE: 121 MMHG | OXYGEN SATURATION: 96 % | RESPIRATION RATE: 20 BRPM | DIASTOLIC BLOOD PRESSURE: 68 MMHG | WEIGHT: 189.6 LBS

## 2018-02-28 DIAGNOSIS — I48.0 PAROXYSMAL ATRIAL FIBRILLATION (HCC): ICD-10-CM

## 2018-02-28 LAB
ANION GAP SERPL CALC-SCNC: 7 MMOL/L (ref 5–15)
BUN SERPL-MCNC: 12 MG/DL (ref 6–20)
BUN/CREAT SERPL: 12 (ref 12–20)
CALCIUM SERPL-MCNC: 9 MG/DL (ref 8.5–10.1)
CHLORIDE SERPL-SCNC: 106 MMOL/L (ref 97–108)
CO2 SERPL-SCNC: 28 MMOL/L (ref 21–32)
CREAT SERPL-MCNC: 0.97 MG/DL (ref 0.55–1.02)
GLUCOSE SERPL-MCNC: 122 MG/DL (ref 65–100)
MAGNESIUM SERPL-MCNC: 2 MG/DL (ref 1.6–2.4)
POTASSIUM SERPL-SCNC: 4.1 MMOL/L (ref 3.5–5.1)
SODIUM SERPL-SCNC: 141 MMOL/L (ref 136–145)

## 2018-02-28 PROCEDURE — 74011000250 HC RX REV CODE- 250: Performed by: INTERNAL MEDICINE

## 2018-02-28 PROCEDURE — 36415 COLL VENOUS BLD VENIPUNCTURE: CPT | Performed by: INTERNAL MEDICINE

## 2018-02-28 PROCEDURE — 80048 BASIC METABOLIC PNL TOTAL CA: CPT | Performed by: INTERNAL MEDICINE

## 2018-02-28 PROCEDURE — 77030018729 HC ELECTRD DEFIB PAD CARD -B

## 2018-02-28 PROCEDURE — 92960 CARDIOVERSION ELECTRIC EXT: CPT

## 2018-02-28 PROCEDURE — 83735 ASSAY OF MAGNESIUM: CPT | Performed by: INTERNAL MEDICINE

## 2018-02-28 PROCEDURE — 74011000258 HC RX REV CODE- 258: Performed by: INTERNAL MEDICINE

## 2018-02-28 RX ORDER — SODIUM CHLORIDE 0.9 % (FLUSH) 0.9 %
5-10 SYRINGE (ML) INJECTION AS NEEDED
Status: DISCONTINUED | OUTPATIENT
Start: 2018-02-28 | End: 2018-02-28 | Stop reason: HOSPADM

## 2018-02-28 RX ORDER — SODIUM CHLORIDE 0.9 % (FLUSH) 0.9 %
5-10 SYRINGE (ML) INJECTION EVERY 8 HOURS
Status: DISCONTINUED | OUTPATIENT
Start: 2018-02-28 | End: 2018-02-28 | Stop reason: HOSPADM

## 2018-02-28 RX ORDER — SODIUM CHLORIDE 0.9 % (FLUSH) 0.9 %
5-10 SYRINGE (ML) INJECTION AS NEEDED
Status: CANCELLED | OUTPATIENT
Start: 2018-02-28

## 2018-02-28 RX ORDER — ESCITALOPRAM OXALATE 5 MG/1
5 TABLET ORAL EVERY EVENING
COMMUNITY
End: 2018-03-09

## 2018-02-28 RX ORDER — SODIUM CHLORIDE 0.9 % (FLUSH) 0.9 %
5-10 SYRINGE (ML) INJECTION EVERY 8 HOURS
Status: CANCELLED | OUTPATIENT
Start: 2018-02-28

## 2018-02-28 RX ORDER — FLECAINIDE ACETATE 50 MG/1
100 TABLET ORAL 2 TIMES DAILY
Qty: 60 TAB | Refills: 3 | Status: SHIPPED | OUTPATIENT
Start: 2018-02-28 | End: 2018-02-28 | Stop reason: SDUPTHER

## 2018-02-28 RX ORDER — FLECAINIDE ACETATE 100 MG/1
100 TABLET ORAL 2 TIMES DAILY
Qty: 60 TAB | Refills: 3 | Status: SHIPPED | OUTPATIENT
Start: 2018-02-28 | End: 2018-03-07

## 2018-02-28 RX ADMIN — METHOHEXITAL SODIUM 51.6 MG: 500 INJECTION, POWDER, LYOPHILIZED, FOR SOLUTION INTRAMUSCULAR; INTRAVENOUS; RECTAL at 12:24

## 2018-02-28 NOTE — IP AVS SNAPSHOT
[facility] 
 
 
 [facility] 
[phone] Patient: Beba Rudd 
MRN: [id number] VSI:[id number] A check donnie indicates which time of day the medication should be taken. My Medications CHANGE how you take these medications Instructions Each Dose to Equal  
 Morning Noon Evening Bedtime  
 flecainide 50 mg tablet Commonly known as:  TAMBOCOR What changed:  how much to take Your last dose was: Your next dose is: Take 2 Tabs by mouth two (2) times a day. 100 mg CONTINUE taking these medications Instructions Each Dose to Equal  
 Morning Noon Evening Bedtime ALPRAZolam 0.25 mg tablet Commonly known as:  Sharonda Santamaria Your last dose was: Your next dose is:    
   
   
 take 1 tablet by mouth once daily if needed CALTRATE 600 + D PO Your last dose was: Your next dose is: Take  by mouth daily. CENTRUM SILVER PO Your last dose was: Your next dose is: Take  by mouth daily. cloNIDine HCl 0.1 mg tablet Commonly known as:  CATAPRES Your last dose was: Your next dose is: Take 1 Tab by mouth three (3) times daily. Hold for SBP <120  Indications: hypertension 0.1 mg  
    
   
   
   
  
 dilTIAZem  mg ER capsule Commonly known as:  CARDIZEM CD Your last dose was: Your next dose is: Take 1 Cap by mouth daily. 180 mg  
    
   
   
   
  
 ELIQUIS 5 mg tablet Generic drug:  apixaban Your last dose was: Your next dose is:    
   
   
 take 1 tablet by mouth twice a day * escitalopram oxalate 5 mg tablet Commonly known as:  Luca Pretty Your last dose was: Your next dose is: Take 5 mg by mouth daily. 5 mg * escitalopram oxalate 10 mg tablet Commonly known as:  Maia Gomez Your last dose was: Your next dose is:    
   
   
 take 1 tablet by mouth once daily  
     
   
   
   
  
 furosemide 20 mg tablet Commonly known as:  LASIX Your last dose was: Your next dose is:    
   
   
 take 1 tablet by mouth PRN  
     
   
   
   
  
 levothyroxine 50 mcg tablet Commonly known as:  SYNTHROID Your last dose was: Your next dose is:    
   
   
 take 1 tablet by mouth once daily PREVACID 30 mg capsule Generic drug:  lansoprazole Your last dose was: Your next dose is: Take 30 mg by mouth nightly. 30 mg  
    
   
   
   
  
 ZETIA 10 mg tablet Generic drug:  ezetimibe Your last dose was: Your next dose is: Take 10 mg by mouth every evening. 10 mg  
    
   
   
   
  
 * Notice: This list has 2 medication(s) that are the same as other medications prescribed for you. Read the directions carefully, and ask your doctor or other care provider to review them with you. STOP taking these medications   
 carvedilol 6.25 mg tablet Commonly known as:  Anthony Easton Where to Get Your Medications These medications were sent to [place] [address]  [address] [id number] Phone:  [phone]  
  flecainide 50 mg tablet

## 2018-02-28 NOTE — IP AVS SNAPSHOT
[facility] 
 
 
 [address] [facility] 
[phone] Patient: Maria Del Carmen Watts 
MRN: [id number] About your hospitalization You were admitted on:  February 28, 2018 You last received care in the:  [facility] PACU You were discharged on:  February 28, 2018 Why you were hospitalized Your primary diagnosis was:  Not on File Follow-up Information Follow up With Details Comments Contact Info Emili Foster MD   [address] [facility] [address] [facility] 
[phone] Discharge Orders None A check donnie indicates which time of day the medication should be taken. My Medications CHANGE how you take these medications Instructions Each Dose to Equal  
 Morning Noon Evening Bedtime  
 flecainide 50 mg tablet Commonly known as:  TAMBOCOR What changed:  how much to take Your last dose was: Your next dose is: Take 2 Tabs by mouth two (2) times a day. 100 mg CONTINUE taking these medications Instructions Each Dose to Equal  
 Morning Noon Evening Bedtime ALPRAZolam 0.25 mg tablet Commonly known as:  Yari Hams Your last dose was: Your next dose is:    
   
   
 take 1 tablet by mouth once daily if needed CALTRATE 600 + D PO Your last dose was: Your next dose is: Take  by mouth daily. CENTRUM SILVER PO Your last dose was: Your next dose is: Take  by mouth daily. cloNIDine HCl 0.1 mg tablet Commonly known as:  CATAPRES Your last dose was: Your next dose is: Take 1 Tab by mouth three (3) times daily. Hold for SBP <120  Indications: hypertension 0.1 mg  
    
   
   
   
  
 dilTIAZem  mg ER capsule Commonly known as:  CARDIZEM CD  
   
 Your last dose was: Your next dose is: Take 1 Cap by mouth daily. 180 mg  
    
   
   
   
  
 ELIQUIS 5 mg tablet Generic drug:  apixaban Your last dose was: Your next dose is:    
   
   
 take 1 tablet by mouth twice a day * escitalopram oxalate 5 mg tablet Commonly known as:  Veola Fujita Your last dose was: Your next dose is: Take 5 mg by mouth daily. 5 mg * escitalopram oxalate 10 mg tablet Commonly known as:  Veola Fujita Your last dose was: Your next dose is:    
   
   
 take 1 tablet by mouth once daily  
     
   
   
   
  
 furosemide 20 mg tablet Commonly known as:  LASIX Your last dose was: Your next dose is:    
   
   
 take 1 tablet by mouth PRN  
     
   
   
   
  
 levothyroxine 50 mcg tablet Commonly known as:  SYNTHROID Your last dose was: Your next dose is:    
   
   
 take 1 tablet by mouth once daily PREVACID 30 mg capsule Generic drug:  lansoprazole Your last dose was: Your next dose is: Take 30 mg by mouth nightly. 30 mg  
    
   
   
   
  
 ZETIA 10 mg tablet Generic drug:  ezetimibe Your last dose was: Your next dose is: Take 10 mg by mouth every evening. 10 mg  
    
   
   
   
  
 * Notice: This list has 2 medication(s) that are the same as other medications prescribed for you. Read the directions carefully, and ask your doctor or other care provider to review them with you. STOP taking these medications   
 carvedilol 6.25 mg tablet Commonly known as:  Griselda Rings Where to Get Your Medications These medications were sent to [place] [address]  [address] [facility] [address] [id number] Phone:  [phone]  
  flecainide 50 mg tablet Discharge Instructions Learning About Cardioversion What is cardioversion? Cardioversion helps your heart return to a normal rhythm. It treats problems like atrial fibrillation. It is also sometimes used in emergencies. It can correct a fast heartbeat that causes low blood pressure, chest pain, or heart failure. There are two types: · The electrical type uses an electric current. The current enters your body through patches on your chest or back. · The chemical type uses medicines. The medicine is usually put into your arm through a tube called an IV. How is cardioversion done? Your doctor may ask you to take medicines before the treatment. These help prevent blood clots. Your doctor will watch you closely to make sure that there are no problems. Electrical cardioversion The electrical procedure is done in a hospital. You will get medicine to help you relax and control the pain. Your doctor will put patches on your chest or back. The patches send an electric current to your heart. This resets your heart rhythm. The electrical part takes about 5 minutes. But you will probably be in the hospital for 1 to 2 hours. You will need to recover from the effects of the sedative medicine. Chemical cardioversion The chemical procedure is most often done in a hospital. In most cases, the medicine is put into your arm through a tube called an IV. But you may get medicines to take by mouth. You may feel a quick sting or pinch when the IV starts. The procedure usually takes about 4 to 8 hours. What can you expect after cardioversion? · You can usually go home the same day. You will need someone to drive you home. · Your doctor may have you take medicines daily. These help your heart beat normally and prevent blood clots. · After electrical cardioversion, you may have redness where the patches were. This looks and feels like a sunburn. · Abnormal heart rhythms sometimes come back after cardioversion. Follow-up care is a key part of your treatment and safety. Be sure to make and go to all appointments, and call your doctor if you are having problems. It's also a good idea to know your test results and keep a list of the medicines you take. Where can you learn more? Go to http://iker-isaac.info/. Enter B383 in the search box to learn more about \"Learning About Cardioversion. \" Current as of: September 21, 2016 Content Version: 11.4 © 0646-2485 Tot[employer]. Care instructions adapted under license by Seeder (which disclaims liability or warranty for this information). If you have questions about a medical condition or this instruction, always ask your healthcare professional. [address] any warranty or liability for your use of this information. Electrical Cardioversion: Care Instructions Your Care Instructions Electrical cardioversion is a treatment for an abnormal heartbeat. For example, it may be used to treat atrial fibrillation. In cardioversion, a brief electric shock is given to the heart to reset its rhythm. The shock comes through patches that are put on the outside of your chest. Cardioversion most often restores the heartbeat to normal. 
After the procedure, you may have redness where the patches were. (This may look like a sunburn.) Do not drive until the day after a cardioversion. You can eat and drink when you feel ready to. Your doctor may have you take medicines daily to help the heart beat in a normal way and to prevent blood clots. Your doctor may give you medicine before and after cardioversion. This is to help keep your heart in a normal rhythm after the procedure. Instead of electric cardioversion, your doctor may try to change your heartbeat to a normal rhythm by giving you medicine.  This is most often done in a clinic or hospital. 
 You may have had a sedative to help you relax. You may be unsteady after having sedation. It can take a few hours for the medicine's effects to wear off. Common side effects of sedation include nausea, vomiting, and feeling sleepy or tired. The doctor has checked you carefully, but problems can develop later. If you notice any problems or new symptoms, get medical treatment right away. Follow-up care is a key part of your treatment and safety. Be sure to make and go to all appointments, and call your doctor if you are having problems. It's also a good idea to know your test results and keep a list of the medicines you take. How can you care for yourself at home? Medicines ? · If the doctor gave you a sedative: ¨ For 24 hours, don't do anything that requires attention to detail. It takes time for the medicine's effects to completely wear off. ¨ For your safety, do not drive or operate any machinery that could be dangerous. Wait until the medicine wears off and you can think clearly and react easily. ? · Take your medicines exactly as prescribed. Call your doctor if you think you are having a problem with your medicine. You may take some of the following medicines: ¨ Antiarrhythmic medicines such as amiodarone (Cordarone). ¨ Beta-blockers such as propranolol (Inderal), metoprolol (Lopressor), or carvedilol (Coreg). ¨ Calcium channel blockers such as diltiazem (Cardizem) or verapamil (Calan). ¨ Digoxin (Lanoxin). You will get more details on the specific medicines your doctor prescribes. ? · If you take a blood thinner, be sure you get instructions about how to take your medicine safely. Blood thinners can cause serious bleeding problems. ? · Do not take any vitamins, over-the-counter medicines, or herbal products without talking to your doctor first. ? Lifestyle changes ? · Do not smoke. Smoking increases your risk of stroke and heart attack. If you need help quitting, talk to your doctor about stop-smoking programs and medicines. These can increase your chances of quitting for good. ? · Eat heart-healthy foods. ? · Limit alcohol to 2 drinks a day for men and 1 drink a day for women. Activity ? · If your doctor recommends it, get more exercise. Walking is a good choice. Bit by bit, increase the amount you walk every day. Try for 30 minutes on most days of the week. You also may want to swim, bike, or do other activities. ? · When you exercise, watch for signs that your heart is working too hard. You are pushing too hard if you cannot talk while you are exercising. If you become short of breath or dizzy or have chest pain, sit down and rest immediately. ? · Check your pulse regularly. Place two fingers on the artery at the palm side of your wrist in line with your thumb. If your heartbeat seems uneven or fast, talk to your doctor. When should you call for help? Call 911 anytime you think you may need emergency care. For example, call if: 
? · You have trouble breathing. ? · You passed out (lost consciousness). ? · You cough up pink, foamy mucus and you have trouble breathing. ? · You have symptoms of a heart attack. These may include: ¨ Chest pain or pressure, or a strange feeling in the chest. 
¨ Sweating. ¨ Shortness of breath. ¨ Nausea or vomiting. ¨ Pain, pressure, or a strange feeling in the back, neck, jaw, or upper belly or in one or both shoulders or arms. ¨ Lightheadedness or sudden weakness. ¨ A fast or irregular heartbeat. After you call 911, the  may tell you to chew 1 adult-strength or 2 to 4 low-dose aspirin. Wait for an ambulance. Do not try to drive yourself. ? · You have symptoms of a stroke. These may include: 
¨ Sudden numbness, tingling, weakness, or loss of movement in your face, arm, or leg, especially on only one side of your body. ¨ Sudden vision changes. ¨ Sudden trouble speaking. ¨ Sudden confusion or trouble understanding simple statements. ¨ Sudden problems with walking or balance. ¨ A sudden, severe headache that is different from past headaches. ?Call your doctor now or seek immediate medical care if: 
? · You have new or worse nausea or vomiting. ? · You have new or increased shortness of breath. ? · You are dizzy or lightheaded, or you feel like you may faint. ? · You have sudden weight gain, such as more than 2 to 3 pounds in a day or 5 pounds in a week. ? · You have increased swelling in your legs, ankles, or feet. ? Watch closely for changes in your health, and be sure to contact your doctor if you have any problems. Where can you learn more? Go to http://iker-isaac.info/. Enter L771 in the search box to learn more about \"Electrical Cardioversion: Care Instructions. \" Current as of: September 21, 2016 Content Version: 11.4 © 7719-5333 Mobile Games Company. Care instructions adapted under license by Qmerce (which disclaims liability or warranty for this information). If you have questions about a medical condition or this instruction, always ask your healthcare professional. [place] [address] any warranty or liability for your use of this information. Deciding Between Electrical Cardioversion and Rate Control Medicines for Atrial Fibrillation Deciding Between Electrical Cardioversion and Rate Control Medicines for Atrial Fibrillation What is atrial fibrillation? Atrial fibrillation (say \"AY-tree-curtis pga-jpdu-XBK-shun\") is a kind of uneven heartbeat. It can make you feel lightheaded and dizzy. You may feel weak. It also can make you more likely to have a stroke. Electrical cardioversion can return your heart to a normal rhythm. First you'll get medicines to make you sleepy and control pain. Then your doctor will use patches to send an electric current to your heart.  This resets the rhythm of your heart. Not everyone with atrial fibrillation needs this treatment. For some people, taking medicines may be better. Most people can live with an uneven heartbeat. It just has to be kept under control so the heart does not beat too fast. 
Use this information to help you and your doctor decide which treatment to choose for atrial fibrillation. What are ahn points about this decision? · Electrical cardioversion can return your heart to a normal rhythm. But the problem can come back. The longer you have had atrial fibrillation, the more likely it is to come back after this treatment. · Cardioversion may not work as well when an uneven heartbeat is caused by another heart disease, such as heart failure. · If your symptoms bother you a lot, you may want to try cardioversion. But even if it works, you may still need to take blood thinners to prevent a stroke. · If you don't have symptoms, or if they don't bother you much, you can try medicines to slow your heart rate. And you can take blood thinners to prevent a stroke. · Cardioversion does have risks, such as stroke. Discuss the risks with your doctor. Make sure you understand them. · You may have more than one heart problem. Cardioversion doesn't work as well if you have more than one heart problem. Why might you choose electrical cardioversion? · It restores the normal heart rhythm for most people. · The idea of having an electric shock does not bother you. · Your symptoms bother you a lot. · You have had atrial fibrillation just one time. · You do not have other heart problems. · You may not have to take as many medicines. Or you may not need to take them as long. Why might you choose rate-control medicines? · These medicines keep many people from having symptoms. · You prefer to take medicines rather than have an electric shock. · Your symptoms don't bother you much.  
· If these medicines don't work, you can still try electrical cardioversion. Your decision Thinking about the facts and your feelings can help you make a decision that is right for you. Be sure you understand the benefits and risks of your options. And think about what else you need to do before you make the decision. Where can you learn more? Go to http://iker-isaac.info/. Enter C170 in the search box to learn more about \"Deciding Between Electrical Cardioversion and Rate Control Medicines for Atrial Fibrillation. \" Current as of: September 21, 2016 Content Version: 11.4 © 2755-4832 Gastrofy. Care instructions adapted under license by FaceBuzz (which disclaims liability or warranty for this information). If you have questions about a medical condition or this instruction, always ask your healthcare professional. [address] any warranty or liability for your use of this information. Learning About Cardioversion What is cardioversion? Cardioversion helps your heart return to a normal rhythm. It treats problems like atrial fibrillation. It is also sometimes used in emergencies. It can correct a fast heartbeat that causes low blood pressure, chest pain, or heart failure. There are two types: · The electrical type uses an electric current. The current enters your body through patches on your chest or back. · The chemical type uses medicines. The medicine is usually put into your arm through a tube called an IV. How is cardioversion done? Your doctor may ask you to take medicines before the treatment. These help prevent blood clots. Your doctor will watch you closely to make sure that there are no problems. Electrical cardioversion The electrical procedure is done in a hospital. You will get medicine to help you relax and control the pain. Your doctor will put patches on your chest or back. The patches send an electric current to your heart. This resets your heart rhythm. The electrical part takes about 5 minutes. But you will probably be in the hospital for 1 to 2 hours. You will need to recover from the effects of the sedative medicine. Chemical cardioversion The chemical procedure is most often done in a hospital. In most cases, the medicine is put into your arm through a tube called an IV. But you may get medicines to take by mouth. You may feel a quick sting or pinch when the IV starts. The procedure usually takes about 4 to 8 hours. What can you expect after cardioversion? · You can usually go home the same day. You will need someone to drive you home. · Your doctor may have you take medicines daily. These help your heart beat normally and prevent blood clots. · After electrical cardioversion, you may have redness where the patches were. This looks and feels like a sunburn. · Abnormal heart rhythms sometimes come back after cardioversion. Follow-up care is a key part of your treatment and safety. Be sure to make and go to all appointments, and call your doctor if you are having problems. It's also a good idea to know your test results and keep a list of the medicines you take. Where can you learn more? Go to http://iker-isaac.info/. Enter F435 in the search box to learn more about \"Learning About Cardioversion. \" Current as of: September 21, 2016 Content Version: 11.4 © 2779-5618 UpWind Solutions. Care instructions adapted under license by SweetLabs (which disclaims liability or warranty for this information). If you have questions about a medical condition or this instruction, always ask your healthcare professional. [place] [address] any warranty or liability for your use of this information. Learning About Cardioversion What is cardioversion? Cardioversion helps your heart return to a normal rhythm. It treats problems like atrial fibrillation. It is also sometimes used in emergencies. It can correct a fast heartbeat that causes low blood pressure, chest pain, or heart failure. There are two types: · The electrical type uses an electric current. The current enters your body through patches on your chest or back. · The chemical type uses medicines. The medicine is usually put into your arm through a tube called an IV. How is cardioversion done? Your doctor may ask you to take medicines before the treatment. These help prevent blood clots. Your doctor will watch you closely to make sure that there are no problems. Electrical cardioversion The electrical procedure is done in a hospital. You will get medicine to help you relax and control the pain. Your doctor will put patches on your chest or back. The patches send an electric current to your heart. This resets your heart rhythm. The electrical part takes about 5 minutes. But you will probably be in the hospital for 1 to 2 hours. You will need to recover from the effects of the sedative medicine. Chemical cardioversion The chemical procedure is most often done in a hospital. In most cases, the medicine is put into your arm through a tube called an IV. But you may get medicines to take by mouth. You may feel a quick sting or pinch when the IV starts. The procedure usually takes about 4 to 8 hours. What can you expect after cardioversion? · You can usually go home the same day. You will need someone to drive you home. · Your doctor may have you take medicines daily. These help your heart beat normally and prevent blood clots. · After electrical cardioversion, you may have redness where the patches were. This looks and feels like a sunburn. · Abnormal heart rhythms sometimes come back after cardioversion. Follow-up care is a key part of your treatment and safety.  Be sure to make and go to all appointments, and call your doctor if you are having problems. It's also a good idea to know your test results and keep a list of the medicines you take. Where can you learn more? Go to http://iker-isaac.info/. Enter K010 in the search box to learn more about \"Learning About Cardioversion. \" Current as of: September 21, 2016 Content Version: 11.4 © 8502-5652 [employer]un[employer]. Care instructions adapted under license by Kallfly Pte Ltd (which disclaims liability or warranty for this information). If you have questions about a medical condition or this instruction, always ask your healthcare professional. [address] any warranty or liability for your use of this information. Introducing \[facility]\"" & HEALTH SERVICES! Dear [facility]: Thank you for requesting a [email or web address] account. Our records indicate that you already have an active [email or web address] account. You can access your account anytime at [email or web address] Did you know that you can access your hospital and ER discharge instructions at any time in [email or web address]? You can also review all of your test results from your hospital stay or ER visit. Additional Information If you have questions, please visit the Frequently Asked Questions section of the [email or web address] website at [email or web address] Remember, [email or web address] is NOT to be used for urgent needs. For medical emergencies, dial 911. Now available from your iPhone and Android! Providers Seen During Your Hospitalization Provider Specialty Primary office phone Loretta Leach MD Cardiology [phone] Your Primary Care Physician (PCP) Primary Care Physician Office Phone Office Fax Christina Engle [phone] You are allergic to the following Allergen Reactions Citalopram Hydrobromide Rash With itching. Chlorthalidone Rash Maxzide (Triamterene-Hydrochlorothiazid) Palpitations Vicodin (Hydrocodone-Acetaminophen) Palpitations Recent Documentation Height Weight Breastfeeding? BMI OB Status Smoking Status 1.6 m 86 kg No 33.59 kg/m2 Postmenopausal Former Smoker Emergency Contacts Name Discharge Info Relation Home Work Mobile Madhav Ojeda DISCHARGE CAREGIVER [3] Spouse [3] [phone] Patient Belongings The following personal items are in your possession at time of discharge: 
     Visual Aid: None Please provide this summary of care documentation to your next provider. Signatures-by signing, you are acknowledging that this After Visit Summary has been reviewed with you and you have received a copy. Patient Signature:  ____________________________________________________________ Date:  ____________________________________________________________  
  
[facility] Provider Signature:  ____________________________________________________________ Date:  ____________________________________________________________

## 2018-02-28 NOTE — TELEPHONE ENCOUNTER
Requested Prescriptions     Signed Prescriptions Disp Refills    flecainide (TAMBOCOR) 100 mg tablet 60 Tab 3     Sig: Take 1 Tab by mouth two (2) times a day. Authorizing Provider: Patrick Vasquez     Ordering User: Danna Vee     Prescription sent per verbal order Dr. Tasneem Becerra.

## 2018-02-28 NOTE — TELEPHONE ENCOUNTER
Pt calling to ask for flecainide 100 mg based on her instructions for the medication. Pt's  is at the pharmacy now. Requested Prescriptions     Pending Prescriptions Disp Refills    flecainide (TAMBOCOR) 50 mg tablet 60 Tab 3     Sig: Take 2 Tabs by mouth two (2) times a day. Thanks!   Nicola Dials

## 2018-02-28 NOTE — PROCEDURES
Cardiac Electrophysiology Report        PATIENT INFORMATION     Patient Name: Kathi Gross  MRN: [id number]      Study Date: 2018    YOB: 1940    Age: 70 y.o. Gender: female      Procedure:  Externalized [facility]    Referring Physician:  Elliot Guillory MD        STAFF     Duty Name   Electrophysiologist Charlie Horvath MD   Monitor Jonathan Baltazar, SRUTHI   Circulator Francisco Coulter, SRUTHI; Taz Enciso RN       PATIENT [address] [facility] [address] a 25 H. o. female with ANÍBAL, HTN, RBBB and persistent AF s/p AF ablation 3/2/2017 (unable to isolate LSPV). She had previously been on Propafenone and underwent several cardioversions resulting in symptomatic improvement with NSR. Echo demonstrated preserved LV function with LA diameter of 3.4 cm in May 2015. She reported shortness of breath post ablation and her amiodarone was discontinued. Glenny Miramontes has had some bilateral lower extremity edema for which she was started on Lasix as well. EKG shows AF. She was started on flecainide and now presents for cardioversion. PROCEDURE     The patient was brought to the Cardiac Electrophysiology laboratory in a post-absorptive, fasting state. Informed consent was obtained. A peripheral IV was in place. Continuous electrocardiographic, blood pressure, O2 saturation and  CO2 monitoring was initiated. Self-adhesive cardioversion patches were positioned on the chest and back. Once conscious sedation was achieved, a single biphasic, synchronized shock at 360 Joules was delivered with successful restoration of sinus rhythm. The patient remained hemodynamically stable, tolerated the procedure well and was transferred in stable condition. There were no immediate complications encountered during the procedure. There was no blood loss and no specimen were removed.       CARDIOVERSION DATA     Energy (Joules) Technique Result   360 Biphasic Success (ERAF)   360 Biphasic Success MEDICATION SUMMARY     Medication Route Unit Total   Brevital IV mcg 51       RADIOLOGY SUMMARY     N/A      CONCLUSIONS     1. Early recurrence of AF observed; repeat DCCV was then successful with restoration of sinus rhythm. 2. Continue flecainide/eliquis; increase flecainide dose to 100 mg bid; monitor for bradycardia; will DC bradycardia given HR in 50's post-DCCV   3. Follow up in 2 weeks in EP clinic. 4. Follow up with  Luigi Westbrook MD as scheduled. Thank you, Luigi Westbrook MD for involving me in the care of this extraordinarily pleasant female.          Sandie Lynn MD  Cardiac Electrophysiology / Cardiology    [address]  [address], [address] [facility], [address]  [address]                   Yuliana Rivera  [phone] / [phone] Fax      [phone] / [phone] Fax

## 2018-02-28 NOTE — DISCHARGE INSTRUCTIONS
Learning About Cardioversion  What is cardioversion? Cardioversion helps your heart return to a normal rhythm. It treats problems like atrial fibrillation. It is also sometimes used in emergencies. It can correct a fast heartbeat that causes low blood pressure, chest pain, or heart failure. There are two types:  · The electrical type uses an electric current. The current enters your body through patches on your chest or back. · The chemical type uses medicines. The medicine is usually put into your arm through a tube called an IV. How is cardioversion done? Your doctor may ask you to take medicines before the treatment. These help prevent blood clots. Your doctor will watch you closely to make sure that there are no problems. Electrical cardioversion  The electrical procedure is done in a hospital. You will get medicine to help you relax and control the pain. Your doctor will put patches on your chest or back. The patches send an electric current to your heart. This resets your heart rhythm. The electrical part takes about 5 minutes. But you will probably be in the hospital for 1 to 2 hours. You will need to recover from the effects of the sedative medicine. Chemical cardioversion  The chemical procedure is most often done in a hospital. In most cases, the medicine is put into your arm through a tube called an IV. But you may get medicines to take by mouth. You may feel a quick sting or pinch when the IV starts. The procedure usually takes about 4 to 8 hours. What can you expect after cardioversion? · You can usually go home the same day. You will need someone to drive you home. · Your doctor may have you take medicines daily. These help your heart beat normally and prevent blood clots. · After electrical cardioversion, you may have redness where the patches were. This looks and feels like a sunburn. · Abnormal heart rhythms sometimes come back after cardioversion.   Follow-up care is a key part of your treatment and safety. Be sure to make and go to all appointments, and call your doctor if you are having problems. It's also a good idea to know your test results and keep a list of the medicines you take. Where can you learn more? Go to http://iker-isaac.info/. Enter K190 in the search box to learn more about \"Learning About Cardioversion. \"  Current as of: September 21, 2016  Content Version: 11.4  © 4250-2844 [employer]. Care instructions adapted under license by [employer] (which disclaims liability or warranty for this information). If you have questions about a medical condition or this instruction, always ask your healthcare professional. [address] any warranty or liability for your use of this information. Electrical Cardioversion: Care Instructions  Your Care Instructions    Electrical cardioversion is a treatment for an abnormal heartbeat. For example, it may be used to treat atrial fibrillation. In cardioversion, a brief electric shock is given to the heart to reset its rhythm. The shock comes through patches that are put on the outside of your chest. Cardioversion most often restores the heartbeat to normal.  After the procedure, you may have redness where the patches were. (This may look like a sunburn.) Do not drive until the day after a cardioversion. You can eat and drink when you feel ready to. Your doctor may have you take medicines daily to help the heart beat in a normal way and to prevent blood clots. Your doctor may give you medicine before and after cardioversion. This is to help keep your heart in a normal rhythm after the procedure. Instead of electric cardioversion, your doctor may try to change your heartbeat to a normal rhythm by giving you medicine. This is most often done in a clinic or hospital.  You may have had a sedative to help you relax. You may be unsteady after having sedation.  It can take a few hours for the medicine's effects to wear off. Common side effects of sedation include nausea, vomiting, and feeling sleepy or tired. The doctor has checked you carefully, but problems can develop later. If you notice any problems or new symptoms, get medical treatment right away. Follow-up care is a key part of your treatment and safety. Be sure to make and go to all appointments, and call your doctor if you are having problems. It's also a good idea to know your test results and keep a list of the medicines you take. How can you care for yourself at home? Medicines  ? · If the doctor gave you a sedative:  ¨ For 24 hours, don't do anything that requires attention to detail. It takes time for the medicine's effects to completely wear off. ¨ For your safety, do not drive or operate any machinery that could be dangerous. Wait until the medicine wears off and you can think clearly and react easily. ? · Take your medicines exactly as prescribed. Call your doctor if you think you are having a problem with your medicine. You may take some of the following medicines:  ¨ Antiarrhythmic medicines such as amiodarone (Cordarone). ¨ Beta-blockers such as propranolol (Inderal), metoprolol (Lopressor), or carvedilol (Coreg). ¨ Calcium channel blockers such as diltiazem (Cardizem) or verapamil (Calan). ¨ Digoxin (Lanoxin). You will get more details on the specific medicines your doctor prescribes. ? · If you take a blood thinner, be sure you get instructions about how to take your medicine safely. Blood thinners can cause serious bleeding problems. ? · Do not take any vitamins, over-the-counter medicines, or herbal products without talking to your doctor first.   ? Lifestyle changes  ? · Do not smoke. Smoking increases your risk of stroke and heart attack. If you need help quitting, talk to your doctor about stop-smoking programs and medicines. These can increase your chances of quitting for good.    ? · Eat heart-healthy foods. ? · Limit alcohol to 2 drinks a day for men and 1 drink a day for women. Activity  ? · If your doctor recommends it, get more exercise. Walking is a good choice. Bit by bit, increase the amount you walk every day. Try for 30 minutes on most days of the week. You also may want to swim, bike, or do other activities. ? · When you exercise, watch for signs that your heart is working too hard. You are pushing too hard if you cannot talk while you are exercising. If you become short of breath or dizzy or have chest pain, sit down and rest immediately. ? · Check your pulse regularly. Place two fingers on the artery at the palm side of your wrist in line with your thumb. If your heartbeat seems uneven or fast, talk to your doctor. When should you call for help? Call 911 anytime you think you may need emergency care. For example, call if:  ? · You have trouble breathing. ? · You passed out (lost consciousness). ? · You cough up pink, foamy mucus and you have trouble breathing. ? · You have symptoms of a heart attack. These may include:  ¨ Chest pain or pressure, or a strange feeling in the chest.  ¨ Sweating. ¨ Shortness of breath. ¨ Nausea or vomiting. ¨ Pain, pressure, or a strange feeling in the back, neck, jaw, or upper belly or in one or both shoulders or arms. ¨ Lightheadedness or sudden weakness. ¨ A fast or irregular heartbeat. After you call 911, the  may tell you to chew 1 adult-strength or 2 to 4 low-dose aspirin. Wait for an ambulance. Do not try to drive yourself. ? · You have symptoms of a stroke. These may include:  ¨ Sudden numbness, tingling, weakness, or loss of movement in your face, arm, or leg, especially on only one side of your body. ¨ Sudden vision changes. ¨ Sudden trouble speaking. ¨ Sudden confusion or trouble understanding simple statements. ¨ Sudden problems with walking or balance.   ¨ A sudden, severe headache that is different from past headaches. ?Call your doctor now or seek immediate medical care if:  ? · You have new or worse nausea or vomiting. ? · You have new or increased shortness of breath. ? · You are dizzy or lightheaded, or you feel like you may faint. ? · You have sudden weight gain, such as more than 2 to 3 pounds in a day or 5 pounds in a week. ? · You have increased swelling in your legs, ankles, or feet. ? Watch closely for changes in your health, and be sure to contact your doctor if you have any problems. Where can you learn more? Go to http://iker-isaac.info/. Enter W893 in the search box to learn more about \"Electrical Cardioversion: Care Instructions. \"  Current as of: September 21, 2016  Content Version: 11.4  © 9688-0821 [employer]. Care instructions adapted under license by [employer] (which disclaims liability or warranty for this information). If you have questions about a medical condition or this instruction, always ask your healthcare professional. [place] [address] any warranty or liability for your use of this information. Deciding Between Electrical Cardioversion and Rate Control Medicines for Atrial Fibrillation  Deciding Between Electrical Cardioversion and Rate Control Medicines for Atrial Fibrillation    What is atrial fibrillation? Atrial fibrillation (say \"AY-tree-curtis mqa-fkie-IUP-shun\") is a kind of uneven heartbeat. It can make you feel lightheaded and dizzy. You may feel weak. It also can make you more likely to have a stroke. Electrical cardioversion can return your heart to a normal rhythm. First you'll get medicines to make you sleepy and control pain. Then your doctor will use patches to send an electric current to your heart. This resets the rhythm of your heart. Not everyone with atrial fibrillation needs this treatment. For some people, taking medicines may be better. Most people can live with an uneven heartbeat. It just has to be kept under control so the heart does not beat too fast.  Use this information to help you and your doctor decide which treatment to choose for atrial fibrillation. What are ahn points about this decision? · Electrical cardioversion can return your heart to a normal rhythm. But the problem can come back. The longer you have had atrial fibrillation, the more likely it is to come back after this treatment. · Cardioversion may not work as well when an uneven heartbeat is caused by another heart disease, such as heart failure. · If your symptoms bother you a lot, you may want to try cardioversion. But even if it works, you may still need to take blood thinners to prevent a stroke. · If you don't have symptoms, or if they don't bother you much, you can try medicines to slow your heart rate. And you can take blood thinners to prevent a stroke. · Cardioversion does have risks, such as stroke. Discuss the risks with your doctor. Make sure you understand them. · You may have more than one heart problem. Cardioversion doesn't work as well if you have more than one heart problem. Why might you choose electrical cardioversion? · It restores the normal heart rhythm for most people. · The idea of having an electric shock does not bother you. · Your symptoms bother you a lot. · You have had atrial fibrillation just one time. · You do not have other heart problems. · You may not have to take as many medicines. Or you may not need to take them as long. Why might you choose rate-control medicines? · These medicines keep many people from having symptoms. · You prefer to take medicines rather than have an electric shock. · Your symptoms don't bother you much. · If these medicines don't work, you can still try electrical cardioversion. Your decision  Thinking about the facts and your feelings can help you make a decision that is right for you.  Be sure you understand the benefits and risks of your options. And think about what else you need to do before you make the decision. Where can you learn more? Go to http://iker-isaac.info/. Enter Q292 in the search box to learn more about \"Deciding Between Electrical Cardioversion and Rate Control Medicines for Atrial Fibrillation. \"  Current as of: September 21, 2016  Content Version: 11.4  © 0638-9730 [employer]. Care instructions adapted under license by [employer] (which disclaims liability or warranty for this information). If you have questions about a medical condition or this instruction, always ask your healthcare professional. [place] [address] any warranty or liability for your use of this information. Learning About Cardioversion  What is cardioversion? Cardioversion helps your heart return to a normal rhythm. It treats problems like atrial fibrillation. It is also sometimes used in emergencies. It can correct a fast heartbeat that causes low blood pressure, chest pain, or heart failure. There are two types:  · The electrical type uses an electric current. The current enters your body through patches on your chest or back. · The chemical type uses medicines. The medicine is usually put into your arm through a tube called an IV. How is cardioversion done? Your doctor may ask you to take medicines before the treatment. These help prevent blood clots. Your doctor will watch you closely to make sure that there are no problems. Electrical cardioversion  The electrical procedure is done in a hospital. You will get medicine to help you relax and control the pain. Your doctor will put patches on your chest or back. The patches send an electric current to your heart. This resets your heart rhythm. The electrical part takes about 5 minutes. But you will probably be in the hospital for 1 to 2 hours. You will need to recover from the effects of the sedative medicine.   Chemical cardioversion  The chemical procedure is most often done in a hospital. In most cases, the medicine is put into your arm through a tube called an IV. But you may get medicines to take by mouth. You may feel a quick sting or pinch when the IV starts. The procedure usually takes about 4 to 8 hours. What can you expect after cardioversion? · You can usually go home the same day. You will need someone to drive you home. · Your doctor may have you take medicines daily. These help your heart beat normally and prevent blood clots. · After electrical cardioversion, you may have redness where the patches were. This looks and feels like a sunburn. · Abnormal heart rhythms sometimes come back after cardioversion. Follow-up care is a key part of your treatment and safety. Be sure to make and go to all appointments, and call your doctor if you are having problems. It's also a good idea to know your test results and keep a list of the medicines you take. Where can you learn more? Go to http://iker-isaac.info/. Enter Z694 in the search box to learn more about \"Learning About Cardioversion. \"  Current as of: September 21, 2016  Content Version: 11.4  © 1329-1202 [employer]. Care instructions adapted under license by [employer] (which disclaims liability or warranty for this information). If you have questions about a medical condition or this instruction, always ask your healthcare professional. [place] [address] any warranty or liability for your use of this information. Learning About Cardioversion  What is cardioversion? Cardioversion helps your heart return to a normal rhythm. It treats problems like atrial fibrillation. It is also sometimes used in emergencies. It can correct a fast heartbeat that causes low blood pressure, chest pain, or heart failure. There are two types:  · The electrical type uses an electric current.  The current enters your body through patches on your chest or back. · The chemical type uses medicines. The medicine is usually put into your arm through a tube called an IV. How is cardioversion done? Your doctor may ask you to take medicines before the treatment. These help prevent blood clots. Your doctor will watch you closely to make sure that there are no problems. Electrical cardioversion  The electrical procedure is done in a hospital. You will get medicine to help you relax and control the pain. Your doctor will put patches on your chest or back. The patches send an electric current to your heart. This resets your heart rhythm. The electrical part takes about 5 minutes. But you will probably be in the hospital for 1 to 2 hours. You will need to recover from the effects of the sedative medicine. Chemical cardioversion  The chemical procedure is most often done in a hospital. In most cases, the medicine is put into your arm through a tube called an IV. But you may get medicines to take by mouth. You may feel a quick sting or pinch when the IV starts. The procedure usually takes about 4 to 8 hours. What can you expect after cardioversion? · You can usually go home the same day. You will need someone to drive you home. · Your doctor may have you take medicines daily. These help your heart beat normally and prevent blood clots. · After electrical cardioversion, you may have redness where the patches were. This looks and feels like a sunburn. · Abnormal heart rhythms sometimes come back after cardioversion. Follow-up care is a key part of your treatment and safety. Be sure to make and go to all appointments, and call your doctor if you are having problems. It's also a good idea to know your test results and keep a list of the medicines you take. Where can you learn more? Go to http://iker-isaac.info/. Enter R385 in the search box to learn more about \"Learning About Cardioversion. \"  Current as of: September 21, 2016  Content Version: 11.4  © 3240-7744 [employer]. Care instructions adapted under license by [employer] (which disclaims liability or warranty for this information). If you have questions about a medical condition or this instruction, always ask your healthcare professional. [address] any warranty or liability for your use of this information.

## 2018-02-28 NOTE — H&P
HISTORY OF PRESENTING ILLNESS       Megan Ojeda is a 70 y. o. female with ANÍBAL, HTN, RBBB and persistent AF s/p AF ablation 3/2/2017 (unable to isolate LSPV). She had previously been on Propafenone and underwent several cardioversions resulting in symptomatic improvement with NSR.  Echo demonstrated preserved LV function with LA diameter of 3.4cm in May 2015.  She reported shortness of breath post ablation and her amiodarone was discontinued. Goldie Mcdowell has had some bilateral lower extremity edema for which she was started on Lasix as well. EKG shows AF.          ACTIVE PROBLEM LIST           Patient Active Problem List     Diagnosis Date Noted    SVT (supraventricular tachycardia) ([facility] 08/28/2017    Hiatal hernia 08/28/2017    Venous insufficiency 05/04/2017    Acquired hypothyroidism 05/01/2017    S/P ablation of atrial fibrillation 04/28/2017    Paroxysmal atrial fibrillation ([facility] 08/28/2016    HTN (hypertension), benign 07/26/2016    Gastroesophageal reflux disease without esophagitis 07/03/2016    RBBB 05/07/2015    LAE (left atrial enlargement) 04/03/2013             PAST MEDICAL HISTORY           Past Medical History:   Diagnosis Date    DDD (degenerative disc disease)      Gastroesophageal reflux disease without esophagitis 7/3/2016    GERD (gastroesophageal reflux disease)      Hiatal hernia      HTN (hypertension), benign 7/26/2016    Hypertension      ANÍBAL (obstructive sleep apnea) 7/26/2016    Paroxysmal atrial fibrillation ([facility] 8/28/2016    Uterine prolapse               PAST SURGICAL HISTORY            Past Surgical History:   Procedure Laterality Date    HX AFIB ABLATION   03/2017    HX GI         COLONOSCOPY 7/14    HX GYN         TUBAL LIGATION    HX HEENT         WISDOM TEETH EXTRACTED.           ALLERGIES            Allergies   Allergen Reactions    Citalopram Hydrobromide Rash       With itching.     Chlorthalidone Rash    Maxzide [Triamterene-Hydrochlorothiazid] Palpitations    Vicodin [Hydrocodone-Acetaminophen] Palpitations            FAMILY HISTORY      Family History   Problem Relation Age of Onset    Diabetes Mother      Hypertension Mother      COPD Mother      negative for cardiac disease         SOCIAL HISTORY       Social History                Social History    Marital status:        Spouse name: N/A    Number of children: N/A    Years of education: N/A            Social History Main Topics    Smoking status: Former Smoker       Packs/day: 1.50       Years: 30.00       Quit date: 3/3/1994    Smokeless tobacco: Never Used    Alcohol use No    Drug use: No    Sexual activity: Not on file           Other Topics Concern    Not on file      Social History Narrative               MEDICATIONS           Current Outpatient Prescriptions   Medication Sig    cloNIDine HCl (CATAPRES) 0.1 mg tablet Take 1 Tab by mouth three (3) times daily. Hold for SBP <120  Indications: hypertension    dilTIAZem CD (CARDIZEM CD) 180 mg ER capsule Take 1 Cap by mouth daily.  carvedilol (COREG) 6.25 mg tablet take 1 tablet by mouth twice a day with food    levothyroxine (SYNTHROID) 50 mcg tablet take 1 tablet by mouth once daily    furosemide (LASIX) 20 mg tablet take 1 tablet by mouth PRN    lansoprazole (PREVACID) 30 mg capsule Take 30 mg by mouth nightly.  ELIQUIS 5 mg tablet take 1 tablet by mouth twice a day    FOLIC ACID/MULTIVIT-MIN/LUTEIN (CENTRUM SILVER PO) Take  by mouth daily.  CALCIUM CARBONATE/VITAMIN D3 (CALTRATE 600 + D PO) Take  by mouth daily.  ezetimibe (ZETIA) 10 mg tablet Take 10 mg by mouth every evening.      No current facility-administered medications for this visit.          I have reviewed the nurses notes, vitals, problem list, allergy list, medical history, family, social history and medications.         REVIEW OF SYMPTOMS       General: Pt denies excessive weight gain or loss.  Pt is able to conduct ADL's  HEENT: Denies blurred vision, headaches, hearing loss, epistaxis and difficulty swallowing. Respiratory: Denies cough, congestion, shortness of breath, GOODE, wheezing or stridor. Cardiovascular: Denies precordial pain, palpitations, edema or PND  Gastrointestinal: Denies poor appetite, indigestion, abdominal pain or blood in stool  Genitourinary: Denies hematuria, dysuria, increased urinary frequency  Musculoskeletal: Denies joint pain or swelling from muscles or joints  Neurologic: Denies tremor, paresthesias, headache, or sensory motor disturbance  Psychiatric: Denies confusion, insomnia, depression  Integumentray: Denies rash, itching or ulcers. Hematologic: Denies easy bruising, bleeding         PHYSICAL EXAMINATION       There were no vitals filed for this visit. General: Well developed, in no acute distress. HEENT: No jaundice, oral mucosa moist, no oral ulcers  Neck: Supple, no stiffness, no lymphadenopathy, supple  Heart:  irreg irregular, no murmur, gallop or rub, no jugular venous distention  Respiratory: Clear bilaterally x 4, no wheezing or rales  Abdomen:   Soft, non-tender, bowel sounds are active.   Extremities:  No edema, normal cap refill, no cyanosis. Musculoskeletal: No clubbing, no deformities  Neuro: A&Ox3, speech clear, gait stable, cooperative, no focal neurologic deficits  Skin: Skin color is normal. No rashes or lesions.  Non diaphoretic, moist.  Vascular: 2+ pulses symmetric in all extremities         DIAGNOSTIC DATA       EKG: atrial fibrillation         LABORATORY DATA             Lab Results   Component Value Date/Time     WBC 6.7 12/29/2017 01:43 AM     HGB 14.1 12/29/2017 01:43 AM     HCT 44.2 12/29/2017 01:43 AM     PLATELET 607 90/48/2317 01:43 AM     MCV 95.5 12/29/2017 01:43 AM            Lab Results   Component Value Date/Time     Sodium 144 12/29/2017 01:43 AM     Potassium 4.1 12/29/2017 01:43 AM     Chloride 106 12/29/2017 01:43 AM     CO2 29 12/29/2017 01:43 AM     Anion gap 9 12/29/2017 01:43 AM     Glucose 140 (H) 12/29/2017 01:43 AM     BUN 15 12/29/2017 01:43 AM     Creatinine 1.14 (H) 12/29/2017 01:43 AM     BUN/Creatinine ratio 13 12/29/2017 01:43 AM     GFR est AA 56 (L) 12/29/2017 01:43 AM     GFR est non-AA 46 (L) 12/29/2017 01:43 AM     Calcium 9.0 12/29/2017 01:43 AM     Bilirubin, total 0.3 12/29/2017 01:43 AM     AST (SGOT) 22 12/29/2017 01:43 AM     Alk. phosphatase 94 12/29/2017 01:43 AM     Protein, total 7.4 12/29/2017 01:43 AM     Albumin 3.9 12/29/2017 01:43 AM     Globulin 3.5 12/29/2017 01:43 AM     A-G Ratio 1.1 12/29/2017 01:43 AM     ALT (SGPT) 34 12/29/2017 01:43 AM             ASSESSMENT       1. Atrial fibrillation                         A. Persistent                        K. Symptomatic                        C.  AF ablation on 3/2/2017 (unable to achieve LSPV block)  2. Hiatal hernia  3. Hypertension   4. ANÍBAL  5. RBBB  6.  GERD          PLAN      DCCV        Demian Alexandre MD  Cardiac Electrophysiology / Cardiology     [address], [address]  [facility]                                                                                 [facility]  [phone] / [phone] Fax                                                                  [phone] / [phone] Fax

## 2018-03-05 ENCOUNTER — TELEPHONE (OUTPATIENT)
Dept: CARDIOLOGY CLINIC | Age: 78
End: 2018-03-05

## 2018-03-05 RX ORDER — CARVEDILOL 6.25 MG/1
TABLET ORAL
Refills: 0 | COMMUNITY
Start: 2018-02-12 | End: 2018-03-07 | Stop reason: SDUPTHER

## 2018-03-05 RX ORDER — APIXABAN 5 MG/1
TABLET, FILM COATED ORAL
Qty: 60 TAB | Refills: 11 | Status: SHIPPED | OUTPATIENT
Start: 2018-03-05 | End: 2018-03-05 | Stop reason: SDUPTHER

## 2018-03-05 NOTE — TELEPHONE ENCOUNTER
Received call, ID verified using two patient identifiers, patient states she has been having low HR, feels fatigued and has GOODE. HR range has been 46-57 since Cardioversion on 2/28/18 and increasing Flecainide to 100 mg twice daily. Also c/o elevated BP highest reading 188/98 since stopping Coreg on 2/28/18 post cardioversion. Patient feels she needs to decrease Flecainide back to 50 mg twice daily and restart Coreg due to her low HR and elevated BP. Advised patient that I would relay information to JAMAR Hammond NP since Dr. Ray Johnson is out of the office this week. Patient is agreeable to this plan.

## 2018-03-05 NOTE — TELEPHONE ENCOUNTER
Called patient, ID verified using two patient identifiers,advised patient per JAMAR Hammond,CANDIDO she is to  [facility] and continue Flecainide for a few days and call us with bp/HR readings. Discontinue Cardizem which is lowering her HR also. If she continues to have bradycardia, will decrease Flec to 50mg BID. \"  Patient read back medication instructions and she is aware to call us on Friday am with her BP and HR readings. She will call the office sooner if necessary. Patient is agreeable to this plan.

## 2018-03-07 ENCOUNTER — HOSPITAL ENCOUNTER (INPATIENT)
Age: 78
LOS: 2 days | Discharge: HOME | DRG: 305 | End: 2018-03-09
Attending: EMERGENCY MEDICINE | Admitting: INTERNAL MEDICINE
Payer: MEDICARE

## 2018-03-07 ENCOUNTER — APPOINTMENT (OUTPATIENT)
Dept: CT IMAGING | Age: 78
DRG: 305 | End: 2018-03-07
Attending: PHYSICIAN ASSISTANT
Payer: MEDICARE

## 2018-03-07 ENCOUNTER — TELEPHONE (OUTPATIENT)
Dept: CARDIOLOGY CLINIC | Age: 78
End: 2018-03-07

## 2018-03-07 ENCOUNTER — APPOINTMENT (OUTPATIENT)
Dept: GENERAL RADIOLOGY | Age: 78
DRG: 305 | End: 2018-03-07
Attending: PHYSICIAN ASSISTANT
Payer: MEDICARE

## 2018-03-07 DIAGNOSIS — Z91.14 NONCOMPLIANCE WITH MEDICATION REGIMEN: ICD-10-CM

## 2018-03-07 DIAGNOSIS — R09.02 HYPOXIA: ICD-10-CM

## 2018-03-07 DIAGNOSIS — R06.02 SOB (SHORTNESS OF BREATH): ICD-10-CM

## 2018-03-07 DIAGNOSIS — I16.0 HYPERTENSIVE URGENCY: Primary | ICD-10-CM

## 2018-03-07 DIAGNOSIS — I27.20 PULMONARY HYPERTENSION (HCC): ICD-10-CM

## 2018-03-07 PROBLEM — I10 ACCELERATED HYPERTENSION: Status: ACTIVE | Noted: 2018-03-07

## 2018-03-07 PROBLEM — R51.9 HEADACHE: Status: ACTIVE | Noted: 2018-03-07

## 2018-03-07 LAB
ALBUMIN SERPL-MCNC: 3.8 G/DL (ref 3.5–5)
ALBUMIN/GLOB SERPL: 1 {RATIO} (ref 1.1–2.2)
ALP SERPL-CCNC: 95 U/L (ref 45–117)
ALT SERPL-CCNC: 23 U/L (ref 12–78)
ANION GAP SERPL CALC-SCNC: 9 MMOL/L (ref 5–15)
AST SERPL-CCNC: 21 U/L (ref 15–37)
BASOPHILS # BLD: 0 K/UL (ref 0–0.1)
BASOPHILS NFR BLD: 1 % (ref 0–1)
BILIRUB SERPL-MCNC: 0.4 MG/DL (ref 0.2–1)
BNP SERPL-MCNC: 453 PG/ML (ref 0–450)
BUN SERPL-MCNC: 17 MG/DL (ref 6–20)
BUN/CREAT SERPL: 18 (ref 12–20)
CALCIUM SERPL-MCNC: 9.5 MG/DL (ref 8.5–10.1)
CHLORIDE SERPL-SCNC: 104 MMOL/L (ref 97–108)
CO2 SERPL-SCNC: 28 MMOL/L (ref 21–32)
CREAT SERPL-MCNC: 0.96 MG/DL (ref 0.55–1.02)
DIFFERENTIAL METHOD BLD: NORMAL
EOSINOPHIL # BLD: 0.2 K/UL (ref 0–0.4)
EOSINOPHIL NFR BLD: 3 % (ref 0–7)
ERYTHROCYTE [DISTWIDTH] IN BLOOD BY AUTOMATED COUNT: 13.5 % (ref 11.5–14.5)
GLOBULIN SER CALC-MCNC: 3.9 G/DL (ref 2–4)
GLUCOSE SERPL-MCNC: 129 MG/DL (ref 65–100)
HCT VFR BLD AUTO: 42.8 % (ref 35–47)
HGB BLD-MCNC: 13.9 G/DL (ref 11.5–16)
IMM GRANULOCYTES # BLD: 0 K/UL (ref 0–0.04)
IMM GRANULOCYTES NFR BLD AUTO: 0 % (ref 0–0.5)
LYMPHOCYTES # BLD: 1.7 K/UL (ref 0.8–3.5)
LYMPHOCYTES NFR BLD: 26 % (ref 12–49)
MAGNESIUM SERPL-MCNC: 2.2 MG/DL (ref 1.6–2.4)
MCH RBC QN AUTO: 30.5 PG (ref 26–34)
MCHC RBC AUTO-ENTMCNC: 32.5 G/DL (ref 30–36.5)
MCV RBC AUTO: 94.1 FL (ref 80–99)
MONOCYTES # BLD: 0.7 K/UL (ref 0–1)
MONOCYTES NFR BLD: 11 % (ref 5–13)
NEUTS SEG # BLD: 3.8 K/UL (ref 1.8–8)
NEUTS SEG NFR BLD: 58 % (ref 32–75)
NRBC # BLD: 0 K/UL (ref 0–0.01)
NRBC BLD-RTO: 0 PER 100 WBC
PLATELET # BLD AUTO: 195 K/UL (ref 150–400)
PMV BLD AUTO: 10.1 FL (ref 8.9–12.9)
POTASSIUM SERPL-SCNC: 3.9 MMOL/L (ref 3.5–5.1)
PROT SERPL-MCNC: 7.7 G/DL (ref 6.4–8.2)
RBC # BLD AUTO: 4.55 M/UL (ref 3.8–5.2)
SODIUM SERPL-SCNC: 141 MMOL/L (ref 136–145)
TROPONIN I SERPL-MCNC: <0.04 NG/ML
WBC # BLD AUTO: 6.5 K/UL (ref 3.6–11)

## 2018-03-07 PROCEDURE — 80053 COMPREHEN METABOLIC PANEL: CPT | Performed by: PHYSICIAN ASSISTANT

## 2018-03-07 PROCEDURE — 77030013140 HC MSK NEB VYRM -A

## 2018-03-07 PROCEDURE — 36415 COLL VENOUS BLD VENIPUNCTURE: CPT | Performed by: PHYSICIAN ASSISTANT

## 2018-03-07 PROCEDURE — 71275 CT ANGIOGRAPHY CHEST: CPT

## 2018-03-07 PROCEDURE — 94640 AIRWAY INHALATION TREATMENT: CPT

## 2018-03-07 PROCEDURE — 84484 ASSAY OF TROPONIN QUANT: CPT | Performed by: PHYSICIAN ASSISTANT

## 2018-03-07 PROCEDURE — 65660000000 HC RM CCU STEPDOWN

## 2018-03-07 PROCEDURE — 74011000250 HC RX REV CODE- 250: Performed by: PHYSICIAN ASSISTANT

## 2018-03-07 PROCEDURE — 93005 ELECTROCARDIOGRAM TRACING: CPT

## 2018-03-07 PROCEDURE — 96374 THER/PROPH/DIAG INJ IV PUSH: CPT

## 2018-03-07 PROCEDURE — 74011250636 HC RX REV CODE- 250/636: Performed by: PHYSICIAN ASSISTANT

## 2018-03-07 PROCEDURE — 70450 CT HEAD/BRAIN W/O DYE: CPT

## 2018-03-07 PROCEDURE — 96361 HYDRATE IV INFUSION ADD-ON: CPT

## 2018-03-07 PROCEDURE — 99285 EMERGENCY DEPT VISIT HI MDM: CPT

## 2018-03-07 PROCEDURE — 65270000029 HC RM PRIVATE

## 2018-03-07 PROCEDURE — 83880 ASSAY OF NATRIURETIC PEPTIDE: CPT | Performed by: PHYSICIAN ASSISTANT

## 2018-03-07 PROCEDURE — 83735 ASSAY OF MAGNESIUM: CPT | Performed by: PHYSICIAN ASSISTANT

## 2018-03-07 PROCEDURE — 74011636320 HC RX REV CODE- 636/320: Performed by: RADIOLOGY

## 2018-03-07 PROCEDURE — 85025 COMPLETE CBC W/AUTO DIFF WBC: CPT | Performed by: PHYSICIAN ASSISTANT

## 2018-03-07 RX ORDER — AMLODIPINE BESYLATE 5 MG/1
5 TABLET ORAL DAILY
Status: DISCONTINUED | OUTPATIENT
Start: 2018-03-07 | End: 2018-03-08

## 2018-03-07 RX ORDER — DIPHENHYDRAMINE HCL 25 MG
25 CAPSULE ORAL ONCE
Status: COMPLETED | OUTPATIENT
Start: 2018-03-07 | End: 2018-03-08

## 2018-03-07 RX ORDER — SODIUM CHLORIDE 0.9 % (FLUSH) 0.9 %
5-10 SYRINGE (ML) INJECTION EVERY 8 HOURS
Status: DISCONTINUED | OUTPATIENT
Start: 2018-03-07 | End: 2018-03-09 | Stop reason: HOSPADM

## 2018-03-07 RX ORDER — HYDRALAZINE HYDROCHLORIDE 20 MG/ML
20 INJECTION INTRAMUSCULAR; INTRAVENOUS
Status: COMPLETED | OUTPATIENT
Start: 2018-03-07 | End: 2018-03-07

## 2018-03-07 RX ORDER — FERROUS SULFATE, DRIED 160(50) MG
1 TABLET, EXTENDED RELEASE ORAL EVERY EVENING
COMMUNITY

## 2018-03-07 RX ORDER — CARVEDILOL 6.25 MG/1
6.25 TABLET ORAL 2 TIMES DAILY
Qty: 60 TAB | Refills: 3
Start: 2018-03-07 | End: 2018-03-09

## 2018-03-07 RX ORDER — NALOXONE HYDROCHLORIDE 0.4 MG/ML
0.4 INJECTION, SOLUTION INTRAMUSCULAR; INTRAVENOUS; SUBCUTANEOUS AS NEEDED
Status: DISCONTINUED | OUTPATIENT
Start: 2018-03-07 | End: 2018-03-09 | Stop reason: HOSPADM

## 2018-03-07 RX ORDER — SODIUM CHLORIDE 0.9 % (FLUSH) 0.9 %
5-10 SYRINGE (ML) INJECTION AS NEEDED
Status: DISCONTINUED | OUTPATIENT
Start: 2018-03-07 | End: 2018-03-09 | Stop reason: HOSPADM

## 2018-03-07 RX ORDER — LABETALOL HYDROCHLORIDE 5 MG/ML
20 INJECTION, SOLUTION INTRAVENOUS
Status: COMPLETED | OUTPATIENT
Start: 2018-03-07 | End: 2018-03-07

## 2018-03-07 RX ORDER — FUROSEMIDE 10 MG/ML
20 INJECTION INTRAMUSCULAR; INTRAVENOUS
Status: DISCONTINUED | OUTPATIENT
Start: 2018-03-07 | End: 2018-03-07

## 2018-03-07 RX ADMIN — IOPAMIDOL 80 ML: 755 INJECTION, SOLUTION INTRAVENOUS at 22:10

## 2018-03-07 RX ADMIN — SODIUM CHLORIDE 1000 ML: 900 INJECTION, SOLUTION INTRAVENOUS at 21:33

## 2018-03-07 RX ADMIN — ALBUTEROL SULFATE 1 DOSE: 2.5 SOLUTION RESPIRATORY (INHALATION) at 22:40

## 2018-03-07 RX ADMIN — LABETALOL HYDROCHLORIDE 20 MG: 5 INJECTION, SOLUTION INTRAVENOUS at 21:30

## 2018-03-07 RX ADMIN — HYDRALAZINE HYDROCHLORIDE 20 MG: 20 INJECTION INTRAMUSCULAR; INTRAVENOUS at 23:48

## 2018-03-07 NOTE — TELEPHONE ENCOUNTER
Received call, ID verified using two patient identifiers, patient states she is not able to tolerate Flecainide 100 mg twice daily. She c/o feeling fatigued and sleepy all the time. HR is 47-49. BP- this am prior to medication 190/106, later it was 159/90 and then after taking Clonidine /68. Advised patient that per JAMAR Simpson NP's previous instructions she can reduce Flecainide to 50 mg twice daily. Patient states that even when she was taking the 50 mg Flecainide she was experiencing fatigue and extreme sleepiness. She wishes to resume Diltiazem 180 mg daily. Patient will take 50 mg Flecainide this evening. Advised patient that I would send information to JAMAR Hammond NP for her  recommendations.  Patient is expecting a call back tomorrow am.

## 2018-03-07 NOTE — IP AVS SNAPSHOT
[address] [place] [address] 
[phone] Patient: Asa Wilson 
MRN: [id number] GQA:[id number] A check donnie indicates which time of day the medication should be taken. My Medications START taking these medications Instructions Each Dose to Equal  
 Morning Noon Evening Bedtime  
 dilTIAZem 30 mg tablet Commonly known as:  CARDIZEM Your last dose was: Last dose given on 3/9/18 at 10 a.m. Take 1 Tab by mouth Before breakfast, lunch, and dinner. 30 mg  
    
   
   
   
  
 lisinopril 10 mg tablet Commonly known as:  Florence Drought Your last dose was: Last dose given on 3/9/18 at 10 a.m. Take 1 Tab by mouth daily. 10 mg CHANGE how you take these medications Instructions Each Dose to Equal  
 Morning Noon Evening Bedtime  
 flecainide 50 mg tablet Commonly known as:  TAMBOCOR What changed:   
- medication strength 
- how much to take - when to take this Your last dose was: Last dose given on 3/9/18 at 10 a.m. Take 1 Tab by mouth every twelve (12) hours. 50 mg CONTINUE taking these medications Instructions Each Dose to Equal  
 Morning Noon Evening Bedtime  
 apixaban 5 mg tablet Commonly known as:  Sanchez Cecilia Your last dose was: Last dose given on 3/9/18 at 10 a.m.   
   
 take 1 tablet by mouth twice a day  
     
   
   
   
  
 calcium-vitamin D 500 mg(1,250mg) -200 unit per tablet Commonly known as:  OYSTER SHELL Your last dose was: Last dose given on 3/9/18 at 10 a.m. Take 1 Tab by mouth daily. 1 Tab CENTRUM SILVER PO Your last dose was:  Not given in the hospital.   
   
 Take 1 Tab by mouth daily. 1 Tab  
    
   
   
   
  
 cloNIDine HCl 0.1 mg tablet Commonly known as:  CATAPRES Your last dose was: Last dose given on 3/9/18 at 10 a.m. Take 1 Tab by mouth three (3) times daily. Hold for SBP <120  Indications: hypertension 0.1 mg  
    
   
   
   
  
 levothyroxine 50 mcg tablet Commonly known as:  SYNTHROID Your last dose was: Last dose given on 3/9/18 at 7:15 a.m.   
   
 take 1 tablet by mouth once daily PREVACID 30 mg capsule Generic drug:  lansoprazole Your last dose was:  Given protonix on 3/8/18 at 9:30 p.m. Take 30 mg by mouth nightly. 30 mg  
    
   
   
   
  
 ZETIA 10 mg tablet Generic drug:  ezetimibe Your last dose was: Last dose given on 3/8/18 at 6 p.m. Take 10 mg by mouth every evening. 10 mg  
    
   
   
   
  
  
STOP taking these medications   
 carvedilol 6.25 mg tablet Commonly known as:  COREG  
   
  
 escitalopram oxalate 5 mg tablet Commonly known as:  Napolean Mantle Where to Get Your Medications Information on where to get these meds will be given to you by the nurse or doctor. ! Ask your nurse or doctor about these medications  
  dilTIAZem 30 mg tablet  
 flecainide 50 mg tablet  
 lisinopril 10 mg tablet

## 2018-03-07 NOTE — IP AVS SNAPSHOT
Bharath Ernandez 
 
 
 [address] 1007 [facility] 
[phone] Patient: Mj Louie 
MRN: [id number] ND About your hospitalization You were admitted on:  2018 You last received care in the:  [facility] [address] [place] [address] You were discharged on:  2018 Why you were hospitalized Your primary diagnosis was:  Not on File Your diagnoses also included:  Accelerated Hypertension, Paroxysmal Atrial Fibrillation (Hcc), S/P Ablation Of Atrial Fibrillation, Huey On Cpap, Gastroesophageal Reflux Disease Without Esophagitis, Acquired Hypothyroidism, Hiatal Hernia, Headache Follow-up Information Follow up With Details Comments Contact Info Janey Jason NP On 3/14/2018 120 pm [address] [place] [address] [address] [place] [address] 
[phone] Gareth Dougherty MD On 3/30/2018 2 PM [address] [place] [address] Rohith [phone] [address] [place] [address] 
[phone] Lisa Almonte MD In 1 week  [address] [place] [address] [address] [place] [address] 
[phone] Your Scheduled Appointments 2018  1:20 PM EDT  
ESTABLISHED PATIENT with Janey Jason NP  
[facility] ([address] [place] [address]) 320 [facility] [place] [address] [place] [address]  
[phone] 2018  2:00 PM EDT  
ESTABLISHED PATIENT with Gareth Dougherty MD  
[facility] ([address] [place] [address]) 320 [facility] [address] [place] [address]  
[phone] Discharge Orders None A check donnie indicates which time of day the medication should be taken. My Medications START taking these medications Instructions Each Dose to Equal  
 Morning Noon Evening Bedtime  
 dilTIAZem 30 mg tablet Commonly known as:  CARDIZEM Your last dose was: Last dose given on 3/9/18 at 10 a.m. Take 1 Tab by mouth Before breakfast, lunch, and dinner. 30 mg  
    
   
   
   
  
 lisinopril 10 mg tablet Commonly known as:  Therisa Semen Your last dose was: Last dose given on 3/9/18 at 10 a.m. Take 1 Tab by mouth daily. 10 mg CHANGE how you take these medications Instructions Each Dose to Equal  
 Morning Noon Evening Bedtime  
 flecainide 50 mg tablet Commonly known as:  TAMBOCOR What changed:   
- medication strength 
- how much to take - when to take this Your last dose was: Last dose given on 3/9/18 at 10 a.m. Take 1 Tab by mouth every twelve (12) hours. 50 mg CONTINUE taking these medications Instructions Each Dose to Equal  
 Morning Noon Evening Bedtime  
 apixaban 5 mg tablet Commonly known as:  Anny Copmayuri Your last dose was: Last dose given on 3/9/18 at 10 a.m.   
   
 take 1 tablet by mouth twice a day  
     
   
   
   
  
 calcium-vitamin D 500 mg(1,250mg) -200 unit per tablet Commonly known as:  OYSTER SHELL Your last dose was: Last dose given on 3/9/18 at 10 a.m. Take 1 Tab by mouth daily. 1 Tab CENTRUM SILVER PO Your last dose was:  Not given in the hospital.   
   
 Take 1 Tab by mouth daily. 1 Tab  
    
   
   
   
  
 cloNIDine HCl 0.1 mg tablet Commonly known as:  CATAPRES Your last dose was: Last dose given on 3/9/18 at 10 a.m. Take 1 Tab by mouth three (3) times daily. Hold for SBP <120  Indications: hypertension 0.1 mg  
    
   
   
   
  
 levothyroxine 50 mcg tablet Commonly known as:  SYNTHROID Your last dose was: Last dose given on 3/9/18 at 7:15 a.m.   
   
 take 1 tablet by mouth once daily PREVACID 30 mg capsule Generic drug:  lansoprazole Your last dose was:  Given protonix on 3/8/18 at 9:30 p.m. Take 30 mg by mouth nightly.   
 30 mg  
    
   
   
   
  
 ZETIA 10 mg tablet Generic drug:  ezetimibe Your last dose was: Last dose given on 3/8/18 at 6 p.m. Take 10 mg by mouth every evening. 10 mg  
    
   
   
   
  
  
STOP taking these medications   
 carvedilol 6.25 mg tablet Commonly known as:  COREG  
   
  
 escitalopram oxalate 5 mg tablet Commonly known as:  Cy Null Where to Get Your Medications Information on where to get these meds will be given to you by the nurse or doctor. ! Ask your nurse or doctor about these medications  
  dilTIAZem 30 mg tablet  
 flecainide 50 mg tablet  
 lisinopril 10 mg tablet Discharge Instructions Future Appointments Date Time Provider Ray Zamora 3/14/2018 1:20 PM CANDIDO Montaño  
3/30/2018 2:00 PM Roberto Brand MD [address] [place] [address]  
2018 2:00 PM Roberto Brand MD [address] [place] [address] HOSPITALIST DISCHARGE INSTRUCTIONS 
NAME: Ksenia James  
:  1940 MRN:  [id number] Date/Time:  3/9/2018 7:47 AM 
 
ADMIT DATE: 3/7/2018 DISCHARGE DATE: 3/9/2018 PRINCIPAL DISCHARGE DIAGNOSES: 
High blood pressure MEDICATIONS: 
· It is important that you take the medication exactly as they are prescribed. Note the changes and additions to your medications. Be sure you understand these changes before you are discharged today. · Keep your medication in the bottles provided by the pharmacist and keep a list of the medication names, dosages, and times to be taken in your wallet. · Do not take other medications without consulting your doctor. Pain Management: per above medications What to do at [facility] Recommended diet:  Cardiac Diet Recommended activity: Activity as tolerated If you experience any of the following symptoms then please call your primary care physician or return to the emergency room if you cannot get hold of your doctor: severe chest pain, shortness of breath, dizziness, heart palpitations or other severe concerning symptoms that brought you to the hospital in the first place Follow Up: Follow-up Information Follow up With Details Comments Contact Joann Nice NP On 3/14/2018 120 pm [address] 1007 [facility] 
[phone] Tre Hess MD On 3/30/2018 2 PM [address] [place] [address] Rohith [phone] 1007 [facility] 
[phone] Marry Rubin MD In 1 week  [address] [place] [address] [address] 1007 [facility] 
[phone] Information obtained by : 
I understand that if any problems occur once I am at home I am to contact my physician. I understand and acknowledge receipt of the instructions indicated above. Physician's or R.N.'s Signature                                                                  Date/Time Patient or Representative Signature                                                          Date/Time High Blood Pressure: Care Instructions Your Care Instructions If your blood pressure is usually above 140/90, you have high blood pressure, or hypertension. That means the top number is 140 or higher or the bottom number is 90 or higher, or both. Despite what a lot of people think, high blood pressure usually doesn't cause headaches or make you feel dizzy or lightheaded. It usually has no symptoms. But it does increase your risk for heart attack, stroke, and kidney or eye damage. The higher your blood pressure, the more your risk increases. Your doctor will give you a goal for your blood pressure.  Your goal will be based on your health and your age. An example of a goal is to keep your blood pressure below 140/90. Lifestyle changes, such as eating healthy and being active, are always important to help lower blood pressure. You might also take medicine to reach your blood pressure goal. 
Follow-up care is a key part of your treatment and safety. Be sure to make and go to all appointments, and call your doctor if you are having problems. It's also a good idea to know your test results and keep a list of the medicines you take. How can you care for yourself at home? Medical treatment · If you stop taking your medicine, your blood pressure will go back up. You may take one or more types of medicine to lower your blood pressure. Be safe with medicines. Take your medicine exactly as prescribed. Call your doctor if you think you are having a problem with your medicine. · Talk to your doctor before you start taking aspirin every day. Aspirin can help certain people lower their risk of a heart attack or stroke. But taking aspirin isn't right for everyone, because it can cause serious bleeding. · See your doctor regularly. You may need to see the doctor more often at first or until your blood pressure comes down. · If you are taking blood pressure medicine, talk to your doctor before you take decongestants or anti-inflammatory medicine, such as ibuprofen. Some of these medicines can raise blood pressure. · Learn how to check your blood pressure at home. Lifestyle changes · Stay at a healthy weight. This is especially important if you put on weight around the waist. Losing even 10 pounds can help you lower your blood pressure. · If your doctor recommends it, get more exercise. Walking is a good choice. Bit by bit, increase the amount you walk every day. Try for at least 30 minutes on most days of the week. You also may want to swim, bike, or do other activities. · Avoid or limit alcohol. Talk to your doctor about whether you can drink any alcohol. · Try to limit how much sodium you eat to less than 2,300 milligrams (mg) a day. Your doctor may ask you to try to eat less than 1,500 mg a day. · Eat plenty of fruits (such as bananas and oranges), vegetables, legumes, whole grains, and low-fat dairy products. · Lower the amount of saturated fat in your diet. Saturated fat is found in animal products such as milk, cheese, and meat. Limiting these foods may help you lose weight and also lower your risk for heart disease. · Do not smoke. Smoking increases your risk for heart attack and stroke. If you need help quitting, talk to your doctor about stop-smoking programs and medicines. These can increase your chances of quitting for good. When should you call for help? Call 911 anytime you think you may need emergency care. This may mean having symptoms that suggest that your blood pressure is causing a serious heart or blood vessel problem. Your blood pressure may be over 180/110. ? For example, call 911 if: 
? · You have symptoms of a heart attack. These may include: ¨ Chest pain or pressure, or a strange feeling in the chest. 
¨ Sweating. ¨ Shortness of breath. ¨ Nausea or vomiting. ¨ Pain, pressure, or a strange feeling in the back, neck, jaw, or upper belly or in one or both shoulders or arms. ¨ Lightheadedness or sudden weakness. ¨ A fast or irregular heartbeat. ? · You have symptoms of a stroke. These may include: 
¨ Sudden numbness, tingling, weakness, or loss of movement in your face, arm, or leg, especially on only one side of your body. ¨ Sudden vision changes. ¨ Sudden trouble speaking. ¨ Sudden confusion or trouble understanding simple statements. ¨ Sudden problems with walking or balance. ¨ A sudden, severe headache that is different from past headaches. ? · You have severe back or belly pain. ?Do not wait until your blood pressure comes down on its own. Get help right away. ?Call your doctor now or seek immediate care if: 
? · Your blood pressure is much higher than normal (such as 180/110 or higher), but you don't have symptoms. ? · You think high blood pressure is causing symptoms, such as: ¨ Severe headache. ¨ Blurry vision. ? Watch closely for changes in your health, and be sure to contact your doctor if: 
? · Your blood pressure measures 140/90 or higher at least 2 times. That means the top number is 140 or higher or the bottom number is 90 or higher, or both. ? · You think you may be having side effects from your blood pressure medicine. ? · Your blood pressure is usually normal, but it goes above normal at least 2 times. Where can you learn more? Go to http://iker-isaac.info/. Enter O465 in the search box to learn more about \"High Blood Pressure: Care Instructions. \" Current as of: September 21, 2016 Content Version: 11.4 © 7665-9403 [employer]. Care instructions adapted under license by [employer] (which disclaims liability or warranty for this information). If you have questions about a medical condition or this instruction, always ask your healthcare professional. [address] any warranty or liability for your use of this information. [employer] Announcement We are excited to announce that we are making your provider's discharge notes available to you in [employer]. You will see these notes when they are completed and signed by the physician that discharged you from your recent hospital stay. If you have any questions or concerns about any information you see in [employer], please call the Health Information Department where you were seen or reach out to your Primary Care Provider for more information about your plan of care. Introducing [facility]! Dear Tara Silverman: Thank you for requesting a Online [employer] account. Our records indicate that you already have an active Online Pr[employer] account. You can access your account anytime at [email or web address] Did you know that you can access your hospital and ER discharge instructions at any time in Online Prasad? You can also review all of your test results from your hospital stay or ER visit. Additional Information If you have questions, please visit the Frequently Asked Questions section of the Online Prasad website at [email or web address] Remember, Online Prasad is NOT to be used for urgent needs. For medical emergencies, dial 911. Now available from your iPhone and Android! Providers Seen During Your Hospitalization Provider Specialty Primary office phone Alejandro Mcelroy MD Emergency Medicine [phone] Freya Gotti MD Internal Medicine [phone] Madhuri Sinclair MD Internal Medicine [phone] Your Primary Care Physician (PCP) Primary Care Physician Office Phone Office Fax Veryl [place] [phone] You are allergic to the following Allergen Reactions Citalopram Hydrobromide Rash With itching. Chlorthalidone Rash Contrast Agent (Iodine) Other (comments) Wheezing/bronchospasm Maxzide (Triamterene-Hydrochlorothiazid) Palpitations Vicodin (Hydrocodone-Acetaminophen) Palpitations Recent Documentation Height Weight BMI OB Status Smoking Status 1.6 m 89.4 kg 34.91 kg/m2 Postmenopausal Former Smoker Emergency Contacts Name Discharge Info Relation Home Work Mobile Madhav Ojeda DISCHARGE CAREGIVER [3] Spouse [3] [phone] Patient Belongings The following personal items are in your possession at time of discharge: 
  Dental Appliances: None  Visual Aid: Glasses, At bedside      Home Medications: Kept at bedside   Jewelry: Necklace, Ring  Clothing:  At bedside    Other Valuables: None Discharge Instructions Attachments/References DILTIAZEM (BY MOUTH) (ENGLISH) LISINOPRIL (BY MOUTH) (ENGLISH) Patient Handouts Diltiazem (By mouth) Diltiazem ([id number]) Treats high blood pressure and angina (chest pain). This medicine is a calcium channel blocker. Brand Name(s): Cardizem, Cardizem CD, Cardizem LA, Cartia XT, Dilt-XR, Matzim LA, [place], [address],[facility] B There may be other brand names for this medicine. When This Medicine Should Not Be Used: This medicine is not right for everyone. Do not use it if you had an allergic reaction to diltiazem or similar medicines. How to Use This Medicine:  
Long Acting Capsule, 12 Hour Capsule, 24 Hour Capsule, Tablet, Long Acting Tablet · Take your medicine as directed. Your dose may need to be changed several times to find what works best for you. · It is best to take this medicine on an empty stomach. · Swallow this medicine whole. Do not crush, break, chew, or open the capsule or tablet. · Missed dose: Take a dose as soon as you remember. If it is almost time for your next dose, wait until then and take a regular dose. Do not take extra medicine to make up for a missed dose. · Store the medicine in a closed container at room temperature, away from heat, moisture, and direct light. Drugs and Foods to Avoid: Ask your doctor or pharmacist before using any other medicine, including over-the-counter medicines, vitamins, and herbal products. · Some medicines can affect how diltiazem works. Tell your doctor if you are using the following: 
¨ Buspirone, carbamazepine, cimetidine, clonidine, cyclosporine, digoxin, ivabradine, lovastatin, midazolam, quinidine, rifampin, simvastatin, triazolam 
¨ Other blood pressure medicine, including a beta-blocker Vandana Valencia Warnings While Using This Medicine: · Tell your doctor if you are pregnant or breastfeeding, or if you have kidney disease, liver disease, or digestion problems. Tell your doctor about all heart problems that you have, including heart failure or rhythm problems. · This medicine may cause the following problems: ¨ Slow heartbeat ¨ Worsening of heart failure ¨ Serious skin reactions · This medicine could lower your blood pressure too much, especially when you first use it or if you are dehydrated. Stand or sit up slowly if you feel lightheaded or dizzy. Do not drive or do anything else that could be dangerous until you know how this medicine affects you. · Do not stop using this medicine without asking your doctor, even if you feel well. This medicine will not cure high blood pressure, but it will help keep it in normal range. You may have to take blood pressure medicine for the rest of your life. · Your doctor will do lab tests at regular visits to check on the effects of this medicine. Keep all appointments. · Keep all medicine out of the reach of children. Never share your medicine with anyone. Possible Side Effects While Using This Medicine:  
Call your doctor right away if you notice any of these side effects: · Allergic reaction: Itching or hives, swelling in your face or hands, swelling or tingling in your mouth or throat, chest tightness, trouble breathing · Blistering, peeling, red skin rash · Dark urine or pale stools, nausea, vomiting, loss of appetite, stomach pain, yellow skin or eyes · Fast, slow, uneven, or pounding heartbeat · Rapid weight gain, swelling in your hands, ankles, or feet If you notice other side effects that you think are caused by this medicine, tell your doctor. Call your doctor for medical advice about side effects. You may report side effects to FDA at 5-052-FDA-1084 © 2017 [facility] Information is for End User's use only and may not be sold, redistributed or otherwise used for commercial purposes. The above information is an  only. It is not intended as medical advice for individual conditions or treatments. Talk to your doctor, nurse or pharmacist before following any medical regimen to see if it is safe and effective for you. Lisinopril (By mouth) Lisinopril (lye-SIN-oh-pril) Treats high blood pressure and heart failure. Also given to reduce the risk of death after a heart attack. This medicine is an ACE inhibitor. Brand Name(s): Prinivil, Qbrelis, Zestril There may be other brand names for this medicine. When This Medicine Should Not Be Used: This medicine is not right for everyone. Do not use it if you had an allergic reaction to lisinopril or another ACE inhibitor, or if you are pregnant. How to Use This Medicine:  
Liquid, Tablet · Take your medicine as directed. Your dose may need to be changed several times to find what works best for you. · Oral liquid: Measure the oral liquid medicine with a marked measuring spoon, oral syringe, or medicine cup. · Missed dose: Take a dose as soon as you remember. If it is almost time for your next dose, wait until then and take a regular dose. Do not take extra medicine to make up for a missed dose. · Store the medicine in a closed container at room temperature, away from heat, moisture, and direct light. Drugs and Foods to Avoid: Ask your doctor or pharmacist before using any other medicine, including over-the-counter medicines, vitamins, and herbal products. · Do not use this medicine together with aliskiren if you have diabetes. · Some foods and medicines may affect how lisinopril works. Tell your doctor if you are using any of the following: ¨ Aliskiren, everolimus, lithium, sirolimus, temsirolimus ¨ Another blood pressure medicine, including an angiotensin receptor blocker (ARB) ¨ Diuretic (water pill, including amiloride, spironolactone, triamterene) ¨ Insulin or diabetes medicine ¨ NSAID pain or arthritis medicine (including aspirin, celecoxib, diclofenac, ibuprofen, naproxen) · Ask your doctor before you use any medicine, supplement, or salt substitute that contains potassium. Warnings While Using This Medicine: · It is not safe to take this medicine during pregnancy. It could harm an unborn baby. Tell your doctor right away if you become pregnant. · Tell your doctor if you are breastfeeding, or if you have kidney disease, liver disease, diabetes, or heart or blood vessel disease. · This medicine may cause the following problems: ¨ Angioedema (severe swelling) ¨ Kidney problems ¨ Serious liver problems · This medicine could lower your blood pressure too much, especially when you first use it or if you are dehydrated. Stand or sit up slowly if you feel lightheaded or dizzy. · Do not stop using this medicine without asking your doctor, even if you feel well. This medicine will not cure your high blood pressure, but it will help keep it in a normal range. You may have to take blood pressure medicine for the rest of your life. · Tell any doctor or dentist who treats you that you are using this medicine. · Your doctor will do lab tests at regular visits to check on the effects of this medicine. Keep all appointments. · Keep all medicine out of the reach of children. Never share your medicine with anyone. Possible Side Effects While Using This Medicine:  
Call your doctor right away if you notice any of these side effects: · Allergic reaction: Itching or hives, swelling in your face or hands, swelling or tingling in your mouth or throat, chest tightness, trouble breathing · Blistering, peeling, or red skin rash · Change in how much or how often you urinate · Confusion, weakness, uneven heartbeat, trouble breathing, numbness or tingling in your hands, feet, or lips · Dark urine or pale stools, nausea, vomiting, loss of appetite, stomach pain, yellow skin or eyes · Fever, chills, sore throat, body aches · Lightheadedness, dizziness, fainting · Severe stomach pain (with or without nausea or vomiting) If you notice these less serious side effects, talk with your doctor: · Dry cough If you notice other side effects that you think are caused by this medicine, tell your doctor. Call your doctor for medical advice about side effects. You may report side effects to FDA at 9-686-FDA-9283 © 2017 [facility] Information is for End User's use only and may not be sold, redistributed or otherwise used for commercial purposes. The above information is an  only. It is not intended as medical advice for individual conditions or treatments. Talk to your doctor, nurse or pharmacist before following any medical regimen to see if it is safe and effective for you. Please provide this summary of care documentation to your next provider. Signatures-by signing, you are acknowledging that this After Visit Summary has been reviewed with you and you have received a copy. Patient Signature:  ____________________________________________________________ Date:  ____________________________________________________________  
  
Yodit Hay Provider Signature:  ____________________________________________________________ Date:  ____________________________________________________________

## 2018-03-08 LAB
ALBUMIN SERPL-MCNC: 3.3 G/DL (ref 3.5–5)
ALBUMIN/GLOB SERPL: 1 {RATIO} (ref 1.1–2.2)
ALP SERPL-CCNC: 79 U/L (ref 45–117)
ALT SERPL-CCNC: 20 U/L (ref 12–78)
ANION GAP SERPL CALC-SCNC: 6 MMOL/L (ref 5–15)
AST SERPL-CCNC: 22 U/L (ref 15–37)
ATRIAL RATE: 67 BPM
BASOPHILS # BLD: 0 K/UL (ref 0–0.1)
BASOPHILS NFR BLD: 0 % (ref 0–1)
BILIRUB SERPL-MCNC: 0.5 MG/DL (ref 0.2–1)
BUN SERPL-MCNC: 13 MG/DL (ref 6–20)
BUN/CREAT SERPL: 16 (ref 12–20)
CALCIUM SERPL-MCNC: 9.5 MG/DL (ref 8.5–10.1)
CALCULATED P AXIS, ECG09: 56 DEGREES
CALCULATED R AXIS, ECG10: 106 DEGREES
CALCULATED T AXIS, ECG11: -5 DEGREES
CHLORIDE SERPL-SCNC: 106 MMOL/L (ref 97–108)
CO2 SERPL-SCNC: 27 MMOL/L (ref 21–32)
CREAT SERPL-MCNC: 0.81 MG/DL (ref 0.55–1.02)
DIAGNOSIS, 93000: NORMAL
DIFFERENTIAL METHOD BLD: ABNORMAL
EOSINOPHIL # BLD: 0 K/UL (ref 0–0.4)
EOSINOPHIL NFR BLD: 0 % (ref 0–7)
ERYTHROCYTE [DISTWIDTH] IN BLOOD BY AUTOMATED COUNT: 13.5 % (ref 11.5–14.5)
GLOBULIN SER CALC-MCNC: 3.4 G/DL (ref 2–4)
GLUCOSE SERPL-MCNC: 148 MG/DL (ref 65–100)
HCT VFR BLD AUTO: 39.3 % (ref 35–47)
HGB BLD-MCNC: 12.9 G/DL (ref 11.5–16)
IMM GRANULOCYTES # BLD: 0 K/UL (ref 0–0.04)
IMM GRANULOCYTES NFR BLD AUTO: 0 % (ref 0–0.5)
LYMPHOCYTES # BLD: 1.1 K/UL (ref 0.8–3.5)
LYMPHOCYTES NFR BLD: 13 % (ref 12–49)
MAGNESIUM SERPL-MCNC: 2 MG/DL (ref 1.6–2.4)
MCH RBC QN AUTO: 30.5 PG (ref 26–34)
MCHC RBC AUTO-ENTMCNC: 32.8 G/DL (ref 30–36.5)
MCV RBC AUTO: 92.9 FL (ref 80–99)
MONOCYTES # BLD: 0.7 K/UL (ref 0–1)
MONOCYTES NFR BLD: 8 % (ref 5–13)
NEUTS SEG # BLD: 6 K/UL (ref 1.8–8)
NEUTS SEG NFR BLD: 77 % (ref 32–75)
NRBC # BLD: 0 K/UL (ref 0–0.01)
NRBC BLD-RTO: 0 PER 100 WBC
P-R INTERVAL, ECG05: 184 MS
PLATELET # BLD AUTO: 181 K/UL (ref 150–400)
PMV BLD AUTO: 9.9 FL (ref 8.9–12.9)
POTASSIUM SERPL-SCNC: 3.8 MMOL/L (ref 3.5–5.1)
PROT SERPL-MCNC: 6.7 G/DL (ref 6.4–8.2)
Q-T INTERVAL, ECG07: 468 MS
QRS DURATION, ECG06: 140 MS
QTC CALCULATION (BEZET), ECG08: 494 MS
RBC # BLD AUTO: 4.23 M/UL (ref 3.8–5.2)
SODIUM SERPL-SCNC: 139 MMOL/L (ref 136–145)
VENTRICULAR RATE, ECG03: 67 BPM
WBC # BLD AUTO: 7.8 K/UL (ref 3.6–11)

## 2018-03-08 PROCEDURE — 36415 COLL VENOUS BLD VENIPUNCTURE: CPT | Performed by: INTERNAL MEDICINE

## 2018-03-08 PROCEDURE — 74011250637 HC RX REV CODE- 250/637: Performed by: INTERNAL MEDICINE

## 2018-03-08 PROCEDURE — 80053 COMPREHEN METABOLIC PANEL: CPT | Performed by: INTERNAL MEDICINE

## 2018-03-08 PROCEDURE — 85025 COMPLETE CBC W/AUTO DIFF WBC: CPT | Performed by: INTERNAL MEDICINE

## 2018-03-08 PROCEDURE — 65660000000 HC RM CCU STEPDOWN

## 2018-03-08 PROCEDURE — 74011250637 HC RX REV CODE- 250/637: Performed by: NURSE PRACTITIONER

## 2018-03-08 PROCEDURE — 83735 ASSAY OF MAGNESIUM: CPT | Performed by: INTERNAL MEDICINE

## 2018-03-08 PROCEDURE — 93005 ELECTROCARDIOGRAM TRACING: CPT

## 2018-03-08 PROCEDURE — 74011250637 HC RX REV CODE- 250/637: Performed by: SPECIALIST

## 2018-03-08 PROCEDURE — 93306 TTE W/DOPPLER COMPLETE: CPT

## 2018-03-08 RX ORDER — CLONIDINE HYDROCHLORIDE 0.1 MG/1
0.1 TABLET ORAL ONCE
Status: COMPLETED | OUTPATIENT
Start: 2018-03-08 | End: 2018-03-08

## 2018-03-08 RX ORDER — CLONIDINE HYDROCHLORIDE 0.1 MG/1
0.1 TABLET ORAL 3 TIMES DAILY
Status: DISCONTINUED | OUTPATIENT
Start: 2018-03-08 | End: 2018-03-09 | Stop reason: HOSPADM

## 2018-03-08 RX ORDER — CARVEDILOL 6.25 MG/1
6.25 TABLET ORAL 2 TIMES DAILY WITH MEALS
Status: DISCONTINUED | OUTPATIENT
Start: 2018-03-08 | End: 2018-03-08

## 2018-03-08 RX ORDER — EZETIMIBE 10 MG/1
10 TABLET ORAL EVERY EVENING
Status: DISCONTINUED | OUTPATIENT
Start: 2018-03-08 | End: 2018-03-09 | Stop reason: HOSPADM

## 2018-03-08 RX ORDER — PANTOPRAZOLE SODIUM 40 MG/1
40 TABLET, DELAYED RELEASE ORAL
Status: DISCONTINUED | OUTPATIENT
Start: 2018-03-08 | End: 2018-03-09 | Stop reason: HOSPADM

## 2018-03-08 RX ORDER — ACETAMINOPHEN 325 MG/1
650 TABLET ORAL
Status: DISCONTINUED | OUTPATIENT
Start: 2018-03-08 | End: 2018-03-09 | Stop reason: HOSPADM

## 2018-03-08 RX ORDER — LEVOTHYROXINE SODIUM 50 UG/1
50 TABLET ORAL
Status: DISCONTINUED | OUTPATIENT
Start: 2018-03-08 | End: 2018-03-09 | Stop reason: HOSPADM

## 2018-03-08 RX ORDER — FERROUS SULFATE, DRIED 160(50) MG
1 TABLET, EXTENDED RELEASE ORAL DAILY
Status: DISCONTINUED | OUTPATIENT
Start: 2018-03-08 | End: 2018-03-09 | Stop reason: HOSPADM

## 2018-03-08 RX ORDER — DILTIAZEM HYDROCHLORIDE 30 MG/1
30 TABLET, FILM COATED ORAL
Status: DISCONTINUED | OUTPATIENT
Start: 2018-03-08 | End: 2018-03-09 | Stop reason: HOSPADM

## 2018-03-08 RX ORDER — ESCITALOPRAM OXALATE 10 MG/1
5 TABLET ORAL EVERY EVENING
Status: DISCONTINUED | OUTPATIENT
Start: 2018-03-08 | End: 2018-03-09 | Stop reason: HOSPADM

## 2018-03-08 RX ORDER — NITROGLYCERIN 0.4 MG/1
0.4 TABLET SUBLINGUAL ONCE
Status: COMPLETED | OUTPATIENT
Start: 2018-03-08 | End: 2018-03-08

## 2018-03-08 RX ORDER — FLECAINIDE ACETATE 50 MG/1
50 TABLET ORAL EVERY 12 HOURS
Status: DISCONTINUED | OUTPATIENT
Start: 2018-03-08 | End: 2018-03-09 | Stop reason: HOSPADM

## 2018-03-08 RX ORDER — LISINOPRIL 5 MG/1
10 TABLET ORAL DAILY
Status: DISCONTINUED | OUTPATIENT
Start: 2018-03-08 | End: 2018-03-09 | Stop reason: HOSPADM

## 2018-03-08 RX ADMIN — OYSTER SHELL CALCIUM WITH VITAMIN D 1 TABLET: 500; 200 TABLET, FILM COATED ORAL at 08:50

## 2018-03-08 RX ADMIN — AMLODIPINE BESYLATE 5 MG: 5 TABLET ORAL at 00:14

## 2018-03-08 RX ADMIN — PANTOPRAZOLE SODIUM 40 MG: 40 TABLET, DELAYED RELEASE ORAL at 21:35

## 2018-03-08 RX ADMIN — LEVOTHYROXINE SODIUM 50 MCG: 50 TABLET ORAL at 06:27

## 2018-03-08 RX ADMIN — FLECAINIDE ACETATE 50 MG: 50 TABLET ORAL at 10:00

## 2018-03-08 RX ADMIN — CLONIDINE HYDROCHLORIDE 0.1 MG: 0.1 TABLET ORAL at 21:35

## 2018-03-08 RX ADMIN — ESCITALOPRAM OXALATE 5 MG: 10 TABLET ORAL at 17:53

## 2018-03-08 RX ADMIN — Medication 10 ML: at 06:22

## 2018-03-08 RX ADMIN — ACETAMINOPHEN 650 MG: 325 TABLET ORAL at 23:37

## 2018-03-08 RX ADMIN — CLONIDINE HYDROCHLORIDE 0.1 MG: 0.1 TABLET ORAL at 08:50

## 2018-03-08 RX ADMIN — CLONIDINE HYDROCHLORIDE 0.1 MG: 0.1 TABLET ORAL at 23:23

## 2018-03-08 RX ADMIN — Medication 10 ML: at 21:35

## 2018-03-08 RX ADMIN — FLECAINIDE ACETATE 50 MG: 50 TABLET ORAL at 21:35

## 2018-03-08 RX ADMIN — LISINOPRIL 10 MG: 5 TABLET ORAL at 09:49

## 2018-03-08 RX ADMIN — CLONIDINE HYDROCHLORIDE 0.1 MG: 0.1 TABLET ORAL at 17:53

## 2018-03-08 RX ADMIN — ACETAMINOPHEN 650 MG: 325 TABLET ORAL at 08:50

## 2018-03-08 RX ADMIN — NITROGLYCERIN 0.4 MG: 0.4 TABLET SUBLINGUAL at 23:22

## 2018-03-08 RX ADMIN — Medication 10 ML: at 13:54

## 2018-03-08 RX ADMIN — DIPHENHYDRAMINE HYDROCHLORIDE 25 MG: 25 CAPSULE ORAL at 00:14

## 2018-03-08 RX ADMIN — EZETIMIBE 10 MG: 10 TABLET ORAL at 17:53

## 2018-03-08 RX ADMIN — APIXABAN 5 MG: 5 TABLET, FILM COATED ORAL at 17:53

## 2018-03-08 RX ADMIN — DILTIAZEM HYDROCHLORIDE 30 MG: 30 TABLET, FILM COATED ORAL at 13:53

## 2018-03-08 RX ADMIN — APIXABAN 5 MG: 5 TABLET, FILM COATED ORAL at 08:50

## 2018-03-08 NOTE — CONSULTS
Name: Mady Appl: [address]   : 1940 Admit Date: 3/7/2018   Phone: [phone]  Room: [facility]/01   PCP: Chester Jon MD  MRN: [id number]   Date: 3/8/2018  Code: Full Code          Chart and notes reviewed. Data reviewed. I review the patient's current medications in the medical record at each encounter. I have evaluated and examined the patient. HPI:    8:53 AM       History was obtained from patient. I was asked by Deloris Berumen MD to see Nigel Self in consultation for a chief complaint of pulmonary HTN. Mrs. Ghada Harrison is a very pleasant 70year old female with history of PAF, s/p ablation and cardioversion x 3 (most recently on ), HTN, GERD, and hiatal hernia who presented to the [facility] ED with dyspnea and fatigue. Patient reports her symptoms began afer her recent cardioversion and increase of Flecainide of 100mg BID. Has noticed very low HR in the 40s, even with activity. Has felt sluggish. Coreg and Cardizem had been stopped after cardioversion, but she also noticed increased BP along with bradycardia. Was advised by the cardiology office to remain off Cardizem, restart Coreg, and Flecainide dose cut back down to 50mg BID. Elevated BP continued along with bradycardia and HA so she presented to the ED. HAs history of ANÍBAL with AHI of 24,1 in 2015 at [address]. Patient states she slept very poorly that night. All events were hypopneas. Was prescribed CPAP which she was using as recently as last May. Was then evaluated by Dr. Lauryn Coffman and had repeat PSG in September of last year. AHI was noted to be zero and CPAP was not indicated. She did not have any REM sleep however. She is a former 1/5 PPD smoker for ~ 27 years, quitting age 50. Denies known history of lung disease and does not use home O2, nebs, or inhalers. Chest CTA personally visualized. No evidence of PE.   Enlarged main pulmonary arteries consistent with pulmonary arterial hypertension. Large hiatal hernia. Head CT with no acute intracranial process. Serum bicarb 27  Trop < 0.04  proBNP 453    Past Medical History:   Diagnosis Date    DDD (degenerative disc disease)     Gastroesophageal reflux disease without esophagitis 7/3/2016    GERD (gastroesophageal reflux disease)     Hiatal hernia     HTN (hypertension), benign 7/26/2016    Hypertension     Hypothyroidism     ANÍBAL (obstructive sleep apnea) 7/26/2016    Paroxysmal atrial fibrillation ([address] 8/28/2016    Uterine prolapse        Past Surgical History:   Procedure Laterality Date    HX AFIB ABLATION  03/2017    HX GI      COLONOSCOPY 7/14    HX GYN      TUBAL LIGATION    HX HEENT      WISDOM TEETH EXTRACTED. Family History   Problem Relation Age of Onset    Diabetes Mother     Hypertension Mother     COPD Mother        Social History   Substance Use Topics    Smoking status: Former Smoker     Packs/day: 1.50     Years: 30.00     Quit date: 3/3/1994    Smokeless tobacco: Never Used    Alcohol use No       Allergies   Allergen Reactions    Citalopram Hydrobromide Rash     With itching.     Chlorthalidone Rash    Contrast Agent [Iodine] Other (comments)     Wheezing/bronchospasm    Maxzide [Triamterene-Hydrochlorothiazid] Palpitations    Vicodin [Hydrocodone-Acetaminophen] Palpitations       Current Facility-Administered Medications   Medication Dose Route Frequency    apixaban (ELIQUIS) tablet 5 mg  5 mg Oral BID    calcium-vitamin D (OS-TIERRA) 500 mg-200 unit tablet  1 Tab Oral DAILY    carvedilol (COREG) tablet 6.25 mg  6.25 mg Oral BID WITH MEALS    cloNIDine HCl (CATAPRES) tablet 0.1 mg  0.1 mg Oral TID    escitalopram oxalate (LEXAPRO) tablet 5 mg  5 mg Oral QPM    ezetimibe (ZETIA) tablet 10 mg  10 mg Oral QPM    pantoprazole (PROTONIX) tablet 40 mg  40 mg Oral QHS    levothyroxine (SYNTHROID) tablet 50 mcg  50 mcg Oral ACB    acetaminophen (TYLENOL) tablet 650 mg  650 mg Oral Q6H PRN    sodium chloride (NS) flush 5-10 mL  5-10 mL IntraVENous Q8H    sodium chloride (NS) flush 5-10 mL  5-10 mL IntraVENous PRN    naloxone (NARCAN) injection 0.4 mg  0.4 mg IntraVENous PRN    amLODIPine (NORVASC) tablet 5 mg  5 mg Oral DAILY         REVIEW OF SYSTEMS   12 point ROS negative except as stated in the HPI. Physical Exam:   Visit Vitals    /69 (BP 1 Location: Right arm, BP Patient Position: At rest)    Pulse 66    Temp 97.7 °F (36.5 °C)    Resp 20    Ht 5' 3\" (1.6 m)    Wt 89.4 kg (197 lb 1.5 oz)    SpO2 96%    BMI 34.91 kg/m2       General:  Alert, cooperative, no distress, appears stated age. Head:  Normocephalic, without obvious abnormality, atraumatic. Eyes:  Conjunctivae/corneas clear. Nose: Nares normal. Septum midline. Mucosa normal.    Throat: Lips, mucosa, and tongue normal.    Neck: Supple, symmetrical, trachea midline, no adenopathy. Lungs:   Clear to auscultation bilaterally. Chest wall:  No tenderness or deformity. Heart:  Bradycardia, no murmur, click, rub or gallop. Abdomen:   Soft, non-tender. Bowel sounds normal. No masses,  No organomegaly. Extremities: Extremities normal, atraumatic, no cyanosis or edema. Pulses: 2+ and symmetric all extremities.    Skin: Skin color, texture, turgor normal. No rashes or lesions   Lymph nodes: Cervical, supraclavicular nodes normal.   Neurologic: Grossly nonfocal       Lab Results   Component Value Date/Time    Sodium 139 03/08/2018 03:55 AM    Potassium 3.8 03/08/2018 03:55 AM    Chloride 106 03/08/2018 03:55 AM    CO2 27 03/08/2018 03:55 AM    BUN 13 03/08/2018 03:55 AM    Creatinine 0.81 03/08/2018 03:55 AM    Glucose 148 (H) 03/08/2018 03:55 AM    Calcium 9.5 03/08/2018 03:55 AM    Magnesium 2.0 03/08/2018 03:55 AM       Lab Results   Component Value Date/Time    WBC 7.8 03/08/2018 03:55 AM    HGB 12.9 03/08/2018 03:55 AM    PLATELET 628 84/52/6638 03:55 AM    MCV 92.9 03/08/2018 03:55 AM Lab Results   Component Value Date/Time    INR 1.2 (H) 12/20/2017 11:25 PM    aPTT 25.7 12/20/2017 11:25 PM    AST (SGOT) 22 03/08/2018 03:55 AM    Alk.  phosphatase 79 03/08/2018 03:55 AM    Protein, total 6.7 03/08/2018 03:55 AM    Albumin 3.3 (L) 03/08/2018 03:55 AM    Globulin 3.4 03/08/2018 03:55 AM       No results found for: IRON, FE, TIBC, IBCT, PSAT, FERR    No results found for: SR, CRP, GINGER, ANAIGG, RA, RPR, RPRT, VDRLT, VDRLS, TSH, TSHEXT     No results found for: PH, PHI, PCO2, PCO2I, PO2, PO2I, HCO3, HCO3I, FIO2, FIO2I    Lab Results   Component Value Date/Time     09/05/2016 01:34 AM    CK-MB Index 1.3 09/05/2016 01:34 AM    Troponin-I, Qt. <0.04 03/07/2018 09:24 PM        Lab Results   Component Value Date/Time    Culture result: NO GROWTH 1 DAY 09/05/2016 02:01 AM    Culture result: MIXED UROGENITAL JAGUAR ISOLATED 07/26/2016 11:48 PM    Culture result: CITROBACTER FREUNDII 04/29/2014 01:04 AM       No results found for: TOXA1, RPR, HBCM, HBSAG, HAAB, HCAB1, HAAT, G6PD, CRYAC, HIVGT, HIVR, HIV1, HIV12, HIVPC, HIVRPI    Lab Results   Component Value Date/Time     09/05/2016 01:34 AM     (H) 06/08/2012 02:40 AM       Lab Results   Component Value Date/Time    Color YELLOW/STRAW 12/29/2017 01:43 AM    Appearance CLEAR 12/29/2017 01:43 AM    Specific gravity 1.010 12/29/2017 01:43 AM    pH (UA) 6.0 12/29/2017 01:43 AM    Protein NEGATIVE  12/29/2017 01:43 AM    Glucose NEGATIVE  12/29/2017 01:43 AM    Ketone NEGATIVE  12/29/2017 01:43 AM    Bilirubin NEGATIVE  12/29/2017 01:43 AM    Blood TRACE (A) 12/29/2017 01:43 AM    Urobilinogen 0.2 12/29/2017 01:43 AM    Nitrites NEGATIVE  12/29/2017 01:43 AM    Leukocyte Esterase NEGATIVE  12/29/2017 01:43 AM    WBC 0-4 12/29/2017 01:43 AM    RBC 0-5 12/29/2017 01:43 AM    Bacteria NEGATIVE  12/29/2017 01:43 AM       IMPRESSION  · Accelerated HTN  · Possible pulmonary HTN: enlarged pulm artery on chest CT  · PAF  · Hiatal hernia  · GERD  · Hypothyroidism  · Hx of ANÍBAL, but most recent PSG 9/2017 with AHI of 0; not on CPAP  · Former heavy smoker: 1.5 PPD x 30 years; quit age 50    PLAN  · Check ECHO  · BP control per primary team  · Cardiology consult pending; holding Coreg for now due to bradycardia  · Patient would benefit from outpatient pulmonary f/u with PFTs to eval for potential COPD given smoking history and dyspnea  · GI prophylaxis: Protonix  · DVT prophylaxis: Eliquis      Thank you for allowing us to participate in the care of this patient.      Chauncey Adan

## 2018-03-08 NOTE — ED NOTES
Assumed care of pt. Bed locked and in low position with call bell within reach. Using AIDET-Introduced self as Primary RN and plan discussed with pt with understanding was verbalized. Pt denies additional complaints at this time. White board updated with this nurse's name. Patient advised that medical information will be discussed and it is their own responsibility to tell nurse if such conversation should not take place in the presence of visitors. Pt verbalizes understanding. Pt's spouse waiting in lobby.

## 2018-03-08 NOTE — TELEPHONE ENCOUNTER
Called patient,  answered, patient ID verified using two patient identifiers. He states patient is currently admitted to [facility] due to Accelerated HTN.  Medication evaluation and changes to be made by physicians while patient is in the hospital.

## 2018-03-08 NOTE — ED PROVIDER NOTES
HPI Comments: Heather Cee is a 70 y.o. female with hx significant for HTN, paroxysmal Afib who presents ambulatory to ER with c/o elevated blood pressure, low HR, and headache. Pt states that she had a cardioversion for afib on 2/28 by Dr. Naa Man with successful conversion to NSR. States when she was discharged home she was advised to stop taking her diltiazem and carvedilol at that time and to continue flecainide 100mg BID and clonidine 0.1mg prn for her BP. States since then she has been having intermittent headache and steady increase in her BP as well. Also notes intermittent SOB particularly with exertion since the procedure. Notes significant worsening in her headache and SOB over the past few days. Notes started back on her carvedilol yx bc she was concerned about her low heart rate, states HR went into the 40's multiple times. States called office today bc BP was high and advised to reduce down to 50mg of flecainide at this time however she had already taken 100mg this morning. Notes checked her BP at [facility] and it was 190's/110's. Took 0.1mg clonidine at that time. Felt it was still high and rechecked at 730PM and was even higher at 220's/110's so took 0.2mg clonidine at that time. Notes still not coming down so came to ER. She specifically denies any fevers, chills, nausea, vomiting, chest pain, rash, diarrhea, abdominal pain, urinary/bowel changes, sweating or weight loss.     PCP: Dallas Pollock MD   PMHx significant for: Past Medical History:  No date: DDD (degenerative disc disease)  7/3/2016: Gastroesophageal reflux disease without esopha*  No date: GERD (gastroesophageal reflux disease)  No date: Hiatal hernia  7/26/2016: HTN (hypertension), benign  No date: Hypertension  No date: Hypothyroidism  7/26/2016: ANÍBAL (obstructive sleep apnea)  8/28/2016: Paroxysmal atrial fibrillation (HCC)  No date: Uterine prolapse    PSHx significant for: Past Surgical History:  03/2017: HX AFIB ABLATION  No date: HX GI      Comment: COLONOSCOPY 7/14  No date: HX GYN      Comment: TUBAL LIGATION  No date: HX HEENT      Comment: WISDOM TEETH EXTRACTED. -- Citalopram Hydrobromide -- Rash    --  With itching.   -- Chlorthalidone -- Rash   -- Maxzide (Triamterene-Hydrochlorothiazid) -- Palpitations   -- Vicodin (Hydrocodone-Acetaminophen) -- Palpitations    There are no other complaints, changes or physical findings at this time. The history is provided by the patient and medical records. Past Medical History:   Diagnosis Date    DDD (degenerative disc disease)     Gastroesophageal reflux disease without esophagitis 7/3/2016    GERD (gastroesophageal reflux disease)     Hiatal hernia     HTN (hypertension), benign 7/26/2016    Hypertension     Hypothyroidism     ANÍBAL (obstructive sleep apnea) 7/26/2016    Paroxysmal atrial fibrillation ([email or web address].) 8/28/2016    Uterine prolapse        Past Surgical History:   Procedure Laterality Date    HX AFIB ABLATION  03/2017    HX GI      COLONOSCOPY 7/14    HX GYN      TUBAL LIGATION    HX HEENT      WISDOM TEETH EXTRACTED. Family History:   Problem Relation Age of Onset    Diabetes Mother     Hypertension Mother     COPD Mother        Social History     Social History    Marital status:      Spouse name: N/A    Number of children: N/A    Years of education: N/A     Occupational History    Not on file. Social History Main Topics    Smoking status: Former Smoker     Packs/day: 1.50     Years: 30.00     Quit date: 3/3/1994    Smokeless tobacco: Never Used    Alcohol use No    Drug use: No    Sexual activity: No     Other Topics Concern    Not on file     Social History Narrative         ALLERGIES: Citalopram hydrobromide; Chlorthalidone; Maxzide [triamterene-hydrochlorothiazid]; and Vicodin [hydrocodone-acetaminophen]    Review of Systems   Constitutional: Negative. Negative for appetite change, chills, fatigue and fever. HENT: Negative. Negative for congestion and sore throat. Eyes: Negative. Negative for visual disturbance. Respiratory: Positive for shortness of breath. Negative for cough and wheezing. Cardiovascular: Negative. Negative for chest pain, palpitations and leg swelling. Gastrointestinal: Negative. Negative for abdominal pain, constipation, diarrhea, nausea and vomiting. Genitourinary: Negative. Negative for dysuria, flank pain and hematuria. Musculoskeletal: Negative. Negative for back pain and neck pain. Skin: Negative. Negative for rash. Neurological: Positive for headaches. Negative for dizziness, syncope, weakness and numbness. Hematological: Negative. Psychiatric/Behavioral: Negative. All other systems reviewed and are negative. Vitals:    03/07/18 2115 03/07/18 2130 03/07/18 2134 03/07/18 2138   BP: (!) 217/109 (!) 217/99     Pulse: 63 65 62    Resp: 23 26 26    Temp:       SpO2: 94% 90% (!) 89% 95%   Weight:       Height:                Physical Exam   Constitutional: She is oriented to person, place, and time. She appears well-developed and well-nourished. No distress. HENT:   Head: Normocephalic and atraumatic. Right Ear: External ear normal.   Left Ear: External ear normal.   Nose: Nose normal.   Mouth/Throat: Oropharynx is clear and moist.   Eyes: Conjunctivae and EOM are normal. Pupils are equal, round, and reactive to light. Neck: Normal range of motion. Neck supple. Cardiovascular: Normal rate, regular rhythm, S1 normal, S2 normal, normal heart sounds, intact distal pulses and normal pulses. Exam reveals no gallop and no friction rub. No murmur heard. Pulses:       Dorsalis pedis pulses are 2+ on the right side, and 2+ on the left side. Pulmonary/Chest: Effort normal and breath sounds normal. No accessory muscle usage. No respiratory distress. She has no decreased breath sounds. She has no wheezes. She has no rhonchi. She has no rales. Abdominal: Soft.  Normal appearance and bowel sounds are normal. She exhibits no distension. There is no hepatosplenomegaly. There is no tenderness. There is no rebound, no guarding and no CVA tenderness. Musculoskeletal: Normal range of motion. She exhibits no edema or tenderness. FROM spine and all joints/extremities in ER without difficulty. Ambulatory in ER without difficulty. Neurological: She is alert and oriented to person, place, and time. She has normal strength. No sensory deficit. No focal neurologic deficit. Strength 5/5 and equal bilateral upper and lower extremities. NVI all extremities, brisk cap refill all extremities. DP pulse 2+ bilaterally. Radial pulse 2+ bilaterally. Skin: Skin is warm and dry. No rash noted. She is not diaphoretic. No erythema. No pallor. Psychiatric: She has a normal mood and affect. Her behavior is normal.   Nursing note and vitals reviewed. MDM  Number of Diagnoses or Management Options  Hypertensive urgency:   Hypoxia:   Pulmonary hypertension:   SOB (shortness of breath):   Diagnosis management comments: DDx: HTN, ICH, PE, electrolyte imbalance    10:17 PM  I reviewed our electronic medical record system for any past medical records and prior imaging and/or lab results that were available that may contribute to the patients current condition. No indication via EMR and discharge note from cardioversion advising pt to stop her carvedilol or diltiazem after the procedure however pt reports that she was told to stop them both and she did. Has not taken either since 2/28. Only taking clonidine for her BP since 2/28.  Per nurse note regarding pt phone call two days ago about concern of her low HR and high BP, concern that diltiazem could be causing low HR and pt was advised at that time to stop her diltiazem (however pt had already not been taking diltiazem at all since 2/28 so could not be source of low HR) and to continue her coreg and flecainide and monitor BP/HR and call office in two days to discuss. Shraddha Buchanan PA-C         Amount and/or Complexity of Data Reviewed  Clinical lab tests: ordered and reviewed  Tests in the radiology section of CPT®: ordered and reviewed  Tests in the medicine section of CPT®: ordered and reviewed  Review and summarize past medical records: yes  Discuss the patient with other providers: yes (Dr. Reji Dent; Dr. Tracie Alarcon; Dr. Woody )  Independent visualization of images, tracings, or specimens: yes    Patient Progress  Patient progress: stable        ED Course       Procedures               ED EKG interpretation:  Rhythm: normal sinus rhythm and RBBB; and regular . Rate (approx.): 67; Axis: normal; P wave: normal; QRS interval: prolonged; ST/T wave: normal;  Other findings: RBBB, abnormal EKG. EKG documented by Shraddha Buchanan PA-C, PA-C, as interpreted by Mariama Escoto MD, ED MD.      9:26 PM   Shraddha Buchanan PA-C discussed patient with Mariama Escoto MD who is in agreement with care plan as outlined. Will be in to see the patient. No further recommendations. Shraddha Buchanan PA-C      10:43 PM  Shraddha Buchanan PA-C  into reevaluate patient at this time. Reports worsening SOB since getting CT. Pt no with audible wheezing bilaterally. Diffuse expiratory wheezing bilat on reexam. No hx of pulmonary disease. Updated on available results. All questions answered. BP remains elevated at 197/116. O2 sats dipped to 88-89% at rest. Will admit for further eval. Shraddha Buchanan PA-C      CONSULT NOTE:   10:48 PM  Shraddha Buchanan PA-C  spoke with Dr. Woody ,   Specialty:  hospitalist  Discussed pt's hx, disposition, and available diagnostic and imaging results. Reviewed care plans. Consultant agrees with plans as outlined. Will admit patient. Recommends hydralazine and consulting cards for clarification on meds.  Shraddha Buchanan PA-C      10:49 PM  Patient is being admitted to the hospital.  The results of their tests and reasons for their admission have been discussed with them and/or available family. They convey agreement and understanding for the need to be admitted and for their admission diagnosis. Consultation has been made with the inpatient physician specialist for hospitalization. LABORATORY TESTS:  Recent Results (from the past 12 hour(s))   CBC WITH AUTOMATED DIFF    Collection Time: 03/07/18  9:24 PM   Result Value Ref Range    WBC 6.5 3.6 - 11.0 K/uL    RBC 4.55 3.80 - 5.20 M/uL    HGB 13.9 11.5 - 16.0 g/dL    HCT 42.8 35.0 - 47.0 %    MCV 94.1 80.0 - 99.0 FL    MCH 30.5 26.0 - 34.0 PG    MCHC 32.5 30.0 - 36.5 g/dL    RDW 13.5 11.5 - 14.5 %    PLATELET 003 439 - 736 K/uL    MPV 10.1 8.9 - 12.9 FL    NRBC 0.0 0  WBC    ABSOLUTE NRBC 0.00 0.00 - 0.01 K/uL    NEUTROPHILS 58 32 - 75 %    LYMPHOCYTES 26 12 - 49 %    MONOCYTES 11 5 - 13 %    EOSINOPHILS 3 0 - 7 %    BASOPHILS 1 0 - 1 %    IMMATURE GRANULOCYTES 0 0.0 - 0.5 %    ABS. NEUTROPHILS 3.8 1.8 - 8.0 K/UL    ABS. LYMPHOCYTES 1.7 0.8 - 3.5 K/UL    ABS. MONOCYTES 0.7 0.0 - 1.0 K/UL    ABS. EOSINOPHILS 0.2 0.0 - 0.4 K/UL    ABS. BASOPHILS 0.0 0.0 - 0.1 K/UL    ABS. IMM. GRANS. 0.0 0.00 - 0.04 K/UL    DF AUTOMATED     METABOLIC PANEL, COMPREHENSIVE    Collection Time: 03/07/18  9:24 PM   Result Value Ref Range    Sodium 141 136 - 145 mmol/L    Potassium 3.9 3.5 - 5.1 mmol/L    Chloride 104 97 - 108 mmol/L    CO2 28 21 - 32 mmol/L    Anion gap 9 5 - 15 mmol/L    Glucose 129 (H) 65 - 100 mg/dL    BUN 17 6 - 20 MG/DL    Creatinine 0.96 0.55 - 1.02 MG/DL    BUN/Creatinine ratio 18 12 - 20      GFR est AA >60 >60 ml/min/1.73m2    GFR est non-AA 57 (L) >60 ml/min/1.73m2    Calcium 9.5 8.5 - 10.1 MG/DL    Bilirubin, total 0.4 0.2 - 1.0 MG/DL    ALT (SGPT) 23 12 - 78 U/L    AST (SGOT) 21 15 - 37 U/L    Alk.  phosphatase 95 45 - 117 U/L    Protein, total 7.7 6.4 - 8.2 g/dL    Albumin 3.8 3.5 - 5.0 g/dL    Globulin 3.9 2.0 - 4.0 g/dL    A-G Ratio 1.0 (L) 1.1 - 2.2     NT-PRO BNP    Collection Time: 03/07/18 9:24 PM   Result Value Ref Range    NT pro- (H) 0 - 450 PG/ML   TROPONIN I    Collection Time: 03/07/18  9:24 PM   Result Value Ref Range    Troponin-I, Qt. <0.04 <0.05 ng/mL   MAGNESIUM    Collection Time: 03/07/18  9:24 PM   Result Value Ref Range    Magnesium 2.2 1.6 - 2.4 mg/dL       IMAGING RESULTS:  CTA CHEST W OR W WO CONT   Final Result      CT HEAD WO CONT   Final Result        Ct Head Wo Cont    Result Date: 3/7/2018  EXAM:  CT HEAD WO CONT Clinical history: Headache and hypertension INDICATION:   headache, HTN COMPARISON: 4/13/2017. CONTRAST:  None. TECHNIQUE: Unenhanced CT of the head was performed using 5 mm images. Brain and bone windows were generated. CT dose reduction was achieved through use of a standardized protocol tailored for this examination and automatic exposure control for dose modulation. FINDINGS: The ventricles and sulci are normal in size, shape and configuration and midline. Scattered white matter hypodensities. . There is no intracranial hemorrhage, extra-axial collection, mass, mass effect or midline shift. The basilar cisterns are open. No acute infarct is identified. The bone windows demonstrate no abnormalities. The visualized portions of the paranasal sinuses and mastoid air cells are clear. IMPRESSION: No acute intracranial process     Cta Chest W Or W Wo Cont    Result Date: 3/7/2018  CLINICAL HISTORY: SOB and hypoxia INDICATION: SOB and hypoxia COMPARISON: 7/23/2016 TECHNIQUE: CT of the chest with  IV contrast , Isovue-370 is performed. Axial images from the thoracic inlet to the level of the upper abdomen are obtained. Manual post-processing of the images and coronal reformatting is also performed. CT dose reduction was achieved through use of a standardized protocol tailored for this examination and automatic exposure control for dose modulation. Multiplanar reformatted imaging was performed. Sagittal and coronal reformatting.  3-D Postprocessing of imaging was performed. 3-D MIP reconstructed images were obtained in the coronal plane. FINDINGS: There is no pulmonary embolism. There is no aortic aneurysm or dissection. Large hiatal hernia. . Dependent atelectasis at the lung bases. Chronic pleural and parenchymal changes with minimal bronchial wall thickening. Elevation of the right hemidiaphragm. Minimal scattered groundglass nodules. There is no pleural or pericardial effusion. There is no mediastinal, axillary or hilar lymphadenopathy. Aneurysmal dilatation of the ascending aorta 44 mm. Increased size of main pulmonary artery is consistent with pulmonary arterial hypertension. . The proximal pulmonary arteries are without evidence of filling defects. No lytic or blastic lesions are identified. Dyspnea. The remainder of the upper abdomen visualized is unremarkable. IMPRESSION: There is no pulmonary embolism. Aneurysmal dilatation of the ascending aorta. Unchanged. Enlarged main pulmonary arteries consistent with pulmonary arterial hypertension. Large hiatal hernia. MEDICATIONS GIVEN:  Medications   hydrALAZINE (APRESOLINE) 20 mg/mL injection 20 mg (not administered)   sodium chloride (NS) flush 5-10 mL (not administered)   sodium chloride (NS) flush 5-10 mL (not administered)   naloxone (NARCAN) injection 0.4 mg (not administered)   sodium chloride 0.9 % bolus infusion 1,000 mL (0 mL IntraVENous Stopped 3/7/18 2220)   labetalol (NORMODYNE;TRANDATE) injection 20 mg (20 mg IntraVENous Given 3/7/18 2130)   iopamidol (ISOVUE-370) 76 % injection 100 mL (80 mL IntraVENous Given 3/7/18 2210)   albuterol 5mg / ipratropium 0.5mg neb solution (1 Dose Nebulization Given 3/7/18 2240)       IMPRESSION:  1. Hypertensive urgency    2. Hypoxia    3. SOB (shortness of breath)    4. Pulmonary hypertension        PLAN:  1.  Admit to hospitalist        CONSULT NOTE:   11:00 PM  Lord Jaycee PA-C  spoke with Dr. Idris Brown,   Specialty: cardiology  Discussed pt's hx, disposition, and available diagnostic and imaging results. Reviewed care plans. Consultant agrees with plans as outlined. States pt should not have stopped her carvedilol or her diltiazem at all, especially post cardioversion. Will see patient in the morning.  Ramirez Doyle PA-C

## 2018-03-08 NOTE — PROGRESS NOTES
Marshal Soliman giselle [place] [address]  [address]  [phone]      Medical Progress Note      NAME: Anai Benavides   :  1940  MRM:  [id number]    Date/Time: 3/8/2018         Subjective:     Chief Complaint:  Patient was seen and examined by me. Chart reviewed. Headache is better. No chest pain       Objective:       Vitals:       Last 24hrs VS reviewed since prior progress note.  Most recent are:    Visit Vitals    /86 (BP 1 Location: Right arm, BP Patient Position: At rest)    Pulse (!) 59    Temp 98.4 °F (36.9 °C)    Resp 20    Ht 5' 3\" (1.6 m)    Wt 89.4 kg (197 lb 1.5 oz)    SpO2 94%    BMI 34.91 kg/m2     SpO2 Readings from Last 6 Encounters:   18 94%   18 96%   18 96%   18 98%   18 98%   17 97%    O2 Flow Rate (L/min): 2 l/min     Intake/Output Summary (Last 24 hours) at 18 1310  Last data filed at 18 2440   Gross per 24 hour   Intake              200 ml   Output             1700 ml   Net            -1500 ml        Exam:     Physical Exam:    Gen:  Well-developed, well-nourished, obese, in no acute distress  HEENT:  Pink conjunctivae, PERRL, hearing intact to voice, moist mucous membranes  Neck:  Supple, without masses, thyroid non-tender  Resp:  No accessory muscle use, clear breath sounds without wheezes rales or rhonchi  Card:  César, no murmurs, normal S1, S2 without thrills, bruits or peripheral edema  Abd:  Soft, non-tender, non-distended, normoactive bowel sounds are present  Musc:  No cyanosis or clubbing  Skin:  No rashes  Neuro:  Cranial nerves 3-12 are grossly intact, follows commands appropriately  Psych:  Good insight, oriented to person, place and time, alert    Medications Reviewed: (see below)    Lab Data Reviewed: (see below)    ______________________________________________________________________    Medications:     Current Facility-Administered Medications   Medication Dose Route Frequency    apixaban (ELIQUIS) tablet 5 mg  5 mg Oral BID    calcium-vitamin D (OS-TIERRA) 500 mg-200 unit tablet  1 Tab Oral DAILY    cloNIDine HCl (CATAPRES) tablet 0.1 mg  0.1 mg Oral TID    escitalopram oxalate (LEXAPRO) tablet 5 mg  5 mg Oral QPM    ezetimibe (ZETIA) tablet 10 mg  10 mg Oral QPM    pantoprazole (PROTONIX) tablet 40 mg  40 mg Oral QHS    levothyroxine (SYNTHROID) tablet 50 mcg  50 mcg Oral ACB    acetaminophen (TYLENOL) tablet 650 mg  650 mg Oral Q6H PRN    lisinopril (PRINIVIL, ZESTRIL) tablet 10 mg  10 mg Oral DAILY    dilTIAZem (CARDIZEM) IR tablet 30 mg  30 mg Oral TIDAC    flecainide (TAMBOCOR) tablet 50 mg  50 mg Oral Q12H    sodium chloride (NS) flush 5-10 mL  5-10 mL IntraVENous Q8H    sodium chloride (NS) flush 5-10 mL  5-10 mL IntraVENous PRN    naloxone (NARCAN) injection 0.4 mg  0.4 mg IntraVENous PRN          Lab Review:     Recent Labs      03/08/18   0355  03/07/18   2124   WBC  7.8  6.5   HGB  12.9  13.9   HCT  39.3  42.8   PLT  181  195     Recent Labs      03/08/18   0355  03/07/18   2124   NA  139  141   K  3.8  3.9   CL  106  104   CO2  27  28   GLU  148*  129*   BUN  13  17   CREA  0.81  0.96   CA  9.5  9.5   MG  2.0  2.2   ALB  3.3*  3.8   TBILI  0.5  0.4   SGOT  22  21   ALT  20  23     No results found for: GLUCPOC       Assessment / Plan: Active Problems:    71 yo hx of HTN, afib s/p ablation, ANÍBAL, presented w/ dyspnea, HTN urgency, possible pulm HTN    1) HTN urgency: now better. Cont Dilt, lisinopril, clonidine. Monitor    2) Headache: due to above, now improved    3) Pafib: s/p ablation. Hold coreg due to bradycardia. Cont dilt, flecainide, eliquis. Cards is following and adjusting meds    4) Pulm HTN: seen on chest CT. Will await echo.   If severe pulm HTN on echo, consider right heart cath.  pulm following    5) Hypothyroid: cont synthroid    6) Depression: cont SSRI    7) ANÍBAL: cont CPAP    Total time spent with patient: 30 Minutes Care Plan discussed with: Patient, family, nursing, cards    Discussed:  Care Plan    Prophylaxis:  eliquis    Disposition:  Home w/Family           ___________________________________________________    Attending Physician: Tim Valera MD

## 2018-03-08 NOTE — H&P
[address] 19  [phone]    Hospitalist Admission Note      NAME:  Neto WAYNE:   1940   MRN:  [id number]     PCP:  Sally Ramos MD     Date/Time:  3/8/2018 1:17 AM          Subjective:     CHIEF COMPLAINT: dyspnea    HISTORY OF PRESENT ILLNESS:     Ms. David Domingo is a 70 y.o. female w/ hx of paroxysmal a-fib, HTN, ANÍBAL who presents with dyspnea. Ongoing for a few days, intermittent, moderate, no change, associated with elevated blood pressure and headache. Had cardioversion on , and has not been taking her Coreg, diltiazem as she thought that's what she was supposed to do. ED workup showed markedly elevated BP to 208U systolic. EKG was nonischemic. CTA chest showed no PE. Head CT showed no acute process. Ms. David Domingo is admitted for further evaluation and management of accelerated HTN. Past Medical History:   Diagnosis Date    DDD (degenerative disc disease)     Gastroesophageal reflux disease without esophagitis 7/3/2016    GERD (gastroesophageal reflux disease)     Hiatal hernia     HTN (hypertension), benign 2016    Hypertension     Hypothyroidism     ANÍBAL (obstructive sleep apnea) 2016    Paroxysmal atrial fibrillation ([address] 2016    Uterine prolapse         Past Surgical History:   Procedure Laterality Date    HX AFIB ABLATION  2017    HX GI      COLONOSCOPY     HX GYN      TUBAL LIGATION    HX HEENT      WISDOM TEETH EXTRACTED. Social History   Substance Use Topics    Smoking status: Former Smoker     Packs/day: 1.50     Years: 30.00     Quit date: 3/3/1994    Smokeless tobacco: Never Used    Alcohol use No        Family History   Problem Relation Age of Onset    Diabetes Mother     Hypertension Mother     COPD Mother           Allergies   Allergen Reactions    Citalopram Hydrobromide Rash     With itching.     Chlorthalidone Rash    Contrast Agent [Iodine] Other (comments)     Wheezing/bronchospasm    Maxzide [Triamterene-Hydrochlorothiazid] Palpitations    Vicodin [Hydrocodone-Acetaminophen] Palpitations        Prior to Admission medications    Medication Sig Start Date End Date Taking? Authorizing Provider   carvedilol (COREG) 6.25 mg tablet Take 1 Tab by mouth two (2) times a day. 3/7/18  Yes Marivel Tijerina MD   calcium-vitamin D (OYSTER SHELL) 500 mg(1,250mg) -200 unit per tablet Take 1 Tab by mouth daily. Yes Historical Provider   FLECAINIDE ACETATE (FLECAINIDE PO) Take  by mouth two (2) times a day. Yes Historical Provider   apixaban (ELIQUIS) 5 mg tablet take 1 tablet by mouth twice a day 3/5/18  Yes Marivel Tijerina MD   escitalopram oxalate (LEXAPRO) 5 mg tablet Take 5 mg by mouth every evening. Yes Historical Provider   cloNIDine HCl (CATAPRES) 0.1 mg tablet Take 1 Tab by mouth three (3) times daily. Hold for SBP <120  Indications: hypertension 1/23/18  Yes Chaim Castro, NP   levothyroxine (SYNTHROID) 50 mcg tablet take 1 tablet by mouth once daily 4/10/17  Yes Historical Provider   lansoprazole (PREVACID) 30 mg capsule Take 30 mg by mouth nightly. Yes Historical Provider   FOLIC ACID/MULTIVIT-MIN/LUTEIN (CENTRUM SILVER PO) Take 1 Tab by mouth daily. Yes Historical Provider   ezetimibe (ZETIA) 10 mg tablet Take 10 mg by mouth every evening.    Yes Ferny Ramirez MD       Review of Systems:  (bold if positive, if negative)    Gen:  Eyes:  ENT:  CVS:  PalpitationsdizzinessPulm:  dyspneaGI:    :    MS:  Skin:  Psych:  Endo:    Hem:  Renal:    Neuro:  headache          Objective:      VITALS:    Vital signs reviewed; most recent are:    Visit Vitals    /76    Pulse 78    Temp 97.8 °F (36.6 °C)    Resp 20    Ht 5' 3\" (1.6 m)    Wt 86.6 kg (191 lb)    SpO2 93%    BMI 33.83 kg/m2     SpO2 Readings from Last 6 Encounters:   03/07/18 93%   02/28/18 96%   02/14/18 96%   02/13/18 98%   01/03/18 98%   12/29/17 97%    O2 Flow Rate (L/min): 2 l/min   No intake or output data in the 24 hours ending 03/08/18 0117         Exam:     Physical Exam:    Gen:  Well-developed, well-nourished, in no acute distress  HEENT:  No scleral icterus, PERRL, hearing intact to voice, moist mucous membranes  Neck:  Supple, without masses. Resp:  No accessory muscle use. CTAB with few exp wheeze. No rales, rhonchi  Card: RRR. Normal S1 and S2 without murmurs, rubs, or gallops. Trace peripheral lower extremity edema. No JVD. Peripheral pulses in tact. Abd:  Normoactive bowel sounds. Soft, non-tender, non-distended. No rebound, no guarding. No appreciable hepatosplenomegaly   Musc:  No cyanosis or clubbing  Skin:  No rashes or ulcers; turgor intact. Cap refill ~2 secs  Neuro:  Cranial nerves are grossly intact, no focal motor weakness, follows commands appropriately  Psych:  Good insight, normal affect. Alert, oriented x 3. Answers questions appropriately       Labs:    Recent Labs      03/07/18 2124   WBC  6.5   HGB  13.9   HCT  42.8   PLT  195     Recent Labs      03/07/18 2124   NA  141   K  3.9   CL  104   CO2  28   GLU  129*   BUN  17   CREA  0.96   CA  9.5   MG  2.2   ALB  3.8   SGOT  21   ALT  23     No components found for: GLPOC  No results for input(s): PH, PCO2, PO2, HCO3, FIO2 in the last 72 hours. No results for input(s): INR in the last 72 hours. No lab exists for component: INREXT  Lab Results   Component Value Date/Time    Specimen Description: URINE 04/29/2014 01:04 AM     Lab Results   Component Value Date/Time    Culture result: NO GROWTH 1 DAY 09/05/2016 02:01 AM    Culture result: MIXED UROGENITAL JAGUAR ISOLATED 07/26/2016 11:48 PM    Culture result: CITROBACTER FREUNDII 04/29/2014 01:04 AM     All other current labs reviewed in the computer. Imaging/Studies:    CTA chest:  There is no pulmonary embolism. Aneurysmal dilatation of the ascending aorta. Unchanged.   Enlarged main pulmonary arteries consistent with pulmonary arterial hypertension. Large hiatal hernia. EKG: NSR, RBBB  EKG personally reviewed         Assessment / Plan:        Accelerated hypertension: has not been on diltiazem or Coreg at home. Can continue Coreg, clonidine. Stop diltiazem due to concerns of bradycardia (HR 40s at home). Add Norvasc. Follow BP/HR closely. Cardiology consult      Pulmonary HTN: with dyspnea. Pulmonology consult. Did have exp wheezing after IV contrast. I think she had a mild reaction; listed contrast dye as allergy. No rash. Will give Benadryl which will also help her insomnia. Defer steroids for now. Monitor closely. ANÍBAL on CPAP: CPAP QHS      Gastroesophageal reflux disease without esophagitis (7/3/2016): cont PPI      Paroxysmal atrial fibrillation (HCC) (8/28/2016) /   S/P ablation of atrial fibrillation (4/28/2017): recent DCCV, now in sinus. Cont Coreg and Eliquis. Acquired hypothyroidism (5/1/2017): cont LT4      Hiatal hernia (8/28/2017): outpatient follow up      Headache (3/7/2018): now better. Head CT w/o acute process. Exam non-focal. Likely related to HTN. Monitor       Depression: cont SSRI        Code Status: *FULL   Surrogate decision maker: *spouse      ED notes and lab results reviewed.        Total time spent with patient: 79 Minutes     Risk of deterioration: High                 Care Plan discussed with: ED provider, Patient, Family, Nursing Staff and >50% of time spent in counseling and coordination of care    Discussed:  Care Plan and D/C Planning    Prophylaxis:  [address]    Disposition:  Home w/Family       ___________________________________________________    Attending Physician: Madhuri Sinclair MD

## 2018-03-08 NOTE — ED NOTES
Bedside and Verbal shift change report given to Linsey Cardona RN (oncoming nurse) by Gael Sexton RN (offgoing nurse). Report included the following information SBAR, ED Summary, MAR, Recent Results and Cardiac Rhythm SBrady,BBB.

## 2018-03-08 NOTE — PROGRESS NOTES
I met with pt and pt's daughter. Pt lives with her  Daniel Merino cell [phone]. Pt drives and is independent with all of his ADLs. Pt has prescription coverage under her insurance plan, she gets her prescriptions filled at [facility]. Pt's PCP Dr. Marcelo Mancilla. Pt has never had home health before. DME - patient does not have any equipment. No other issues or concerns at this time. Thanks Sarai Stockton MSW   Care Management Interventions  PCP Verified by CM:  Yes  MyChart Signup: No  Discharge Durable Medical Equipment: No  Physical Therapy Consult: No  Occupational Therapy Consult: No  Speech Therapy Consult: No  Confirm Follow Up Transport: Family  Plan discussed with Pt/Family/Caregiver: Yes  Discharge Location  Discharge Placement: Home

## 2018-03-08 NOTE — PROGRESS NOTES
TRANSFER - IN REPORT:    Verbal report received from [facility] RN(name) on Job Ducking  being received from ED(unit) for routine progression of care      Report consisted of patients Situation, Background, Assessment and   Recommendations(SBAR). Information from the following report(s) SBAR, Kardex, MAR, Recent Results and Cardiac Rhythm SB,RBBB was reviewed with the receiving nurse. Opportunity for questions and clarification was provided. Assessment completed upon patients arrival to unit and care assumed.

## 2018-03-08 NOTE — PROGRESS NOTES
[facility]: MED RECONCILIATION    Information obtained from: Patient     Allergies: Citalopram hydrobromide; Chlorthalidone; Maxzide [triamterene-hydrochlorothiazid]; and Vicodin [hydrocodone-acetaminophen]    Comment:  Flecainide:   - Patient was previously on 50 mg BID. Dose was increased to 100 mg BID.   - Patient reports fatigue and sleepiness from it. - She called cardiology office today. She was told to take 50 mg dose for tonight. NP was to get in touch with her tomorrow morning for the further instructions. Prior to Admission Medications:     Medication Documentation Review Audit       Reviewed by Roberto Amaya, PHARMD (Pharmacist) on 03/07/18 at [phone]         Medication Sig Documenting Provider Last Dose Status Taking? apixaban (ELIQUIS) 5 mg tablet take 1 tablet by mouth twice a day Javan Erwin MD 3/7/2018 1945 Active Yes    calcium-vitamin D (OYSTER SHELL) 500 mg(1,250mg) -200 unit per tablet Take 1 Tab by mouth daily. Historical Provider 3/7/2018 Active Yes    carvedilol (COREG) 6.25 mg tablet Take 1 Tab by mouth two (2) times a day. Javan Erwin MD 3/7/2018 1945 Active Yes    cloNIDine HCl (CATAPRES) 0.1 mg tablet Take 1 Tab by mouth three (3) times daily. Hold for SBP <120  Indications: hypertension Nate Hurtado NP 3/7/2018 1930 Active Yes             Med Note (Virgie GURROLA   Wed Mar 7, 2018  9:20 PM): Pt reports 0.1 mg at 1830, then took 0.2 mg at 1930. escitalopram oxalate (LEXAPRO) 5 mg tablet Take 5 mg by mouth every evening. Historical Provider 3/7/2018 1800 Active Yes    ezetimibe (ZETIA) 10 mg tablet Take 10 mg by mouth every evening. Ferny Ramriez MD 3/6/2018 Active Yes    flecainide (TAMBOCOR) 100 mg tablet Take 1 Tab by mouth two (2) times a day. Debi Sotelo MD 3/7/2018 AM Active Yes    FOLIC ACID/MULTIVIT-MIN/LUTEIN (CENTRUM SILVER PO) Take 1 Tab by mouth daily.  Historical Provider 3/7/2018 Active Yes    lansoprazole (PREVACID) 30 mg capsule Take 30 mg by mouth nightly.  Historical Provider 3/5/2018 Active Yes    levothyroxine (SYNTHROID) 50 mcg tablet take 1 tablet by mouth once daily Historical Provider 3/7/2018 am Active Yes                               [address] [place], PHARMD   Contact: [phone]

## 2018-03-08 NOTE — PROGRESS NOTES
Bedside shift change report given to Threesa Ty (oncoming nurse) by Rosalia Crabtree RN (offgoing nurse). Report included the following information SBAR, Kardex, MAR, Recent Results and Cardiac Rhythm SB-SR with BBB.     07:36 Pt c/o nagging headache in back of head, no medications available. Notified Dr. Enrique Ayon.  Order received for prn Tylenol. Rosalia Crabtree, RN

## 2018-03-08 NOTE — PROGRESS NOTES
Bedside and Verbal shift change report given to [place] (oncoming nurse) by Mayank Mcghee (offgoing nurse). Report included the following information SBAR, Kardex, ED Summary, Procedure Summary, Intake/Output, MAR, Recent Results and Cardiac Rhythm sinus aguila. [address] at bedside, order for Natalio Villanueva on floor, notified of bradycardia. Verbal order to hold coreg until MD can round on pt.     0919 coreg and norvasc dcd. Order to start lisinopril 10 mg daily, cardizem IR 30 mg TID, flecanide 50 mg q12    [address] at bedside to address pt concerns re: [id number]    5352 orders for am labs    1515 per Danitza, hold cardizem if HR<60    1645 HR 57, cardizem held    Bedside and Verbal shift change report given to Azeem Segura (oncoming nurse) by [place] (offgoing nurse). Report included the following information SBAR, Kardex, ED Summary, Procedure Summary, Intake/Output, MAR, Recent Results and Cardiac Rhythm sinus aguila.

## 2018-03-08 NOTE — CONSULTS
Reason for Consult: HTN, Afib, Bradycardia    HPI: Chary Salcedo is a 70 y.o. female with past medical history significant for atrial fibrillation with recurrent cardioversion in the past and status post pulmonary vein ablation in 2017 by Dr. Tera Barbosa. She had recurrence of atrial fibrillation earlier this year and underwent cardioversion last week by Dr. Tera Barbosa. Post cardioversion she was bradycardic therefore Dr. Dameon Jhaveri had discontinued carvedilol as well as Cardizem and had increased her flecainide 200 mg p.o. twice daily. Since her discharge she has felt fatigued and in last 3-4 days her blood pressure is not controlled. She called our office and spoke to Dr. Tera Barbosa nurse who advised her to resume Coreg and cut down flecainide to 50 mg p.o. twice daily. Despite this she continued to have symptoms therefore she presented to the ER. She was noted to have systolic blood pressure of 233. She was admitted for management of accelerated hypertension and bradycardia. Her heart rate during the night has ranged in 50-60 range. She denies any symptoms of chest pain. She is reluctant to go back on flecainide at 100 mg p.o. twice daily. EKG demonstrated normal sinus rhythm with a right bundle branch block with nonspecific ST-T changes. Plan:    1. Hypertension: Appears to be rebound hypertension due to discontinuation of her previous antihypertensives. I will resume her Cardizem at 30 mg p.o. 3 times daily. I will start lisinopril 10 mg p.o. daily which is a new medication for her. This can be titrated up as per blood pressure requirement. Continue Catapres. 2.  Paroxysmal atrial fibrillation: She remains in sinus rhythm. Continue flecainide all the current dose to 50 mg p.o. twice daily (patient preferred). Adding Cardizem 30 mg p.o. 3 times daily. Continue Eliquis. 3.  If blood pressure is less than 160 she can be discharged home with outpatient follow-up by Dr. Lydia King.       Past Medical History:   Diagnosis Date    DDD (degenerative disc disease)     Gastroesophageal reflux disease without esophagitis 7/3/2016    GERD (gastroesophageal reflux disease)     Hiatal hernia     HTN (hypertension), benign 7/26/2016    Hypertension     Hypothyroidism     ANÍBAL (obstructive sleep apnea) 7/26/2016    Paroxysmal [facility] fibrillation ([facility] 8/28/2016    Uterine prolapse             Past Surgical History:   Procedure Laterality Date    HX AFIB ABLATION  03/2017    HX GI      COLONOSCOPY 7/14    HX GYN      TUBAL LIGATION    HX HEENT      WISDOM TEETH EXTRACTED. Family History   Problem Relation Age of Onset    Diabetes Mother     Hypertension Mother     COPD Mother            Social History     Social History    Marital status:      Spouse name: N/A    Number of children: N/A    Years of education: N/A     Occupational History    Not on file. Social History Main Topics    Smoking status: Former Smoker     Packs/day: 1.50     Years: 30.00     Quit date: 3/3/1994    Smokeless tobacco: Never Used    Alcohol use No    Drug use: No    Sexual activity: No     Other Topics Concern    Not on file     Social History Narrative         Allergies   Allergen Reactions    Citalopram Hydrobromide Rash     With itching.     Chlorthalidone Rash    Contrast Agent [Iodine] Other (comments)     Wheezing/bronchospasm    Maxzide [Triamterene-Hydrochlorothiazid] Palpitations    Vicodin [Hydrocodone-Acetaminophen] Palpitations            Current Facility-Administered Medications   Medication Dose Route Frequency Provider Last Rate Last Dose    apixaban (ELIQUIS) tablet 5 mg  5 mg Oral BID Carlen Schirmer, MD   5 mg at 03/08/18 0850    calcium-vitamin D (OS-TIERRA) 500 mg-200 unit tablet  1 Tab Oral DAILY Carlen Schirmer, MD   1 Tab at 03/08/18 0850    cloNIDine HCl (CATAPRES) tablet 0.1 mg  0.1 mg Oral TID Carlen Schirmer, MD   0.1 mg at 03/08/18 0850    escitalopram oxalate (LEXAPRO) tablet 5 mg  5 mg Oral QPM Dior Kimball MD        ezetimibe (ZETIA) tablet 10 mg  10 mg Oral QPM Dior Kimball MD        pantoprazole (PROTONIX) tablet 40 mg  40 mg Oral QHS Dior Kimball MD        levothyroxine (SYNTHROID) tablet 50 mcg  50 mcg Oral ACB Dior Kimball MD   50 mcg at 03/08/18 6353    acetaminophen (TYLENOL) tablet 650 mg  650 mg Oral Q6H PRN Jose GURROLA Do, MD   650 mg at 03/08/18 0850    lisinopril (PRINIVIL, ZESTRIL) tablet 10 mg  10 mg Oral DAILY Zenia Muhammad MD   10 mg at 03/08/18 0949    dilTIAZem (CARDIZEM) IR tablet 30 mg  30 mg Oral TIDAC Glynn Villareal MD        flecainide (TAMBOCOR) tablet 50 mg  50 mg Oral Q12H Zuleyma Ceasar, NP   50 mg at 03/08/18 1000    sodium chloride (NS) flush 5-10 mL  5-10 mL IntraVENous Q8H Dior Kimball MD   10 mL at 03/08/18 0622    sodium chloride (NS) flush 5-10 mL  5-10 mL IntraVENous PRN Dior Kimball MD        naloxone [facility]) injection 0.4 mg  0.4 mg IntraVENous PRN Dior Kimball MD            ROS:  12 point review of systems was performed.  All negative except for HPI     Physical Exam:  Visit Vitals    /86 (BP 1 Location: Right arm, BP Patient Position: At rest)    Pulse (!) 59    Temp 98.4 °F (36.9 °C)    Resp 20    Ht 5' 3\" (1.6 m)    Wt 197 lb 1.5 oz (89.4 kg)    SpO2 94%    BMI 34.91 kg/m2       Gen:  Well-developed, well-nourished, in no acute distress  HEENT:  Pink conjunctivae, PERRL, hearing intact to voice, moist mucous membranes  Neck:  Supple, without masses, thyroid non-tender  Resp:  No accessory muscle use, clear breath sounds without wheezes rales or rhonchi  Card:  No murmurs, normal S1, S2 without thrills, bruits or peripheral edema  Abd:  Soft, non-tender, non-distended, normoactive bowel sounds are present, no palpable organomegaly and no detectable hernias  Lymph:  No cervical or inguinal adenopathy  Musc:  No cyanosis or clubbing  Skin:  No rashes or ulcers, skin turgor is good  Neuro:  Cranial nerves are grossly intact, no focal motor weakness, follows commands appropriately  Psych:  Good insight, oriented to person, place and time, alert     Labs:     Lab Results   Component Value Date/Time    WBC 7.8 03/08/2018 03:55 AM    HGB 12.9 03/08/2018 03:55 AM    HCT 39.3 03/08/2018 03:55 AM    PLATELET 854 04/49/4981 03:55 AM    MCV 92.9 03/08/2018 03:55 AM     Lab Results   Component Value Date/Time    Glucose 148 (H) 03/08/2018 03:55 AM    Creatinine 0.81 03/08/2018 03:55 AM      No results found for: CHOL, CHOLPOCT, HDL, LDL, LDLC, LDLCPOC, LDLCEXT, TRIGL, TGLPOCT, CHHD, CHHDX  Lab Results   Component Value Date/Time    ALT (SGPT) 20 03/08/2018 03:55 AM    AST (SGOT) 22 03/08/2018 03:55 AM    Alk.  phosphatase 79 03/08/2018 03:55 AM    Bilirubin, total 0.5 03/08/2018 03:55 AM    Albumin 3.3 (L) 03/08/2018 03:55 AM    Protein, total 6.7 03/08/2018 03:55 AM    INR 1.2 (H) 12/20/2017 11:25 PM    Prothrombin time 11.5 (H) 12/20/2017 11:25 PM    PLATELET 416 21/44/5535 03:55 AM     Lab Results   Component Value Date/Time    INR 1.2 (H) 12/20/2017 11:25 PM    INR 1.0 02/28/2017 04:37 PM    Prothrombin time 11.5 (H) 12/20/2017 11:25 PM    Prothrombin time 10.8 02/28/2017 04:37 PM      Lab Results   Component Value Date/Time    GFR est non-AA >60 03/08/2018 03:55 AM    GFR est AA >60 03/08/2018 03:55 AM    Creatinine 0.81 03/08/2018 03:55 AM    BUN 13 03/08/2018 03:55 AM    Sodium 139 03/08/2018 03:55 AM    Potassium 3.8 03/08/2018 03:55 AM    Chloride 106 03/08/2018 03:55 AM    CO2 27 03/08/2018 03:55 AM    Magnesium 2.0 03/08/2018 03:55 AM     No results found for: PSA, PSA2, PSAR1, PSA1, PSAR2, PSA3, PSAR3, [id number], [id number], PSALT  No results found for: TSH, TSH2, TSH3, TSHP, TSHELE, TSHEXT, TT3, T3U, T3UP, FRT3, FT3, FT4, FT4P, T4, T4P, FT4T, TT7, TSHEXT   Lab Results   Component Value Date/Time    Glucose 148 (H) 03/08/2018 03:55 AM      Lab Results   Component Value Date/Time     09/05/2016 01:34 AM    CK - MB 1.4 09/05/2016 01:34 AM CK-MB Index 1.3 09/05/2016 01:34 AM    Troponin-I, Qt. <0.04 03/07/2018 09:24 PM      Lab Results   Component Value Date/Time    NT pro- (H) 03/07/2018 09:24 PM    NT pro- 08/13/2017 06:40 PM      Lab Results   Component Value Date/Time    Sodium 139 03/08/2018 03:55 AM    Potassium 3.8 03/08/2018 03:55 AM    Chloride 106 03/08/2018 03:55 AM    CO2 27 03/08/2018 03:55 AM    Anion gap 6 03/08/2018 03:55 AM    Glucose 148 (H) 03/08/2018 03:55 AM    BUN 13 03/08/2018 03:55 AM    Creatinine 0.81 03/08/2018 03:55 AM    BUN/Creatinine ratio 16 03/08/2018 03:55 AM    GFR est AA >60 03/08/2018 03:55 AM    GFR est non-AA >60 03/08/2018 03:55 AM    Calcium 9.5 03/08/2018 03:55 AM      Lab Results   Component Value Date/Time    Sodium 139 03/08/2018 03:55 AM    Potassium 3.8 03/08/2018 03:55 AM    Chloride 106 03/08/2018 03:55 AM    CO2 27 03/08/2018 03:55 AM    Anion gap 6 03/08/2018 03:55 AM    Glucose 148 (H) 03/08/2018 03:55 AM    BUN 13 03/08/2018 03:55 AM    Creatinine 0.81 03/08/2018 03:55 AM    BUN/Creatinine ratio 16 03/08/2018 03:55 AM    GFR est AA >60 03/08/2018 03:55 AM    GFR est non-AA >60 03/08/2018 03:55 AM    Calcium 9.5 03/08/2018 03:55 AM    Bilirubin, total 0.5 03/08/2018 03:55 AM    ALT (SGPT) 20 03/08/2018 03:55 AM    AST (SGOT) 22 03/08/2018 03:55 AM    Alk.  phosphatase 79 03/08/2018 03:55 AM    Protein, total 6.7 03/08/2018 03:55 AM    Albumin 3.3 (L) 03/08/2018 03:55 AM    Globulin 3.4 03/08/2018 03:55 AM    A-G Ratio 1.0 (L) 03/08/2018 03:55 AM      No results found for: HBA1C, EYB5OOMK, HGBE8, BWZ1GAYD, IWU1OMBT      Recent Labs      03/07/18   2124   TROIQ  <0.04           Problem List:     Problem List  Date Reviewed: 3/7/2018          Codes Class Noted    Accelerated hypertension ICD-10-CM: I10  ICD-9-CM: 401.0  3/7/2018        Headache ICD-10-CM: R51  ICD-9-CM: 784.0  3/7/2018        SV[facility] (supraventricular tachycardia) ([facility] ICD-10-CM: I47.1  ICD-9-CM: 427.89  8/28/2017 Hiatal hernia ICD-10-CM: K44.9  ICD-9-CM: 553.3  8/28/2017        Venous insufficiency ICD-10-CM: I87.2  ICD-9-CM: 459.81  5/4/2017        Acquired hypothyroidism ICD-10-CM: E03.9  ICD-9-CM: 244.9  5/1/2017        S/P ablation of atrial fibrillation ICD-10-CM: Z98.890, Z86.79  ICD-9-CM: V45.89  4/28/2017        Paroxysmal atrial fibrillation (HCC) ICD-10-CM: I48.0  ICD-9-CM: 427.31  8/28/2016        HTN (hypertension), benign ICD-10-CM: I10  ICD-9-CM: 401.1  7/26/2016        Gastroesophageal reflux disease without esophagitis ICD-10-CM: K21.9  ICD-9-CM: 530.81  7/3/2016        ANÍBAL on CPAP ICD-10-CM: G47.33, Z99.89  ICD-9-CM: 327.23, V46.8  7/9/2015        RBBB ICD-10-CM: I45.10  ICD-9-CM: 426.4  5/7/2015        LAE (left atrial enlargement) ICD-10-CM: I51.7  ICD-9-CM: 429.3  4/3/2013                Glynn Rosenthal MD, [facility]

## 2018-03-08 NOTE — PROGRESS NOTES
Primary Nurse Norma Rebollar and Delmis Johnson, RN performed a dual skin assessment on this patient No impairment noted  Logan score is 22

## 2018-03-08 NOTE — ED TRIAGE NOTES
Pt here for 222/129 this evening at home. Reports she was here on 2/28 for cardioversion for AFIB. Denies chest pain or headache. Reports SOB.

## 2018-03-09 VITALS
HEART RATE: 66 BPM | WEIGHT: 197.09 LBS | SYSTOLIC BLOOD PRESSURE: 147 MMHG | RESPIRATION RATE: 17 BRPM | BODY MASS INDEX: 34.92 KG/M2 | TEMPERATURE: 97.6 F | OXYGEN SATURATION: 91 % | DIASTOLIC BLOOD PRESSURE: 77 MMHG | HEIGHT: 63 IN

## 2018-03-09 LAB
ANION GAP SERPL CALC-SCNC: 6 MMOL/L (ref 5–15)
ATRIAL RATE: 65 BPM
BUN SERPL-MCNC: 15 MG/DL (ref 6–20)
BUN/CREAT SERPL: 15 (ref 12–20)
CALCIUM SERPL-MCNC: 9.2 MG/DL (ref 8.5–10.1)
CALCULATED P AXIS, ECG09: 83 DEGREES
CALCULATED R AXIS, ECG10: 85 DEGREES
CALCULATED T AXIS, ECG11: 23 DEGREES
CHLORIDE SERPL-SCNC: 105 MMOL/L (ref 97–108)
CO2 SERPL-SCNC: 29 MMOL/L (ref 21–32)
CREAT SERPL-MCNC: 1 MG/DL (ref 0.55–1.02)
DIAGNOSIS, 93000: NORMAL
GLUCOSE SERPL-MCNC: 147 MG/DL (ref 65–100)
MAGNESIUM SERPL-MCNC: 2 MG/DL (ref 1.6–2.4)
P-R INTERVAL, ECG05: 198 MS
PHOSPHATE SERPL-MCNC: 3.1 MG/DL (ref 2.6–4.7)
POTASSIUM SERPL-SCNC: 4.1 MMOL/L (ref 3.5–5.1)
Q-T INTERVAL, ECG07: 516 MS
QRS DURATION, ECG06: 142 MS
QTC CALCULATION (BEZET), ECG08: 536 MS
SODIUM SERPL-SCNC: 140 MMOL/L (ref 136–145)
VENTRICULAR RATE, ECG03: 65 BPM

## 2018-03-09 PROCEDURE — 74011250637 HC RX REV CODE- 250/637: Performed by: INTERNAL MEDICINE

## 2018-03-09 PROCEDURE — 80048 BASIC METABOLIC PNL TOTAL CA: CPT | Performed by: INTERNAL MEDICINE

## 2018-03-09 PROCEDURE — 74011250637 HC RX REV CODE- 250/637: Performed by: NURSE PRACTITIONER

## 2018-03-09 PROCEDURE — 84100 ASSAY OF PHOSPHORUS: CPT | Performed by: INTERNAL MEDICINE

## 2018-03-09 PROCEDURE — 36415 COLL VENOUS BLD VENIPUNCTURE: CPT | Performed by: INTERNAL MEDICINE

## 2018-03-09 PROCEDURE — 83735 ASSAY OF MAGNESIUM: CPT | Performed by: INTERNAL MEDICINE

## 2018-03-09 RX ORDER — LISINOPRIL 10 MG/1
10 TABLET ORAL DAILY
Qty: 30 TAB | Refills: 0 | Status: SHIPPED | OUTPATIENT
Start: 2018-03-09 | End: 2018-03-16 | Stop reason: DRUGHIGH

## 2018-03-09 RX ORDER — CLONIDINE HYDROCHLORIDE 0.1 MG/1
0.1 TABLET ORAL
Status: DISCONTINUED | OUTPATIENT
Start: 2018-03-09 | End: 2018-03-09 | Stop reason: HOSPADM

## 2018-03-09 RX ORDER — FLECAINIDE ACETATE 50 MG/1
50 TABLET ORAL EVERY 12 HOURS
Qty: 60 TAB | Refills: 0 | Status: SHIPPED | OUTPATIENT
Start: 2018-03-09 | End: 2018-07-03

## 2018-03-09 RX ORDER — DILTIAZEM HYDROCHLORIDE 30 MG/1
30 TABLET, FILM COATED ORAL
Qty: 90 TAB | Refills: 0 | Status: SHIPPED | OUTPATIENT
Start: 2018-03-09 | End: 2018-04-02 | Stop reason: SDUPTHER

## 2018-03-09 RX ORDER — NITROGLYCERIN 0.4 MG/1
0.4 TABLET SUBLINGUAL AS NEEDED
Status: DISCONTINUED | OUTPATIENT
Start: 2018-03-09 | End: 2018-03-09 | Stop reason: HOSPADM

## 2018-03-09 RX ADMIN — CLONIDINE HYDROCHLORIDE 0.1 MG: 0.1 TABLET ORAL at 09:28

## 2018-03-09 RX ADMIN — LISINOPRIL 10 MG: 5 TABLET ORAL at 09:27

## 2018-03-09 RX ADMIN — DILTIAZEM HYDROCHLORIDE 30 MG: 30 TABLET, FILM COATED ORAL at 09:26

## 2018-03-09 RX ADMIN — Medication 10 ML: at 07:15

## 2018-03-09 RX ADMIN — OYSTER SHELL CALCIUM WITH VITAMIN D 1 TABLET: 500; 200 TABLET, FILM COATED ORAL at 09:28

## 2018-03-09 RX ADMIN — FLECAINIDE ACETATE 50 MG: 50 TABLET ORAL at 09:28

## 2018-03-09 RX ADMIN — LEVOTHYROXINE SODIUM 50 MCG: 50 TABLET ORAL at 07:15

## 2018-03-09 RX ADMIN — DILTIAZEM HYDROCHLORIDE 30 MG: 30 TABLET, FILM COATED ORAL at 12:21

## 2018-03-09 RX ADMIN — APIXABAN 5 MG: 5 TABLET, FILM COATED ORAL at 09:26

## 2018-03-09 NOTE — ADT AUTH CERT NOTES
2/8 CARDIOLOGY NOTE by Coral Silverman RN        Review Status Review Entered       In Primary 3/9/2018       Details         HPI: Roberto Diehl a 70 y. o. female with past medical history significant for atrial fibrillation with recurrent cardioversion in the past and status post pulmonary vein ablation in 2017 by Dr. Winsome Jasso. Lance Mahmood had recurrence of atrial fibrillation earlier this year and underwent cardioversion last week by Dr. Winsome Jasso.  Post cardioversion she was bradycardic therefore Dr. Nish Holt had discontinued carvedilol as well as Cardizem and had increased her flecainide 200 mg p.o. twice daily. Mayte Belch her discharge she has felt fatigued and in last 3-4 days her blood pressure is not controlled.  She called our office and spoke to Dr. Winsome Jasso nurse who advised her to resume Coreg and cut down flecainide to 50 mg p.o. twice daily.  Despite this she continued to have symptoms therefore she presented to the ER. Lance Mahmood was noted to have systolic blood pressure of 233.  She was admitted for management of accelerated hypertension and bradycardia.  Her heart rate during the night has ranged in 50-60 range.  She denies any symptoms of chest pain.  She is reluctant to go back on flecainide at 100 mg p.o. twice daily.     EKG demonstrated normal sinus rhythm with a right bundle branch block with nonspecific ST-T changes.     Plan:     1.  Hypertension: Appears to be rebound hypertension due to discontinuation of her previous antihypertensives.  I will resume her Cardizem at 30 mg p.o. 3 times daily.  I will start lisinopril 10 mg p.o. daily which is a new medication for her.  This can be titrated up as per blood pressure requirement.  Continue Catapres.       2.  Paroxysmal atrial fibrillation: She remains in sinus rhythm.  Continue flecainide all the current dose to 50 mg p.o. twice daily (patient preferred).  Adding Cardizem 30 mg p.o. 3 times daily.  Continue Eliquis.     3.  If blood pressure is less than 160 she can be discharged home with outpatient follow-up by Dr. Theresa Caban.                  2D ECHO by Abraham Lewis RN        Review Status Review Entered       In Primary 3/9/2018       Details             Narrative      [facility]  [address], [address] [facility]  [phone]    Transthoracic Echocardiogram    Patient: Clarisa Nash  MRN: [id number]  [address] #: [de-identified]  : 1940  Age: 70 years  Gender: Female  Height: 63 in  Weight: 196.7 lb  BSA: 1.92 mï¾²  BP: 142 / 69 mmHg  Study date: 08-Mar-2018  Status: Routine  Location: [facility] ACC #: [id number]    Allergies: CITALOPRAM HYDROBROMIDE, CHLORTHALIDONE, IODINE,  TRIAMTERENE-HYDROCHLOROTHIAZID, HYDROCODONE-ACETAMINOPHEN    Ordering Physician: Peter MILLER  Reading Group: [address] [place] [address] & [email or web address]  Technologist: EDSON Elrdidge, RVT  Reading Physician: Jenelle Bear MD    SUMMARY:  Left ventricle: Size was normal. Systolic function was normal. Ejection  fraction was estimated to be 65 %. There were no regional wall motion  abnormalities. Right ventricle: The size was normal. Systolic function was normal.    Mitral valve: There was mild regurgitation. Regurgitation grade was 1+ on  a scale of 0 to 4+. Tricuspid valve: There was mild regurgitation. Regurgitation grade was 1+  on a scale of 0 to 4+. COMPARISONS:  Comparison was made with the previous study of 19-May-2015. INDICATIONS: Pulmonary HTN on CT    HISTORY: Prior history: ANÍBAL on CPAP, PAF, Ablation    PROCEDURE: This was a routine study. The study included complete 2D  imaging, M-mode, complete spectral Doppler, and color Doppler. The heart  rate was 66 bpm, at the start of the study. Systolic blood pressure was  142 mmHg, at the start of the study. Diastolic blood pressure was 69 mmHg,  at the start of the study. Images were obtained from the parasternal,  apical, subcostal, and suprasternal notch acoustic windows.  Image quality  was good.    LEFT VENTRICLE: Size was normal. Systolic function was normal. Ejection  fraction was estimated to be 65 %. There were no regional wall motion  abnormalities. Wall thickness was normal.    RIGHT VENTRICLE: The size was normal. Systolic function was normal.    LEFT ATRIUM: Size was normal.    ATRIAL SEPTUM: The atrial septum appeared intact. RIGHT ATRIUM: Size was normal.    MITRAL VALVE: Normal valve structure. DOPPLER: There was mild  regurgitation. Regurgitation grade was 1+ on a scale of 0 to 4+. AORTIC VALVE: Normal valve structure. DOPPLER: Transaortic velocity was  within the normal range. There was no stenosis. There was no regurgitation. TRICUSPID VALVE: Normal valve structure. DOPPLER: There was mild  regurgitation. Regurgitation grade was 1+ on a scale of 0 to 4+. PULMONIC VALVE: Not well visualized, but normal Doppler findings. AORTA: The root exhibited normal size. PERICARDIUM: There was no pericardial effusion.     SYSTEM MEASUREMENT TABLES    2D  LVOT Diam: 1.8 cm  Ao Diam: 3.3 cm  LA Diam: 3.1 cm  IVSd: 1 cm  LVIDd: 4.1 cm  LVIDs: 2.7 cm  LVPWd: 0.8 cm  SV(Teich): 44.7 ml  RVIDd: 3.4 cm  LAESV Index (A-L): 24.5 ml/m2    CW  AV Vmax: 1.3 m/s  AV maxP.1 mmHg  MV VTI: 41.6 cm  MV meanP.8 mmHg  TR Vmax: 3.2 m/s  TR maxP.9 mmHg  RAP: 3 mmHg    PW  LVOT Vmax: 1.3 m/s  E/E': 17.9  RVSP: 43.9 mmHg    Prepared and E-signed by    Roberto Odom MD  Signed 08-Mar-2018 15:38:06

## 2018-03-09 NOTE — PROGRESS NOTES
1225 - D/C instructions, education materials, and prescription provided to pt. Medication side effects list also discussed. Pt verbalizes understanding. All questions/concerns addressed at this time. Pt in  No acute distress. IV and telemetry removed. Personal belongings accounted for.

## 2018-03-09 NOTE — PROGRESS NOTES
Problem: Falls - Risk of  Goal: *Absence of Falls  Document Germán Fall Risk and appropriate interventions in the flowsheet.    Outcome: Progressing Towards Goal  Fall Risk Interventions:            Medication Interventions: Patient to call before getting OOB, Teach patient to arise slowly, Bed/chair exit alarm         History of Falls Interventions: Bed/chair exit alarm

## 2018-03-09 NOTE — PROGRESS NOTES
Preparing discharge paperwork. MD has provided RX for diltiazem, flecainide, and lisinopril. Medication and Side Effects guide completed. All care plans and education are complete.

## 2018-03-09 NOTE — DISCHARGE INSTRUCTIONS
Future Appointments  Date Time Provider Ray Naranjoi   3/14/2018 1:20 PM Marilu Morin NP [address] [place] [address]   3/30/2018 2:00 PM Javan Erwin MD [address] [place] [address]   2018 2:00 PM Javan Erwin MD [address] [place] [address]     HOSPITALIST DISCHARGE INSTRUCTIONS  NAME: Christine Tejada   :  1940   MRN:  [id number]     Date/Time:  3/9/2018 7:47 AM    ADMIT DATE: 3/7/2018     DISCHARGE DATE: 3/9/2018     PRINCIPAL DISCHARGE DIAGNOSES:  High blood pressure    MEDICATIONS:  · It is important that you take the medication exactly as they are prescribed. Note the changes and additions to your medications. Be sure you understand these changes before you are discharged today. · Keep your medication in the bottles provided by the pharmacist and keep a list of the medication names, dosages, and times to be taken in your wallet. · Do not take other medications without consulting your doctor. Pain Management: per above medications    What to do at Home    Recommended diet:  Cardiac Diet    Recommended activity: Activity as tolerated    If you experience any of the following symptoms then please call your primary care physician or return to the emergency room if you cannot get hold of your doctor:  severe chest pain, shortness of breath, dizziness, heart palpitations or other severe concerning symptoms that brought you to the hospital in the first place    Follow Up: Follow-up Information     Follow up With Details Comments Chelsea Wiley NP On 3/14/2018 120 pm [address] [place] [address]      Javan Erwin MD On 3/30/2018 2  [address] [facility] Sharon Sam MD In 1 week  [address]  [phone]              Information obtained by :  I understand that if any problems occur once I am at home I am to contact my physician.     I understand and acknowledge receipt of the instructions indicated above. Physician's or R.N.'s Signature                                                                  Date/Time                                                                                                                                              Patient or Representative Signature                                                          Date/Time           High Blood Pressure: Care Instructions  Your Care Instructions    If your blood pressure is usually above 140/90, you have high blood pressure, or hypertension. That means the top number is 140 or higher or the bottom number is 90 or higher, or both. Despite what a lot of people think, high blood pressure usually doesn't cause headaches or make you feel dizzy or lightheaded. It usually has no symptoms. But it does increase your risk for heart attack, stroke, and kidney or eye damage. The higher your blood pressure, the more your risk increases. Your doctor will give you a goal for your blood pressure. Your goal will be based on your health and your age. An example of a goal is to keep your blood pressure below 140/90. Lifestyle changes, such as eating healthy and being active, are always important to help lower blood pressure. You might also take medicine to reach your blood pressure goal.  Follow-up care is a key part of your treatment and safety. Be sure to make and go to all appointments, and call your doctor if you are having problems. It's also a good idea to know your test results and keep a list of the medicines you take. How can you care for yourself at home? Medical treatment  · If you stop taking your medicine, your blood pressure will go back up. You may take one or more types of medicine to lower your blood pressure. Be safe with medicines.  Take your medicine exactly as prescribed. Call your doctor if you think you are having a problem with your medicine. · Talk to your doctor before you start taking aspirin every day. Aspirin can help certain people lower their risk of a heart attack or stroke. But taking aspirin isn't right for everyone, because it can cause serious bleeding. · See your doctor regularly. You may need to see the doctor more often at first or until your blood pressure comes down. · If you are taking blood pressure medicine, talk to your doctor before you take decongestants or anti-inflammatory medicine, such as ibuprofen. Some of these medicines can raise blood pressure. · Learn how to check your blood pressure at home. Lifestyle changes  · Stay at a healthy weight. This is especially important if you put on weight around the waist. Losing even 10 pounds can help you lower your blood pressure. · If your doctor recommends it, get more exercise. Walking is a good choice. Bit by bit, increase the amount you walk every day. Try for at least 30 minutes on most days of the week. You also may want to swim, bike, or do other activities. · Avoid or limit alcohol. Talk to your doctor about whether you can drink any alcohol. · Try to limit how much sodium you eat to less than 2,300 milligrams (mg) a day. Your doctor may ask you to try to eat less than 1,500 mg a day. · Eat plenty of fruits (such as bananas and oranges), vegetables, legumes, whole grains, and low-fat dairy products. · Lower the amount of saturated fat in your diet. Saturated fat is found in animal products such as milk, cheese, and meat. Limiting these foods may help you lose weight and also lower your risk for heart disease. · Do not smoke. Smoking increases your risk for heart attack and stroke. If you need help quitting, talk to your doctor about stop-smoking programs and medicines. These can increase your chances of quitting for good. When should you call for help?   Call [phone] anytime you think you may need emergency care. This may mean having symptoms that suggest that your blood pressure is causing a serious heart or blood vessel problem. Your blood pressure may be over 180/110. ? For example, call 911 if:  ? · You have symptoms of a heart attack. These may include:  ¨ Chest pain or pressure, or a strange feeling in the chest.  ¨ Sweating. ¨ Shortness of breath. ¨ Nausea or vomiting. ¨ Pain, pressure, or a strange feeling in the back, neck, jaw, or upper belly or in one or both shoulders or arms. ¨ Lightheadedness or sudden weakness. ¨ A fast or irregular heartbeat. ? · You have symptoms of a stroke. These may include:  ¨ Sudden numbness, tingling, weakness, or loss of movement in your face, arm, or leg, especially on only one side of your body. ¨ Sudden vision changes. ¨ Sudden trouble speaking. ¨ Sudden confusion or trouble understanding simple statements. ¨ Sudden problems with walking or balance. ¨ A sudden, severe headache that is different from past headaches. ? · You have severe back or belly pain. ?Do not wait until your blood pressure comes down on its own. Get help right away. ?Call your doctor now or seek immediate care if:  ? · Your blood pressure is much higher than normal (such as 180/110 or higher), but you don't have symptoms. ? · You think high blood pressure is causing symptoms, such as:  ¨ Severe headache. ¨ Blurry vision. ? Watch closely for changes in your health, and be sure to contact your doctor if:  ? · Your blood pressure measures 140/90 or higher at least 2 times. That means the top number is 140 or higher or the bottom number is 90 or higher, or both. ? · You think you may be having side effects from your blood pressure medicine. ? · Your blood pressure is usually normal, but it goes above normal at least 2 times. Where can you learn more? Go to http://iker-isaac.info/.   Enter G688 in the search box to learn more about \"High Blood Pressure: Care Instructions. \"  Current as of: September 21, 2016  Content Version: 11.4  © 1633-8126 [employer]wise, [employer]. Care instructions adapted under license by [employer] (which disclaims liability or warranty for this information). If you have questions about a medical condition or this instruction, always ask your healthcare professional. [address] any warranty or liability for your use of this information.

## 2018-03-09 NOTE — PROGRESS NOTES
1946  Bedside and Verbal shift change report given to Romana Cooler (oncoming nurse) by Kashif Marquez (offgoing nurse). Report included the following information SBAR, Kardex, ED Summary, Intake/Output, MAR, Accordion and Recent Results. 2135  Patient complaining of headache but refused the pain medication. 2300  Called on call cardio about patient having a chest tightness and elevated BP. Ordered dose of clonidine, sublingual nitroglycerine and EKG. Sent EKG result via tiger text. 18  MD called and ordered PRN medications for BP and chest tightness. 2330  Patient is feeling better. Less chest tightness and headache is slowly resolving. Visit Vitals    /76    Pulse 61    Temp 98 °F (36.7 °C)    Resp 18    Ht 5' 3\" (1.6 m)    Wt 89.4 kg (197 lb 1.5 oz)    SpO2 92%    BMI 34.91 kg/m2     0735  Bedside and Verbal shift change report given to Neville Velasquez 57 (oncoming nurse) by Lore Arrieta RN (offgoing nurse). Report included the following information SBAR, Kardex, Intake/Output, MAR, Accordion and Recent Results.

## 2018-03-09 NOTE — PROGRESS NOTES
Name: Юлия Han: [address]   : 1940 Admit Date: 3/7/2018   Phone: [phone]  Room: [facility]/   PCP: Marion Cage MD  MRN: [id number]   Date: 3/9/2018  Code: Full Code          Chart and notes reviewed. Data reviewed. I review the patient's current medications in the medical record at each encounter. I have evaluated and examined the patient. Overnight events  Afebrile  Sats 93% on RA  ECHO with EF 65%; mild TR; RAP 3; RVSP 43.9. PASP not noted. Had some chest tightness and elevated BP overnight  HR in the 50s overnight    ROS: Feeling better this morning. Denies CP. Denies SOB. Ambulating around the room. Denies fever, chills, or cough. Denies abd pain or LE pain/swelling. Past Medical History:   Diagnosis Date    DDD (degenerative disc disease)     Gastroesophageal reflux disease without esophagitis 7/3/2016    GERD (gastroesophageal reflux disease)     Hiatal hernia     HTN (hypertension), benign 2016    Hypertension     Hypothyroidism     ANÍBAL (obstructive sleep apnea) 2016    Paroxysmal atrial fibrillation ([facility] [address] 2016    Uterine prolapse        Past Surgical History:   Procedure Laterality Date    HX AFIB ABLATION  2017    HX GI      COLONOSCOPY     HX GYN      TUBAL LIGATION    HX HEENT      WISDOM TEETH EXTRACTED. Family History   Problem Relation Age of Onset    Diabetes Mother     Hypertension Mother     COPD Mother        Social History   Substance Use Topics    Smoking status: Former Smoker     Packs/day: 1.50     Years: 30.00     Quit date: 3/3/1994    Smokeless tobacco: Never Used    Alcohol use No       Allergies   Allergen Reactions    Citalopram Hydrobromide Rash     With itching.     Chlorthalidone Rash    Contrast Agent [Iodine] Other (comments)     Wheezing/bronchospasm    Maxzide [Triamterene-Hydrochlorothiazid] Palpitations    Vicodin [Hydrocodone-Acetaminophen] Palpitations Current Facility-Administered Medications   Medication Dose Route Frequency    cloNIDine HCl (CATAPRES) tablet 0.1 mg  0.1 mg Oral Q4H PRN    nitroglycerin (NITROSTAT) tablet 0.4 mg  0.4 mg SubLINGual PRN    apixaban (ELIQUIS) tablet 5 mg  5 mg Oral BID    calcium-vitamin D (OS-TIERRA) 500 mg-200 unit tablet  1 Tab Oral DAILY    cloNIDine HCl (CATAPRES) tablet 0.1 mg  0.1 mg Oral TID    escitalopram oxalate (LEXAPRO) tablet 5 mg  5 mg Oral QPM    ezetimibe (ZETIA) tablet 10 mg  10 mg Oral QPM    pantoprazole (PROTONIX) tablet 40 mg  40 mg Oral QHS    levothyroxine (SYNTHROID) tablet 50 mcg  50 mcg Oral ACB    acetaminophen (TYLENOL) tablet 650 mg  650 mg Oral Q6H PRN    lisinopril (PRINIVIL, ZESTRIL) tablet 10 mg  10 mg Oral DAILY    dilTIAZem (CARDIZEM) IR tablet 30 mg  30 mg Oral TIDAC    flecainide (TAMBOCOR) tablet 50 mg  50 mg Oral Q12H    sodium chloride (NS) flush 5-10 mL  5-10 mL IntraVENous Q8H    sodium chloride (NS) flush 5-10 mL  5-10 mL IntraVENous PRN    naloxone (NARCAN) injection 0.4 mg  0.4 mg IntraVENous PRN         REVIEW OF SYSTEMS   12 point ROS negative except as stated in the HPI. Physical Exam:   Visit Vitals    /80 (BP 1 Location: Right arm, BP Patient Position: At rest)    Pulse 69    Temp 97.7 °F (36.5 °C)    Resp 26    Ht 5' 3\" (1.6 m)    Wt 89.4 kg (197 lb 1.5 oz)    SpO2 93%    BMI 34.91 kg/m2       General:  Alert, cooperative, no distress, appears stated age. Head:  Normocephalic, without obvious abnormality, atraumatic. Eyes:  Conjunctivae/corneas clear. Nose: Nares normal. Septum midline. Mucosa normal.    Throat: Lips, mucosa, and tongue normal.    Neck: Supple, symmetrical, trachea midline, no adenopathy. Lungs:   Clear to auscultation bilaterally. Chest wall:  No tenderness or deformity. Heart:  HR 69K, no murmur, click, rub or gallop. Abdomen:   Soft, non-tender. Bowel sounds normal. No masses,  No organomegaly.    Extremities: Extremities normal, atraumatic, no cyanosis or edema. Pulses: 2+ and symmetric all extremities. Skin: Skin color, texture, turgor normal. No rashes or lesions   Lymph nodes: Cervical, supraclavicular nodes normal.   Neurologic: Grossly nonfocal       Lab Results   Component Value Date/Time    Sodium 140 03/09/2018 03:34 AM    Potassium 4.1 03/09/2018 03:34 AM    Chloride 105 03/09/2018 03:34 AM    CO2 29 03/09/2018 03:34 AM    BUN 15 03/09/2018 03:34 AM    Creatinine 1.00 03/09/2018 03:34 AM    Glucose 147 (H) 03/09/2018 03:34 AM    Calcium 9.2 03/09/2018 03:34 AM    Magnesium 2.0 03/09/2018 03:34 AM    Phosphorus 3.1 03/09/2018 03:34 AM       Lab Results   Component Value Date/Time    WBC 7.8 03/08/2018 03:55 AM    HGB 12.9 03/08/2018 03:55 AM    PLATELET 015 87/70/2832 03:55 AM    MCV 92.9 03/08/2018 03:55 AM       Lab Results   Component Value Date/Time    INR 1.2 (H) 12/20/2017 11:25 PM    aPTT 25.7 12/20/2017 11:25 PM    AST (SGOT) 22 03/08/2018 03:55 AM    Alk.  phosphatase 79 03/08/2018 03:55 AM    Protein, total 6.7 03/08/2018 03:55 AM    Albumin 3.3 (L) 03/08/2018 03:55 AM    Globulin 3.4 03/08/2018 03:55 AM       No results found for: IRON, FE, TIBC, IBCT, PSAT, FERR    No results found for: SR, CRP, GINGER, ANAIGG, RA, RPR, RPRT, VDRLT, VDRLS, TSH, TSHEXT, TSHEXT     No results found for: PH, PHI, PCO2, PCO2I, PO2, PO2I, HCO3, HCO3I, FIO2, FIO2I    Lab Results   Component Value Date/Time     09/05/2016 01:34 AM    CK-MB Index 1.3 09/05/2016 01:34 AM    Troponin-I, Qt. <0.04 03/07/2018 09:24 PM        Lab Results   Component Value Date/Time    Culture result: NO GROWTH 1 DAY 09/05/2016 02:01 AM    Culture result: MIXED UROGENITAL JAGUAR ISOLATED 07/26/2016 11:48 PM    Culture result: CITROBACTER FREUNDII 04/29/2014 01:04 AM       No results found for: TOXA1, RPR, HBCM, HBSAG, HAAB, HCAB1, HAAT, G6PD, CRYAC, HIVGT, HIVR, HIV1, HIV12, HIVPC, HIVRPI    Lab Results   Component Value Date/Time     09/05/2016 01:34 AM     (H) 06/08/2012 02:40 AM       Lab Results   Component Value Date/Time    Color YELLOW/STRAW 12/29/2017 01:43 AM    Appearance CLEAR 12/29/2017 01:43 AM    Specific gravity 1.010 12/29/2017 01:43 AM    pH (UA) 6.0 12/29/2017 01:43 AM    Protein NEGATIVE  12/29/2017 01:43 AM    Glucose NEGATIVE  12/29/2017 01:43 AM    Ketone NEGATIVE  12/29/2017 01:43 AM    Bilirubin NEGATIVE  12/29/2017 01:43 AM    Blood TRACE (A) 12/29/2017 01:43 AM    Urobilinogen 0.2 12/29/2017 01:43 AM    Nitrites NEGATIVE  12/29/2017 01:43 AM    Leukocyte Esterase NEGATIVE  12/29/2017 01:43 AM    WBC 0-4 12/29/2017 01:43 AM    RBC 0-5 12/29/2017 01:43 AM    Bacteria NEGATIVE  12/29/2017 01:43 AM       Images: no new images this morning    IMPRESSION  · Accelerated HTN  · Likely mild to moderate pulmonary HTN by RVSP on ECHO: enlarged pulm artery on chest CT  · PAF  · Hiatal hernia  · GERD  · Hypothyroidism  · Hx of ANÍBAL, but most recent PSG 9/2017 with AHI of 0; not on CPAP  · Former heavy smoker: 1.5 PPD x 30 years; quit age 50    PLAN  · Cardiology following; holding Coreg for now due to bradycardia  · Continue Cardizem and lisinopril added for BP control  · Would repeat ECHO outpatient and consider RHC if evidence of significant pulm HTN  · Patient would benefit from outpatient pulmonary f/u with PFTs to eval for potential COPD given smoking history and dyspnea  · GI prophylaxis: Protonix  · DVT prophylaxis: Eliquis    Patient is stable from a pulmonary standpoint. We will sign off and arrange for outpatient follow up as above. Please call with questions.     Chauncey Kauffman

## 2018-03-09 NOTE — PROGRESS NOTES
1220 - D/C instructions, education materials, and prescriptions provided to pt. Medication side effects list also provided. Pt denies further questions/concerns at this time. Pt in no acute distress. IV and telemetry removed.  Personal belongings

## 2018-03-09 NOTE — DISCHARGE SUMMARY
Physician Discharge Summary     Patient ID:  Felicia Gann  [id number]  35 y.o.  1940    Admit date: 3/7/2018    Discharge date: 3/9/2018    Admission Diagnoses: Accelerated hypertension    Principal Discharge Diagnoses:    Accelerated HTN    OTHER PROBLEMS ADDRESSEDS  Principal Diagnosis <principal problem not specified>                                            Active Problems:    ANÍBAL on CPAP (7/9/2015)      Gastroesophageal reflux disease without esophagitis (7/3/2016)      Paroxysmal atrial fibrillation ([address] (8/28/2016)      S/P ablation of atrial fibrillation (4/28/2017)      Acquired hypothyroidism (5/1/2017)      Hiatal hernia (8/28/2017)      Accelerated hypertension (3/7/2018)      Headache (3/7/2018)       Patient Active Problem List   Diagnosis Code    LAE (left atrial enlargement) I51.7    RBBB I45.10    ANÍBAL on CPAP G47.33, Z99.89    Gastroesophageal reflux disease without esophagitis K21.9    HTN (hypertension), benign I10    Paroxysmal atrial fibrillation (HCC) I48.0    S/P ablation of atrial fibrillation Z98.890, Z86.79    Acquired hypothyroidism E03.9    Venous insufficiency I87.2    SVT (supraventricular tachycardia) (HCC) I47.1    Hiatal hernia K44.9    Accelerated hypertension I10    Headache R51         Hospital Course:   Ms. Helio East is a 69 yo WF w/ hx of HTN, pAfib, hiatal hernia p/w accelerated HTN due to confusion about her home meds    Accelerated HTN: now well controlled. Was off diltiazem and Coreg due to confusion re: instructions. Spent a considerable amount of time going over her meds with her and , also written instructions were provided. She will stop Coreg, continue diltiazem at 30mg TID, and add lisinopril to her regimen, based on cardiology recs. For her afib, to remain in SR, cards recommended continuing on flecainide at 50 mg BID. I will stop her SSRI due to prolonged QTc. She needs close follow up with cardiology as below.     There was some concern re: pulmonary HTN seen on CTA, so echo was performed. She was seen by pulmonology, who thought likely mild to moderate by RVSP. Repeat echo outpatient and consider RHC if markedly elevated and symptomatic    Pt discharged in improved and stable condition. Procedures performed: none    Imaging studies: see above  TTE  LEFT VENTRICLE: Size was normal. Systolic function was normal. Ejection  fraction was estimated to be 65 %. There were no regional wall motion  abnormalities. Wall thickness was normal.    RIGHT VENTRICLE: The size was normal. Systolic function was normal.    LEFT ATRIUM: Size was normal.    ATRIAL SEPTUM: The atrial septum appeared intact. RIGHT ATRIUM: Size was normal.    MITRAL VALVE: Normal valve structure. DOPPLER: There was mild  regurgitation. Regurgitation grade was 1+ on a scale of 0 to 4+. AORTIC VALVE: Normal valve structure. DOPPLER: Transaortic velocity was  within the normal range. There was no stenosis. There was no regurgitation. TRICUSPID VALVE: Normal valve structure. DOPPLER: There was mild  regurgitation. Regurgitation grade was 1+ on a scale of 0 to 4+. PULMONIC VALVE: Not well visualized, but normal Doppler findings. AORTA: The root exhibited normal size. PERICARDIUM: There was no pericardial effusion. SYSTEM MEASUREMENT TABLES    2D  LVOT Diam: 1.8 cm  Ao Diam: 3.3 cm  LA Diam: 3.1 cm  IVSd: 1 cm  LVIDd: 4.1 cm  LVIDs: 2.7 cm  LVPWd: 0.8 cm  SV(Teich): 44.7 ml  RVIDd: 3.4 cm  LAESV Index (A-L): 24.5 ml/m2    CW  AV Vmax: 1.3 m/s  AV maxP.1 mmHg  MV VTI: 41.6 cm  MV meanP.8 mmHg  TR Vmax: 3.2 m/s  TR maxP.9 mmHg  RAP: 3 mmHg    PW  LVOT Vmax: 1.3 m/s  E/E': 17.9  RVSP: 43.9 mmHg  --------------------    CTA  There is no pulmonary embolism. Aneurysmal dilatation of the ascending aorta. Unchanged. Enlarged main pulmonary arteries consistent with pulmonary arterial  hypertension.    Large hiatal hernia        PCP: Lacey Marcelino MD    Consults: Cardiology and Pulmonary/Intensive care    Discharge Exam:  Patient Vitals for the past 12 hrs:   Temp Pulse Resp BP SpO2   03/09/18 1207 - 66 17 - -   03/09/18 1200 - 66 20 147/77 -   03/09/18 1144 97.6 °F (36.4 °C) 64 23 139/83 91 %   03/09/18 0921 97.7 °F (36.5 °C) 69 26 145/80 93 %   03/09/18 0700 - 63 - - -     GEN: NAD  CV: RRR, no LE edema  RESP: CTAB    Disposition: home    Patient Instructions:   Current Discharge Medication List      START taking these medications    Details   dilTIAZem (CARDIZEM) 30 mg tablet Take 1 Tab by mouth Before breakfast, lunch, and dinner. Qty: 90 Tab, Refills: 0      lisinopril (PRINIVIL, ZESTRIL) 10 mg tablet Take 1 Tab by mouth daily. Qty: 30 Tab, Refills: 0         CONTINUE these medications which have CHANGED    Details   flecainide (TAMBOCOR) 50 mg tablet Take 1 Tab by mouth every twelve (12) hours. Qty: 60 Tab, Refills: 0         CONTINUE these medications which have NOT CHANGED    Details   calcium-vitamin D (OYSTER SHELL) 500 mg(1,250mg) -200 unit per tablet Take 1 Tab by mouth daily. apixaban (ELIQUIS) 5 mg tablet take 1 tablet by mouth twice a day  Qty: 60 Tab, Refills: 11      cloNIDine HCl (CATAPRES) 0.1 mg tablet Take 1 Tab by mouth three (3) times daily. Hold for SBP <120  Indications: hypertension  Qty: 270 Tab, Refills: 3      levothyroxine (SYNTHROID) 50 mcg tablet take 1 tablet by mouth once daily  Refills: 0      lansoprazole (PREVACID) 30 mg capsule Take 30 mg by mouth nightly. FOLIC ACID/MULTIVIT-MIN/LUTEIN (CENTRUM SILVER PO) Take 1 Tab by mouth daily. ezetimibe (ZETIA) 10 mg tablet Take 10 mg by mouth every evening.          STOP taking these medications       carvedilol (COREG) 6.25 mg tablet Comments:   Reason for Stopping:         escitalopram oxalate (LEXAPRO) 5 mg tablet Comments:   Reason for Stopping:               Activity: See discharge instructions  Diet: See discharge instructions  Wound Care: See discharge instructions    Follow-up Information     Follow up With Details Comments [address] Wiley On 3/14/2018 120 pm 380 [facility]  [address] [place] [address]  [id number] [place] [address]      Fredy Read MD On 3/30/2018 2  [address] [facility] Sharon Arriaga MD In 1 week  [facility]  [phone]            I spent 40 minutes on this discharge.     Signed:  Gracy Mantilla MD  3/9/2018  7:48 AM

## 2018-03-09 NOTE — PROGRESS NOTES
SHIFT CHANGE:  8:06 AM Report received from Enrique Mckinney RN. SBAR, Kardex, Procedure Summary, Intake/Output, MAR, Recent Results, Med Rec Status and Cardiac Rhythm SB were discussed.       SHIFT SUMMARY:        END OF SHIFT REPORT:

## 2018-03-14 ENCOUNTER — TELEPHONE (OUTPATIENT)
Dept: CARDIOLOGY CLINIC | Age: 78
End: 2018-03-14

## 2018-03-14 ENCOUNTER — HOSPITAL ENCOUNTER (EMERGENCY)
Age: 78
Discharge: HOME | End: 2018-03-14
Attending: EMERGENCY MEDICINE | Admitting: EMERGENCY MEDICINE
Payer: MEDICARE

## 2018-03-14 ENCOUNTER — APPOINTMENT (OUTPATIENT)
Dept: GENERAL RADIOLOGY | Age: 78
End: 2018-03-14
Attending: EMERGENCY MEDICINE
Payer: MEDICARE

## 2018-03-14 VITALS
TEMPERATURE: 98.1 F | SYSTOLIC BLOOD PRESSURE: 189 MMHG | HEART RATE: 62 BPM | HEIGHT: 63 IN | OXYGEN SATURATION: 96 % | RESPIRATION RATE: 18 BRPM | WEIGHT: 190 LBS | BODY MASS INDEX: 33.66 KG/M2 | DIASTOLIC BLOOD PRESSURE: 84 MMHG

## 2018-03-14 DIAGNOSIS — I10 ACCELERATED HYPERTENSION: Primary | ICD-10-CM

## 2018-03-14 LAB
ANION GAP SERPL CALC-SCNC: 11 MMOL/L (ref 5–15)
ATRIAL RATE: 72 BPM
BUN SERPL-MCNC: 10 MG/DL (ref 6–20)
BUN/CREAT SERPL: 12 (ref 12–20)
CALCIUM SERPL-MCNC: 9.1 MG/DL (ref 8.5–10.1)
CALCULATED P AXIS, ECG09: 78 DEGREES
CALCULATED R AXIS, ECG10: 63 DEGREES
CALCULATED T AXIS, ECG11: 9 DEGREES
CHLORIDE SERPL-SCNC: 104 MMOL/L (ref 97–108)
CO2 SERPL-SCNC: 28 MMOL/L (ref 21–32)
CREAT SERPL-MCNC: 0.86 MG/DL (ref 0.55–1.02)
DIAGNOSIS, 93000: NORMAL
GLUCOSE SERPL-MCNC: 122 MG/DL (ref 65–100)
P-R INTERVAL, ECG05: 192 MS
POTASSIUM SERPL-SCNC: 3.7 MMOL/L (ref 3.5–5.1)
Q-T INTERVAL, ECG07: 454 MS
QRS DURATION, ECG06: 148 MS
QTC CALCULATION (BEZET), ECG08: 497 MS
SODIUM SERPL-SCNC: 143 MMOL/L (ref 136–145)
VENTRICULAR RATE, ECG03: 72 BPM

## 2018-03-14 PROCEDURE — 36415 COLL VENOUS BLD VENIPUNCTURE: CPT | Performed by: EMERGENCY MEDICINE

## 2018-03-14 PROCEDURE — 93005 ELECTROCARDIOGRAM TRACING: CPT

## 2018-03-14 PROCEDURE — 71046 X-RAY EXAM CHEST 2 VIEWS: CPT

## 2018-03-14 PROCEDURE — 99285 EMERGENCY DEPT VISIT HI MDM: CPT

## 2018-03-14 PROCEDURE — 80048 BASIC METABOLIC PNL TOTAL CA: CPT | Performed by: EMERGENCY MEDICINE

## 2018-03-14 PROCEDURE — 74011000250 HC RX REV CODE- 250: Performed by: EMERGENCY MEDICINE

## 2018-03-14 PROCEDURE — 96374 THER/PROPH/DIAG INJ IV PUSH: CPT

## 2018-03-14 PROCEDURE — 74011250637 HC RX REV CODE- 250/637: Performed by: EMERGENCY MEDICINE

## 2018-03-14 RX ORDER — LISINOPRIL 5 MG/1
10 TABLET ORAL
Status: COMPLETED | OUTPATIENT
Start: 2018-03-14 | End: 2018-03-14

## 2018-03-14 RX ORDER — ENALAPRILAT 1.25 MG/ML
1.25 INJECTION INTRAVENOUS
Status: COMPLETED | OUTPATIENT
Start: 2018-03-14 | End: 2018-03-14

## 2018-03-14 RX ADMIN — ENALAPRILAT 1.25 MG: 2.5 INJECTION INTRAVENOUS at 10:41

## 2018-03-14 RX ADMIN — LISINOPRIL 10 MG: 5 TABLET ORAL at 11:58

## 2018-03-14 NOTE — ED PROVIDER NOTES
HPI Comments: 70 y.o. female with past medical history significant for HTN, a-fib, ablation, hypothyroidism, ANÍBAL, GERD, and hiatal hernia who presents via EMS to be evaluated for HTN. EMS personnel explains that the pt woke up about 4 hours ago and the pt's daughter found the pt's systolic BP to be over 267 around 8:30 AM (1 hour and 45 minutes ago). The pt states that she took her morning medications about 2 hours ago. Per EMS, the pt had a cardioversion 2 weeks ago, but she was not discharged until 5 days ago due to her HTN. The pt says that she has a follow up appointment with Dr. Eran Begum later today. EMS personnel states that the pt's  was concerned about the pt's slight SOB, but the pt indicated that it is her baseline due to her hiatal hernia. There are no other acute medical concerns at this time. Social hx: former smoker  PCP: Mk Angela MD    Note written by Jannet Boss, as dictated by Finn Faustin MD 10:15 AM     The history is provided by the patient and the EMS personnel. No  was used. Past Medical History:   Diagnosis Date    DDD (degenerative disc disease)     Gastroesophageal reflux disease without esophagitis 7/3/2016    GERD (gastroesophageal reflux disease)     Hiatal hernia     HTN (hypertension), benign 7/26/2016    Hypertension     Hypothyroidism     ANÍBAL (obstructive sleep apnea) 7/26/2016    Paroxysmal atrial fibrillation ([facility] [address] 8/28/2016    Uterine prolapse        Past Surgical History:   Procedure Laterality Date    HX AFIB ABLATION  03/2017    HX GI      COLONOSCOPY 7/14    HX GYN      TUBAL LIGATION    HX HEENT      WISDOM TEETH EXTRACTED.          Family History:   Problem Relation Age of Onset    Diabetes Mother     Hypertension Mother     COPD Mother        Social History     Social History    Marital status:      Spouse name: N/A    Number of children: N/A    Years of education: N/A     Occupational History    Not on file. Social History Main Topics    Smoking status: Former Smoker     Packs/day: 1.50     Years: 30.00     Quit date: 3/3/1994    Smokeless tobacco: Never Used    Alcohol use No    Drug use: No    Sexual activity: No     Other Topics Concern    Not on file     Social History Narrative         ALLERGIES: Citalopram hydrobromide; Chlorthalidone; Contrast agent [iodine]; Maxzide [triamterene-hydrochlorothiazid]; and Vicodin [hydrocodone-acetaminophen]    Review of Systems   Constitutional: Negative for chills and fever. HENT: Negative for ear pain and sore throat. Eyes: Negative for photophobia and pain. Respiratory: Positive for shortness of breath (slight, at baseline). Negative for chest tightness. Cardiovascular: Negative for chest pain and leg swelling. Gastrointestinal: Negative for abdominal pain, nausea and vomiting. Genitourinary: Negative for dysuria and flank pain. Musculoskeletal: Negative for back pain and neck pain. Skin: Negative for rash and wound. Neurological: Negative for dizziness, light-headedness and headaches. Vitals:    03/14/18 1040 03/14/18 1041 03/14/18 1045 03/14/18 1051   BP: (!) 174/91 (!) 174/91 189/84    Pulse: 65 68 67 66   Resp: 21  21 18   Temp:       SpO2: 94%  95% 96%   Weight:       Height:                Physical Exam   Constitutional: She is oriented to person, place, and time. She appears well-developed and well-nourished. No distress. HENT:   Head: Normocephalic and atraumatic. Eyes: Conjunctivae and EOM are normal.   Cardiovascular: Normal rate, regular rhythm, normal heart sounds and intact distal pulses. No murmur heard. Pulmonary/Chest: Effort normal and breath sounds normal. No respiratory distress. She has no wheezes. Abdominal: Soft. Bowel sounds are normal. There is no tenderness. Musculoskeletal: Normal range of motion. She exhibits no edema, tenderness or deformity.    Neurological: She is alert and oriented to person, place, and time. No cranial nerve deficit. Skin: She is not diaphoretic. Psychiatric: She has a normal mood and affect. Nursing note and vitals reviewed. MDM  Number of Diagnoses or Management Options  Accelerated hypertension:   Diagnosis management comments: Patient with hx of accelerated HTN, check labs and give meds for symptoms and contact cardiology to discuss medication adjustment and follow-up. Amount and/or Complexity of Data Reviewed  Clinical lab tests: ordered and reviewed  Tests in the radiology section of CPT®: ordered and reviewed          ED Course       Procedures    ED EKG interpretation:  Rhythm: normal sinus rhythm with sinus arrhythmia; and regular . Rate (approx.): 72 bpm;  RBBB. No STEMI. Unchanged from 2 weeks ago. Note written by Jannet Whatley, as dictated by Hua Barroso MD 10:22 AM       CONSULT NOTE:  11:45 AM Hua Barroso MD spoke with Dr. Enrique Mayen, Consult for Cardiology. Discussed available diagnostic tests and clinical findings. Dr. Enrique Mayen agrees with the plan to take an extra dose of ace inhibitor. She can follow up in the office tomorrow or Friday.              VITALS:   Patient Vitals for the past 8 hrs:   Temp Pulse Resp BP SpO2   03/14/18 1158 - 62 - - -   03/14/18 1051 - 66 18 - 96 %   03/14/18 1045 - 67 21 189/84 95 %   03/14/18 1041 - 68 - (!) 174/91 -   03/14/18 1040 - 65 21 (!) 174/91 94 %   03/14/18 1021 98.1 °F (36.7 °C) 92 28 (!) 194/91 96 %                  Recent Results (from the past 24 hour(s))   METABOLIC PANEL, BASIC    Collection Time: 03/14/18 10:24 AM   Result Value Ref Range    Sodium 143 136 - 145 mmol/L    Potassium 3.7 3.5 - 5.1 mmol/L    Chloride 104 97 - 108 mmol/L    CO2 28 21 - 32 mmol/L    Anion gap 11 5 - 15 mmol/L    Glucose 122 (H) 65 - 100 mg/dL    BUN 10 6 - 20 MG/DL    Creatinine 0.86 0.55 - 1.02 MG/DL    BUN/Creatinine ratio 12 12 - 20      GFR est AA >60 >60 ml/min/1.73m2 GFR est non-AA >60 >60 ml/min/1.73m2    Calcium 9.1 8.5 - 10.1 MG/DL       Xr Chest Pa Lat    Result Date: 3/14/2018  EXAM:  XR CHEST PA LAT INDICATION:  Acute on chronic hypertension today despite medications. COMPARISON: Chest views on 12/12/2017. CT angiography chest on 3/7/2018. TECHNIQUE: PA and lateral chest views FINDINGS: Cardiac monitoring wires overlie the thorax. Cardiomegaly and hiatal hernia are unchanged. Aortic contours are unchanged. The pulmonary vasculature is within normal limits. The lungs and pleural spaces are clear. Osteopenia is unchanged. Elevated right diaphragm is unchanged. IMPRESSION: No acute process. No change.

## 2018-03-14 NOTE — ED NOTES
Patient discharged by Dr GURROLA. Patient and  given the opportunity to ask questions, verbalized understanding of teaching. Reiterated multiple times to patient dosage instructions for lisinopril; patient able to verbalize understanding and teach back to this RN regarding proper dosage and admin until f/u with cardiology on Friday. Patient also given written instructions on lisinopril administration and dosage.

## 2018-03-14 NOTE — ED NOTES
Based on TRST score of 3, functional assessment discussed in collaboration with Provider  Dr. Abhi Nunn , Family member/representative  Pt represents self , and/or Patient and no additional referrals needed at this time.

## 2018-03-14 NOTE — TELEPHONE ENCOUNTER
General cardiologist is Arjun Arriaga. Looks like an appointment is scheduled for Arjun Arriaga already.   Future Appointments  Date Time Provider [facility]   3/16/2018 2:20 PM Parviz Barros 26   3/30/2018 2:00 PM Parviz Barros 26 5/25/2018 2:00 PM Cyndie Allan MD [address] [place] [address]

## 2018-03-14 NOTE — DISCHARGE INSTRUCTIONS
DASH Diet: Care Instructions  Your Care Instructions    The DASH diet is an eating plan that can help lower your blood pressure. DASH stands for Dietary Approaches to Stop Hypertension. Hypertension is high blood pressure. The DASH diet focuses on eating foods that are high in calcium, potassium, and magnesium. These nutrients can lower blood pressure. The foods that are highest in these nutrients are fruits, vegetables, low-fat dairy products, nuts, seeds, and legumes. But taking calcium, potassium, and magnesium supplements instead of eating foods that are high in those nutrients does not have the same effect. The DASH diet also includes whole grains, fish, and poultry. The DASH diet is one of several lifestyle changes your doctor may recommend to lower your high blood pressure. Your doctor may also want you to decrease the amount of sodium in your diet. Lowering sodium while following the DASH diet can lower blood pressure even further than just the DASH diet alone. Follow-up care is a key part of your treatment and safety. Be sure to make and go to all appointments, and call your doctor if you are having problems. It's also a good idea to know your test results and keep a list of the medicines you take. How can you care for yourself at home? Following the DASH diet  · Eat 4 to 5 servings of fruit each day. A serving is 1 medium-sized piece of fruit, ½ cup chopped or canned fruit, 1/4 cup dried fruit, or 4 ounces (½ cup) of fruit juice. Choose fruit more often than fruit juice. · Eat 4 to 5 servings of vegetables each day. A serving is 1 cup of lettuce or raw leafy vegetables, ½ cup of chopped or cooked vegetables, or 4 ounces (½ cup) of vegetable juice. Choose vegetables more often than vegetable juice. · Get 2 to 3 servings of low-fat and fat-free dairy each day. A serving is 8 ounces of milk, 1 cup of yogurt, or 1 ½ ounces of cheese. · Eat 6 to 8 servings of grains each day.  A serving is 1 slice of bread, 1 ounce of dry cereal, or ½ cup of cooked rice, pasta, or cooked cereal. Try to choose whole-grain products as much as possible. · Limit lean meat, poultry, and fish to 2 servings each day. A serving is 3 ounces, about the size of a deck of cards. · Eat 4 to 5 servings of nuts, seeds, and legumes (cooked dried beans, lentils, and split peas) each week. A serving is 1/3 cup of nuts, 2 tablespoons of seeds, or ½ cup of cooked beans or peas. · Limit fats and oils to 2 to 3 servings each day. A serving is 1 teaspoon of vegetable oil or 2 tablespoons of salad dressing. · Limit sweets and added sugars to 5 servings or less a week. A serving is 1 tablespoon jelly or jam, ½ cup sorbet, or 1 cup of lemonade. · Eat less than 2,300 milligrams (mg) of sodium a day. If you limit your sodium to 1,500 mg a day, you can lower your blood pressure even more. Tips for success  · Start small. Do not try to make dramatic changes to your diet all at once. You might feel that you are missing out on your favorite foods and then be more likely to not follow the plan. Make small changes, and stick with them. Once those changes become habit, add a few more changes. · Try some of the following:  ¨ Make it a goal to eat a fruit or vegetable at every meal and at snacks. This will make it easy to get the recommended amount of fruits and vegetables each day. ¨ Try yogurt topped with fruit and nuts for a snack or healthy dessert. ¨ Add lettuce, tomato, cucumber, and onion to sandwiches. ¨ Combine a ready-made pizza crust with low-fat mozzarella cheese and lots of vegetable toppings. Try using tomatoes, squash, spinach, broccoli, carrots, cauliflower, and onions. ¨ Have a variety of cut-up vegetables with a low-fat dip as an appetizer instead of chips and dip. ¨ Sprinkle sunflower seeds or chopped almonds over salads. Or try adding chopped walnuts or almonds to cooked vegetables.   ¨ Try some vegetarian meals using beans and peas. Add garbanzo or kidney beans to salads. Make burritos and tacos with mashed cervantes beans or black beans. Where can you learn more? Go to http://iker-isaac.info/. Enter L654 in the search box to learn more about \"DASH Diet: Care Instructions. \"  Current as of: September 21, 2016  Content Version: 11.4  © 9088-0934 [employer]. Care instructions adapted under license by [employer] (which disclaims liability or warranty for this information). If you have questions about a medical condition or this instruction, always ask your healthcare professional. [address] any warranty or liability for your use of this information. High Blood Pressure: Care Instructions  Your Care Instructions    If your blood pressure is usually above 140/90, you have high blood pressure, or hypertension. That means the top number is 140 or higher or the bottom number is 90 or higher, or both. Despite what a lot of people think, high blood pressure usually doesn't cause headaches or make you feel dizzy or lightheaded. It usually has no symptoms. But it does increase your risk for heart attack, stroke, and kidney or eye damage. The higher your blood pressure, the more your risk increases. Your doctor will give you a goal for your blood pressure. Your goal will be based on your health and your age. An example of a goal is to keep your blood pressure below 140/90. Lifestyle changes, such as eating healthy and being active, are always important to help lower blood pressure. You might also take medicine to reach your blood pressure goal.  Follow-up care is a key part of your treatment and safety. Be sure to make and go to all appointments, and call your doctor if you are having problems. It's also a good idea to know your test results and keep a list of the medicines you take. How can you care for yourself at home?   Medical treatment  · If you stop taking your medicine, your blood pressure will go back up. You may take one or more types of medicine to lower your blood pressure. Be safe with medicines. Take your medicine exactly as prescribed. Call your doctor if you think you are having a problem with your medicine. · Talk to your doctor before you start taking aspirin every day. Aspirin can help certain people lower their risk of a heart attack or stroke. But taking aspirin isn't right for everyone, because it can cause serious bleeding. · See your doctor regularly. You may need to see the doctor more often at first or until your blood pressure comes down. · If you are taking blood pressure medicine, talk to your doctor before you take decongestants or anti-inflammatory medicine, such as ibuprofen. Some of these medicines can raise blood pressure. · Learn how to check your blood pressure at home. Lifestyle changes  · Stay at a healthy weight. This is especially important if you put on weight around the waist. Losing even 10 pounds can help you lower your blood pressure. · If your doctor recommends it, get more exercise. Walking is a good choice. Bit by bit, increase the amount you walk every day. Try for at least 30 minutes on most days of the week. You also may want to swim, bike, or do other activities. · Avoid or limit alcohol. Talk to your doctor about whether you can drink any alcohol. · Try to limit how much sodium you eat to less than 2,300 milligrams (mg) a day. Your doctor may ask you to try to eat less than 1,500 mg a day. · Eat plenty of fruits (such as bananas and oranges), vegetables, legumes, whole grains, and low-fat dairy products. · Lower the amount of saturated fat in your diet. Saturated fat is found in animal products such as milk, cheese, and meat. Limiting these foods may help you lose weight and also lower your risk for heart disease. · Do not smoke. Smoking increases your risk for heart attack and stroke.  If you need help quitting, talk to your doctor about stop-smoking programs and medicines. These can increase your chances of quitting for good. When should you call for help? Call 911 anytime you think you may need emergency care. This may mean having symptoms that suggest that your blood pressure is causing a serious heart or blood vessel problem. Your blood pressure may be over 180/110. ? For example, call 911 if:  ? · You have symptoms of a heart attack. These may include:  ¨ Chest pain or pressure, or a strange feeling in the chest.  ¨ Sweating. ¨ Shortness of breath. ¨ Nausea or vomiting. ¨ Pain, pressure, or a strange feeling in the back, neck, jaw, or upper belly or in one or both shoulders or arms. ¨ Lightheadedness or sudden weakness. ¨ A fast or irregular heartbeat. ? · You have symptoms of a stroke. These may include:  ¨ Sudden numbness, tingling, weakness, or loss of movement in your face, arm, or leg, especially on only one side of your body. ¨ Sudden vision changes. ¨ Sudden trouble speaking. ¨ Sudden confusion or trouble understanding simple statements. ¨ Sudden problems with walking or balance. ¨ A sudden, severe headache that is different from past headaches. ? · You have severe back or belly pain. ?Do not wait until your blood pressure comes down on its own. Get help right away. ?Call your doctor now or seek immediate care if:  ? · Your blood pressure is much higher than normal (such as 180/110 or higher), but you don't have symptoms. ? · You think high blood pressure is causing symptoms, such as:  ¨ Severe headache. ¨ Blurry vision. ? Watch closely for changes in your health, and be sure to contact your doctor if:  ? · Your blood pressure measures 140/90 or higher at least 2 times. That means the top number is 140 or higher or the bottom number is 90 or higher, or both. ? · You think you may be having side effects from your blood pressure medicine.    ? · Your blood pressure is usually normal, but it goes above normal at least 2 times. Where can you learn more? Go to http://iker-isaac.info/. Enter L236 in the search box to learn more about \"High Blood Pressure: Care Instructions. \"  Current as of: September 21, 2016  Content Version: 11.4  © 8537-0914 [employer]. Care instructions adapted under license by [employer] (which disclaims liability or warranty for this information). If you have questions about a medical condition or this instruction, always ask your healthcare professional. [place] [address] any warranty or liability for your use of this information.   Future Appointments  Date Time Provider Ray Zamora   3/14/2018 1:20 PM Marty Steiner, [address] [place] [address]   3/30/2018 2:00 PM Cruz Alberto MD [address] [place] [address]   5/25/2018 2:00 PM Cruz Alberto MD [address] [place] [address]

## 2018-03-14 NOTE — ED TRIAGE NOTES
Pt arrived via EMS for elevated BP, h/o same. Pt currently on HTN medications and took her scheduled AM doses. Denies pain on arrival, denies HA or dizziness.

## 2018-03-14 NOTE — PROGRESS NOTES
03/14/18     MSW met with the patient and her . The patient was discharged from [facility] on 3/9/18. She was admitted 3/7/18-3/9/18 for Hypertension. Patient reports today her blood pressure was high as she has a blood presser cuff to check. She reports her BP high again today. The patient lives with her , son and teenage granddtr in a 2 story home with 3 steps to enter and 11 steps to the 2nd floor bedroom. She reports ambulating the stairs with no problem. She uses no medical equipment and never had HH. She has an appt with Dr. Alyssa Monge, Cardiology dept in [facility] at 1:20pm today. MSW called and informed staff that patient is in ER with no disposition of admission at this time. Will follow for discharge needs. Care Management Interventions  PCP Verified by CM:  Yes (Dr. Octavio Marcus)  Transition of Care Consult (CM Consult): Discharge Planning  Current Support Network: Lives with Spouse  Confirm Follow Up Transport: Family  Plan discussed with Pt/Family/Caregiver: Yes  Discharge Location  Discharge Placement: Unable to determine at this time     JULIO Evrin

## 2018-03-14 NOTE — TELEPHONE ENCOUNTER
Pt has an appointment @ 1:20 pm today and she is currently downstairs in the emergency room due to high blood pressure. She is currently getting an IV to get it to come down but Roland Rivera, the , wanted to give a call so that we're aware. Roland Rivera or pt will call back to cancel/reschedule her appointment if she gets admitted. Thanks!   [facility]

## 2018-03-16 ENCOUNTER — OFFICE VISIT (OUTPATIENT)
Dept: CARDIOLOGY CLINIC | Age: 78
End: 2018-03-16

## 2018-03-16 VITALS
BODY MASS INDEX: 33.66 KG/M2 | SYSTOLIC BLOOD PRESSURE: 170 MMHG | HEIGHT: 63 IN | DIASTOLIC BLOOD PRESSURE: 88 MMHG | HEART RATE: 76 BPM | WEIGHT: 190 LBS

## 2018-03-16 DIAGNOSIS — Z99.89 OSA ON CPAP: ICD-10-CM

## 2018-03-16 DIAGNOSIS — G47.33 OSA ON CPAP: ICD-10-CM

## 2018-03-16 DIAGNOSIS — I51.7 LAE (LEFT ATRIAL ENLARGEMENT): ICD-10-CM

## 2018-03-16 DIAGNOSIS — Z86.79 S/P ABLATION OF ATRIAL FIBRILLATION: ICD-10-CM

## 2018-03-16 DIAGNOSIS — Z98.890 S/P ABLATION OF ATRIAL FIBRILLATION: ICD-10-CM

## 2018-03-16 DIAGNOSIS — I47.1 SVT (SUPRAVENTRICULAR TACHYCARDIA) (HCC): ICD-10-CM

## 2018-03-16 DIAGNOSIS — I48.0 PAROXYSMAL ATRIAL FIBRILLATION (HCC): ICD-10-CM

## 2018-03-16 DIAGNOSIS — I45.10 RBBB: ICD-10-CM

## 2018-03-16 DIAGNOSIS — I10 ACCELERATED HYPERTENSION: Primary | ICD-10-CM

## 2018-03-16 RX ORDER — LISINOPRIL 20 MG/1
TABLET ORAL DAILY
COMMUNITY
End: 2018-03-16 | Stop reason: SDUPTHER

## 2018-03-16 RX ORDER — LISINOPRIL 20 MG/1
20 TABLET ORAL DAILY
Qty: 30 TAB | Refills: 5 | Status: SHIPPED | OUTPATIENT
Start: 2018-03-16 | End: 2018-09-09 | Stop reason: SDUPTHER

## 2018-03-16 NOTE — MR AVS SNAPSHOT
[address] [facility] [address] [facility] 
[phone] Patient: Grant Costa 
MRN: [id number] PJA:[id number] Visit Information Date & Time Provider Department Dept. Phone Encounter #  
 3/16/2018  2:20 PM Floyd Fitzgerald MD [facility] Brittney Horton [phone] [id number] Follow-up Instructions Return in about 2 months (around 5/16/2018). Your Appointments 3/30/2018  2:00 PM  
ESTABLISHED PATIENT with Floyd Fitzgerald MD  
[facility] ([facility]) Appt Note: hospital follow up per [facility] [address] [facility] [address]  
[phone]  
  
   
 [address] [address]  
  
    
 5/25/2018  2:00 PM  
ESTABLISHED PATIENT with Floyd Fitzgerald MD  
[facility] ([facility]) Appt Note: 3 [address] [facility] 600 [address] [facility]  
[phone] Upcoming Health Maintenance Date Due DTaP/Tdap/Td series (1 - Tdap) 2/12/1963 ZOSTER VACCINE AGE 60> 12/12/2001 GLAUCOMA SCREENING Q2Y 2/12/2007 Bone Densitometry (Dexa) Screening 2/12/2007 Pneumococcal 65+ Low/Medium Risk (1 of 2 - PCV13) 2/12/2007 MEDICARE YEARLY EXAM 2/12/2007 Influenza Age 5 to Adult 8/1/2017 Allergies as of 3/16/2018  Review Complete On: 3/16/2018 By: Lorena Barillas LPN Severity Noted Reaction Type Reactions Citalopram Hydrobromide High 09/16/2014    Rash With itching. Chlorthalidone  04/03/2013    Rash Contrast Agent [Iodine]  03/08/2018    Other (comments) Wheezing/bronchospasm Maxzide [Triamterene-hydrochlorothiazid]  04/03/2013    Palpitations Vicodin [Hydrocodone-acetaminophen]  04/03/2013    Palpitations Current Immunizations  Reviewed on 3/3/2012 No immunizations on file. Not reviewed this visit You Were Diagnosed With   
  
 Codes Comments Accelerated hypertension    -  Primary ICD-10-CM: I10 
ICD-9-CM: 401.0 SVT (supraventricular tachycardia) (HCC)     ICD-10-CM: I47.1 ICD-9-CM: 427.89 Paroxysmal atrial fibrillation (HCC)     ICD-10-CM: I48.0 ICD-9-CM: 427.31   
 RBBB     ICD-10-CM: I45.10 ICD-9-CM: 426.4 LAE (left atrial enlargement)     ICD-10-CM: I51.7 ICD-9-CM: 429.3 S/P ablation of atrial fibrillation     ICD-10-CM: Z98.890, Z86.79 
ICD-9-CM: V45.89 ANÍBAL on CPAP     ICD-10-CM: G47.33, Z99.89 ICD-9-CM: 327.23, V46.8 Vitals BP Pulse Height(growth percentile) Weight(growth percentile) BMI OB Status 170/88 76 5' 3\" (1.6 m) 190 lb (86.2 kg) 33.66 kg/m2 Postmenopausal  
 Smoking Status Former Smoker Vitals History BMI and BSA Data Body Mass Index Body Surface Area  
 33.66 kg/m 2 1.96 m 2 Preferred Pharmacy Pharmacy Name Phone [place], [address] [phone] Your Updated Medication List  
  
   
This list is accurate as of 3/16/18  3:03 PM.  Always use your most recent med list.  
  
  
  
  
 apixaban 5 mg tablet Commonly known as:  ELIQUIS  
take 1 tablet by mouth twice a day  
  
 calcium-vitamin D 500 mg(1,250mg) -200 unit per tablet Commonly known as:  OYSTER SHELL Take 1 Tab by mouth daily. CENTRUM SILVER PO Take 1 Tab by mouth daily. cloNIDine HCl 0.1 mg tablet Commonly known as:  CATAPRES Take 1 Tab by mouth three (3) times daily. Hold for SBP <120  Indications: hypertension  
  
 dilTIAZem 30 mg tablet Commonly known as:  CARDIZEM Take 1 Tab by mouth Before breakfast, lunch, and dinner. flecainide 50 mg tablet Commonly known as:  TAMBOCOR Take 1 Tab by mouth every twelve (12) hours. levothyroxine 50 mcg tablet Commonly known as:  SYNTHROID  
take 1 tablet by mouth once daily  
  
 lisinopril 20 mg tablet Commonly known as:  [address] Take  by mouth daily. PREVACID 30 mg capsule Generic drug:  lansoprazole Take 30 mg by mouth nightly. ZETIA 10 mg tablet Generic drug:  ezetimibe Take 10 mg by mouth every evening. Follow-up Instructions Return in about 2 months (around 5/16/2018). Patient Instructions Reduce Clonidine to 0.1 mg twice daily, But hold for systolic BP under 683. Introducing [facility] SERVICES! Dear Alec [employer]: Thank you for requesting a [employer] account. Our records indicate that you already have an active [employer] account. You can access your account anytime at [email or web address] Did you know that you can access your hospital and ER discharge instructions at any time in [employer]? You can also review all of your test results from your hospital stay or ER visit. Additional Information If you have questions, please visit the Frequently Asked Questions section of the [employer] website at [email or web address] Remember, [employer] is NOT to be used for urgent needs. For medical emergencies, dial 911. Now available from your iPhone and Android! Please provide this summary of care documentation to your next provider. Your primary care clinician is listed as Antony Engle. If you have any questions after today's visit, please call [phone].

## 2018-03-16 NOTE — PROGRESS NOTES
[id number] [facility], [address] [facility]    Office [phone]  Fax [phone]        Jake Juarez is a 70 y.o. female. Last seen 2 months ago. Problem List  Date Reviewed: 3/7/2018          Codes Class Noted    Accelerated hypertension ICD-10-CM: I10  ICD-9-CM: 401.0  3/7/2018        Headache ICD-10-CM: R51  ICD-9-CM: 784.0  3/7/2018        SVT (supraventricular tachycardia) ([facility] [address] ICD-10-CM: I47.1  ICD-9-CM: 427.89  8/28/2017        Hiatal hernia ICD-10-CM: K44.9  ICD-9-CM: 553.3  8/28/2017        Venous insufficiency ICD-10-CM: I87.2  ICD-9-CM: 459.81  5/4/2017        Acquired hypothyroidism ICD-10-CM: E03.9  ICD-9-CM: 244.9  5/1/2017        S/P ablation of atrial fibrillation ICD-10-CM: Z98.890, Z86.79  ICD-9-CM: V45.89  4/28/2017        Paroxysmal atrial fibrillation (HCC) ICD-10-CM: I48.0  ICD-9-CM: 427.31  8/28/2016        HTN (hypertension), benign ICD-10-CM: I10  ICD-9-CM: 401.1  7/26/2016        Gastroesophageal reflux disease without esophagitis ICD-10-CM: K21.9  ICD-9-CM: 530.81  7/3/2016        ANÍBAL on CPAP ICD-10-CM: G47.33, Z99.89  ICD-9-CM: 327.23, V46.8  7/9/2015        RBBB ICD-10-CM: I45.10  ICD-9-CM: 426.4  5/7/2015        LAE (left atrial enlargement) ICD-10-CM: I51.7  ICD-9-CM: 429.3  4/3/2013             Cardiac testing  Adenosine myoview 2011 - normal perfusion, EF 81%  Echocardiogram 2/22/13 - normal left ventricular size and function, normal appearing mitral, aortic, and tricuspid valves, with mild mitral and moderate tricuspid regurgitation, bi-atrial enlargement  Lexiscan 3/3/2014 - normal perfusion EF 83%  Echo: 5/15/15- 60%, mildly dilated LA, mild MR  Cardioversion 7/23/2015 - 200J  Lexiscan cardiolite 5/10/16 - normal perfusion, EF 78%  Renal Visceral Duplex 5/2016 - No evidence of ONOFRE  3/2/17-.  Successful atrial fibrillation ablation however unable to achieve entrance/exit block of LSPV per Dr. Duggan Million    HPI    She underwent cardioverison on 2/28 by Dr. Fatmata Roberts. She was hospitalized 3/7-3/9/18 with accelerated HTN. Apparently there was some confusion around her meds, then suspected rebound HTN, resolved with resumption of Diltiazem and the addition of Lisinopril her BP normalized. Repeat echo LVEF 65%. PA systolic 44. Subsequently seen in ED with high BP - lisinopril increased to 20/d    Her BP the past few days have been improved and she takes Clonidine if her systolic BP is over 760. She is sleeping well at night. Patient denies any exertional chest pain, palpitations, syncope, orthopnea, edema or paroxysmal nocturnal dyspnea. Past Medical History:   Diagnosis Date    DDD (degenerative disc disease)     Gastroesophageal reflux disease without esophagitis 7/3/2016    GERD (gastroesophageal reflux disease)     Hiatal hernia     HTN (hypertension), benign 7/26/2016    Hypertension     Hypothyroidism     ANÍBAL (obstructive sleep apnea) 7/26/2016    Paroxysmal atrial fibrillation ([facility] [address] 8/28/2016    Uterine prolapse       Current Outpatient Prescriptions on File Prior to Visit   Medication Sig Dispense Refill    dilTIAZem (CARDIZEM) 30 mg tablet Take 1 Tab by mouth Before breakfast, lunch, and dinner. 90 Tab 0    flecainide (TAMBOCOR) 50 mg tablet Take 1 Tab by mouth every twelve (12) hours. 60 Tab 0    calcium-vitamin D (OYSTER SHELL) 500 mg(1,250mg) -200 unit per tablet Take 1 Tab by mouth daily.  apixaban (ELIQUIS) 5 mg tablet take 1 tablet by mouth twice a day 60 Tab 11    cloNIDine HCl (CATAPRES) 0.1 mg tablet Take 1 Tab by mouth three (3) times daily. Hold for SBP <120  Indications: hypertension 270 Tab 3    levothyroxine (SYNTHROID) 50 mcg tablet take 1 tablet by mouth once daily  0    lansoprazole (PREVACID) 30 mg capsule Take 30 mg by mouth nightly.  FOLIC ACID/MULTIVIT-MIN/LUTEIN (CENTRUM SILVER PO) Take 1 Tab by mouth daily.  ezetimibe (ZETIA) 10 mg tablet Take 10 mg by mouth every evening. No current facility-administered medications on file prior to visit. Allergies   Allergen Reactions    Citalopram Hydrobromide Rash     With itching.  Chlorthalidone Rash    Contrast Agent [Iodine] Other (comments)     Wheezing/bronchospasm    Maxzide [Triamterene-Hydrochlorothiazid] Palpitations    Vicodin [Hydrocodone-Acetaminophen] Palpitations     , former smoker     Review of Systems  Constitutional: Negative for fever, chills, and diaphoresis. +fatigue  Respiratory: Negative for cough, hemoptysis, sputum production, and wheezing. +GOODE  Cardiovascular: Negative for orthopnea, claudication,leg swelling and PND. Gastrointestinal: Negative for heartburn, nausea, vomiting, blood in stool and melena. Genitourinary: Negative for dysuria and flank pain. Musculoskeletal: Negative for joint pain and back pain. Skin: Negative for rash. Neurological: Negative for focal weakness, seizures, loss of consciousness, weakness. Endo/Heme/Allergies: Does not bruise/bleed easily. Psychiatric/Behavioral: Negative for memory loss. Visit Vitals    /88    Pulse 76    Ht 5' 3\" (1.6 m)    Wt 190 lb (86.2 kg)    BMI 33.66 kg/m2      Wt Readings from Last 3 Encounters:   03/16/18 190 lb (86.2 kg)   03/14/18 190 lb (86.2 kg)   03/08/18 197 lb 1.5 oz (89.4 kg)      Physical Exam   Constitutional: She is oriented to person, place, and time. She appears well-developed and well-nourished. Neck: Neck supple. No JVD present. Cardiovascular: Irregularly irregular  Pulses: Carotid pulses are 2+ on the right side, and 2+ on the left side. Dorsalis pedis pulses are 2+ on the right side, and 2+ on the left side. Pulmonary/Chest: Effort normal and breath sounds normal. She has no wheezes. She has no rales. Abdominal: Soft. Bowel sounds are normal. There is no tenderness. There is no rebound. Musculoskeletal: She exhibits no edema.    Neurological: She is alert and oriented to person, place, and time. Skin: Skin is warm and dry. Psychiatric: She has a normal mood and affect. Lab Results   Component Value Date/Time    Sodium 143 03/14/2018 10:24 AM    Potassium 3.7 03/14/2018 10:24 AM    Chloride 104 03/14/2018 10:24 AM    CO2 28 03/14/2018 10:24 AM    Anion gap 11 03/14/2018 10:24 AM    Glucose 122 (H) 03/14/2018 10:24 AM    BUN 10 03/14/2018 10:24 AM    Creatinine 0.86 03/14/2018 10:24 AM    BUN/Creatinine ratio 12 03/14/2018 10:24 AM    GFR est AA >60 03/14/2018 10:24 AM    GFR est non-AA >60 03/14/2018 10:24 AM    Calcium 9.1 03/14/2018 10:24 AM    Bilirubin, total 0.5 03/08/2018 03:55 AM    AST (SGOT) 22 03/08/2018 03:55 AM    Alk. phosphatase 79 03/08/2018 03:55 AM    Protein, total 6.7 03/08/2018 03:55 AM    Albumin 3.3 (L) 03/08/2018 03:55 AM    Globulin 3.4 03/08/2018 03:55 AM    A-G Ratio 1.0 (L) 03/08/2018 03:55 AM    ALT (SGPT) 20 03/08/2018 03:55 AM     Cardiographics   EKG 4/13 - normal  EKG 5/6/15 - AF with ventricular rate 96, RBBB  EKG 6/3/15 - A-fib rate 70s, RBBB, Rightward axis  EKG 8/31/15 - SR, RBBB  EKG 12/7/15 - SR, RBBB  EKG 7/26/16 - AF 80s, RBBB, rightward axis, unchanged from 7/23/16   EKG 11/28/16 - SR 60   EKG 12/05/16-AF 80-90, RBBB   EKG 01/10/17- AF 70s  EKG 8/25/17- SR 60, RBBB rightward axis. ASSESSMENT and PLAN  Encounter Diagnoses   Name Primary?  Accelerated hypertension Yes    SVT (supraventricular tachycardia) (HCC)     Paroxysmal atrial fibrillation (HCC)     RBBB     LAE (left atrial enlargement)     S/P ablation of atrial fibrillation     ANÍBAL on CPAP      1. Chronic HTN with recent acceleration requiring hospitalization, likely due to rebound HTN. Her BPs are improved. She would continue increased Lisinopril 20 mg daily. Will reduce Clonidine to 0.1 mg BID given her lethargy. Stress/anxiety do play a role. She does not have ANÍBAL. Continue home BP monitoring. 2. Paroxysmal AF s/p recent cardioversion 1 year following AF ablation. Continue Flecainide 50 mg BID + Dilt 30 mg TID. Continue Eliquis    Follow-up Disposition:  Return in about 2 months (around 5/16/2018).     Written by Neo Cullen, dictated by Trisha Foster MD.  Trisha Foster MD

## 2018-03-16 NOTE — PROGRESS NOTES
Visit Vitals    /88    Pulse 76    Ht 5' 3\" (1.6 m)    Wt 190 lb (86.2 kg)    BMI 33.66 kg/m2     Medication changes for this OV VO Dr Alpesh Hauser

## 2018-03-17 ENCOUNTER — HOSPITAL ENCOUNTER (EMERGENCY)
Age: 78
Discharge: HOME | End: 2018-03-17
Attending: EMERGENCY MEDICINE
Payer: MEDICARE

## 2018-03-17 VITALS
SYSTOLIC BLOOD PRESSURE: 159 MMHG | HEART RATE: 81 BPM | HEIGHT: 63 IN | BODY MASS INDEX: 33.66 KG/M2 | DIASTOLIC BLOOD PRESSURE: 56 MMHG | RESPIRATION RATE: 18 BRPM | TEMPERATURE: 97.7 F | OXYGEN SATURATION: 98 % | WEIGHT: 190 LBS

## 2018-03-17 DIAGNOSIS — I16.0 HYPERTENSIVE URGENCY: Primary | ICD-10-CM

## 2018-03-17 LAB
ALBUMIN SERPL-MCNC: 4 G/DL (ref 3.5–5)
ALBUMIN/GLOB SERPL: 1.1 {RATIO} (ref 1.1–2.2)
ALP SERPL-CCNC: 95 U/L (ref 45–117)
ALT SERPL-CCNC: 33 U/L (ref 12–78)
ANION GAP SERPL CALC-SCNC: 10 MMOL/L (ref 5–15)
AST SERPL-CCNC: 26 U/L (ref 15–37)
ATRIAL RATE: 84 BPM
BILIRUB SERPL-MCNC: 0.3 MG/DL (ref 0.2–1)
BUN SERPL-MCNC: 16 MG/DL (ref 6–20)
BUN/CREAT SERPL: 16 (ref 12–20)
CALCIUM SERPL-MCNC: 9.5 MG/DL (ref 8.5–10.1)
CALCULATED R AXIS, ECG10: 107 DEGREES
CALCULATED T AXIS, ECG11: 12 DEGREES
CHLORIDE SERPL-SCNC: 105 MMOL/L (ref 97–108)
CK MB CFR SERPL CALC: 1.4 % (ref 0–2.5)
CK MB SERPL-MCNC: 1.1 NG/ML (ref 5–25)
CK SERPL-CCNC: 79 U/L (ref 26–192)
CO2 SERPL-SCNC: 27 MMOL/L (ref 21–32)
CREAT SERPL-MCNC: 1.01 MG/DL (ref 0.55–1.02)
DIAGNOSIS, 93000: NORMAL
GLOBULIN SER CALC-MCNC: 3.8 G/DL (ref 2–4)
GLUCOSE SERPL-MCNC: 118 MG/DL (ref 65–100)
P-R INTERVAL, ECG05: 176 MS
POTASSIUM SERPL-SCNC: 4 MMOL/L (ref 3.5–5.1)
PROT SERPL-MCNC: 7.8 G/DL (ref 6.4–8.2)
Q-T INTERVAL, ECG07: 432 MS
QRS DURATION, ECG06: 144 MS
QTC CALCULATION (BEZET), ECG08: 510 MS
SODIUM SERPL-SCNC: 142 MMOL/L (ref 136–145)
TROPONIN I SERPL-MCNC: <0.04 NG/ML
TSH SERPL DL<=0.05 MIU/L-ACNC: 1.05 UIU/ML (ref 0.36–3.74)
VENTRICULAR RATE, ECG03: 84 BPM

## 2018-03-17 PROCEDURE — 99285 EMERGENCY DEPT VISIT HI MDM: CPT

## 2018-03-17 PROCEDURE — 74011250636 HC RX REV CODE- 250/636: Performed by: STUDENT IN AN ORGANIZED HEALTH CARE EDUCATION/TRAINING PROGRAM

## 2018-03-17 PROCEDURE — 74011250637 HC RX REV CODE- 250/637: Performed by: STUDENT IN AN ORGANIZED HEALTH CARE EDUCATION/TRAINING PROGRAM

## 2018-03-17 PROCEDURE — 74011250636 HC RX REV CODE- 250/636

## 2018-03-17 PROCEDURE — 93005 ELECTROCARDIOGRAM TRACING: CPT

## 2018-03-17 PROCEDURE — 74011000250 HC RX REV CODE- 250: Performed by: STUDENT IN AN ORGANIZED HEALTH CARE EDUCATION/TRAINING PROGRAM

## 2018-03-17 PROCEDURE — 80053 COMPREHEN METABOLIC PANEL: CPT | Performed by: EMERGENCY MEDICINE

## 2018-03-17 PROCEDURE — 36415 COLL VENOUS BLD VENIPUNCTURE: CPT | Performed by: EMERGENCY MEDICINE

## 2018-03-17 PROCEDURE — 96374 THER/PROPH/DIAG INJ IV PUSH: CPT

## 2018-03-17 PROCEDURE — 82550 ASSAY OF CK (CPK): CPT | Performed by: EMERGENCY MEDICINE

## 2018-03-17 PROCEDURE — 96375 TX/PRO/DX INJ NEW DRUG ADDON: CPT

## 2018-03-17 PROCEDURE — 84484 ASSAY OF TROPONIN QUANT: CPT | Performed by: EMERGENCY MEDICINE

## 2018-03-17 PROCEDURE — 84443 ASSAY THYROID STIM HORMONE: CPT | Performed by: EMERGENCY MEDICINE

## 2018-03-17 RX ORDER — LORAZEPAM 2 MG/ML
0.5 INJECTION INTRAMUSCULAR ONCE
Status: COMPLETED | OUTPATIENT
Start: 2018-03-17 | End: 2018-03-17

## 2018-03-17 RX ORDER — HYDRALAZINE HYDROCHLORIDE 20 MG/ML
20 INJECTION INTRAMUSCULAR; INTRAVENOUS ONCE
Status: COMPLETED | OUTPATIENT
Start: 2018-03-17 | End: 2018-03-17

## 2018-03-17 RX ORDER — HYDRALAZINE HYDROCHLORIDE 20 MG/ML
INJECTION INTRAMUSCULAR; INTRAVENOUS
Status: COMPLETED
Start: 2018-03-17 | End: 2018-03-17

## 2018-03-17 RX ORDER — LABETALOL HYDROCHLORIDE 5 MG/ML
20 INJECTION, SOLUTION INTRAVENOUS
Status: DISCONTINUED | OUTPATIENT
Start: 2018-03-17 | End: 2018-03-17 | Stop reason: HOSPADM

## 2018-03-17 RX ADMIN — LORAZEPAM 0.5 MG: 2 INJECTION INTRAMUSCULAR; INTRAVENOUS at 03:00

## 2018-03-17 RX ADMIN — HYDRALAZINE HYDROCHLORIDE 20 MG: 20 INJECTION INTRAMUSCULAR; INTRAVENOUS at 01:37

## 2018-03-17 RX ADMIN — LIDOCAINE HYDROCHLORIDE 40 ML: 20 SOLUTION ORAL; TOPICAL at 03:43

## 2018-03-17 NOTE — ED TRIAGE NOTES
Hypertension. Has been seen multiple times since 2/28 for the same thing. Was in a-fib on 2/28 cardioverted and has been having problems with the blood pressure ever since. Saw Dr. Lupe Boston earlier today.

## 2018-03-17 NOTE — DISCHARGE INSTRUCTIONS
Acute High Blood Pressure: Care Instructions  Your Care Instructions    Acute high blood pressure is very high blood pressure. It's a serious problem. Very high blood pressure can damage your brain, heart, eyes, and kidneys. You may have been given medicines to lower your blood pressure. You may have gotten them as pills or through a needle in one of your veins. This is called an IV. And maybe you were given other medicines too. These can be needed when high blood pressure causes other problems. To keep your blood pressure at a lower level, you may need to make healthy lifestyle changes. And you will probably need to take medicines. Be sure to follow up with your doctor about your blood pressure and what you can do about it. Follow-up care is a key part of your treatment and safety. Be sure to make and go to all appointments, and call your doctor if you are having problems. It's also a good idea to know your test results and keep a list of the medicines you take. How can you care for yourself at home? · See your doctor as often as he or she recommends. This is to make sure your blood pressure is under control. You will probably go at least 2 times a year. But it may be more often at first.  · Take your blood pressure medicine exactly as prescribed. You may take one or more types. They include diuretics, beta-blockers, ACE inhibitors, calcium channel blockers, and angiotensin II receptor blockers. Call your doctor if you think you are having a problem with your medicine. · If you take blood pressure medicine, talk to your doctor before you take decongestants or anti-inflammatory medicine, such as ibuprofen. These can raise blood pressure. · Learn how to check your blood pressure at home. Check it often. · Ask your doctor if it's okay to drink alcohol. · Talk to your doctor about lifestyle changes that can help blood pressure. These include being active and not smoking.   When should you call for help?  Call 911 anytime you think you may need emergency care. This may mean having symptoms that suggest that your blood pressure is causing a serious heart or blood vessel problem. Your blood pressure may be over 180/110. ? For example, call 911 if:  ? · You have symptoms of a heart attack. These may include:  ¨ Chest pain or pressure, or a strange feeling in the chest.  ¨ Sweating. ¨ Shortness of breath. ¨ Nausea or vomiting. ¨ Pain, pressure, or a strange feeling in the back, neck, jaw, or upper belly or in one or both shoulders or arms. ¨ Lightheadedness or sudden weakness. ¨ A fast or irregular heartbeat. ? · You have symptoms of a stroke. These may include:  ¨ Sudden numbness, tingling, weakness, or loss of movement in your face, arm, or leg, especially on only one side of your body. ¨ Sudden vision changes. ¨ Sudden trouble speaking. ¨ Sudden confusion or trouble understanding simple statements. ¨ Sudden problems with walking or balance. ¨ A sudden, severe headache that is different from past headaches. ? · You have severe back or belly pain. ?Do not wait until your blood pressure comes down on its own. Get help right away. ?Call your doctor now or seek immediate care if:  ? · Your blood pressure is much higher than normal (such as 180/110 or higher), but you don't have symptoms. ? · You think high blood pressure is causing symptoms, such as:  ¨ Severe headache. ¨ Blurry vision. ? Watch closely for changes in your health, and be sure to contact your doctor if:  ? · Your blood pressure measures 140/90 or higher at least 2 times. That means the top number is 140 or higher or the bottom number is 90 or higher, or both. ? · You think you may be having side effects from your blood pressure medicine. ? · Your blood pressure is usually normal, but it goes above normal at least 2 times. Where can you learn more? Go to http://iker-isaac.info/.   Enter X129 in the search box to learn more about \"Acute High Blood Pressure: Care Instructions. \"  Current as of: September 21, 2016  Content Version: 11.4  © 8854-9846 [employer], [employer]. Care instructions adapted under license by [employer] (which disclaims liability or warranty for this information). If you have questions about a medical condition or this instruction, always ask your healthcare professional. [place] [address] any warranty or liability for your use of this information.

## 2018-03-17 NOTE — ED PROVIDER NOTES
HPI Comments: Pt is 80yro F with Pmhx of HTN, RBBB, hypothyroidism, persistent Afib s/p ablation in 3/2/17 and several cardioversions, who presents to ED complaining of high BP    Patient was hospitalized ~3 weeks ago due to hypertensive urgency, discharge on antihypertensive regimen that she is compliant with per report. Patient has also received several cardioversions in the past due to persistent Afib. She is followed by Dr. Chintan Mckeon outpatient, saw him in office yesterday afternoon. Patient was stable prior to office visit, but reports that once she got home, BP was elevated. Clonidine was recently decreased from 0.1mg TID to 0.1mg BID, but patient took a 2nd dose at 9pm last night and a 3rd one at 11pm because BP was in the 200s/110s. She decided to come to ED because BP did not decrease despite taking all her antihypertensives tonight. She currently denies H/A, blurry vision, dizziness, orthopnea, N/V, diarrhea or dysuria but endorses SOB and mild upper abdomen discomfort(right below b/l breasts). She also recall some tremors yesterday morning, which self-resolved. Patient is frustrated because she keeps coming back to ED due to same issue. Past Medical History:   Diagnosis Date    DDD (degenerative disc disease)     Gastroesophageal reflux disease without esophagitis 7/3/2016    GERD (gastroesophageal reflux disease)     Hiatal hernia     HTN (hypertension), benign 7/26/2016    Hypertension     Hypothyroidism     ANÍBAL (obstructive sleep apnea) 7/26/2016    Paroxysmal atrial fibrillation ([facility] [address] 8/28/2016    Uterine prolapse        Past Surgical History:   Procedure Laterality Date    HX AFIB ABLATION  03/2017    HX GI      COLONOSCOPY 7/14    HX GYN      TUBAL LIGATION    HX HEENT      WISDOM TEETH EXTRACTED.          Family History:   Problem Relation Age of Onset    Diabetes Mother     Hypertension Mother     COPD Mother        Social History     Social History    Marital status:      Spouse name: N/A    Number of children: N/A    Years of education: N/A     Occupational History    Not on file. Social History Main Topics    Smoking status: Former Smoker     Packs/day: 1.50     Years: 30.00     Quit date: 3/3/1994    Smokeless tobacco: Never Used    Alcohol use No    Drug use: No    Sexual activity: No     Other Topics Concern    Not on file     Social History Narrative         ALLERGIES: Citalopram hydrobromide; Chlorthalidone; Contrast agent [iodine]; Maxzide [triamterene-hydrochlorothiazid]; and Vicodin [hydrocodone-acetaminophen]    Review of Systems   Constitutional: Negative for chills and fever. HENT: Negative for congestion. Eyes: Negative for visual disturbance. Patient believes eyes get occasionally red when she looks in the mirror   Respiratory: Negative for cough. Gastrointestinal: Negative for abdominal distention and diarrhea. Upper abdominal discomfort   Endocrine: Negative for polyuria. Genitourinary: Negative for hematuria. Musculoskeletal: Negative for arthralgias. Skin: Negative for pallor and wound. Neurological: Negative for tremors, facial asymmetry, speech difficulty and numbness. Psychiatric/Behavioral: Negative for agitation. Vitals:    03/17/18 0018   BP: (!) 230/127   Pulse: 72   Resp: 18   Temp: 97.7 °F (36.5 °C)   SpO2: 100%   Weight: 86.2 kg (190 lb)   Height: 5' 3\" (1.6 m)            Physical Exam   Constitutional: She is oriented to person, place, and time. Obese, anxious, cooperative, in NAD   HENT:   Head: Normocephalic and atraumatic. Eyes: Conjunctivae and EOM are normal. Pupils are equal, round, and reactive to light. Cardiovascular:   arrhythmia heard on auscultation, regularly irregular   Pulmonary/Chest: Effort normal and breath sounds normal. No respiratory distress. She has no wheezes. She has no rales. She exhibits no tenderness. Abdominal: Soft.  Bowel sounds are normal. She exhibits no distension. There is no tenderness. Musculoskeletal: She exhibits edema (1+pitting edema noted on right lower extremity, very mild on left LE). Neurological: She is alert and oriented to person, place, and time. Skin: Skin is warm. She is not diaphoretic. No erythema. No pallor. Psychiatric: She has a normal mood and affect. Her behavior is normal. Thought content normal.        MDM  Number of Diagnoses or Management Options  Diagnosis management comments: Pt is 80yro F with Pmhx of HTN, RBBB, hypothyroidism, persistent Afib s/p ablation in 3/2/17 and several cardioversions, who presents to ED complaining of elevated BP    Hypertensive urgency- POA /127. Recurrent issue. Pt is followed by Dr. Oren Ellis, saw him in office yesterday afternoon. Current regimen: lisinopril 20mg/d, Clonidine 0.1 BID(recently decreased from TID), Diltiazem 30mg TID, fleicainide 50mg BID  -EKG shows nl sinus rhythm with sinus arrhythmia, unchanged from previous.   -Repeat BP on b/l arms. If still elevated, give hydralazine 20mg IV and re-assess  -CMP, TSH, trop pending    3:45 AM  -Labwork unremarkable. Patient anxious while in ED and receive one time dose of Ativan(reports used to take Lexapro but was discontinued). -Patient received GI cocktail for upper abdominal discomfort, likely acid reflux in setting of hiatal hernia. Can follow-up outpatient  -Will discharge with recommendations to continue Clonidine 0.1mg TID(instead of BID) as she might have had rebound HTN. Patient needs to follow-up with PCP and cardiologist for further management of HTN and anxiety. ED Course       Procedures      EKG: normal sinus rhythm with marked sinus arrhythmia, RBBB. When compared with previous EKG on 3/14/18, no significant change.

## 2018-03-17 NOTE — ROUTINE PROCESS
I have reviewed discharge instructions with the patient. The patient and spouse verbalized understanding. Pt confirmed understanding of need for follow up with primary care provider. Pt is not in any current distress and shows no evidence of clinical instability. Pt left ambulatory  Pt family/friends are present and left with all personal belongings. Pt states chief complaint has improved with treatment provided    PT is alert and oriented, Pt is hemodynamically/respiratorily stable. Paperwork given by provider and reviewed with patient and their , opportunity for questions/clarification given.      Patient Vitals for the past 4 hrs:   Temp Pulse Resp BP SpO2   03/17/18 0330 - 81 18 159/56 98 %   03/17/18 0300 - 91 21 164/80 98 %   03/17/18 0245 - 89 16 161/83 98 %   03/17/18 0230 - 97 19 195/80 97 %   03/17/18 0215 - 88 19 191/77 99 %   03/17/18 0200 - 95 16 (!) 201/78 100 %   03/17/18 0145 - 82 17 (!) 207/89 99 %   03/17/18 0137 - 83 - (!) 216/96 -   03/17/18 0130 - 82 21 (!) 226/112 96 %   03/17/18 0115 - - - (!) 225/104 -   03/17/18 0111 - 84 27 (!) 249/115 96 %   03/17/18 0107 - 84 - (!) 246/105 -   03/17/18 0018 97.7 °F (36.5 °C) 72 18 (!) 230/127 100 %

## 2018-04-16 ENCOUNTER — TELEPHONE (OUTPATIENT)
Dept: CARDIOLOGY CLINIC | Age: 78
End: 2018-04-16

## 2018-04-16 ENCOUNTER — HOSPITAL ENCOUNTER (EMERGENCY)
Age: 78
Discharge: HOME | End: 2018-04-16
Attending: EMERGENCY MEDICINE
Payer: MEDICARE

## 2018-04-16 VITALS
OXYGEN SATURATION: 94 % | SYSTOLIC BLOOD PRESSURE: 174 MMHG | WEIGHT: 188 LBS | BODY MASS INDEX: 36.91 KG/M2 | DIASTOLIC BLOOD PRESSURE: 75 MMHG | RESPIRATION RATE: 16 BRPM | HEART RATE: 70 BPM | HEIGHT: 60 IN | TEMPERATURE: 98.3 F

## 2018-04-16 DIAGNOSIS — I16.0 HYPERTENSIVE URGENCY: Primary | ICD-10-CM

## 2018-04-16 LAB
ANION GAP SERPL CALC-SCNC: 8 MMOL/L (ref 5–15)
ATRIAL RATE: 69 BPM
BASOPHILS # BLD: 0 K/UL (ref 0–0.1)
BASOPHILS NFR BLD: 1 % (ref 0–1)
BUN SERPL-MCNC: 12 MG/DL (ref 6–20)
BUN/CREAT SERPL: 11 (ref 12–20)
CALCIUM SERPL-MCNC: 9.5 MG/DL (ref 8.5–10.1)
CALCULATED P AXIS, ECG09: 20 DEGREES
CALCULATED R AXIS, ECG10: 80 DEGREES
CALCULATED T AXIS, ECG11: 16 DEGREES
CHLORIDE SERPL-SCNC: 107 MMOL/L (ref 97–108)
CO2 SERPL-SCNC: 29 MMOL/L (ref 21–32)
CREAT SERPL-MCNC: 1.08 MG/DL (ref 0.55–1.02)
DIAGNOSIS, 93000: NORMAL
DIFFERENTIAL METHOD BLD: NORMAL
EOSINOPHIL # BLD: 0.2 K/UL (ref 0–0.4)
EOSINOPHIL NFR BLD: 3 % (ref 0–7)
ERYTHROCYTE [DISTWIDTH] IN BLOOD BY AUTOMATED COUNT: 13.2 % (ref 11.5–14.5)
GLUCOSE SERPL-MCNC: 137 MG/DL (ref 65–100)
HCT VFR BLD AUTO: 39.3 % (ref 35–47)
HGB BLD-MCNC: 13 G/DL (ref 11.5–16)
IMM GRANULOCYTES # BLD: 0 K/UL (ref 0–0.04)
IMM GRANULOCYTES NFR BLD AUTO: 0 % (ref 0–0.5)
LYMPHOCYTES # BLD: 2.3 K/UL (ref 0.8–3.5)
LYMPHOCYTES NFR BLD: 35 % (ref 12–49)
MCH RBC QN AUTO: 30.7 PG (ref 26–34)
MCHC RBC AUTO-ENTMCNC: 33.1 G/DL (ref 30–36.5)
MCV RBC AUTO: 92.7 FL (ref 80–99)
MONOCYTES # BLD: 0.7 K/UL (ref 0–1)
MONOCYTES NFR BLD: 10 % (ref 5–13)
NEUTS SEG # BLD: 3.3 K/UL (ref 1.8–8)
NEUTS SEG NFR BLD: 51 % (ref 32–75)
NRBC # BLD: 0 K/UL (ref 0–0.01)
NRBC BLD-RTO: 0 PER 100 WBC
P-R INTERVAL, ECG05: 168 MS
PLATELET # BLD AUTO: 193 K/UL (ref 150–400)
PMV BLD AUTO: 10.1 FL (ref 8.9–12.9)
POTASSIUM SERPL-SCNC: 3.5 MMOL/L (ref 3.5–5.1)
Q-T INTERVAL, ECG07: 464 MS
QRS DURATION, ECG06: 148 MS
QTC CALCULATION (BEZET), ECG08: 497 MS
RBC # BLD AUTO: 4.24 M/UL (ref 3.8–5.2)
SODIUM SERPL-SCNC: 144 MMOL/L (ref 136–145)
T4 FREE SERPL-MCNC: 1.2 NG/DL (ref 0.8–1.5)
TSH SERPL DL<=0.05 MIU/L-ACNC: 2.37 UIU/ML (ref 0.36–3.74)
VENTRICULAR RATE, ECG03: 69 BPM
WBC # BLD AUTO: 6.5 K/UL (ref 3.6–11)

## 2018-04-16 PROCEDURE — 93005 ELECTROCARDIOGRAM TRACING: CPT

## 2018-04-16 PROCEDURE — 80048 BASIC METABOLIC PNL TOTAL CA: CPT | Performed by: EMERGENCY MEDICINE

## 2018-04-16 PROCEDURE — 96374 THER/PROPH/DIAG INJ IV PUSH: CPT

## 2018-04-16 PROCEDURE — 85025 COMPLETE CBC W/AUTO DIFF WBC: CPT | Performed by: EMERGENCY MEDICINE

## 2018-04-16 PROCEDURE — 94762 N-INVAS EAR/PLS OXIMTRY CONT: CPT

## 2018-04-16 PROCEDURE — 84439 ASSAY OF FREE THYROXINE: CPT | Performed by: EMERGENCY MEDICINE

## 2018-04-16 PROCEDURE — 74011250636 HC RX REV CODE- 250/636: Performed by: EMERGENCY MEDICINE

## 2018-04-16 PROCEDURE — 36415 COLL VENOUS BLD VENIPUNCTURE: CPT | Performed by: EMERGENCY MEDICINE

## 2018-04-16 PROCEDURE — 99285 EMERGENCY DEPT VISIT HI MDM: CPT

## 2018-04-16 PROCEDURE — 84443 ASSAY THYROID STIM HORMONE: CPT | Performed by: EMERGENCY MEDICINE

## 2018-04-16 RX ORDER — SODIUM CHLORIDE 0.9 % (FLUSH) 0.9 %
5-10 SYRINGE (ML) INJECTION AS NEEDED
Status: DISCONTINUED | OUTPATIENT
Start: 2018-04-16 | End: 2018-04-16 | Stop reason: HOSPADM

## 2018-04-16 RX ORDER — HYDRALAZINE HYDROCHLORIDE 20 MG/ML
INJECTION INTRAMUSCULAR; INTRAVENOUS
Status: DISCONTINUED
Start: 2018-04-16 | End: 2018-04-16 | Stop reason: HOSPADM

## 2018-04-16 RX ORDER — HYDRALAZINE HYDROCHLORIDE 20 MG/ML
20 INJECTION INTRAMUSCULAR; INTRAVENOUS
Status: COMPLETED | OUTPATIENT
Start: 2018-04-16 | End: 2018-04-16

## 2018-04-16 RX ORDER — SODIUM CHLORIDE 0.9 % (FLUSH) 0.9 %
5-10 SYRINGE (ML) INJECTION EVERY 8 HOURS
Status: DISCONTINUED | OUTPATIENT
Start: 2018-04-16 | End: 2018-04-16 | Stop reason: HOSPADM

## 2018-04-16 RX ADMIN — HYDRALAZINE HYDROCHLORIDE 20 MG: 20 INJECTION INTRAMUSCULAR; INTRAVENOUS at 01:25

## 2018-04-16 NOTE — TELEPHONE ENCOUNTER
Pt in ER last night for elevated BP. Any type of adjustments you can recommend until she returns in May for appt. ?

## 2018-04-16 NOTE — ED TRIAGE NOTES
Hypertension. was 208/102 when she left home tonight. Does have a history of hypertension and has taken all medication today. Does have a small headache. Denies being sick lately. Allergy list is correct.

## 2018-04-16 NOTE — TELEPHONE ENCOUNTER
Patient called stating she was in ER for HTN (Record in [address] [place] [address]) She would like a return call, she can be reached at [phone].  Thank you

## 2018-04-16 NOTE — ED NOTES
Patients blood pressure is 163/65 and remains stable. Patient discharged home by Dr. Madhavi Jessica.

## 2018-04-16 NOTE — DISCHARGE INSTRUCTIONS
We hope that we have addressed all of your medical concerns. The examination and treatment you received in the Emergency Department were for an emergent problem and were not intended as complete care. It is important that you follow up with your healthcare provider(s) for ongoing care. If your symptoms worsen or do not improve as expected, and you are unable to reach your usual health care provider(s), you should return to the Emergency Department. Today's healthcare is undergoing tremendous change, and patient satisfaction surveys are one of the many tools to assess the quality of medical care. You may receive a survey from the [employer] regarding your experience in the Emergency Department. I hope that your experience has been completely positive, particularly the medical care that I provided. As such, please participate in the survey; anything less than excellent does not meet my expectations or intentions. [facility] and 8 [facility] participate in nationally recognized quality of care measures. If your blood pressure is greater than 120/80, as reported below, we urge that you seek medical care to address the potential of high blood pressure, commonly known as hypertension. Hypertension can be hereditary or can be caused by certain medical conditions, pain, stress, or \"white coat syndrome. \"       Please make an appointment with your health care provider(s) for follow up of your Emergency Department visit. VITALS:   Patient Vitals for the past 8 hrs:   Temp Pulse Resp BP SpO2   04/16/18 0145 - - - 163/65 97 %   04/16/18 0130 - - - (!) 205/88 98 %   04/16/18 0115 - - - (!) 187/103 96 %   04/16/18 0100 - - - (!) 206/90 94 %   04/16/18 0040 98.3 °F (36.8 °C) 68 16 (!) 226/101 100 %          Thank you for allowing us to provide you with medical care today. We realize that you have many choices for your emergency care needs.   Please choose us in the future for any continued health care needs. [employer] [address] [place] [address] [address]   Office: [phone]            Recent Results (from the past 24 hour(s))   CBC WITH AUTOMATED DIFF    Collection Time: 04/16/18  1:10 AM   Result Value Ref Range    WBC 6.5 3.6 - 11.0 K/uL    RBC 4.24 3.80 - 5.20 M/uL    HGB 13.0 11.5 - 16.0 g/dL    HCT 39.3 35.0 - 47.0 %    MCV 92.7 80.0 - 99.0 FL    MCH 30.7 26.0 - 34.0 PG    MCHC 33.1 30.0 - 36.5 g/dL    RDW 13.2 11.5 - 14.5 %    PLATELET 878 168 - 616 K/uL    MPV 10.1 8.9 - 12.9 FL    NRBC 0.0 0  WBC    ABSOLUTE NRBC 0.00 0.00 - 0.01 K/uL    NEUTROPHILS 51 32 - 75 %    LYMPHOCYTES 35 12 - 49 %    MONOCYTES 10 5 - 13 %    EOSINOPHILS 3 0 - 7 %    BASOPHILS 1 0 - 1 %    IMMATURE GRANULOCYTES 0 0.0 - 0.5 %    ABS. NEUTROPHILS 3.3 1.8 - 8.0 K/UL    ABS. LYMPHOCYTES 2.3 0.8 - 3.5 K/UL    ABS. MONOCYTES 0.7 0.0 - 1.0 K/UL    ABS. EOSINOPHILS 0.2 0.0 - 0.4 K/UL    ABS. BASOPHILS 0.0 0.0 - 0.1 K/UL    ABS. IMM. GRANS. 0.0 0.00 - 0.04 K/UL    DF AUTOMATED     METABOLIC PANEL, BASIC    Collection Time: 04/16/18  1:10 AM   Result Value Ref Range    Sodium 144 136 - 145 mmol/L    Potassium 3.5 3.5 - 5.1 mmol/L    Chloride 107 97 - 108 mmol/L    CO2 29 21 - 32 mmol/L    Anion gap 8 5 - 15 mmol/L    Glucose 137 (H) 65 - 100 mg/dL    BUN 12 6 - 20 MG/DL    Creatinine 1.08 (H) 0.55 - 1.02 MG/DL    BUN/Creatinine ratio 11 (L) 12 - 20      GFR est AA 60 (L) >60 ml/min/1.73m2    GFR est non-AA 49 (L) >60 ml/min/1.73m2    Calcium 9.5 8.5 - 10.1 MG/DL   TSH 3RD GENERATION    Collection Time: 04/16/18  1:10 AM   Result Value Ref Range    TSH 2.37 0.36 - 3.74 uIU/mL       No results found. Acute High Blood Pressure: Care Instructions  Your Care Instructions    Acute high blood pressure is very high blood pressure. It's a serious problem. Very high blood pressure can damage your brain, heart, eyes, and kidneys.   You may have been given medicines to lower your blood pressure. You may have gotten them as pills or through a needle in one of your veins. This is called an IV. And maybe you were given other medicines too. These can be needed when high blood pressure causes other problems. To keep your blood pressure at a lower level, you may need to make healthy lifestyle changes. And you will probably need to take medicines. Be sure to follow up with your doctor about your blood pressure and what you can do about it. Follow-up care is a key part of your treatment and safety. Be sure to make and go to all appointments, and call your doctor if you are having problems. It's also a good idea to know your test results and keep a list of the medicines you take. How can you care for yourself at home? · See your doctor as often as he or she recommends. This is to make sure your blood pressure is under control. You will probably go at least 2 times a year. But it may be more often at first.  · Take your blood pressure medicine exactly as prescribed. You may take one or more types. They include diuretics, beta-blockers, ACE inhibitors, calcium channel blockers, and angiotensin II receptor blockers. Call your doctor if you think you are having a problem with your medicine. · If you take blood pressure medicine, talk to your doctor before you take decongestants or anti-inflammatory medicine, such as ibuprofen. These can raise blood pressure. · Learn how to check your blood pressure at home. Check it often. · Ask your doctor if it's okay to drink alcohol. · Talk to your doctor about lifestyle changes that can help blood pressure. These include being active and not smoking. When should you call for help? Call 911 anytime you think you may need emergency care. This may mean having symptoms that suggest that your blood pressure is causing a serious heart or blood vessel problem. Your blood pressure may be over 180/110. ? For example, call 911 if:  ? · You have symptoms of a heart attack. These may include:  ¨ Chest pain or pressure, or a strange feeling in the chest.  ¨ Sweating. ¨ Shortness of breath. ¨ Nausea or vomiting. ¨ Pain, pressure, or a strange feeling in the back, neck, jaw, or upper belly or in one or both shoulders or arms. ¨ Lightheadedness or sudden weakness. ¨ A fast or irregular heartbeat. ? · You have symptoms of a stroke. These may include:  ¨ Sudden numbness, tingling, weakness, or loss of movement in your face, arm, or leg, especially on only one side of your body. ¨ Sudden vision changes. ¨ Sudden trouble speaking. ¨ Sudden confusion or trouble understanding simple statements. ¨ Sudden problems with walking or balance. ¨ A sudden, severe headache that is different from past headaches. ? · You have severe back or belly pain. ?Do not wait until your blood pressure comes down on its own. Get help right away. ?Call your doctor now or seek immediate care if:  ? · Your blood pressure is much higher than normal (such as 180/110 or higher), but you don't have symptoms. ? · You think high blood pressure is causing symptoms, such as:  ¨ Severe headache. ¨ Blurry vision. ? Watch closely for changes in your health, and be sure to contact your doctor if:  ? · Your blood pressure measures 140/90 or higher at least 2 times. That means the top number is 140 or higher or the bottom number is 90 or higher, or both. ? · You think you may be having side effects from your blood pressure medicine. ? · Your blood pressure is usually normal, but it goes above normal at least 2 times. Where can you learn more? Go to http://iker-isaac.info/. Enter O952 in the search box to learn more about \"Acute High Blood Pressure: Care Instructions. \"  Current as of: September 21, 2016  Content Version: 11.4  © 7193-5836 [employer].  Care instructions adapted under license by [employer] (which disclaims liability or warranty for this information). If you have questions about a medical condition or this instruction, always ask your healthcare professional. [address] any warranty or liability for your use of this information.

## 2018-04-16 NOTE — ED PROVIDER NOTES
HPI Comments: 70 y.o. female with past medical history significant for HTN, GERD, hiatal hernia, ANÍBAL, PAF s/p ablation, hypothyroidism, who presents ambulatory to the ED with chief complaint of elevated BP today. Pt reports that her BP was elevated when she woke up this morning. She took her medications as prescribed today, but her BP has been fluctuating \"up and down\" all day. Pt measured her BP again tonight, and found that it was elevated even higher than before. Notes that the reading was 208/102 mmHg prior to arrival. Due to the high blood pressure reading, pt decided to come to the ED for evaluation. She complains of a minor 1/10 \"aching\" HA at this time, but is otherwise asymptomatic. Pt is noted to be hypertensive in triage with BP of 226/101 mmHg. She specifically denies fevers, chills, CP, or SOB. There are no other acute medical concerns at this time. Review of medical records indicates that patient has been seen in the ED on several different occasions for elevated blood pressure. Was most recently admitted to the hospital 3/7-3/9/2018 for accelerated hypertension due to confusion about her home medications. Was most recently seen in the ED on 3/17/2018 for hypertensive urgency. Her current medication regimen includes Lisinopril 20mg daily, Clonidine 0.1 mgTID, Diltiazem 30mg TID, and Fleicainide 50mg BID. Social hx: Negative for Tobacco use (former smoker); Negative for EtOH use; Negative for Illicit Drug Abuse    PCP: Lisa Almonte MD  Cardiology: Dr. Kerrie Cason     Note written by Becky Short. Won Poe, as dictated by John Fitzgerald, DO 12:54 AM     The history is provided by the patient and medical records. No  was used.         Past Medical History:   Diagnosis Date    DDD (degenerative disc disease)     Gastroesophageal reflux disease without esophagitis 7/3/2016    GERD (gastroesophageal reflux disease)     Hiatal hernia     HTN (hypertension), benign 7/26/2016    Hypertension     Hypothyroidism     ANÍBAL (obstructive sleep apnea) 7/26/2016    Paroxysmal atrial fibrillation ([facility] [address] 8/28/2016    Uterine prolapse        Past Surgical History:   Procedure Laterality Date    HX AFIB ABLATION  03/2017    HX GI      COLONOSCOPY 7/14    HX GYN      TUBAL LIGATION    HX HEENT      WISDOM TEETH EXTRACTED. Family History:   Problem Relation Age of Onset    Diabetes Mother     Hypertension Mother     COPD Mother        Social History     Social History    Marital status:      Spouse name: N/A    Number of children: N/A    Years of education: N/A     Occupational History    Not on file. Social History Main Topics    Smoking status: Former Smoker     Packs/day: 1.50     Years: 30.00     Quit date: 3/3/1994    Smokeless tobacco: Never Used    Alcohol use No    Drug use: No    Sexual activity: No     Other Topics Concern    Not on file     Social History Narrative     ALLERGIES: Citalopram hydrobromide; Chlorthalidone; Contrast agent [iodine]; Maxzide [triamterene-hydrochlorothiazid]; and Vicodin [hydrocodone-acetaminophen]    Review of Systems   Constitutional: Negative for appetite change, chills, fever and unexpected weight change. HENT: Negative for ear pain, hearing loss, rhinorrhea and trouble swallowing. Eyes: Negative for pain and visual disturbance. Respiratory: Negative for cough, chest tightness and shortness of breath. Cardiovascular: Negative for chest pain and palpitations. Gastrointestinal: Negative for abdominal distention, abdominal pain, blood in stool and vomiting. Genitourinary: Negative for dysuria, hematuria and urgency. Musculoskeletal: Negative for back pain and myalgias. Skin: Negative for rash. Neurological: Positive for headaches. Negative for dizziness, syncope, weakness and numbness. Psychiatric/Behavioral: Negative for confusion and suicidal ideas.    All other systems reviewed and are negative. Vitals:    04/16/18 0215 04/16/18 0216 04/16/18 0217 04/16/18 0230   BP:   174/75    Pulse:  70 70    Resp:       Temp:       SpO2: 97% 97% 97% 94%   Weight:       Height:                Physical Exam   Constitutional: She is oriented to person, place, and time. She appears well-developed and well-nourished. No distress. HENT:   Head: Normocephalic and atraumatic. Right Ear: External ear normal.   Left Ear: External ear normal.   Nose: Nose normal.   Mouth/Throat: Oropharynx is clear and moist. No oropharyngeal exudate. Eyes: Conjunctivae and EOM are normal. Pupils are equal, round, and reactive to light. Right eye exhibits no discharge. Left eye exhibits no discharge. No scleral icterus. Neck: Normal range of motion. Neck supple. No JVD present. No tracheal deviation present. Cardiovascular: Normal rate, regular rhythm, normal heart sounds and intact distal pulses. Exam reveals no gallop and no friction rub. No murmur heard. Pulmonary/Chest: Effort normal and breath sounds normal. No stridor. No respiratory distress. She has no decreased breath sounds. She has no wheezes. She has no rhonchi. She has no rales. She exhibits no tenderness. Abdominal: Soft. Bowel sounds are normal. She exhibits no distension. There is no tenderness. There is no rebound and no guarding. Musculoskeletal: Normal range of motion. She exhibits no edema or tenderness. Neurological: She is alert and oriented to person, place, and time. She has normal strength and normal reflexes. No cranial nerve deficit or sensory deficit. She exhibits normal muscle tone. Coordination normal. GCS eye subscore is 4. GCS verbal subscore is 5. GCS motor subscore is 6. Skin: Skin is warm and dry. No rash noted. She is not diaphoretic. No erythema. No pallor. Psychiatric: She has a normal mood and affect. Her behavior is normal. Judgment and thought content normal.   Nursing note and vitals reviewed.   Note written by Becky Jackman, as dictated by Yesy Davidson DO 12:54 AM      MDM  Number of Diagnoses or Management Options  Hypertensive urgency:      Amount and/or Complexity of Data Reviewed  Clinical lab tests: ordered and reviewed  Tests in the medicine section of CPT®: ordered and reviewed  Independent visualization of images, tracings, or specimens: yes (ekg)    Risk of Complications, Morbidity, and/or Mortality  Presenting problems: moderate  Diagnostic procedures: moderate  Management options: moderate    Critical Care  Total time providing critical care: 30-74 minutes (Total critical care time spent exclusive of procedures:  40 minutes)    Patient Progress  Patient progress: improved        ED Course       Procedures    Chief Complaint   Patient presents with    Hypertension       The patient's presenting problems have been discussed, and they are in agreement with the care plan formulated and outlined with them. I have encouraged them to ask questions as they arise throughout their visit. MEDICATIONS GIVEN:  Medications   sodium chloride (NS) flush 5-10 mL (not administered)   sodium chloride (NS) flush 5-10 mL (not administered)   hydrALAZINE (APRESOLINE) 20 mg/mL injection 20 mg ( IntraVENous Canceled Entry 4/16/18 0126)       LABS REVIEWED:  Recent Results (from the past 24 hour(s))   CBC WITH AUTOMATED DIFF    Collection Time: 04/16/18  1:10 AM   Result Value Ref Range    WBC 6.5 3.6 - 11.0 K/uL    RBC 4.24 3.80 - 5.20 M/uL    HGB 13.0 11.5 - 16.0 g/dL    HCT 39.3 35.0 - 47.0 %    MCV 92.7 80.0 - 99.0 FL    MCH 30.7 26.0 - 34.0 PG    MCHC 33.1 30.0 - 36.5 g/dL    RDW 13.2 11.5 - 14.5 %    PLATELET 025 434 - 102 K/uL    MPV 10.1 8.9 - 12.9 FL    NRBC 0.0 0  WBC    ABSOLUTE NRBC 0.00 0.00 - 0.01 K/uL    NEUTROPHILS 51 32 - 75 %    LYMPHOCYTES 35 12 - 49 %    MONOCYTES 10 5 - 13 %    EOSINOPHILS 3 0 - 7 %    BASOPHILS 1 0 - 1 %    IMMATURE GRANULOCYTES 0 0.0 - 0.5 %    ABS. NEUTROPHILS 3.3 1.8 - 8.0 K/UL    ABS. LYMPHOCYTES 2.3 0.8 - 3.5 K/UL    ABS. MONOCYTES 0.7 0.0 - 1.0 K/UL    ABS. EOSINOPHILS 0.2 0.0 - 0.4 K/UL    ABS. BASOPHILS 0.0 0.0 - 0.1 K/UL    ABS. IMM. GRANS. 0.0 0.00 - 0.04 K/UL    DF AUTOMATED     METABOLIC PANEL, BASIC    Collection Time: 04/16/18  1:10 AM   Result Value Ref Range    Sodium 144 136 - 145 mmol/L    Potassium 3.5 3.5 - 5.1 mmol/L    Chloride 107 97 - 108 mmol/L    CO2 29 21 - 32 mmol/L    Anion gap 8 5 - 15 mmol/L    Glucose 137 (H) 65 - 100 mg/dL    BUN 12 6 - 20 MG/DL    Creatinine 1.08 (H) 0.55 - 1.02 MG/DL    BUN/Creatinine ratio 11 (L) 12 - 20      GFR est AA 60 (L) >60 ml/min/1.73m2    GFR est non-AA 49 (L) >60 ml/min/1.73m2    Calcium 9.5 8.5 - 10.1 MG/DL   TSH 3RD GENERATION    Collection Time: 04/16/18  1:10 AM   Result Value Ref Range    TSH 2.37 0.36 - 3.74 uIU/mL       VITAL SIGNS:  Patient Vitals for the past 12 hrs:   Temp Pulse Resp BP SpO2   04/16/18 0230 - - - - 94 %   04/16/18 0217 - 70 - 174/75 97 %   04/16/18 0216 - 70 - - 97 %   04/16/18 0215 - - - - 97 %   04/16/18 0145 - - - 163/65 97 %   04/16/18 0130 - - - (!) 205/88 98 %   04/16/18 0115 - - - (!) 187/103 96 %   04/16/18 0100 - - - (!) 206/90 94 %   04/16/18 0040 98.3 °F (36.8 °C) 68 16 (!) 226/101 100 %     ED EKG interpretation:  Rhythm: normal sinus rhythm and RBBB; and regular . Rate (approx.): 69; Axis: normal; P wave: normal; QRS interval: normal ; ST/T wave: normal; Other findings: abnormal ekg. This EKG was interpreted by Yesy Davidson DO,ED Provider. PROGRESS NOTES:  2:13 AM   Discussed results and plan with patient. Her blood pressure is improved. Patient will be discharged home with PCP and cardiology follow-up. Patient instructed to return to the emergency room for any worsening symptoms or any other concerns. DIAGNOSIS:    1.  Hypertensive urgency        PLAN:  Follow-up Information     Follow up With Details Comments Contact Info    Brenda Faye MD Schedule an appointment as soon as possible for a visit  [address]  [address] [facility]  [address]      Aaron Meraz MD Schedule an appointment as soon as possible for a visit  [address] [address]  [phone]      [facility] EMERGENCY DEPT  If symptoms worsen [facility]  [phone]        Discharge Medication List as of 4/16/2018  2:13 AM      CONTINUE these medications which have NOT CHANGED    Details   dilTIAZem (CARDIZEM) 30 mg tablet take 1 tablet by mouth BEFORE BREAKFAST, LUNCH AND DINNER, Normal, Disp-90 Tab, R-6      lisinopril (PRINIVIL, ZESTRIL) 20 mg tablet Take 1 Tab by mouth daily. , Normal, Disp-30 Tab, R-5      flecainide (TAMBOCOR) 50 mg tablet Take 1 Tab by mouth every twelve (12) hours. , Print, Disp-60 Tab, R-0      calcium-vitamin D (OYSTER SHELL) 500 mg(1,250mg) -200 unit per tablet Take 1 Tab by mouth daily. , Historical Med      apixaban (ELIQUIS) 5 mg tablet take 1 tablet by mouth twice a day, Normal, Disp-60 Tab, R-11      cloNIDine HCl (CATAPRES) 0.1 mg tablet Take 1 Tab by mouth three (3) times daily. Hold for SBP <120  Indications: hypertension, Normal, Disp-270 Tab, R-3      levothyroxine (SYNTHROID) 50 mcg tablet take 1 tablet by mouth once daily, Historical Med, R-0      lansoprazole (PREVACID) 30 mg capsule Take 30 mg by mouth nightly., Historical Med      FOLIC ACID/MULTIVIT-MIN/LUTEIN (CENTRUM SILVER PO) Take 1 Tab by mouth daily. , Historical Med      ezetimibe (ZETIA) 10 mg tablet Take 10 mg by mouth every evening., Historical Med             ED COURSE: The patient's hospital course has been uncomplicated.

## 2018-04-17 NOTE — TELEPHONE ENCOUNTER
Verneda Farr, NP Lucile Buerger, LPN        Caller: Unspecified (Yesterday,  8:30 AM)                     I can see that when he saw her in march, they reduced her Clonidine to 0.1 mg BID, rather than TID, to address some increased lethargy.  Can you confirm if she changed this dosing.  The ER note from this morning, still says TID.      All other medications stayed the same and were accurately documented in the ER this morning.

## 2018-04-18 NOTE — TELEPHONE ENCOUNTER
Brittany Jones, CANDIDO  Artist GAGANDEEP Garcia        Caller: Unspecified (2 days ago,  8:30 AM)                     I can see that when he saw her in march, they reduced her Clonidine to 0.1 mg BID, rather than TID, to address some increased lethargy.  Can you confirm if she changed this dosing.  The ER note from this morning, still says TID. All other medications stayed the same and were accurately documented in the ER this morning.

## 2018-04-18 NOTE — TELEPHONE ENCOUNTER
Pt states that she knows that he wants clonidine 0.1 mg BID but mostly ends up using it TID due to high readings and sometimes has to use 4 x day. Does have occasional low readings at noon and skips it .

## 2018-04-27 NOTE — TELEPHONE ENCOUNTER
Chaim Castro, CANDIDO Ross, GAGANDEEP        Caller: Unspecified (1 week ago)                     No additional changes at this time.       As we've talked with her before, consistency in medication administration will keep things smoothed out.  I fear that when she takes too much then has to skip that this can be exacerbating rebound HTN.  He only reduced the Clonidine from TID to BID to try to address some lethargy.  If she is requiring TID, based on her pre-medication BP's, she should continue with that pattern.  Starting to take 4 per day and then having to skip is where I think she may be getting into trouble. We will plan to see her back in May.

## 2018-05-25 ENCOUNTER — OFFICE VISIT (OUTPATIENT)
Dept: CARDIOLOGY CLINIC | Age: 78
End: 2018-05-25

## 2018-05-25 VITALS
BODY MASS INDEX: 37.5 KG/M2 | HEART RATE: 73 BPM | DIASTOLIC BLOOD PRESSURE: 80 MMHG | HEIGHT: 60 IN | SYSTOLIC BLOOD PRESSURE: 150 MMHG | OXYGEN SATURATION: 98 % | WEIGHT: 191 LBS

## 2018-05-25 DIAGNOSIS — I48.0 PAROXYSMAL ATRIAL FIBRILLATION (HCC): Primary | ICD-10-CM

## 2018-05-25 DIAGNOSIS — I45.10 RBBB: ICD-10-CM

## 2018-05-25 DIAGNOSIS — I87.2 VENOUS INSUFFICIENCY: ICD-10-CM

## 2018-05-25 DIAGNOSIS — E66.01 SEVERE OBESITY (BMI 35.0-39.9) WITH COMORBIDITY (HCC): ICD-10-CM

## 2018-05-25 DIAGNOSIS — Z86.79 S/P ABLATION OF ATRIAL FIBRILLATION: ICD-10-CM

## 2018-05-25 DIAGNOSIS — I47.1 SVT (SUPRAVENTRICULAR TACHYCARDIA) (HCC): ICD-10-CM

## 2018-05-25 DIAGNOSIS — I10 HTN (HYPERTENSION), BENIGN: ICD-10-CM

## 2018-05-25 DIAGNOSIS — Z98.890 S/P ABLATION OF ATRIAL FIBRILLATION: ICD-10-CM

## 2018-05-25 DIAGNOSIS — I51.7 LAE (LEFT ATRIAL ENLARGEMENT): ICD-10-CM

## 2018-05-25 NOTE — PATIENT INSTRUCTIONS
Please continue your current medication regimen. If you feel that your cough progresses, please call me and we can further discuss a re-challenge of the Diovan instead of the Lisinopril. The Diovan is a cousin to the Lisinopril and doesn't have that cough side effect. Stay active in your garden and monitor for any progression of your shortness of breath. Continue to work no consistency in medications, and monitor only when need be. Continue to keep a log and bring this with you to your visit.

## 2018-05-25 NOTE — PROGRESS NOTES
[id number] [facility] [address] [facility]    Office [phone]  Fax [phone]        Corie Matson is a 70 y.o. female. Last seen 2 months ago. Seen in [facility] ER 4/16/18 with accelerated HTN. Problem List  Date Reviewed: 3/7/2018          Codes Class Noted    Severe obesity (BMI 35.0-39. 9) with comorbidity ([address] ICD-10-CM: E66.01  ICD-9-CM: 278.01  5/25/2018        Accelerated hypertension ICD-10-CM: I10  ICD-9-CM: 401.0  3/7/2018        Headache ICD-10-CM: R51  ICD-9-CM: 784.0  3/7/2018        SVT (supraventricular tachycardia) (HCC) ICD-10-CM: I47.1  ICD-9-CM: 427.89  8/28/2017        Hiatal hernia ICD-10-CM: K44.9  ICD-9-CM: 553.3  8/28/2017        Venous insufficiency ICD-10-CM: I87.2  ICD-9-CM: 459.81  5/4/2017        Acquired hypothyroidism ICD-10-CM: E03.9  ICD-9-CM: 244.9  5/1/2017        S/P ablation of atrial fibrillation ICD-10-CM: Z98.890, Z86.79  ICD-9-CM: V45.89  4/28/2017        Paroxysmal atrial fibrillation (HCC) ICD-10-CM: I48.0  ICD-9-CM: 427.31  8/28/2016        HTN (hypertension), benign ICD-10-CM: I10  ICD-9-CM: 401.1  7/26/2016        Gastroesophageal reflux disease without esophagitis ICD-10-CM: K21.9  ICD-9-CM: 530.81  7/3/2016        ANÍBAL on CPAP ICD-10-CM: G47.33, Z99.89  ICD-9-CM: 327.23, V46.8  7/9/2015        RBBB ICD-10-CM: I45.10  ICD-9-CM: 426.4  5/7/2015        LAE (left atrial enlargement) ICD-10-CM: I51.7  ICD-9-CM: 429.3  4/3/2013             Cardiac testing  Adenosine myoview 2011 - normal perfusion, EF 81%  Echocardiogram 2/22/13 - normal left ventricular size and function, normal appearing mitral, aortic, and tricuspid valves, with mild mitral and moderate tricuspid regurgitation, bi-atrial enlargement  Lexiscan 3/3/2014 - normal perfusion EF 83%  Echo: 5/15/15- 60%, mildly dilated LA, mild MR  Cardioversion 7/23/2015 - 200J  Lexiscan cardiolite 5/10/16 - normal perfusion, EF 78%  Renal Visceral Duplex 5/2016 - No evidence of ONOFRE  3/2/17-. Successful atrial fibrillation ablation however unable to achieve entrance/exit block of LSPV per Dr. Monsivais Part    Echo 3/8/18 - EF 65%. No WMA. Normal RV size and function. Mild MR. Mild TR. HPI  Ms. Neal Gentile remains active working in her yard and with house chores. She reportedly \"never sits down\". She denies any exertional chest pain . Reports stable mild shortness of breath at rest and moderate dyspnea on exertion. She denies any recurrent palpitations or tachycardia. No bleeding or bruising concerns on Eliquis. Home monitoring of BP continues with her same trends - intermittent low SBP 90 to 100's precipitating her to hold medications followed by elevated SBP's 180-190's thereafter. She reports that she understands the need for medication consistency, but gets nervous about her BP dropping or going too high. She checks her BP multiple times each day. States that this helps with her anxiety. Using PRN Clonidine for elevated BP's. Reports following a low sodium diet. Previously diagnosed with ANÍBAL, now reports this diagnosis has been rejected by her sleep medicine specialist. No longer using CPAP. She reports a cough, now on Lisinopril. No wheezing or sputum production. Cough is dry. She denies any syncope, orthopnea, edema or paroxysmal nocturnal dyspnea.       Past Medical History:   Diagnosis Date    DDD (degenerative disc disease)     Gastroesophageal reflux disease without esophagitis 7/3/2016    GERD (gastroesophageal reflux disease)     Hiatal hernia     HTN (hypertension), benign 7/26/2016    Hypertension     Hypothyroidism     ANÍBAL (obstructive sleep apnea) 7/26/2016    Paroxysmal atrial fibrillation ([facility] [address] 8/28/2016    Uterine prolapse       Current Outpatient Prescriptions on File Prior to Visit   Medication Sig Dispense Refill    dilTIAZem (CARDIZEM) 30 mg tablet take 1 tablet by mouth BEFORE BREAKFAST, LUNCH AND DINNER 90 Tab 6    lisinopril (PRINIVIL, ZESTRIL) 20 mg tablet Take 1 Tab by mouth daily. 30 Tab 5    flecainide (TAMBOCOR) 50 mg tablet Take 1 Tab by mouth every twelve (12) hours. 60 Tab 0    calcium-vitamin D (OYSTER SHELL) 500 mg(1,250mg) -200 unit per tablet Take 1 Tab by mouth daily.  apixaban (ELIQUIS) 5 mg tablet take 1 tablet by mouth twice a day 60 Tab 11    cloNIDine HCl (CATAPRES) 0.1 mg tablet Take 1 Tab by mouth three (3) times daily. Hold for SBP <120  Indications: hypertension 270 Tab 3    levothyroxine (SYNTHROID) 50 mcg tablet take 1 tablet by mouth once daily  0    lansoprazole (PREVACID) 30 mg capsule Take 30 mg by mouth nightly.  FOLIC ACID/MULTIVIT-MIN/LUTEIN (CENTRUM SILVER PO) Take 1 Tab by mouth daily.  ezetimibe (ZETIA) 10 mg tablet Take 10 mg by mouth every evening. No current facility-administered medications on file prior to visit. Allergies   Allergen Reactions    Citalopram Hydrobromide Rash     With itching.  Chlorthalidone Rash    Contrast Agent [Iodine] Other (comments)     Wheezing/bronchospasm    Maxzide [Triamterene-Hydrochlorothiazid] Palpitations    Vicodin [Hydrocodone-Acetaminophen] Palpitations     , former smoker     Review of Systems  Constitutional: Negative for fever, chills, and diaphoresis. +fatigue  Respiratory: Negative for hemoptysis, sputum production, and wheezing. +SOB/GOODE and cough. Cardiovascular: Negative for orthopnea, claudication,leg swelling and PND. Gastrointestinal: Negative for heartburn, nausea, vomiting, blood in stool and melena. Genitourinary: Negative for dysuria and flank pain. Musculoskeletal: Negative for joint pain and back pain. Skin: Negative for rash. Neurological: Negative for focal weakness, seizures, loss of consciousness, weakness. Endo/Heme/Allergies: Does not bruise/bleed easily. Psychiatric/Behavioral: Negative for memory loss.       Visit Vitals    /80 (BP 1 Location: Right arm, BP Patient Position: Sitting)    Pulse 73    Ht 5' (1.524 m)    Wt 191 lb (86.6 kg)    SpO2 98%    BMI 37.3 kg/m2      Wt Readings from Last 3 Encounters:   05/25/18 191 lb (86.6 kg)   04/16/18 188 lb (85.3 kg)   03/17/18 190 lb (86.2 kg)      Physical Exam   Constitutional: She is oriented to person, place, and time. She appears well-developed and well-nourished. Neck: Neck supple. No JVD present. Cardiovascular: regular  Pulses: Carotid pulses are 2+ on the right side, and 2+ on the left side. Dorsalis pedis pulses are 2+ on the right side, and 2+ on the left side. Pulmonary/Chest: Effort normal and breath sounds normal. She has no wheezes. She has no rales. Abdominal: Soft. Bowel sounds are normal. There is no tenderness. There is no rebound. Musculoskeletal: She exhibits no edema. Neurological: She is alert and oriented to person, place, and time. Skin: Skin is warm and dry. Psychiatric: She has a normal mood and affect. Lab Results   Component Value Date/Time    Sodium 144 04/16/2018 01:10 AM    Potassium 3.5 04/16/2018 01:10 AM    Chloride 107 04/16/2018 01:10 AM    CO2 29 04/16/2018 01:10 AM    Anion gap 8 04/16/2018 01:10 AM    Glucose 137 (H) 04/16/2018 01:10 AM    BUN 12 04/16/2018 01:10 AM    Creatinine 1.08 (H) 04/16/2018 01:10 AM    BUN/Creatinine ratio 11 (L) 04/16/2018 01:10 AM    GFR est AA 60 (L) 04/16/2018 01:10 AM    GFR est non-AA 49 (L) 04/16/2018 01:10 AM    Calcium 9.5 04/16/2018 01:10 AM    Bilirubin, total 0.3 03/17/2018 01:26 AM    AST (SGOT) 26 03/17/2018 01:26 AM    Alk.  phosphatase 95 03/17/2018 01:26 AM    Protein, total 7.8 03/17/2018 01:26 AM    Albumin 4.0 03/17/2018 01:26 AM    Globulin 3.8 03/17/2018 01:26 AM    A-G Ratio 1.1 03/17/2018 01:26 AM    ALT (SGPT) 33 03/17/2018 01:26 AM     Cardiographics   EKG 4/13 - normal  EKG 5/6/15 - AF with ventricular rate 96, RBBB  EKG 6/3/15 - A-fib rate 70s, RBBB, Rightward axis  EKG 8/31/15 - SR, RBBB  EKG 12/7/15 - SR, RBBB  EKG 7/26/16 - AF 80s, RBBB, rightward axis, unchanged from 7/23/16   EKG 11/28/16 - SR 60   EKG 12/05/16-AF 80-90, RBBB   EKG 01/10/17- AF 70s  EKG 8/25/17- SR 60, RBBB rightward axis. EKG 5/25/18 - SR 66    ASSESSMENT and PLAN  Encounter Diagnoses   Name Primary?  Paroxysmal atrial fibrillation (HCC) Yes    Venous insufficiency     SVT (supraventricular tachycardia) (HCC)     RBBB     LAE (left atrial enlargement)     HTN (hypertension), benign     Severe obesity (BMI 35.0-39. 9) with comorbidity ([facility] [address]     S/P ablation of atrial fibrillation      1. Chronic HTN -  Her BP control remains sporadic on current medication regimen, likely due to ongoing suspension of medication and then use of PRN Clonidine. Encouraged her to continue current medication regimen and to continue to work on consistency as prescribed, low sodium diet and a reduced amount of monitoring. Stress/anxiety are a big factor in this; I am skeptical that this pattern will change. 2. Cough; likely due to ACE inhibitor - we discussed the use of ARB in place of ACE. She wishes to continue her current regimen due to reports of \"doing poorly on Valsartan in the past\". Will continue to monitor. 3. Paroxysmal AF s/p recent cardioversion 1 year following AF ablation. SR by EKG today. Continue Flecainide 50 mg BID + Dilt 30 mg TID. Continue Eliquis for stroke prevention. Recent lab work in [facility] 19 shows stable Hgb. She was encouraged to continue current medications, remain active, lose weight and call with any new complaints or concerns. Follow-up Disposition:  Return in about 3 months (around 8/25/2018).     Darrel Mora NP

## 2018-05-25 NOTE — PROGRESS NOTES
Shortness of breath per patient   Patient said night before last she was having palpitations      Visit Vitals    /80 (BP 1 Location: Right arm, BP Patient Position: Sitting)    Pulse 73    Ht 5' (1.524 m)    Wt 191 lb (86.6 kg)    SpO2 98%    BMI 37.3 kg/m2     Ekg was done today

## 2018-05-25 NOTE — MR AVS SNAPSHOT
[address] [facility] [address] [facility] 
[phone] Patient: Donald Rivas 
MRN: [id number] BCM:2/07/4246 Visit Information Date & Time Provider Department Dept. Phone Encounter #  
 5/25/2018 10:00 AM Americo Conroy NP [facility] Emily Smith [phone] [id number] Follow-up Instructions Return in about 3 months (around 8/25/2018). Your Appointments 8/27/2018  2:40 PM  
ESTABLISHED PATIENT with Bob Escobedo MD  
[facility] ([facility] SCHEDULING) Appt Note: 3 month follow up with Dr. Cole Tate  per Hanna Ocampo [address] [facility] [address] [facility]  
54 [facility] 13 Upcoming Health Maintenance Date Due DTaP/Tdap/Td series (1 - Tdap) 2/12/1963 ZOSTER VACCINE AGE 60> 12/12/2001 GLAUCOMA SCREENING Q2Y 2/12/2007 Bone Densitometry (Dexa) Screening 2/12/2007 Pneumococcal 65+ Low/Medium Risk (1 of 2 - PCV13) 2/12/2007 MEDICARE YEARLY EXAM 3/14/2018 Influenza Age 5 to Adult 8/1/2018 Allergies as of 5/25/2018  Review Complete On: 5/25/2018 By: Gerardo Schwartz  
  
 Severity Noted Reaction Type Reactions Citalopram Hydrobromide High 09/16/2014    Rash With itching. Chlorthalidone  04/03/2013    Rash Contrast Agent [Iodine]  03/08/2018    Other (comments) Wheezing/bronchospasm Maxzide [Triamterene-hydrochlorothiazid]  04/03/2013    Palpitations Vicodin [Hydrocodone-acetaminophen]  04/03/2013    Palpitations Current Immunizations  Reviewed on 3/3/2012 No immunizations on file. Not reviewed this visit You Were Diagnosed With   
  
 Codes Comments Paroxysmal atrial fibrillation (HCC)    -  Primary ICD-10-CM: I48.0 ICD-9-CM: 427.31 Vitals BP Pulse Height(growth percentile) Weight(growth percentile) SpO2 BMI 150/80 (BP 1 Location: Right arm, BP Patient Position: Sitting) 73 5' (1.524 m) 191 lb (86.6 kg) 98% 37.3 kg/m2 OB Status Smoking Status Postmenopausal Former Smoker Vitals History BMI and BSA Data Body Mass Index Body Surface Area  
 37.3 kg/m 2 1.91 m 2 Preferred Pharmacy Pharmacy Name Phone [place], [address] [phone] Your Updated Medication List  
  
   
This list is accurate as of 5/25/18 11:17 AM.  Always use your most recent med list.  
  
  
  
  
 apixaban 5 mg tablet Commonly known as:  ELIQUIS  
take 1 tablet by mouth twice a day  
  
 calcium-vitamin D 500 mg(1,250mg) -200 unit per tablet Commonly known as:  OYSTER SHELL Take 1 Tab by mouth daily. CENTRUM SILVER PO Take 1 Tab by mouth daily. cloNIDine HCl 0.1 mg tablet Commonly known as:  CATAPRES Take 1 Tab by mouth three (3) times daily. Hold for SBP <120  Indications: hypertension  
  
 dilTIAZem 30 mg tablet Commonly known as:  CARDIZEM  
take 1 tablet by mouth BEFORE BREAKFAST, LUNCH AND DINNER  
  
 flecainide 50 mg tablet Commonly known as:  TAMBOCOR Take 1 Tab by mouth every twelve (12) hours. levothyroxine 50 mcg tablet Commonly known as:  SYNTHROID  
take 1 tablet by mouth once daily  
  
 lisinopril 20 mg tablet Commonly known as:  Rajani Fess Take 1 Tab by mouth daily. PREVACID 30 mg capsule Generic drug:  lansoprazole Take 30 mg by mouth nightly. ZETIA 10 mg tablet Generic drug:  ezetimibe Take 10 mg by mouth every evening. We Performed the Following AMB POC EKG ROUTINE W/ 12 LEADS, INTER & REP [20304 CPT(R)] Follow-up Instructions Return in about 3 months (around 8/25/2018). Patient Instructions Please continue your current medication regimen.   
 
If you feel that your cough progresses, please call me and we can further discuss a re-challenge of the Diovan instead of the Lisinopril. The Diovan is a cousin to the Lisinopril and doesn't have that cough side effect. Stay active in your garden and monitor for any progression of your shortness of breath. Continue to work no consistency in medications, and monitor only when need be. Continue to keep a log and bring this with you to your visit. Introducing [facility]! Dear Ophelia Morales: Thank you for requesting a [employer] account. Our records indicate that you already have an active [employer] account. You can access your account anytime at [email or web address] "[employer] Did you know that you can access your hospital and ER discharge instructions at any time in [employer]? You can also review all of your test results from your hospital stay or ER visit. Additional Information If you have questions, please visit the Frequently Asked Questions section of the [employer] website at [email or web address] [employer]/. Remember, [employer] is NOT to be used for urgent needs. For medical emergencies, dial 911. Now available from your iPhone and Android! Please provide this summary of care documentation to your next provider. Your primary care clinician is listed as Ángel Valentino. If you have any questions after today's visit, please call [phone].

## 2018-06-29 ENCOUNTER — APPOINTMENT (OUTPATIENT)
Dept: GENERAL RADIOLOGY | Age: 78
DRG: 310 | End: 2018-06-29
Attending: EMERGENCY MEDICINE
Payer: MEDICARE

## 2018-06-29 ENCOUNTER — HOSPITAL ENCOUNTER (INPATIENT)
Age: 78
LOS: 1 days | Discharge: HOME | DRG: 310 | End: 2018-06-30
Attending: EMERGENCY MEDICINE | Admitting: INTERNAL MEDICINE
Payer: MEDICARE

## 2018-06-29 DIAGNOSIS — I48.91 ATRIAL FIBRILLATION WITH RAPID VENTRICULAR RESPONSE (HCC): Primary | ICD-10-CM

## 2018-06-29 PROBLEM — R51.9 HEADACHE: Status: RESOLVED | Noted: 2018-03-07 | Resolved: 2018-06-29

## 2018-06-29 PROBLEM — I10 ACCELERATED HYPERTENSION: Status: RESOLVED | Noted: 2018-03-07 | Resolved: 2018-06-29

## 2018-06-29 LAB
ALBUMIN SERPL-MCNC: 3.9 G/DL (ref 3.5–5)
ALBUMIN/GLOB SERPL: 1 {RATIO} (ref 1.1–2.2)
ALP SERPL-CCNC: 89 U/L (ref 45–117)
ALT SERPL-CCNC: 27 U/L (ref 12–78)
ANION GAP SERPL CALC-SCNC: 11 MMOL/L (ref 5–15)
APPEARANCE UR: CLEAR
AST SERPL-CCNC: 24 U/L (ref 15–37)
ATRIAL RATE: 107 BPM
ATRIAL RATE: 74 BPM
BACTERIA URNS QL MICRO: NEGATIVE /HPF
BASOPHILS # BLD: 0 K/UL (ref 0–0.1)
BASOPHILS NFR BLD: 0 % (ref 0–1)
BILIRUB SERPL-MCNC: 0.6 MG/DL (ref 0.2–1)
BILIRUB UR QL: NEGATIVE
BUN SERPL-MCNC: 15 MG/DL (ref 6–20)
BUN/CREAT SERPL: 12 (ref 12–20)
CALCIUM SERPL-MCNC: 9.4 MG/DL (ref 8.5–10.1)
CALCULATED P AXIS, ECG09: 61 DEGREES
CALCULATED R AXIS, ECG10: 65 DEGREES
CALCULATED R AXIS, ECG10: 98 DEGREES
CALCULATED T AXIS, ECG11: -11 DEGREES
CALCULATED T AXIS, ECG11: 25 DEGREES
CHLORIDE SERPL-SCNC: 104 MMOL/L (ref 97–108)
CO2 SERPL-SCNC: 27 MMOL/L (ref 21–32)
COLOR UR: ABNORMAL
CREAT SERPL-MCNC: 1.25 MG/DL (ref 0.55–1.02)
DIAGNOSIS, 93000: NORMAL
DIAGNOSIS, 93000: NORMAL
DIFFERENTIAL METHOD BLD: ABNORMAL
EOSINOPHIL # BLD: 0.2 K/UL (ref 0–0.4)
EOSINOPHIL NFR BLD: 2 % (ref 0–7)
EPITH CASTS URNS QL MICRO: ABNORMAL /LPF
ERYTHROCYTE [DISTWIDTH] IN BLOOD BY AUTOMATED COUNT: 13.5 % (ref 11.5–14.5)
GLOBULIN SER CALC-MCNC: 4.1 G/DL (ref 2–4)
GLUCOSE SERPL-MCNC: 140 MG/DL (ref 65–100)
GLUCOSE UR STRIP.AUTO-MCNC: NEGATIVE MG/DL
HCT VFR BLD AUTO: 42.1 % (ref 35–47)
HGB BLD-MCNC: 13.6 G/DL (ref 11.5–16)
HGB UR QL STRIP: ABNORMAL
HYALINE CASTS URNS QL MICRO: ABNORMAL /LPF (ref 0–5)
IMM GRANULOCYTES # BLD: 0 K/UL (ref 0–0.04)
IMM GRANULOCYTES NFR BLD AUTO: 0 % (ref 0–0.5)
KETONES UR QL STRIP.AUTO: NEGATIVE MG/DL
LEUKOCYTE ESTERASE UR QL STRIP.AUTO: ABNORMAL
LYMPHOCYTES # BLD: 3.3 K/UL (ref 0.8–3.5)
LYMPHOCYTES NFR BLD: 33 % (ref 12–49)
MAGNESIUM SERPL-MCNC: 2.2 MG/DL (ref 1.6–2.4)
MCH RBC QN AUTO: 30.4 PG (ref 26–34)
MCHC RBC AUTO-ENTMCNC: 32.3 G/DL (ref 30–36.5)
MCV RBC AUTO: 94.2 FL (ref 80–99)
MONOCYTES # BLD: 1.1 K/UL (ref 0–1)
MONOCYTES NFR BLD: 11 % (ref 5–13)
NEUTS SEG # BLD: 5.4 K/UL (ref 1.8–8)
NEUTS SEG NFR BLD: 54 % (ref 32–75)
NITRITE UR QL STRIP.AUTO: NEGATIVE
NRBC # BLD: 0 K/UL (ref 0–0.01)
NRBC BLD-RTO: 0 PER 100 WBC
P-R INTERVAL, ECG05: 178 MS
PH UR STRIP: 6.5 [PH] (ref 5–8)
PLATELET # BLD AUTO: 199 K/UL (ref 150–400)
PMV BLD AUTO: 10.2 FL (ref 8.9–12.9)
POTASSIUM SERPL-SCNC: 3.5 MMOL/L (ref 3.5–5.1)
PROT SERPL-MCNC: 8 G/DL (ref 6.4–8.2)
PROT UR STRIP-MCNC: NEGATIVE MG/DL
Q-T INTERVAL, ECG07: 362 MS
Q-T INTERVAL, ECG07: 444 MS
QRS DURATION, ECG06: 138 MS
QRS DURATION, ECG06: 144 MS
QTC CALCULATION (BEZET), ECG08: 492 MS
QTC CALCULATION (BEZET), ECG08: 526 MS
RBC # BLD AUTO: 4.47 M/UL (ref 3.8–5.2)
RBC #/AREA URNS HPF: ABNORMAL /HPF (ref 0–5)
SODIUM SERPL-SCNC: 142 MMOL/L (ref 136–145)
SP GR UR REFRACTOMETRY: <1.005 (ref 1–1.03)
TROPONIN I SERPL-MCNC: <0.05 NG/ML
UA: UC IF INDICATED,UAUC: ABNORMAL
UROBILINOGEN UR QL STRIP.AUTO: 0.2 EU/DL (ref 0.2–1)
VENTRICULAR RATE, ECG03: 127 BPM
VENTRICULAR RATE, ECG03: 74 BPM
WBC # BLD AUTO: 10.1 K/UL (ref 3.6–11)
WBC URNS QL MICRO: ABNORMAL /HPF (ref 0–4)

## 2018-06-29 PROCEDURE — 97535 SELF CARE MNGMENT TRAINING: CPT

## 2018-06-29 PROCEDURE — 93005 ELECTROCARDIOGRAM TRACING: CPT

## 2018-06-29 PROCEDURE — 74011000258 HC RX REV CODE- 258: Performed by: EMERGENCY MEDICINE

## 2018-06-29 PROCEDURE — 80053 COMPREHEN METABOLIC PANEL: CPT | Performed by: EMERGENCY MEDICINE

## 2018-06-29 PROCEDURE — 71045 X-RAY EXAM CHEST 1 VIEW: CPT

## 2018-06-29 PROCEDURE — 74011250636 HC RX REV CODE- 250/636: Performed by: INTERNAL MEDICINE

## 2018-06-29 PROCEDURE — 99285 EMERGENCY DEPT VISIT HI MDM: CPT

## 2018-06-29 PROCEDURE — 83735 ASSAY OF MAGNESIUM: CPT | Performed by: EMERGENCY MEDICINE

## 2018-06-29 PROCEDURE — 85025 COMPLETE CBC W/AUTO DIFF WBC: CPT | Performed by: EMERGENCY MEDICINE

## 2018-06-29 PROCEDURE — 97165 OT EVAL LOW COMPLEX 30 MIN: CPT

## 2018-06-29 PROCEDURE — 81001 URINALYSIS AUTO W/SCOPE: CPT | Performed by: EMERGENCY MEDICINE

## 2018-06-29 PROCEDURE — 84484 ASSAY OF TROPONIN QUANT: CPT | Performed by: EMERGENCY MEDICINE

## 2018-06-29 PROCEDURE — 74011250637 HC RX REV CODE- 250/637: Performed by: INTERNAL MEDICINE

## 2018-06-29 PROCEDURE — 96365 THER/PROPH/DIAG IV INF INIT: CPT

## 2018-06-29 PROCEDURE — 36415 COLL VENOUS BLD VENIPUNCTURE: CPT | Performed by: EMERGENCY MEDICINE

## 2018-06-29 PROCEDURE — 97116 GAIT TRAINING THERAPY: CPT

## 2018-06-29 PROCEDURE — 65270000029 HC RM PRIVATE

## 2018-06-29 PROCEDURE — 74011000250 HC RX REV CODE- 250: Performed by: EMERGENCY MEDICINE

## 2018-06-29 PROCEDURE — 97161 PT EVAL LOW COMPLEX 20 MIN: CPT

## 2018-06-29 RX ORDER — SODIUM CHLORIDE 0.9 % (FLUSH) 0.9 %
5-10 SYRINGE (ML) INJECTION AS NEEDED
Status: DISCONTINUED | OUTPATIENT
Start: 2018-06-29 | End: 2018-06-30 | Stop reason: HOSPADM

## 2018-06-29 RX ORDER — CLONIDINE HYDROCHLORIDE 0.1 MG/1
0.1 TABLET ORAL 3 TIMES DAILY
COMMUNITY
End: 2018-07-03

## 2018-06-29 RX ORDER — PANTOPRAZOLE SODIUM 40 MG/1
40 TABLET, DELAYED RELEASE ORAL
Status: DISCONTINUED | OUTPATIENT
Start: 2018-06-29 | End: 2018-06-30 | Stop reason: HOSPADM

## 2018-06-29 RX ORDER — LEVOTHYROXINE SODIUM 50 UG/1
50 TABLET ORAL
COMMUNITY

## 2018-06-29 RX ORDER — DILTIAZEM HYDROCHLORIDE 60 MG/1
60 TABLET, FILM COATED ORAL
Status: DISCONTINUED | OUTPATIENT
Start: 2018-06-29 | End: 2018-06-30 | Stop reason: HOSPADM

## 2018-06-29 RX ORDER — LEVOTHYROXINE SODIUM 50 UG/1
50 TABLET ORAL
Status: DISCONTINUED | OUTPATIENT
Start: 2018-06-29 | End: 2018-06-30 | Stop reason: HOSPADM

## 2018-06-29 RX ORDER — DILTIAZEM HYDROCHLORIDE 5 MG/ML
10 INJECTION INTRAVENOUS
Status: COMPLETED | OUTPATIENT
Start: 2018-06-29 | End: 2018-06-29

## 2018-06-29 RX ORDER — ONDANSETRON 2 MG/ML
4 INJECTION INTRAMUSCULAR; INTRAVENOUS
Status: DISCONTINUED | OUTPATIENT
Start: 2018-06-29 | End: 2018-06-30 | Stop reason: HOSPADM

## 2018-06-29 RX ORDER — CLONIDINE HYDROCHLORIDE 0.1 MG/1
0.1 TABLET ORAL
Status: COMPLETED | OUTPATIENT
Start: 2018-06-29 | End: 2018-06-29

## 2018-06-29 RX ORDER — LISINOPRIL 20 MG/1
20 TABLET ORAL DAILY
Status: DISCONTINUED | OUTPATIENT
Start: 2018-06-29 | End: 2018-06-30 | Stop reason: HOSPADM

## 2018-06-29 RX ORDER — ACETAMINOPHEN 325 MG/1
650 TABLET ORAL
COMMUNITY
End: 2018-07-03

## 2018-06-29 RX ORDER — ACETAMINOPHEN 325 MG/1
650 TABLET ORAL
Status: DISCONTINUED | OUTPATIENT
Start: 2018-06-29 | End: 2018-06-30 | Stop reason: HOSPADM

## 2018-06-29 RX ORDER — SODIUM CHLORIDE 0.9 % (FLUSH) 0.9 %
5-10 SYRINGE (ML) INJECTION EVERY 8 HOURS
Status: DISCONTINUED | OUTPATIENT
Start: 2018-06-29 | End: 2018-06-30 | Stop reason: HOSPADM

## 2018-06-29 RX ORDER — SODIUM CHLORIDE 9 MG/ML
75 INJECTION, SOLUTION INTRAVENOUS CONTINUOUS
Status: DISCONTINUED | OUTPATIENT
Start: 2018-06-29 | End: 2018-06-29

## 2018-06-29 RX ORDER — CLONIDINE HYDROCHLORIDE 0.1 MG/1
0.1 TABLET ORAL 3 TIMES DAILY
Status: DISCONTINUED | OUTPATIENT
Start: 2018-06-29 | End: 2018-06-30 | Stop reason: HOSPADM

## 2018-06-29 RX ORDER — MORPHINE SULFATE 4 MG/ML
2 INJECTION INTRAVENOUS
Status: DISCONTINUED | OUTPATIENT
Start: 2018-06-29 | End: 2018-06-30 | Stop reason: HOSPADM

## 2018-06-29 RX ORDER — FLECAINIDE ACETATE 50 MG/1
50 TABLET ORAL EVERY 12 HOURS
Status: DISCONTINUED | OUTPATIENT
Start: 2018-06-29 | End: 2018-06-30 | Stop reason: HOSPADM

## 2018-06-29 RX ORDER — SODIUM CHLORIDE AND POTASSIUM CHLORIDE .9; .15 G/100ML; G/100ML
SOLUTION INTRAVENOUS CONTINUOUS
Status: DISCONTINUED | OUTPATIENT
Start: 2018-06-29 | End: 2018-06-30 | Stop reason: HOSPADM

## 2018-06-29 RX ORDER — EZETIMIBE 10 MG/1
10 TABLET ORAL EVERY EVENING
Status: DISCONTINUED | OUTPATIENT
Start: 2018-06-29 | End: 2018-06-30 | Stop reason: HOSPADM

## 2018-06-29 RX ORDER — NALOXONE HYDROCHLORIDE 0.4 MG/ML
0.4 INJECTION, SOLUTION INTRAMUSCULAR; INTRAVENOUS; SUBCUTANEOUS AS NEEDED
Status: DISCONTINUED | OUTPATIENT
Start: 2018-06-29 | End: 2018-06-30 | Stop reason: HOSPADM

## 2018-06-29 RX ORDER — CLONIDINE HYDROCHLORIDE 0.1 MG/1
0.1 TABLET ORAL
COMMUNITY
End: 2018-07-11 | Stop reason: SDUPTHER

## 2018-06-29 RX ORDER — LANSOPRAZOLE 30 MG/1
30 CAPSULE, DELAYED RELEASE ORAL
Status: DISCONTINUED | OUTPATIENT
Start: 2018-06-29 | End: 2018-06-29 | Stop reason: CLARIF

## 2018-06-29 RX ADMIN — Medication 10 ML: at 21:38

## 2018-06-29 RX ADMIN — DILTIAZEM HYDROCHLORIDE 10 MG/HR: 5 INJECTION INTRAVENOUS at 06:05

## 2018-06-29 RX ADMIN — CLONIDINE HYDROCHLORIDE 0.1 MG: 0.1 TABLET ORAL at 17:07

## 2018-06-29 RX ADMIN — CLONIDINE HYDROCHLORIDE 0.1 MG: 0.1 TABLET ORAL at 16:13

## 2018-06-29 RX ADMIN — LISINOPRIL 20 MG: 20 TABLET ORAL at 09:17

## 2018-06-29 RX ADMIN — APIXABAN 5 MG: 5 TABLET, FILM COATED ORAL at 19:50

## 2018-06-29 RX ADMIN — EZETIMIBE 10 MG: 10 TABLET ORAL at 17:07

## 2018-06-29 RX ADMIN — APIXABAN 5 MG: 5 TABLET, FILM COATED ORAL at 08:09

## 2018-06-29 RX ADMIN — FLECAINIDE ACETATE 50 MG: 50 TABLET ORAL at 09:17

## 2018-06-29 RX ADMIN — DILTIAZEM HYDROCHLORIDE 60 MG: 60 TABLET, FILM COATED ORAL at 11:57

## 2018-06-29 RX ADMIN — PANTOPRAZOLE SODIUM 40 MG: 40 TABLET, DELAYED RELEASE ORAL at 16:13

## 2018-06-29 RX ADMIN — DILTIAZEM HYDROCHLORIDE 5 MG/HR: 5 INJECTION INTRAVENOUS at 06:28

## 2018-06-29 RX ADMIN — DILTIAZEM HYDROCHLORIDE 10 MG/HR: 5 INJECTION INTRAVENOUS at 02:45

## 2018-06-29 RX ADMIN — FLECAINIDE ACETATE 50 MG: 50 TABLET ORAL at 19:51

## 2018-06-29 RX ADMIN — DILTIAZEM HYDROCHLORIDE 60 MG: 60 TABLET, FILM COATED ORAL at 08:09

## 2018-06-29 RX ADMIN — SODIUM CHLORIDE AND POTASSIUM CHLORIDE: 9; 1.49 INJECTION, SOLUTION INTRAVENOUS at 06:28

## 2018-06-29 RX ADMIN — Medication 10 ML: at 16:15

## 2018-06-29 RX ADMIN — CLONIDINE HYDROCHLORIDE 0.1 MG: 0.1 TABLET ORAL at 19:50

## 2018-06-29 RX ADMIN — LEVOTHYROXINE SODIUM 50 MCG: 50 TABLET ORAL at 09:18

## 2018-06-29 RX ADMIN — DILTIAZEM HYDROCHLORIDE 60 MG: 60 TABLET, FILM COATED ORAL at 16:13

## 2018-06-29 RX ADMIN — DILTIAZEM HYDROCHLORIDE 15 MG/HR: 5 INJECTION INTRAVENOUS at 04:19

## 2018-06-29 RX ADMIN — DILTIAZEM HYDROCHLORIDE 10 MG: 5 INJECTION INTRAVENOUS at 02:40

## 2018-06-29 NOTE — ED NOTES
Pt resting in bed comfortably. Converted to Sinus Rhythm. EKG obtained and given to ER MD for review.

## 2018-06-29 NOTE — ED TRIAGE NOTES
Patient arrives with complaint of \" I think I am in Afib\" Patient has been having palpitations    Patient roomed to obtain EKG, complete triage

## 2018-06-29 NOTE — ROUTINE PROCESS
0700 Shift change report received from [place], [address] [place] [address]. SBAR, Kardex, Procedure Summary, Intake/Output, MAR, Accordion, Recent Results and Cardiac Rhythm AFib reviewed. 1900  Bedside report given to Lucio Snowden RN. SBAR, Kardex, Procedure Summary, Intake/Output, MAR, Accordion, Recent Results and Cardiac Rhythm SR reviewed. Patient is stable at this time. Call light within reach, bed alarm on, bed in low position.

## 2018-06-29 NOTE — PROGRESS NOTES
Reason for Admission:   Atrial fib with RVR                   RRAT Score:       10              Plan for utilizing home health: At this juncture,there are no home health needs anticipated                     Likelihood of Readmission:  Low/green                         Transition of Care Plan:                    I met with pt as an introductory visit. Pt was admitted to [facility] with atrial fibrillation with RVR. At home,pt is on diltiazem 20 mg po tid and this has been increased by cardiology. Pt states she is worried about discharging home before she sees how she handles the increase in her medications. Nurse informed and will discuss with attending as per cardiology,pt could potentially be discharged later today. Pt does not meet criteria for home health or hospital to home. Pt is independent in all aspects. She has no home DME. Pt confirmed her address as correct on demographics.  is [place]. His number is [phone] if needed. If pt is not discharged later today,she will likely discharge home tomorrow. Pt has a follow-up appointment with Dr Luz Marina Muhammad on 7/6/18 @ 3:00 pm.Pt has a follow-up appointment with Dr Elidia Posey on 7/9/18 @ 1:00 pm.    I notified cardiology nurse navigator regarding pt.'s admission and anticipated discharge date. When discharged,unless needs change,pt is okay to be discharged from a case management perspective. Boris Heaton    Addendum-Per pt,her eliquis is only 20 dollars/month. Pt states she has no difficulties with obtaining her medications and is compliant with medications.   Boris Heaton

## 2018-06-29 NOTE — PROGRESS NOTES
Shift Summary    0700:  Patient admitted from the ED. She is alert and oriented. No complaints voiced. Patient on room air with no signs of distress noted. Cardizem drip at 5. NS with 20 meq KCL at 100. No needs voiced at this time. 1440:  Patient medicated with PO Cardizem. IV Cardizem turned down to 2.5.    0830:  Dr. Michelle Rain at the bedside. Informed that home meds need reordered. IV Cardizem stopped. 0915:  Patient with complaints of pressure under her left breast and into chest.  Dr. Aleisha Coley notified. No new orders. 9643:  Patient assisted to sit up higher in bed. Breakfast tray given. 1015:  Patient assisted up to commode then up to chair. Daughter at the bedside. 1125: Patient assisted back to bed.    1200: Patient resting in bed with daughter at the bedside. No changes noted. 1300:  Patient continues to rest in bed. No changes noted. 1400:  Patient up in the chair. No needs expressed. Patient is more anxious this afternoon. Per patient, her  is in the ED and may be admitted. 1610:  BP elevated. Dr. Michelle Rain called. Orders received. 1700: BP remains elevated. Order given or extra dose clonidine. 1800: BP is better. Will continue to monitor.

## 2018-06-29 NOTE — DISCHARGE INSTRUCTIONS
HOSPITALIST DISCHARGE INSTRUCTIONS  NAME: Dayanara Bundy   :  1940   MRN:  [id number]     Date/Time:  2018 8:13 AM    ADMIT DATE: 2018     DISCHARGE DATE: 2018     ADMITTING DIAGNOSIS:  afib with RVR    DISCHARGE DIAGNOSIS:  same    MEDICATIONS:  See after visit summary       · It is important that you take the medication exactly as they are prescribed. · Keep your medication in the bottles provided by the pharmacist and keep a list of the medication names, dosages, and times to be taken in your wallet. · Do not take other medications without consulting your doctor     Pain Management: per above medications    What to do at Home    Recommended diet:  Cardiac Diet    Recommended activity: Activity as tolerated    1) Return to the hospital if you feel worse    2) If you experience any of the following symptoms then please call your primary care physician or return to the emergency room if you cannot get hold of your doctor:  Fever, chills, nausea, vomiting, diarrhea, change in mentation, falling, bleeding, shortness of breath, chest pain, severe headache, severe abdominal pain,     3) Your diltiazem was increased to 60mg three times daily    4) follow up with the cardiologist    Follow Up: Follow-up Information     Follow up With Details Comments Cristofer Falcon MD On 2018 3  [address] [facility] [address] Siobhan Vázquez MD Go on 2018 [facility] PCP f/u appointment on  @ 1:00 p.m. [address] [place] [address]  [phone]              Information obtained by :  I understand that if any problems occur once I am at home I am to contact my physician. I understand and acknowledge receipt of the instructions indicated above.                                                                                                                                            Physician's or R.N.'s Signature                                                                  Date/Time                                                                                                                                              Patient or Representative Signature                                                          Date/Time      Future Appointments  Date Time Provider Ray Zamora   7/6/2018 3:00 PM MD VENKAT Tinajero [address]   8/27/2018 2:40 PM MD VENKAT Tinajero SCHED          Atrial Fibrillation: Care Instructions  Your Care Instructions    Atrial fibrillation is an irregular and often fast heartbeat. Treating this condition is important for several reasons. It can cause blood clots, which can travel from your heart to your brain and cause a stroke. If you have a fast heartbeat, you may feel lightheaded, dizzy, and weak. An irregular heartbeat can also increase your risk for heart failure. Atrial fibrillation is often the result of another heart condition, such as high blood pressure or coronary artery disease. Making changes to improve your heart condition will help you stay healthy and active. Follow-up care is a key part of your treatment and safety. Be sure to make and go to all appointments, and call your doctor if you are having problems. It's also a good idea to know your test results and keep a list of the medicines you take. How can you care for yourself at home? Medicines  ? · Take your medicines exactly as prescribed. Call your doctor if you think you are having a problem with your medicine. You will get more details on the specific medicines your doctor prescribes. ? · If your doctor has given you a blood thinner to prevent a stroke, be sure you get instructions about how to take your medicine safely. Blood thinners can cause serious bleeding problems.    ? · Do not take any vitamins, over-the-counter drugs, or herbal products without talking to your doctor first.   ? Lifestyle changes  ? · Do not smoke. Smoking can increase your chance of a stroke and heart attack. If you need help quitting, talk to your doctor about stop-smoking programs and medicines. These can increase your chances of quitting for good. ? · Eat a heart-healthy diet. ? · Stay at a healthy weight. Lose weight if you need to.   ? · Limit alcohol to 2 drinks a day for men and 1 drink a day for women. Too much alcohol can cause health problems. ? · Avoid colds and flu. Get a pneumococcal vaccine shot. If you have had one before, ask your doctor whether you need another dose. Get a flu shot every year. If you must be around people with colds or flu, wash your hands often. Activity  ? · If your doctor recommends it, get more exercise. Walking is a good choice. Bit by bit, increase the amount you walk every day. Try for at least 30 minutes on most days of the week. You also may want to swim, bike, or do other activities. Your doctor may suggest that you join a cardiac rehabilitation program so that you can have help increasing your physical activity safely. ? · Start light exercise if your doctor says it is okay. Even a small amount will help you get stronger, have more energy, and manage stress. Walking is an easy way to get exercise. Start out by walking a little more than you did in the hospital. Gradually increase the amount you walk. ? · When you exercise, watch for signs that your heart is working too hard. You are pushing too hard if you cannot talk while you are exercising. If you become short of breath or dizzy or have chest pain, sit down and rest immediately. ? · Check your pulse regularly. Place two fingers on the artery at the palm side of your wrist, in line with your thumb. If your heartbeat seems uneven or fast, talk to your doctor. When should you call for help? Call 911 anytime you think you may need emergency care.  For example, call if:  ? · You have symptoms of a heart attack. These may include:  ¨ Chest pain or pressure, or a strange feeling in the chest.  ¨ Sweating. ¨ Shortness of breath. ¨ Nausea or vomiting. ¨ Pain, pressure, or a strange feeling in the back, neck, jaw, or upper belly or in one or both shoulders or arms. ¨ Lightheadedness or sudden weakness. ¨ A fast or irregular heartbeat. After you call 911, the  may tell you to chew 1 adult-strength or 2 to 4 low-dose aspirin. Wait for an ambulance. Do not try to drive yourself. ? · You have symptoms of a stroke. These may include:  ¨ Sudden numbness, tingling, weakness, or loss of movement in your face, arm, or leg, especially on only one side of your body. ¨ Sudden vision changes. ¨ Sudden trouble speaking. ¨ Sudden confusion or trouble understanding simple statements. ¨ Sudden problems with walking or balance. ¨ A sudden, severe headache that is different from past headaches. ? · You passed out (lost consciousness). ?Call your doctor now or seek immediate medical care if:  ? · You have new or increased shortness of breath. ? · You feel dizzy or lightheaded, or you feel like you may faint. ? · Your heart rate becomes irregular. ? · You can feel your heart flutter in your chest or skip heartbeats. Tell your doctor if these symptoms are new or worse. ? Watch closely for changes in your health, and be sure to contact your doctor if you have any problems. Where can you learn more? Go to http://iker-isaac.info/. Enter U020 in the search box to learn more about \"Atrial Fibrillation: Care Instructions. \"  Current as of: September 21, 2016  Content Version: 11.4  © 9079-7650 [employer]. Care instructions adapted under license by [employer] (which disclaims liability or warranty for this information).  If you have questions about a medical condition or this instruction, always ask your healthcare professional. [place] any warranty or liability for your use of this information.

## 2018-06-29 NOTE — CONSULTS
Reason for Consult: Afib with RVR    HPI: Alfa Meredith is a 70 y.o. female with known history of Afib with RVR, DCCV in Feb 2018 by Dr. Kirsten Smith, admitted with Afib RVR. Incidentally noted on BP machine that her HR was elevated. No symptoms. Started on Cardizem gtt on arrival. Overnight converted to SR while on Cardizem gtt. Now remain in SR. No chest pain, SOB, lightheadedness, dizziness, presyncope or syncope. BP at presentation was elevated in 200 range but normal today am.     EKG at presentation personally reviewed: Afib with RVR, V rate 127, RBBB. EKG today am: SR, RBBB. Adenosine myoview 2011 - normal perfusion, EF 81%  Echocardiogram 2/22/13 - normal left ventricular size and function, normal appearing mitral, aortic, and tricuspid valves, with mild mitral and moderate tricuspid regurgitation, bi-atrial enlargement  Lexiscan 3/3/2014 - normal perfusion EF 83%  Echo: 5/15/15- 60%, mildly dilated LA, mild MR  Cardioversion 7/23/2015 - 200J  Lexiscan cardiolite 5/10/16 - normal perfusion, EF 78%  Renal Visceral Duplex 5/2016 - No evidence of ONOFRE  3/2/17-. Successful atrial fibrillation ablation however unable to achieve entrance/exit block of LSPV per Dr. Tamiko Isaac    Echo 3/8/18 - EF 65%. No WMA. Normal RV size and function. Mild MR. Mild TR. Plan:    1. Afib with RVR: Now back in SR after receiving IV diltiazem 5mg/hr briefly overnight. Today morning she received higher dosage of Cardizem PO at 60mg. Continue Eliquis. IV cardizem has been dced today am. Continue Flecainide. 2. HTN: Continue home medications. Anxiety plays a big role in her BP as well. She is currently normotensive. She can be deced home with higher dosage of Cardizem at 60mg TID. She will f/u with Dr. Damian De León as OP.         Past Medical History:   Diagnosis Date    DDD (degenerative disc disease)     Gastroesophageal reflux disease without esophagitis 7/3/2016    GERD (gastroesophageal reflux disease)     Hiatal hernia     HTN (hypertension), benign 7/26/2016    Hypertension     Hypothyroidism     ANÍBAL (obstructive sleep apnea) 7/26/2016    Paroxysmal atrial fibrillation ([facility] [address] 8/28/2016    Uterine prolapse             Past Surgical History:   Procedure Laterality Date    HX AFIB ABLATION  03/2017    HX GI      COLONOSCOPY 7/14    HX GYN      TUBAL LIGATION    HX HEENT      WISDOM TEETH EXTRACTED. Family History   Problem Relation Age of Onset    Diabetes Mother     Hypertension Mother     COPD Mother            Social History     Social History    Marital status:      Spouse name: N/A    Number of children: N/A    Years of education: N/A     Occupational History    Not on file. Social History Main Topics    Smoking status: Former Smoker     Packs/day: 1.50     Years: 30.00     Quit date: 3/3/1994    Smokeless tobacco: Never Used    Alcohol use No    Drug use: No    Sexual activity: No     Other Topics Concern    Not on file     Social History Narrative         Allergies   Allergen Reactions    Citalopram Hydrobromide Rash     With itching.     Chlorthalidone Rash    Contrast Agent [Iodine] Other (comments)     Wheezing/bronchospasm    Maxzide [Triamterene-Hydrochlorothiazid] Palpitations    Vicodin [Hydrocodone-Acetaminophen] Palpitations            Current Facility-Administered Medications   Medication Dose Route Frequency Provider Last Rate Last Dose    sodium chloride (NS) flush 5-10 mL  5-10 mL IntraVENous Q8H Benedict Fischer MD        sodium chloride (NS) flush 5-10 mL  5-10 mL IntraVENous PRN Benedict Fischer MD        dilTIAZem (CARDIZEM) 125 mg in dextrose 5% 125 mL infusion  0-15 mg/hr IntraVENous TITRATE Benedict Fischer MD   Stopped at 06/29/18 0829    sodium chloride (NS) flush 5-10 mL  5-10 mL IntraVENous Q8H Gerson Guerra MD        sodium chloride (NS) flush 5-10 mL  5-10 mL IntraVENous PRN Gerson Guerra MD        acetaminophen (TYLENOL) tablet 650 mg  650 mg Oral Q4H PRN Mandie Feldman MD        morphine injection 2 mg  2 mg IntraVENous Q4H PRN Mandie Feldman MD        naloxone [facility]) injection 0.4 mg  0.4 mg IntraVENous PRN Mandie Feldman MD        ondansetron [facility]) injection 4 mg  4 mg IntraVENous Q4H PRN Mandie Feldman MD        [facility]) tablet 5 mg  5 mg Oral BID Mandie Feldman MD   5 mg at 06/29/18 0809    dilTIAZem (CARDIZEM) IR tablet 60 mg  60 mg Oral TIDAC Mandie Feldman MD   60 mg at 06/29/18 0809    0.9% sodium chloride with KCl 20 mEq/L infusion   IntraVENous CONTINUOUS Jose GURROLA Do,  mL/hr at 06/29/18 0628          ROS:  12 point review of systems was performed.  All negative except for HPI     Physical Exam:  Visit Vitals    /70    Pulse 67    Temp 97.9 °F (36.6 °C)    Resp 21    Ht 5' 3\" (1.6 m)    Wt 190 lb (86.2 kg)    SpO2 94%    BMI 33.66 kg/m2       Gen:  Well-developed, well-nourished, in no acute distress  HEENT:  Pink conjunctivae, PERRL, hearing intact to voice, moist mucous membranes  Neck:  Supple, without masses, thyroid non-tender  Resp:  No accessory muscle use, clear breath sounds without wheezes rales or rhonchi  Card:  No murmurs, normal S1, S2 without thrills, bruits or peripheral edema  Abd:  Soft, non-tender, non-distended, normoactive bowel sounds are present, no palpable organomegaly and no detectable hernias  Lymph:  No cervical or inguinal adenopathy  Musc:  No cyanosis or clubbing  Skin:  No rashes or ulcers, skin turgor is good  Neuro:  Cranial nerves are grossly intact, no focal motor weakness, follows commands appropriately  Psych:  Good insight, oriented to person, place and time, alert     Labs:     Lab Results   Component Value Date/Time    WBC 10.1 06/29/2018 02:27 AM    HGB 13.6 06/29/2018 02:27 AM    HCT 42.1 06/29/2018 02:27 AM    PLATELET 757 84/62/7012 02:27 AM    MCV 94.2 06/29/2018 02:27 AM     Lab Results   Component Value Date/Time    Glucose 140 (H) 06/29/2018 02:27 AM    Creatinine 1.25 (H) 06/29/2018 02:27 AM      No results found for: CHOL, CHOLPOCT, HDL, LDL, LDLC, LDLCPOC, LDLCEXT, TRIGL, TGLPOCT, CHHD, CHHDX  Lab Results   Component Value Date/Time    ALT (SGPT) 27 06/29/2018 02:27 AM    AST (SGOT) 24 06/29/2018 02:27 AM    Alk.  phosphatase 89 06/29/2018 02:27 AM    Bilirubin, total 0.6 06/29/2018 02:27 AM    Albumin 3.9 06/29/2018 02:27 AM    Protein, total 8.0 06/29/2018 02:27 AM    INR 1.2 (H) 12/20/2017 11:25 PM    Prothrombin time 11.5 (H) 12/20/2017 11:25 PM    PLATELET 477 11/85/3632 02:27 AM     Lab Results   Component Value Date/Time    INR 1.2 (H) 12/20/2017 11:25 PM    INR 1.0 02/28/2017 04:37 PM    Prothrombin time 11.5 (H) 12/20/2017 11:25 PM    Prothrombin time 10.8 02/28/2017 04:37 PM      Lab Results   Component Value Date/Time    GFR est non-AA 42 (L) 06/29/2018 02:27 AM    GFR est AA 51 (L) 06/29/2018 02:27 AM    Creatinine 1.25 (H) 06/29/2018 02:27 AM    BUN 15 06/29/2018 02:27 AM    Sodium 142 06/29/2018 02:27 AM    Potassium 3.5 06/29/2018 02:27 AM    Chloride 104 06/29/2018 02:27 AM    CO2 27 06/29/2018 02:27 AM    Magnesium 2.2 06/29/2018 02:27 AM    Phosphorus 3.1 03/09/2018 03:34 AM     No results found for: PSA, PSA2, PSAR1, Elder Leandro, PSAR3, [id number], [id number], PSALT  Lab Results   Component Value Date/Time    TSH 2.37 04/16/2018 01:10 AM    T4, Free 1.2 04/16/2018 01:10 AM      Lab Results   Component Value Date/Time    Glucose 140 (H) 06/29/2018 02:27 AM      Lab Results   Component Value Date/Time    CK 79 03/17/2018 01:26 AM    CK - MB 1.1 03/17/2018 01:26 AM    CK-MB Index 1.4 03/17/2018 01:26 AM    Troponin-I, Qt. <0.05 06/29/2018 06:31 AM      Lab Results   Component Value Date/Time    NT pro- (H) 03/07/2018 09:24 PM    NT pro- 08/13/2017 06:40 PM      Lab Results   Component Value Date/Time    Sodium 142 06/29/2018 02:27 AM    Potassium 3.5 06/29/2018 02:27 AM Chloride 104 06/29/2018 02:27 AM    CO2 27 06/29/2018 02:27 AM    Anion gap 11 06/29/2018 02:27 AM    Glucose 140 (H) 06/29/2018 02:27 AM    BUN 15 06/29/2018 02:27 AM    Creatinine 1.25 (H) 06/29/2018 02:27 AM    BUN/Creatinine ratio 12 06/29/2018 02:27 AM    GFR est AA 51 (L) 06/29/2018 02:27 AM    GFR est non-AA 42 (L) 06/29/2018 02:27 AM    Calcium 9.4 06/29/2018 02:27 AM      Lab Results   Component Value Date/Time    Sodium 142 06/29/2018 02:27 AM    Potassium 3.5 06/29/2018 02:27 AM    Chloride 104 06/29/2018 02:27 AM    CO2 27 06/29/2018 02:27 AM    Anion gap 11 06/29/2018 02:27 AM    Glucose 140 (H) 06/29/2018 02:27 AM    BUN 15 06/29/2018 02:27 AM    Creatinine 1.25 (H) 06/29/2018 02:27 AM    BUN/Creatinine ratio 12 06/29/2018 02:27 AM    GFR est AA 51 (L) 06/29/2018 02:27 AM    GFR est non-AA 42 (L) 06/29/2018 02:27 AM    Calcium 9.4 06/29/2018 02:27 AM    Bilirubin, total 0.6 06/29/2018 02:27 AM    ALT (SGPT) 27 06/29/2018 02:27 AM    AST (SGOT) 24 06/29/2018 02:27 AM    Alk. phosphatase 89 06/29/2018 02:27 AM    Protein, total 8.0 06/29/2018 02:27 AM    Albumin 3.9 06/29/2018 02:27 AM    Globulin 4.1 (H) 06/29/2018 02:27 AM    A-G Ratio 1.0 (L) 06/29/2018 02:27 AM      No results found for: HBA1C, YCK9NMGP, HGBE8, UYU2AEMS, RLX3YSMM      Recent Labs      06/29/18   0631   TROIQ  <0.05           Problem List:     Problem List  Date Reviewed: 6/29/2018          Codes Class Noted    * (Principal)Atrial fibrillation with RVR ([facility] [address] ICD-10-CM: I48.91  ICD-9-CM: 427.31  6/29/2018        Severe obesity (BMI 35.0-39. 9) with comorbidity ([facility] [address] ICD-10-CM: E66.01  ICD-9-CM: 278.01  5/25/2018        SVT (supraventricular tachycardia) (HCC) ICD-10-CM: I47.1  ICD-9-CM: 427.89  8/28/2017        Hiatal hernia ICD-10-CM: K44.9  ICD-9-CM: 553.3  8/28/2017        Venous insufficiency ICD-10-CM: G70.2  ICD-9-CM: 459.81  5/4/2017        Acquired hypothyroidism ICD-10-CM: E03.9  ICD-9-CM: 244.9  5/1/2017        S/P ablation of atrial fibrillation ICD-10-CM: Z98.890, Z86.79  ICD-9-CM: V45.89  4/28/2017        Paroxysmal atrial fibrillation (HCC) ICD-10-CM: I48.0  ICD-9-CM: 427.31  8/28/2016        HTN (hypertension) ICD-10-CM: I10  ICD-9-CM: 401.9  7/26/2016        Gastroesophageal reflux disease without esophagitis ICD-10-CM: K21.9  ICD-9-CM: 530.81  7/3/2016        RBBB ICD-10-CM: I45.10  ICD-9-CM: 426.4  5/7/2015        LAE (left atrial enlargement) ICD-10-CM: I51.7  ICD-9-CM: 429.3  4/3/2013                Glynn Byrne MD, [facility]

## 2018-06-29 NOTE — PROGRESS NOTES
[facility] follow-up appointment on Monday July 9,2018 @ 1:00 p.m. with Dr. Michael Vázquez     Cardiology appointment on Friday July 6,2018 @ 1:00 p.m., with Dr. Marquita Gann.   Added to AVS.  Layla Phelan CM Specialist

## 2018-06-29 NOTE — ED PROVIDER NOTES
HPI Comments: 70 y.o. female with past medical history significant for HTN, hiatal hernia, GERD, paroxysmal A-fib, and hypothyroidism who presents from home with chief complaint of palpitations. Pt reports she was awake watching TV when she began feeling palpitations 1 hour ago. She states her BP at that time was 917 systolic w/ an HR >303. Pt notes she took a clonidine at that time. She takes several regular medications including flecainide, Eliquis, and diltiazem. Pt notes she had a cardiac ablation by Dr. Leslie Santana 1-2 years ago, but she has required cardioversion one time since then. Pt denies chest pain, SOB, and nausea. There are no other acute medical concerns at this time. PCP: Eliodoro Gaucher, MD  Cardiology: Asiya Rivas MD    Note written by Jannet Bautista, as dictated by Dominga Charlton MD 2:33 AM    The history is provided by the patient. Past Medical History:   Diagnosis Date    DDD (degenerative disc disease)     Gastroesophageal reflux disease without esophagitis 7/3/2016    GERD (gastroesophageal reflux disease)     Hiatal hernia     HTN (hypertension), benign 7/26/2016    Hypertension     Hypothyroidism     ANÍBAL (obstructive sleep apnea) 7/26/2016    Paroxysmal atrial fibrillation ([facility] [address] 8/28/2016    Uterine prolapse        Past Surgical History:   Procedure Laterality Date    HX AFIB ABLATION  03/2017    HX GI      COLONOSCOPY 7/14    HX GYN      TUBAL LIGATION    HX HEENT      WISDOM TEETH EXTRACTED. Family History:   Problem Relation Age of Onset    Diabetes Mother     Hypertension Mother     COPD Mother        Social History     Social History    Marital status:      Spouse name: N/A    Number of children: N/A    Years of education: N/A     Occupational History    Not on file.      Social History Main Topics    Smoking status: Former Smoker     Packs/day: 1.50     Years: 30.00     Quit date: 3/3/1994    Smokeless tobacco: Never Used    Alcohol use No    Drug use: No    Sexual activity: No     Other Topics Concern    Not on file     Social History Narrative         ALLERGIES: Citalopram hydrobromide; Chlorthalidone; Contrast agent [iodine]; Maxzide [triamterene-hydrochlorothiazid]; and Vicodin [hydrocodone-acetaminophen]    Review of Systems   Respiratory: Negative for shortness of breath. Cardiovascular: Positive for palpitations. Negative for chest pain. Gastrointestinal: Negative for nausea. All other systems reviewed and are negative. Vitals:    06/29/18 0215 06/29/18 0240   BP: (!) 204/109 (!) 207/94   Pulse: (!) 137 (!) 115   Resp: 22    Temp: 98.4 °F (36.9 °C)    SpO2: 96%             Physical Exam   Constitutional: She is oriented to person, place, and time. She appears well-developed and well-nourished. HENT:   Head: Normocephalic and atraumatic. Eyes: Conjunctivae are normal. No scleral icterus. Neck: Neck supple. No tracheal deviation present. Cardiovascular: Normal heart sounds and intact distal pulses. An irregular rhythm present. Tachycardia present. Exam reveals no gallop and no friction rub. No murmur heard. Pulmonary/Chest: Effort normal and breath sounds normal. She has no wheezes. She has no rales. Abdominal: Soft. She exhibits no distension. There is no tenderness. There is no rebound and no guarding. Musculoskeletal: She exhibits no edema. Neurological: She is alert and oriented to person, place, and time. Skin: Skin is warm and dry. No rash noted. Psychiatric: Her mood appears anxious (mild). Nursing note and vitals reviewed.   Note written by Jannet Ballesteros, as dictated by Nakul Shay MD 2:33 AM    [facility]      ED Course       Procedures      ED EKG interpretation:  Rhythm: atrial fib; Rate (approx.): 127; ST/T wave: non-specific changes; RBBB  Note written by Jannet Ballesteros, as dictated by Nakul Shay MD 2:22 AM    CONSULT NOTE:  3:26 Wallace Hurtado MD spoke with  Michael Green for Hospitalist.  Discussed available diagnostic tests and clinical findings. Dr. Dallas Grey will admit Pt. A/P: A-fib with RVR - has gotten bolus Cardizem and on drip; admit for further management. Marine Lynn MD  3:49 AM    Total critical care time spent exclusive of procedures:  35 min.   Marine Lynn MD  3:50 AM

## 2018-06-29 NOTE — PROGRESS NOTES
Occupational Therapy EVALUATION/discharge  Patient: Lizette Bynum (89 y.o. female)  Date: 6/29/2018  Primary Diagnosis: Atrial fibrillation with RVR ([facility] [address]        Precautions: universal       ASSESSMENT:   Based on the objective data described below, the patient presents with good overall activity tolerance following admission 6/29 for Afib with RVR. Patient lives with her  and manages all care independently PTA. Today, patient was received in the bed, alert, oriented x4, and agreeable to activity. She was independent with all functional mobility and was independent/mod I for completion of all ADLs. All vitals were stable with activity. Patient educated on energy conservation techniques and general home safety. Patient is at her baseline in regards to functional abilities and has no further skilled OT needs. Further skilled acute occupational therapy is not indicated at this time. Discharge Recommendations: None  Further Equipment Recommendations for Discharge: none       SUBJECTIVE:   Patient was agreeable to OT evaluation    OBJECTIVE DATA SUMMARY:   HISTORY:   Past Medical History:   Diagnosis Date    DDD (degenerative disc disease)     Gastroesophageal reflux disease without esophagitis 7/3/2016    GERD (gastroesophageal reflux disease)     Hiatal hernia     HTN (hypertension), benign 7/26/2016    Hypertension     Hypothyroidism     ANÍBAL (obstructive sleep apnea) 7/26/2016    Paroxysmal atrial fibrillation ([address] 8/28/2016    Uterine prolapse      Past Surgical History:   Procedure Laterality Date    HX AFIB ABLATION  03/2017    HX GI      COLONOSCOPY 7/14    HX GYN      TUBAL LIGATION    HX HEENT      WISDOM TEETH EXTRACTED. Prior Level of Function/Environment/Context: Patient lives with her . PTA, patient was independent with all ADLs and functional mobility.       Home Situation  Home Environment: Private residence  One/Two Story Residence: Two story  Living Alone: No  Support Systems: Spouse/Significant Other/Partner  Patient Expects to be Discharged to[de-identified] Private residence  Current DME Used/Available at Home: None  Tub or Shower Type: Shower    Hand dominance: Right    EXAMINATION OF PERFORMANCE DEFICITS:  Cognitive/Behavioral Status:  Neurologic State: Alert  Orientation Level: Oriented X4  Cognition: Appropriate decision making; Appropriate for age attention/concentration; Appropriate safety awareness  Perception: Appears intact  Perseveration: No perseveration noted  Safety/Judgement: Awareness of environment;Good awareness of safety precautions    Skin: Intact in the uppers    Edema: None noted in the uppers    Hearing: Auditory  Auditory Impairment: None    Vision/Perceptual:    Tracking: Able to track stimulus in all quadrants w/o difficulty    Diplopia: No    Acuity: Within Defined Limits    Corrective Lenses: Glasses    Range of Motion:  AROM: Within functional limits in the uppers  PROM: Within functional limits in the uppers    Strength:  Strength: Within functional limits in the uppers    Coordination:  Fine Motor Skills-Upper: Left Intact; Right Intact    Gross Motor Skills-Upper: Left Intact; Right Intact    Tone & Sensation:  Tone: normal  Sensation: intact    Balance:  Sitting: Intact  Standing: Intact    Functional Mobility and Transfers for ADLs:  Bed Mobility:  Rolling: Independent  Supine to Sit: Independent  Sit to Supine: Independent  Scooting: Independent    Transfers:  Sit to Stand: Independent  Stand to Sit: Independent  Bed to Chair: Independent  Toilet Transfer : Independent    ADL Assessment:  Feeding: Independent    Oral Facial Hygiene/Grooming: Independent    Bathing: Independent    Upper Body Dressing: Independent    Lower Body Dressing: Independent    Toileting: Independent    Cognitive Retraining  Safety/Judgement: Awareness of environment;Good awareness of safety precautions    Functional Measure:  Barthel Index:    Bathin  Bladder: 10  Bowels: 10  Groomin  Dressing: 10  Feeding: 10  Mobility: 15  Stairs: 10  Toilet Use: 10  Transfer (Bed to Chair and Back): 15  Total: 100       Barthel and G-code impairment scale:  Percentage of impairment CH  0% CI  1-19% CJ  20-39% CK  40-59% CL  60-79% CM  80-99% CN  100%   Barthel Score 0-100 100 99-80 79-60 59-40 20-39 1-19   0   Barthel Score 0-20 20 17-19 13-16 9-12 5-8 1-4 0      The Barthel ADL Index: Guidelines  1. The index should be used as a record of what a patient does, not as a record of what a patient could do. 2. The main aim is to establish degree of independence from any help, physical or verbal, however minor and for whatever reason. 3. The need for supervision renders the patient not independent. 4. A patient's performance should be established using the best available evidence. Asking the patient, friends/relatives and nurses are the usual sources, but direct observation and common sense are also important. However direct testing is not needed. 5. Usually the patient's performance over the preceding 24-48 hours is important, but occasionally longer periods will be relevant. 6. Middle categories imply that the patient supplies over 50 per cent of the effort. 7. Use of aids to be independent is allowed. Marylu Rocha, Barthel, D.W. (4910). Functional evaluation: the Barthel Index. [address] [facility] (14)2. Evelin Franks, MELLJLYNDA, Chelsie Ramírez.[facility], [address] [place] [address] (). Measuring the change indisability after inpatient rehabilitation; comparison of the responsiveness of the Barthel Index and Functional [place] Measure. Journal of Neurology, Neurosurgery, and Psychiatry, 66(4), 842-695. John Phillips, N.BEKAH.A, MAUREEN Chaparro, & Ashleigh Crook MLiliA. (2004.) Assessment of post-stroke quality of life in cost-effectiveness studies: The usefulness of the Barthel Index and the EuroQoL-5D. Quality of Life Research, 13, 933-69       G codes:   In compliance with CMSs Claims Based Outcome Reporting, the following G-code set was chosen for this patient based on their primary functional limitation being treated: The outcome measure chosen to determine the severity of the functional limitation was the Barthel index with a score of 100/100 which was correlated with the impairment scale. ? Self Care:     - CURRENT STATUS: CH - 0% impaired, limited or restricted    - GOAL STATUS: CH - 0% impaired, limited or restricted    - D/C STATUS:  CH - 0% impaired, limited or restricted     Occupational Therapy Evaluation Charge Determination   History Examination Decision-Making   LOW Complexity : Brief history review  LOW Complexity : 1-3 performance deficits relating to physical, cognitive , or psychosocial skils that result in activity limitations and / or participation restrictions  LOW Complexity : No comorbidities that affect functional and no verbal or physical assistance needed to complete eval tasks       Based on the above components, the patient evaluation is determined to be of the following complexity level: LOW   Pain:  Pain Scale 1: Numeric (0 - 10)  Pain Intensity 1: 0              Activity Tolerance:   Patient tolerated eval well. After treatment:   []  Patient left in no apparent distress sitting up in chair  [x]  Patient left in no apparent distress in bed  [x]  Call bell left within reach  [x]  Nursing notified  []  Caregiver present  []  Bed alarm activated    COMMUNICATION/EDUCATION:   Communication/Collaboration:  [x]      Home safety education was provided and the patient/caregiver indicated understanding. [x]      Patient/family have participated as able and agree with findings and recommendations. []      Patient is unable to participate in plan of care at this time. Findings and recommendations were discussed with: Physical Therapist, Registered Nurse and patient.     Rebecca Stubbs OTR/L  Time Calculation: 25 mins

## 2018-06-29 NOTE — PROGRESS NOTES
[facility]: MED RECONCILIATION    Comments/Recommendations:    Patient was alert and oriented and knowledgeable about her medications. The patient was able to provide a medication list which she uses daily to keep track of her medications. Recent refill history was confirmed with [facility].  Patient reports that she checks her blood pressure and HR frequently every few hours throughout the day and keeps a log. She reports that she often holds her clonidine for an SBP <135 because sometimes if it is closer to 120 her blood pressure will drop too low and she feels fatigued. In addition, she sometimes takes a 4th dose of clonidine 0.1 mg as needed.  Patient reports that she used to take brand Nexium for GERD which worked very well for her, but her insurance began requiring the generic esomeprazole and this did not work for her. Her doctor switched her to lansoprazole which she said helps, but still not as well as Nexium.  Patient reports that she rarely takes APAP as needed for pain or headache. Medications added:     · APAP    Medications removed:    · none    Medications adjusted:    · Added additional prn dose of clonidine    Allergies: Citalopram hydrobromide; Chlorthalidone; Contrast agent [iodine]; Maxzide [triamterene-hydrochlorothiazid]; and Vicodin [hydrocodone-acetaminophen]    Prior to Admission Medications:   Prior to Admission Medications   Prescriptions Last Dose Informant Patient Reported? Taking? FOLIC ACID/MULTIVIT-MIN/LUTEIN (CENTRUM SILVER PO) 6/28/2018 at pm Self Yes Yes   Sig: Take 1 Tab by mouth every evening. acetaminophen (TYLENOL) 325 mg tablet  Self Yes Yes   Sig: Take 650 mg by mouth daily as needed for Pain. apixaban (ELIQUIS) 5 mg tablet 6/28/2018 at pm Self No Yes   Sig: take 1 tablet by mouth twice a day   calcium-vitamin D (OYSTER SHELL) 500 mg(1,250mg) -200 unit per tablet 6/28/2018 at pm Self Yes Yes   Sig: Take 1 Tab by mouth every evening.    cloNIDine HCl (CATAPRES) 0.1 mg tablet 6/28/2018 at bedtime Self Yes Yes   Sig: Take 0.1 mg by mouth daily as needed (Patient takes an 1 extra dose as needed for SBP >135 in addition to scheduled doses). cloNIDine HCl (CATAPRES) 0.1 mg tablet 6/28/2018 at pm Self Yes Yes   Sig: Take 0.1 mg by mouth three (3) times daily. Hold for SBP < 120   dilTIAZem (CARDIZEM) 30 mg tablet 6/28/2018 at pm Self No Yes   Sig: take 1 tablet by mouth BEFORE BREAKFAST, LUNCH AND DINNER   ezetimibe (ZETIA) 10 mg tablet 6/28/2018 at pm Self Yes Yes   Sig: Take 10 mg by mouth every evening. flecainide (TAMBOCOR) 50 mg tablet 6/28/2018 at pm Self No Yes   Sig: Take 1 Tab by mouth every twelve (12) hours. lansoprazole (PREVACID) 30 mg capsule 6/28/2018 at pm Self Yes Yes   Sig: Take 30 mg by mouth nightly. levothyroxine (SYNTHROID) 50 mcg tablet 6/28/2018 at am Self Yes Yes   Sig: Take 50 mcg by mouth Daily (before breakfast). lisinopril (PRINIVIL, ZESTRIL) 20 mg tablet 6/28/2018 at am Self No Yes   Sig: Take 1 Tab by mouth daily.         Thank you,  Annika Talley, Student Pharmacist

## 2018-06-29 NOTE — PROGRESS NOTES
physical Therapy EVALUATION/DISCHARGE  Patient: Connie Lanza (47 y.o. female)  Date: 6/29/2018  Primary Diagnosis: Atrial fibrillation with RVR ([facility])       ASSESSMENT :  Based on the objective data described above, the patient presents with independent with ambulation without assistive device and independent with all functional mobility. Reviewed all safety precaution and home exercise program with the patient, verbalized understanding, clear to go home per Physical Therapy perspective. Skilled physical therapy is not indicated at this time. PLAN :  Discharge Recommendations: None  Further Equipment Recommendations for Discharge: none     SUBJECTIVE:   Patient stated I feel better.     OBJECTIVE DATA SUMMARY:   HISTORY:    Past Medical History:   Diagnosis Date    DDD (degenerative disc disease)     Gastroesophageal reflux disease without esophagitis 7/3/2016    GERD (gastroesophageal reflux disease)     Hiatal hernia     HTN (hypertension), benign 7/26/2016    Hypertension     Hypothyroidism     ANÍBAL (obstructive sleep apnea) 7/26/2016    Paroxysmal atrial fibrillation ([facility] [address] 8/28/2016    Uterine prolapse      Past Surgical History:   Procedure Laterality Date    HX AFIB ABLATION  03/2017    HX GI      COLONOSCOPY 7/14    HX GYN      TUBAL LIGATION    HX HEENT      WISDOM TEETH EXTRACTED. Prior Level of Function/Home Situation: Independent community ambulator without assistive device. Personal factors and/or comorbidities impacting plan of care:     Home Situation  Home Environment: Private residence  One/Two Story Residence: Two story  Living Alone: No  Support Systems: Spouse/Significant Other/Partner  Patient Expects to be Discharged to[de-identified] Private residence  Current DME Used/Available at Home: None    EXAMINATION/PRESENTATION/DECISION MAKING:   Critical Behavior:              Hearing:   Auditory  Auditory Impairment: None    Range Of Motion:  AROM: Within functional limits PROM: Within functional limits           Strength:    Strength: Within functional limits                    Tone & Sensation:                                  Coordination:  Coordination: Within functional limits  Vision:      Functional Mobility:  Bed Mobility:  Rolling: Independent  Supine to Sit: Independent  Sit to Supine: Independent  Scooting: Independent  Transfers:  Sit to Stand: Independent  Stand to Sit: Independent  Stand Pivot Transfers: Independent     Bed to Chair: Independent              Balance:   Sitting: Intact  Standing: Intact; Without support  Ambulation/Gait Training:  Distance (ft): 200 Feet (ft)     Ambulation - Level of Assistance: Independent     Gait Description (WDL): Within defined limits                                          Therapeutic Exercises:    Instructed patient to continue active range of motion exercise on both legs while up on chair or on bed. Functional Measure:  Tinetti test:    Sitting Balance: 1  Arises: 2  Attempts to Rise: 2  Immediate Standing Balance: 2  Standing Balance: 2  Nudged: 2  Eyes Closed: 1  Turn 360 Degrees - Continuous/Discontinuous: 1  Turn 360 Degrees - Steady/Unsteady: 1  Sitting Down: 2  Balance Score: 16  Indication of Gait: 1  R Step Length/Height: 1  L Step Length/Height: 1  R Foot Clearance: 1  L Foot Clearance: 1  Step Symmetry: 1  Step Continuity: 1  Path: 2  Trunk: 2  Walking Time: 1  Gait Score: 12  Total Score: 28       Tinetti Test and G-code impairment scale:  Percentage of Impairment CH    0%   CI    1-19% CJ    20-39% CK    40-59% CL    60-79% CM    80-99% CN     100%   Tinetti  Score 0-28 28 23-27 17-22 12-16 6-11 1-5 0       Tinetti Tool Score Risk of Falls  <19 = High Fall Risk  19-24 = Moderate Fall Risk  25-28 = Low Fall Risk  Tinetti ME. Performance-Oriented Assessment of Mobility Problems in Elderly Patients. [address]; [id number].  (Scoring Description: PT Bulletin Feb. 10, 1993)    Older adults: Nikhil Sharp et al, 2009; n = 1000 [facility] elderly evaluated with ABC, ADRY, ADL, and IADL)  · Mean ADRY score for males aged 69-68 years = 26.21(3.40)  · Mean ADRY score for females age 69-68 years = 25.16(4.30)  · Mean ADRY score for males over 80 years = 23.29(6.02)  · Mean ADRY score for females over 80 years = 17.20(8.32)       G codes: In compliance with CMSs Claims Based Outcome Reporting, the following G-code set was chosen for this patient based on their primary functional limitation being treated: The outcome measure chosen to determine the severity of the functional limitation was the tinetti with a score of 28/28 which was correlated with the impairment scale. ? Mobility - Walking and Moving Around:     - CURRENT STATUS: CH - 0% impaired, limited or restricted    - GOAL STATUS: CH - 0% impaired, limited or restricted    - D/C STATUS:  CH - 0% impaired, limited or restricted        Physical Therapy Evaluation Charge Determination   History Examination Presentation Decision-Making   LOW Complexity : Zero comorbidities / personal factors that will impact the outcome / POC LOW Complexity : 1-2 Standardized tests and measures addressing body structure, function, activity limitation and / or participation in recreation  LOW Complexity : Stable, uncomplicated  Other outcome measures tinetti  LOW       Based on the above components, the patient evaluation is determined to be of the following complexity level: LOW     Pain:  Pain Scale 1: Numeric (0 - 10)  Pain Intensity 1: 0     Activity Tolerance:   Good. Please refer to the flowsheet for vital signs taken during this treatment.   After treatment:   [x]   Patient left in no apparent distress sitting up in chair  []   Patient left in no apparent distress in bed  [x]   Call bell left within reach  [x]   Nursing notified  [x]   Caregiver present  []   Bed alarm activated    COMMUNICATION/EDUCATION:   Communication/Collaboration:  [x]   Fall prevention education was provided and the patient/caregiver indicated understanding. [x]   Patient/family have participated as able and agree with findings and recommendations. []   Patient is unable to participate in plan of care at this time. Findings and recommendations were discussed with: Occupational Therapist, Registered Nurse, Physician and patient    Thank you for this referral.  Latrell Dietz PT,[facility].    Time Calculation: 25 mins

## 2018-06-29 NOTE — ED NOTES
TRANSFER - OUT REPORT:    Verbal report given to Fredi Orantes RN (name) on Adin Somers  being transferred to ICU (unit) for routine progression of care       Report consisted of patients Situation, Background, Assessment and   Recommendations(SBAR). Information from the following report(s) SBAR, Kardex, ED Summary, MAR, Recent Results and Cardiac Rhythm NSR was reviewed with the receiving nurse. Lines:   Peripheral IV 06/29/18 Left Antecubital (Active)   Site Assessment Clean, dry, & intact 6/29/2018  2:30 AM   Phlebitis Assessment 0 6/29/2018  2:30 AM   Infiltration Assessment 0 6/29/2018  2:30 AM   Dressing Status Clean, dry, & intact 6/29/2018  2:30 AM   Dressing Type Tape;Transparent 6/29/2018  2:30 AM   Hub Color/Line Status Patent; Flushed;Pink 6/29/2018  2:30 AM   Action Taken Blood drawn 6/29/2018  2:30 AM        Opportunity for questions and clarification was provided.       Patient transported with:   Monitor  Registered Nurse

## 2018-06-29 NOTE — PROGRESS NOTES
[facility] [address]  [address], [place], [address] [facility]  [phone]      Medical Progress Note      NAME: Kash Brower   :  1940  MRM:  [id number]    Date/Time: 2018         Subjective:     Chief Complaint:  Patient was seen and examined by me. Chart reviewed. Denied chest pain, SOB       Objective:       Vitals:       Last 24hrs VS reviewed since prior progress note.  Most recent are:    Visit Vitals    /70    Pulse 67    Temp 97.9 °F (36.6 °C)    Resp 21    Ht 5' 3\" (1.6 m)    Wt 86.2 kg (190 lb)    SpO2 94%    BMI 33.66 kg/m2     SpO2 Readings from Last 6 Encounters:   18 94%   18 98%   18 94%   18 98%   18 96%   18 91%        No intake or output data in the 24 hours ending 18 0845     Exam:     Physical Exam:    Gen:  Well-developed, well-nourished, obese, in no acute distress  HEENT:  Pink conjunctivae, PERRL, hearing intact to voice, moist mucous membranes  Neck:  Supple, without masses, thyroid non-tender  Resp:  No accessory muscle use, clear breath sounds without wheezes rales or rhonchi  Card:  No murmurs, normal S1, S2 without thrills, bruits or peripheral edema  Abd:  Soft, non-tender, non-distended, normoactive bowel sounds are present  Musc:  No cyanosis or clubbing  Skin:  No rashes or ulcers, skin turgor is good  Neuro:  Cranial nerves 3-12 are grossly intact, follows commands appropriately  Psych:  Good insight, oriented to person, place and time, alert    Medications Reviewed: (see below)    Lab Data Reviewed: (see below)    ______________________________________________________________________    Medications:     Current Facility-Administered Medications   Medication Dose Route Frequency    sodium chloride (NS) flush 5-10 mL  5-10 mL IntraVENous Q8H    sodium chloride (NS) flush 5-10 mL  5-10 mL IntraVENous PRN    sodium chloride (NS) flush 5-10 mL  5-10 mL IntraVENous Q8H    sodium chloride (NS) flush 5-10 mL  5-10 mL IntraVENous PRN    acetaminophen (TYLENOL) tablet 650 mg  650 mg Oral Q4H PRN    morphine injection 2 mg  2 mg IntraVENous Q4H PRN    naloxone (NARCAN) injection 0.4 mg  0.4 mg IntraVENous PRN    ondansetron (ZOFRAN) injection 4 mg  4 mg IntraVENous Q4H PRN    dilTIAZem (CARDIZEM) IR tablet 60 mg  60 mg Oral TIDAC    0.9% sodium chloride with KCl 20 mEq/L infusion   IntraVENous CONTINUOUS    levothyroxine (SYNTHROID) tablet 50 mcg  50 mcg Oral ACB    lisinopril (PRINIVIL, ZESTRIL) tablet 20 mg  20 mg Oral DAILY    flecainide (TAMBOCOR) tablet 50 mg  50 mg Oral Q12H    apixaban (ELIQUIS) tablet 5 mg  5 mg Oral Q12H    lansoprazole (PREVACID) capsule 30 mg  30 mg Oral QHS    ezetimibe (ZETIA) tablet 10 mg  10 mg Oral QPM          Lab Review:     Recent Labs      06/29/18 0227   WBC  10.1   HGB  13.6   HCT  42.1   PLT  199     Recent Labs      06/29/18 0227   NA  142   K  3.5   CL  104   CO2  27   GLU  140*   BUN  15   CREA  1.25*   CA  9.4   MG  2.2   ALB  3.9   TBILI  0.6   SGOT  24   ALT  27     No results found for: GLUCPOC       Assessment / Plan:     Principal Problem:    69 yo hx of HTN, Pafib, presented w/ afib RVR    1) Atrial fibrillation with RVR: Hx of Pafib s/p ablation, cardioversion. Now back in NSR. Will wean off dilt gtt. Increase home PO dilt. Cont Flecanide, Eliquis.   Cards to eval    2) HTN: cont lisinopril, clonidine    3) Hypothyroid: cont synthroid    4) Obesity: already recommended wt loss    Total time spent with patient: 35 min                  Care Plan discussed with: Patient, nursing    Discussed:  Care Plan    Prophylaxis:  eliquis    Disposition:  Home w/Family           ___________________________________________________    Attending Physician: Luke Jules MD

## 2018-06-29 NOTE — IP AVS SNAPSHOT
[address] [place] [address] 
[phone] Patient: Nori Hicks 
MRN: [id number] NVT:4/19/4950 A check donnie indicates which time of day the medication should be taken. My Medications CHANGE how you take these medications Instructions Each Dose to Equal  
 Morning Noon Evening Bedtime  
 dilTIAZem 60 mg tablet Commonly known as:  CARDIZEM What changed:  See the new instructions. Your last dose was: Your next dose is: Take 1 Tab by mouth Before breakfast, lunch, and dinner. 60 mg CONTINUE taking these medications Instructions Each Dose to Equal  
 Morning Noon Evening Bedtime  
 acetaminophen 325 mg tablet Commonly known as:  TYLENOL Your last dose was: Your next dose is: Take 650 mg by mouth daily as needed for Pain. 650 mg  
    
   
   
   
  
 apixaban 5 mg tablet Commonly known as:  Mer Loll Your last dose was: Your next dose is:    
   
   
 take 1 tablet by mouth twice a day  
     
   
   
   
  
 calcium-vitamin D 500 mg(1,250mg) -200 unit per tablet Commonly known as:  OYSTER SHELL Your last dose was: Your next dose is: Take 1 Tab by mouth every evening. 1 Tab CENTRUM SILVER PO Your last dose was: Your next dose is: Take 1 Tab by mouth every evening. 1 Tab * cloNIDine HCl 0.1 mg tablet Commonly known as:  CATAPRES Your last dose was: Your next dose is: Take 0.1 mg by mouth daily as needed (Patient takes an 1 extra dose as needed for SBP >135 in addition to scheduled doses). 0.1 mg  
    
   
   
   
  
 * cloNIDine HCl 0.1 mg tablet Commonly known as:  CATAPRES Your last dose was: Your next dose is: Take 0.1 mg by mouth three (3) times daily. Hold for SBP < 120  
 0.1 mg  
    
   
   
   
  
 flecainide 50 mg tablet Commonly known as:  TAMBOCOR Your last dose was: Your next dose is: Take 1 Tab by mouth every twelve (12) hours. 50 mg  
    
   
   
   
  
 levothyroxine 50 mcg tablet Commonly known as:  SYNTHROID Your last dose was: Your next dose is: Take 50 mcg by mouth Daily (before breakfast). 50 mcg  
    
   
   
   
  
 lisinopril 20 mg tablet Commonly known as:  Alicia Needy Your last dose was: Your next dose is: Take 1 Tab by mouth daily. 20 mg  
    
   
   
   
  
 PREVACID 30 mg capsule Generic drug:  lansoprazole Your last dose was: Your next dose is: Take 30 mg by mouth nightly. 30 mg  
    
   
   
   
  
 ZETIA 10 mg tablet Generic drug:  ezetimibe Your last dose was: Your next dose is: Take 10 mg by mouth every evening. 10 mg  
    
   
   
   
  
 * Notice: This list has 2 medication(s) that are the same as other medications prescribed for you. Read the directions carefully, and ask your doctor or other care provider to review them with you. Where to Get Your Medications Information on where to get these meds will be given to you by the nurse or doctor. ! Ask your nurse or doctor about these medications  
  dilTIAZem 60 mg tablet

## 2018-06-29 NOTE — ED NOTES
ASSISTED TO BEDSIDE COMMODE. Deidra Montano. SKIN WARM, DRY, PINK. RR EVEN AND UNLABORED. UA COLLECTED AND SENT. TOLERATING DILTIAZEM DRIP WELL.  TITRATED AS CHARTED

## 2018-06-29 NOTE — H&P
[address], [facility]  [phone]    Admission History and Physical      NAME:  Tamiko Driscoll   :   1940   MRN:  [id number]     PCP:  Corey Savage MD     Date/Time:  2018         Subjective:     CHIEF COMPLAINT: \"My heart rate was high\"     HISTORY OF PRESENT ILLNESS:     Ms. Balaji Kumar is a 70 y.o.  female with PMH of a-fib requiring cardioversions and ablation in the past, admitted for a-fib with RVR. Per pt, was in her usual state of health and getting ready for bed. She checked her BP before bed to determine whether to take her clonidine and noted that her HR was elevated prompting her to come to the ED. Denies CP, SOB currently. On dilt gtt     Past Medical History:   Diagnosis Date    DDD (degenerative disc disease)     Gastroesophageal reflux disease without esophagitis 7/3/2016    GERD (gastroesophageal reflux disease)     Hiatal hernia     HTN (hypertension), benign 2016    Hypertension     Hypothyroidism     ANÍBAL (obstructive sleep apnea) 2016    Paroxysmal atrial fibrillation ([facility] [address] 2016    Uterine prolapse         Past Surgical History:   Procedure Laterality Date    HX AFIB ABLATION  2017    HX GI      COLONOSCOPY     HX GYN      TUBAL LIGATION    HX HEENT      WISDOM TEETH EXTRACTED. Social History   Substance Use Topics    Smoking status: Former Smoker     Packs/day: 1.50     Years: 30.00     Quit date: 3/3/1994    Smokeless tobacco: Never Used    Alcohol use No        Family History   Problem Relation Age of Onset    Diabetes Mother     Hypertension Mother     COPD Mother         Allergies   Allergen Reactions    Citalopram Hydrobromide Rash     With itching.     Chlorthalidone Rash    Contrast Agent [Iodine] Other (comments)     Wheezing/bronchospasm    Maxzide [Triamterene-Hydrochlorothiazid] Palpitations    Vicodin [Hydrocodone-Acetaminophen] Palpitations Prior to Admission medications    Medication Sig Start Date End Date Taking? Authorizing Provider   dilTIAZem (CARDIZEM) 30 mg tablet take 1 tablet by mouth BEFORE BREAKFAST, LUNCH AND DINNER 4/2/18  Yes Raul Sarkar NP   flecainide (TAMBOCOR) 50 mg tablet Take 1 Tab by mouth every twelve (12) hours. 3/9/18  Yes Mihir Loving MD   calcium-vitamin D (OYSTER SHELL) 500 mg(1,250mg) -200 unit per tablet Take 1 Tab by mouth daily. Yes Historical Provider   apixaban (ELIQUIS) 5 mg tablet take 1 tablet by mouth twice a day 3/5/18  Yes Gerald Jay MD   cloNIDine HCl (CATAPRES) 0.1 mg tablet Take 1 Tab by mouth three (3) times daily. Hold for SBP <120  Indications: hypertension 1/23/18  Yes Raul Sarkar NP   levothyroxine (SYNTHROID) 50 mcg tablet take 1 tablet by mouth once daily 4/10/17  Yes Historical Provider   lansoprazole (PREVACID) 30 mg capsule Take 30 mg by mouth nightly. Yes Historical Provider   FOLIC ACID/MULTIVIT-MIN/LUTEIN (CENTRUM SILVER PO) Take 1 Tab by mouth daily. Yes Historical Provider   ezetimibe (ZETIA) 10 mg tablet Take 10 mg by mouth every evening. Yes Ferny Ramirez MD   lisinopril (PRINIVIL, ZESTRIL) 20 mg tablet Take 1 Tab by mouth daily.  3/16/18   Gerald Jay MD         Review of Systems:  (bold if positive, if negative)    Gen:  Eyes:  ENT:  CVS:  Pulm:  GI:    :    MS:  Skin:  Psych:  Endo:    Hem:  Renal:    Neuro:            Objective:      VITALS:    Vital signs reviewed; most recent are:    Visit Vitals    /62    Pulse 70    Temp 98.4 °F (36.9 °C)    Resp 22    Ht 5' 3\" (1.6 m)    Wt 86.2 kg (190 lb)    SpO2 97%    BMI 33.66 kg/m2     SpO2 Readings from Last 6 Encounters:   06/29/18 97%   05/25/18 98%   04/16/18 94%   03/17/18 98%   03/14/18 96%   03/09/18 91%        No intake or output data in the 24 hours ending 06/29/18 2191         Exam:     Physical Exam:    Gen:  Well-developed, well-nourished, in no acute distress  HEENT:  Pink conjunctivae, PERRL, hearing intact to voice, moist mucous membranes  Neck:  Supple, without masses, thyroid non-tender  Resp:  No accessory muscle use, clear breath sounds without wheezes rales or rhonchi  Card: Tachycardic. Irregularly irregular   Abd:  Soft, non-tender, non-distended, normoactive bowel sounds are present, no palpable organomegaly  Lymph:  No cervical adenopathy  Musc:  No cyanosis or clubbing  Skin:  No rashes or ulcers, skin turgor is good  Neuro:  Cranial nerves 3-12 are grossly intact,  strength is 5/5 bilaterally, dorsi / plantarflexion strength is 5/5 bilaterally, follows commands appropriately  Psych:  Alert with good insight. Oriented to person, place, and time       Labs:    Recent Labs      06/29/18 0227   WBC  10.1   HGB  13.6   HCT  42.1   PLT  199     Recent Labs      06/29/18 0227   NA  142   K  3.5   CL  104   CO2  27   GLU  140*   BUN  15   CREA  1.25*   CA  9.4   MG  2.2   ALB  3.9   SGOT  24   ALT  27     No components found for: GLPOC  No results for input(s): PH, PCO2, PO2, HCO3, FIO2 in the last 72 hours. No results for input(s): INR in the last 72 hours. No lab exists for component: INREXT    CXR =>   L basilar atelectasis     Trop => <0.05      Assessment/Plan:      Atrial fibrillation with RVR ([facility] (6/29/2018) - unclear trigger. ?mild IVVD  -admit to stepdown   -continue dilt gtt; increase home PO dilt   -continue flecanide  -continue Eliquis   -K>4, Mg>2  -TSH WNL recently   -cardiology eval       HTN (hypertension) (7/26/2016)  -continue home BP meds; consider stopping ACE I so as to have room to uptitrate dilt for rate control       Paroxysmal atrial fibrillation ([facility] (8/28/2016)  -see above       Acquired hypothyroidism (5/1/2017)  -continue levothyroxine       Severe obesity (BMI 35.0-39. 9) with comorbidity ([facility] (5/25/2018)  -counseled on weight loss     Surrogate decision maker:      Total time spent with patient: 70 Minutes Care Plan discussed with: Patient, Family, Nursing Staff and ED MD     Discussed:  Code Status, Care Plan and D/C Planning    Prophylaxis:  Eliquis     Probable Disposition:  Home w/Family           ___________________________________________________    Attending Physician: Collette Murders, MD

## 2018-06-29 NOTE — IP AVS SNAPSHOT
[facility] 
 
 
 [address] [facility] [address] 
[phone] Patient: Concetta Yen 
MRN: [id number] About your hospitalization You were admitted on:  June 29, 2018 You last received care in the:  [facility] 3 [facility] You were discharged on:  June 30, 2018 Why you were hospitalized Your primary diagnosis was:  Atrial Fibrillation With Rvr (Hcc) Your diagnoses also included:  Acquired Hypothyroidism, Paroxysmal Atrial Fibrillation (Hcc), Severe Obesity (Bmi 35.0-39. 9) With Comorbidity (Hcc), Htn (Hypertension) Follow-up Information Follow up With Details Comments Contact Info Parmjit Caldwell MD On 7/6/2018 3 PM [address] [place] [phone] [address] [place] [address] Road [address] 
[phone] Worley Holter, MD Go on 7/9/2018 Hospital PCP f/u appointment on Monday July 9 @ 1:00 p.m. [address] [phone] Your Scheduled Appointments Friday July 06, 2018  3:00 PM EDT  
ESTABLISHED PATIENT with Parmjit Caldwell MD  
[facility] ([facility]) [address] [facility] [place] [address] [phone] Discharge Orders None A check donnie indicates which time of day the medication should be taken. My Medications CHANGE how you take these medications Instructions Each Dose to Equal  
 Morning Noon Evening Bedtime  
 dilTIAZem 60 mg tablet Commonly known as:  CARDIZEM What changed:  See the new instructions. Your last dose was: Your next dose is: Take 1 Tab by mouth Before breakfast, lunch, and dinner. 60 mg CONTINUE taking these medications Instructions Each Dose to Equal  
 Morning Noon Evening Bedtime  
 acetaminophen 325 mg tablet Commonly known as:  TYLENOL Your last dose was: Your next dose is: Take 650 mg by mouth daily as needed for Pain. 650 mg  
    
   
   
   
  
 apixaban 5 mg tablet Commonly known as:  Cliffton Rubinstein Your last dose was: Your next dose is:    
   
   
 take 1 tablet by mouth twice a day  
     
   
   
   
  
 calcium-vitamin D 500 mg(1,250mg) -200 unit per tablet Commonly known as:  OYSTER SHELL Your last dose was: Your next dose is: Take 1 Tab by mouth every evening. 1 Tab CENTRUM SILVER PO Your last dose was: Your next dose is: Take 1 Tab by mouth every evening. 1 Tab * cloNIDine HCl 0.1 mg tablet Commonly known as:  CATAPRES Your last dose was: Your next dose is: Take 0.1 mg by mouth daily as needed (Patient takes an 1 extra dose as needed for SBP >135 in addition to scheduled doses). 0.1 mg  
    
   
   
   
  
 * cloNIDine HCl 0.1 mg tablet Commonly known as:  CATAPRES Your last dose was: Your next dose is: Take 0.1 mg by mouth three (3) times daily. Hold for SBP < 120  
 0.1 mg  
    
   
   
   
  
 flecainide 50 mg tablet Commonly known as:  TAMBOCOR Your last dose was: Your next dose is: Take 1 Tab by mouth every twelve (12) hours. 50 mg  
    
   
   
   
  
 levothyroxine 50 mcg tablet Commonly known as:  SYNTHROID Your last dose was: Your next dose is: Take 50 mcg by mouth Daily (before breakfast). 50 mcg  
    
   
   
   
  
 lisinopril 20 mg tablet Commonly known as:  Ora Bee Your last dose was: Your next dose is: Take 1 Tab by mouth daily. 20 mg  
    
   
   
   
  
 PREVACID 30 mg capsule Generic drug:  lansoprazole Your last dose was: Your next dose is: Take 30 mg by mouth nightly. 30 mg  
    
   
   
   
  
 ZETIA 10 mg tablet Generic drug:  ezetimibe Your last dose was: Your next dose is: Take 10 mg by mouth every evening. 10 mg  
    
   
   
   
  
 * Notice: This list has 2 medication(s) that are the same as other medications prescribed for you. Read the directions carefully, and ask your doctor or other care provider to review them with you. Where to Get Your Medications Information on where to get these meds will be given to you by the nurse or doctor. ! Ask your nurse or doctor about these medications  
  dilTIAZem 60 mg tablet Discharge Instructions HOSPITALIST DISCHARGE INSTRUCTIONS 
NAME: Dayanara Bundy  
:  1940 MRN:  [id number] Date/Time:  2018 8:13 AM 
 
ADMIT DATE: 2018 DISCHARGE DATE: 2018 ADMITTING DIAGNOSIS: 
afib with RVR DISCHARGE DIAGNOSIS: 
same MEDICATIONS: 
See after visit summary · It is important that you take the medication exactly as they are prescribed. · Keep your medication in the bottles provided by the pharmacist and keep a list of the medication names, dosages, and times to be taken in your wallet. · Do not take other medications without consulting your doctor Pain Management: per above medications What to do at [facility] Recommended diet:  Cardiac Diet Recommended activity: Activity as tolerated 1) Return to the hospital if you feel worse 2) If you experience any of the following symptoms then please call your primary care physician or return to the emergency room if you cannot get hold of your doctor: 
Fever, chills, nausea, vomiting, diarrhea, change in mentation, falling, bleeding, shortness of breath, chest pain, severe headache, severe abdominal pain, 3) Your diltiazem was increased to 60mg three times daily 4) follow up with the cardiologist 
 
Follow Up: Follow-up Information Follow up With Details Comments Contact Info Layla Thomas MD On 7/6/2018 3  [facility] [address] [place] [phone] 100[address] [facility] 
[phone] Osmin Yu MD Go on 7/9/2018 Hospital PCP f/u appointment on Monday July 9 @ 1:00 p.m. [address] [facility] [address] [facility] 
[phone] Information obtained by : 
I understand that if any problems occur once I am at home I am to contact my physician. I understand and acknowledge receipt of the instructions indicated above. Physician's or R.N.'s Signature                                                                  Date/Time Patient or Representative Signature                                                          Date/Time Future Appointments Date Time Provider Ray Zamora 7/6/2018 3:00 PM MD VENKAT Baird  
8/27/2018 2:40 PM Layla Thomas MD 1000 [facility] Atrial Fibrillation: Care Instructions Your Care Instructions Atrial fibrillation is an irregular and often fast heartbeat. Treating this condition is important for several reasons. It can cause blood clots, which can travel from your heart to your brain and cause a stroke. If you have a fast heartbeat, you may feel lightheaded, dizzy, and weak. An irregular heartbeat can also increase your risk for heart failure. Atrial fibrillation is often the result of another heart condition, such as high blood pressure or coronary artery disease. Making changes to improve your heart condition will help you stay healthy and active. Follow-up care is a key part of your treatment and safety.  Be sure to make and go to all appointments, and call your doctor if you are having problems. It's also a good idea to know your test results and keep a list of the medicines you take. How can you care for yourself at home? Medicines ? · Take your medicines exactly as prescribed. Call your doctor if you think you are having a problem with your medicine. You will get more details on the specific medicines your doctor prescribes. ? · If your doctor has given you a blood thinner to prevent a stroke, be sure you get instructions about how to take your medicine safely. Blood thinners can cause serious bleeding problems. ? · Do not take any vitamins, over-the-counter drugs, or herbal products without talking to your doctor first. ? Lifestyle changes ? · Do not smoke. Smoking can increase your chance of a stroke and heart attack. If you need help quitting, talk to your doctor about stop-smoking programs and medicines. These can increase your chances of quitting for good. ? · Eat a heart-healthy diet. ? · Stay at a healthy weight. Lose weight if you need to.  
? · Limit alcohol to 2 drinks a day for men and 1 drink a day for women. Too much alcohol can cause health problems. ? · Avoid colds and flu. Get a pneumococcal vaccine shot. If you have had one before, ask your doctor whether you need another dose. Get a flu shot every year. If you must be around people with colds or flu, wash your hands often. Activity ? · If your doctor recommends it, get more exercise. Walking is a good choice. Bit by bit, increase the amount you walk every day. Try for at least 30 minutes on most days of the week. You also may want to swim, bike, or do other activities. Your doctor may suggest that you join a cardiac rehabilitation program so that you can have help increasing your physical activity safely. ? · Start light exercise if your doctor says it is okay. Even a small amount will help you get stronger, have more energy, and manage stress. Walking is an easy way to get exercise. Start out by walking a little more than you did in the hospital. Gradually increase the amount you walk. ? · When you exercise, watch for signs that your heart is working too hard. You are pushing too hard if you cannot talk while you are exercising. If you become short of breath or dizzy or have chest pain, sit down and rest immediately. ? · Check your pulse regularly. Place two fingers on the artery at the palm side of your wrist, in line with your thumb. If your heartbeat seems uneven or fast, talk to your doctor. When should you call for help? Call 911 anytime you think you may need emergency care. For example, call if: 
? · You have symptoms of a heart attack. These may include: ¨ Chest pain or pressure, or a strange feeling in the chest. 
¨ Sweating. ¨ Shortness of breath. ¨ Nausea or vomiting. ¨ Pain, pressure, or a strange feeling in the back, neck, jaw, or upper belly or in one or both shoulders or arms. ¨ Lightheadedness or sudden weakness. ¨ A fast or irregular heartbeat. After you call 911, the  may tell you to chew 1 adult-strength or 2 to 4 low-dose aspirin. Wait for an ambulance. Do not try to drive yourself. ? · You have symptoms of a stroke. These may include: 
¨ Sudden numbness, tingling, weakness, or loss of movement in your face, arm, or leg, especially on only one side of your body. ¨ Sudden vision changes. ¨ Sudden trouble speaking. ¨ Sudden confusion or trouble understanding simple statements. ¨ Sudden problems with walking or balance. ¨ A sudden, severe headache that is different from past headaches. ? · You passed out (lost consciousness). ?Call your doctor now or seek immediate medical care if: 
? · You have new or increased shortness of breath. ? · You feel dizzy or lightheaded, or you feel like you may faint. ? · Your heart rate becomes irregular. ? · You can feel your heart flutter in your chest or skip heartbeats. Tell your doctor if these symptoms are new or worse. ? Watch closely for changes in your health, and be sure to contact your doctor if you have any problems. Where can you learn more? Go to http://iker-isaac.info/. Enter U020 in the search box to learn more about \"Atrial Fibrillation: Care Instructions. \" Current as of: September 21, 2016 Content Version: 11.4 © 5349-2002 TrackR. Care instructions adapted under license by Wiki-PR (which disclaims liability or warranty for this information). If you have questions about a medical condition or this instruction, always ask your healthcare professional. [address] any warranty or liability for your use of this information. Nival Announcement We are excited to announce that we are making your provider's discharge notes available to you in Nival. You will see these notes when they are completed and signed by the physician that discharged you from your recent hospital stay. If you have any questions or concerns about any information you see in Nival, please call the Health Information Department where you were seen or reach out to your Primary Care Provider for more information about your plan of care. Introducing [address]! Dear Wilma Ayala: Thank you for requesting a Nival account. Our records indicate that you already have an active Nival account. You can access your account anytime at [email or web address] Did you know that you can access your hospital and ER discharge instructions at any time in Nival? You can also review all of your test results from your hospital stay or ER visit. Additional Information If you have questions, please visit the Frequently Asked Questions section of the Nival website at [email or web address] Remember, MyChart is NOT to be used for urgent needs. For medical emergencies, dial 911. Now available from your iPhone and Android! Introducing Reyes Jorge As a [facility] patient, I wanted to make you aware of our electronic visit tool called Reyes Jorge. PawSpot 24/YourEncore allows you to connect within minutes with a medical provider 24 hours a day, seven days a week via a mobile device or tablet or logging into a secure website from your computer. You can access Reyes Jorge from anywhere in the [place]. A virtual visit might be right for you when you have a simple condition and feel like you just dont want to get out of bed, or cant get away from work for an appointment, when your regular [facility] provider is not available (evenings, weekends or holidays), or when youre out of town and need minor care. Electronic visits cost only $49 and if the [employer]/YourEncore provider determines a prescription is needed to treat your condition, one can be electronically transmitted to a nearby pharmacy*. Please take a moment to enroll today if you have not already done so. The enrollment process is free and takes just a few minutes. To enroll, please download the PawSpot 24/YourEncore tapan to your tablet or phone, or visit [email or web address] to enroll on your computer. And, as an [address] [place] [address] patient with a [facility] account, the results of your visits will be scanned into your electronic medical record and your primary care provider will be able to view the scanned results. We urge you to continue to see your regular [facility] provider for your ongoing medical care. And while your primary care provider may not be the one available when you seek a Reyes Jorge virtual visit, the peace of mind you get from getting a real diagnosis real time can be priceless.    
 
For more information on Reyes Jorge, view our Frequently Asked Questions (FAQs) at [email or web address]. Sincerely, 
 
Dae Hartmann MD 
Chief Medical Officer Jayne Daniel *:  certain medications cannot be prescribed via Reyes Jorge Providers Seen During Your Hospitalization Provider Specialty Primary office phone Albin Andrade MD Emergency Medicine [phone] Annel Montoya MD Internal Medicine [phone] Orlin Segundo MD Internal Medicine [phone] Your Primary Care Physician (PCP) Primary Care Physician Office Phone Office Fax Gricelaris Mendez [phone] You are allergic to the following Allergen Reactions Citalopram Hydrobromide Rash With itching. Chlorthalidone Rash Contrast Agent (Iodine) Other (comments) Wheezing/bronchospasm Maxzide (Triamterene-Hydrochlorothiazid) Palpitations Vicodin (Hydrocodone-Acetaminophen) Palpitations Recent Documentation Height Weight BMI OB Status Smoking Status 1.6 m 86.2 kg 33.66 kg/m2 Postmenopausal Former Smoker Emergency Contacts Name Discharge Info Relation Home Work Mobile Madhav Ojeda DISCHARGE CAREGIVER [3] Spouse [3] [phone] Patient Belongings The following personal items are in your possession at time of discharge: 
  Dental Appliances: None  Visual Aid: None      Home Medications: None   Jewelry: Necklace, Ring  Clothing: At bedside, Footwear, Pants, Shirt, Socks    Other Valuables: None Please provide this summary of care documentation to your next provider. Signatures-by signing, you are acknowledging that this After Visit Summary has been reviewed with you and you have received a copy. Patient Signature:  ____________________________________________________________ Date:  ____________________________________________________________  
  
Daisha Dahl  Provider Signature: ____________________________________________________________ Date:  ____________________________________________________________

## 2018-06-30 VITALS
TEMPERATURE: 98.2 F | BODY MASS INDEX: 33.66 KG/M2 | RESPIRATION RATE: 21 BRPM | DIASTOLIC BLOOD PRESSURE: 72 MMHG | HEIGHT: 63 IN | SYSTOLIC BLOOD PRESSURE: 128 MMHG | WEIGHT: 190 LBS | HEART RATE: 58 BPM | OXYGEN SATURATION: 98 %

## 2018-06-30 LAB
ANION GAP SERPL CALC-SCNC: 4 MMOL/L (ref 5–15)
BASOPHILS # BLD: 0 K/UL (ref 0–0.1)
BASOPHILS NFR BLD: 0 % (ref 0–1)
BUN SERPL-MCNC: 16 MG/DL (ref 6–20)
BUN/CREAT SERPL: 17 (ref 12–20)
CALCIUM SERPL-MCNC: 8.6 MG/DL (ref 8.5–10.1)
CHLORIDE SERPL-SCNC: 107 MMOL/L (ref 97–108)
CO2 SERPL-SCNC: 29 MMOL/L (ref 21–32)
CREAT SERPL-MCNC: 0.92 MG/DL (ref 0.55–1.02)
DIFFERENTIAL METHOD BLD: ABNORMAL
EOSINOPHIL # BLD: 0.2 K/UL (ref 0–0.4)
EOSINOPHIL NFR BLD: 3 % (ref 0–7)
ERYTHROCYTE [DISTWIDTH] IN BLOOD BY AUTOMATED COUNT: 13.6 % (ref 11.5–14.5)
GLUCOSE SERPL-MCNC: 145 MG/DL (ref 65–100)
HCT VFR BLD AUTO: 35.4 % (ref 35–47)
HGB BLD-MCNC: 11.3 G/DL (ref 11.5–16)
IMM GRANULOCYTES # BLD: 0 K/UL (ref 0–0.04)
IMM GRANULOCYTES NFR BLD AUTO: 0 % (ref 0–0.5)
LYMPHOCYTES # BLD: 2.1 K/UL (ref 0.8–3.5)
LYMPHOCYTES NFR BLD: 31 % (ref 12–49)
MAGNESIUM SERPL-MCNC: 2 MG/DL (ref 1.6–2.4)
MCH RBC QN AUTO: 31 PG (ref 26–34)
MCHC RBC AUTO-ENTMCNC: 31.9 G/DL (ref 30–36.5)
MCV RBC AUTO: 97 FL (ref 80–99)
MONOCYTES # BLD: 0.8 K/UL (ref 0–1)
MONOCYTES NFR BLD: 11 % (ref 5–13)
NEUTS SEG # BLD: 3.7 K/UL (ref 1.8–8)
NEUTS SEG NFR BLD: 54 % (ref 32–75)
NRBC # BLD: 0 K/UL (ref 0–0.01)
NRBC BLD-RTO: 0 PER 100 WBC
PHOSPHATE SERPL-MCNC: 3.4 MG/DL (ref 2.6–4.7)
PLATELET # BLD AUTO: 182 K/UL (ref 150–400)
PMV BLD AUTO: 10.3 FL (ref 8.9–12.9)
POTASSIUM SERPL-SCNC: 4.7 MMOL/L (ref 3.5–5.1)
RBC # BLD AUTO: 3.65 M/UL (ref 3.8–5.2)
SODIUM SERPL-SCNC: 140 MMOL/L (ref 136–145)
WBC # BLD AUTO: 6.9 K/UL (ref 3.6–11)

## 2018-06-30 PROCEDURE — 74011250637 HC RX REV CODE- 250/637: Performed by: INTERNAL MEDICINE

## 2018-06-30 PROCEDURE — 80048 BASIC METABOLIC PNL TOTAL CA: CPT | Performed by: INTERNAL MEDICINE

## 2018-06-30 PROCEDURE — 84100 ASSAY OF PHOSPHORUS: CPT | Performed by: INTERNAL MEDICINE

## 2018-06-30 PROCEDURE — 85025 COMPLETE CBC W/AUTO DIFF WBC: CPT | Performed by: INTERNAL MEDICINE

## 2018-06-30 PROCEDURE — 36415 COLL VENOUS BLD VENIPUNCTURE: CPT | Performed by: INTERNAL MEDICINE

## 2018-06-30 PROCEDURE — 83735 ASSAY OF MAGNESIUM: CPT | Performed by: INTERNAL MEDICINE

## 2018-06-30 RX ORDER — DILTIAZEM HYDROCHLORIDE 60 MG/1
60 TABLET, FILM COATED ORAL
Qty: 90 TAB | Refills: 1 | Status: SHIPPED | OUTPATIENT
Start: 2018-06-30 | End: 2018-08-29 | Stop reason: SDUPTHER

## 2018-06-30 RX ADMIN — APIXABAN 5 MG: 5 TABLET, FILM COATED ORAL at 08:24

## 2018-06-30 RX ADMIN — FLECAINIDE ACETATE 50 MG: 50 TABLET ORAL at 08:25

## 2018-06-30 RX ADMIN — LISINOPRIL 20 MG: 20 TABLET ORAL at 08:25

## 2018-06-30 RX ADMIN — Medication 10 ML: at 05:12

## 2018-06-30 RX ADMIN — DILTIAZEM HYDROCHLORIDE 60 MG: 60 TABLET, FILM COATED ORAL at 08:25

## 2018-06-30 RX ADMIN — LEVOTHYROXINE SODIUM 50 MCG: 50 TABLET ORAL at 08:25

## 2018-06-30 NOTE — PROGRESS NOTES
0710: Bedside shift change report given to Marcela Wilson RN (oncoming nurse) by Sera Pham RN (offgoing nurse). Report included the following information SBAR, Kardex, ED Summary, Procedure Summary and MAR.   0745: Initial assessment performed at this time. Patient A&Ox4. Patient resting comfortably in bed at this time. Patient does not appear to be in distress and no pain noted. VSS. Will continue to monitor patient at this time. 0815: Dr. Joshua Haynes at bedside. Discharge orders received. 0830: Patient sitting on side of bed eating breakfast.  0915: Patient dressed and sitting on the side of the bed waiting for  to pick her up. IV taken out. Discharge instructions gone over and patient verbalizes understanding. No additional needs at this time. 0930: Patient wheelchaired out to . Patient discharged.

## 2018-06-30 NOTE — DISCHARGE SUMMARY
[facility] [place] [address]  [address]  [phone]    Physician Discharge Summary     Patient ID:  Tobias Muñiz  [id number]  42 y.o.  1940    Admit date: 6/29/2018    Discharge date and time: 6/30/2018    Admission Diagnoses: Atrial fibrillation with RVR [facility])    Discharge Diagnoses:  Principal Diagnosis Atrial fibrillation with RVR ([facility]                                            Principal Problem:    Atrial fibrillation with RVR ([address] (6/29/2018)    Active Problems:    HTN (hypertension) (7/26/2016)      Paroxysmal atrial fibrillation ([facility] (8/28/2016)      Acquired hypothyroidism (5/1/2017)      Severe obesity (BMI 35.0-39. 9) with comorbidity ([facility] (5/25/2018)           Hospital Course:     69 yo hx of HTN, Pafib, presented w/ afib RVR     1) Atrial fibrillation with RVR: Hx of Pafib s/p ablation, cardioversion. Now back in NSR with dilt gtt. Home dilt was increased to 60mg tid. Cont Flecanide, Eliquis.   Cards was following, patient will f/u with Dr. Luz Marina Muhammad     2) HTN: cont lisinopril, clonidine     3) Hypothyroid: cont synthroid     4) Obesity: already recommended wt loss    PCP: Mary Deluna MD     Consults: Cardiology    Significant Diagnostic Studies: none    Discharge Exam:  Physical Exam:    Gen: Well-developed, well-nourished, obese, in no acute distress  HEENT:  Pink conjunctivae, PERRL, hearing intact to voice, moist mucous membranes  Neck: Supple, without masses, thyroid non-tender  Resp: No accessory muscle use, clear breath sounds without wheezes rales or rhonchi  Card: No murmurs, normal S1, S2 without thrills, bruits or peripheral edema  Abd:  Soft, non-tender, non-distended, normoactive bowel sounds are present, no palpable organomegaly and no detectable hernias  Lymph:  No cervical or inguinal adenopathy  Musc: No cyanosis or clubbing  Skin: No rashes or ulcers, skin turgor is good  Neuro:  Cranial nerves are grossly intact, no focal motor weakness, follows commands appropriately  Psych:  Good insight, oriented to person, place and time, alert    Disposition: home  Discharge Condition: Stable    Patient Instructions:   Current Discharge Medication List      CONTINUE these medications which have CHANGED    Details   dilTIAZem (CARDIZEM) 60 mg tablet Take 1 Tab by mouth Before breakfast, lunch, and dinner. Qty: 90 Tab, Refills: 1         CONTINUE these medications which have NOT CHANGED    Details   levothyroxine (SYNTHROID) 50 mcg tablet Take 50 mcg by mouth Daily (before breakfast). acetaminophen (TYLENOL) 325 mg tablet Take 650 mg by mouth daily as needed for Pain. !! cloNIDine HCl (CATAPRES) 0.1 mg tablet Take 0.1 mg by mouth daily as needed (Patient takes an 1 extra dose as needed for SBP >135 in addition to scheduled doses). !! cloNIDine HCl (CATAPRES) 0.1 mg tablet Take 0.1 mg by mouth three (3) times daily. Hold for SBP < 120      lisinopril (PRINIVIL, ZESTRIL) 20 mg tablet Take 1 Tab by mouth daily. Qty: 30 Tab, Refills: 5      flecainide (TAMBOCOR) 50 mg tablet Take 1 Tab by mouth every twelve (12) hours. Qty: 60 Tab, Refills: 0      calcium-vitamin D (OYSTER SHELL) 500 mg(1,250mg) -200 unit per tablet Take 1 Tab by mouth every evening. apixaban (ELIQUIS) 5 mg tablet take 1 tablet by mouth twice a day  Qty: 60 Tab, Refills: 11      lansoprazole (PREVACID) 30 mg capsule Take 30 mg by mouth nightly. FOLIC ACID/MULTIVIT-MIN/LUTEIN (CENTRUM SILVER PO) Take 1 Tab by mouth every evening.      ezetimibe (ZETIA) 10 mg tablet Take 10 mg by mouth every evening. !! - Potential duplicate medications found. Please discuss with provider.         Activity: Activity as tolerated  Diet: Cardiac Diet  Wound Care: None needed    Follow-up with  Follow-up Information     Follow up With Details Comments Cristofer Falcon MD On 7/6/2018 3 PM [address] [place] [address]  [phone] Gin Ureña MD Go on 7/9/2018 Hospital PCP f/u appointment on Monday July 9 @ 1:00 p.m. [address] [facility] [address]  [address] [place] [address]  [phone]            Follow-up tests/labs none    Signed:  Tara Barrientos MD  6/30/2018  8:13 AM    I spent 32 min on discharge

## 2018-07-03 ENCOUNTER — HOSPITAL ENCOUNTER (EMERGENCY)
Age: 78
Discharge: HOME | End: 2018-07-03
Attending: EMERGENCY MEDICINE
Payer: MEDICARE

## 2018-07-03 VITALS
OXYGEN SATURATION: 97 % | TEMPERATURE: 97.8 F | RESPIRATION RATE: 20 BRPM | BODY MASS INDEX: 35.5 KG/M2 | HEIGHT: 61 IN | SYSTOLIC BLOOD PRESSURE: 151 MMHG | WEIGHT: 188 LBS | HEART RATE: 68 BPM | DIASTOLIC BLOOD PRESSURE: 70 MMHG

## 2018-07-03 DIAGNOSIS — I48.91 ATRIAL FIBRILLATION WITH RAPID VENTRICULAR RESPONSE (HCC): Primary | ICD-10-CM

## 2018-07-03 DIAGNOSIS — E86.0 DEHYDRATION: ICD-10-CM

## 2018-07-03 LAB
ALBUMIN SERPL-MCNC: 3.6 G/DL (ref 3.5–5)
ALBUMIN/GLOB SERPL: 0.9 {RATIO} (ref 1.1–2.2)
ALP SERPL-CCNC: 86 U/L (ref 45–117)
ALT SERPL-CCNC: 29 U/L (ref 12–78)
ANION GAP SERPL CALC-SCNC: 8 MMOL/L (ref 5–15)
AST SERPL-CCNC: 24 U/L (ref 15–37)
ATRIAL RATE: 102 BPM
ATRIAL RATE: 133 BPM
ATRIAL RATE: 63 BPM
BASOPHILS # BLD: 0 K/UL (ref 0–0.1)
BASOPHILS NFR BLD: 0 % (ref 0–1)
BILIRUB SERPL-MCNC: 0.3 MG/DL (ref 0.2–1)
BUN SERPL-MCNC: 15 MG/DL (ref 6–20)
BUN/CREAT SERPL: 11 (ref 12–20)
CALCIUM SERPL-MCNC: 9.5 MG/DL (ref 8.5–10.1)
CALCULATED P AXIS, ECG09: 114 DEGREES
CALCULATED P AXIS, ECG09: 45 DEGREES
CALCULATED R AXIS, ECG10: 69 DEGREES
CALCULATED R AXIS, ECG10: 80 DEGREES
CALCULATED R AXIS, ECG10: 90 DEGREES
CALCULATED T AXIS, ECG11: -3 DEGREES
CALCULATED T AXIS, ECG11: -8 DEGREES
CALCULATED T AXIS, ECG11: 8 DEGREES
CHLORIDE SERPL-SCNC: 105 MMOL/L (ref 97–108)
CO2 SERPL-SCNC: 28 MMOL/L (ref 21–32)
CREAT SERPL-MCNC: 1.32 MG/DL (ref 0.55–1.02)
DIAGNOSIS, 93000: NORMAL
DIFFERENTIAL METHOD BLD: NORMAL
EOSINOPHIL # BLD: 0.2 K/UL (ref 0–0.4)
EOSINOPHIL NFR BLD: 3 % (ref 0–7)
ERYTHROCYTE [DISTWIDTH] IN BLOOD BY AUTOMATED COUNT: 13.1 % (ref 11.5–14.5)
GLOBULIN SER CALC-MCNC: 4 G/DL (ref 2–4)
GLUCOSE SERPL-MCNC: 159 MG/DL (ref 65–100)
HCT VFR BLD AUTO: 40.9 % (ref 35–47)
HGB BLD-MCNC: 13.5 G/DL (ref 11.5–16)
IMM GRANULOCYTES # BLD: 0 K/UL (ref 0–0.04)
IMM GRANULOCYTES NFR BLD AUTO: 0 % (ref 0–0.5)
LYMPHOCYTES # BLD: 1.8 K/UL (ref 0.8–3.5)
LYMPHOCYTES NFR BLD: 28 % (ref 12–49)
MAGNESIUM SERPL-MCNC: 2 MG/DL (ref 1.6–2.4)
MCH RBC QN AUTO: 31 PG (ref 26–34)
MCHC RBC AUTO-ENTMCNC: 33 G/DL (ref 30–36.5)
MCV RBC AUTO: 94 FL (ref 80–99)
MONOCYTES # BLD: 0.7 K/UL (ref 0–1)
MONOCYTES NFR BLD: 10 % (ref 5–13)
NEUTS SEG # BLD: 3.7 K/UL (ref 1.8–8)
NEUTS SEG NFR BLD: 58 % (ref 32–75)
NRBC # BLD: 0 K/UL (ref 0–0.01)
NRBC BLD-RTO: 0 PER 100 WBC
P-R INTERVAL, ECG05: 200 MS
PLATELET # BLD AUTO: 218 K/UL (ref 150–400)
PMV BLD AUTO: 10.1 FL (ref 8.9–12.9)
POTASSIUM SERPL-SCNC: 3.9 MMOL/L (ref 3.5–5.1)
PROT SERPL-MCNC: 7.6 G/DL (ref 6.4–8.2)
Q-T INTERVAL, ECG07: 376 MS
Q-T INTERVAL, ECG07: 404 MS
Q-T INTERVAL, ECG07: 458 MS
QRS DURATION, ECG06: 142 MS
QRS DURATION, ECG06: 142 MS
QRS DURATION, ECG06: 144 MS
QTC CALCULATION (BEZET), ECG08: 468 MS
QTC CALCULATION (BEZET), ECG08: 494 MS
QTC CALCULATION (BEZET), ECG08: 511 MS
RBC # BLD AUTO: 4.35 M/UL (ref 3.8–5.2)
SODIUM SERPL-SCNC: 141 MMOL/L (ref 136–145)
T4 FREE SERPL-MCNC: 1.4 NG/DL (ref 0.8–1.5)
TSH SERPL DL<=0.05 MIU/L-ACNC: 1.62 UIU/ML (ref 0.36–3.74)
VENTRICULAR RATE, ECG03: 111 BPM
VENTRICULAR RATE, ECG03: 63 BPM
VENTRICULAR RATE, ECG03: 90 BPM
WBC # BLD AUTO: 6.3 K/UL (ref 3.6–11)

## 2018-07-03 PROCEDURE — 36415 COLL VENOUS BLD VENIPUNCTURE: CPT | Performed by: EMERGENCY MEDICINE

## 2018-07-03 PROCEDURE — 96361 HYDRATE IV INFUSION ADD-ON: CPT

## 2018-07-03 PROCEDURE — 93005 ELECTROCARDIOGRAM TRACING: CPT

## 2018-07-03 PROCEDURE — 96366 THER/PROPH/DIAG IV INF ADDON: CPT

## 2018-07-03 PROCEDURE — 80053 COMPREHEN METABOLIC PANEL: CPT | Performed by: EMERGENCY MEDICINE

## 2018-07-03 PROCEDURE — 74011000250 HC RX REV CODE- 250: Performed by: EMERGENCY MEDICINE

## 2018-07-03 PROCEDURE — 83735 ASSAY OF MAGNESIUM: CPT | Performed by: EMERGENCY MEDICINE

## 2018-07-03 PROCEDURE — 74011250636 HC RX REV CODE- 250/636: Performed by: EMERGENCY MEDICINE

## 2018-07-03 PROCEDURE — 77030018729 HC ELECTRD DEFIB PAD CARD -B

## 2018-07-03 PROCEDURE — 96365 THER/PROPH/DIAG IV INF INIT: CPT

## 2018-07-03 PROCEDURE — 74011250637 HC RX REV CODE- 250/637: Performed by: EMERGENCY MEDICINE

## 2018-07-03 PROCEDURE — 85025 COMPLETE CBC W/AUTO DIFF WBC: CPT | Performed by: EMERGENCY MEDICINE

## 2018-07-03 PROCEDURE — 84443 ASSAY THYROID STIM HORMONE: CPT | Performed by: EMERGENCY MEDICINE

## 2018-07-03 PROCEDURE — 84439 ASSAY OF FREE THYROXINE: CPT | Performed by: EMERGENCY MEDICINE

## 2018-07-03 PROCEDURE — 99285 EMERGENCY DEPT VISIT HI MDM: CPT

## 2018-07-03 PROCEDURE — 74011000258 HC RX REV CODE- 258: Performed by: EMERGENCY MEDICINE

## 2018-07-03 RX ORDER — SODIUM CHLORIDE 9 MG/ML
100 INJECTION, SOLUTION INTRAVENOUS CONTINUOUS
Status: DISCONTINUED | OUTPATIENT
Start: 2018-07-03 | End: 2018-07-03

## 2018-07-03 RX ORDER — FLECAINIDE ACETATE 50 MG/1
100 TABLET ORAL EVERY 12 HOURS
Qty: 60 TAB | Refills: 0 | Status: SHIPPED
Start: 2018-07-03 | End: 2018-07-10 | Stop reason: DRUGHIGH

## 2018-07-03 RX ORDER — DILTIAZEM HYDROCHLORIDE 5 MG/ML
15 INJECTION INTRAVENOUS
Status: COMPLETED | OUTPATIENT
Start: 2018-07-03 | End: 2018-07-03

## 2018-07-03 RX ORDER — PROPOFOL 10 MG/ML
200 INJECTION, EMULSION INTRAVENOUS
Status: COMPLETED | OUTPATIENT
Start: 2018-07-03 | End: 2018-07-03

## 2018-07-03 RX ORDER — SODIUM CHLORIDE 0.9 % (FLUSH) 0.9 %
5-10 SYRINGE (ML) INJECTION EVERY 8 HOURS
Status: DISCONTINUED | OUTPATIENT
Start: 2018-07-03 | End: 2018-07-03 | Stop reason: HOSPADM

## 2018-07-03 RX ORDER — CLONIDINE HYDROCHLORIDE 0.1 MG/1
0.1 TABLET ORAL 2 TIMES DAILY
Qty: 60 TAB | Refills: 3 | Status: SHIPPED | OUTPATIENT
Start: 2018-07-03 | End: 2019-01-07 | Stop reason: SDUPTHER

## 2018-07-03 RX ORDER — CARVEDILOL 6.25 MG/1
6.25 TABLET ORAL 2 TIMES DAILY WITH MEALS
Qty: 60 TAB | Refills: 3 | Status: SHIPPED
Start: 2018-07-03 | End: 2018-07-06 | Stop reason: SDUPTHER

## 2018-07-03 RX ORDER — SODIUM CHLORIDE 0.9 % (FLUSH) 0.9 %
5-10 SYRINGE (ML) INJECTION AS NEEDED
Status: DISCONTINUED | OUTPATIENT
Start: 2018-07-03 | End: 2018-07-03 | Stop reason: HOSPADM

## 2018-07-03 RX ADMIN — DILTIAZEM HYDROCHLORIDE 15 MG: 5 INJECTION INTRAVENOUS at 05:53

## 2018-07-03 RX ADMIN — PROPOFOL 200 MG: 10 INJECTION, EMULSION INTRAVENOUS at 08:58

## 2018-07-03 RX ADMIN — DILTIAZEM HYDROCHLORIDE 5 MG/HR: 5 INJECTION INTRAVENOUS at 06:17

## 2018-07-03 RX ADMIN — Medication 10 ML: at 05:54

## 2018-07-03 RX ADMIN — SODIUM CHLORIDE 100 ML/HR: 900 INJECTION, SOLUTION INTRAVENOUS at 06:06

## 2018-07-03 RX ADMIN — FLECAINIDE ACETATE 150 MG: 100 TABLET ORAL at 07:28

## 2018-07-03 RX ADMIN — SODIUM CHLORIDE 1000 ML: 900 INJECTION, SOLUTION INTRAVENOUS at 06:17

## 2018-07-03 NOTE — ED NOTES
7:00 AM  Change of shift. Care of patient taken over from Dr. Marga Odell; H&P reviewed, bedside handoff complete.

## 2018-07-03 NOTE — PROGRESS NOTES
[facility]: MED RECONCILIATION    Comments/Recommendations:    Patient was alert and oriented during the interview, she is very knowledgeable about her medications and reports that the only changes made since her last admission a few days ago was an increase in her diltiazem to 60 mg TID.  Patient reports that she last took her medications yesterday and expresses concern that she will not receive her morning doses while in the ED. Medications added:     · none    Medications removed:    · none    Medications adjusted:    · none    Allergies: Citalopram hydrobromide; Chlorthalidone; Contrast agent [iodine]; Maxzide [triamterene-hydrochlorothiazid]; and Vicodin [hydrocodone-acetaminophen]    Prior to Admission Medications:   Prior to Admission Medications   Prescriptions Last Dose Informant Patient Reported? Taking? FOLIC ACID/MULTIVIT-MIN/LUTEIN (CENTRUM SILVER PO) 7/2/2018 at 2030 Self Yes Yes   Sig: Take 1 Tab by mouth every evening. apixaban (ELIQUIS) 5 mg tablet 7/2/2018 at 2030 Self No Yes   Sig: take 1 tablet by mouth twice a day   calcium-vitamin D (OYSTER SHELL) 500 mg(1,250mg) -200 unit per tablet 7/2/2018 at 2030 Self Yes Yes   Sig: Take 1 Tab by mouth every evening. cloNIDine HCl (CATAPRES) 0.1 mg tablet 7/2/2018 at 0410 Self Yes Yes   Sig: Take 0.1 mg by mouth daily as needed (Patient takes an 1 extra dose as needed for SBP >135 in addition to scheduled doses). cloNIDine HCl (CATAPRES) 0.1 mg tablet 7/2/2018 at 2030 Self Yes Yes   Sig: Take 0.1 mg by mouth three (3) times daily. Hold for SBP < 120   dilTIAZem (CARDIZEM) 60 mg tablet 7/2/2018 at 1630 Self No Yes   Sig: Take 1 Tab by mouth Before breakfast, lunch, and dinner. ezetimibe (ZETIA) 10 mg tablet 7/2/2018 at 2030 Self Yes Yes   Sig: Take 10 mg by mouth every evening. flecainide (TAMBOCOR) 50 mg tablet 7/2/2018 at 2030 Self No Yes   Sig: Take 1 Tab by mouth every twelve (12) hours.    lansoprazole (PREVACID) 30 mg capsule 7/2/2018 at 2030 Self Yes Yes   Sig: Take 30 mg by mouth nightly. levothyroxine (SYNTHROID) 50 mcg tablet 7/2/2018 at 0830 Self Yes Yes   Sig: Take 50 mcg by mouth Daily (before breakfast). lisinopril (PRINIVIL, ZESTRIL) 20 mg tablet 7/2/2018 at 2030 Self No Yes   Sig: Take 1 Tab by mouth daily.         Thank you,  Naina Campo, Student Pharmacist

## 2018-07-03 NOTE — PROCEDURES
BRIEF PROCEDURE NOTE    Date of Procedure: 7/3/2018   Preoperative Diagnosis: atrial fibrillation  Postoperative Diagnosis: same   Procedure: Synchronized DC cardioversion  Cardiologist: Marquita Gann MD  Anesthesia:  IV sedation  Estimated Blood Loss: None  Findings: Following informed consent, the patient was sedated with intravenous diprivan administered by Dr Kristin Smith in the ED. 200 joule biphasic shock was delivered using anterior and posterior pads converting atrial fibrillation to sinus rhythm. No complications were noted.      Complications: none    Successful cardioversion of AF to sinus  Increase flecainide to 100 mg bid  Resume coreg 6.25 mg bid  Reduce diltiazem to 30 tid  Continue lisinopril 20/d    Home after awake  Expedited EP evaluation

## 2018-07-03 NOTE — ED NOTES
Bedside and verbal report given to SRUTHI Hernandez at shift change for routine progression of care. Report consisted of patients Situation, Background, Assessment and Recommendations(SBAR). Information from the following report(s) SBAR, Kardex, ED Summary, MAR, Accordion and Recent Results was reviewed with the receiving nurse. Opportunity for questions and clarification was provided.

## 2018-07-03 NOTE — ED NOTES
Assisted patient up to [facility] and back onto stretcher without incident.

## 2018-07-03 NOTE — DISCHARGE INSTRUCTIONS
Atrial Fibrillation: Care Instructions  Your Care Instructions    Atrial fibrillation is an irregular and often fast heartbeat. Treating this condition is important for several reasons. It can cause blood clots, which can travel from your heart to your brain and cause a stroke. If you have a fast heartbeat, you may feel lightheaded, dizzy, and weak. An irregular heartbeat can also increase your risk for heart failure. Atrial fibrillation is often the result of another heart condition, such as high blood pressure or coronary artery disease. Making changes to improve your heart condition will help you stay healthy and active. Follow-up care is a key part of your treatment and safety. Be sure to make and go to all appointments, and call your doctor if you are having problems. It's also a good idea to know your test results and keep a list of the medicines you take. How can you care for yourself at home? Medicines  ? · Take your medicines exactly as prescribed. Call your doctor if you think you are having a problem with your medicine. You will get more details on the specific medicines your doctor prescribes. ? · If your doctor has given you a blood thinner to prevent a stroke, be sure you get instructions about how to take your medicine safely. Blood thinners can cause serious bleeding problems. ? · Do not take any vitamins, over-the-counter drugs, or herbal products without talking to your doctor first.   ? Lifestyle changes  ? · Do not smoke. Smoking can increase your chance of a stroke and heart attack. If you need help quitting, talk to your doctor about stop-smoking programs and medicines. These can increase your chances of quitting for good. ? · Eat a heart-healthy diet. ? · Stay at a healthy weight. Lose weight if you need to.   ? · Limit alcohol to 2 drinks a day for men and 1 drink a day for women. Too much alcohol can cause health problems. ? · Avoid colds and flu.  Get a pneumococcal vaccine shot. If you have had one before, ask your doctor whether you need another dose. Get a flu shot every year. If you must be around people with colds or flu, wash your hands often. Activity  ? · If your doctor recommends it, get more exercise. Walking is a good choice. Bit by bit, increase the amount you walk every day. Try for at least 30 minutes on most days of the week. You also may want to swim, bike, or do other activities. Your doctor may suggest that you join a cardiac rehabilitation program so that you can have help increasing your physical activity safely. ? · Start light exercise if your doctor says it is okay. Even a small amount will help you get stronger, have more energy, and manage stress. Walking is an easy way to get exercise. Start out by walking a little more than you did in the hospital. Gradually increase the amount you walk. ? · When you exercise, watch for signs that your heart is working too hard. You are pushing too hard if you cannot talk while you are exercising. If you become short of breath or dizzy or have chest pain, sit down and rest immediately. ? · Check your pulse regularly. Place two fingers on the artery at the palm side of your wrist, in line with your thumb. If your heartbeat seems uneven or fast, talk to your doctor. When should you call for help? Call 911 anytime you think you may need emergency care. For example, call if:  ? · You have symptoms of a heart attack. These may include:  ¨ Chest pain or pressure, or a strange feeling in the chest.  ¨ Sweating. ¨ Shortness of breath. ¨ Nausea or vomiting. ¨ Pain, pressure, or a strange feeling in the back, neck, jaw, or upper belly or in one or both shoulders or arms. ¨ Lightheadedness or sudden weakness. ¨ A fast or irregular heartbeat. After you call 911, the  may tell you to chew 1 adult-strength or 2 to 4 low-dose aspirin. Wait for an ambulance. Do not try to drive yourself.    ? · You have symptoms of a stroke. These may include:  ¨ Sudden numbness, tingling, weakness, or loss of movement in your face, arm, or leg, especially on only one side of your body. ¨ Sudden vision changes. ¨ Sudden trouble speaking. ¨ Sudden confusion or trouble understanding simple statements. ¨ Sudden problems with walking or balance. ¨ A sudden, severe headache that is different from past headaches. ? · You passed out (lost consciousness). ?Call your doctor now or seek immediate medical care if:  ? · You have new or increased shortness of breath. ? · You feel dizzy or lightheaded, or you feel like you may faint. ? · Your heart rate becomes irregular. ? · You can feel your heart flutter in your chest or skip heartbeats. Tell your doctor if these symptoms are new or worse. ? Watch closely for changes in your health, and be sure to contact your doctor if you have any problems. Where can you learn more? Go to http://iker-isaac.info/. Enter U020 in the search box to learn more about \"Atrial Fibrillation: Care Instructions. \"  Current as of: September 21, 2016  Content Version: 11.4  © 1483-4125 [employer]. Care instructions adapted under license by [employer] (which disclaims liability or warranty for this information). If you have questions about a medical condition or this instruction, always ask your healthcare professional. [address] any warranty or liability for your use of this information. Learning About Cardioversion  What is cardioversion? Cardioversion helps your heart return to a normal rhythm. It treats problems like atrial fibrillation. It is also sometimes used in emergencies. It can correct a fast heartbeat that causes low blood pressure, chest pain, or heart failure. There are two types:  · The electrical type uses an electric current. The current enters your body through patches on your chest or back.   · The chemical type uses medicines. The medicine is usually put into your arm through a tube called an IV. How is cardioversion done? Your doctor may ask you to take medicines before the treatment. These help prevent blood clots. Your doctor will watch you closely to make sure that there are no problems. Electrical cardioversion  The electrical procedure is done in a hospital. You will get medicine to help you relax and control the pain. Your doctor will put patches on your chest or back. The patches send an electric current to your heart. This resets your heart rhythm. The electrical part takes about 5 minutes. But you will probably be in the hospital for 1 to 2 hours. You will need to recover from the effects of the sedative medicine. Chemical cardioversion  The chemical procedure is most often done in a hospital. In most cases, the medicine is put into your arm through a tube called an IV. But you may get medicines to take by mouth. You may feel a quick sting or pinch when the IV starts. The procedure usually takes about 4 to 8 hours. What can you expect after cardioversion? · You can usually go home the same day. You will need someone to drive you home. · Your doctor may have you take medicines daily. These help your heart beat normally and prevent blood clots. · After electrical cardioversion, you may have redness where the patches were. This looks and feels like a sunburn. · Abnormal heart rhythms sometimes come back after cardioversion. Follow-up care is a key part of your treatment and safety. Be sure to make and go to all appointments, and call your doctor if you are having problems. It's also a good idea to know your test results and keep a list of the medicines you take. Where can you learn more? Go to http://iker-isaac.info/. Enter B471 in the search box to learn more about \"Learning About Cardioversion. \"  Current as of: September 21, 2016  Content Version: 11.4  © 2222-1498 [employer]. Care instructions adapted under license by [employer] (which disclaims liability or warranty for this information). If you have questions about a medical condition or this instruction, always ask your healthcare professional. [address] any warranty or liability for your use of this information. Electrical Cardioversion: What to Expect at Home  Your Recovery    Electrical cardioversion is a treatment for an abnormal heartbeat, such as atrial fibrillation, supraventricular tachycardia, or ventricular tachycardia (VT). It uses a brief electrical shock to reset your heart's rhythm. After cardioversion, you may have redness, like a sunburn, where the patches were. The medicines you got to make you sleepy may make you feel drowsy for the rest of the day. Your doctor may have you take medicines to help the heart beat normally and to prevent blood clots. This care sheet gives you a general idea about how long it will take for you to recover. But each person recovers at a different pace. Follow the steps below to feel better as quickly as possible. How can you care for yourself at home? Medicines  ? · Be safe with medicines. Take your medicines exactly as prescribed. Call your doctor if you think you are having a problem with your medicine. You may take one or more of the following medicines:  ¨ Rate-control medicines to slow the heart rate. These include beta-blockers, calcium channel blockers, and digoxin. ¨ Rhythm control medicines that help the heart keep a normal rhythm. ¨ Blood thinners, also called anticoagulants, which help prevent blood clots. You will get more details on the specific medicines your doctor prescribes. Be sure you know how to take your medicines safely. ? · Do not take any vitamins, over-the-counter medicines, or herbal products without talking to your doctor first.   Exercise  ?  · Start light exercise if your doctor says that it is okay. Even a small amount will help you get stronger, have more energy, and manage your stress. Walking is an easy way to get exercise. Start out by walking a little more than you did in the hospital. Bit by bit, increase the amount you walk. ? · When you exercise, watch for signs that your heart is working too hard. You are pushing too hard if you cannot talk while you are exercising. If you become short of breath or dizzy or have chest pain, sit down and rest right away. ? · Check your pulse regularly. Place two fingers on the artery at the palm side of your wrist in line with your thumb. If your heartbeat seems uneven or fast, talk to your doctor. Other instructions  ? · Ask your doctor when you can drive again. ? · Do not smoke. If you need help quitting, talk to your doctor about stop-smoking programs and medicines. These can increase your chances of quitting for good. ? · Limit alcohol. Follow-up care is a key part of your treatment and safety. Be sure to make and go to all appointments, and call your doctor if you are having problems. It's also a good idea to know your test results and keep a list of the medicines you take. When should you call for help? Call 911 anytime you think you may need emergency care. For example, call if:  ? · You passed out (lost consciousness). ? · You have chest pain or pressure. This may occur with:  ¨ Sweating. ¨ Shortness of breath. ¨ Nausea or vomiting. ¨ Pain that spreads from the chest to the neck, jaw, or one or both shoulders or arms. ¨ A fast or uneven pulse. After calling 911, the  may tell you to chew 1 adult-strength or 2 to 4 low-dose aspirin. Wait for an ambulance. Do not try to drive yourself. ? · You have symptoms of a stroke. These may include:  ¨ Sudden numbness, tingling, weakness, or loss of movement in your face, arm, or leg, especially on cornelio side of your body. ¨ Sudden vision changes.   ¨ Sudden trouble speaking. ¨ Sudden confusion or trouble understanding simple statements. ¨ Sudden problems with walking or balance. ¨ A sudden, severe headache that is different from past headaches. ?Call your doctor now or seek immediate medical care if:  ? · You feel dizzy or lightheaded, or you feel like you may faint. ? · You have a fast or irregular heartbeat. ? Watch closely for any changes in your health, and be sure to contact your doctor if you have any problems. Where can you learn more? Go to http://iker-isaac.info/. Enter A617 in the search box to learn more about \"Electrical Cardioversion: What to Expect at Home. \"  Current as of: September 21, 2016  Content Version: 11.4  © 9322-8783 [employer]. Care instructions adapted under license by [employer] (which disclaims liability or warranty for this information). If you have questions about a medical condition or this instruction, always ask your healthcare professional. [place] [address] any warranty or liability for your use of this information. We hope that we have addressed all of your medical concerns. The examination and treatment you received in the Emergency Department were for an emergent problem and were not intended as complete care. It is important that you follow up with your healthcare provider(s) for ongoing care. If your symptoms worsen or do not improve as expected, and you are unable to reach your usual health care provider(s), you should return to the Emergency Department. Today's healthcare is undergoing tremendous change, and patient satisfaction surveys are one of the many tools to assess the quality of medical care. You may receive a survey from the [email or web address] regarding your experience in the Emergency Department. I hope that your experience has been completely positive, particularly the medical care that I provided.   As such, please participate in the survey; anything less than excellent does not meet my expectations or intentions. [facility] and [facility] participate in nationally recognized quality of care measures. If your blood pressure is greater than 120/80, as reported below, we urge that you seek medical care to address the potential of high blood pressure, commonly known as hypertension. Hypertension can be hereditary or can be caused by certain medical conditions, pain, stress, or \"white coat syndrome. \"       Please make an appointment with your health care provider(s) for follow up of your Emergency Department visit. VITALS:   Patient Vitals for the past 8 hrs:   Temp Pulse Resp BP SpO2   07/03/18 0909 97.8 °F (36.6 °C) 67 20 142/74 98 %   07/03/18 0903 97.8 °F (36.6 °C) 68 20 156/88 96 %   07/03/18 0903 - 70 20 156/88 95 %   07/03/18 0900 - 86 16 (!) 193/95 95 %   07/03/18 0900 - 95 22 156/88 97 %   07/03/18 0845 - 94 23 (!) 157/95 97 %   07/03/18 0844 97.8 °F (36.6 °C) (!) 102 16 (!) 157/95 97 %   07/03/18 0830 - 75 25 (!) 161/97 94 %   07/03/18 0815 - 91 25 (!) 170/91 96 %   07/03/18 0800 - 88 27 (!) 151/92 96 %   07/03/18 0745 - 88 22 (!) 175/99 95 %   07/03/18 0730 - (!) 103 (!) 33 (!) 194/120 95 %   07/03/18 0728 - 89 - (!) 194/120 -   07/03/18 0715 - 97 24 (!) 183/113 96 %   07/03/18 0700 - 99 25 (!) 181/101 95 %   07/03/18 0645 - (!) 102 26 (!) 189/97 93 %   07/03/18 0635 - 86 21 - 94 %   07/03/18 0630 - 98 22 (!) 185/110 97 %   07/03/18 0615 - 95 21 (!) 165/105 95 %   07/03/18 0600 - 93 25 162/65 -   07/03/18 0553 - (!) 106 - (!) 194/109 -   07/03/18 0550 - - - - 100 %   07/03/18 0545 - (!) 111 25 (!) 194/109 94 %   07/03/18 0528 97.7 °F (36.5 °C) (!) 118 20 (!) 198/101 100 %          Thank you for allowing us to provide you with medical care today. We realize that you have many choices for your emergency care needs.   Please choose us in the future for any continued health care needs.      Regards,           Michael K. Fatuma Barillas, [facility].   Office: [phone]            Recent Results (from the past 24 hour(s))   EKG, 12 LEAD, INITIAL    Collection Time: 07/03/18  5:35 AM   Result Value Ref Range    Ventricular Rate 111 BPM    Atrial Rate 133 BPM    QRS Duration 142 ms    Q-T Interval 376 ms    QTC Calculation (Bezet) 511 ms    Calculated P Axis 114 degrees    Calculated R Axis 69 degrees    Calculated T Axis -8 degrees    Diagnosis       Sinus tachycardia with 2nd degree AV block (Mobitz I) with premature   supraventricular complexes  Right bundle branch block  T wave abnormality, consider inferior ischemia  Abnormal ECG  When compared with ECG of 29-JUN-2018 05:09,  premature supraventricular complexes are now present  Sinus rhythm is now with 2nd degree AV block (Mobitz I)  Vent. rate has increased BY  37 BPM  Inverted T waves have replaced nonspecific T wave abnormality in Inferior   leads     CBC WITH AUTOMATED DIFF    Collection Time: 07/03/18  5:47 AM   Result Value Ref Range    WBC 6.3 3.6 - 11.0 K/uL    RBC 4.35 3.80 - 5.20 M/uL    HGB 13.5 11.5 - 16.0 g/dL    HCT 40.9 35.0 - 47.0 %    MCV 94.0 80.0 - 99.0 FL    MCH 31.0 26.0 - 34.0 PG    MCHC 33.0 30.0 - 36.5 g/dL    RDW 13.1 11.5 - 14.5 %    PLATELET 052 231 - 945 K/uL    MPV 10.1 8.9 - 12.9 FL    NRBC 0.0 0  WBC    ABSOLUTE NRBC 0.00 0.00 - 0.01 K/uL    NEUTROPHILS 58 32 - 75 %    LYMPHOCYTES 28 12 - 49 %    MONOCYTES 10 5 - 13 %    EOSINOPHILS 3 0 - 7 %    BASOPHILS 0 0 - 1 %    IMMATURE GRANULOCYTES 0 0.0 - 0.5 %    ABS. NEUTROPHILS 3.7 1.8 - 8.0 K/UL    ABS. LYMPHOCYTES 1.8 0.8 - 3.5 K/UL    ABS. MONOCYTES 0.7 0.0 - 1.0 K/UL    ABS. EOSINOPHILS 0.2 0.0 - 0.4 K/UL    ABS. BASOPHILS 0.0 0.0 - 0.1 K/UL    ABS. IMM.  GRANS. 0.0 0.00 - 0.04 K/UL    DF AUTOMATED     METABOLIC PANEL, COMPREHENSIVE    Collection Time: 07/03/18  5:47 AM   Result Value Ref Range    Sodium 141 136 - 145 mmol/L Potassium 3.9 3.5 - 5.1 mmol/L    Chloride 105 97 - 108 mmol/L    CO2 28 21 - 32 mmol/L    Anion gap 8 5 - 15 mmol/L    Glucose 159 (H) 65 - 100 mg/dL    BUN 15 6 - 20 MG/DL    Creatinine 1.32 (H) 0.55 - 1.02 MG/DL    BUN/Creatinine ratio 11 (L) 12 - 20      GFR est AA 47 (L) >60 ml/min/1.73m2    GFR est non-AA 39 (L) >60 ml/min/1.73m2    Calcium 9.5 8.5 - 10.1 MG/DL    Bilirubin, total 0.3 0.2 - 1.0 MG/DL    ALT (SGPT) 29 12 - 78 U/L    AST (SGOT) 24 15 - 37 U/L    Alk. phosphatase 86 45 - 117 U/L    Protein, total 7.6 6.4 - 8.2 g/dL    Albumin 3.6 3.5 - 5.0 g/dL    Globulin 4.0 2.0 - 4.0 g/dL    A-G Ratio 0.9 (L) 1.1 - 2.2     MAGNESIUM    Collection Time: 07/03/18  5:47 AM   Result Value Ref Range    Magnesium 2.0 1.6 - 2.4 mg/dL   TSH 3RD GENERATION    Collection Time: 07/03/18  5:47 AM   Result Value Ref Range    TSH 1.62 0.36 - 3.74 uIU/mL   EKG, 12 LEAD, INITIAL    Collection Time: 07/03/18  6:42 AM   Result Value Ref Range    Ventricular Rate 107 BPM    Atrial Rate 75 BPM    QRS Duration 138 ms    Q-T Interval 348 ms    QTC Calculation (Bezet) 464 ms    Calculated R Axis 94 degrees    Calculated T Axis -15 degrees    Diagnosis       Atrial fibrillation with rapid ventricular response  Right bundle branch block  Abnormal ECG  When compared with ECG of 03-JUL-2018 05:35,  MANUAL COMPARISON REQUIRED, DATA IS UNCONFIRMED     EKG, 12 LEAD, INITIAL    Collection Time: 07/03/18  9:09 AM   Result Value Ref Range    Ventricular Rate 63 BPM    Atrial Rate 63 BPM    P-R Interval 200 ms    QRS Duration 144 ms    Q-T Interval 458 ms    QTC Calculation (Bezet) 468 ms    Calculated P Axis 45 degrees    Calculated R Axis 80 degrees    Calculated T Axis 8 degrees    Diagnosis       Normal sinus rhythm  Right bundle branch block  Abnormal ECG  When compared with ECG of 03-JUL-2018 06:42,  MANUAL COMPARISON REQUIRED, DATA IS UNCONFIRMED         No results found.

## 2018-07-03 NOTE — ED PROVIDER NOTES
HPI Comments: 70 y.o. female with past medical history significant for HTN, GERD, hiatal hernia, ANÍBAL, PAF s/p ablation, hypothyroidism, who presents ambulatory to the ED with chief complaint of HTN and elevated heart rate. Pt was recently admitted for A-fib. Pt denies CP or SOB. Patient is a 70 y.o. female presenting with rapid heart beat. The history is provided by the patient. No  was used. Rapid Heart Rate   This is a recurrent problem. The current episode started 1 to 2 hours ago. The problem occurs constantly. The problem has not changed since onset. Pertinent negatives include no chest pain, no abdominal pain, no headaches and no shortness of breath. Nothing aggravates the symptoms. Nothing relieves the symptoms. She has tried nothing for the symptoms. Past Medical History:   Diagnosis Date    DDD (degenerative disc disease)     Gastroesophageal reflux disease without esophagitis 7/3/2016    GERD (gastroesophageal reflux disease)     Hiatal hernia     HTN (hypertension), benign 7/26/2016    Hypertension     Hypothyroidism     ANÍBAL (obstructive sleep apnea) 7/26/2016    Paroxysmal atrial fibrillation ([facility] [address] 8/28/2016    Uterine prolapse        Past Surgical History:   Procedure Laterality Date    HX AFIB ABLATION  03/2017    HX GI      COLONOSCOPY 7/14    HX GYN      TUBAL LIGATION    HX HEENT      WISDOM TEETH EXTRACTED. Family History:   Problem Relation Age of Onset    Diabetes Mother     Hypertension Mother     COPD Mother        Social History     Social History    Marital status:      Spouse name: N/A    Number of children: N/A    Years of education: N/A     Occupational History    Not on file.      Social History Main Topics    Smoking status: Former Smoker     Packs/day: 1.50     Years: 30.00     Quit date: 3/3/1994    Smokeless tobacco: Former User    Alcohol use No    Drug use: No    Sexual activity: No     Other Topics Concern    Not on file     Social History Narrative     ALLERGIES: Citalopram hydrobromide; Chlorthalidone; Contrast agent [iodine]; Maxzide [triamterene-hydrochlorothiazid]; and Vicodin [hydrocodone-acetaminophen]    Review of Systems   Constitutional: Negative for appetite change, chills, fever and unexpected weight change. HENT: Negative for ear pain, hearing loss, rhinorrhea and trouble swallowing. Eyes: Negative for pain and visual disturbance. Respiratory: Negative for cough, chest tightness and shortness of breath. Cardiovascular: Positive for palpitations. Negative for chest pain. Gastrointestinal: Negative for abdominal distention, abdominal pain, blood in stool and vomiting. Genitourinary: Negative for dysuria, hematuria and urgency. Musculoskeletal: Negative for back pain and myalgias. Skin: Negative for rash. Neurological: Negative for dizziness, syncope, weakness, numbness and headaches. Psychiatric/Behavioral: Negative for confusion and suicidal ideas. All other systems reviewed and are negative. Vitals:    07/03/18 0600 07/03/18 0615 07/03/18 0630 07/03/18 0635   BP: 162/65 (!) 165/105 (!) 185/110    Pulse: 93 95 98 86   Resp: 25 21 22 21   Temp:       SpO2:  95% 97% 94%   Weight:       Height:                Physical Exam   Constitutional: She is oriented to person, place, and time. She appears well-developed and well-nourished. No distress. HENT:   Head: Normocephalic and atraumatic. Right Ear: External ear normal.   Left Ear: External ear normal.   Nose: Nose normal.   Mouth/Throat: Oropharynx is clear and moist. No oropharyngeal exudate. Eyes: Conjunctivae and EOM are normal. Pupils are equal, round, and reactive to light. Right eye exhibits no discharge. Left eye exhibits no discharge. No scleral icterus. Neck: Normal range of motion. Neck supple. No JVD present. No tracheal deviation present. Cardiovascular: Normal heart sounds and intact distal pulses.   An irregularly irregular rhythm present. Tachycardia present. Exam reveals no gallop and no friction rub. No murmur heard. Pulmonary/Chest: Effort normal and breath sounds normal. No stridor. No respiratory distress. She has no decreased breath sounds. She has no wheezes. She has no rhonchi. She has no rales. She exhibits no tenderness. Abdominal: Soft. Bowel sounds are normal. She exhibits no distension. There is no tenderness. There is no rebound and no guarding. Musculoskeletal: Normal range of motion. She exhibits no edema or tenderness. Neurological: She is alert and oriented to person, place, and time. She has normal strength and normal reflexes. No cranial nerve deficit or sensory deficit. She exhibits normal muscle tone. Coordination normal. GCS eye subscore is 4. GCS verbal subscore is 5. GCS motor subscore is 6. Skin: Skin is warm and dry. No rash noted. She is not diaphoretic. No erythema. No pallor. Psychiatric: Her speech is normal and behavior is normal. Judgment and thought content normal. Her mood appears anxious. Cognition and memory are normal.   Nursing note and vitals reviewed. MDM  Number of Diagnoses or Management Options  Atrial fibrillation with rapid ventricular response [facility]):   Dehydration:   Diagnosis management comments: 9:04 AM  Seen in ED by cardiology. No improvement (cardioversion) with flecainide. Patient was electrically cardioverted to normal sinus rhythm. Will d/c home with cardiology follow up. Patient understands and agrees with plan.          Amount and/or Complexity of Data Reviewed  Clinical lab tests: ordered and reviewed  Tests in the medicine section of CPT®: ordered and reviewed  Independent visualization of images, tracings, or specimens: yes (ekg)    Risk of Complications, Morbidity, and/or Mortality  Presenting problems: moderate  Diagnostic procedures: low  Management options: moderate    Critical Care  Total time providing critical care: 30-74 minutes (Total critical care time spent exclusive of procedures:  45 minutes)    Patient Progress  Patient progress: stable        ED Course       Procedural Sedation  Date/Time: 7/3/2018 8:57 AM  Performed by: Armando Bishop by: Dorian Anderson     Consent:     Consent obtained:  Written    Consent given by:  Patient    Risks discussed:   Allergic reaction, dysrhythmia, inadequate sedation, nausea, vomiting, respiratory compromise necessitating ventilatory assistance and intubation, prolonged sedation necessitating reversal and prolonged hypoxia resulting in organ damage    Alternatives discussed:  Analgesia without sedation and anxiolysis  Indications:     Procedure performed:  Cardioversion    Procedure necessitating sedation performed by:  Different physician (Dr. Candance Corning (cardiology))    Intended level of sedation:  Moderate (conscious sedation)  Pre-sedation assessment:     Time since last food or drink:  7pm yesterday    ASA classification: class 2 - patient with mild systemic disease      Neck mobility: normal      Mouth openin finger widths    Mallampati score:  II - soft palate, uvula, fauces visible    Pre-sedation assessments completed and reviewed: airway patency, cardiovascular function, hydration status, mental status, nausea/vomiting, pain level, respiratory function and temperature      History of difficult intubation: no      Pre-sedation assessment completed:  7/3/2018 8:45 AM  Immediate pre-procedure details:     Reassessment: Patient reassessed immediately prior to procedure      Reviewed: vital signs, relevant labs/tests and NPO status      Verified: bag valve mask available, emergency equipment available, intubation equipment available, IV patency confirmed, oxygen available, reversal medications available and suction available    Procedure details (see MAR for exact dosages):     Sedation start time:  7/3/2018 8:57 AM    Preoxygenation:  Nasal cannula    Sedation:  Propofol Intra-procedure monitoring:  Blood pressure monitoring, cardiac monitor, continuous capnometry, continuous pulse oximetry, frequent LOC assessments and frequent vital sign checks    Intra-procedure events: none      Sedation end time:  7/3/2018 9:02 AM    Total sedation time (minutes):  5  Post-procedure details:     Post-sedation assessment completed:  7/3/2018 9:45 AM    Attendance: Constant attendance by certified staff until patient recovered      Recovery: Patient returned to pre-procedure baseline      Complications:  None    Post-sedation assessments completed and reviewed: airway patency, cardiovascular function, hydration status, mental status, nausea/vomiting, pain level, respiratory function and temperature      Patient is stable for discharge or admission: yes      Patient tolerance: Tolerated well, no immediate complications    EKG  Date/Time: 7/3/2018 9:07 AM  Performed by: Braeden Davis by: Hillary Cortes     ECG reviewed by ED Physician in the absence of a cardiologist: yes    Previous ECG:     Previous ECG:  Compared to current    Comparison ECG info:  From earlier today    Similarity:  Changes noted  Interpretation:     Interpretation: abnormal    Rate:     ECG rate:  63  Rhythm:     Rhythm: sinus rhythm    Ectopy:     Ectopy: none    QRS:     QRS axis:  Normal  Conduction:     Conduction: abnormal      Abnormal conduction: complete RBBB    ST segments:     ST segments:  Non-specific  T waves:     T waves: non-specific          Chief Complaint   Patient presents with    Rapid Heart Rate       The patient's presenting problems have been discussed, and they are in agreement with the care plan formulated and outlined with them. I have encouraged them to ask questions as they arise throughout their visit.     MEDICATIONS GIVEN:  Medications   sodium chloride (NS) flush 5-10 mL (10 mL IntraVENous Given 7/3/18 0554)   sodium chloride (NS) flush 5-10 mL (not administered)   dilTIAZem (CARDIZEM) 125 mg in dextrose 5% 125 mL infusion (7.5 mg/hr IntraVENous Rate Change 7/3/18 0696)   sodium chloride 0.9 % bolus infusion 1,000 mL (1,000 mL IntraVENous New Bag 7/3/18 0617)   flecainide (TAMBOCOR) tablet 150 mg (not administered)   dilTIAZem (CARDIZEM) injection 15 mg (15 mg IntraVENous Given 7/3/18 5672)       LABS REVIEWED:  Recent Results (from the past 24 hour(s))   CBC WITH AUTOMATED DIFF    Collection Time: 07/03/18  5:47 AM   Result Value Ref Range    WBC 6.3 3.6 - 11.0 K/uL    RBC 4.35 3.80 - 5.20 M/uL    HGB 13.5 11.5 - 16.0 g/dL    HCT 40.9 35.0 - 47.0 %    MCV 94.0 80.0 - 99.0 FL    MCH 31.0 26.0 - 34.0 PG    MCHC 33.0 30.0 - 36.5 g/dL    RDW 13.1 11.5 - 14.5 %    PLATELET 120 796 - 146 K/uL    MPV 10.1 8.9 - 12.9 FL    NRBC 0.0 0  WBC    ABSOLUTE NRBC 0.00 0.00 - 0.01 K/uL    NEUTROPHILS 58 32 - 75 %    LYMPHOCYTES 28 12 - 49 %    MONOCYTES 10 5 - 13 %    EOSINOPHILS 3 0 - 7 %    BASOPHILS 0 0 - 1 %    IMMATURE GRANULOCYTES 0 0.0 - 0.5 %    ABS. NEUTROPHILS 3.7 1.8 - 8.0 K/UL    ABS. LYMPHOCYTES 1.8 0.8 - 3.5 K/UL    ABS. MONOCYTES 0.7 0.0 - 1.0 K/UL    ABS. EOSINOPHILS 0.2 0.0 - 0.4 K/UL    ABS. BASOPHILS 0.0 0.0 - 0.1 K/UL    ABS. IMM. GRANS. 0.0 0.00 - 0.04 K/UL    DF AUTOMATED     METABOLIC PANEL, COMPREHENSIVE    Collection Time: 07/03/18  5:47 AM   Result Value Ref Range    Sodium 141 136 - 145 mmol/L    Potassium 3.9 3.5 - 5.1 mmol/L    Chloride 105 97 - 108 mmol/L    CO2 28 21 - 32 mmol/L    Anion gap 8 5 - 15 mmol/L    Glucose 159 (H) 65 - 100 mg/dL    BUN 15 6 - 20 MG/DL    Creatinine 1.32 (H) 0.55 - 1.02 MG/DL    BUN/Creatinine ratio 11 (L) 12 - 20      GFR est AA 47 (L) >60 ml/min/1.73m2    GFR est non-AA 39 (L) >60 ml/min/1.73m2    Calcium 9.5 8.5 - 10.1 MG/DL    Bilirubin, total 0.3 0.2 - 1.0 MG/DL    ALT (SGPT) 29 12 - 78 U/L    AST (SGOT) 24 15 - 37 U/L    Alk.  phosphatase 86 45 - 117 U/L    Protein, total 7.6 6.4 - 8.2 g/dL    Albumin 3.6 3.5 - 5.0 g/dL    Globulin 4.0 2.0 - 4.0 g/dL    A-G Ratio 0.9 (L) 1.1 - 2.2     MAGNESIUM    Collection Time: 07/03/18  5:47 AM   Result Value Ref Range    Magnesium 2.0 1.6 - 2.4 mg/dL   TSH 3RD GENERATION    Collection Time: 07/03/18  5:47 AM   Result Value Ref Range    TSH 1.62 0.36 - 3.74 uIU/mL       VITAL SIGNS:  Patient Vitals for the past 12 hrs:   Temp Pulse Resp BP SpO2   07/03/18 0635 - 86 21 - 94 %   07/03/18 0630 - 98 22 (!) 185/110 97 %   07/03/18 0615 - 95 21 (!) 165/105 95 %   07/03/18 0600 - 93 25 162/65 -   07/03/18 0553 - (!) 106 - (!) 194/109 -   07/03/18 0550 - - - - 100 %   07/03/18 0545 - (!) 111 25 (!) 194/109 94 %   07/03/18 0528 97.7 °F (36.5 °C) (!) 118 20 (!) 198/101 100 %       RADIOLOGY RESULTS:  The following have been ordered and reviewed:  No results found. ED EKG interpretation: #1  Rhythm: atrial fib with RVR and RBBB; and irregular. Rate (approx.): 111; Axis: normal; QRS interval: normal ; ST/T wave: non-specific changes; Other findings: abnormal ekg. This EKG was interpreted by Mae Liao DO, ED Provider. ED EKG interpretation: #2  Rhythm: atrial fib and RBBB; and irregular. Rate (approx.): 90; Axis: normal; QRS interval: normal ; ST/T wave: normal; Other findings: abnormal ekg. This EKG was interpreted by Mae Liao DO,ED Provider. CONSULTATIONS:   CONSULT NOTE:  7:00 AM Mae Liao DO spoke with Dr. Rusty Boyle, Consult for Cardiology. Discussed available diagnostic tests and clinical findings. He is in agreement with care plans as outlined. Dr. Rusty Boyle will see pt for further evaluation and treatment, recommends giving fleconide 150 mg po now. PROGRESS NOTES:  7:10 AM  Change of shift. Care of patient signed over to Dr. Joe Harkins. Bedside handoff complete. DIAGNOSIS:    1. Atrial fibrillation with rapid ventricular response ([address]    2. Dehydration        PLAN:  Cardiology to see. Final disposition by on coming physician and cardiology.     ED COURSE: The patient's hospital course has been uncomplicated.

## 2018-07-03 NOTE — ED NOTES
Discharged by Dr. Mosquera November, with verbal and written instructions provided to patient. Patient alert and oriented x 3. Denies chest pain and shortness of breath. Ambulates with steady gait, but offered wheelchair which patient declined.

## 2018-07-03 NOTE — ED NOTES
Patient remains in a-fib on monitor with a rate of 91bpm. Patient denies chest pain or feeling palpitations but reports a \"funny feeling\" in her chest. Call bell within reach and  at bedside.

## 2018-07-03 NOTE — ED TRIAGE NOTES
Patient arrives with c/o tachycardia and high blood pressure throughout the night. Patient states she was recently admitted for AFIB and released on 6/30/18. Denies SOB or chest pain.

## 2018-07-03 NOTE — CONSULTS
hCela Lindsay MD [facility] [address]  Office [phone]  Mobile [phone]    Date of  Admission: 7/3/2018  5:30 AM  PCP- Alise Escobar MD    Radha rCaig is a 70 y.o. female seen for recurrent AF, accelerated HTN. Consult requested by Cathy Lennon MD    Assessment  · Recurrent AF with RVR  · Hx AF ablation March 2017  · Chronic HTN with labile control  · Obesity  · Anxiety disorder        Discussion/Recommendations:  2nd ED visit in a week with AF, unclear trigger  Will proceed with DCCV now. The procedure was discussed at length with the patient, including risks/benefits, potential findings and treatment options. .She agrees to proceed. Increase flecanide to 100 bid  Resume coreg 6.25 bid  Continue dilt/clonidine/lisinopril  Arrange for EP evaluation with Dr Olga Gloria - may require hybrid ablation  Needs anxiolytic Rx     Subjective:  Patient well known to me - PAF, HTN, obesity. Underwent AF ablation March 2017 - see caveats below. Chronic issues with BP control. Sleep study recently negative. hosp last week with recurrent AF with RVR, spontaneously converting with iv diltiazem. Dose of oral dilt increased. Last night with gradual elevation in BP and HR - \"up all night\", felt heart pounding. Got anxious. Presented to ED early morning - AF with RVR, slowed with iv diltiazem. At my request flecainide 150 mg po given - persistent AF with rate 90s. She has been adherent with eliquis, last dose last night. Prior to last week felt great. Patient denies any exertional chest pain, dyspnea, palpitations, syncope, orthopnea, edema or paroxysmal nocturnal dyspnea. No recent intercurrent illness.      Cardiac testing  Adenosine myoview 2011 - normal perfusion, EF 81%  Echocardiogram 2/22/13 - normal left ventricular size and function, normal appearing mitral, aortic, and tricuspid valves, with mild mitral and moderate tricuspid regurgitation, bi-atrial enlargement  Lexiscan 3/3/2014 - normal perfusion EF 83%  Echo: 5/15/15- 60%, mildly dilated LA, mild MR  Cardioversion 7/23/2015 - 200J  Lexiscan cardiolite 5/10/16 - normal perfusion, EF 78%  Renal Visceral Duplex 5/2016 - No evidence of ONOFRE  3/2/17-. Successful atrial fibrillation ablation however unable to achieve entrance/exit block of LSPV per Dr. ePtra Gupta    Echo 3/8/18 - EF 65%. No WMA. Normal RV size and function. Mild MR. Mild TR. Past Medical History:   Diagnosis Date    DDD (degenerative disc disease)     Gastroesophageal reflux disease without esophagitis 7/3/2016    GERD (gastroesophageal reflux disease)     Hiatal hernia     HTN (hypertension), benign 7/26/2016    Hypothyroidism     ANÍBAL (obstructive sleep apnea) 7/26/2016    Paroxysmal atrial fibrillation ([facility] [address] 8/28/2016    Uterine prolapse       Past Surgical History:   Procedure Laterality Date    HX AFIB ABLATION  03/2017    HX GI      COLONOSCOPY 7/14    HX GYN      TUBAL LIGATION    HX HEENT      WISDOM TEETH EXTRACTED. No current facility-administered medications on file prior to encounter. Current Outpatient Prescriptions on File Prior to Encounter   Medication Sig Dispense Refill    dilTIAZem (CARDIZEM) 60 mg tablet Take 1 Tab by mouth Before breakfast, lunch, and dinner. 90 Tab 1    levothyroxine (SYNTHROID) 50 mcg tablet Take 50 mcg by mouth Daily (before breakfast).  cloNIDine HCl (CATAPRES) 0.1 mg tablet Take 0.1 mg by mouth daily as needed (Patient takes an 1 extra dose as needed for SBP >135 in addition to scheduled doses).  cloNIDine HCl (CATAPRES) 0.1 mg tablet Take 0.1 mg by mouth three (3) times daily. Hold for SBP < 120      lisinopril (PRINIVIL, ZESTRIL) 20 mg tablet Take 1 Tab by mouth daily. 30 Tab 5    flecainide (TAMBOCOR) 50 mg tablet Take 1 Tab by mouth every twelve (12) hours.  60 Tab 0    calcium-vitamin D (OYSTER SHELL) 500 mg(1,250mg) -200 unit per tablet Take 1 Tab by mouth every evening.  apixaban (ELIQUIS) 5 mg tablet take 1 tablet by mouth twice a day 60 Tab 11    lansoprazole (PREVACID) 30 mg capsule Take 30 mg by mouth nightly.  FOLIC ACID/MULTIVIT-MIN/LUTEIN (CENTRUM SILVER PO) Take 1 Tab by mouth every evening.  ezetimibe (ZETIA) 10 mg tablet Take 10 mg by mouth every evening. Allergies   Allergen Reactions    Citalopram Hydrobromide Rash     With itching.  Chlorthalidone Rash    Contrast Agent [Iodine] Other (comments)     Wheezing/bronchospasm    Maxzide [Triamterene-Hydrochlorothiazid] Palpitations    Vicodin [Hydrocodone-Acetaminophen] Palpitations             Review of Symptoms:  Constitutional: negative for fevers, chills, sweats, anorexia and weight loss  Respiratory: negative for cough, sputum, hemoptysis or pleurisy/chest pain  Gastrointestinal: negative for dysphagia, odynophagia, melena and abdominal pain  Musculoskeletal:positive for arthralgias  Neurological: negative for dizziness, vertigo and seizures  Other systems reviewed and negative except as above. Physical Exam    Visit Vitals    BP (!) 194/120    Pulse (!) 103    Temp 97.7 °F (36.5 °C)    Resp (!) 33    Ht 5' 1\" (1.549 m)    Wt 188 lb (85.3 kg)    SpO2 95%    BMI 35.52 kg/m2     Visit Vitals    BP (!) 157/95 (BP 1 Location: Right arm, BP Patient Position: At rest)    Pulse (!) 102    Temp 97.8 °F (36.6 °C)    Resp 16    Ht 5' 1\" (1.549 m)    Wt 188 lb (85.3 kg)    SpO2 97%    BMI 35.52 kg/m2     General Appearance:  Well developed, well nourished,alert and oriented x 3, and individual in no acute distress. Ears/Nose/Mouth/Throat:   Hearing grossly normal.         Neck: Supple. Chest:   Lungs clear to auscultation bilaterally. Cardiovascular:  Regular rate and rhythm, S1, S2 normal, no murmur. Abdomen:   Soft, non-tender, bowel sounds are active. Extremities: No edema bilaterally. Skin: Warm and dry. Cardiographics    Telemetry: AFIB/90s  ECG: atrial fibrillation, rate 120s    Recent Results (from the past 12 hour(s))   CBC WITH AUTOMATED DIFF    Collection Time: 07/03/18  5:47 AM   Result Value Ref Range    WBC 6.3 3.6 - 11.0 K/uL    RBC 4.35 3.80 - 5.20 M/uL    HGB 13.5 11.5 - 16.0 g/dL    HCT 40.9 35.0 - 47.0 %    MCV 94.0 80.0 - 99.0 FL    MCH 31.0 26.0 - 34.0 PG    MCHC 33.0 30.0 - 36.5 g/dL    RDW 13.1 11.5 - 14.5 %    PLATELET 309 780 - 546 K/uL    MPV 10.1 8.9 - 12.9 FL    NRBC 0.0 0  WBC    ABSOLUTE NRBC 0.00 0.00 - 0.01 K/uL    NEUTROPHILS 58 32 - 75 %    LYMPHOCYTES 28 12 - 49 %    MONOCYTES 10 5 - 13 %    EOSINOPHILS 3 0 - 7 %    BASOPHILS 0 0 - 1 %    IMMATURE GRANULOCYTES 0 0.0 - 0.5 %    ABS. NEUTROPHILS 3.7 1.8 - 8.0 K/UL    ABS. LYMPHOCYTES 1.8 0.8 - 3.5 K/UL    ABS. MONOCYTES 0.7 0.0 - 1.0 K/UL    ABS. EOSINOPHILS 0.2 0.0 - 0.4 K/UL    ABS. BASOPHILS 0.0 0.0 - 0.1 K/UL    ABS. IMM. GRANS. 0.0 0.00 - 0.04 K/UL    DF AUTOMATED     METABOLIC PANEL, COMPREHENSIVE    Collection Time: 07/03/18  5:47 AM   Result Value Ref Range    Sodium 141 136 - 145 mmol/L    Potassium 3.9 3.5 - 5.1 mmol/L    Chloride 105 97 - 108 mmol/L    CO2 28 21 - 32 mmol/L    Anion gap 8 5 - 15 mmol/L    Glucose 159 (H) 65 - 100 mg/dL    BUN 15 6 - 20 MG/DL    Creatinine 1.32 (H) 0.55 - 1.02 MG/DL    BUN/Creatinine ratio 11 (L) 12 - 20      GFR est AA 47 (L) >60 ml/min/1.73m2    GFR est non-AA 39 (L) >60 ml/min/1.73m2    Calcium 9.5 8.5 - 10.1 MG/DL    Bilirubin, total 0.3 0.2 - 1.0 MG/DL    ALT (SGPT) 29 12 - 78 U/L    AST (SGOT) 24 15 - 37 U/L    Alk.  phosphatase 86 45 - 117 U/L    Protein, total 7.6 6.4 - 8.2 g/dL    Albumin 3.6 3.5 - 5.0 g/dL    Globulin 4.0 2.0 - 4.0 g/dL    A-G Ratio 0.9 (L) 1.1 - 2.2     MAGNESIUM    Collection Time: 07/03/18  5:47 AM   Result Value Ref Range    Magnesium 2.0 1.6 - 2.4 mg/dL   TSH 3RD GENERATION    Collection Time: 07/03/18  5:47 AM   Result Value Ref Range    TSH 1.62 0.36 - 3.74 uIU/mL

## 2018-07-03 NOTE — ED NOTES
Assumed care of pt from triage. Pt presents to ED with chief complaint of high blood pressure and tachycardia. Pt is A&O x 4. Pt denies any other symptoms at this time. Pt resting comfortably on the stretcher in a position of comfort. Pt in no acute distress at this time. Call bell within reach. Side rails x 2. Cardiac monitor x 3. Stretcher locked in the lowest position. Pt aware of plan to await for MD/PA-C/NP assessment, and pt/family verbalizes understanding. Will continue to monitor. Dr. Angel Stone at bedside.

## 2018-07-03 NOTE — ED NOTES
Per Dr. Tiarra Baker, keep Cardizem at same rate 7.5 ml/hr for now. HR @ 103 at this time. Pt is resting in bed. Denies chest pain or discomfort. Call light in reach. Will continue to monitor.

## 2018-07-05 ENCOUNTER — TELEPHONE (OUTPATIENT)
Dept: CARDIOLOGY CLINIC | Age: 78
End: 2018-07-05

## 2018-07-05 NOTE — TELEPHONE ENCOUNTER
Left voicemail message. Scheduled office visit for recurrent Afib Post Afib ablation 3/2017 per Dr. Dave Moncada referral.  Asked patient to call office to confirm availability.     Future Appointments  Date Time Provider Ray Zamora   7/6/2018 3:00 PM Parviz Alford [address]   7/11/2018 1:00 PM Tramaine Mujica MD [facility] SCHED   8/27/2018 2:40 PM Effie Felix MD [address] [place] [address]

## 2018-07-06 ENCOUNTER — OFFICE VISIT (OUTPATIENT)
Dept: CARDIOLOGY CLINIC | Age: 78
End: 2018-07-06

## 2018-07-06 VITALS
RESPIRATION RATE: 18 BRPM | DIASTOLIC BLOOD PRESSURE: 84 MMHG | BODY MASS INDEX: 35.57 KG/M2 | HEART RATE: 89 BPM | WEIGHT: 188.4 LBS | OXYGEN SATURATION: 98 % | HEIGHT: 61 IN | SYSTOLIC BLOOD PRESSURE: 116 MMHG

## 2018-07-06 DIAGNOSIS — Z99.89 OSA ON CPAP: ICD-10-CM

## 2018-07-06 DIAGNOSIS — I48.0 PAROXYSMAL ATRIAL FIBRILLATION (HCC): Primary | ICD-10-CM

## 2018-07-06 DIAGNOSIS — I87.2 VENOUS INSUFFICIENCY: ICD-10-CM

## 2018-07-06 DIAGNOSIS — Z98.890 S/P ABLATION OF ATRIAL FIBRILLATION: ICD-10-CM

## 2018-07-06 DIAGNOSIS — Z86.79 S/P ABLATION OF ATRIAL FIBRILLATION: ICD-10-CM

## 2018-07-06 DIAGNOSIS — G47.33 OSA ON CPAP: ICD-10-CM

## 2018-07-06 DIAGNOSIS — E66.01 SEVERE OBESITY (BMI 35.0-39.9) WITH COMORBIDITY (HCC): ICD-10-CM

## 2018-07-06 DIAGNOSIS — I45.10 RBBB: ICD-10-CM

## 2018-07-06 DIAGNOSIS — I10 HTN (HYPERTENSION), BENIGN: ICD-10-CM

## 2018-07-06 DIAGNOSIS — F41.9 ANXIETY: ICD-10-CM

## 2018-07-06 NOTE — PROGRESS NOTES
[id number] [facility] [facility], [address] [facility]    Office [phone]  Fax [phone]        Eliot Blackwood is a 70 y.o. female. Follow up from ED Visit 7/3/18 with recurrent AF with RVR, accelerated HTN s/p DCCV. Problem List  Date Reviewed: 7/11/2018          Codes Class Noted    Anxiety ICD-10-CM: F41.9  ICD-9-CM: 300.00  7/22/2018        Severe obesity (BMI 35.0-39. 9) with comorbidity ([facility] ICD-10-CM: E66.01  ICD-9-CM: 278.01  5/25/2018        SVT (supraventricular tachycardia) ([facility] ICD-10-CM: I47.1  ICD-9-CM: 427.89  8/28/2017        Hiatal hernia ICD-10-CM: K44.9  ICD-9-CM: 553.3  8/28/2017        Venous insufficiency ICD-10-CM: I87.2  ICD-9-CM: 459.81  5/4/2017        Acquired hypothyroidism ICD-10-CM: E03.9  ICD-9-CM: 244.9  5/1/2017        S/P ablation of atrial fibrillation ICD-10-CM: Z98.890, Z86.79  ICD-9-CM: V45.89  4/28/2017        Paroxysmal atrial fibrillation (HCC) ICD-10-CM: I48.0  ICD-9-CM: 427.31  8/28/2016        Gastroesophageal reflux disease without esophagitis ICD-10-CM: K21.9  ICD-9-CM: 530.81  7/3/2016        RBBB ICD-10-CM: I45.10  ICD-9-CM: 426.4  5/7/2015        LAE (left atrial enlargement) ICD-10-CM: I51.7  ICD-9-CM: 429.3  4/3/2013             Cardiac testing  Adenosine myoview 2011 - normal perfusion, EF 81%  Echocardiogram 2/22/13 - normal left ventricular size and function, normal appearing mitral, aortic, and tricuspid valves, with mild mitral and moderate tricuspid regurgitation, bi-atrial enlargement  Lexiscan 3/3/2014 - normal perfusion EF 83%  Echo: 5/15/15- 60%, mildly dilated LA, mild MR  Cardioversion 7/23/2015 - 200J  Lexiscan cardiolite 5/10/16 - normal perfusion, EF 78%  Renal Visceral Duplex 5/2016 - No evidence of ONOFRE  3/2/17-. Successful atrial fibrillation ablation however unable to achieve entrance/exit block of LSPV per Dr. Pearley Burkitt    Echo 3/8/18 - EF 65%. No WMA. Normal RV size and function. Mild MR. Mild TR.   EKG 7/6/18 - AF 80s, RBBB    HPI  Ms. Ojeda states last night her heart rate increased from the 50s to the 100s with a peak BP on 168/107. Resumption of Coreg was advised, but she self-discontinued after 1 dose due to \"feeling like a zombie\" and experiencing visual disturbances. She is interested in scheduling an ablation. She is also adherent with Flecainide 100 bid, recently increased, after recurrence of AF s/p DCCV    She has been active around her yard, with no exertional symptoms. Patient has been experiencing stress. She tried Lexapro, but states Dr. Tone Dougherty told her to stop it. She is interested in discussing this with Alphonso Bush MD.    She denies any exertional chest pain, , syncope, orthopnea, edema or paroxysmal nocturnal dyspnea. Past Medical History:   Diagnosis Date    Atrial fibrillation with RVR ([facility] 6/29/2018    DDD (degenerative disc disease)     Gastroesophageal reflux disease without esophagitis 7/3/2016    GERD (gastroesophageal reflux disease)     Hiatal hernia     HTN (hypertension), benign 7/26/2016    Hypothyroidism     ANÍBAL (obstructive sleep apnea) 7/26/2016    Paroxysmal atrial fibrillation ([facility] 8/28/2016    Uterine prolapse       Current Outpatient Prescriptions on File Prior to Visit   Medication Sig Dispense Refill    cloNIDine HCl (CATAPRES) 0.1 mg tablet Take 1 Tab by mouth two (2) times a day. Hold for SBP < 100 60 Tab 3    dilTIAZem (CARDIZEM) 60 mg tablet Take 1 Tab by mouth Before breakfast, lunch, and dinner. 90 Tab 1    levothyroxine (SYNTHROID) 50 mcg tablet Take 50 mcg by mouth Daily (before breakfast).  lisinopril (PRINIVIL, ZESTRIL) 20 mg tablet Take 1 Tab by mouth daily. 30 Tab 5    calcium-vitamin D (OYSTER SHELL) 500 mg(1,250mg) -200 unit per tablet Take 1 Tab by mouth every evening.  apixaban (ELIQUIS) 5 mg tablet take 1 tablet by mouth twice a day 60 Tab 11    lansoprazole (PREVACID) 30 mg capsule Take 30 mg by mouth nightly.       FOLIC ACID/MULTIVIT-MIN/LUTEIN (CENTRUM SILVER PO) Take 1 Tab by mouth every evening.  ezetimibe (ZETIA) 10 mg tablet Take 10 mg by mouth every evening. No current facility-administered medications on file prior to visit. Allergies   Allergen Reactions    Citalopram Hydrobromide Rash     With itching.  Chlorthalidone Rash    Contrast Agent [Iodine] Other (comments)     Wheezing/bronchospasm    Maxzide [Triamterene-Hydrochlorothiazid] Palpitations    Vicodin [Hydrocodone-Acetaminophen] Palpitations     , former smoker     Review of Systems  Constitutional: Negative for fever, chills, and diaphoresis. Respiratory: Negative for cough, hemoptysis, sputum production, and wheezing.  +palpitations  Cardiovascular: Negative for orthopnea, claudication,leg swelling and PND. Gastrointestinal: Negative for heartburn, nausea, vomiting, blood in stool and melena. Genitourinary: Negative for dysuria and flank pain. Musculoskeletal: Negative for joint pain and back pain. Skin: Negative for rash. Neurological: Negative for focal weakness, seizures, loss of consciousness, weakness. Endo/Heme/Allergies: Does not bruise/bleed easily. Psychiatric/Behavioral: Negative for memory loss. +stress     Visit Vitals    /84 (BP 1 Location: Left arm, BP Patient Position: Sitting)    Pulse 89    Resp 18    Ht 5' 1\" (1.549 m)    Wt 188 lb 6.4 oz (85.5 kg)    SpO2 98%    BMI 35.6 kg/m2      Wt Readings from Last 3 Encounters:   07/11/18 189 lb 6.4 oz (85.9 kg)   07/06/18 188 lb 6.4 oz (85.5 kg)   07/03/18 188 lb (85.3 kg)      Physical Exam   Constitutional: She is oriented to person, place, and time. She appears well-developed and well-nourished. Neck: Neck supple. No JVD present. Cardiovascular: Irregularly irregular  Pulses: Carotid pulses are 2+ on the right side, and 2+ on the left side. Dorsalis pedis pulses are 2+ on the right side, and 2+ on the left side.    Pulmonary/Chest: Effort normal and breath sounds normal. She has no wheezes. She has no rales. Abdominal: Soft. Bowel sounds are normal. There is no tenderness. There is no rebound. Musculoskeletal: She exhibits no edema. Neurological: She is alert and oriented to person, place, and time. Skin: Skin is warm and dry. Psychiatric: She has a normal mood and affect. Lab Results   Component Value Date/Time    Sodium 141 07/03/2018 05:47 AM    Potassium 3.9 07/03/2018 05:47 AM    Chloride 105 07/03/2018 05:47 AM    CO2 28 07/03/2018 05:47 AM    Anion gap 8 07/03/2018 05:47 AM    Glucose 159 (H) 07/03/2018 05:47 AM    BUN 15 07/03/2018 05:47 AM    Creatinine 1.32 (H) 07/03/2018 05:47 AM    BUN/Creatinine ratio 11 (L) 07/03/2018 05:47 AM    GFR est AA 47 (L) 07/03/2018 05:47 AM    GFR est non-AA 39 (L) 07/03/2018 05:47 AM    Calcium 9.5 07/03/2018 05:47 AM    Bilirubin, total 0.3 07/03/2018 05:47 AM    AST (SGOT) 24 07/03/2018 05:47 AM    Alk. phosphatase 86 07/03/2018 05:47 AM    Protein, total 7.6 07/03/2018 05:47 AM    Albumin 3.6 07/03/2018 05:47 AM    Globulin 4.0 07/03/2018 05:47 AM    A-G Ratio 0.9 (L) 07/03/2018 05:47 AM    ALT (SGPT) 29 07/03/2018 05:47 AM     Cardiographics   EKG 4/13 - normal  EKG 5/6/15 - AF with ventricular rate 96, RBBB  EKG 6/3/15 - A-fib rate 70s, RBBB, Rightward axis  EKG 8/31/15 - SR, RBBB  EKG 12/7/15 - SR, RBBB  EKG 7/26/16 - AF 80s, RBBB, rightward axis, unchanged from 7/23/16   EKG 11/28/16 - SR 60   EKG 12/05/16-AF 80-90, RBBB   EKG 01/10/17- AF 70s  EKG 8/25/17- SR 60, RBBB rightward axis. EKG 7/6/18 - AF 80s, RBBB    ASSESSMENT and PLAN  Encounter Diagnoses   Name Primary?  Paroxysmal atrial fibrillation (HCC) Yes    Venous insufficiency     RBBB     HTN (hypertension), benign     ANÍBAL on CPAP     S/P ablation of atrial fibrillation     Severe obesity (BMI 35.0-39. 9) with comorbidity ([address]     Anxiety      1. AF.  Early recurrence after cardioversion 3 days ago and increased Flecainide. Her rate is controlled. Favor consideration of repeat ablation. She will see Dr. Tone Dougherty next week. Continue Flecainide for now. Resume Coreg at a lower dose - 3.125 BID. 2. HTN. Labile BPs at home, but normotensive today. Continue current medical regiment. She tends to self adjust her medications. Stress and anxiety play a large role. 3. Anxiety disorder. Urged her to follow up with Alphonso Bush MD. I think she would benefit from SNRI therapy. I also encouraged deep breathing exercises. Follow-up Disposition:  Return in about 3 months (around 10/6/2018). Written by Missael Villarreal, as dictated by Dr. Catina Velazquez.      Catina Velazquez MD

## 2018-07-06 NOTE — MR AVS SNAPSHOT
[address] [facility] [address] [facility] 
[phone] Patient: Gala Del Valle 
MRN: [id number] Visit Information Date & Time Provider Department Dept. Phone Encounter #  
 7/6/2018  3:00 PM Arpan Camarena MD [facility] Kristan Tello [phone] [id number] Follow-up Instructions Return in about 3 months (around 10/6/2018). Your Appointments 7/11/2018  1:00 PM  
ESTABLISHED PATIENT with Ahsan Florez MD  
[facility] ([facility]) Appt Note: Afib Evaluation per Dr. Frank Muhammad [address] [facility] [place] [address] [facility] [address]  
[phone]  
  
   
 [address] [address]  
  
    
 8/27/2018  2:40 PM  
ESTABLISHED PATIENT with Arpan Camarena MD  
[facility] ([facility]) Appt Note: 3 month follow up with Dr. Frank Muhammad  per Nolan Hedge [address] [facility] 600 [address] [facility]  
54 [facility] [address] [address] Upcoming Health Maintenance Date Due DTaP/Tdap/Td series (1 - Tdap) 2/12/1963 ZOSTER VACCINE AGE 60> 12/12/2001 GLAUCOMA SCREENING Q2Y 2/12/2007 Bone Densitometry (Dexa) Screening 2/12/2007 Pneumococcal 65+ Low/Medium Risk (1 of 2 - PCV13) 2/12/2007 MEDICARE YEARLY EXAM 3/14/2018 Influenza Age 5 to Adult 8/1/2018 Allergies as of 7/6/2018  Review Complete On: 7/6/2018 By: Rusty Hinkle LPN Severity Noted Reaction Type Reactions Citalopram Hydrobromide High 09/16/2014    Rash With itching. Chlorthalidone  04/03/2013    Rash Contrast Agent [Iodine]  03/08/2018    Other (comments) Wheezing/bronchospasm Maxzide [Triamterene-hydrochlorothiazid]  04/03/2013    Palpitations Vicodin [Hydrocodone-acetaminophen]  04/03/2013    Palpitations Current Immunizations  Reviewed on 7/6/2018 No immunizations on file. Reviewed by Jyoti Carson LPN on 2/0/8256 at  1:29 PM  
You Were Diagnosed With   
  
 Codes Comments Atrial fibrillation with rapid ventricular response (HCC)    -  Primary ICD-10-CM: I48.91 
ICD-9-CM: 427.31 Paroxysmal atrial fibrillation (HCC)     ICD-10-CM: I48.0 ICD-9-CM: 427.31 Venous insufficiency     ICD-10-CM: I87.2 ICD-9-CM: 459.81   
 SVT (supraventricular tachycardia) (HCC)     ICD-10-CM: I47.1 ICD-9-CM: 427.89 RBBB     ICD-10-CM: I45.10 ICD-9-CM: 426.4 LAE (left atrial enlargement)     ICD-10-CM: I51.7 ICD-9-CM: 429.3 HTN (hypertension), benign     ICD-10-CM: I10 
ICD-9-CM: 401.1 ANÍBAL on CPAP     ICD-10-CM: G47.33, Z99.89 ICD-9-CM: 327.23, V46.8 Vitals BP Pulse Resp Height(growth percentile) Weight(growth percentile) SpO2  
 116/84 (BP 1 Location: Left arm, BP Patient Position: Sitting) 89 18 5' 1\" (1.549 m) 188 lb 6.4 oz (85.5 kg) 98% BMI OB Status Smoking Status 35.6 kg/m2 Postmenopausal Former Smoker Vitals History BMI and BSA Data Body Mass Index Body Surface Area  
 35.6 kg/m 2 1.92 m 2 Preferred Pharmacy Pharmacy Name Phone [place], [address] [place] [address] [facility] [phone] Your Updated Medication List  
  
   
This list is accurate as of 7/6/18  3:40 PM.  Always use your most recent med list.  
  
  
  
  
 apixaban 5 mg tablet Commonly known as:  ELIQUIS  
take 1 tablet by mouth twice a day  
  
 calcium-vitamin D 500 mg(1,250mg) -200 unit per tablet Commonly known as:  OYSTER SHELL Take 1 Tab by mouth every evening. carvedilol 6.25 mg tablet Commonly known as:  Michelle David Take 1 Tab by mouth two (2) times daily (with meals). CENTRUM SILVER PO Take 1 Tab by mouth every evening. * cloNIDine HCl 0.1 mg tablet Commonly known as:  CATAPRES  
 Take 0.1 mg by mouth daily as needed (Patient takes an 1 extra dose as needed for SBP >135 in addition to scheduled doses). * cloNIDine HCl 0.1 mg tablet Commonly known as:  CATAPRES Take 1 Tab by mouth two (2) times a day. Hold for SBP < 100  
  
 dilTIAZem 60 mg tablet Commonly known as:  CARDIZEM Take 1 Tab by mouth Before breakfast, lunch, and dinner. flecainide 50 mg tablet Commonly known as:  TAMBOCOR Take 2 Tabs by mouth every twelve (12) hours. levothyroxine 50 mcg tablet Commonly known as:  SYNTHROID Take 50 mcg by mouth Daily (before breakfast). lisinopril 20 mg tablet Commonly known as:  Isela Mac Take 1 Tab by mouth daily. PREVACID 30 mg capsule Generic drug:  lansoprazole Take 30 mg by mouth nightly. ZETIA 10 mg tablet Generic drug:  ezetimibe Take 10 mg by mouth every evening. * Notice: This list has 2 medication(s) that are the same as other medications prescribed for you. Read the directions carefully, and ask your doctor or other care provider to review them with you. We Performed the Following AMB POC EKG ROUTINE W/ 12 LEADS, INTER & REP [29384 CPT(R)] Follow-up Instructions Return in about 3 months (around 10/6/2018). Patient Instructions Try using Coreg 1/2 tablet twice a day with food (with breakfast and dinner) Take at least 10 deep breaths 3 times a day Introducing [facility]! Dear Haley Rodriguez: Thank you for requesting a [employer] account. Our records indicate that you already have an active [employer] account. You can access your account anytime at [email or web address] Did you know that you can access your hospital and ER discharge instructions at any time in [employer]? You can also review all of your test results from your hospital stay or ER visit. Additional Information If you have questions, please visit the Frequently Asked Questions section of the [email or web address] website at [email or web address] Remember, [email or web address] is NOT to be used for urgent needs. For medical emergencies, dial 911. Now available from your iPhone and Android! Please provide this summary of care documentation to your next provider. Your primary care clinician is listed as Jefry Morel. If you have any questions after today's visit, please call [phone].

## 2018-07-06 NOTE — PATIENT INSTRUCTIONS
Try using Coreg 1/2 tablet twice a day with food (with breakfast and dinner)  Take at least 10 deep breaths 3 times a day

## 2018-07-06 NOTE — PROGRESS NOTES
Chief Complaint   Patient presents with   [facility] Follow Up     AFIB w/RVR     1. Have you been to the ER, urgent care clinic since your last visit? Hospitalized since your last visit? Yes,6/29/18-6/30/18,[facility] AFIB w/RVR    2. Have you seen or consulted any other health care providers outside of [facility] [place] since your last visit? Include any pap smears or colon screening.  No    Visit Vitals    /84 (BP 1 Location: Left arm, BP Patient Position: Sitting)    Pulse 89    Resp 18    Ht 5' 1\" (1.549 m)    Wt 188 lb 6.4 oz (85.5 kg)    SpO2 98%    BMI 35.6 kg/m2

## 2018-07-10 DIAGNOSIS — I48.0 PAROXYSMAL ATRIAL FIBRILLATION (HCC): ICD-10-CM

## 2018-07-10 RX ORDER — FLECAINIDE ACETATE 100 MG/1
100 TABLET ORAL 2 TIMES DAILY
Qty: 60 TAB | Refills: 6 | Status: SHIPPED | OUTPATIENT
Start: 2018-07-10 | End: 2018-08-28 | Stop reason: SDUPTHER

## 2018-07-10 NOTE — TELEPHONE ENCOUNTER
Requested Prescriptions     Signed Prescriptions Disp Refills    flecainide (TAMBOCOR) 100 mg tablet 60 Tab 6     Sig: Take 1 Tab by mouth two (2) times a day. Authorizing Provider: Mitali Hudson     Ordering User: Kari Babin     Per verbal order Dr. Maryam Melendez.

## 2018-07-11 ENCOUNTER — CLINICAL SUPPORT (OUTPATIENT)
Dept: CARDIOLOGY CLINIC | Age: 78
End: 2018-07-11

## 2018-07-11 ENCOUNTER — OFFICE VISIT (OUTPATIENT)
Dept: CARDIOLOGY CLINIC | Age: 78
End: 2018-07-11

## 2018-07-11 VITALS
HEIGHT: 61 IN | WEIGHT: 189.4 LBS | RESPIRATION RATE: 16 BRPM | DIASTOLIC BLOOD PRESSURE: 80 MMHG | BODY MASS INDEX: 35.76 KG/M2 | SYSTOLIC BLOOD PRESSURE: 144 MMHG | OXYGEN SATURATION: 95 % | HEART RATE: 56 BPM

## 2018-07-11 DIAGNOSIS — I48.0 PAROXYSMAL ATRIAL FIBRILLATION (HCC): Primary | ICD-10-CM

## 2018-07-11 DIAGNOSIS — I48.91 ATRIAL FIBRILLATION, UNSPECIFIED TYPE (HCC): Primary | ICD-10-CM

## 2018-07-11 RX ORDER — ESCITALOPRAM OXALATE 5 MG/1
TABLET, FILM COATED ORAL DAILY
COMMUNITY
End: 2022-06-08

## 2018-07-11 NOTE — PROGRESS NOTES
Visit Vitals    /80 (BP 1 Location: Left arm, BP Patient Position: Sitting)    Pulse (!) 56    Resp 16    Ht 5' 1\" (1.549 m)    Wt 189 lb 6.4 oz (85.9 kg)    SpO2 95%    BMI 35.79 kg/m2     Medication changes made  per verbal order of Dr. Fabiana Hernandez

## 2018-07-11 NOTE — PROGRESS NOTES
HISTORY OF PRESENTING ILLNESS      Ro Long is a 70 y.o. female with ANÍBAL, HTN, RBBB and persistent AF s/p AF ablation 3/2/2017 (unable to isolate LSPV). She had previously been on Propafenone and underwent several cardioversions resulting in symptomatic improvement with NSR.  Echo demonstrated preserved LV function with LA diameter of 3.4cm in May 2015.  She reported shortness of breath post ablation and her amiodarone was discontinued. Recently she had some recurrence of AF, underwent DCCV and had ERAF after 3 days. Her diltiazem was increased and coreg was added to her regimen. However now is in SR. She has had some exertional fatigue recently. HR is 56 bpm today. ACTIVE PROBLEM LIST     Patient Active Problem List    Diagnosis Date Noted    Atrial fibrillation with RVR ([address] 06/29/2018    Severe obesity (BMI 35.0-39. 9) with comorbidity ([address] 05/25/2018    SVT (supraventricular tachycardia) ([address] 08/28/2017    Hiatal hernia 08/28/2017    Venous insufficiency 05/04/2017    Acquired hypothyroidism 05/01/2017    S/P ablation of atrial fibrillation 04/28/2017    Paroxysmal atrial fibrillation ([address] 08/28/2016    HTN (hypertension) 07/26/2016    Gastroesophageal reflux disease without esophagitis 07/03/2016    RBBB 05/07/2015    LAE (left atrial enlargement) 04/03/2013           PAST MEDICAL HISTORY     Past Medical History:   Diagnosis Date    DDD (degenerative disc disease)     Gastroesophageal reflux disease without esophagitis 7/3/2016    GERD (gastroesophageal reflux disease)     Hiatal hernia     HTN (hypertension), benign 7/26/2016    Hypothyroidism     ANÍBAL (obstructive sleep apnea) 7/26/2016    Paroxysmal atrial fibrillation ([address] 8/28/2016    Uterine prolapse            PAST SURGICAL HISTORY     Past Surgical History:   Procedure Laterality Date    HX AFIB ABLATION  03/2017    HX GI      COLONOSCOPY 7/14    HX GYN      TUBAL LIGATION    HX HEENT      WISDOM TEETH EXTRACTED. ALLERGIES     Allergies   Allergen Reactions    Citalopram Hydrobromide Rash     With itching.  Chlorthalidone Rash    Contrast Agent [Iodine] Other (comments)     Wheezing/bronchospasm    Maxzide [Triamterene-Hydrochlorothiazid] Palpitations    Vicodin [Hydrocodone-Acetaminophen] Palpitations          FAMILY HISTORY     Family History   Problem Relation Age of Onset    Diabetes Mother     Hypertension Mother    Hattie COPD Mother     negative for cardiac disease       SOCIAL HISTORY     Social History     Social History    Marital status:      Spouse name: N/A    Number of children: N/A    Years of education: N/A     Social History Main Topics    Smoking status: Former Smoker     Packs/day: 1.50     Years: 30.00     Quit date: 3/3/1994    Smokeless tobacco: Former User    Alcohol use No    Drug use: No    Sexual activity: No     Other Topics Concern    None     Social History Narrative         MEDICATIONS     Current Outpatient Prescriptions   Medication Sig    escitalopram oxalate (LEXAPRO) 5 mg tablet Take  by mouth daily.  flecainide (TAMBOCOR) 100 mg tablet Take 1 Tab by mouth two (2) times a day. (Patient taking differently: Take 50 mg by mouth two (2) times a day.)    cloNIDine HCl (CATAPRES) 0.1 mg tablet Take 1 Tab by mouth two (2) times a day. Hold for SBP < 100    carvedilol (COREG) 6.25 mg tablet Take 1 Tab by mouth two (2) times daily (with meals). (Patient taking differently: Take 3.125 mg by mouth two (2) times daily (with meals). )    dilTIAZem (CARDIZEM) 60 mg tablet Take 1 Tab by mouth Before breakfast, lunch, and dinner.  levothyroxine (SYNTHROID) 50 mcg tablet Take 50 mcg by mouth Daily (before breakfast).  lisinopril (PRINIVIL, ZESTRIL) 20 mg tablet Take 1 Tab by mouth daily.  calcium-vitamin D (OYSTER SHELL) 500 mg(1,250mg) -200 unit per tablet Take 1 Tab by mouth every evening.     apixaban (ELIQUIS) 5 mg tablet take 1 tablet by mouth twice a day    lansoprazole (PREVACID) 30 mg capsule Take 30 mg by mouth nightly.  FOLIC ACID/MULTIVIT-MIN/LUTEIN (CENTRUM SILVER PO) Take 1 Tab by mouth every evening.  ezetimibe (ZETIA) 10 mg tablet Take 10 mg by mouth every evening. No current facility-administered medications for this visit. I have reviewed the nurses notes, vitals, problem list, allergy list, medical history, family, social history and medications. REVIEW OF SYMPTOMS      General: Pt denies excessive weight gain or loss. Pt is able to conduct ADL's  HEENT: Denies blurred vision, headaches, hearing loss, epistaxis and difficulty swallowing. Respiratory: Denies cough, congestion, shortness of breath, GOODE, wheezing or stridor. Cardiovascular: Denies precordial pain, palpitations, edema or PND  Gastrointestinal: Denies poor appetite, indigestion, abdominal pain or blood in stool  Genitourinary: Denies hematuria, dysuria, increased urinary frequency  Musculoskeletal: Denies joint pain or swelling from muscles or joints  Neurologic: Denies tremor, paresthesias, headache, or sensory motor disturbance  Psychiatric: Denies confusion, insomnia, depression  Integumentray: Denies rash, itching or ulcers. Hematologic: Denies easy bruising, bleeding       PHYSICAL EXAMINATION      Vitals:    07/11/18 1302   BP: 144/80   Pulse: (!) 56   Resp: 16   SpO2: 95%   Weight: 189 lb 6.4 oz (85.9 kg)   Height: 5' 1\" (1.549 m)     General: Well developed, in no acute distress. HEENT: No jaundice, oral mucosa moist, no oral ulcers  Neck: Supple, no stiffness, no lymphadenopathy, supple  Heart:  Normal S1/S2 negative S3 or S4. Regular, no murmur, gallop or rub, no jugular venous distention  Respiratory: Clear bilaterally x 4, no wheezing or rales  Abdomen:   Soft, non-tender, bowel sounds are active.   Extremities:  No edema, normal cap refill, no cyanosis.   Musculoskeletal: No clubbing, no deformities  Neuro: A&Ox3, speech clear, gait stable, cooperative, no focal neurologic deficits  Skin: Skin color is normal. No rashes or lesions. Non diaphoretic, moist.  Vascular: 2+ pulses symmetric in all extremities       DIAGNOSTIC DATA      EKG: Sinus rhythm with RBBB       LABORATORY DATA      Lab Results   Component Value Date/Time    WBC 6.3 07/03/2018 05:47 AM    HGB 13.5 07/03/2018 05:47 AM    HCT 40.9 07/03/2018 05:47 AM    PLATELET 640 42/92/6485 05:47 AM    MCV 94.0 07/03/2018 05:47 AM      Lab Results   Component Value Date/Time    Sodium 141 07/03/2018 05:47 AM    Potassium 3.9 07/03/2018 05:47 AM    Chloride 105 07/03/2018 05:47 AM    CO2 28 07/03/2018 05:47 AM    Anion gap 8 07/03/2018 05:47 AM    Glucose 159 (H) 07/03/2018 05:47 AM    BUN 15 07/03/2018 05:47 AM    Creatinine 1.32 (H) 07/03/2018 05:47 AM    BUN/Creatinine ratio 11 (L) 07/03/2018 05:47 AM    GFR est AA 47 (L) 07/03/2018 05:47 AM    GFR est non-AA 39 (L) 07/03/2018 05:47 AM    Calcium 9.5 07/03/2018 05:47 AM    Bilirubin, total 0.3 07/03/2018 05:47 AM    AST (SGOT) 24 07/03/2018 05:47 AM    Alk. phosphatase 86 07/03/2018 05:47 AM    Protein, total 7.6 07/03/2018 05:47 AM    Albumin 3.6 07/03/2018 05:47 AM    Globulin 4.0 07/03/2018 05:47 AM    A-G Ratio 0.9 (L) 07/03/2018 05:47 AM    ALT (SGPT) 29 07/03/2018 05:47 AM           ASSESSMENT      1. Atrial fibrillation                         A. Persistent                        I. Symptomatic                        C.  AF ablation on 3/2/2017 (unable to achieve LSPV block)  2. Hiatal hernia  3. Hypertension   4. ANÍBAL  5. RBBB  6. GERD        PLAN     Will continue current drug regimen for now and arrange for 30 day monitor to determine whether this regimen will be effective or whether repeat AF ablation warranted. FOLLOW-UP     After monitor completed      Thank you, Raeann Brenner MD and Dr. Geraldine Camarena for allowing me to participate in the care of this extraordinarily pleasant female.  Please do not hesitate to contact me for further questions/concerns.          Charmaine Patino MD  Cardiac Electrophysiology / Cardiology    [address]  [address], [address] [facility], [address]  [address]    [place]  [phone] / [phone] Fax   [phone] / [phone] Fax

## 2018-07-12 ENCOUNTER — TELEPHONE (OUTPATIENT)
Dept: CARDIOLOGY CLINIC | Age: 78
End: 2018-07-12

## 2018-07-12 NOTE — TELEPHONE ENCOUNTER
Pt called to discuss her medication due to frequent low heart rate. Pt can be reached at #[phone].   Thanks

## 2018-07-12 NOTE — TELEPHONE ENCOUNTER
Pt IDX2 Pt stated her HR is 49 -51 and she called the on call  Last night and he said not to drink caffiene. At that time she said she was not dizzy and the Dr. Ben Mejia give her any more instructions. Today she feels unbalanced but not dizzy, she has chosen not to take her Coreg for a few days. It is difficult to get a history from pt that is clear. .    Instructed her to hold Coreg till tomorrow if her HR stays under 50. She stated she's not taking it at all. Asked her to hydrate and rest tonight and call in am with an update.     [facility] please

## 2018-07-13 ENCOUNTER — TELEPHONE (OUTPATIENT)
Dept: CARDIOLOGY CLINIC | Age: 78
End: 2018-07-13

## 2018-07-13 NOTE — TELEPHONE ENCOUNTER
Checked with Kira Oliver and pt's trend on monitor HR-58 1st degree block with pac's . Notified Libertad Santana of update. Called pt IDX2  Pt stated that she feels good this morning because she started taking her Flecainide the way Dr. Víctor Wagoner ordered. I also encourage her to take her Coreg every day twice a day. She says it makes her \"konk out\" (sleepy)  She was taking it at 1pm and then not in the pm. I discussed with her to take the first one when she wakes up at 9-10 am and when she goes to bed. I reinforced the importance of taking her meds while she is on her monitor. Also to give the medication some time. She is to call back if she has any further concerns.

## 2018-07-13 NOTE — TELEPHONE ENCOUNTER
Pt called and IDX2 Verified Medications Patient was taking when she saw / Roland Stockton on 711/18    Cardizem 60mg  TID  Coreg 3.125mg daily   Asked pt twice about frequency. Flecainide 100mg BID  She now takes 50mg BID after appt.

## 2018-07-22 PROBLEM — I48.91 ATRIAL FIBRILLATION WITH RVR (HCC): Status: RESOLVED | Noted: 2018-06-29 | Resolved: 2018-07-22

## 2018-07-22 PROBLEM — F41.9 ANXIETY: Status: ACTIVE | Noted: 2018-07-22

## 2018-07-22 RX ORDER — CARVEDILOL 3.12 MG/1
3.12 TABLET ORAL 2 TIMES DAILY WITH MEALS
Qty: 60 TAB | Refills: 3 | Status: SHIPPED | OUTPATIENT
Start: 2018-07-22 | End: 2018-11-19 | Stop reason: SDUPTHER

## 2018-08-01 ENCOUNTER — TELEPHONE (OUTPATIENT)
Dept: CARDIOLOGY CLINIC | Age: 78
End: 2018-08-01

## 2018-08-01 NOTE — TELEPHONE ENCOUNTER
Pt called to discuss her up/down heart rate:  Pt states today her BP has ranged from 148/100 71hr at 10:00am; 144/97 72hr at 11:51am; 148/82 73hr at 12:57pm and 104/64 54hr at 3:37.   Phone #[phone]  Thanks

## 2018-08-02 NOTE — TELEPHONE ENCOUNTER
Pt IDX2 concerned about BP elevated on 8/1/19            10 am      HR         11am             12:30            148/100     72        149/97   76         113/72   63    8/2 139/79  HR 54         124/74  HR 55    Pt has no symptoms had this time. haltor monitor on until 8/8/18    Asked pt to continue to check BP over weekend and if consistently elevated to call back on Monday with  BP Log.     Pt had no further questions

## 2018-08-15 ENCOUNTER — HOSPITAL ENCOUNTER (EMERGENCY)
Age: 78
Discharge: HOME | End: 2018-08-15
Attending: EMERGENCY MEDICINE | Admitting: EMERGENCY MEDICINE
Payer: MEDICARE

## 2018-08-15 ENCOUNTER — OFFICE VISIT (OUTPATIENT)
Dept: CARDIOLOGY CLINIC | Age: 78
End: 2018-08-15

## 2018-08-15 VITALS
TEMPERATURE: 97.9 F | WEIGHT: 188 LBS | RESPIRATION RATE: 18 BRPM | OXYGEN SATURATION: 98 % | SYSTOLIC BLOOD PRESSURE: 197 MMHG | BODY MASS INDEX: 34.6 KG/M2 | HEART RATE: 76 BPM | HEIGHT: 62 IN | DIASTOLIC BLOOD PRESSURE: 87 MMHG

## 2018-08-15 VITALS
BODY MASS INDEX: 35.07 KG/M2 | OXYGEN SATURATION: 95 % | HEIGHT: 62 IN | HEART RATE: 60 BPM | RESPIRATION RATE: 20 BRPM | WEIGHT: 190.6 LBS | DIASTOLIC BLOOD PRESSURE: 70 MMHG | SYSTOLIC BLOOD PRESSURE: 146 MMHG

## 2018-08-15 DIAGNOSIS — I10 HYPERTENSION, UNSPECIFIED TYPE: Primary | ICD-10-CM

## 2018-08-15 DIAGNOSIS — I48.91 ATRIAL FIBRILLATION, UNSPECIFIED TYPE (HCC): Primary | ICD-10-CM

## 2018-08-15 LAB
ALBUMIN SERPL-MCNC: 3.8 G/DL (ref 3.5–5)
ALBUMIN/GLOB SERPL: 1.1 {RATIO} (ref 1.1–2.2)
ALP SERPL-CCNC: 81 U/L (ref 45–117)
ALT SERPL-CCNC: 26 U/L (ref 12–78)
ANION GAP SERPL CALC-SCNC: 7 MMOL/L (ref 5–15)
AST SERPL-CCNC: 23 U/L (ref 15–37)
ATRIAL RATE: 70 BPM
BASOPHILS # BLD: 0 K/UL (ref 0–0.1)
BASOPHILS NFR BLD: 0 % (ref 0–1)
BILIRUB SERPL-MCNC: 0.4 MG/DL (ref 0.2–1)
BUN SERPL-MCNC: 16 MG/DL (ref 6–20)
BUN/CREAT SERPL: 14 (ref 12–20)
CALCIUM SERPL-MCNC: 8.9 MG/DL (ref 8.5–10.1)
CALCULATED P AXIS, ECG09: 84 DEGREES
CALCULATED R AXIS, ECG10: 88 DEGREES
CALCULATED T AXIS, ECG11: 12 DEGREES
CHLORIDE SERPL-SCNC: 106 MMOL/L (ref 97–108)
CO2 SERPL-SCNC: 28 MMOL/L (ref 21–32)
CREAT SERPL-MCNC: 1.11 MG/DL (ref 0.55–1.02)
DIAGNOSIS, 93000: NORMAL
DIFFERENTIAL METHOD BLD: NORMAL
EOSINOPHIL # BLD: 0.2 K/UL (ref 0–0.4)
EOSINOPHIL NFR BLD: 3 % (ref 0–7)
ERYTHROCYTE [DISTWIDTH] IN BLOOD BY AUTOMATED COUNT: 13 % (ref 11.5–14.5)
GLOBULIN SER CALC-MCNC: 3.6 G/DL (ref 2–4)
GLUCOSE SERPL-MCNC: 123 MG/DL (ref 65–100)
HCT VFR BLD AUTO: 38.9 % (ref 35–47)
HGB BLD-MCNC: 12.6 G/DL (ref 11.5–16)
IMM GRANULOCYTES # BLD: 0 K/UL (ref 0–0.04)
IMM GRANULOCYTES NFR BLD AUTO: 0 % (ref 0–0.5)
LYMPHOCYTES # BLD: 2.2 K/UL (ref 0.8–3.5)
LYMPHOCYTES NFR BLD: 28 % (ref 12–49)
MCH RBC QN AUTO: 30.9 PG (ref 26–34)
MCHC RBC AUTO-ENTMCNC: 32.4 G/DL (ref 30–36.5)
MCV RBC AUTO: 95.3 FL (ref 80–99)
MONOCYTES # BLD: 0.8 K/UL (ref 0–1)
MONOCYTES NFR BLD: 11 % (ref 5–13)
NEUTS SEG # BLD: 4.3 K/UL (ref 1.8–8)
NEUTS SEG NFR BLD: 57 % (ref 32–75)
NRBC # BLD: 0 K/UL (ref 0–0.01)
NRBC BLD-RTO: 0 PER 100 WBC
P-R INTERVAL, ECG05: 176 MS
PLATELET # BLD AUTO: 184 K/UL (ref 150–400)
PMV BLD AUTO: 10.4 FL (ref 8.9–12.9)
POTASSIUM SERPL-SCNC: 3.7 MMOL/L (ref 3.5–5.1)
PROT SERPL-MCNC: 7.4 G/DL (ref 6.4–8.2)
Q-T INTERVAL, ECG07: 450 MS
QRS DURATION, ECG06: 152 MS
QTC CALCULATION (BEZET), ECG08: 486 MS
RBC # BLD AUTO: 4.08 M/UL (ref 3.8–5.2)
SODIUM SERPL-SCNC: 141 MMOL/L (ref 136–145)
VENTRICULAR RATE, ECG03: 70 BPM
WBC # BLD AUTO: 7.6 K/UL (ref 3.6–11)

## 2018-08-15 PROCEDURE — 94762 N-INVAS EAR/PLS OXIMTRY CONT: CPT

## 2018-08-15 PROCEDURE — 99285 EMERGENCY DEPT VISIT HI MDM: CPT

## 2018-08-15 PROCEDURE — 74011250637 HC RX REV CODE- 250/637

## 2018-08-15 PROCEDURE — 74011000250 HC RX REV CODE- 250: Performed by: EMERGENCY MEDICINE

## 2018-08-15 PROCEDURE — 96374 THER/PROPH/DIAG INJ IV PUSH: CPT

## 2018-08-15 PROCEDURE — 36415 COLL VENOUS BLD VENIPUNCTURE: CPT | Performed by: EMERGENCY MEDICINE

## 2018-08-15 PROCEDURE — 85025 COMPLETE CBC W/AUTO DIFF WBC: CPT | Performed by: EMERGENCY MEDICINE

## 2018-08-15 PROCEDURE — 80053 COMPREHEN METABOLIC PANEL: CPT | Performed by: EMERGENCY MEDICINE

## 2018-08-15 PROCEDURE — 93005 ELECTROCARDIOGRAM TRACING: CPT

## 2018-08-15 RX ORDER — HYDRALAZINE HYDROCHLORIDE 25 MG/1
50 TABLET, FILM COATED ORAL ONCE
Status: COMPLETED | OUTPATIENT
Start: 2018-08-15 | End: 2018-08-15

## 2018-08-15 RX ORDER — SODIUM CHLORIDE 0.9 % (FLUSH) 0.9 %
5-10 SYRINGE (ML) INJECTION EVERY 8 HOURS
Status: DISCONTINUED | OUTPATIENT
Start: 2018-08-15 | End: 2018-08-15 | Stop reason: HOSPADM

## 2018-08-15 RX ORDER — SODIUM CHLORIDE 0.9 % (FLUSH) 0.9 %
5-10 SYRINGE (ML) INJECTION AS NEEDED
Status: DISCONTINUED | OUTPATIENT
Start: 2018-08-15 | End: 2018-08-15 | Stop reason: HOSPADM

## 2018-08-15 RX ORDER — HYDRALAZINE HYDROCHLORIDE 25 MG/1
TABLET, FILM COATED ORAL
Status: COMPLETED
Start: 2018-08-15 | End: 2018-08-15

## 2018-08-15 RX ORDER — LABETALOL HYDROCHLORIDE 5 MG/ML
20 INJECTION, SOLUTION INTRAVENOUS ONCE
Status: COMPLETED | OUTPATIENT
Start: 2018-08-15 | End: 2018-08-15

## 2018-08-15 RX ADMIN — HYDRALAZINE HYDROCHLORIDE 50 MG: 25 TABLET, FILM COATED ORAL at 02:25

## 2018-08-15 RX ADMIN — Medication 10 ML: at 00:14

## 2018-08-15 RX ADMIN — HYDRALAZINE HYDROCHLORIDE 50 MG: 25 TABLET ORAL at 02:25

## 2018-08-15 RX ADMIN — LABETALOL HYDROCHLORIDE 20 MG: 5 INJECTION INTRAVENOUS at 00:45

## 2018-08-15 NOTE — MR AVS SNAPSHOT
[address] [facility] [address] [phone] Patient: Samm Salazar 
MRN: [id number] [id number] Visit Information Date & Time Provider Department Dept. Phone Encounter #  
 8/15/2018  2:40 PM Marilynn Yoo MD [facility] [place] [phone] [id number] Your Appointments 10/2/2018  2:20 PM  
ESTABLISHED PATIENT with Holden Barger MD  
[facility] ([address] [place] [address] Road) Appt Note: 3 month follow up with Dr. Aurora Brooks  per [employer]; 3 month follow up with Dr. Aurora Brooks per [employer] [address] [facility] [address] [facility] 99 [id number] [phone]  
  
   
 354 [facility] 501 [facility] [address]  
  
    
 11/28/2018  2:40 PM  
ESTABLISHED PATIENT with Marilynn Yoo MD  
[facility] ([address] [place] [address] Road) Appt Note: 3 month follow up  
 [address] [facility] [address] [place] [address] [id number] [place] [address] Upcoming Health Maintenance Date Due DTaP/Tdap/Td series (1 - Tdap) 2/12/1963 ZOSTER VACCINE AGE 60> 12/12/2001 GLAUCOMA SCREENING Q2Y 2/12/2007 Bone Densitometry (Dexa) Screening 2/12/2007 Pneumococcal 65+ Low/Medium Risk (1 of 2 - PCV13) 2/12/2007 MEDICARE YEARLY EXAM 3/14/2018 Influenza Age 5 to Adult 8/1/2018 Allergies as of 8/15/2018  Review Complete On: 8/15/2018 By: Marilynn Yoo MD  
  
 Severity Noted Reaction Type Reactions Citalopram Hydrobromide High 09/16/2014    Rash With itching. Chlorthalidone  04/03/2013    Rash Contrast Agent [Iodine]  03/08/2018    Other (comments) Wheezing/bronchospasm Maxzide [Triamterene-hydrochlorothiazid]  04/03/2013    Palpitations Vicodin [Hydrocodone-acetaminophen]  04/03/2013    Palpitations Current Immunizations  Reviewed on 7/6/2018 No immunizations on file. Not reviewed this visit Vitals BP Pulse Resp Height(growth percentile) Weight(growth percentile) SpO2  
 146/70 (BP 1 Location: Left arm, BP Patient Position: Sitting) 60 20 5' 2\" (1.575 m) 190 lb 9.6 oz (86.5 kg) 95% BMI OB Status Smoking Status 34.86 kg/m2 Postmenopausal Former Smoker Vitals History BMI and BSA Data Body Mass Index Body Surface Area 34.86 kg/m 2 1.95 m 2 [facility] [address] [phone] Your Updated Medication List  
  
   
This list is accurate as of 8/15/18  3:53 PM.  Always use your most recent med list.  
  
  
  
  
 apixaban 5 mg tablet Commonly known as:  ELIQUIS  
take 1 tablet by mouth twice a day  
  
 calcium-vitamin D 500 mg(1,250mg) -200 unit per tablet Commonly known as:  OYSTER SHELL Take 1 Tab by mouth every evening. carvedilol 3.125 mg tablet Commonly known as:  Vergia Cullen Take 1 Tab by mouth two (2) times daily (with meals). CENTRUM SILVER PO Take 1 Tab by mouth every evening. cloNIDine HCl 0.1 mg tablet Commonly known as:  CATAPRES Take 1 Tab by mouth two (2) times a day. Hold for SBP < 100  
  
 dilTIAZem 60 mg tablet Commonly known as:  CARDIZEM Take 1 Tab by mouth Before breakfast, lunch, and dinner. flecainide 100 mg tablet Commonly known as:  TAMBOCOR Take 1 Tab by mouth two (2) times a day. levothyroxine 50 mcg tablet Commonly known as:  SYNTHROID Take 50 mcg by mouth Daily (before breakfast). LEXAPRO 5 mg tablet Generic drug:  escitalopram oxalate Take  by mouth daily. lisinopril 20 mg tablet Commonly known as:  Luma Shames Take 1 Tab by mouth daily. PREVACID 30 mg capsule Generic drug:  lansoprazole Take 30 mg by mouth nightly. ZETIA 10 mg tablet Generic drug:  ezetimibe Take 10 mg by mouth every evening. Introducing [facility]! Dear Inge Lancaster: Thank you for requesting a [employer] account. Our records indicate that you already have an active [employer] account. You can access your account anytime at [email or web address] Did you know that you can access your hospital and ER discharge instructions at any time in [employer]? You can also review all of your test results from your hospital stay or ER visit. Additional Information If you have questions, please visit the Frequently Asked Questions section of the [employer] website at [email or web address] Remember, [employer] is NOT to be used for urgent needs. For medical emergencies, dial 911. Now available from your iPhone and Android! Please provide this summary of care documentation to your next provider. Your primary care clinician is listed as Bailey Martinez. If you have any questions after today's visit, please call [phone].

## 2018-08-15 NOTE — ED NOTES
The documentation for this period is being entered following the guidelines as defined in the [address] [place] [address] downtime policy by Sita Latif.

## 2018-08-15 NOTE — DISCHARGE INSTRUCTIONS
We hope that we have addressed all of your medical concerns. The examination and treatment you received in the Emergency Department were for an emergent problem and were not intended as complete care. It is important that you follow up with your healthcare provider(s) for ongoing care. If your symptoms worsen or do not improve as expected, and you are unable to reach your usual health care provider(s), you should return to the Emergency Department. Today's healthcare is undergoing tremendous change, and patient satisfaction surveys are one of the many tools to assess the quality of medical care. You may receive a survey from the [employer] organization regarding your experience in the Emergency Department. I hope that your experience has been completely positive, particularly the medical care that I provided. As such, please participate in the survey; anything less than excellent does not meet my expectations or intentions. [facility] and [facility] participate in nationally recognized quality of care measures. If your blood pressure is greater than 120/80, as reported below, we urge that you seek medical care to address the potential of high blood pressure, commonly known as hypertension. Hypertension can be hereditary or can be caused by certain medical conditions, pain, stress, or \"white coat syndrome. \"       Please make an appointment with your health care provider(s) for follow up of your Emergency Department visit. VITALS:   Patient Vitals for the past 8 hrs:   Temp Pulse Resp BP SpO2   08/15/18 0040 - - - - 98 %   08/15/18 0030 - 73 18 199/86 98 %   08/15/18 0024 - 70 23 (!) 186/92 98 %   08/15/18 0010 - - - (!) 212/104 -   08/15/18 0009 97.9 °F (36.6 °C) 70 20 (!) 236/104 98 %          Thank you for allowing us to provide you with medical care today. We realize that you have many choices for your emergency care needs.   Please choose us in the future for any continued health care needs. [address]oli [address]   Office: [phone]            Recent Results (from the past 24 hour(s))   CBC WITH AUTOMATED DIFF    Collection Time: 08/15/18 12:31 AM   Result Value Ref Range    WBC 7.6 3.6 - 11.0 K/uL    RBC 4.08 3.80 - 5.20 M/uL    HGB 12.6 11.5 - 16.0 g/dL    HCT 38.9 35.0 - 47.0 %    MCV 95.3 80.0 - 99.0 FL    MCH 30.9 26.0 - 34.0 PG    MCHC 32.4 30.0 - 36.5 g/dL    RDW 13.0 11.5 - 14.5 %    PLATELET 824 575 - 825 K/uL    MPV 10.4 8.9 - 12.9 FL    NRBC 0.0 0  WBC    ABSOLUTE NRBC 0.00 0.00 - 0.01 K/uL    NEUTROPHILS 57 32 - 75 %    LYMPHOCYTES 28 12 - 49 %    MONOCYTES 11 5 - 13 %    EOSINOPHILS 3 0 - 7 %    BASOPHILS 0 0 - 1 %    IMMATURE GRANULOCYTES 0 0.0 - 0.5 %    ABS. NEUTROPHILS 4.3 1.8 - 8.0 K/UL    ABS. LYMPHOCYTES 2.2 0.8 - 3.5 K/UL    ABS. MONOCYTES 0.8 0.0 - 1.0 K/UL    ABS. EOSINOPHILS 0.2 0.0 - 0.4 K/UL    ABS. BASOPHILS 0.0 0.0 - 0.1 K/UL    ABS. IMM. GRANS. 0.0 0.00 - 0.04 K/UL    DF AUTOMATED     METABOLIC PANEL, COMPREHENSIVE    Collection Time: 08/15/18 12:31 AM   Result Value Ref Range    Sodium 141 136 - 145 mmol/L    Potassium 3.7 3.5 - 5.1 mmol/L    Chloride 106 97 - 108 mmol/L    CO2 28 21 - 32 mmol/L    Anion gap 7 5 - 15 mmol/L    Glucose 123 (H) 65 - 100 mg/dL    BUN 16 6 - 20 MG/DL    Creatinine 1.11 (H) 0.55 - 1.02 MG/DL    BUN/Creatinine ratio 14 12 - 20      GFR est AA 58 (L) >60 ml/min/1.73m2    GFR est non-AA 48 (L) >60 ml/min/1.73m2    Calcium 8.9 8.5 - 10.1 MG/DL    Bilirubin, total 0.4 0.2 - 1.0 MG/DL    ALT (SGPT) 26 12 - 78 U/L    AST (SGOT) 23 15 - 37 U/L    Alk. phosphatase 81 45 - 117 U/L    Protein, total 7.4 6.4 - 8.2 g/dL    Albumin 3.8 3.5 - 5.0 g/dL    Globulin 3.6 2.0 - 4.0 g/dL    A-G Ratio 1.1 1.1 - 2.2         No results found.            High Blood Pressure: Care Instructions  Your Care Instructions    If your blood pressure is usually above 130/80, you have high blood pressure, or hypertension. That means the top number is 130 or higher or the bottom number is 80 or higher, or both. Despite what a lot of people think, high blood pressure usually doesn't cause headaches or make you feel dizzy or lightheaded. It usually has no symptoms. But it does increase your risk for heart attack, stroke, and kidney or eye damage. The higher your blood pressure, the more your risk increases. Your doctor will give you a goal for your blood pressure. Your goal will be based on your health and your age. Lifestyle changes, such as eating healthy and being active, are always important to help lower blood pressure. You might also take medicine to reach your blood pressure goal.  Follow-up care is a key part of your treatment and safety. Be sure to make and go to all appointments, and call your doctor if you are having problems. It's also a good idea to know your test results and keep a list of the medicines you take. How can you care for yourself at home? Medical treatment  · If you stop taking your medicine, your blood pressure will go back up. You may take one or more types of medicine to lower your blood pressure. Be safe with medicines. Take your medicine exactly as prescribed. Call your doctor if you think you are having a problem with your medicine. · Talk to your doctor before you start taking aspirin every day. Aspirin can help certain people lower their risk of a heart attack or stroke. But taking aspirin isn't right for everyone, because it can cause serious bleeding. · See your doctor regularly. You may need to see the doctor more often at first or until your blood pressure comes down. · If you are taking blood pressure medicine, talk to your doctor before you take decongestants or anti-inflammatory medicine, such as ibuprofen. Some of these medicines can raise blood pressure. · Learn how to check your blood pressure at home.   Lifestyle changes  · Stay at a healthy weight. This is especially important if you put on weight around the waist. Losing even 10 pounds can help you lower your blood pressure. · If your doctor recommends it, get more exercise. Walking is a good choice. Bit by bit, increase the amount you walk every day. Try for at least 30 minutes on most days of the week. You also may want to swim, bike, or do other activities. · Avoid or limit alcohol. Talk to your doctor about whether you can drink any alcohol. · Try to limit how much sodium you eat to less than 2,300 milligrams (mg) a day. Your doctor may ask you to try to eat less than 1,500 mg a day. · Eat plenty of fruits (such as bananas and oranges), vegetables, legumes, whole grains, and low-fat dairy products. · Lower the amount of saturated fat in your diet. Saturated fat is found in animal products such as milk, cheese, and meat. Limiting these foods may help you lose weight and also lower your risk for heart disease. · Do not smoke. Smoking increases your risk for heart attack and stroke. If you need help quitting, talk to your doctor about stop-smoking programs and medicines. These can increase your chances of quitting for good. When should you call for help? Call 911 anytime you think you may need emergency care. This may mean having symptoms that suggest that your blood pressure is causing a serious heart or blood vessel problem. Your blood pressure may be over 180/110.   For example, call 911 if:    · You have symptoms of a heart attack. These may include:  ¨ Chest pain or pressure, or a strange feeling in the chest.  ¨ Sweating. ¨ Shortness of breath. ¨ Nausea or vomiting. ¨ Pain, pressure, or a strange feeling in the back, neck, jaw, or upper belly or in one or both shoulders or arms. ¨ Lightheadedness or sudden weakness. ¨ A fast or irregular heartbeat.     · You have symptoms of a stroke.  These may include:  ¨ Sudden numbness, tingling, weakness, or loss of movement in your face, arm, or leg, especially on only one side of your body. ¨ Sudden vision changes. ¨ Sudden trouble speaking. ¨ Sudden confusion or trouble understanding simple statements. ¨ Sudden problems with walking or balance. ¨ A sudden, severe headache that is different from past headaches.     · You have severe back or belly pain.    Do not wait until your blood pressure comes down on its own. Get help right away.   Call your doctor now or seek immediate care if:    · Your blood pressure is much higher than normal (such as 180/110 or higher), but you don't have symptoms.     · You think high blood pressure is causing symptoms, such as:  ¨ Severe headache. ¨ Blurry vision.    Watch closely for changes in your health, and be sure to contact your doctor if:    · Your blood pressure measures 140/90 or higher at least 2 times. That means the top number is 140 or higher or the bottom number is 90 or higher, or both.     · You think you may be having side effects from your blood pressure medicine.     · Your blood pressure is usually normal, but it goes above normal at least 2 times. Where can you learn more? Go to http://iker-isaac.info/. Enter U796 in the search box to learn more about \"High Blood Pressure: Care Instructions. \"  Current as of: December 6, 2017  Content Version: 11.7  © 5540-6750 [employer]. Care instructions adapted under license by [employer] (which disclaims liability or warranty for this information). If you have questions about a medical condition or this instruction, always ask your healthcare professional. [place] [address] any warranty or liability for your use of this information.

## 2018-08-15 NOTE — ED TRIAGE NOTES
Triage: Patient reports her BP being high today gradually going up.  + H/A, takes Clonidine, Lisinopril. Denies C/P. Reports taking medication as prescribed. Dr. Cat Tam is her Cardiologist.  Also reports being on a holter monitor for the past month and came off on the 9th of August.  Has an appointment with Dr. Ashwini Sesay today.

## 2018-08-15 NOTE — PROGRESS NOTES
Visit Vitals    /70 (BP 1 Location: Left arm, BP Patient Position: Sitting)    Pulse 60    Resp 20    Ht 5' 2\" (1.575 m)    Wt 190 lb 9.6 oz (86.5 kg)    SpO2 95%    BMI 34.86 kg/m2

## 2018-08-15 NOTE — PROGRESS NOTES
HISTORY OF PRESENTING ILLNESS      Lizette Bynum is a 70 y.o. female with ANÍBAL, HTN, RBBB and persistent AF s/p AF ablation 3/2/2017 (unable to isolate LSPV). She had previously been on Propafenone and underwent several cardioversions resulting in symptomatic improvement with NSR.  Echo demonstrated preserved LV function with LA diameter of 3.4cm in May 2015.  She reported shortness of breath post ablation and her amiodarone was discontinued. Recently she had some recurrence of AF, underwent DCCV and had ERAF after 3 days. Her diltiazem was increased and coreg was added to her regimen. She is currently on flecainide. She underwent a monitor which demonstrated episodes of atrial fibrillation with controlled ventricular rate. She is asymptomatic during these episodes. She did have some episodes of bradycardia during which she feels fatigued and tired. ACTIVE PROBLEM LIST     Patient Active Problem List    Diagnosis Date Noted    Anxiety 07/22/2018    Severe obesity (BMI 35.0-39. 9) with comorbidity ([address] 05/25/2018    SVT (supraventricular tachycardia) ([address] 08/28/2017    Hiatal hernia 08/28/2017    Venous insufficiency 05/04/2017    Acquired hypothyroidism 05/01/2017    S/P ablation of atrial fibrillation 04/28/2017    Paroxysmal atrial fibrillation ([address] 08/28/2016    Gastroesophageal reflux disease without esophagitis 07/03/2016    RBBB 05/07/2015    LAE (left atrial enlargement) 04/03/2013           PAST MEDICAL HISTORY     Past Medical History:   Diagnosis Date    Atrial fibrillation with RVR ([address] 6/29/2018    DDD (degenerative disc disease)     Gastroesophageal reflux disease without esophagitis 7/3/2016    GERD (gastroesophageal reflux disease)     Hiatal hernia     HTN (hypertension), benign 7/26/2016    Hypothyroidism     ANÍBAL (obstructive sleep apnea) 7/26/2016    Paroxysmal atrial fibrillation ([address] 8/28/2016    Uterine prolapse            PAST SURGICAL HISTORY Past Surgical History:   Procedure Laterality Date    HX AFIB ABLATION  03/2017    HX GI      COLONOSCOPY 7/14    HX GYN      TUBAL LIGATION    HX HEENT      WISDOM TEETH EXTRACTED. ALLERGIES     Allergies   Allergen Reactions    Citalopram Hydrobromide Rash     With itching.  Chlorthalidone Rash    Contrast Agent [Iodine] Other (comments)     Wheezing/bronchospasm    Maxzide [Triamterene-Hydrochlorothiazid] Palpitations    Vicodin [Hydrocodone-Acetaminophen] Palpitations          FAMILY HISTORY     Family History   Problem Relation Age of Onset    Diabetes Mother     Hypertension Mother    [facility] COPD Mother     negative for cardiac disease       SOCIAL HISTORY     Social History     Social History    Marital status:      Spouse name: N/A    Number of children: N/A    Years of education: N/A     Social History Main Topics    Smoking status: Former Smoker     Packs/day: 1.50     Years: 30.00     Quit date: 3/3/1994    Smokeless tobacco: Former User    Alcohol use No    Drug use: No    Sexual activity: No     Other Topics Concern    None     Social History Narrative         MEDICATIONS     Current Outpatient Prescriptions   Medication Sig    carvedilol (COREG) 3.125 mg tablet Take 1 Tab by mouth two (2) times daily (with meals).  escitalopram oxalate (LEXAPRO) 5 mg tablet Take  by mouth daily.  flecainide (TAMBOCOR) 100 mg tablet Take 1 Tab by mouth two (2) times a day. (Patient taking differently: Take 50 mg by mouth two (2) times a day.)    cloNIDine HCl (CATAPRES) 0.1 mg tablet Take 1 Tab by mouth two (2) times a day. Hold for SBP < 100    dilTIAZem (CARDIZEM) 60 mg tablet Take 1 Tab by mouth Before breakfast, lunch, and dinner.  levothyroxine (SYNTHROID) 50 mcg tablet Take 50 mcg by mouth Daily (before breakfast).  lisinopril (PRINIVIL, ZESTRIL) 20 mg tablet Take 1 Tab by mouth daily.     calcium-vitamin D (OYSTER SHELL) 500 mg(1,250mg) -200 unit per tablet Take 1 Tab by mouth every evening.  apixaban (ELIQUIS) 5 mg tablet take 1 tablet by mouth twice a day    lansoprazole (PREVACID) 30 mg capsule Take 30 mg by mouth nightly.  FOLIC ACID/MULTIVIT-MIN/LUTEIN (CENTRUM SILVER PO) Take 1 Tab by mouth every evening.  ezetimibe (ZETIA) 10 mg tablet Take 10 mg by mouth every evening. No current facility-administered medications for this visit. I have reviewed the nurses notes, vitals, problem list, allergy list, medical history, family, social history and medications. REVIEW OF SYMPTOMS      General: Pt denies excessive weight gain or loss. Pt is able to conduct ADL's  HEENT: Denies blurred vision, headaches, hearing loss, epistaxis and difficulty swallowing. Respiratory: Denies cough, congestion, shortness of breath, GOODE, wheezing or stridor. Cardiovascular: Denies precordial pain, palpitations, edema or PND  Gastrointestinal: Denies poor appetite, indigestion, abdominal pain or blood in stool  Genitourinary: Denies hematuria, dysuria, increased urinary frequency  Musculoskeletal: Denies joint pain or swelling from muscles or joints  Neurologic: Denies tremor, paresthesias, headache, or sensory motor disturbance  Psychiatric: Denies confusion, insomnia, depression  Integumentray: Denies rash, itching or ulcers. Hematologic: Denies easy bruising, bleeding       PHYSICAL EXAMINATION      Vitals:    08/15/18 1450   BP: 146/70   Pulse: 60   Resp: 20   SpO2: 95%   Weight: 190 lb 9.6 oz (86.5 kg)   Height: 5' 2\" (1.575 m)     General: Well developed, in no acute distress. HEENT: No jaundice, oral mucosa moist, no oral ulcers  Neck: Supple, no stiffness, no lymphadenopathy, supple  Heart:  Normal S1/S2 negative S3 or S4.  Regular, no murmur, gallop or rub, no jugular venous distention  Respiratory: Clear bilaterally x 4, no wheezing or rales  Abdomen:   Soft, non-tender, bowel sounds are active.   Extremities:  No edema, normal cap refill, no cyanosis. Musculoskeletal: No clubbing, no deformities  Neuro: A&Ox3, speech clear, gait stable, cooperative, no focal neurologic deficits  Skin: Skin color is normal. No rashes or lesions. Non diaphoretic, moist.  Vascular: 2+ pulses symmetric in all extremities       DIAGNOSTIC DATA      EKG:        LABORATORY DATA      Lab Results   Component Value Date/Time    WBC 7.6 08/15/2018 12:31 AM    HGB 12.6 08/15/2018 12:31 AM    HCT 38.9 08/15/2018 12:31 AM    PLATELET 978 28/77/5976 12:31 AM    MCV 95.3 08/15/2018 12:31 AM      Lab Results   Component Value Date/Time    Sodium 141 08/15/2018 12:31 AM    Potassium 3.7 08/15/2018 12:31 AM    Chloride 106 08/15/2018 12:31 AM    CO2 28 08/15/2018 12:31 AM    Anion gap 7 08/15/2018 12:31 AM    Glucose 123 (H) 08/15/2018 12:31 AM    BUN 16 08/15/2018 12:31 AM    Creatinine 1.11 (H) 08/15/2018 12:31 AM    BUN/Creatinine ratio 14 08/15/2018 12:31 AM    GFR est AA 58 (L) 08/15/2018 12:31 AM    GFR est non-AA 48 (L) 08/15/2018 12:31 AM    Calcium 8.9 08/15/2018 12:31 AM    Bilirubin, total 0.4 08/15/2018 12:31 AM    AST (SGOT) 23 08/15/2018 12:31 AM    Alk. phosphatase 81 08/15/2018 12:31 AM    Protein, total 7.4 08/15/2018 12:31 AM    Albumin 3.8 08/15/2018 12:31 AM    Globulin 3.6 08/15/2018 12:31 AM    A-G Ratio 1.1 08/15/2018 12:31 AM    ALT (SGPT) 26 08/15/2018 12:31 AM           ASSESSMENT      1. Atrial fibrillation                         A. Persistent                        Q. Symptomatic                        C.  AF ablation on 3/2/2017 (unable to achieve LSPV block)  2. Hiatal hernia  3. Hypertension   4. ANÍBAL  5. RBBB  6. GERD        PLAN     Patient appears to be more symptomatic during episodes of bradycardia rather than during her atrial fibrillation. Discussed options of repeat attempt at AF ablation to allow discontinuation of diltiazem and flecainide however she wishes to avoid this for now. She may reconsider in the future.    FOLLOW-UP       Thank you, Damian Michaels MD for allowing me to participate in the care of this extraordinarily pleasant female. Please do not hesitate to contact me for further questions/concerns.          Roland Martin MD  Cardiac Electrophysiology / Cardiology    [facility].  [facility], [address], [facility]. Tray Chavira.    Vincenzo Rivera  [phone] / [phone] Fax   [phone] / [phone] Fax

## 2018-08-16 NOTE — ED PROVIDER NOTES
HPI Comments: Patient reports her BP being high today gradually going up.  + H/A, takes Clonidine, Lisinopril. Denies C/P. Reports taking medication as prescribed. Dr. Mindi Adame is her Cardiologist.  Also reports being on a holter monitor for the past month and came off on the 9th of August.  Has an appointment with Dr. Rupesh Heller today. Patient is a 70 y.o. female presenting with hypertension. The history is provided by the patient. No  was used. Hypertension    This is a chronic problem. The current episode started 3 to 5 hours ago. The problem has been gradually worsening. Associated symptoms include anxiety and headaches. Pertinent negatives include no chest pain, no orthopnea, no palpitations, no PND, no confusion, no malaise/fatigue, no neck pain, no peripheral edema, no dizziness, no shortness of breath, no nausea and no vomiting. Risk factors include hypertension. Past Medical History:   Diagnosis Date    Atrial fibrillation with RVR ([facility] 6/29/2018    DDD (degenerative disc disease)     Gastroesophageal reflux disease without esophagitis 7/3/2016    GERD (gastroesophageal reflux disease)     Hiatal hernia     HTN (hypertension), benign 7/26/2016    Hypothyroidism     ANÍBAL (obstructive sleep apnea) 7/26/2016    Paroxysmal atrial fibrillation ([facility] 8/28/2016    Uterine prolapse        Past Surgical History:   Procedure Laterality Date    HX AFIB ABLATION  03/2017    HX GI      COLONOSCOPY 7/14    HX GYN      TUBAL LIGATION    HX HEENT      WISDOM TEETH EXTRACTED. Family History:   Problem Relation Age of Onset    Diabetes Mother     Hypertension Mother     COPD Mother        Social History     Social History    Marital status:      Spouse name: N/A    Number of children: N/A    Years of education: N/A     Occupational History    Not on file.      Social History Main Topics    Smoking status: Former Smoker     Packs/day: 1.50     Years: 30.00 Quit date: 3/3/1994    Smokeless tobacco: Former User    Alcohol use No    Drug use: No    Sexual activity: No     Other Topics Concern    Not on file     Social History Narrative     ALLERGIES: Citalopram hydrobromide; Chlorthalidone; Contrast agent [iodine]; Maxzide [triamterene-hydrochlorothiazid]; and Vicodin [hydrocodone-acetaminophen]    Review of Systems   Constitutional: Negative for appetite change, chills, fever, malaise/fatigue and unexpected weight change. HENT: Negative for ear pain, hearing loss, rhinorrhea and trouble swallowing. Eyes: Negative for pain and visual disturbance. Respiratory: Negative for cough, chest tightness and shortness of breath. Cardiovascular: Negative for chest pain, palpitations, orthopnea and PND. Gastrointestinal: Negative for abdominal distention, abdominal pain, blood in stool, nausea and vomiting. Genitourinary: Negative for dysuria, hematuria and urgency. Musculoskeletal: Negative for back pain, myalgias and neck pain. Skin: Negative for rash. Neurological: Positive for headaches. Negative for dizziness, syncope, weakness and numbness. Psychiatric/Behavioral: Negative for confusion and suicidal ideas. All other systems reviewed and are negative. Vitals:    08/15/18 0024 08/15/18 0030 08/15/18 0040 08/15/18 0225   BP: (!) 186/92 199/86  197/87   Pulse: 70 73  76   Resp: 23 18     Temp:       SpO2: 98% 98% 98%    Weight:       Height:                Physical Exam   Constitutional: She is oriented to person, place, and time. She appears well-developed and well-nourished. No distress. HENT:   Head: Normocephalic and atraumatic. Right Ear: External ear normal.   Left Ear: External ear normal.   Nose: Nose normal.   Mouth/Throat: Oropharynx is clear and moist. No oropharyngeal exudate. Eyes: Conjunctivae and EOM are normal. Pupils are equal, round, and reactive to light. Right eye exhibits no discharge. Left eye exhibits no discharge.  No scleral icterus. Neck: Normal range of motion. Neck supple. No JVD present. No tracheal deviation present. Cardiovascular: Normal rate, regular rhythm, normal heart sounds and intact distal pulses. Exam reveals no gallop and no friction rub. No murmur heard. Pulmonary/Chest: Effort normal and breath sounds normal. No stridor. No respiratory distress. She has no decreased breath sounds. She has no wheezes. She has no rhonchi. She has no rales. She exhibits no tenderness. Abdominal: Soft. Bowel sounds are normal. She exhibits no distension. There is no tenderness. There is no rebound and no guarding. Musculoskeletal: Normal range of motion. She exhibits no edema or tenderness. Neurological: She is alert and oriented to person, place, and time. She has normal strength and normal reflexes. No cranial nerve deficit or sensory deficit. She exhibits normal muscle tone. Coordination normal. GCS eye subscore is 4. GCS verbal subscore is 5. GCS motor subscore is 6. Skin: Skin is warm and dry. No rash noted. She is not diaphoretic. No erythema. No pallor. Psychiatric: Her speech is normal and behavior is normal. Judgment and thought content normal. Her mood appears anxious. Cognition and memory are normal.   Nursing note and vitals reviewed.        MDM  Number of Diagnoses or Management Options  Hypertension, unspecified type:      Amount and/or Complexity of Data Reviewed  Clinical lab tests: ordered and reviewed  Tests in the medicine section of CPT®: ordered and reviewed  Review and summarize past medical records: yes  Independent visualization of images, tracings, or specimens: yes (ekg)    Risk of Complications, Morbidity, and/or Mortality  Presenting problems: moderate  Diagnostic procedures: low  Management options: moderate    Patient Progress  Patient progress: improved        ED Course       Procedures    Chief Complaint   Patient presents with    Hypertension       The patient's presenting problems have been discussed, and they are in agreement with the care plan formulated and outlined with them. I have encouraged them to ask questions as they arise throughout their visit. MEDICATIONS GIVEN:  Medications   labetalol (NORMODYNE;TRANDATE) injection 20 mg (20 mg IntraVENous Given 8/15/18 0045)   hydrALAZINE (APRESOLINE) tablet 50 mg (50 mg Oral Given 8/15/18 0225)       LABS REVIEWED:  Recent Results (from the past 24 hour(s))   EKG, 12 LEAD, INITIAL    Collection Time: 08/15/18 12:22 AM   Result Value Ref Range    Ventricular Rate 70 BPM    Atrial Rate 70 BPM    P-R Interval 176 ms    QRS Duration 152 ms    Q-T Interval 450 ms    QTC Calculation (Bezet) 486 ms    Calculated P Axis 84 degrees    Calculated R Axis 88 degrees    Calculated T Axis 12 degrees    Diagnosis       Sinus rhythm with premature supraventricular complexes  Right bundle branch block  Abnormal ECG  When compared with ECG of 03-JUL-2018 09:09,  premature supraventricular complexes are now present  Confirmed by Ghazal Sanz M.D., [employer] ([id number]) on 8/15/2018 5:12:29 PM     CBC WITH AUTOMATED DIFF    Collection Time: 08/15/18 12:31 AM   Result Value Ref Range    WBC 7.6 3.6 - 11.0 K/uL    RBC 4.08 3.80 - 5.20 M/uL    HGB 12.6 11.5 - 16.0 g/dL    HCT 38.9 35.0 - 47.0 %    MCV 95.3 80.0 - 99.0 FL    MCH 30.9 26.0 - 34.0 PG    MCHC 32.4 30.0 - 36.5 g/dL    RDW 13.0 11.5 - 14.5 %    PLATELET 926 952 - 364 K/uL    MPV 10.4 8.9 - 12.9 FL    NRBC 0.0 0  WBC    ABSOLUTE NRBC 0.00 0.00 - 0.01 K/uL    NEUTROPHILS 57 32 - 75 %    LYMPHOCYTES 28 12 - 49 %    MONOCYTES 11 5 - 13 %    EOSINOPHILS 3 0 - 7 %    BASOPHILS 0 0 - 1 %    IMMATURE GRANULOCYTES 0 0.0 - 0.5 %    ABS. NEUTROPHILS 4.3 1.8 - 8.0 K/UL    ABS. LYMPHOCYTES 2.2 0.8 - 3.5 K/UL    ABS. MONOCYTES 0.8 0.0 - 1.0 K/UL    ABS. EOSINOPHILS 0.2 0.0 - 0.4 K/UL    ABS. BASOPHILS 0.0 0.0 - 0.1 K/UL    ABS. IMM.  GRANS. 0.0 0.00 - 0.04 K/UL    DF AUTOMATED     METABOLIC PANEL, COMPREHENSIVE Collection Time: 08/15/18 12:31 AM   Result Value Ref Range    Sodium 141 136 - 145 mmol/L    Potassium 3.7 3.5 - 5.1 mmol/L    Chloride 106 97 - 108 mmol/L    CO2 28 21 - 32 mmol/L    Anion gap 7 5 - 15 mmol/L    Glucose 123 (H) 65 - 100 mg/dL    BUN 16 6 - 20 MG/DL    Creatinine 1.11 (H) 0.55 - 1.02 MG/DL    BUN/Creatinine ratio 14 12 - 20      GFR est AA 58 (L) >60 ml/min/1.73m2    GFR est non-AA 48 (L) >60 ml/min/1.73m2    Calcium 8.9 8.5 - 10.1 MG/DL    Bilirubin, total 0.4 0.2 - 1.0 MG/DL    ALT (SGPT) 26 12 - 78 U/L    AST (SGOT) 23 15 - 37 U/L    Alk. phosphatase 81 45 - 117 U/L    Protein, total 7.4 6.4 - 8.2 g/dL    Albumin 3.8 3.5 - 5.0 g/dL    Globulin 3.6 2.0 - 4.0 g/dL    A-G Ratio 1.1 1.1 - 2.2         VITAL SIGNS:  No data found. RADIOLOGY RESULTS:  The following have been ordered and reviewed:  No results found. ED EKG interpretation:  Rhythm: normal sinus rhythm and premature supraventricular complex and RBBB; and regular . Rate (approx.): 70; Axis: normal; P wave: normal; QRS interval: normal ; ST/T wave: normal; Other findings: abnormal ekg. This EKG was interpreted by Tod Kelley DO, ED Provider. PROGRESS NOTES:  Pt's BP improved. Discussed results and plan with patient and spouse. Patient will be discharged home with PCP and cardiology follow up. Patient instructed to return to the emergency room for any worsening symptoms or any other concerns. DIAGNOSIS:    1.  Hypertension, unspecified type        PLAN:  Follow-up Information     Follow up With Details Comments Contact Info    Leida Paul MD Schedule an appointment as soon as possible for a visit  [address] [place] [address]  [phone]      Mimi Stephens MD Schedule an appointment as soon as possible for a visit  [address]      [facility] EMERGENCY DEPT  If symptoms worsen [address] [facility]  [phone] Discharge Medication List as of 8/15/2018  4:48 AM      CONTINUE these medications which have NOT CHANGED    Details   carvedilol (COREG) 3.125 mg tablet Take 1 Tab by mouth two (2) times daily (with meals). , Normal, Disp-60 Tab, R-3      escitalopram oxalate (LEXAPRO) 5 mg tablet Take  by mouth daily. , Historical Med      flecainide (TAMBOCOR) 100 mg tablet Take 1 Tab by mouth two (2) times a day., NormalNote changed to one 100 mg tablet. Disp-60 Tab, R-6      cloNIDine HCl (CATAPRES) 0.1 mg tablet Take 1 Tab by mouth two (2) times a day. Hold for SBP < 100, Normal, Disp-60 Tab, R-3      dilTIAZem (CARDIZEM) 60 mg tablet Take 1 Tab by mouth Before breakfast, lunch, and dinner., Print, Disp-90 Tab, R-1      levothyroxine (SYNTHROID) 50 mcg tablet Take 50 mcg by mouth Daily (before breakfast). , Historical Med      lisinopril (PRINIVIL, ZESTRIL) 20 mg tablet Take 1 Tab by mouth daily. , Normal, Disp-30 Tab, R-5      calcium-vitamin D (OYSTER SHELL) 500 mg(1,250mg) -200 unit per tablet Take 1 Tab by mouth every evening., Historical Med      apixaban (ELIQUIS) 5 mg tablet take 1 tablet by mouth twice a day, Normal, Disp-60 Tab, R-11      lansoprazole (PREVACID) 30 mg capsule Take 30 mg by mouth nightly., Historical Med      FOLIC ACID/MULTIVIT-MIN/LUTEIN (CENTRUM SILVER PO) Take 1 Tab by mouth every evening., Historical Med      ezetimibe (ZETIA) 10 mg tablet Take 10 mg by mouth every evening., Historical Med             ED COURSE: The patient's hospital course has been uncomplicated.

## 2018-08-20 DIAGNOSIS — I48.0 PAROXYSMAL ATRIAL FIBRILLATION (HCC): ICD-10-CM

## 2018-08-20 RX ORDER — FLECAINIDE ACETATE 50 MG/1
50 TABLET ORAL 2 TIMES DAILY
Qty: 30 TAB | Refills: 3 | Status: CANCELLED | OUTPATIENT
Start: 2018-08-20

## 2018-08-28 ENCOUNTER — TELEPHONE (OUTPATIENT)
Dept: CARDIOLOGY CLINIC | Age: 78
End: 2018-08-28

## 2018-08-28 DIAGNOSIS — I48.0 PAROXYSMAL ATRIAL FIBRILLATION (HCC): ICD-10-CM

## 2018-08-28 RX ORDER — FLECAINIDE ACETATE 100 MG/1
50 TABLET ORAL 2 TIMES DAILY
Qty: 60 TAB | Refills: 2 | Status: SHIPPED | OUTPATIENT
Start: 2018-08-28 | End: 2018-10-04 | Stop reason: DRUGHIGH

## 2018-08-28 NOTE — TELEPHONE ENCOUNTER
Pt stated is it possible if she can change the dosage of the Flecainide 100mg to a 50 mg. Due to the 50 mg working better.  She stated that's what she has been taken   Phone: [phone]

## 2018-08-30 ENCOUNTER — TELEPHONE (OUTPATIENT)
Dept: CARDIOLOGY CLINIC | Age: 78
End: 2018-08-30

## 2018-08-30 NOTE — TELEPHONE ENCOUNTER
Jerad Marin from [address] [place] [address] is calling to see if pt holter monitor is in the office because it needs to be mailed back to them. Pt told them she brought monitor to office. Phone: [phone] Option 1 and Option 5 / She states you can talk to anyone who picks up.

## 2018-08-30 NOTE — TELEPHONE ENCOUNTER
Pt called stating that the pharmacy has not received the refill request for her medication. Pt is out of medication. Requested Prescriptions     Pending Prescriptions Disp Refills    dilTIAZem (CARDIZEM) 60 mg tablet 90 Tab 1     Sig: Take 1 Tab by mouth Before breakfast, lunch, and dinner.      Thanks

## 2018-08-30 NOTE — TELEPHONE ENCOUNTER
Refill of diltiazem 60mg TID completed per VO of Dr. Aurora Brooks.     Last OV: 7/6/18  Next OV: 10/2/18

## 2018-08-31 RX ORDER — DILTIAZEM HYDROCHLORIDE 60 MG/1
60 TABLET, FILM COATED ORAL
Qty: 90 TAB | Refills: 3 | Status: SHIPPED | OUTPATIENT
Start: 2018-08-31 | End: 2019-01-02 | Stop reason: SDUPTHER

## 2018-09-10 RX ORDER — LISINOPRIL 20 MG/1
TABLET ORAL
Qty: 30 TAB | Refills: 5 | Status: SHIPPED | OUTPATIENT
Start: 2018-09-10 | End: 2018-10-04 | Stop reason: SINTOL

## 2018-10-02 PROCEDURE — 99285 EMERGENCY DEPT VISIT HI MDM: CPT

## 2018-10-03 ENCOUNTER — HOSPITAL ENCOUNTER (EMERGENCY)
Age: 78
Discharge: HOME | End: 2018-10-03
Attending: EMERGENCY MEDICINE
Payer: MEDICARE

## 2018-10-03 VITALS
SYSTOLIC BLOOD PRESSURE: 123 MMHG | HEIGHT: 62 IN | DIASTOLIC BLOOD PRESSURE: 54 MMHG | TEMPERATURE: 97.9 F | BODY MASS INDEX: 34.78 KG/M2 | OXYGEN SATURATION: 96 % | RESPIRATION RATE: 24 BRPM | WEIGHT: 189 LBS | HEART RATE: 69 BPM

## 2018-10-03 DIAGNOSIS — I10 ESSENTIAL HYPERTENSION: Primary | ICD-10-CM

## 2018-10-03 LAB
ALBUMIN SERPL-MCNC: 3.7 G/DL (ref 3.5–5)
ALBUMIN/GLOB SERPL: 1 {RATIO} (ref 1.1–2.2)
ALP SERPL-CCNC: 84 U/L (ref 45–117)
ALT SERPL-CCNC: 30 U/L (ref 12–78)
ANION GAP SERPL CALC-SCNC: 8 MMOL/L (ref 5–15)
AST SERPL-CCNC: 27 U/L (ref 15–37)
ATRIAL RATE: 69 BPM
BASOPHILS # BLD: 0 K/UL (ref 0–0.1)
BASOPHILS NFR BLD: 1 % (ref 0–1)
BILIRUB SERPL-MCNC: 0.3 MG/DL (ref 0.2–1)
BUN SERPL-MCNC: 20 MG/DL (ref 6–20)
BUN/CREAT SERPL: 18 (ref 12–20)
CALCIUM SERPL-MCNC: 9.6 MG/DL (ref 8.5–10.1)
CALCULATED P AXIS, ECG09: 84 DEGREES
CALCULATED R AXIS, ECG10: 89 DEGREES
CALCULATED T AXIS, ECG11: 8 DEGREES
CHLORIDE SERPL-SCNC: 106 MMOL/L (ref 97–108)
CO2 SERPL-SCNC: 29 MMOL/L (ref 21–32)
COMMENT, HOLDF: NORMAL
CREAT SERPL-MCNC: 1.13 MG/DL (ref 0.55–1.02)
DIAGNOSIS, 93000: NORMAL
DIFFERENTIAL METHOD BLD: NORMAL
EOSINOPHIL # BLD: 0.2 K/UL (ref 0–0.4)
EOSINOPHIL NFR BLD: 3 % (ref 0–7)
ERYTHROCYTE [DISTWIDTH] IN BLOOD BY AUTOMATED COUNT: 13 % (ref 11.5–14.5)
GLOBULIN SER CALC-MCNC: 3.7 G/DL (ref 2–4)
GLUCOSE SERPL-MCNC: 165 MG/DL (ref 65–100)
HCT VFR BLD AUTO: 40.2 % (ref 35–47)
HGB BLD-MCNC: 13 G/DL (ref 11.5–16)
IMM GRANULOCYTES # BLD: 0 K/UL (ref 0–0.04)
IMM GRANULOCYTES NFR BLD AUTO: 0 % (ref 0–0.5)
LYMPHOCYTES # BLD: 1.5 K/UL (ref 0.8–3.5)
LYMPHOCYTES NFR BLD: 20 % (ref 12–49)
MCH RBC QN AUTO: 30.4 PG (ref 26–34)
MCHC RBC AUTO-ENTMCNC: 32.3 G/DL (ref 30–36.5)
MCV RBC AUTO: 94.1 FL (ref 80–99)
MONOCYTES # BLD: 0.7 K/UL (ref 0–1)
MONOCYTES NFR BLD: 9 % (ref 5–13)
NEUTS SEG # BLD: 5.2 K/UL (ref 1.8–8)
NEUTS SEG NFR BLD: 68 % (ref 32–75)
NRBC # BLD: 0 K/UL (ref 0–0.01)
NRBC BLD-RTO: 0 PER 100 WBC
P-R INTERVAL, ECG05: 180 MS
PLATELET # BLD AUTO: 195 K/UL (ref 150–400)
PMV BLD AUTO: 10.1 FL (ref 8.9–12.9)
POTASSIUM SERPL-SCNC: 4.1 MMOL/L (ref 3.5–5.1)
PROT SERPL-MCNC: 7.4 G/DL (ref 6.4–8.2)
Q-T INTERVAL, ECG07: 454 MS
QRS DURATION, ECG06: 142 MS
QTC CALCULATION (BEZET), ECG08: 486 MS
RBC # BLD AUTO: 4.27 M/UL (ref 3.8–5.2)
SAMPLES BEING HELD,HOLD: NORMAL
SODIUM SERPL-SCNC: 143 MMOL/L (ref 136–145)
TROPONIN I SERPL-MCNC: <0.05 NG/ML
VENTRICULAR RATE, ECG03: 69 BPM
WBC # BLD AUTO: 7.7 K/UL (ref 3.6–11)

## 2018-10-03 PROCEDURE — 93005 ELECTROCARDIOGRAM TRACING: CPT

## 2018-10-03 PROCEDURE — 85025 COMPLETE CBC W/AUTO DIFF WBC: CPT | Performed by: EMERGENCY MEDICINE

## 2018-10-03 PROCEDURE — 96374 THER/PROPH/DIAG INJ IV PUSH: CPT

## 2018-10-03 PROCEDURE — 80053 COMPREHEN METABOLIC PANEL: CPT | Performed by: EMERGENCY MEDICINE

## 2018-10-03 PROCEDURE — 84484 ASSAY OF TROPONIN QUANT: CPT | Performed by: EMERGENCY MEDICINE

## 2018-10-03 PROCEDURE — 74011250636 HC RX REV CODE- 250/636: Performed by: PHYSICIAN ASSISTANT

## 2018-10-03 RX ORDER — HYDRALAZINE HYDROCHLORIDE 20 MG/ML
20 INJECTION INTRAMUSCULAR; INTRAVENOUS
Status: COMPLETED | OUTPATIENT
Start: 2018-10-03 | End: 2018-10-03

## 2018-10-03 RX ORDER — HYDRALAZINE HYDROCHLORIDE 20 MG/ML
INJECTION INTRAMUSCULAR; INTRAVENOUS
Status: DISCONTINUED
Start: 2018-10-03 | End: 2018-10-03 | Stop reason: HOSPADM

## 2018-10-03 RX ADMIN — HYDRALAZINE HYDROCHLORIDE 20 MG: 20 INJECTION INTRAMUSCULAR; INTRAVENOUS at 01:26

## 2018-10-03 NOTE — DISCHARGE INSTRUCTIONS
Home Blood Pressure Test: About This Test  What is it? A home blood pressure test allows you to keep track of your blood pressure at home. Blood pressure is a measure of the force of blood against the walls of your arteries. Blood pressure readings include two numbers, such as 130/80 (say \"130 over 80\"). The first number is the systolic pressure. The second number is the diastolic pressure. Why is this test done? You may do this test at home to:  · Find out if you have high blood pressure. · Track your blood pressure if you have high blood pressure. · Track how well medicine is working to reduce high blood pressure. · Check how lifestyle changes, such as weight loss and exercise, are affecting blood pressure. How can you prepare for the test?  · Do not use caffeine, tobacco, or medicines known to raise blood pressure (such as nasal decongestant sprays) for at least 30 minutes before taking your blood pressure. · Do not exercise for at least 30 minutes before taking your blood pressure. What happens before the test?  Take your blood pressure while you feel comfortable and relaxed. Sit quietly with both feet on the floor for at least 5 minutes before the test.  What happens during the test?  · Sit with your arm slightly bent and resting on a table so that your upper arm is at the same level as your heart. · Roll up your sleeve or take off your shirt to expose your upper arm. · Wrap the blood pressure cuff around your upper arm so that the lower edge of the cuff is about 1 inch above the bend of your elbow. Proceed with the following steps depending on if you are using an automatic or manual pressure monitor. Automatic blood pressure monitors  · Press the on/off button on the automatic monitor and wait until the ready-to-measure \"heart\" symbol appears next to zero in the display window. · Press the start button. The cuff will inflate and deflate by itself.   · Your blood pressure numbers will appear on the screen. · Write your numbers in your log book, along with the date and time. Manual blood pressure monitors  · Place the earpieces of a stethoscope in your ears, and place the bell of the stethoscope over the artery, just below the cuff. · Close the valve on the rubber inflating bulb. · Squeeze the bulb rapidly with your opposite hand to inflate the cuff until the dial or column of mercury reads about 30 mm Hg higher than your usual systolic pressure. If you do not know your usual pressure, inflate the cuff to 210 mm Hg or until the pulse at your wrist disappears. · Open the pressure valve just slightly by twisting or pressing the valve on the bulb. · As you watch the pressure slowly fall, note the level on the dial at which you first start to hear a pulsing or tapping sound through the stethoscope. This is your systolic blood pressure. · Continue letting the air out slowly. The sounds will become muffled and will finally disappear. Note the pressure when the sounds completely disappear. This is your diastolic blood pressure. Let out all the remaining air. · Write your numbers in your log book, along with the date and time. What else should you know about the test?  Here are the categories of blood pressure for adults:  Ideal blood pressure. Systolic is less than 824, and diastolic is less than 80. Elevated blood pressure. Systolic is 761 to 901, and diastolic is less than 80. High blood pressure (hypertension). Systolic is 629 or above. Diastolic is 80 or above. One or both numbers may be high. It is more accurate to take the average of several readings made throughout the day than to rely on a single reading. Follow-up care is a key part of your treatment and safety. Be sure to make and go to all appointments, and call your doctor if you are having problems. It's also a good idea to keep a list of the medicines you take. Where can you learn more?   Go to http://iker-isaac.info/. Enter C427 in the search box to learn more about \"Home Blood Pressure Test: About This Test.\"  Current as of: December 6, 2017  Content Version: 11.7  © 2773-2204 [employer]. Care instructions adapted under license by [employer] (which disclaims liability or warranty for this information). If you have questions about a medical condition or this instruction, always ask your healthcare professional. [facility] any warranty or liability for your use of this information. We hope that we have addressed all of your medical concerns. The examination and treatment you received in the Emergency Department were for an emergent problem and were not intended as complete care. It is important that you follow up with your healthcare provider(s) for ongoing care. If your symptoms worsen or do not improve as expected, and you are unable to reach your usual health care provider(s), you should return to the Emergency Department. Today's healthcare is undergoing tremendous change, and patient satisfaction surveys are one of the many tools to assess the quality of medical care. You may receive a survey from the [employer] organization regarding your experience in the Emergency Department. I hope that your experience has been completely positive, particularly the medical care that I provided. As such, please participate in the survey; anything less than excellent does not meet my expectations or intentions. [facility] participate in nationally recognized quality of care measures. If your blood pressure is greater than 120/80, as reported below, we urge that you seek medical care to address the potential of high blood pressure, commonly known as hypertension.    Hypertension can be hereditary or can be caused by certain medical conditions, pain, stress, or \"white coat syndrome. \"       Please make an appointment with your health care provider(s) for follow up of your Emergency Department visit. VITALS:   Patient Vitals for the past 8 hrs:   Temp Pulse Resp BP SpO2   10/03/18 0115 - 63 21 182/74 91 %   10/03/18 0045 - 61 17 192/80 95 %   10/03/18 0030 - 69 19 193/87 93 %   10/03/18 0015 - 72 26 (!) 212/90 93 %   10/03/18 0004 97.9 °F (36.6 °C) 69 16 (!) 236/107 97 %          Thank you for allowing us to provide you with medical care today. We realize that you have many choices for your emergency care needs. Please choose us in the future for any continued health care needs. Venus Farfan, [address] [place], [place] [address]y [address].   Office: [phone]            Recent Results (from the past 24 hour(s))   SAMPLES BEING HELD    Collection Time: 10/03/18 12:21 AM   Result Value Ref Range    SAMPLES BEING HELD RD,MELE,SST,LAV,PST,DRK GRN     COMMENT        Add-on orders for these samples will be processed based on acceptable specimen integrity and analyte stability, which may vary by analyte. CBC WITH AUTOMATED DIFF    Collection Time: 10/03/18 12:21 AM   Result Value Ref Range    WBC 7.7 3.6 - 11.0 K/uL    RBC 4.27 3.80 - 5.20 M/uL    HGB 13.0 11.5 - 16.0 g/dL    HCT 40.2 35.0 - 47.0 %    MCV 94.1 80.0 - 99.0 FL    MCH 30.4 26.0 - 34.0 PG    MCHC 32.3 30.0 - 36.5 g/dL    RDW 13.0 11.5 - 14.5 %    PLATELET 443 341 - 090 K/uL    MPV 10.1 8.9 - 12.9 FL    NRBC 0.0 0  WBC    ABSOLUTE NRBC 0.00 0.00 - 0.01 K/uL    NEUTROPHILS 68 32 - 75 %    LYMPHOCYTES 20 12 - 49 %    MONOCYTES 9 5 - 13 %    EOSINOPHILS 3 0 - 7 %    BASOPHILS 1 0 - 1 %    IMMATURE GRANULOCYTES 0 0.0 - 0.5 %    ABS. NEUTROPHILS 5.2 1.8 - 8.0 K/UL    ABS. LYMPHOCYTES 1.5 0.8 - 3.5 K/UL    ABS. MONOCYTES 0.7 0.0 - 1.0 K/UL    ABS. EOSINOPHILS 0.2 0.0 - 0.4 K/UL    ABS. BASOPHILS 0.0 0.0 - 0.1 K/UL    ABS. IMM.  GRANS. 0.0 0.00 - 0.04 K/UL    DF AUTOMATED     METABOLIC PANEL, COMPREHENSIVE    Collection Time: 10/03/18 12:21 AM   Result Value Ref Range    Sodium 143 136 - 145 mmol/L    Potassium 4.1 3.5 - 5.1 mmol/L    Chloride 106 97 - 108 mmol/L    CO2 29 21 - 32 mmol/L    Anion gap 8 5 - 15 mmol/L    Glucose 165 (H) 65 - 100 mg/dL    BUN 20 6 - 20 MG/DL    Creatinine 1.13 (H) 0.55 - 1.02 MG/DL    BUN/Creatinine ratio 18 12 - 20      GFR est AA 57 (L) >60 ml/min/1.73m2    GFR est non-AA 47 (L) >60 ml/min/1.73m2    Calcium 9.6 8.5 - 10.1 MG/DL    Bilirubin, total 0.3 0.2 - 1.0 MG/DL    ALT (SGPT) 30 12 - 78 U/L    AST (SGOT) 27 15 - 37 U/L    Alk. phosphatase 84 45 - 117 U/L    Protein, total 7.4 6.4 - 8.2 g/dL    Albumin 3.7 3.5 - 5.0 g/dL    Globulin 3.7 2.0 - 4.0 g/dL    A-G Ratio 1.0 (L) 1.1 - 2.2     TROPONIN I    Collection Time: 10/03/18 12:21 AM   Result Value Ref Range    Troponin-I, Qt. <0.05 <0.05 ng/mL       No results found.

## 2018-10-03 NOTE — ED PROVIDER NOTES
HPI Comments: Nancy Lujan is a 70 y.o. female  who presents by private vehicle to ER with c/o Patient presents with: 
Hypertension Patient presents with complaints of elevated blood pressure. Patient reports that she has a history of hypertension and checks her blood pressure regularly and keeps track. Patient reports checking her blood pressure tonight and it was elevated. Patient denies any complaints. She specifically denies any fevers, chills, nausea, vomiting, chest pain, shortness of breath, headache, rash, diarrhea, abdominal pain, urinary/bowel changes, sweating or weight loss. PCP: Sherren Saner, MD  
PMHx significant for: Past Medical History: 
6/29/2018: Atrial fibrillation with RVR ([facility] [address] No date: DDD (degenerative disc disease) 7/3/2016: Gastroesophageal reflux disease without esopha* No date: GERD (gastroesophageal reflux disease) No date: Hiatal hernia 7/26/2016: HTN (hypertension), benign No date: Hypothyroidism 7/26/2016: ANÍBAL (obstructive sleep apnea) 8/28/2016: Paroxysmal atrial fibrillation ([facility] [address] No date: Uterine prolapse PSHx significant for: Past Surgical History: 
03/2017: HX AFIB ABLATION No date: HX GI Comment: COLONOSCOPY 7/14 No date: HX GYN Comment: TUBAL LIGATION No date: HX HEENT Comment: WISDOM TEETH EXTRACTED. Social Hx: Tobacco use: Smoking status: Former Smoker Packs/day: 1.50      Years: 30.00 Quit date: 3/3/1994 Smokeless status: Former User                    
; EtOH use: The patient states she drinks 0 per week.; Illicit Drug use: Allergies: 
 -- Citalopram Hydrobromide -- Rash 
  --  With itching. 
 -- Chlorthalidone -- Rash 
 -- Contrast Agent (Iodine) -- Other (comments) --  Wheezing/bronchospasm 
 -- Maxzide (Triamterene-Hydrochlorothiazid) -- Palpitations -- Vicodin (Hydrocodone-Acetaminophen) -- Palpitations There are no other complaints, changes or physical findings at this time. Patient is a 70 y.o. female presenting with hypertension. The history is provided by the patient. Hypertension This is a recurrent problem. The current episode started 1 to 2 hours ago. The problem has not changed since onset. Pertinent negatives include no chest pain, no orthopnea, no palpitations, no PND, no anxiety, no confusion, no malaise/fatigue, no blurred vision, no headaches, no tinnitus, no neck pain, no peripheral edema, no dizziness, no shortness of breath, no nausea and no vomiting. There are no associated agents to hypertension. Risk factors include a sedentary lifestyle, hypertension and postmenopause. Past Medical History:  
Diagnosis Date  Atrial fibrillation with RVR ([address] 6/29/2018  DDD (degenerative disc disease)  Gastroesophageal reflux disease without esophagitis 7/3/2016  GERD (gastroesophageal reflux disease)  Hiatal hernia   
 HTN (hypertension), benign 7/26/2016  Hypothyroidism  ANÍBAL (obstructive sleep apnea) 7/26/2016  Paroxysmal atrial fibrillation ([address] 8/28/2016  Uterine prolapse Past Surgical History:  
Procedure Laterality Date  HX AFIB ABLATION  03/2017  HX GI    
 COLONOSCOPY 7/14  HX GYN    
 TUBAL LIGATION  
 HX HEENT    
 WISDOM TEETH EXTRACTED. Family History:  
Problem Relation Age of Onset  Diabetes Mother  Hypertension Mother  COPD Mother Social History Social History  Marital status:  Spouse name: N/A  
 Number of children: N/A  
 Years of education: N/A Occupational History  Not on file. Social History Main Topics  Smoking status: Former Smoker Packs/day: 1.50 Years: 30.00 Quit date: 3/3/1994  Smokeless tobacco: Former User  Alcohol use No  
 Drug use: No  
 Sexual activity: No  
 
Other Topics Concern  Not on file Social History Narrative ALLERGIES: Citalopram hydrobromide; Chlorthalidone; Contrast agent [iodine]; Maxzide [triamterene-hydrochlorothiazid]; and Vicodin [hydrocodone-acetaminophen] Review of Systems Constitutional: Negative. Negative for malaise/fatigue. HENT: Negative. Negative for tinnitus. Eyes: Negative. Negative for blurred vision. Respiratory: Negative. Negative for shortness of breath. Cardiovascular: Negative. Negative for chest pain, palpitations, orthopnea and PND. Gastrointestinal: Negative. Negative for nausea and vomiting. Endocrine: Negative. Genitourinary: Negative. Musculoskeletal: Negative. Negative for neck pain. Skin: Negative. Allergic/Immunologic: Negative. Neurological: Negative. Negative for dizziness and headaches. Hematological: Negative. Psychiatric/Behavioral: Negative. Negative for confusion. All other systems reviewed and are negative. Vitals:  
 10/03/18 0004 BP: (!) 236/107 Pulse: 69 Resp: 16 Temp: 97.9 °F (36.6 °C) SpO2: 97% Weight: 85.7 kg (189 lb) Height: 5' 2\" (1.575 m) Physical Exam  
Constitutional: She is oriented to person, place, and time. She appears well-developed. HENT:  
Head: Normocephalic and atraumatic. Right Ear: External ear normal.  
Left Ear: External ear normal.  
Nose: Nose normal.  
Mouth/Throat: Oropharynx is clear and moist. No oropharyngeal exudate. Eyes: Conjunctivae, EOM and lids are normal. Right eye exhibits no discharge. Left eye exhibits no discharge. Neck: Normal range of motion. No tracheal deviation present. No thyromegaly present. Cardiovascular: Normal rate, regular rhythm, normal heart sounds and intact distal pulses. Pulmonary/Chest: Effort normal and breath sounds normal.  
Abdominal: Soft. Normal appearance and bowel sounds are normal.  
Musculoskeletal: Normal range of motion. Neurological: She is alert and oriented to person, place, and time. Skin: Skin is warm and dry. Psychiatric: She has a normal mood and affect. Judgment normal.  
  
 
MDM Number of Diagnoses or Management Options Essential hypertension:  
Diagnosis management comments: Assesment/Plan- 70 y.o. Patient presents with: 
Hypertension 
differential includes: essential hypertension, anxiousness. Labs  reviewed with mild elevation in glucose and creatinine. Recommend Cardiology follow up. Patient educated on reasons to return to the ED. Amount and/or Complexity of Data Reviewed Clinical lab tests: ordered and reviewed Tests in the medicine section of CPT®: ordered and reviewed ED Course Procedures

## 2018-10-04 ENCOUNTER — OFFICE VISIT (OUTPATIENT)
Dept: CARDIOLOGY CLINIC | Age: 78
End: 2018-10-04

## 2018-10-04 VITALS
BODY MASS INDEX: 34.82 KG/M2 | HEART RATE: 64 BPM | WEIGHT: 189.2 LBS | HEIGHT: 62 IN | DIASTOLIC BLOOD PRESSURE: 78 MMHG | OXYGEN SATURATION: 97 % | SYSTOLIC BLOOD PRESSURE: 120 MMHG

## 2018-10-04 DIAGNOSIS — Z86.79 S/P ABLATION OF ATRIAL FIBRILLATION: ICD-10-CM

## 2018-10-04 DIAGNOSIS — I87.2 VENOUS INSUFFICIENCY: ICD-10-CM

## 2018-10-04 DIAGNOSIS — G47.33 OSA ON CPAP: ICD-10-CM

## 2018-10-04 DIAGNOSIS — I10 HTN (HYPERTENSION), BENIGN: ICD-10-CM

## 2018-10-04 DIAGNOSIS — Z99.89 OSA ON CPAP: ICD-10-CM

## 2018-10-04 DIAGNOSIS — I47.1 SVT (SUPRAVENTRICULAR TACHYCARDIA) (HCC): ICD-10-CM

## 2018-10-04 DIAGNOSIS — Z98.890 S/P ABLATION OF ATRIAL FIBRILLATION: ICD-10-CM

## 2018-10-04 DIAGNOSIS — I48.0 PAROXYSMAL ATRIAL FIBRILLATION (HCC): Primary | ICD-10-CM

## 2018-10-04 DIAGNOSIS — I45.10 RBBB: ICD-10-CM

## 2018-10-04 RX ORDER — FLECAINIDE ACETATE 50 MG/1
TABLET ORAL 2 TIMES DAILY
COMMUNITY
End: 2018-11-27 | Stop reason: SDUPTHER

## 2018-10-04 RX ORDER — CANDESARTAN 16 MG/1
16 TABLET ORAL DAILY
Qty: 30 TAB | Refills: 3 | Status: SHIPPED | OUTPATIENT
Start: 2018-10-04 | End: 2019-01-20 | Stop reason: SDUPTHER

## 2018-10-04 NOTE — PROGRESS NOTES
[id number] [facility], [address] [facility]    Office [phone]  Fax [phone]        Lamberto Miller is a 70 y.o. female. Last seen by me 3 months ago. Problem List  Date Reviewed: 8/15/2018          Codes Class Noted    Anxiety ICD-10-CM: F41.9  ICD-9-CM: 300.00  7/22/2018        Severe obesity (BMI 35.0-39. 9) with comorbidity ([facility] [address] ICD-10-CM: E66.01  ICD-9-CM: 278.01  5/25/2018        SVT (supraventricular tachycardia) ([facility] [address] ICD-10-CM: I47.1  ICD-9-CM: 427.89  8/28/2017        Hiatal hernia ICD-10-CM: K44.9  ICD-9-CM: 553.3  8/28/2017        Venous insufficiency ICD-10-CM: I87.2  ICD-9-CM: 459.81  5/4/2017        Acquired hypothyroidism ICD-10-CM: E03.9  ICD-9-CM: 244.9  5/1/2017        S/P ablation of atrial fibrillation ICD-10-CM: Z98.890, Z86.79  ICD-9-CM: V45.89  4/28/2017        Paroxysmal atrial fibrillation (HCC) ICD-10-CM: I48.0  ICD-9-CM: 427.31  8/28/2016        Gastroesophageal reflux disease without esophagitis ICD-10-CM: K21.9  ICD-9-CM: 530.81  7/3/2016        RBBB ICD-10-CM: I45.10  ICD-9-CM: 426.4  5/7/2015        LAE (left atrial enlargement) ICD-10-CM: I51.7  ICD-9-CM: 429.3  4/3/2013             Cardiac testing  Adenosine myoview 2011 - normal perfusion, EF 81%  Echocardiogram 2/22/13 - normal left ventricular size and function, normal appearing mitral, aortic, and tricuspid valves, with mild mitral and moderate tricuspid regurgitation, bi-atrial enlargement  Lexiscan 3/3/2014 - normal perfusion EF 83%  Echo: 5/15/15- 60%, mildly dilated LA, mild MR  Cardioversion 7/23/2015 - 200J  Lexiscan cardiolite 5/10/16 - normal perfusion, EF 78%  Renal Visceral Duplex 5/2016 - No evidence of ONOFRE  3/2/17-. Successful atrial fibrillation ablation however unable to achieve entrance/exit block of LSPV per Dr. Venegas Erp    Echo 3/8/18 - EF 65%. No WMA. Normal RV size and function. Mild MR. Mild TR. EKG 7/6/18 - AF 80s, RBBB    HPI  Ms. Ojeda experienced an accelerated bout of HTN 2 days ago requiring ED evaluation. She was given IV hydralazine and subsequently discharged. Overall, her BPs at home have been in the 110-140 range, occasionally higher or lower. She is currently taking Clonidine only once daily at night. She reports a cough and itching that is worse at night, which she attributes to her Lisinopril. She feels better overall with Lexapro. She is adherent with Flecainide, with no episodes of AF. She is also using Eliquis, with no bleeding concerns. She has been trying to walk regularly. Patient denies any exertional chest pain, dyspnea, palpitations, syncope, orthopnea, edema or paroxysmal nocturnal dyspnea. Past Medical History:   Diagnosis Date    Atrial fibrillation with RVR ([facility] [address] 6/29/2018    DDD (degenerative disc disease)     Gastroesophageal reflux disease without esophagitis 7/3/2016    GERD (gastroesophageal reflux disease)     Hiatal hernia     HTN (hypertension), benign 7/26/2016    Hypothyroidism     ANÍBAL (obstructive sleep apnea) 7/26/2016    Paroxysmal atrial fibrillation ([facility] [address] 8/28/2016    Uterine prolapse       Current Outpatient Prescriptions on File Prior to Visit   Medication Sig Dispense Refill    lisinopril (PRINIVIL, ZESTRIL) 20 mg tablet take 1 tablet by mouth once daily 30 Tab 5    dilTIAZem (CARDIZEM) 60 mg tablet Take 1 Tab by mouth Before breakfast, lunch, and dinner. 90 Tab 3    flecainide (TAMBOCOR) 100 mg tablet Take 0.5 Tabs by mouth two (2) times a day. 60 Tab 2    carvedilol (COREG) 3.125 mg tablet Take 1 Tab by mouth two (2) times daily (with meals). 60 Tab 3    escitalopram oxalate (LEXAPRO) 5 mg tablet Take  by mouth daily.  cloNIDine HCl (CATAPRES) 0.1 mg tablet Take 1 Tab by mouth two (2) times a day. Hold for SBP < 100 60 Tab 3    levothyroxine (SYNTHROID) 50 mcg tablet Take 50 mcg by mouth Daily (before breakfast).       calcium-vitamin D (OYSTER SHELL) 500 mg(1,250mg) -200 unit per tablet Take 1 Tab by mouth every evening.  apixaban (ELIQUIS) 5 mg tablet take 1 tablet by mouth twice a day 60 Tab 11    lansoprazole (PREVACID) 30 mg capsule Take 30 mg by mouth nightly.  FOLIC ACID/MULTIVIT-MIN/LUTEIN (CENTRUM SILVER PO) Take 1 Tab by mouth every evening.  ezetimibe (ZETIA) 10 mg tablet Take 10 mg by mouth every evening. No current facility-administered medications on file prior to visit. Allergies   Allergen Reactions    Citalopram Hydrobromide Rash     With itching.  Chlorthalidone Rash    Contrast Agent [Iodine] Other (comments)     Wheezing/bronchospasm    Maxzide [Triamterene-Hydrochlorothiazid] Palpitations    Vicodin [Hydrocodone-Acetaminophen] Palpitations     , former smoker     Review of Systems  Constitutional: Negative for fever, chills, and diaphoresis. Respiratory: Negative for cough, hemoptysis, sputum production, and wheezing.  +palpitations  Cardiovascular: Negative for orthopnea, claudication,leg swelling and PND. Gastrointestinal: Negative for heartburn, nausea, vomiting, blood in stool and melena. Genitourinary: Negative for dysuria and flank pain. Musculoskeletal: Negative for joint pain and back pain. Skin: Negative for rash. Neurological: Negative for focal weakness, seizures, loss of consciousness, weakness. Endo/Heme/Allergies: Does not bruise/bleed easily. Psychiatric/Behavioral: Negative for memory loss. +stress     Visit Vitals    Ht 5' 2\" (1.575 m)      Wt Readings from Last 3 Encounters:   10/03/18 189 lb (85.7 kg)   08/15/18 190 lb 9.6 oz (86.5 kg)   08/15/18 188 lb (85.3 kg)      Physical Exam   Constitutional: She is oriented to person, place, and time. She appears well-developed and well-nourished. Neck: Neck supple. No JVD present. Cardiovascular: regular, normal S1S2, no murmur  Pulses: Carotid pulses are 2+ on the right side, and 2+ on the left side.  Dorsalis pedis pulses are 2+ on the right side, and 2+ on the left side. Pulmonary/Chest: Effort normal and breath sounds normal. She has no wheezes. She has no rales. Abdominal: Soft. Bowel sounds are normal. There is no tenderness. There is no rebound. Musculoskeletal: She exhibits no edema. Neurological: She is alert and oriented to person, place, and time. Skin: Skin is warm and dry. Psychiatric: She has a normal mood and affect. Lab Results   Component Value Date/Time    Sodium 143 10/03/2018 12:21 AM    Potassium 4.1 10/03/2018 12:21 AM    Chloride 106 10/03/2018 12:21 AM    CO2 29 10/03/2018 12:21 AM    Anion gap 8 10/03/2018 12:21 AM    Glucose 165 (H) 10/03/2018 12:21 AM    BUN 20 10/03/2018 12:21 AM    Creatinine 1.13 (H) 10/03/2018 12:21 AM    BUN/Creatinine ratio 18 10/03/2018 12:21 AM    GFR est AA 57 (L) 10/03/2018 12:21 AM    GFR est non-AA 47 (L) 10/03/2018 12:21 AM    Calcium 9.6 10/03/2018 12:21 AM    Bilirubin, total 0.3 10/03/2018 12:21 AM    AST (SGOT) 27 10/03/2018 12:21 AM    Alk. phosphatase 84 10/03/2018 12:21 AM    Protein, total 7.4 10/03/2018 12:21 AM    Albumin 3.7 10/03/2018 12:21 AM    Globulin 3.7 10/03/2018 12:21 AM    A-G Ratio 1.0 (L) 10/03/2018 12:21 AM    ALT (SGPT) 30 10/03/2018 12:21 AM     Cardiographics   EKG 4/13 - normal  EKG 5/6/15 - AF with ventricular rate 96, RBBB  EKG 6/3/15 - A-fib rate 70s, RBBB, Rightward axis  EKG 8/31/15 - SR, RBBB  EKG 12/7/15 - SR, RBBB  EKG 7/26/16 - AF 80s, RBBB, rightward axis, unchanged from 7/23/16   EKG 11/28/16 - SR 60   EKG 12/05/16-AF 80-90, RBBB   EKG 01/10/17- AF 70s  EKG 8/25/17- SR 60, RBBB rightward axis. EKG 7/6/18 - AF 80s, RBBB  EKG 10/4/18 - SR 58, first degree AV block, RBBB, no significant change from yesterday    ASSESSMENT and PLAN  Encounter Diagnoses   Name Primary?     Paroxysmal atrial fibrillation (HCC) Yes    SVT (supraventricular tachycardia) (HCC)     Venous insufficiency     HTN (hypertension), benign     ANÍBAL on CPAP     S/P ablation of atrial fibrillation     RBBB      1. Paroxysmal AF. No symptomatic recurrence in 3 months with increased Flecainide. Would continue medical therapy. [place] ablation for frequent recurrences. 2. HTN. BPs are remarkably good on a multidrug regimen. Unclear trigger for recent BP acceleration. Will replace Lisinopril with Atacand 16 mg/d due to cough and itching. 3. Anxiety disorder. She seems to be doing well with low dose Lexapro. Follow-up Disposition:  Return in about 3 months (around 1/4/2019). Written by Iain Martel, as dictated by Dr. Yoanna Orozco.      Yoanna Orozco MD

## 2018-10-04 NOTE — PROGRESS NOTES
Patient has some shortness of breath today. Patient complains of a cough and itching.     Visit Vitals    /78 (BP 1 Location: Right arm, BP Patient Position: Sitting)    Pulse 64    Ht 5' 2\" (1.575 m)    Wt 189 lb 3.2 oz (85.8 kg)    SpO2 97%    BMI 34.61 kg/m2

## 2018-10-04 NOTE — PROGRESS NOTES
[id number] [facility], [address] [facility] Office [phone] Fax [phone] Susan Islas is a 70 y.o. female. Last seen 3 months ago. Problem List  Date Reviewed: 8/15/2018 Codes Class Noted Anxiety ICD-10-CM: F41.9 ICD-9-CM: 300.00  7/22/2018 Severe obesity (BMI 35.0-39. 9) with comorbidity ([address] ICD-10-CM: E66.01 
ICD-9-CM: 278.01  5/25/2018 SVT (supraventricular tachycardia) (HCC) ICD-10-CM: I47.1 ICD-9-CM: 427.89  8/28/2017 Hiatal hernia ICD-10-CM: K44.9 ICD-9-CM: 553.3  8/28/2017 Venous insufficiency ICD-10-CM: I87.2 ICD-9-CM: 459.81  5/4/2017 Acquired hypothyroidism ICD-10-CM: E03.9 ICD-9-CM: 244.9  5/1/2017 S/P ablation of atrial fibrillation ICD-10-CM: Z98.890, Z86.79 
ICD-9-CM: V45.89  4/28/2017 Paroxysmal atrial fibrillation (HCC) ICD-10-CM: I48.0 ICD-9-CM: 427.31  8/28/2016 Gastroesophageal reflux disease without esophagitis ICD-10-CM: K21.9 ICD-9-CM: 530.81  7/3/2016 RBBB ICD-10-CM: I45.10 ICD-9-CM: 426.4  5/7/2015 LAE (left atrial enlargement) ICD-10-CM: I51.7 ICD-9-CM: 429.3  4/3/2013 Cardiac testing Adenosine myoview 2011 - normal perfusion, EF 81% Echocardiogram 2/22/13 - normal left ventricular size and function, normal appearing mitral, aortic, and tricuspid valves, with mild mitral and moderate tricuspid regurgitation, bi-atrial enlargement Lexiscan 3/3/2014 - normal perfusion EF 83% Echo: 5/15/15- 60%, mildly dilated LA, mild MR Cardioversion 7/23/2015 - 200J Lexiscan cardiolite 5/10/16 - normal perfusion, EF 78% Renal Visceral Duplex 5/2016 - No evidence of ONOFRE 
3/2/17-. Successful atrial fibrillation ablation however unable to achieve entrance/exit block of LSPV per Dr. Mitchell Net Echo 3/8/18 - EF 65%. No WMA. Normal RV size and function. Mild MR. Mild TR.  
EKG 7/6/18 - AF 80s, RBBB 
 
HPI 
 Ms. Crow Gotti states last night her heart rate increased from the 50s to the 100s with a peak BP on 168/107. Resumption of Coreg was advised, but she self-discontinued after 1 dose due to \"feeling like a zombie\" and experiencing visual disturbances. She is interested in scheduling an ablation. She is also adherent with Flecainide 100 bid, recently increased, after recurrence of AF s/p DCCV She has been active around her yard, with no exertional symptoms. Patient has been experiencing stress. She tried Lexapro, but states Dr. Lalo Martin told her to stop it. She is interested in discussing this with Joao Alvarez MD. 
 
She denies any exertional chest pain, , syncope, orthopnea, edema or paroxysmal nocturnal dyspnea. Past Medical History:  
Diagnosis Date  Atrial fibrillation with RVR ([address] 6/29/2018  DDD (degenerative disc disease)  Gastroesophageal reflux disease without esophagitis 7/3/2016  GERD (gastroesophageal reflux disease)  Hiatal hernia   
 HTN (hypertension), benign 7/26/2016  Hypothyroidism  ANÍBAL (obstructive sleep apnea) 7/26/2016  Paroxysmal atrial fibrillation ([facility] [address] 8/28/2016  Uterine prolapse Current Outpatient Prescriptions on File Prior to Visit Medication Sig Dispense Refill  lisinopril (PRINIVIL, ZESTRIL) 20 mg tablet take 1 tablet by mouth once daily 30 Tab 5  
 dilTIAZem (CARDIZEM) 60 mg tablet Take 1 Tab by mouth Before breakfast, lunch, and dinner. 90 Tab 3  
 flecainide (TAMBOCOR) 100 mg tablet Take 0.5 Tabs by mouth two (2) times a day. 60 Tab 2  carvedilol (COREG) 3.125 mg tablet Take 1 Tab by mouth two (2) times daily (with meals). 60 Tab 3  
 escitalopram oxalate (LEXAPRO) 5 mg tablet Take  by mouth daily.  cloNIDine HCl (CATAPRES) 0.1 mg tablet Take 1 Tab by mouth two (2) times a day. Hold for SBP < 100 60 Tab 3  
 levothyroxine (SYNTHROID) 50 mcg tablet Take 50 mcg by mouth Daily (before breakfast).  calcium-vitamin D (OYSTER SHELL) 500 mg(1,250mg) -200 unit per tablet Take 1 Tab by mouth every evening.  apixaban (ELIQUIS) 5 mg tablet take 1 tablet by mouth twice a day 60 Tab 11  
 lansoprazole (PREVACID) 30 mg capsule Take 30 mg by mouth nightly.  FOLIC ACID/MULTIVIT-MIN/LUTEIN (CENTRUM SILVER PO) Take 1 Tab by mouth every evening.  ezetimibe (ZETIA) 10 mg tablet Take 10 mg by mouth every evening. No current facility-administered medications on file prior to visit. Allergies Allergen Reactions  Citalopram Hydrobromide Rash With itching.  Chlorthalidone Rash  Contrast Agent [Iodine] Other (comments) Wheezing/bronchospasm  Maxzide [Triamterene-Hydrochlorothiazid] Palpitations  Vicodin [Hydrocodone-Acetaminophen] Palpitations , former smoker Review of Systems Constitutional: Negative for fever, chills, and diaphoresis. Respiratory: Negative for cough, hemoptysis, sputum production, and wheezing.  +palpitations Cardiovascular: Negative for orthopnea, claudication,leg swelling and PND. Gastrointestinal: Negative for heartburn, nausea, vomiting, blood in stool and melena. Genitourinary: Negative for dysuria and flank pain. Musculoskeletal: Negative for joint pain and back pain. Skin: Negative for rash. Neurological: Negative for focal weakness, seizures, loss of consciousness, weakness. Endo/Heme/Allergies: Does not bruise/bleed easily. Psychiatric/Behavioral: Negative for memory loss. +stress There were no vitals taken for this visit. Wt Readings from Last 3 Encounters:  
10/03/18 189 lb (85.7 kg) 08/15/18 190 lb 9.6 oz (86.5 kg) 08/15/18 188 lb (85.3 kg) Physical Exam  
Constitutional: She is oriented to person, place, and time. She appears well-developed and well-nourished. Neck: Neck supple. No JVD present. Cardiovascular: Irregularly irregular Pulses: Carotid pulses are 2+ on the right side, and 2+ on the left side. Dorsalis pedis pulses are 2+ on the right side, and 2+ on the left side. Pulmonary/Chest: Effort normal and breath sounds normal. She has no wheezes. She has no rales. Abdominal: Soft. Bowel sounds are normal. There is no tenderness. There is no rebound. Musculoskeletal: She exhibits no edema. Neurological: She is alert and oriented to person, place, and time. Skin: Skin is warm and dry. Psychiatric: She has a normal mood and affect. Lab Results Component Value Date/Time Sodium 143 10/03/2018 12:21 AM  
 Potassium 4.1 10/03/2018 12:21 AM  
 Chloride 106 10/03/2018 12:21 AM  
 CO2 29 10/03/2018 12:21 AM  
 Anion gap 8 10/03/2018 12:21 AM  
 Glucose 165 (H) 10/03/2018 12:21 AM  
 BUN 20 10/03/2018 12:21 AM  
 Creatinine 1.13 (H) 10/03/2018 12:21 AM  
 BUN/Creatinine ratio 18 10/03/2018 12:21 AM  
 GFR est AA 57 (L) 10/03/2018 12:21 AM  
 GFR est non-AA 47 (L) 10/03/2018 12:21 AM  
 Calcium 9.6 10/03/2018 12:21 AM  
 Bilirubin, total 0.3 10/03/2018 12:21 AM  
 AST (SGOT) 27 10/03/2018 12:21 AM  
 Alk. phosphatase 84 10/03/2018 12:21 AM  
 Protein, total 7.4 10/03/2018 12:21 AM  
 Albumin 3.7 10/03/2018 12:21 AM  
 Globulin 3.7 10/03/2018 12:21 AM  
 A-G Ratio 1.0 (L) 10/03/2018 12:21 AM  
 ALT (SGPT) 30 10/03/2018 12:21 AM  
 
Cardiographics EKG 4/13 - normal 
EKG 5/6/15 - AF with ventricular rate 96, RBBB EKG 6/3/15 - A-fib rate 70s, RBBB, Rightward axis EKG 8/31/15 - SR, RBBB EKG 12/7/15 - SR, RBBB EKG 7/26/16 - AF 80s, RBBB, rightward axis, unchanged from 7/23/16 EKG 11/28/16 - SR 60 EKG 12/05/16-AF 80-90, RBBB EKG 01/10/17- AF 76s EKG 8/25/17- SR 60, RBBB rightward axis. EKG 7/6/18 - AF 80s, RBBB ASSESSMENT and PLAN No diagnosis found. 1. AF. Early recurrence after cardioversion 3 days ago and increased Flecainide. Her rate is controlled.  Favor consideration of repeat ablation. She will see Dr. Julisa Rajput next week. Continue Flecainide for now. Resume Coreg at a lower dose - 3.125 BID. 2. HTN. Labile BPs at home, but normotensive today. Continue current medical regiment. She tends to self adjust her medications. Stress and anxiety play a large role. 3. Anxiety disorder. Urged her to follow up with Stephany Mcclelland MD. I think she would benefit from SNRI therapy. I also encouraged deep breathing exercises. Follow-up Disposition: Not on File Written by Herman Dolan, as dictated by Dr. Tarsha Dubose. Henry Moncada

## 2018-10-04 NOTE — MR AVS SNAPSHOT
[address] [facility] [address] [facility] 
[phone] Patient: Susan Islas 
MRN: [id number] PED:6[birth year]96[birth year] Visit Information Date & Time Provider Department Dept. Phone Encounter #  
 10/4/2018  1:40 PM Anusha Patel MD [facility] [place] [phone] [id number] Follow-up Instructions Return in about 3 months (around 1/4/2019). Your Appointments 11/28/2018  2:40 PM  
ESTABLISHED PATIENT with Noah Ramos MD  
[facility] ([facility]) Appt Note: 3 month follow up  
 [address] [facility] [address] [facility]  
[address] [facility] [address] [address] [facility] Upcoming Health Maintenance Date Due DTaP/Tdap/Td series (1 - Tdap) 2/12/1963 Shingrix Vaccine Age 50> (1 of 2) 2/12/1992 GLAUCOMA SCREENING Q2Y 2/12/2007 Bone Densitometry (Dexa) Screening 2/12/2007 Pneumococcal 65+ Low/Medium Risk (1 of 2 - PCV13) 2/12/2007 MEDICARE YEARLY EXAM 3/14/2018 Influenza Age 5 to Adult 8/1/2018 Allergies as of 10/4/2018  Review Complete On: 10/4/2018 By: Ann Sabillon  
  
 Severity Noted Reaction Type Reactions Citalopram Hydrobromide High 09/16/2014    Rash With itching. Chlorthalidone  04/03/2013    Rash Contrast Agent [Iodine]  03/08/2018    Other (comments) Wheezing/bronchospasm Maxzide [Triamterene-hydrochlorothiazid]  04/03/2013    Palpitations Vicodin [Hydrocodone-acetaminophen]  04/03/2013    Palpitations Current Immunizations  Reviewed on 7/6/2018 No immunizations on file. Not reviewed this visit You Were Diagnosed With   
  
 Codes Comments Paroxysmal atrial fibrillation (HCC)    -  Primary ICD-10-CM: I48.0 ICD-9-CM: 427.31   
 SVT (supraventricular tachycardia) (HCC)     ICD-10-CM: I47.1 ICD-9-CM: 427.89   
 Venous insufficiency     ICD-10-CM: I87.2 ICD-9-CM: 459.81 HTN (hypertension), benign     ICD-10-CM: I10 
ICD-9-CM: 401.1 ANÍBAL on CPAP     ICD-10-CM: G47.33, Z99.89 ICD-9-CM: 327.23, V46.8 S/P ablation of atrial fibrillation     ICD-10-CM: Z98.890, Z86.79 
ICD-9-CM: V45.89 Vitals BP Pulse Height(growth percentile) Weight(growth percentile) SpO2 BMI  
 120/78 (BP 1 Location: Right arm, BP Patient Position: Sitting) 64 5' 2\" (1.575 m) 189 lb 3.2 oz (85.8 kg) 97% 34.61 kg/m2 OB Status Smoking Status Postmenopausal Former Smoker Vitals History BMI and BSA Data Body Mass Index Body Surface Area  
 34.61 kg/m 2 1.94 m 2 Preferred Pharmacy Pharmacy Name Phone [place], [address] [facility] [phone] Your Updated Medication List  
  
   
This list is accurate as of 10/4/18  2:24 PM.  Always use your most recent med list.  
  
  
  
  
 apixaban 5 mg tablet Commonly known as:  ELIQUIS  
take 1 tablet by mouth twice a day  
  
 calcium-vitamin D 500 mg(1,250mg) -200 unit per tablet Commonly known as:  OYSTER SHELL Take 1 Tab by mouth every evening. carvedilol 3.125 mg tablet Commonly known as:  Gayatri Fozia Take 1 Tab by mouth two (2) times daily (with meals). CENTRUM SILVER PO Take 1 Tab by mouth every evening. cloNIDine HCl 0.1 mg tablet Commonly known as:  CATAPRES Take 1 Tab by mouth two (2) times a day. Hold for SBP < 100  
  
 dilTIAZem 60 mg tablet Commonly known as:  CARDIZEM Take 1 Tab by mouth Before breakfast, lunch, and dinner. flecainide 50 mg tablet Commonly known as:  TAMBOCOR Take  by mouth two (2) times a day. levothyroxine 50 mcg tablet Commonly known as:  SYNTHROID Take 50 mcg by mouth Daily (before breakfast). LEXAPRO 5 mg tablet Generic drug:  escitalopram oxalate Take  by mouth daily. lisinopril 20 mg tablet Commonly known as:  PRINIVIL, ZESTRIL  
take 1 tablet by mouth once daily PREVACID 30 mg capsule Generic drug:  lansoprazole Take 30 mg by mouth nightly. ZETIA 10 mg tablet Generic drug:  ezetimibe Take 10 mg by mouth every evening. We Performed the Following AMB POC EKG ROUTINE W/ 12 LEADS, INTER & REP [05554 CPT(R)] Follow-up Instructions Return in about 3 months (around 1/4/2019). Patient Instructions Replace Lisinopril with Atacand 16 mg daily in the morning. Introducing [facility] & HEALTH SERVICES! Dear Keith Del Real: Thank you for requesting a [employer] account. Our records indicate that you already have an active [employer] account. You can access your account anytime at [email or web address] Did you know that you can access your hospital and ER discharge instructions at any time in [employer]? You can also review all of your test results from your hospital stay or ER visit. Additional Information If you have questions, please visit the Frequently Asked Questions section of the [employer] website at [email or web address] Remember, [employer] is NOT to be used for urgent needs. For medical emergencies, dial 911. Now available from your iPhone and Android! Please provide this summary of care documentation to your next provider. Your primary care clinician is listed as Rhonda Ramos. If you have any questions after today's visit, please call [phone].

## 2018-11-19 RX ORDER — CARVEDILOL 3.12 MG/1
TABLET ORAL
Qty: 90 TAB | Refills: 1 | Status: SHIPPED | OUTPATIENT
Start: 2018-11-19 | End: 2019-02-11 | Stop reason: SDUPTHER

## 2018-11-27 RX ORDER — FLECAINIDE ACETATE 50 MG/1
50 TABLET ORAL 2 TIMES DAILY
Qty: 180 TAB | Refills: 1 | Status: SHIPPED | OUTPATIENT
Start: 2018-11-27 | End: 2019-05-28 | Stop reason: SDUPTHER

## 2018-11-27 NOTE — TELEPHONE ENCOUNTER
Refill is per verbal order of Dr. Johnney Dancer. Requested Prescriptions     Pending Prescriptions Disp Refills    flecainide (TAMBOCOR) 50 mg tablet 60 Tab 1     Sig: Take 1 Tab by mouth two (2) times a day.

## 2018-11-28 ENCOUNTER — OFFICE VISIT (OUTPATIENT)
Dept: CARDIOLOGY CLINIC | Age: 78
End: 2018-11-28

## 2018-11-28 VITALS
HEART RATE: 60 BPM | WEIGHT: 192 LBS | DIASTOLIC BLOOD PRESSURE: 62 MMHG | RESPIRATION RATE: 20 BRPM | HEIGHT: 62 IN | BODY MASS INDEX: 35.33 KG/M2 | SYSTOLIC BLOOD PRESSURE: 142 MMHG | OXYGEN SATURATION: 92 %

## 2018-11-28 DIAGNOSIS — I10 HTN (HYPERTENSION), BENIGN: ICD-10-CM

## 2018-11-28 DIAGNOSIS — Z86.79 S/P ABLATION OF ATRIAL FIBRILLATION: ICD-10-CM

## 2018-11-28 DIAGNOSIS — I45.10 RBBB: ICD-10-CM

## 2018-11-28 DIAGNOSIS — Z98.890 S/P ABLATION OF ATRIAL FIBRILLATION: ICD-10-CM

## 2018-11-28 DIAGNOSIS — I48.0 PAROXYSMAL ATRIAL FIBRILLATION (HCC): Primary | ICD-10-CM

## 2018-11-28 DIAGNOSIS — Z99.89 OSA ON CPAP: ICD-10-CM

## 2018-11-28 DIAGNOSIS — G47.33 OSA ON CPAP: ICD-10-CM

## 2018-11-28 NOTE — PROGRESS NOTES
Visit Vitals  /62   Pulse 60   Resp 20   Ht 5' 2\" (1.575 m)   Wt 192 lb (87.1 kg)   SpO2 92%   BMI 35.12 kg/m²     No complaints at this time.

## 2018-11-28 NOTE — PROGRESS NOTES
HISTORY OF PRESENTING ILLNESS      Damaris Garcia is a 70 y.o. female with ANÍBAL, HTN, RBBB and persistent AF s/p AF ablation 3/2/2017 (unable to isolate LSPV). She had previously been on Propafenone and underwent several cardioversions resulting in symptomatic improvement with NSR.  Echo demonstrated preserved LV function with LA diameter of 3.4cm in May 2015.  She reported shortness of breath post ablation and her amiodarone was discontinued. Recently she had some recurrence of AF, underwent DCCV and had ERAF after 3 days. Her diltiazem was increased and coreg was added to her regimen. She is currently on flecainide. She underwent a monitor which demonstrated episodes of atrial fibrillation with controlled ventricular rate. She is asymptomatic during these episodes. She did have some episodes of bradycardia during which she feels fatigued and tired. Last visit, we discussed options of repeat attempt at AF ablation to allow discontinuation of diltiazem and flecainide however she wished to avoid this. She reports feeling well and has noted improvement in her heart rates at home. ACTIVE PROBLEM LIST     Patient Active Problem List    Diagnosis Date Noted    Anxiety 07/22/2018    Severe obesity (BMI 35.0-39. 9) with comorbidity ([address] 05/25/2018    SVT (supraventricular tachycardia) ([address] 08/28/2017    Hiatal hernia 08/28/2017    Venous insufficiency 05/04/2017    Acquired hypothyroidism 05/01/2017    S/P ablation of atrial fibrillation 04/28/2017    Paroxysmal atrial fibrillation ([address] 08/28/2016    Gastroesophageal reflux disease without esophagitis 07/03/2016    RBBB 05/07/2015    LAE (left atrial enlargement) 04/03/2013           PAST MEDICAL HISTORY     Past Medical History:   Diagnosis Date    Atrial fibrillation with RVR ([address] 6/29/2018    DDD (degenerative disc disease)     Gastroesophageal reflux disease without esophagitis 7/3/2016    GERD (gastroesophageal reflux disease)  Hiatal hernia     HTN (hypertension), benign 2016    Hypothyroidism     ANÍBAL (obstructive sleep apnea) 2016    Paroxysmal atrial fibrillation ([address] 2016    Uterine prolapse            PAST SURGICAL HISTORY     Past Surgical History:   Procedure Laterality Date    HX AFIB ABLATION  2017    HX GI      COLONOSCOPY     HX GYN      TUBAL LIGATION    HX HEENT      WISDOM TEETH EXTRACTED. ALLERGIES     Allergies   Allergen Reactions    Citalopram Hydrobromide Rash     With itching.  Chlorthalidone Rash    Contrast Agent [Iodine] Other (comments)     Wheezing/bronchospasm    Maxzide [Triamterene-Hydrochlorothiazid] Palpitations    Vicodin [Hydrocodone-Acetaminophen] Palpitations          FAMILY HISTORY     Family History   Problem Relation Age of Onset    Diabetes Mother     Hypertension Mother     COPD Mother     negative for cardiac disease       SOCIAL HISTORY     Social History     Socioeconomic History    Marital status:      Spouse name: Not on file    Number of children: Not on file    Years of education: Not on file    Highest education level: Not on file   Tobacco Use    Smoking status: Former Smoker     Packs/day: 1.50     Years: 30.00     Pack years: 45.00     Last attempt to quit: 3/3/1994     Years since quittin.7    Smokeless tobacco: Former User   Substance and Sexual Activity    Alcohol use: No    Drug use: No    Sexual activity: No         MEDICATIONS     Current Outpatient Medications   Medication Sig    flecainide (TAMBOCOR) 50 mg tablet Take 1 Tab by mouth two (2) times a day.  carvedilol (COREG) 3.125 mg tablet take 1 tablet by mouth twice a day with meals    candesartan (ATACAND) 16 mg tablet Take 1 Tab by mouth daily.  dilTIAZem (CARDIZEM) 60 mg tablet Take 1 Tab by mouth Before breakfast, lunch, and dinner.  escitalopram oxalate (LEXAPRO) 5 mg tablet Take  by mouth daily.     cloNIDine HCl (CATAPRES) 0.1 mg tablet Take 1 Tab by mouth two (2) times a day. Hold for SBP < 100    levothyroxine (SYNTHROID) 50 mcg tablet Take 50 mcg by mouth Daily (before breakfast).  calcium-vitamin D (OYSTER SHELL) 500 mg(1,250mg) -200 unit per tablet Take 1 Tab by mouth every evening.  apixaban (ELIQUIS) 5 mg tablet take 1 tablet by mouth twice a day    lansoprazole (PREVACID) 30 mg capsule Take 30 mg by mouth nightly.  FOLIC ACID/MULTIVIT-MIN/LUTEIN (CENTRUM SILVER PO) Take 1 Tab by mouth every evening.  ezetimibe (ZETIA) 10 mg tablet Take 10 mg by mouth every evening. No current facility-administered medications for this visit. I have reviewed the nurses notes, vitals, problem list, allergy list, medical history, family, social history and medications. REVIEW OF SYMPTOMS      General: Pt denies excessive weight gain or loss. Pt is able to conduct ADL's  HEENT: Denies blurred vision, headaches, hearing loss, epistaxis and difficulty swallowing. Respiratory: Denies cough, congestion, shortness of breath, GOODE, wheezing or stridor. Cardiovascular: Denies precordial pain, palpitations, edema or PND  Gastrointestinal: Denies poor appetite, indigestion, abdominal pain or blood in stool  Genitourinary: Denies hematuria, dysuria, increased urinary frequency  Musculoskeletal: Denies joint pain or swelling from muscles or joints  Neurologic: Denies tremor, paresthesias, headache, or sensory motor disturbance  Psychiatric: Denies confusion, insomnia, depression  Integumentray: Denies rash, itching or ulcers. Hematologic: Denies easy bruising, bleeding       PHYSICAL EXAMINATION      There were no vitals filed for this visit. General: Well developed, in no acute distress. HEENT: No jaundice, oral mucosa moist, no oral ulcers  Neck: Supple, no stiffness, no lymphadenopathy, supple  Heart:  Normal S1/S2 negative S3 or S4.  Regular, no murmur, gallop or rub, no jugular venous distention  Respiratory: Clear bilaterally x 4, no wheezing or rales  Abdomen:   Soft, non-tender, bowel sounds are active.   Extremities:  No edema, normal cap refill, no cyanosis. Musculoskeletal: No clubbing, no deformities  Neuro: A&Ox3, speech clear, gait stable, cooperative, no focal neurologic deficits  Skin: Skin color is normal. No rashes or lesions. Non diaphoretic, moist.  Vascular: 2+ pulses symmetric in all extremities       DIAGNOSTIC DATA      EKG:        LABORATORY DATA      Lab Results   Component Value Date/Time    WBC 7.7 10/03/2018 12:21 AM    HGB 13.0 10/03/2018 12:21 AM    HCT 40.2 10/03/2018 12:21 AM    PLATELET 163 41/94/7960 12:21 AM    MCV 94.1 10/03/2018 12:21 AM      Lab Results   Component Value Date/Time    Sodium 143 10/03/2018 12:21 AM    Potassium 4.1 10/03/2018 12:21 AM    Chloride 106 10/03/2018 12:21 AM    CO2 29 10/03/2018 12:21 AM    Anion gap 8 10/03/2018 12:21 AM    Glucose 165 (H) 10/03/2018 12:21 AM    BUN 20 10/03/2018 12:21 AM    Creatinine 1.13 (H) 10/03/2018 12:21 AM    BUN/Creatinine ratio 18 10/03/2018 12:21 AM    GFR est AA 57 (L) 10/03/2018 12:21 AM    GFR est non-AA 47 (L) 10/03/2018 12:21 AM    Calcium 9.6 10/03/2018 12:21 AM    Bilirubin, total 0.3 10/03/2018 12:21 AM    AST (SGOT) 27 10/03/2018 12:21 AM    Alk. phosphatase 84 10/03/2018 12:21 AM    Protein, total 7.4 10/03/2018 12:21 AM    Albumin 3.7 10/03/2018 12:21 AM    Globulin 3.7 10/03/2018 12:21 AM    A-G Ratio 1.0 (L) 10/03/2018 12:21 AM    ALT (SGPT) 30 10/03/2018 12:21 AM           ASSESSMENT      1. Atrial fibrillation                         A. Persistent                        W. Symptomatic                        C.  AF ablation on 3/2/2017 (unable to achieve LSPV block)  2. Hiatal hernia  3. Hypertension   4. ANÍBAL  5. RBBB  6. GERD        PLAN     Will continue to monitor for symptomatic bradycardia for which would consider repeat ablation in an attempt to eliminate medical therapy.  She has follow up scheduled with Dr. Jeannie Valdes, therefore can see EP in one year or sooner if needed. FOLLOW-UP   1 year    Thank you, Michele Lopez MD for allowing me to participate in the care of this extraordinarily pleasant female. Please do not hesitate to contact me for further questions/concerns.        Joaquin Krabbe, NP [address] [facility]  [address]    [address] [facility]  [phone] / [phone] Fax   [phone] / [phone] Fax

## 2019-01-02 RX ORDER — DILTIAZEM HYDROCHLORIDE 60 MG/1
60 TABLET, FILM COATED ORAL
Qty: 90 TAB | Refills: 3 | Status: SHIPPED | OUTPATIENT
Start: 2019-01-02 | End: 2019-04-20 | Stop reason: SDUPTHER

## 2019-01-07 ENCOUNTER — OFFICE VISIT (OUTPATIENT)
Dept: CARDIOLOGY CLINIC | Age: 79
End: 2019-01-07

## 2019-01-07 VITALS
SYSTOLIC BLOOD PRESSURE: 138 MMHG | HEART RATE: 59 BPM | WEIGHT: 192.2 LBS | HEIGHT: 62 IN | DIASTOLIC BLOOD PRESSURE: 80 MMHG | BODY MASS INDEX: 35.37 KG/M2 | RESPIRATION RATE: 18 BRPM

## 2019-01-07 DIAGNOSIS — I47.1 SVT (SUPRAVENTRICULAR TACHYCARDIA) (HCC): ICD-10-CM

## 2019-01-07 DIAGNOSIS — G47.33 OSA ON CPAP: ICD-10-CM

## 2019-01-07 DIAGNOSIS — Z99.89 OSA ON CPAP: ICD-10-CM

## 2019-01-07 DIAGNOSIS — I45.10 RBBB: ICD-10-CM

## 2019-01-07 DIAGNOSIS — I10 HTN (HYPERTENSION), BENIGN: ICD-10-CM

## 2019-01-07 DIAGNOSIS — Z86.79 S/P ABLATION OF ATRIAL FIBRILLATION: ICD-10-CM

## 2019-01-07 DIAGNOSIS — I87.2 VENOUS INSUFFICIENCY: ICD-10-CM

## 2019-01-07 DIAGNOSIS — Z98.890 S/P ABLATION OF ATRIAL FIBRILLATION: ICD-10-CM

## 2019-01-07 DIAGNOSIS — I48.0 PAROXYSMAL ATRIAL FIBRILLATION (HCC): Primary | ICD-10-CM

## 2019-01-07 RX ORDER — CLONIDINE HYDROCHLORIDE 0.1 MG/1
0.1 TABLET ORAL DAILY
COMMUNITY
End: 2020-01-09 | Stop reason: SDUPTHER

## 2019-01-07 NOTE — PROGRESS NOTES
[address] [facility], [address] [facility]  Office [phone]  Fax [phone]    Ken Miller is a 70 y.o. female. Last seen by me 3 months ago. Problem List  Date Reviewed: 11/28/2018          Codes Class Noted    Anxiety ICD-10-CM: F41.9  ICD-9-CM: 300.00  7/22/2018        Severe obesity (BMI 35.0-39. 9) with comorbidity ([facility] ICD-10-CM: E66.01  ICD-9-CM: 278.01  5/25/2018        SVT (supraventricular tachycardia) ([facility] ICD-10-CM: I47.1  ICD-9-CM: 427.89  8/28/2017        Hiatal hernia ICD-10-CM: K44.9  ICD-9-CM: 553.3  8/28/2017        Venous insufficiency ICD-10-CM: I87.2  ICD-9-CM: 459.81  5/4/2017        Acquired hypothyroidism ICD-10-CM: E03.9  ICD-9-CM: 244.9  5/1/2017        S/P ablation of atrial fibrillation ICD-10-CM: Z98.890, Z86.79  ICD-9-CM: V45.89  4/28/2017        Paroxysmal atrial fibrillation (HCC) ICD-10-CM: I48.0  ICD-9-CM: 427.31  8/28/2016        Gastroesophageal reflux disease without esophagitis ICD-10-CM: K21.9  ICD-9-CM: 530.81  7/3/2016        RBBB ICD-10-CM: I45.10  ICD-9-CM: 426.4  5/7/2015        LAE (left atrial enlargement) ICD-10-CM: I51.7  ICD-9-CM: 429.3  4/3/2013             Cardiac testing  Adenosine myoview 2011 - normal perfusion, EF 81%  Echocardiogram 2/22/13 - normal left ventricular size and function, normal appearing mitral, aortic, and tricuspid valves, with mild mitral and moderate tricuspid regurgitation, bi-atrial enlargement  Lexiscan 3/3/2014 - normal perfusion EF 83%  Echo: 5/15/15- 60%, mildly dilated LA, mild MR  Cardioversion 7/23/2015 - 200J  Lexiscan cardiolite 5/10/16 - normal perfusion, EF 78%  Renal Visceral Duplex 5/2016 - No evidence of ONOFRE  3/2/17-. Successful atrial fibrillation ablation however unable to achieve entrance/exit block of LSPV per Dr. Abernathy Asp    Echo 3/8/18 - EF 65%. No WMA. Normal RV size and function. Mild MR. Mild TR.   EKG 7/6/18 - AF 80s, RBBB  EKG 10/4/18 - SR 58, first degree AV block, RBBB, no significant change from yesterday  EKG 1/7/19 - SR 59, RBBB, no change from 10/4/18    HPI  Ms. Ojeda states her BP occasionally drops to the 329 systolic range, with associated increased fatigue. She takes all her BP medications in the morning. She does not take Clonidine when her systolic blood pressure is below 120. She no longer has a cough. She has not noticed any spells of AF. She reports worsening short of breath with activity, such as walking to her car. Her activity is somewhat limited by a \"locking\" in her hips, however she remains active doing chores around the house. She is adherent with Lexapro, with improvement in her mood. Patient denies any exertional chest pain, palpitations, syncope, orthopnea, edema or paroxysmal nocturnal dyspnea. Past Medical History:   Diagnosis Date    Atrial fibrillation with RVR ([facility] 6/29/2018    DDD (degenerative disc disease)     Gastroesophageal reflux disease without esophagitis 7/3/2016    GERD (gastroesophageal reflux disease)     Hiatal hernia     HTN (hypertension), benign 7/26/2016    Hypothyroidism     ANÍBAL (obstructive sleep apnea) 7/26/2016    Paroxysmal atrial fibrillation ([facility] 8/28/2016    Uterine prolapse       Current Outpatient Medications on File Prior to Visit   Medication Sig Dispense Refill    cloNIDine HCl (CATAPRES) 0.1 mg tablet Take  by mouth daily.  dilTIAZem (CARDIZEM) 60 mg tablet Take 1 Tab by mouth Before breakfast, lunch, and dinner. 90 Tab 3    flecainide (TAMBOCOR) 50 mg tablet Take 1 Tab by mouth two (2) times a day. 180 Tab 1    carvedilol (COREG) 3.125 mg tablet take 1 tablet by mouth twice a day with meals 90 Tab 1    escitalopram oxalate (LEXAPRO) 5 mg tablet Take  by mouth daily.  levothyroxine (SYNTHROID) 50 mcg tablet Take 50 mcg by mouth Daily (before breakfast).  calcium-vitamin D (OYSTER SHELL) 500 mg(1,250mg) -200 unit per tablet Take 1 Tab by mouth every evening.       apixaban (ELIQUIS) 5 mg tablet take 1 tablet by mouth twice a day 60 Tab 11    lansoprazole (PREVACID) 30 mg capsule Take 30 mg by mouth nightly.  FOLIC ACID/MULTIVIT-MIN/LUTEIN (CENTRUM SILVER PO) Take 1 Tab by mouth every evening.  ezetimibe (ZETIA) 10 mg tablet Take 10 mg by mouth every evening. No current facility-administered medications on file prior to visit. Allergies   Allergen Reactions    Citalopram Hydrobromide Rash     With itching.  Chlorthalidone Rash    Contrast Agent [Iodine] Other (comments)     Wheezing/bronchospasm    Maxzide [Triamterene-Hydrochlorothiazid] Palpitations    Vicodin [Hydrocodone-Acetaminophen] Palpitations     , former smoker     Review of Systems  Constitutional: Negative for fever, chills, and diaphoresis. +fatigue  Respiratory: Negative for cough, hemoptysis, sputum production, and wheezing. Cardiovascular: Negative for orthopnea, claudication,leg swelling and PND. +GOODE  Gastrointestinal: Negative for heartburn, nausea, vomiting, blood in stool and melena. Genitourinary: Negative for dysuria and flank pain. Musculoskeletal: Negative for joint pain and back pain. Skin: Negative for rash. Neurological: Negative for focal weakness, seizures, loss of consciousness, weakness. Endo/Heme/Allergies: Does not bruise/bleed easily. Psychiatric/Behavioral: Negative for memory loss. Visit Vitals  /80 (BP 1 Location: Left arm)   Pulse (!) 59   Resp 18   Ht 5' 2\" (1.575 m)   Wt 192 lb 3.2 oz (87.2 kg)   BMI 35.15 kg/m²      Wt Readings from Last 3 Encounters:   01/28/19 192 lb (87.1 kg)   01/07/19 192 lb 3.2 oz (87.2 kg)   11/28/18 192 lb (87.1 kg)      Physical Exam   Constitutional: She is oriented to person, place, and time. She appears well-developed and well-nourished. Neck: Neck supple. No JVD present. Cardiovascular: regular, normal S1S2, no murmur  Pulses: Carotid pulses are 2+ on the right side, and 2+ on the left side. Dorsalis pedis pulses are 2+ on the right side, and 2+ on the left side. Pulmonary/Chest: Effort normal and breath sounds normal. She has no wheezes. She has no rales. Abdominal: Soft. Bowel sounds are normal. There is no tenderness. There is no rebound. Musculoskeletal: She exhibits no edema. Neurological: She is alert and oriented to person, place, and time. Skin: Skin is warm and dry. Psychiatric: She has a normal mood and affect. Lab Results   Component Value Date/Time    Sodium 143 10/03/2018 12:21 AM    Potassium 4.1 10/03/2018 12:21 AM    Chloride 106 10/03/2018 12:21 AM    CO2 29 10/03/2018 12:21 AM    Anion gap 8 10/03/2018 12:21 AM    Glucose 165 (H) 10/03/2018 12:21 AM    BUN 20 10/03/2018 12:21 AM    Creatinine 1.13 (H) 10/03/2018 12:21 AM    BUN/Creatinine ratio 18 10/03/2018 12:21 AM    GFR est AA 57 (L) 10/03/2018 12:21 AM    GFR est non-AA 47 (L) 10/03/2018 12:21 AM    Calcium 9.6 10/03/2018 12:21 AM    Bilirubin, total 0.3 10/03/2018 12:21 AM    AST (SGOT) 27 10/03/2018 12:21 AM    Alk. phosphatase 84 10/03/2018 12:21 AM    Protein, total 7.4 10/03/2018 12:21 AM    Albumin 3.7 10/03/2018 12:21 AM    Globulin 3.7 10/03/2018 12:21 AM    A-G Ratio 1.0 (L) 10/03/2018 12:21 AM    ALT (SGPT) 30 10/03/2018 12:21 AM     Cardiographics   EKG 4/13 - normal  EKG 5/6/15 - AF with ventricular rate 96, RBBB  EKG 6/3/15 - A-fib rate 70s, RBBB, Rightward axis  EKG 8/31/15 - SR, RBBB  EKG 12/7/15 - SR, RBBB  EKG 7/26/16 - AF 80s, RBBB, rightward axis, unchanged from 7/23/16   EKG 11/28/16 - SR 60   EKG 12/05/16-AF 80-90, RBBB   EKG 01/10/17- AF 70s  EKG 8/25/17- SR 60, RBBB rightward axis. EKG 7/6/18 - AF 80s, RBBB  EKG 10/4/18 - SR 58, first degree AV block, RBBB, no significant change from yesterday  EKG 1/7/19 - SR 59, RBBB, no change from 10/4/18    ASSESSMENT and PLAN  Encounter Diagnoses   Name Primary?     Paroxysmal atrial fibrillation (HCC) Yes    HTN (hypertension), benign     S/P ablation of atrial fibrillation     ANÍBAL on CPAP     RBBB     SVT (supraventricular tachycardia) (HCC)     Venous insufficiency      1. Paroxysmal AF. No symptomatic recurrence in 6 months on Flecainide. Would continue medical therapy. Ferney ablation for frequent recurrences. 2. HTN. BPs remain remarkably good on a multidrug regimen. Will shift Atacand to mid-day dosing to minimize morning hypotension. She takes Clonidine on a PRN basis in the evenings. 3. Anxiety disorder. She seems to be doing well with low dose Lexapro. 4. Chronic GOODE. Perhaps worse in recent months. Lexiscan cardiolite in 2016 was normal. Will re-evaluate with repeat study in the near future. Phone follow up after reviewing tests    Follow-up Disposition:  Return in about 4 months (around 5/7/2019). Written by Jules Saldana, as dictated by Dr. Viki Demarco.      Viki Demarco MD

## 2019-01-07 NOTE — PROGRESS NOTES
Visit Vitals  /80 (BP 1 Location: Left arm)   Pulse (!) 59   Resp 18   Ht 5' 2\" (1.575 m)   Wt 192 lb 3.2 oz (87.2 kg)   BMI 35.15 kg/m²       Patient is here for follow up. C/o low BP readings sometimes.

## 2019-01-21 RX ORDER — CANDESARTAN 16 MG/1
TABLET ORAL
Qty: 30 TAB | Refills: 3 | Status: SHIPPED | OUTPATIENT
Start: 2019-01-21 | End: 2019-05-21 | Stop reason: SDUPTHER

## 2019-01-25 ENCOUNTER — TELEPHONE (OUTPATIENT)
Dept: CARDIOLOGY CLINIC | Age: 79
End: 2019-01-25

## 2019-01-25 NOTE — TELEPHONE ENCOUNTER
ID verified per protocol. Confirmed appointment(s) for 1/28/19. [facility] Patient instructed to be NPO x 2 hrs prior, no caffeine (not even decaf coffee,tea,janell) x 12 hrs prior, wear comfortable clothes/good walking shoes. Patient also informed the test takes 4 hrs.  Patient verbalized understanding and agrees with prep/test.

## 2019-01-28 ENCOUNTER — CLINICAL SUPPORT (OUTPATIENT)
Dept: CARDIOLOGY CLINIC | Age: 79
End: 2019-01-28

## 2019-01-28 VITALS — HEIGHT: 62 IN | WEIGHT: 192 LBS | BODY MASS INDEX: 35.33 KG/M2

## 2019-01-28 DIAGNOSIS — I47.1 SVT (SUPRAVENTRICULAR TACHYCARDIA) (HCC): ICD-10-CM

## 2019-01-28 DIAGNOSIS — R06.09 DOE (DYSPNEA ON EXERTION): ICD-10-CM

## 2019-01-28 DIAGNOSIS — I10 HYPERTENSION, UNSPECIFIED TYPE: ICD-10-CM

## 2019-01-28 DIAGNOSIS — I48.91 ATRIAL FIBRILLATION, UNSPECIFIED TYPE (HCC): ICD-10-CM

## 2019-01-28 DIAGNOSIS — I45.10 RBBB: ICD-10-CM

## 2019-01-30 LAB
STRESS BASELINE DIAS BP: 88 MMHG
STRESS BASELINE HR: 50 BPM
STRESS BASELINE SYS BP: 150 MMHG
STRESS O2 SAT PEAK: 99 %
STRESS O2 SAT REST: 97 %
STRESS PEAK DIAS BP: 88 MMHG
STRESS PEAK SYS BP: 150 MMHG
STRESS PERCENT HR ACHIEVED: 69 %
STRESS POST PEAK HR: 84 BPM
STRESS RATE PRESSURE PRODUCT: NORMAL BPM*MMHG
STRESS ST DEPRESSION: 0 MM
STRESS ST ELEVATION: 0 MM
STRESS TARGET HR: 122 BPM

## 2019-02-04 ENCOUNTER — TELEPHONE (OUTPATIENT)
Dept: CARDIOLOGY CLINIC | Age: 79
End: 2019-02-04

## 2019-02-04 NOTE — TELEPHONE ENCOUNTER
PTIDX2 notified  Of stress test results per Serena NP  Pt verbalized understanding. Pt stated BP in morning is still low at 95/55 HR 49 after she changed taking candesartan at NOON (which is 4PM) in pt's schedule. By 6pm  sys and she takes Clonidine PRN. When BP low pt's complains of fatigue and feels better when some mornings  sys. She states she stays hydrated.     Myrna Anand advise

## 2019-02-04 NOTE — TELEPHONE ENCOUNTER
----- Message from Fermin Lind NP sent at 2/1/2019 11:22 AM EST -----  Please notify Ms. Ojeda that her stress test was normal.  No concern for new blockage. Please continue to monitor shortness of breath and notify us if this progresses prior to her next visit. Would encourage her to try to remain as active day to day as she can.   Shortness of breath may be due to some deconditioning.    ----- Message -----  From: Isatu Mcdowell MD  Sent: 1/30/2019   2:09 PM  To: Betina Jaramillo MD

## 2019-02-05 NOTE — TELEPHONE ENCOUNTER
Pt [id number] instructed to continue medication as per Serena NP  Pt verbalized understanding and had no furhter questions

## 2019-04-22 RX ORDER — DILTIAZEM HYDROCHLORIDE 60 MG/1
TABLET, FILM COATED ORAL
Qty: 90 TAB | Refills: 3 | Status: SHIPPED | OUTPATIENT
Start: 2019-04-22 | End: 2019-09-09 | Stop reason: SDUPTHER

## 2019-05-10 ENCOUNTER — OFFICE VISIT (OUTPATIENT)
Dept: CARDIOLOGY CLINIC | Age: 79
End: 2019-05-10

## 2019-05-10 VITALS
BODY MASS INDEX: 35.88 KG/M2 | WEIGHT: 195 LBS | DIASTOLIC BLOOD PRESSURE: 70 MMHG | HEART RATE: 58 BPM | SYSTOLIC BLOOD PRESSURE: 130 MMHG | HEIGHT: 62 IN | RESPIRATION RATE: 20 BRPM | OXYGEN SATURATION: 96 %

## 2019-05-10 DIAGNOSIS — Z86.79 S/P ABLATION OF ATRIAL FIBRILLATION: ICD-10-CM

## 2019-05-10 DIAGNOSIS — E66.01 SEVERE OBESITY (BMI 35.0-39.9) WITH COMORBIDITY (HCC): ICD-10-CM

## 2019-05-10 DIAGNOSIS — I45.10 RBBB: ICD-10-CM

## 2019-05-10 DIAGNOSIS — Z99.89 OSA ON CPAP: ICD-10-CM

## 2019-05-10 DIAGNOSIS — G47.33 OSA ON CPAP: ICD-10-CM

## 2019-05-10 DIAGNOSIS — I10 HTN (HYPERTENSION), BENIGN: ICD-10-CM

## 2019-05-10 DIAGNOSIS — Z98.890 S/P ABLATION OF ATRIAL FIBRILLATION: ICD-10-CM

## 2019-05-10 DIAGNOSIS — F41.9 ANXIETY: ICD-10-CM

## 2019-05-10 DIAGNOSIS — I48.0 PAROXYSMAL ATRIAL FIBRILLATION (HCC): Primary | ICD-10-CM

## 2019-05-10 NOTE — PROGRESS NOTES
[id number] [facility], [address] [facility]  Office [phone]  Fax [phone]    Travis Martinez is a 70 y.o. female. Last seen 4 months ago. Problem List  Date Reviewed: 11/28/2018          Codes Class Noted    Anxiety ICD-10-CM: F41.9  ICD-9-CM: 300.00  7/22/2018        Severe obesity (BMI 35.0-39. 9) with comorbidity ([facility] [address] ICD-10-CM: E66.01  ICD-9-CM: 278.01  5/25/2018        SVT (supraventricular tachycardia) ([facility] [address] ICD-10-CM: I47.1  ICD-9-CM: 427.89  8/28/2017        Hiatal hernia ICD-10-CM: K44.9  ICD-9-CM: 553.3  8/28/2017        Venous insufficiency ICD-10-CM: I87.2  ICD-9-CM: 459.81  5/4/2017        Acquired hypothyroidism ICD-10-CM: E03.9  ICD-9-CM: 244.9  5/1/2017        S/P ablation of atrial fibrillation ICD-10-CM: Z98.890, Z86.79  ICD-9-CM: V45.89  4/28/2017        Paroxysmal atrial fibrillation (HCC) ICD-10-CM: I48.0  ICD-9-CM: 427.31  8/28/2016        Gastroesophageal reflux disease without esophagitis ICD-10-CM: K21.9  ICD-9-CM: 530.81  7/3/2016        RBBB ICD-10-CM: I45.10  ICD-9-CM: 426.4  5/7/2015        LAE (left atrial enlargement) ICD-10-CM: I51.7  ICD-9-CM: 429.3  4/3/2013             Cardiac testing  Adenosine myoview 2011 - normal perfusion, EF 81%  Echocardiogram 2/22/13 - normal left ventricular size and function, normal appearing mitral, aortic, and tricuspid valves, with mild mitral and moderate tricuspid regurgitation, bi-atrial enlargement  Lexiscan 3/3/2014 - normal perfusion EF 83%  Echo: 5/15/15- 60%, mildly dilated LA, mild MR  Cardioversion 7/23/2015 - 200J  Lexiscan cardiolite 5/10/16 - normal perfusion, EF 78%  Renal Visceral Duplex 5/2016 - No evidence of ONOFRE  3/2/17-. Successful atrial fibrillation ablation however unable to achieve entrance/exit block of LSPV per Dr. Montano Hildale    Echo 3/8/18 - EF 65%. No WMA. Normal RV size and function. Mild MR. Mild TR. Lexiscan Cardiolite 1/30/19 - normal perfusion. EF 77%. HPI  Ms. Ojeda states she is doing well. Stays busy at home with housework and yard work. With some positional changes she does note some mild dizziness. No symptomatic AF episodes. HR at home tends to be in 55-60+ range. Reviewed her BP log BP at home in 120 -140 range; rarely lower or higher. She feels her anxiety is much better on Lexapro. Reports activity is limited by pain from her sciatica, she is doing some exercises to relieve pain. Patient denies any exertional chest pain, palpitations, syncope, orthopnea, edema or paroxysmal nocturnal dyspnea. Past Medical History:   Diagnosis Date    Atrial fibrillation with RVR ([facility] [address] 6/29/2018    DDD (degenerative disc disease)     Gastroesophageal reflux disease without esophagitis 7/3/2016    GERD (gastroesophageal reflux disease)     Hiatal hernia     HTN (hypertension), benign 7/26/2016    Hypothyroidism     ANÍBAL (obstructive sleep apnea) 7/26/2016    Paroxysmal atrial fibrillation ([facility] [address] 8/28/2016    Uterine prolapse       Current Outpatient Medications on File Prior to Visit   Medication Sig Dispense Refill    dilTIAZem (CARDIZEM) 60 mg tablet take 1 tablet by mouth BEFORE BREAKFAST, LUNCH, AND DINNER 90 Tab 3    apixaban (ELIQUIS) 5 mg tablet take 1 tablet by mouth twice a day 60 Tab 11    carvedilol (COREG) 3.125 mg tablet take 1 tablet by mouth twice a day with meals 180 Tab 1    candesartan (ATACAND) 16 mg tablet take 1 tablet by mouth once daily 30 Tab 3    cloNIDine HCl (CATAPRES) 0.1 mg tablet Take 0.1 mg by mouth daily. Hold if BP less then 120 sys      flecainide (TAMBOCOR) 50 mg tablet Take 1 Tab by mouth two (2) times a day. 180 Tab 1    escitalopram oxalate (LEXAPRO) 5 mg tablet Take  by mouth daily.  levothyroxine (SYNTHROID) 50 mcg tablet Take 50 mcg by mouth Daily (before breakfast).  calcium-vitamin D (OYSTER SHELL) 500 mg(1,250mg) -200 unit per tablet Take 1 Tab by mouth every evening.       lansoprazole (PREVACID) 30 mg capsule Take 30 mg by mouth nightly.  FOLIC ACID/MULTIVIT-MIN/LUTEIN (CENTRUM SILVER PO) Take 1 Tab by mouth every evening.  ezetimibe (ZETIA) 10 mg tablet Take 10 mg by mouth every evening. No current facility-administered medications on file prior to visit. Allergies   Allergen Reactions    Citalopram Hydrobromide Rash     With itching.  Chlorthalidone Rash    Contrast Agent [Iodine] Other (comments)     Wheezing/bronchospasm    Maxzide [Triamterene-Hydrochlorothiazid] Palpitations    Vicodin [Hydrocodone-Acetaminophen] Palpitations     , former smoker     Review of Systems  Constitutional: Negative for fever, chills, and diaphoresis. +fatigue  Respiratory: Negative for cough, hemoptysis, sputum production, and wheezing. Cardiovascular: Negative for orthopnea, claudication,leg swelling and PND. +GOODE  Gastrointestinal: Negative for heartburn, nausea, vomiting, blood in stool and melena. Genitourinary: Negative for dysuria and flank pain. Musculoskeletal: Negative for joint pain and back pain. Skin: Negative for rash. Neurological: Negative for focal weakness, seizures, loss of consciousness, weakness. Endo/Heme/Allergies: Does not bruise/bleed easily. Psychiatric/Behavioral: Negative for memory loss. Visit Vitals  /70   Pulse (!) 58   Resp 20   Ht 5' 2\" (1.575 m)   Wt 195 lb (88.5 kg)   SpO2 96%   BMI 35.67 kg/m²      Wt Readings from Last 3 Encounters:   05/10/19 195 lb (88.5 kg)   01/28/19 192 lb (87.1 kg)   01/07/19 192 lb 3.2 oz (87.2 kg)      Physical Exam   Constitutional: She is oriented to person, place, and time. She appears well-developed and well-nourished. Neck: Neck supple. No JVD present. Cardiovascular: regular, normal S1S2, no murmur  Pulses: Carotid pulses are 2+ on the right side, and 2+ on the left side. Dorsalis pedis pulses are 2+ on the right side, and 2+ on the left side.    Pulmonary/Chest: Effort normal and breath sounds normal. She has no wheezes. She has no rales. Abdominal: Soft. Bowel sounds are normal. There is no tenderness. There is no rebound. Musculoskeletal: She exhibits no edema. Neurological: She is alert and oriented to person, place, and time. Skin: Skin is warm and dry. Psychiatric: She has a normal mood and affect. Lab Results   Component Value Date/Time    Sodium 143 10/03/2018 12:21 AM    Potassium 4.1 10/03/2018 12:21 AM    Chloride 106 10/03/2018 12:21 AM    CO2 29 10/03/2018 12:21 AM    Anion gap 8 10/03/2018 12:21 AM    Glucose 165 (H) 10/03/2018 12:21 AM    BUN 20 10/03/2018 12:21 AM    Creatinine 1.13 (H) 10/03/2018 12:21 AM    BUN/Creatinine ratio 18 10/03/2018 12:21 AM    GFR est AA 57 (L) 10/03/2018 12:21 AM    GFR est non-AA 47 (L) 10/03/2018 12:21 AM    Calcium 9.6 10/03/2018 12:21 AM    Bilirubin, total 0.3 10/03/2018 12:21 AM    AST (SGOT) 27 10/03/2018 12:21 AM    Alk. phosphatase 84 10/03/2018 12:21 AM    Protein, total 7.4 10/03/2018 12:21 AM    Albumin 3.7 10/03/2018 12:21 AM    Globulin 3.7 10/03/2018 12:21 AM    A-G Ratio 1.0 (L) 10/03/2018 12:21 AM    ALT (SGPT) 30 10/03/2018 12:21 AM     Cardiographics   EKG 4/13 - normal  EKG 5/6/15 - AF with ventricular rate 96, RBBB  EKG 6/3/15 - A-fib rate 70s, RBBB, Rightward axis  EKG 8/31/15 - SR, RBBB  EKG 12/7/15 - SR, RBBB  EKG 7/26/16 - AF 80s, RBBB, rightward axis, unchanged from 7/23/16   EKG 11/28/16 - SR 60   EKG 12/05/16-AF 80-90, RBBB   EKG 01/10/17- AF 70s  EKG 8/25/17- SR 60, RBBB rightward axis. EKG 7/6/18 - AF 80s, RBBB  EKG 10/4/18 - SR 58, first degree AV block, RBBB, no significant change from yesterday  EKG 1/7/19 - SR 59, RBBB, no change from 10/4/18  EKG 5/10/19 -  SR, 54  RBBB no change from 1/17/19      ASSESSMENT and PLAN  Encounter Diagnoses   Name Primary?     Paroxysmal atrial fibrillation (HCC) Yes    RBBB     HTN (hypertension), benign     S/P ablation of atrial fibrillation     ANÍBAL on CPAP     Anxiety     Severe obesity (BMI 35.0-39. 9) with comorbidity ([address]      1. Paroxysmal AF. S/P Ablation march 2017 with recurrence now stable on Flecainide. Continue Flecainide + Eliquis. She is also on low dose Diltiazem + carvedilol. Would continue medical therapy. [place] ablation for frequent recurrences. 2. HTN. BPs remain remarkably good on a multidrug regimen. Management of anxiety with low dose Lexapro may have contributed. She takes Clonidine on a PRN basis in the evenings. 3. Anxiety disorder. She seems to be doing well with low dose Lexapro.     her quality of life seems to be much better over past 6+ months. Follow-up and Dispositions    · Return in about 6 months (around 11/10/2019).          Written by Isaak Boykin, as dictated by Dr. Jr John MD.    Jr John MD

## 2019-05-10 NOTE — PROGRESS NOTES
Visit Vitals  /70   Pulse (!) 58   Resp 20   Ht 5' 2\" (1.575 m)   Wt 195 lb (88.5 kg)   SpO2 96%   BMI 35.67 kg/m²     EKG today  Increase in SOB with exertion.   Denies edema or CP

## 2019-05-28 RX ORDER — FLECAINIDE ACETATE 50 MG/1
TABLET ORAL
Qty: 180 TAB | Refills: 1 | Status: SHIPPED | OUTPATIENT
Start: 2019-05-28 | End: 2019-08-21 | Stop reason: SDUPTHER

## 2019-08-07 NOTE — PROGRESS NOTES
[address] [facility], [address] [facility]    Office [phone]  Fax [phone]        Lizette Bynum is a 78 y.o. female. Last seen 2 months ago. Problem List  Date Reviewed: 10/27/2017          Codes Class Noted    SVT (supraventricular tachycardia) ([address] ICD-10-CM: I47.1  ICD-9-CM: 427.89  8/28/2017        Hiatal hernia ICD-10-CM: K44.9  ICD-9-CM: 553.3  8/28/2017        Venous insufficiency ICD-10-CM: I87.2  ICD-9-CM: 459.81  5/4/2017        Acquired hypothyroidism ICD-10-CM: E03.9  ICD-9-CM: 244.9  5/1/2017        S/P ablation of atrial fibrillation ICD-10-CM: Z98.890, Z86.79  ICD-9-CM: V45.89  4/28/2017        Paroxysmal atrial fibrillation (HCC) ICD-10-CM: I48.0  ICD-9-CM: 427.31  8/28/2016        ANÍBAL (obstructive sleep apnea) ICD-10-CM: G47.33  ICD-9-CM: 327.23  7/26/2016        HTN (hypertension), benign ICD-10-CM: I10  ICD-9-CM: 401.1  7/26/2016        Gastroesophageal reflux disease without esophagitis ICD-10-CM: K21.9  ICD-9-CM: 530.81  7/3/2016        RBBB ICD-10-CM: I45.10  ICD-9-CM: 426.4  5/7/2015        LAE (left atrial enlargement) ICD-10-CM: I51.7  ICD-9-CM: 429.3  4/3/2013             Cardiac testing  Adenosine myoview 2011 - normal perfusion, EF 81%  Echocardiogram 2/22/13 - normal left ventricular size and function, normal appearing mitral, aortic, and tricuspid valves, with mild mitral and moderate tricuspid regurgitation, bi-atrial enlargement  Lexiscan 3/3/2014 - normal perfusion EF 83%  Echo: 5/15/15- 60%, mildly dilated LA, mild MR  Cardioversion 7/23/2015 - 200J  Lexiscan cardiolite 5/10/16 - normal perfusion, EF 78%  Renal Visceral Duplex 5/2016 - No evidence of ONOFRE  3/2/17-. Successful atrial fibrillation ablation however unable to achieve entrance/exit block of LSPV per Dr. Evaristo Rose    HPI  Ms. Lawyer Sommers remains active with housework. She continues to report a constant chest pressure - recently determined due to large hiatal hernia.   She does not plan to move forward with surgical repair at this time. Home monitoring of blood pressure indicates SBP 99 to 140's in the morning to noon and  to 180's from 7 to 10pm. HR trend 50-70's. She questions wether she will need to remain on Diltiazem - restarted during recent admission to [address] [place] [address] for SVT. Previously on Dilt per Dr. Faby Navarrete but discontinued following AF ablation. She reports concern over mental fogginess on this medication. She denies any elevated HR or palpitations. She continues to take Diovan only once daily; at night. She did experience an episode of dizziness when helping a friend last week. No syncope or near syncope. She was recently seen by Dr. Nasima Weston for re-evaluation of ANÍBAL and has now been taken off of her CPAP. She denies any orthopnea, edema or paroxysmal nocturnal dyspnea. No bleeding or bruising concerns on Eliquis. Past Medical History:   Diagnosis Date    DDD (degenerative disc disease)     Gastroesophageal reflux disease without esophagitis 7/3/2016    GERD (gastroesophageal reflux disease)     Hiatal hernia     HTN (hypertension), benign 7/26/2016    Hypertension     ANÍBAL (obstructive sleep apnea) 7/26/2016    Paroxysmal atrial fibrillation ([facility] [address] 8/28/2016    Uterine prolapse       Current Outpatient Prescriptions on File Prior to Visit   Medication Sig Dispense Refill    valsartan (DIOVAN) 80 mg tablet Take 1 Tab by mouth two (2) times a day. (Patient taking differently: Take 80 mg by mouth daily.) 60 Tab 5    cloNIDine HCl (CATAPRES) 0.1 mg tablet take 1 tablet by mouth NIGHTLY.  HOLD IF SYSTOLIC BLOOD PRESSURE IS LESS THAN 150 30 Tab 3    carvedilol (COREG) 6.25 mg tablet take 1 tablet by mouth twice a day with food (Patient taking differently: take 6.25mg tablet by mouth twice a day with food) 60 Tab 11    levothyroxine (SYNTHROID) 50 mcg tablet take 1 tablet by mouth once daily  0    furosemide (LASIX) 20 mg tablet take 1 tablet by mouth PRN  0    lansoprazole (PREVACID) 30 mg capsule Take 30 mg by mouth nightly.  ELIQUIS 5 mg tablet take 1 tablet by mouth twice a day 60 Tab 11    FOLIC ACID/MULTIVIT-MIN/LUTEIN (CENTRUM SILVER PO) Take  by mouth daily.  CALCIUM CARBONATE/VITAMIN D3 (CALTRATE 600 + D PO) Take  by mouth daily.  ezetimibe (ZETIA) 10 mg tablet Take 10 mg by mouth every evening. No current facility-administered medications on file prior to visit. Allergies   Allergen Reactions    Citalopram Hydrobromide Rash     With itching.  Chlorthalidone Rash    Maxzide [Triamterene-Hydrochlorothiazid] Palpitations    Vicodin [Hydrocodone-Acetaminophen] Palpitations     , former smoker     Review of Systems  Constitutional: Negative for fever, chills, malaise/fatigue and diaphoresis. Respiratory: Negative for cough, hemoptysis, sputum production, and wheezing. Cardiovascular: Negative for orthopnea, claudication,leg swelling and PND. Positive for chest pain. Gastrointestinal: Negative for heartburn, nausea, vomiting, blood in stool and melena. Genitourinary: Negative for dysuria and flank pain. Musculoskeletal: Negative for joint pain and back pain. Skin: Negative for rash. Neurological: Negative for focal weakness, seizures, loss of consciousness, weakness. Endo/Heme/Allergies: Does not bruise/bleed easily. Psychiatric/Behavioral: Negative for memory loss. Positive for anxiety. Visit Vitals    /84 (BP 1 Location: Left arm, BP Patient Position: Sitting)    Pulse 82    Resp 18    Ht 5' 3\" (1.6 m)    Wt 196 lb (88.9 kg)    SpO2 97%    BMI 34.72 kg/m2      Wt Readings from Last 3 Encounters:   10/27/17 196 lb (88.9 kg)   09/14/17 197 lb 12.8 oz (89.7 kg)   08/28/17 196 lb 6.4 oz (89.1 kg)      Physical Exam   Constitutional: She is oriented to person, place, and time. She appears well-developed and well-nourished. Neck: Neck supple. No JVD present.    Cardiovascular: Normal rate, regular rhythm, S1 normal, S2 normal and normal heart sounds. Exam reveals no gallop. No murmur heard. Pulses: Carotid pulses are 2+ on the right side, and 2+ on the left side. Dorsalis pedis pulses are 2+ on the right side, and 2+ on the left side. Pulmonary/Chest: Effort normal and breath sounds normal. She has no wheezes. She has no rales. Abdominal: Soft. Bowel sounds are normal. There is no tenderness. There is no rebound. Musculoskeletal: She exhibits no edema. Neurological: She is alert and oriented to person, place, and time. Skin: Skin is warm and dry. Psychiatric: She has a normal mood and affect. Lab Results   Component Value Date/Time    Sodium 142 08/13/2017 06:40 PM    Potassium 3.8 08/13/2017 06:40 PM    Chloride 105 08/13/2017 06:40 PM    CO2 32 08/13/2017 06:40 PM    Anion gap 5 08/13/2017 06:40 PM    Glucose 115 08/13/2017 06:40 PM    BUN 15 08/13/2017 06:40 PM    Creatinine 1.18 08/13/2017 06:40 PM    BUN/Creatinine ratio 13 08/13/2017 06:40 PM    GFR est AA 54 08/13/2017 06:40 PM    GFR est non-AA 45 08/13/2017 06:40 PM    Calcium 9.3 08/13/2017 06:40 PM    Bilirubin, total 0.5 08/13/2017 06:40 PM    AST (SGOT) 21 08/13/2017 06:40 PM    Alk. phosphatase 85 08/13/2017 06:40 PM    Protein, total 7.5 08/13/2017 06:40 PM    Albumin 4.0 08/13/2017 06:40 PM    Globulin 3.5 08/13/2017 06:40 PM    A-G Ratio 1.1 08/13/2017 06:40 PM    ALT (SGPT) 27 08/13/2017 06:40 PM     Cardiographics   EKG 4/13 - normal  EKG 5/6/15 - AF with ventricular rate 96, RBBB  EKG 6/3/15 - A-fib rate 70s, RBBB, Rightward axis  EKG 8/31/15 - SR, RBBB  EKG 12/7/15 - SR, RBBB  EKG 7/26/16 - AF 80s, RBBB, rightward axis, unchanged from 7/23/16   EKG 11/28/16 - SR 60   EKG 12/05/16-AF 80-90, RBBB   EKG 01/10/17- AF 70s  EKG 8/25/17- SR 60, RBBB rightward axis. ASSESSMENT and PLAN  Encounter Diagnoses   Name Primary?     HTN (hypertension), benign Yes    LAE (left atrial enlargement)     Paroxysmal atrial fibrillation ([facility] [address]     S/P ablation of atrial fibrillation     ANÍBAL (obstructive sleep apnea)     Acquired hypothyroidism     Venous insufficiency     SVT (supraventricular tachycardia) (HCC)     RBBB      Mrs. Ojeda has chronic HTN with large fluctuation in BP control per home monitoring. Plan to discontinue Diltiazem. Advised her to resume twice daily Diovan - mid day dose and evening dose. Continue remainder of regimen but ensure Coreg is taken with meals. Continue a low sodium diet. She takes low dose Lasix for venous insufficieny but has no hx of HF. No longer requiring CPAP per repeat sleep study. Continue to monitor BP and HR at home. She is s/p AF ablation March 2017. No s/sxs of recurrent AF. No bleeding or bruising concerns on Eliquis. The patient was encouraged to call with any new complaints or concerns. Follow-up Disposition:  Return in about 4 months (around 2/27/2018).     CANDIDO Wood MD No

## 2019-08-21 RX ORDER — FLECAINIDE ACETATE 50 MG/1
TABLET ORAL
Qty: 180 TAB | Refills: 1 | Status: SHIPPED | OUTPATIENT
Start: 2019-08-21 | End: 2020-05-13

## 2019-08-23 RX ORDER — CANDESARTAN 16 MG/1
TABLET ORAL
Qty: 90 TAB | Refills: 3 | Status: SHIPPED | OUTPATIENT
Start: 2019-08-23 | End: 2020-08-21

## 2019-09-09 ENCOUNTER — TELEPHONE (OUTPATIENT)
Dept: CARDIOLOGY CLINIC | Age: 79
End: 2019-09-09

## 2019-09-09 RX ORDER — DILTIAZEM HYDROCHLORIDE 60 MG/1
TABLET, FILM COATED ORAL
Qty: 270 TAB | Refills: 0 | Status: SHIPPED | OUTPATIENT
Start: 2019-09-09 | End: 2019-12-10 | Stop reason: SDUPTHER

## 2019-09-09 RX ORDER — DILTIAZEM HYDROCHLORIDE 60 MG/1
TABLET, FILM COATED ORAL
Qty: 180 TAB | Refills: 0 | Status: SHIPPED | OUTPATIENT
Start: 2019-09-09 | End: 2019-09-09 | Stop reason: SDUPTHER

## 2019-09-09 NOTE — TELEPHONE ENCOUNTER
Patient states that the prescriptions for diltiazem was sent incorrectly. She has a 90 day supply taking the medication three times a day, but the prescription was only for 180 tablets. Pharmacy confirmed.      Phone: [phone]

## 2019-09-09 NOTE — TELEPHONE ENCOUNTER
Refill of diltiazem 60 mg daily completed per VO of Dr. Miesha De La Garza.     Last OV: 5/2019  Next OV: 11/2019

## 2019-11-11 ENCOUNTER — OFFICE VISIT (OUTPATIENT)
Dept: CARDIOLOGY CLINIC | Age: 79
End: 2019-11-11

## 2019-11-11 VITALS
HEART RATE: 54 BPM | BODY MASS INDEX: 36.07 KG/M2 | DIASTOLIC BLOOD PRESSURE: 70 MMHG | RESPIRATION RATE: 20 BRPM | WEIGHT: 196 LBS | SYSTOLIC BLOOD PRESSURE: 134 MMHG | OXYGEN SATURATION: 96 % | HEIGHT: 62 IN

## 2019-11-11 DIAGNOSIS — E66.01 SEVERE OBESITY (BMI 35.0-39.9) WITH COMORBIDITY (HCC): ICD-10-CM

## 2019-11-11 DIAGNOSIS — I10 HTN (HYPERTENSION), BENIGN: ICD-10-CM

## 2019-11-11 DIAGNOSIS — I48.0 PAROXYSMAL ATRIAL FIBRILLATION (HCC): Primary | ICD-10-CM

## 2019-11-11 DIAGNOSIS — F41.9 ANXIETY: ICD-10-CM

## 2019-11-11 NOTE — PROGRESS NOTES
Visit Vitals  /70   Pulse (!) 54   Resp 20   Ht 5' 2\" (1.575 m)   Wt 196 lb (88.9 kg)   SpO2 96%   BMI 35.85 kg/m²     EKG troday  Increase in SOB when going up stairs  BP log when BP low complains of fatigue  EKG today

## 2019-11-11 NOTE — LETTER
11/12/19 Patient: Lamin Ruffin  
YOB: 1940 Date of Visit: 11/11/2019 Luis Ramsey MD 
[address] [facility] [address] [place] [id number] VIA Facsimile: [phone] Dear Luis Ramsey MD, Thank you for referring Ms. Melisa Coats to [facility] for evaluation. My notes for this consultation are attached. If you have questions, please do not hesitate to call me. I look forward to following your patient along with you. Sincerely, Jose F Curry MD

## 2019-11-11 NOTE — PROGRESS NOTES
Melania Zhang Maira, [address]  [address] [place], [place], [address] [facility]    Office [phone]  Fax [phone]  Cell [phone]        Shannon Andrade is a 70 y.o. female. Last seen by me 6 months ago. DIAGNOSES  Encounter Diagnoses     ICD-10-CM ICD-9-CM   1. Paroxysmal atrial fibrillation (HCC) I48.0 427.31   2. HTN (hypertension), benign I10 401.1   3. Anxiety F41.9 300.00   4. Severe obesity (BMI 35.0-39. 9) with comorbidity ([facility] E66.01 278.01       ASSESSMENT/PLAN    Paroxysmal AF. She underwent ablation March 2017 with recurrence. No recurrent sxs on Flecainide. Continue Flecainide plus Elquis. low dose Diltiazem + Carvedilol. Reserve repeat ablation for frequent recurrences. HTN. BPs remain remarkably good on a multidrug regimen. Management of anxiety with low dose Lexapro may have contributed. She takes Clonidine on a PRN basis in the evenings. Anxiety disorder. She seems to be doing well with low dose Lexapro. Obesity. We had a long discussion about heathy weight loss, reduction in carbs, regular exercise. Follow-up and Dispositions    · Return in about 6 months (around 5/11/2020). HPI    Shannon Andrade reports she is feeling well overall. She has an elliptical and an exercise bike at home, but she is not using them as much as she should. She has been a dry cough lately. Patient denies any exertional chest pain, palpitations, syncope, orthopnea, edema or paroxysmal nocturnal dyspnea. She reports SOB with stairs. BP's at home have been in the normal 120-130 range, but it goes low after she takes her medication. She reports fatigue when this happens.      Cardiac testing  Adenosine myoview 2011 - normal perfusion, EF 81%  Echocardiogram 2/22/13 - normal left ventricular size and function, normal appearing mitral, aortic, and tricuspid valves, with mild mitral and moderate tricuspid regurgitation, bi-atrial enlargement  Lexiscan 3/3/2014 - normal perfusion EF 83%  Echo: 5/15/15- 60%, mildly dilated LA, mild MR  Cardioversion 7/23/2015 - 200J  Lexiscan cardiolite 5/10/16 - normal perfusion, EF 78%  Renal Visceral Duplex 5/2016 - No evidence of ONOFRE  3/2/17-. Successful atrial fibrillation ablation however unable to achieve entrance/exit block of LSPV per Dr. Mirella Beckham    Echo 3/8/18 - EF 65%. No WMA. Normal RV size and function. Mild MR. Mild TR. Lexiscan Cardiolite 1/30/19 - normal perfusion. EF 77%. Past Medical History:   Diagnosis Date    Atrial fibrillation with RVR ([facility] 6/29/2018    DDD (degenerative disc disease)     Gastroesophageal reflux disease without esophagitis 7/3/2016    GERD (gastroesophageal reflux disease)     Hiatal hernia     HTN (hypertension), benign 7/26/2016    Hypothyroidism     ANÍBAL (obstructive sleep apnea) 7/26/2016    Paroxysmal atrial fibrillation ([facility] 8/28/2016    Uterine prolapse       Current Outpatient Medications on File Prior to Visit   Medication Sig Dispense Refill    dilTIAZem (CARDIZEM) 60 mg tablet TAKE 1 TABLET BY MOUTH BEFORE BREAKFAST LUNCH AND DINNER (Patient taking differently: 60 mg. TAKE 1 TABLET BY MOUTH BEFORE BREAKFAST LUNCH AND DINNER) 270 Tab 0    candesartan (ATACAND) 16 mg tablet TAKE 1 TABLET BY MOUTH ONCE DAILY 90 Tab 3    flecainide (TAMBOCOR) 50 mg tablet TAKE 1 TABLET BY MOUTH TWICE A  Tab 1    carvedilol (COREG) 3.125 mg tablet TAKE 1 TABLET BY MOUTH TWICE A DAY WITH MEALS 180 Tab 3    apixaban (ELIQUIS) 5 mg tablet take 1 tablet by mouth twice a day 60 Tab 11    cloNIDine HCl (CATAPRES) 0.1 mg tablet Take 0.1 mg by mouth daily. Hold if BP less then 120 sys      escitalopram oxalate (LEXAPRO) 5 mg tablet Take  by mouth daily.  levothyroxine (SYNTHROID) 50 mcg tablet Take 50 mcg by mouth Daily (before breakfast).  calcium-vitamin D (OYSTER SHELL) 500 mg(1,250mg) -200 unit per tablet Take 1 Tab by mouth every evening.       lansoprazole (PREVACID) 30 mg capsule Take 30 mg by mouth nightly.  FOLIC ACID/MULTIVIT-MIN/LUTEIN (CENTRUM SILVER PO) Take 1 Tab by mouth every evening.  ezetimibe (ZETIA) 10 mg tablet Take 10 mg by mouth every evening. No current facility-administered medications on file prior to visit. Allergies   Allergen Reactions    Citalopram Hydrobromide Rash     With itching.  Chlorthalidone Rash    Contrast Agent [Iodine] Other (comments)     Wheezing/bronchospasm    Maxzide [Triamterene-Hydrochlorothiazid] Palpitations    Vicodin [Hydrocodone-Acetaminophen] Palpitations     , former smoker     Review of Systems  Constitutional: Negative for fever, chills, and diaphoresis. +fatigue  Respiratory: Negative for cough, hemoptysis, sputum production, and wheezing. Cardiovascular: Negative for orthopnea, claudication,leg swelling and PND. +GOODE, cough  Gastrointestinal: Negative for heartburn, nausea, vomiting, blood in stool and melena. Genitourinary: Negative for dysuria and flank pain. Musculoskeletal: Negative for joint pain and back pain. Skin: Negative for rash. Neurological: Negative for focal weakness, seizures, loss of consciousness, weakness. Endo/Heme/Allergies: Does not bruise/bleed easily. Psychiatric/Behavioral: Negative for memory loss. Visit Vitals  /70   Pulse (!) 54   Resp 20   Ht 5' 2\" (1.575 m)   Wt 196 lb (88.9 kg)   SpO2 96%   BMI 35.85 kg/m²      Wt Readings from Last 3 Encounters:   11/11/19 196 lb (88.9 kg)   05/10/19 195 lb (88.5 kg)   01/28/19 192 lb (87.1 kg)      Physical Exam   Constitutional: She is oriented to person, place, and time. She appears well-developed and well-nourished. Neck: Neck supple. No JVD present. Cardiovascular: regular, normal S1S2, no murmur  Pulses: Carotid pulses are 2+ on the right side, and 2+ on the left side. Dorsalis pedis pulses are 2+ on the right side, and 2+ on the left side.    Pulmonary/Chest: Effort normal and breath sounds normal. She has no wheezes. She has no rales. Abdominal: Soft. Bowel sounds are normal. There is no tenderness. There is no rebound. Musculoskeletal: She exhibits no edema. Neurological: She is alert and oriented to person, place, and time. Skin: Skin is warm and dry. Psychiatric: She has a normal mood and affect. Lab Results   Component Value Date/Time    Sodium 143 10/03/2018 12:21 AM    Potassium 4.1 10/03/2018 12:21 AM    Chloride 106 10/03/2018 12:21 AM    CO2 29 10/03/2018 12:21 AM    Anion gap 8 10/03/2018 12:21 AM    Glucose 165 (H) 10/03/2018 12:21 AM    BUN 20 10/03/2018 12:21 AM    Creatinine 1.13 (H) 10/03/2018 12:21 AM    BUN/Creatinine ratio 18 10/03/2018 12:21 AM    GFR est AA 57 (L) 10/03/2018 12:21 AM    GFR est non-AA 47 (L) 10/03/2018 12:21 AM    Calcium 9.6 10/03/2018 12:21 AM    Bilirubin, total 0.3 10/03/2018 12:21 AM    AST (SGOT) 27 10/03/2018 12:21 AM    Alk. phosphatase 84 10/03/2018 12:21 AM    Protein, total 7.4 10/03/2018 12:21 AM    Albumin 3.7 10/03/2018 12:21 AM    Globulin 3.7 10/03/2018 12:21 AM    A-G Ratio 1.0 (L) 10/03/2018 12:21 AM    ALT (SGPT) 30 10/03/2018 12:21 AM     Cardiographics   EKG 4/13 - normal  EKG 5/6/15 - AF with ventricular rate 96, RBBB  EKG 6/3/15 - A-fib rate 70s, RBBB, Rightward axis  EKG 8/31/15 - SR, RBBB  EKG 12/7/15 - SR, RBBB  EKG 7/26/16 - AF 80s, RBBB, rightward axis, unchanged from 7/23/16   EKG 11/28/16 - SR 60   EKG 12/05/16-AF 80-90, RBBB   EKG 01/10/17- AF 70s  EKG 8/25/17- SR 60, RBBB rightward axis. EKG 7/6/18 - AF 80s, RBBB  EKG 10/4/18 - SR 58, first degree AV block, RBBB, no significant change from yesterday  EKG 1/7/19 - SR 59, RBBB, no change from 10/4/18  EKG 5/10/19 -  SR, 54  RBBB no change from 1/17/19  EKG 11/11/19 - SB 54, , RBBB, no change from 5/10/19          Written by Laxmi Ken, as dictated by Yang Giles M.D.     Yang Giles MD

## 2019-12-04 ENCOUNTER — OFFICE VISIT (OUTPATIENT)
Dept: CARDIOLOGY CLINIC | Age: 79
End: 2019-12-04

## 2019-12-04 VITALS
BODY MASS INDEX: 36.25 KG/M2 | SYSTOLIC BLOOD PRESSURE: 140 MMHG | RESPIRATION RATE: 20 BRPM | DIASTOLIC BLOOD PRESSURE: 70 MMHG | HEART RATE: 55 BPM | WEIGHT: 197 LBS | HEIGHT: 62 IN | OXYGEN SATURATION: 96 %

## 2019-12-04 DIAGNOSIS — I48.0 PAROXYSMAL ATRIAL FIBRILLATION (HCC): Primary | ICD-10-CM

## 2019-12-04 DIAGNOSIS — Z99.89 OSA ON CPAP: ICD-10-CM

## 2019-12-04 DIAGNOSIS — Z86.79 S/P ABLATION OF ATRIAL FIBRILLATION: ICD-10-CM

## 2019-12-04 DIAGNOSIS — G47.33 OSA ON CPAP: ICD-10-CM

## 2019-12-04 DIAGNOSIS — I45.10 RBBB: ICD-10-CM

## 2019-12-04 DIAGNOSIS — Z98.890 S/P ABLATION OF ATRIAL FIBRILLATION: ICD-10-CM

## 2019-12-04 DIAGNOSIS — E66.01 SEVERE OBESITY (BMI 35.0-39.9) WITH COMORBIDITY (HCC): ICD-10-CM

## 2019-12-04 DIAGNOSIS — I87.2 VENOUS INSUFFICIENCY: ICD-10-CM

## 2019-12-04 DIAGNOSIS — I10 HYPERTENSION, UNSPECIFIED TYPE: ICD-10-CM

## 2019-12-04 NOTE — PROGRESS NOTES
Room # 3  Complains of SOB with exertion    Visit Vitals  /90 (BP 1 Location: Left arm, BP Patient Position: Sitting)   Pulse (!) 55   Resp 20   Ht 5' 2\" (1.575 m)   Wt 197 lb (89.4 kg)   SpO2 96%   BMI 36.03 kg/m²

## 2019-12-04 NOTE — PROGRESS NOTES
HISTORY OF PRESENTING ILLNESS      Shannon Adnrade is a 70 y.o. female with ANÍBAL, HTN, RBBB and persistent AF s/p AF ablation 3/2/2017 (unable to isolate LSPV). She had previously been on Propafenone and underwent several cardioversions resulting in symptomatic improvement with NSR.  Echo demonstrated preserved LV function with LA diameter of 3.4cm in May 2015.  She reported shortness of breath post ablation and her amiodarone was discontinued. Recently she had some recurrence of AF, underwent DCCV and had ERAF after 3 days. Her diltiazem was increased and coreg was added to her regimen. She is currently on flecainide.  She underwent a monitor which demonstrated episodes of atrial fibrillation with controlled ventricular rate.  She is asymptomatic during these episodes.  She did have some episodes of bradycardia during which she feels fatigued and tired. Last visit, we discussed options of repeat attempt at AF ablation to allow discontinuation of diltiazem and flecainide however she wished to avoid this. She continues to have occ recurrences of AF according to her BP monitor and has bradycardia after taking her meds that is associated with fatigue. ACTIVE PROBLEM LIST     Patient Active Problem List    Diagnosis Date Noted    Anxiety 07/22/2018    Severe obesity (BMI 35.0-39. 9) with comorbidity ([address] 05/25/2018    SVT (supraventricular tachycardia) ([address] 08/28/2017    Hiatal hernia 08/28/2017    Venous insufficiency 05/04/2017    Acquired hypothyroidism 05/01/2017    S/P ablation of atrial fibrillation 04/28/2017    Paroxysmal atrial fibrillation ([address] 08/28/2016    Gastroesophageal reflux disease without esophagitis 07/03/2016    RBBB 05/07/2015    LAE (left atrial enlargement) 04/03/2013           PAST MEDICAL HISTORY     Past Medical History:   Diagnosis Date    Atrial fibrillation with RVR ([address] 6/29/2018    DDD (degenerative disc disease)     Gastroesophageal reflux disease without esophagitis 7/3/2016    GERD (gastroesophageal reflux disease)     Hiatal hernia     HTN (hypertension), benign 2016    Hypothyroidism     ANÍBAL (obstructive sleep apnea) 2016    Paroxysmal atrial fibrillation ([facility] 2016    Uterine prolapse            PAST SURGICAL HISTORY     Past Surgical History:   Procedure Laterality Date    HX AFIB ABLATION  2017    HX GI      COLONOSCOPY     HX GYN      TUBAL LIGATION    HX HEENT      WISDOM TEETH EXTRACTED. ALLERGIES     Allergies   Allergen Reactions    Citalopram Hydrobromide Rash     With itching.  Chlorthalidone Rash    Contrast Agent [Iodine] Other (comments)     Wheezing/bronchospasm    Maxzide [Triamterene-Hydrochlorothiazid] Palpitations    Vicodin [Hydrocodone-Acetaminophen] Palpitations          FAMILY HISTORY     Family History   Problem Relation Age of Onset    Diabetes Mother     Hypertension Mother     COPD Mother     negative for cardiac disease       SOCIAL HISTORY     Social History     Socioeconomic History    Marital status:      Spouse name: Not on file    Number of children: Not on file    Years of education: Not on file    Highest education level: Not on file   Tobacco Use    Smoking status: Former Smoker     Packs/day: 1.50     Years: 30.00     Pack years: 45.00     Last attempt to quit: 3/3/1994     Years since quittin.7    Smokeless tobacco: Former User   Substance and Sexual Activity    Alcohol use: No    Drug use: No    Sexual activity: Never         MEDICATIONS     Current Outpatient Medications   Medication Sig    dilTIAZem (CARDIZEM) 60 mg tablet TAKE 1 TABLET BY MOUTH BEFORE BREAKFAST LUNCH AND DINNER (Patient taking differently: 60 mg.  TAKE 1 TABLET BY MOUTH BEFORE BREAKFAST LUNCH AND DINNER)    candesartan (ATACAND) 16 mg tablet TAKE 1 TABLET BY MOUTH ONCE DAILY    flecainide (TAMBOCOR) 50 mg tablet TAKE 1 TABLET BY MOUTH TWICE A DAY    carvedilol (COREG) 3.125 mg tablet TAKE 1 TABLET BY MOUTH TWICE A DAY WITH MEALS    apixaban (ELIQUIS) 5 mg tablet take 1 tablet by mouth twice a day    cloNIDine HCl (CATAPRES) 0.1 mg tablet Take 0.1 mg by mouth daily. Hold if BP less then 120 sys    escitalopram oxalate (LEXAPRO) 5 mg tablet Take  by mouth daily.  levothyroxine (SYNTHROID) 50 mcg tablet Take 50 mcg by mouth Daily (before breakfast).  calcium-vitamin D (OYSTER SHELL) 500 mg(1,250mg) -200 unit per tablet Take 1 Tab by mouth every evening.  lansoprazole (PREVACID) 30 mg capsule Take 30 mg by mouth nightly.  FOLIC ACID/MULTIVIT-MIN/LUTEIN (CENTRUM SILVER PO) Take 1 Tab by mouth every evening.  ezetimibe (ZETIA) 10 mg tablet Take 10 mg by mouth every evening. No current facility-administered medications for this visit. I have reviewed the nurses notes, vitals, problem list, allergy list, medical history, family, social history and medications. REVIEW OF SYMPTOMS      General: Pt denies excessive weight gain or loss. Pt is able to conduct ADL's  HEENT: Denies blurred vision, headaches, hearing loss, epistaxis and difficulty swallowing. Respiratory: Denies cough, congestion, shortness of breath, GOODE, wheezing or stridor. Cardiovascular: Denies precordial pain, palpitations, edema or PND  Gastrointestinal: Denies poor appetite, indigestion, abdominal pain or blood in stool  Genitourinary: Denies hematuria, dysuria, increased urinary frequency  Musculoskeletal: Denies joint pain or swelling from muscles or joints  Neurologic: Denies tremor, paresthesias, headache, or sensory motor disturbance  Psychiatric: Denies confusion, insomnia, depression  Integumentray: Denies rash, itching or ulcers. Hematologic: Denies easy bruising, bleeding       PHYSICAL EXAMINATION      There were no vitals filed for this visit. General: Well developed, in no acute distress.   HEENT: No jaundice, oral mucosa moist, no oral ulcers  Neck: Supple, no stiffness, no lymphadenopathy, supple  Heart:  Normal S1/S2 negative S3 or S4. Regular, no murmur, gallop or rub, no jugular venous distention  Respiratory: Clear bilaterally x 4, no wheezing or rales  Abdomen:   Soft, non-tender, bowel sounds are active.   Extremities:  No edema, normal cap refill, no cyanosis. Musculoskeletal: No clubbing, no deformities  Neuro: A&Ox3, speech clear, gait stable, cooperative, no focal neurologic deficits  Skin: Skin color is normal. No rashes or lesions. Non diaphoretic, moist.  Vascular: 2+ pulses symmetric in all extremities       DIAGNOSTIC DATA      EKG:        LABORATORY DATA      Lab Results   Component Value Date/Time    WBC 7.7 10/03/2018 12:21 AM    HGB 13.0 10/03/2018 12:21 AM    HCT 40.2 10/03/2018 12:21 AM    PLATELET 898 70/36/6994 12:21 AM    MCV 94.1 10/03/2018 12:21 AM      Lab Results   Component Value Date/Time    Sodium 143 10/03/2018 12:21 AM    Potassium 4.1 10/03/2018 12:21 AM    Chloride 106 10/03/2018 12:21 AM    CO2 29 10/03/2018 12:21 AM    Anion gap 8 10/03/2018 12:21 AM    Glucose 165 (H) 10/03/2018 12:21 AM    BUN 20 10/03/2018 12:21 AM    Creatinine 1.13 (H) 10/03/2018 12:21 AM    BUN/Creatinine ratio 18 10/03/2018 12:21 AM    GFR est AA 57 (L) 10/03/2018 12:21 AM    GFR est non-AA 47 (L) 10/03/2018 12:21 AM    Calcium 9.6 10/03/2018 12:21 AM    Bilirubin, total 0.3 10/03/2018 12:21 AM    AST (SGOT) 27 10/03/2018 12:21 AM    Alk. phosphatase 84 10/03/2018 12:21 AM    Protein, total 7.4 10/03/2018 12:21 AM    Albumin 3.7 10/03/2018 12:21 AM    Globulin 3.7 10/03/2018 12:21 AM    A-G Ratio 1.0 (L) 10/03/2018 12:21 AM    ALT (SGPT) 30 10/03/2018 12:21 AM           ASSESSMENT      1. Atrial fibrillation                         A. Persistent                        Y. Symptomatic                        C.  AF ablation on 3/2/2017 (unable to achieve LSPV block)  2. Hiatal hernia  3. Hypertension   4. ANÍBAL  5. RBBB  6.  GERD        PLAN     Discussed PPM vs repeat attempt at AF ablation (with plan to lower meds). Will contact her next week to see how she wishes to proceed. FOLLOW-UP     1 year or earlier if she wishes to proceed with procedural approach      Thank you, Jennifer Rosales MD and Dr. Sheri Dahl for allowing me to participate in the care of this extraordinarily pleasant female. Please do not hesitate to contact me for further questions/concerns.          Kat Lim MD  Cardiac Electrophysiology / Cardiology    [address]  [address] [facility], [facility], [address] [facility]  [facility] [address]    [address] [facility]  [phone] / [phone] Fax   [phone] / [phone] Fax

## 2019-12-09 ENCOUNTER — TELEPHONE (OUTPATIENT)
Dept: CARDIOLOGY CLINIC | Age: 79
End: 2019-12-09

## 2019-12-09 NOTE — TELEPHONE ENCOUNTER
Patient prefers to continue current medical therapy for now. May consider procedural option in the new year.     Cynthia Steel NP      ----- Message from Catarino Mullen MD sent at 12/4/2019  2:49 PM EST -----  Please call patient next week to see if she wants status quo, ppm or repeat af ablation

## 2019-12-10 RX ORDER — DILTIAZEM HYDROCHLORIDE 60 MG/1
60 TABLET, FILM COATED ORAL 3 TIMES DAILY
Qty: 270 TAB | Refills: 3 | Status: SHIPPED | OUTPATIENT
Start: 2019-12-10 | End: 2020-12-18

## 2020-01-09 RX ORDER — CLONIDINE HYDROCHLORIDE 0.1 MG/1
0.1 TABLET ORAL DAILY
Qty: 90 TAB | Refills: 1 | Status: SHIPPED | OUTPATIENT
Start: 2020-01-09 | End: 2020-06-19

## 2020-01-09 NOTE — TELEPHONE ENCOUNTER
Refill approved VO   Requested Prescriptions     Pending Prescriptions Disp Refills    cloNIDine HCl (CATAPRES) 0.1 mg tablet 90 Tab 1     Sig: Take 1 Tab by mouth daily.  Hold if BP less then 120 sys

## 2020-02-13 RX ORDER — CARVEDILOL 3.12 MG/1
TABLET ORAL
Qty: 180 TAB | Refills: 3 | Status: SHIPPED | OUTPATIENT
Start: 2020-02-13 | End: 2021-02-08

## 2020-03-25 NOTE — TELEPHONE ENCOUNTER
NOV 5/19/2020  Requested Prescriptions     Pending Prescriptions Disp Refills    apixaban (Eliquis) 5 mg tablet [Pharmacy Med Name: ELIQUIS 5MG TABLETS] 60 Tab 11     Sig: TAKE 1 TABLET BY MOUTH TWICE A DAY

## 2020-05-12 ENCOUNTER — VIRTUAL VISIT (OUTPATIENT)
Dept: CARDIOLOGY CLINIC | Age: 80
End: 2020-05-12

## 2020-05-12 DIAGNOSIS — Z86.79 S/P ABLATION OF ATRIAL FIBRILLATION: ICD-10-CM

## 2020-05-12 DIAGNOSIS — Z98.890 S/P ABLATION OF ATRIAL FIBRILLATION: ICD-10-CM

## 2020-05-12 DIAGNOSIS — I45.10 RBBB: ICD-10-CM

## 2020-05-12 DIAGNOSIS — E66.01 SEVERE OBESITY (BMI 35.0-39.9) WITH COMORBIDITY (HCC): ICD-10-CM

## 2020-05-12 DIAGNOSIS — I51.7 LAE (LEFT ATRIAL ENLARGEMENT): ICD-10-CM

## 2020-05-12 DIAGNOSIS — I10 ESSENTIAL HYPERTENSION: ICD-10-CM

## 2020-05-12 DIAGNOSIS — R00.1 SINUS BRADYCARDIA: ICD-10-CM

## 2020-05-12 DIAGNOSIS — I48.0 PAROXYSMAL ATRIAL FIBRILLATION (HCC): Primary | ICD-10-CM

## 2020-05-12 NOTE — PROGRESS NOTES
TELEPHONE VISIT DOCUMENTATION     Pursuant to the emergency declaration under the [facility] waiver authority and the [employer] Act, this Telephone Visit was conducted, with patient's consent, to reduce the patient's risk of exposure to COVID-19 and provide continuity of care for an established patient. She and/or health care decision maker is aware that that she may receive a bill for this telephone service, depending on her insurance coverage, and has provided verbal consent to proceed. This is a Patient Initiated Episode with an Established Patient who has not had a related appointment within my department in the past 7 days or scheduled within the next 24 hours. Last seen in clinic 6 months     ASSESSMENT        ICD-10-CM ICD-9-CM    1. Paroxysmal atrial fibrillation (HCC) I48.0 427.31    2. Severe obesity (BMI 35.0-39. 9) with comorbidity ([facility] E66.01 278.01    3. RBBB I45.10 426.4    4. LAE (left atrial enlargement) I51.7 429.3    5. S/P ablation of atrial fibrillation Z98.890 V45.89     Z86.79     6. Essential hypertension I10 401.9    7. Sinus bradycardia R00.1 427.89         PLAN     Paroxysmal AF. She underwent ablation March 2017 with recurrence requiring addition of flecainide. Intermittent bradycardia to 50s. No recurrent AF sx on Flecainide.   - Continue Flecainide + low dose Diltiazem + Carvedilol.   - continue Eliquis  -  Reserve repeat ablation for frequent recurrences.      HTN. Normotensive for the most part. But does get high and lows at times (245 systolic, 97 systolic)  Management of anxiety with low dose Lexapro may have contributed. She takes Clonidine on a PRN basis in the evenings.       Anxiety disorder. She seems to be doing well with low dose Lexapro.      Obesity. We had a long discussion about heathy weight loss, reduction in carbs, regular exercise.      RTC 3 months    We discussed the expected course, resolution and complications of the diagnosis(es) in detail. Medication risks, benefits, costs, interactions, and alternatives were discussed as indicated. I advised her to contact the office if her condition worsens, changes or fails to improve as anticipated. She expressed understanding with the diagnosis(es) and plan    HISTORY OF PRESENTING ILLNESS      Aby Shaver is a 68 y.o. female evaluated via telephone on 5/12/2020 due to COVID 19 restrictions. Patient unable to participate in Virtual Visit with synchronous audio/visual technology. BPs up and down - 164/92, then 97/64, dizzy at times. No sx of AF, however. HR 60s. Adherent with her medications. Has stable GOODE with effort. No chest pain. Patient denies any exertional chest pain, palpitations, syncope, orthopnea, edema or paroxysmal nocturnal dyspnea. ACTIVE PROBLEM LIST     Patient Active Problem List    Diagnosis Date Noted    Sinus bradycardia 05/12/2020    Anxiety 07/22/2018    Severe obesity (BMI 35.0-39. 9) with comorbidity ([address] 05/25/2018    SVT (supraventricular tachycardia) ([address] 08/28/2017    Hiatal hernia 08/28/2017    Venous insufficiency 05/04/2017    Acquired hypothyroidism 05/01/2017    S/P ablation of atrial fibrillation 04/28/2017    Paroxysmal atrial fibrillation ([address] 08/28/2016    Essential hypertension 07/26/2016    Gastroesophageal reflux disease without esophagitis 07/03/2016    RBBB 05/07/2015    LAE (left atrial enlargement) 04/03/2013           PAST MEDICAL HISTORY     Past Medical History:   Diagnosis Date    Atrial fibrillation with RVR ([address] 6/29/2018    DDD (degenerative disc disease)     Gastroesophageal reflux disease without esophagitis 7/3/2016    GERD (gastroesophageal reflux disease)     Hiatal hernia     HTN (hypertension), benign 7/26/2016    Hypothyroidism     ANÍBAL (obstructive sleep apnea) 7/26/2016    Paroxysmal atrial fibrillation ([address] 2016    Uterine prolapse            PAST SURGICAL HISTORY     Past Surgical History:   Procedure Laterality Date    HX AFIB ABLATION  2017    HX GI      COLONOSCOPY     HX GYN      TUBAL LIGATION    HX HEENT      WISDOM TEETH EXTRACTED. ALLERGIES     Allergies   Allergen Reactions    Citalopram Hydrobromide Rash     With itching.  Chlorthalidone Rash    Contrast Agent [Iodine] Other (comments)     Wheezing/bronchospasm    Maxzide [Triamterene-Hydrochlorothiazid] Palpitations    Vicodin [Hydrocodone-Acetaminophen] Palpitations          FAMILY HISTORY     Family History   Problem Relation Age of Onset    Diabetes Mother     Hypertension Mother     COPD Mother     negative for cardiac disease       SOCIAL HISTORY     Social History     Socioeconomic History    Marital status:      Spouse name: Not on file    Number of children: Not on file    Years of education: Not on file    Highest education level: Not on file   Tobacco Use    Smoking status: Former Smoker     Packs/day: 1.50     Years: 30.00     Pack years: 45.00     Last attempt to quit: 3/3/1994     Years since quittin.2    Smokeless tobacco: Former User   Substance and Sexual Activity    Alcohol use: No    Drug use: No    Sexual activity: Never         MEDICATIONS     Current Outpatient Medications   Medication Sig    carvediloL (COREG) 3.125 mg tablet TAKE 1 TABLET BY MOUTH TWICE A DAY WITH MEALS    cloNIDine HCl (CATAPRES) 0.1 mg tablet Take 1 Tab by mouth daily. Hold if BP less then 120 sys    dilTIAZem (CARDIZEM) 60 mg tablet Take 1 Tab by mouth three (3) times daily. TAKE 1 TABLET BY MOUTH BEFORE BREAKFAST LUNCH AND DINNER    candesartan (ATACAND) 16 mg tablet TAKE 1 TABLET BY MOUTH ONCE DAILY    escitalopram oxalate (LEXAPRO) 5 mg tablet Take  by mouth daily.  levothyroxine (SYNTHROID) 50 mcg tablet Take 50 mcg by mouth Daily (before breakfast).     calcium-vitamin D (OYSTER SHELL) 500 mg(1,250mg) -200 unit per tablet Take 1 Tab by mouth every evening.  lansoprazole (PREVACID) 30 mg capsule Take 30 mg by mouth nightly.  FOLIC ACID/MULTIVIT-MIN/LUTEIN (CENTRUM SILVER PO) Take 1 Tab by mouth every evening.  ezetimibe (ZETIA) 10 mg tablet Take 10 mg by mouth every evening.  flecainide (TAMBOCOR) 50 mg tablet TAKE 1 TABLET BY MOUTH TWICE A DAY    apixaban (Eliquis) 5 mg tablet TAKE 1 TABLET BY MOUTH TWICE DAILY     No current facility-administered medications for this visit. I have reviewed the nurses notes, vitals, problem list, allergy list, medical history, family, social history and medications. REVIEW OF SYMPTOMS     Constitutional: Negative for fever, chills, malaise/fatigue and diaphoresis. Respiratory: Negative for cough, hemoptysis, sputum production, shortness of breath and wheezing. Cardiovascular: Negative for chest pain, palpitations, orthopnea, claudication, leg swelling and PND. Gastrointestinal: Negative for heartburn, nausea, vomiting, blood in stool and melena. Genitourinary: Negative for dysuria and flank pain. Musculoskeletal: Negative for joint pain and back pain. Skin: Negative for rash. Neurological: Negative for focal weakness, seizures, loss of consciousness, weakness and headaches. Endo/Heme/Allergies: Negative for abnormal bleeding. Psychiatric/Behavioral: Negative for memory loss. DIAGNOSTIC DATA      Adenosine myoview 2011 - normal perfusion, EF 81%  Echocardiogram 2/22/13 - normal left ventricular size and function, normal appearing mitral, aortic, and tricuspid valves, with mild mitral and moderate tricuspid regurgitation, bi-atrial enlargement  Lexiscan 3/3/2014 - normal perfusion EF 83%  Echo: 5/15/15- 60%, mildly dilated LA, mild MR  Cardioversion 7/23/2015 - 200J  Lexiscan cardiolite 5/10/16 - normal perfusion, EF 78%  Renal Visceral Duplex 5/2016 - No evidence of ONOFRE  3/2/17-.  Successful atrial fibrillation ablation however unable to achieve entrance/exit block of LSPV per Dr. Eileen Weldon    Echo 3/8/18 - EF 65%. No WMA. Normal RV size and function. Mild MR. Mild TR. Lexiscan Cardiolite 1/30/19 - normal perfusion. EF 77%. LABORATORY DATA      Lab Results   Component Value Date/Time    WBC 7.7 10/03/2018 12:21 AM    HGB 13.0 10/03/2018 12:21 AM    HCT 40.2 10/03/2018 12:21 AM    PLATELET 507 64/60/2075 12:21 AM    MCV 94.1 10/03/2018 12:21 AM      Lab Results   Component Value Date/Time    Sodium 143 10/03/2018 12:21 AM    Potassium 4.1 10/03/2018 12:21 AM    Chloride 106 10/03/2018 12:21 AM    CO2 29 10/03/2018 12:21 AM    Anion gap 8 10/03/2018 12:21 AM    Glucose 165 (H) 10/03/2018 12:21 AM    BUN 20 10/03/2018 12:21 AM    Creatinine 1.13 (H) 10/03/2018 12:21 AM    BUN/Creatinine ratio 18 10/03/2018 12:21 AM    GFR est AA 57 (L) 10/03/2018 12:21 AM    GFR est non-AA 47 (L) 10/03/2018 12:21 AM    Calcium 9.6 10/03/2018 12:21 AM    Bilirubin, total 0.3 10/03/2018 12:21 AM    AST (SGOT) 27 10/03/2018 12:21 AM    Alk. phosphatase 84 10/03/2018 12:21 AM    Protein, total 7.4 10/03/2018 12:21 AM    Albumin 3.7 10/03/2018 12:21 AM    Globulin 3.7 10/03/2018 12:21 AM    A-G Ratio 1.0 (L) 10/03/2018 12:21 AM    ALT (SGPT) 30 10/03/2018 12:21 AM           FOLLOW-UP     Follow-up and Dispositions    · Return in about 3 months (around 8/12/2020).           Total Time: minutes: 11-20 minutes      Azalea Luque MD    [place] [address]        [address]        [phone] / [phone] Fax       Dony Pelaez  [address] , [address] [place] [address]  Yuliana Rivera  [phone] / [phone] Fax

## 2020-05-13 RX ORDER — FLECAINIDE ACETATE 50 MG/1
TABLET ORAL
Qty: 180 TAB | Refills: 1 | Status: SHIPPED | OUTPATIENT
Start: 2020-05-13 | End: 2020-11-06

## 2020-05-13 NOTE — TELEPHONE ENCOUNTER
Requested Prescriptions     Pending Prescriptions Disp Refills    apixaban (Eliquis) 5 mg tablet [Pharmacy Med Name: ELIQUIS 5MG TABLETS] 180 Tab 1     Sig: TAKE 1 TABLET BY MOUTH TWICE DAILY     \correct amount resent to pharmacy  Saadia Valdez

## 2020-05-13 NOTE — TELEPHONE ENCOUNTER
Requested Prescriptions     Pending Prescriptions Disp Refills    flecainide (TAMBOCOR) 50 mg tablet [Pharmacy Med Name: FLECAINIDE 50MG TABLETS] 180 Tab 1     Sig: TAKE 1 TABLET BY MOUTH TWICE A DAY     PAULINE Rodriguez

## 2020-06-19 ENCOUNTER — HOSPITAL ENCOUNTER (EMERGENCY)
Age: 80
Discharge: HOME | End: 2020-06-19
Attending: EMERGENCY MEDICINE | Admitting: EMERGENCY MEDICINE
Payer: MEDICARE

## 2020-06-19 ENCOUNTER — APPOINTMENT (OUTPATIENT)
Dept: GENERAL RADIOLOGY | Age: 80
End: 2020-06-19
Attending: EMERGENCY MEDICINE
Payer: MEDICARE

## 2020-06-19 ENCOUNTER — TELEPHONE (OUTPATIENT)
Dept: CARDIOLOGY CLINIC | Age: 80
End: 2020-06-19

## 2020-06-19 VITALS
HEART RATE: 61 BPM | DIASTOLIC BLOOD PRESSURE: 67 MMHG | SYSTOLIC BLOOD PRESSURE: 148 MMHG | OXYGEN SATURATION: 95 % | RESPIRATION RATE: 17 BRPM | TEMPERATURE: 98.3 F

## 2020-06-19 DIAGNOSIS — I16.0 HYPERTENSIVE URGENCY: Primary | ICD-10-CM

## 2020-06-19 LAB
ALBUMIN SERPL-MCNC: 3.9 G/DL (ref 3.5–5)
ALBUMIN/GLOB SERPL: 1 {RATIO} (ref 1.1–2.2)
ALP SERPL-CCNC: 86 U/L (ref 45–117)
ALT SERPL-CCNC: 24 U/L (ref 12–78)
ANION GAP SERPL CALC-SCNC: 3 MMOL/L (ref 5–15)
APTT PPP: 27.8 SEC (ref 22.1–32)
AST SERPL-CCNC: 20 U/L (ref 15–37)
BASOPHILS # BLD: 0 K/UL (ref 0–0.1)
BASOPHILS NFR BLD: 0 % (ref 0–1)
BILIRUB SERPL-MCNC: 0.4 MG/DL (ref 0.2–1)
BNP SERPL-MCNC: 283 PG/ML
BUN SERPL-MCNC: 10 MG/DL (ref 6–20)
BUN/CREAT SERPL: 11 (ref 12–20)
CALCIUM SERPL-MCNC: 9.2 MG/DL (ref 8.5–10.1)
CHLORIDE SERPL-SCNC: 107 MMOL/L (ref 97–108)
CK SERPL-CCNC: 94 U/L (ref 26–192)
CO2 SERPL-SCNC: 31 MMOL/L (ref 21–32)
CREAT SERPL-MCNC: 0.91 MG/DL (ref 0.55–1.02)
D DIMER PPP FEU-MCNC: 0.35 MG/L FEU (ref 0–0.65)
DIFFERENTIAL METHOD BLD: NORMAL
EOSINOPHIL # BLD: 0.2 K/UL (ref 0–0.4)
EOSINOPHIL NFR BLD: 3 % (ref 0–7)
ERYTHROCYTE [DISTWIDTH] IN BLOOD BY AUTOMATED COUNT: 13.3 % (ref 11.5–14.5)
GLOBULIN SER CALC-MCNC: 3.9 G/DL (ref 2–4)
GLUCOSE SERPL-MCNC: 131 MG/DL (ref 65–100)
HCT VFR BLD AUTO: 40.6 % (ref 35–47)
HGB BLD-MCNC: 13.1 G/DL (ref 11.5–16)
IMM GRANULOCYTES # BLD AUTO: 0 K/UL (ref 0–0.04)
IMM GRANULOCYTES NFR BLD AUTO: 0 % (ref 0–0.5)
INR PPP: 1.1 (ref 0.9–1.1)
LYMPHOCYTES # BLD: 1.9 K/UL (ref 0.8–3.5)
LYMPHOCYTES NFR BLD: 32 % (ref 12–49)
MAGNESIUM SERPL-MCNC: 2.2 MG/DL (ref 1.6–2.4)
MCH RBC QN AUTO: 31.3 PG (ref 26–34)
MCHC RBC AUTO-ENTMCNC: 32.3 G/DL (ref 30–36.5)
MCV RBC AUTO: 96.9 FL (ref 80–99)
MONOCYTES # BLD: 0.7 K/UL (ref 0–1)
MONOCYTES NFR BLD: 11 % (ref 5–13)
NEUTS SEG # BLD: 3.2 K/UL (ref 1.8–8)
NEUTS SEG NFR BLD: 54 % (ref 32–75)
NRBC # BLD: 0 K/UL (ref 0–0.01)
NRBC BLD-RTO: 0 PER 100 WBC
PHOSPHATE SERPL-MCNC: 3.3 MG/DL (ref 2.6–4.7)
PLATELET # BLD AUTO: 185 K/UL (ref 150–400)
PMV BLD AUTO: 10.2 FL (ref 8.9–12.9)
POTASSIUM SERPL-SCNC: 3.7 MMOL/L (ref 3.5–5.1)
PROT SERPL-MCNC: 7.8 G/DL (ref 6.4–8.2)
PROTHROMBIN TIME: 10.9 SEC (ref 9–11.1)
RBC # BLD AUTO: 4.19 M/UL (ref 3.8–5.2)
SODIUM SERPL-SCNC: 141 MMOL/L (ref 136–145)
THERAPEUTIC RANGE,PTTT: NORMAL SECS (ref 58–77)
TROPONIN I SERPL-MCNC: <0.05 NG/ML
TSH SERPL DL<=0.05 MIU/L-ACNC: 3.55 UIU/ML (ref 0.36–3.74)
WBC # BLD AUTO: 6 K/UL (ref 3.6–11)

## 2020-06-19 PROCEDURE — 93005 ELECTROCARDIOGRAM TRACING: CPT

## 2020-06-19 PROCEDURE — 80053 COMPREHEN METABOLIC PANEL: CPT

## 2020-06-19 PROCEDURE — 84100 ASSAY OF PHOSPHORUS: CPT

## 2020-06-19 PROCEDURE — 96375 TX/PRO/DX INJ NEW DRUG ADDON: CPT

## 2020-06-19 PROCEDURE — 83735 ASSAY OF MAGNESIUM: CPT

## 2020-06-19 PROCEDURE — 71045 X-RAY EXAM CHEST 1 VIEW: CPT

## 2020-06-19 PROCEDURE — 85730 THROMBOPLASTIN TIME PARTIAL: CPT

## 2020-06-19 PROCEDURE — 99285 EMERGENCY DEPT VISIT HI MDM: CPT

## 2020-06-19 PROCEDURE — 74011250636 HC RX REV CODE- 250/636: Performed by: EMERGENCY MEDICINE

## 2020-06-19 PROCEDURE — 84484 ASSAY OF TROPONIN QUANT: CPT

## 2020-06-19 PROCEDURE — 96374 THER/PROPH/DIAG INJ IV PUSH: CPT

## 2020-06-19 PROCEDURE — 85379 FIBRIN DEGRADATION QUANT: CPT

## 2020-06-19 PROCEDURE — 85025 COMPLETE CBC W/AUTO DIFF WBC: CPT

## 2020-06-19 PROCEDURE — 84443 ASSAY THYROID STIM HORMONE: CPT

## 2020-06-19 PROCEDURE — 83880 ASSAY OF NATRIURETIC PEPTIDE: CPT

## 2020-06-19 PROCEDURE — 82550 ASSAY OF CK (CPK): CPT

## 2020-06-19 PROCEDURE — 85610 PROTHROMBIN TIME: CPT

## 2020-06-19 PROCEDURE — 36415 COLL VENOUS BLD VENIPUNCTURE: CPT

## 2020-06-19 RX ORDER — LABETALOL HCL 20 MG/4 ML
20 SYRINGE (ML) INTRAVENOUS
Status: COMPLETED | OUTPATIENT
Start: 2020-06-19 | End: 2020-06-19

## 2020-06-19 RX ORDER — HYDRALAZINE HYDROCHLORIDE 50 MG/1
50 TABLET, FILM COATED ORAL 3 TIMES DAILY
Qty: 90 TAB | Refills: 0 | Status: SHIPPED | OUTPATIENT
Start: 2020-06-19 | End: 2020-06-19 | Stop reason: ALTCHOICE

## 2020-06-19 RX ORDER — LABETALOL HCL 20 MG/4 ML
10 SYRINGE (ML) INTRAVENOUS
Status: DISCONTINUED | OUTPATIENT
Start: 2020-06-19 | End: 2020-06-19

## 2020-06-19 RX ORDER — CLONIDINE HYDROCHLORIDE 0.1 MG/1
0.1 TABLET ORAL 2 TIMES DAILY
Qty: 90 TAB | Refills: 1 | Status: SHIPPED | OUTPATIENT
Start: 2020-06-19 | End: 2020-12-07

## 2020-06-19 RX ORDER — HYDRALAZINE HYDROCHLORIDE 20 MG/ML
20 INJECTION INTRAMUSCULAR; INTRAVENOUS
Status: COMPLETED | OUTPATIENT
Start: 2020-06-19 | End: 2020-06-19

## 2020-06-19 RX ADMIN — LABETALOL HYDROCHLORIDE 20 MG: 5 INJECTION, SOLUTION INTRAVENOUS at 05:58

## 2020-06-19 RX ADMIN — HYDRALAZINE HYDROCHLORIDE 20 MG: 20 INJECTION INTRAMUSCULAR; INTRAVENOUS at 05:11

## 2020-06-19 NOTE — TELEPHONE ENCOUNTER
Spoke with Mrs Salvatore Tapia. Reviewed ED visit. Accelerated HTN with markedly elevated BP > 724 systolic. Responded to iv hydralazine and labetalol. Given Rx for hydralazine 50 mg tid but has not filled as yet. Suspect anxiety playing a role. Labs and cXR nl.    I favor scheduled clonidine dosing 0.1 mg bid, rather than starting hydralazine. She had been taking clonidine only prn prior. Continue close home BP monitoring, may need to escalate clonidine dosing.      Close followup with NP in the office next week to reassess BP    She agrees with this plan

## 2020-06-19 NOTE — DISCHARGE INSTRUCTIONS
Patient Education        Acute High Blood Pressure: Care Instructions  Your Care Instructions     Acute high blood pressure is very high blood pressure. It's a serious problem. Very high blood pressure can damage your brain, heart, eyes, and kidneys. You may have been given medicines to lower your blood pressure. You may have gotten them as pills or through a needle in one of your veins. This is called an IV. And maybe you were given other medicines too. These can be needed when high blood pressure causes other problems. To keep your blood pressure at a lower level, you may need to make healthy lifestyle changes. And you will probably need to take medicines. Be sure to follow up with your doctor about your blood pressure and what you can do about it. Follow-up care is a key part of your treatment and safety. Be sure to make and go to all appointments, and call your doctor if you are having problems. It's also a good idea to know your test results and keep a list of the medicines you take. How can you care for yourself at home? · See your doctor as often as he or she recommends. This is to make sure your blood pressure is under control. You will probably go at least 2 times a year. But it may be more often at first.  · Take your blood pressure medicine exactly as prescribed. You may take one or more types. They include diuretics, beta-blockers, ACE inhibitors, calcium channel blockers, and angiotensin II receptor blockers. Call your doctor if you think you are having a problem with your medicine. · If you take blood pressure medicine, talk to your doctor before you take decongestants or anti-inflammatory medicine, such as ibuprofen. These can raise blood pressure. · Learn how to check your blood pressure at home. Check it often. · Ask your doctor if it's okay to drink alcohol. · Talk to your doctor about lifestyle changes that can help blood pressure.  These include being active and managing your weight. · Don't smoke. Smoking increases your risk for heart attack and stroke. When should you call for help? Call  911 anytime you think you may need emergency care. This may mean having symptoms that suggest that your blood pressure is causing a serious heart or blood vessel problem. Your blood pressure may be over 180/120. For example, call 911 if:  · You have symptoms of a heart attack. These may include:  ? Chest pain or pressure, or a strange feeling in the chest.  ? Sweating. ? Shortness of breath. ? Nausea or vomiting. ? Pain, pressure, or a strange feeling in the back, neck, jaw, or upper belly or in one or both shoulders or arms. ? Lightheadedness or sudden weakness. ? A fast or irregular heartbeat. · You have symptoms of a stroke. These may include:  ? Sudden numbness, tingling, weakness, or loss of movement in your face, arm, or leg, especially on only one side of your body. ? Sudden vision changes. ? Sudden trouble speaking. ? Sudden confusion or trouble understanding simple statements. ? Sudden problems with walking or balance. ? A sudden, severe headache that is different from past headaches. · You have severe back or belly pain. Do not wait until your blood pressure comes down on its own. Get help right away. Call your doctor now or seek immediate care if:  · Your blood pressure is much higher than normal (such as 180/120 or higher), but you don't have symptoms. · You think high blood pressure is causing symptoms, such as:  ? Severe headache.  ? Blurry vision. Watch closely for changes in your health, and be sure to contact your doctor if:  · Your blood pressure measures higher than your doctor recommends at least 2 times. That means the top number is higher or the bottom number is higher, or both. · You think you may be having side effects from your blood pressure medicine. Where can you learn more?   Go to http://iker-isaac.info/  Enter H919 in the search box to learn more about \"Acute High Blood Pressure: Care Instructions. \"  Current as of: December 16, 2019               Content Version: 12.5  © 7286-2135 Healthwise, Incorporated. Care instructions adapted under license by [employer] (which disclaims liability or warranty for this information). If you have questions about a medical condition or this instruction, always ask your healthcare professional. [place] [address] any warranty or liability for your use of this information.

## 2020-06-19 NOTE — ED NOTES
Patient discharged from ED by provider. Discharge instructions reviewed with patient and all questions answered. Patient ambulatory from ED in [facility].

## 2020-06-19 NOTE — TELEPHONE ENCOUNTER
Saschanybhavna Cho went to ED today and ED MD started patient on hydralazine TID with her clonidine. Patient wanted to make sure Alayna Exon aware and agrees with plan.     advise

## 2020-06-19 NOTE — ED PROVIDER NOTES
The patient is a 57-year-old female with a past medical history significant for paroxysmal atrial fibrillation status post cardioversion and ablation x3, chronic anticoagulation with Eliquis, GERD, hiatal hernia, hypothyroidism, hypertension, degenerative disease, obstructive sleep apnea, uterine prolapse, morbid obesity who presents to the ED with a complaint of intermittent episode of elevated blood pressure over the last 24 to 48 hours. The patient state that her blood pressure has been running high even though she has been taking her medication as instructed. She described a blood pressure in the 787 systolic to diastolic of 95. Prior to coming to the ER, the patient had a blood pressure of 220/100. She took her clonidine approximately an hour prior to arrival to the ED without any improvement. She is complaining chest tightness that has subsided at this time. She denies any headache, sore throat, cough or congestion, fever, chills, neck and back pain, nausea, vomiting, abdominal pain, diarrhea, constipation, dysuria, dizziness, extremity weakness or numbness, sick contact, skin rash, recent travel, leg pain or swelling or prior history of the same. Past Medical History:   Diagnosis Date    Atrial fibrillation with RVR ([address] 6/29/2018    DDD (degenerative disc disease)     Gastroesophageal reflux disease without esophagitis 7/3/2016    GERD (gastroesophageal reflux disease)     Hiatal hernia     HTN (hypertension), benign 7/26/2016    Hypothyroidism     ANÍBAL (obstructive sleep apnea) 7/26/2016    Paroxysmal atrial fibrillation ([address] 8/28/2016    Uterine prolapse        Past Surgical History:   Procedure Laterality Date    HX AFIB ABLATION  03/2017    HX GI      COLONOSCOPY 7/14    HX GYN      TUBAL LIGATION    HX HEENT      WISDOM TEETH EXTRACTED.          Family History:   Problem Relation Age of Onset    Diabetes Mother     Hypertension Mother     COPD Mother        Social History     Socioeconomic History    Marital status:      Spouse name: Not on file    Number of children: Not on file    Years of education: Not on file    Highest education level: Not on file   Occupational History    Not on file   Social Needs    Financial resource strain: Not on file    Food insecurity     Worry: Not on file     Inability: Not on file    Transportation needs     Medical: Not on file     Non-medical: Not on file   Tobacco Use    Smoking status: Former Smoker     Packs/day: 1.50     Years: 30.00     Pack years: 45.00     Last attempt to quit: 3/3/1994     Years since quittin.3    Smokeless tobacco: Former User   Substance and Sexual Activity    Alcohol use: No    Drug use: No    Sexual activity: Never   Lifestyle    Physical activity     Days per week: Not on file     Minutes per session: Not on file    Stress: Not on file   Relationships    Social connections     Talks on phone: Not on file     Gets together: Not on file     Attends Scientologist service: Not on file     Active member of club or organization: Not on file     Attends meetings of clubs or organizations: Not on file     Relationship status: Not on file    Intimate partner violence     Fear of current or ex partner: Not on file     Emotionally abused: Not on file     Physically abused: Not on file     Forced sexual activity: Not on file   Other Topics Concern    Not on file   Social History Narrative    Not on file         ALLERGIES: Citalopram hydrobromide; Chlorthalidone; Contrast agent [iodine]; Maxzide [triamterene-hydrochlorothiazid]; and Vicodin [hydrocodone-acetaminophen]    Review of Systems   All other systems reviewed and are negative. Vitals:    20 0446 20 0503   BP: (!) 219/95 (!) 247/96   Pulse: 74 68   Resp: 16 24   Temp: 98.4 °F (36.9 °C)    SpO2: 96% 96%            Physical Exam  Vitals signs and nursing note reviewed. Exam conducted with a chaperone present. CONSTITUTIONAL: Well-appearing; well-nourished; in no apparent distress  HEAD: Normocephalic; atraumatic  EYES: PERRL; EOM intact; conjunctiva and sclera are clear bilaterally. ENT: No rhinorrhea; normal pharynx with no tonsillar hypertrophy; mucous membranes pink/moist, no erythema, no exudate. NECK: Supple; non-tender; no cervical lymphadenopathy  CARD: Normal S1, S2; no murmurs, rubs, or gallops. Regular rate and rhythm. RESP: Normal respiratory effort; breath sounds clear and equal bilaterally; no wheezes, rhonchi, or rales. ABD: Normal bowel sounds; non-distended; non-tender; no palpable organomegaly, no masses, no bruits. Back Exam: Normal inspection; no vertebral point tenderness, no CVA tenderness. Normal range of motion. EXT: Normal ROM in all four extremities; non-tender to palpation; no swelling or deformity; distal pulses are normal, no edema. SKIN: Warm; dry; no rash. NEURO:Alert and oriented x 3, coherent, FLO-XII grossly intact, sensory and motor are non-focal.        MDM  Number of Diagnoses or Management Options  Diagnosis management comments: Assessment: Hypertensive urgency rule out ACS, electrolyte normality and thyroid disease. The patient appears otherwise stable with a nonfocal exam.    Plan: EKG/lab/chest x-ray/education, reassurance, symptomatic treatment/blood pressure management/serial exam/ Monitor and Reevaluate.          Amount and/or Complexity of Data Reviewed  Clinical lab tests: ordered and reviewed  Tests in the radiology section of CPT®: ordered and reviewed  Tests in the medicine section of CPT®: reviewed and ordered  Discussion of test results with the performing providers: yes  Decide to obtain previous medical records or to obtain history from someone other than the patient: yes  Obtain history from someone other than the patient: yes  Review and summarize past medical records: yes  Discuss the patient with other providers: yes  Independent visualization of images, tracings, or specimens: yes    Risk of Complications, Morbidity, and/or Mortality  Presenting problems: moderate  Diagnostic procedures: moderate  Management options: moderate    Critical Care  Total time providing critical care: (Total critical care time spent exclusive of procedures: 45 minutes)    Patient Progress  Patient progress: stable         Procedures    ED EKG interpretation:  Rhythm: normal sinus rhythm; and regular . Rate (approx.): 70; Axis: normal; P wave: normal; QRS interval: prolonged; ST/T wave: non-specific changes; in  Lead: Diffusely; RBBB; Other findings: left ventricular hypertrophy. This EKG was interpreted by Yomaira Barry MD,ED Provider. XRAY INTERPRETATION (ED MD)  Chest Xray  No acute process seen. Normal heart size. No bony abnormalities. No infiltrate. Jerica Aviles MD 5:12 AM.  Progress Note:   Pt has been reexamined by Yomaira Barry MD. Pt is feeling much better. Symptoms have improved. All available results have been reviewed with pt and any available family. Pt understands sx, dx, and tx in ED. Care plan has been outlined and questions have been answered. Pt is ready to go home. Will send home on hypertension instruction. Prescription of hydralazine and clonidine. Outpatient referral with PCP as needed. Written by Yomaira Barry MD,6:53 AM    .   .

## 2020-06-19 NOTE — ED TRIAGE NOTES
Triage: Pt advsie that her blood pressure has been high even though she has been taking anti hypertensive medications.

## 2020-06-19 NOTE — TELEPHONE ENCOUNTER
Patient requesting to schedule an appt with Dr. Crow Watkins. Hospital follow up.  Please advise      CQIBS:[phone]

## 2020-06-22 LAB
ATRIAL RATE: 70 BPM
CALCULATED P AXIS, ECG09: 116 DEGREES
CALCULATED R AXIS, ECG10: 89 DEGREES
CALCULATED T AXIS, ECG11: 25 DEGREES
DIAGNOSIS, 93000: NORMAL
P-R INTERVAL, ECG05: 190 MS
Q-T INTERVAL, ECG07: 458 MS
QRS DURATION, ECG06: 144 MS
QTC CALCULATION (BEZET), ECG08: 494 MS
VENTRICULAR RATE, ECG03: 70 BPM

## 2020-06-26 ENCOUNTER — OFFICE VISIT (OUTPATIENT)
Dept: CARDIOLOGY CLINIC | Age: 80
End: 2020-06-26

## 2020-06-26 VITALS
SYSTOLIC BLOOD PRESSURE: 110 MMHG | WEIGHT: 197 LBS | HEIGHT: 62 IN | BODY MASS INDEX: 36.25 KG/M2 | HEART RATE: 56 BPM | DIASTOLIC BLOOD PRESSURE: 60 MMHG | OXYGEN SATURATION: 97 %

## 2020-06-26 DIAGNOSIS — I10 ESSENTIAL HYPERTENSION: Primary | ICD-10-CM

## 2020-06-26 DIAGNOSIS — R06.09 DOE (DYSPNEA ON EXERTION): ICD-10-CM

## 2020-06-26 DIAGNOSIS — I48.0 PAROXYSMAL ATRIAL FIBRILLATION (HCC): ICD-10-CM

## 2020-06-26 DIAGNOSIS — Z86.79 S/P ABLATION OF ATRIAL FIBRILLATION: ICD-10-CM

## 2020-06-26 DIAGNOSIS — E66.01 SEVERE OBESITY (BMI 35.0-39.9) WITH COMORBIDITY (HCC): ICD-10-CM

## 2020-06-26 DIAGNOSIS — F41.9 ANXIETY: ICD-10-CM

## 2020-06-26 DIAGNOSIS — Z98.890 S/P ABLATION OF ATRIAL FIBRILLATION: ICD-10-CM

## 2020-06-26 NOTE — LETTER
6/29/20 Patient: Marty Del Cid  
YOB: 1940 Date of Visit: 6/26/2020 Gus Guzmán MD 
[address] [facility] [address] [place] [address] 99 [id number] VIA Facsimile: [phone] Dear Gus Guzmán MD, Thank you for referring Ms. Lundy Mohs to [facility] for evaluation. My notes for this consultation are attached. If you have questions, please do not hesitate to call me. I look forward to following your patient along with you.  
 
 
Sincerely, 
 
Sarah Mcarthur NP

## 2020-06-26 NOTE — PROGRESS NOTES
Chela Alejandre is a 68 y.o. female    Visit Vitals  /60 (BP 1 Location: Left arm, BP Patient Position: Sitting)   Pulse (!) 56   Ht 5' 2\" (1.575 m)   Wt 197 lb (89.4 kg)   SpO2 97%   BMI 36.03 kg/m²       Chief Complaint   Patient presents with    Irregular Heart Beat     afib    Hypertension    Slow Heart Rate     aguila       Chest pain no  SOB when bending over  Dizziness no  Swelling no  Recent hospital visit [facility] for extreme blood pressure  Refills no    Hospital follow up.

## 2020-06-26 NOTE — PROGRESS NOTES
Virgilio Camilo, DERIAN  [id number]  [place], [address] [facility]    Office [phone]  Fax [phone]        Brady Mcclellan is a 68 y.o. female. Last seen by me 6 months ago. DIAGNOSES  Encounter Diagnoses     ICD-10-CM ICD-9-CM   1. Essential hypertension I10 401.9   2. Severe obesity (BMI 35.0-39. 9) with comorbidity ([address] E66.01 278.01   3. Paroxysmal atrial fibrillation (HCC) I48.0 427.31   4. S/P ablation of atrial fibrillation Z98.890 V45.89    Z86.79    5. Anxiety F41.9 300.00   6. GOODE (dyspnea on exertion) R06.00 786.09       ASSESSMENT/PLAN  Paroxysmal AF.  She underwent ablation March 2017 with recurrence requiring addition of flecainide. Intermittent bradycardia to 50s. No recurrent AF sx on Flecainide.   - Continue Flecainide + low dose Diltiazem + Carvedilol.   - continue Eliquis  - Reserve repeat ablation for frequent recurrences.      HTN, hx of labile pressures- Anxiety likely contributing. She takes Clonidine on a PRN basis at bedtime; advised to take clonidine 0.1mg at bedtime scheduled unless systolic pressure <072.         Obesity. Cont to work on [employer] loss, reduction in carbs, regular exercise.      Keep f/u in office as previously scheduled (~6 wks)    Follow-up and Dispositions    · Return in about 6 weeks (around 8/7/2020), or if symptoms worsen or fail to improve. HPI  Pt here today for f/u of BP. Seen in ED 6/19/20 for HTN emergency. Per telephone note by Dr. Carmen Jansen: \"Accelerated HTN with markedly elevated BP > 661 systolic. Responded to iv hydralazine and labetalol. Given Rx for hydralazine 50 mg tid but has not filled as yet. Suspect anxiety playing a role. Labs and cXR nl. I favor scheduled clonidine dosing 0.1 mg bid, rather than starting hydralazine. She had been taking clonidine only prn prior. \"     Pt did not start clonidine BID as advised. Continues to use only PRN at bedtime.   States she is very hesitant to take scheduled as BP sometimes 80s/50s. States day of ED visit she had increased stress due to her daughter. Keeps very detailed home BP log with pressures 80s-170s/60s-90s (mostly). Feels well with stable cardiac symptoms.     Has stable GOODE. No chest pain. Patient denies any exertional chest pain, palpitations, syncope, orthopnea, edema or paroxysmal nocturnal dyspnea.     Has occasional dizziness if gets up too quickly. Cardiac testing  Adenosine myoview 2011 - normal perfusion, EF 81%  Echocardiogram 2/22/13 - normal left ventricular size and function, normal appearing mitral, aortic, and tricuspid valves, with mild mitral and moderate tricuspid regurgitation, bi-atrial enlargement  Lexiscan 3/3/2014 - normal perfusion EF 83%  Echo: 5/15/15- 60%, mildly dilated LA, mild MR  Cardioversion 7/23/2015 - 200J  Lexiscan cardiolite 5/10/16 - normal perfusion, EF 78%  Renal Visceral Duplex 5/2016 - No evidence of ONOFRE  3/2/17-. Successful atrial fibrillation ablation however unable to achieve entrance/exit block of LSPV per Dr. Yina Galeano    Echo 3/8/18 - EF 65%. No WMA. Normal RV size and function. Mild MR. Mild TR. Lexiscan Cardiolite 1/30/19 - normal perfusion. EF 77%. Past Medical History:   Diagnosis Date    Atrial fibrillation with RVR ([facility] 6/29/2018    DDD (degenerative disc disease)     Gastroesophageal reflux disease without esophagitis 7/3/2016    GERD (gastroesophageal reflux disease)     Hiatal hernia     HTN (hypertension), benign 7/26/2016    Hypothyroidism     ANÍBAL (obstructive sleep apnea) 7/26/2016    Paroxysmal atrial fibrillation ([facility] 8/28/2016    Uterine prolapse       Current Outpatient Medications on File Prior to Visit   Medication Sig Dispense Refill    cloNIDine HCL (CATAPRES) 0.1 mg tablet Take 1 Tab by mouth two (2) times a day.  Hold if BP less then 120 sys 90 Tab 1    flecainide (TAMBOCOR) 50 mg tablet TAKE 1 TABLET BY MOUTH TWICE A  Tab 1    apixaban (Eliquis) 5 mg tablet TAKE 1 TABLET BY MOUTH TWICE DAILY 180 Tab 1    carvediloL (COREG) 3.125 mg tablet TAKE 1 TABLET BY MOUTH TWICE A DAY WITH MEALS 180 Tab 3    dilTIAZem (CARDIZEM) 60 mg tablet Take 1 Tab by mouth three (3) times daily. TAKE 1 TABLET BY MOUTH BEFORE BREAKFAST LUNCH AND DINNER 270 Tab 3    candesartan (ATACAND) 16 mg tablet TAKE 1 TABLET BY MOUTH ONCE DAILY 90 Tab 3    escitalopram oxalate (LEXAPRO) 5 mg tablet Take  by mouth daily.  levothyroxine (SYNTHROID) 50 mcg tablet Take 50 mcg by mouth Daily (before breakfast).  calcium-vitamin D (OYSTER SHELL) 500 mg(1,250mg) -200 unit per tablet Take 1 Tab by mouth every evening.  lansoprazole (PREVACID) 30 mg capsule Take 30 mg by mouth nightly.  FOLIC ACID/MULTIVIT-MIN/LUTEIN (CENTRUM SILVER PO) Take 1 Tab by mouth every evening.  ezetimibe (ZETIA) 10 mg tablet Take 10 mg by mouth every evening. No current facility-administered medications on file prior to visit. Allergies   Allergen Reactions    Citalopram Hydrobromide Rash     With itching.  Chlorthalidone Rash    Contrast Agent [Iodine] Other (comments)     Wheezing/bronchospasm    Maxzide [Triamterene-Hydrochlorothiazid] Palpitations    Vicodin [Hydrocodone-Acetaminophen] Palpitations     Review of Systems (positive findings above)  Constitutional: Negative for fever, chills, and diaphoresis. Respiratory: Negative for cough, hemoptysis, sputum production, and wheezing. Cardiovascular: Negative for chest pain, palpitations, leg swelling and PND. Gastrointestinal: Negative for heartburn, nausea, vomiting, blood in stool and melena. Genitourinary: Negative for dysuria and flank pain. Neurological: Negative for focal weakness, seizures, loss of consciousness,   Endo/Heme/Allergies: Negative for abnormal bleeding. Psychiatric/Behavioral: Negative for memory loss.       Visit Vitals  /60 (BP 1 Location: Left arm, BP Patient Position: Sitting)   Pulse (!) 56   Ht 5' 2\" (1.575 m)   Wt 197 lb (89.4 kg)   SpO2 97%   BMI 36.03 kg/m²      Wt Readings from Last 3 Encounters:   06/26/20 197 lb (89.4 kg)   12/04/19 197 lb (89.4 kg)   11/11/19 196 lb (88.9 kg)      Physical Exam   General - well developed well nourished  Neck - no JVD, neck supple  Cardiac - normal S1, S2, RRR no murmurs, rubs or gallops. No clicks  Vascular - radials pulses equal bilateral  Lungs - clear to auscultation bilaterals, no rales, wheezing or rhonchi  Abd - soft nontender, non-distended, +BS  Extremities - no edema, warm, well perfused  Neuro - nonfocal  Psych - normal mood and affect      Lab Results   Component Value Date/Time    Sodium 141 06/19/2020 04:56 AM    Potassium 3.7 06/19/2020 04:56 AM    Chloride 107 06/19/2020 04:56 AM    CO2 31 06/19/2020 04:56 AM    Anion gap 3 (L) 06/19/2020 04:56 AM    Glucose 131 (H) 06/19/2020 04:56 AM    BUN 10 06/19/2020 04:56 AM    Creatinine 0.91 06/19/2020 04:56 AM    BUN/Creatinine ratio 11 (L) 06/19/2020 04:56 AM    GFR est AA >60 06/19/2020 04:56 AM    GFR est non-AA 60 (L) 06/19/2020 04:56 AM    Calcium 9.2 06/19/2020 04:56 AM    Bilirubin, total 0.4 06/19/2020 04:56 AM    Alk.  phosphatase 86 06/19/2020 04:56 AM    Protein, total 7.8 06/19/2020 04:56 AM    Albumin 3.9 06/19/2020 04:56 AM    Globulin 3.9 06/19/2020 04:56 AM    A-G Ratio 1.0 (L) 06/19/2020 04:56 AM    ALT (SGPT) 24 06/19/2020 04:56 AM     Lab Results   Component Value Date/Time    WBC 6.0 06/19/2020 04:56 AM    HGB 13.1 06/19/2020 04:56 AM    HCT 40.6 06/19/2020 04:56 AM    PLATELET 254 05/61/0383 04:56 AM    MCV 96.9 06/19/2020 04:56 AM     No results found for: CHOL, CHOLPOCT, CHOLX, CHLST, CHOLV, HDL, HDLPOC, HDLP, LDL, LDLCPOC, LDLC, DLDLP, VLDLC, VLDL, TGLX, TRIGL, TRIGP, TGLPOCT, CHHD, CHHDX    Cardiographics   EKG 4/13 - normal  EKG 5/6/15 - AF with ventricular rate 96, RBBB  EKG 6/3/15 - A-fib rate 70s, RBBB, Rightward axis  EKG 8/31/15 - SR, RBBB  EKG 12/7/15 - SR, RBBB  EKG 7/26/16 - AF 80s, RBBB, rightward axis, unchanged from 7/23/16   EKG 11/28/16 - SR 60   EKG 12/05/16-AF 80-90, RBBB   EKG 01/10/17- AF 70s  EKG 8/25/17- SR 60, RBBB rightward axis.    EKG 7/6/18 - AF 80s, RBBB  EKG 10/4/18 - SR 58, first degree AV block, RBBB, no significant change from yesterday  EKG 1/7/19 - SR 59, RBBB, no change from 10/4/18  EKG 5/10/19 -  SR, 54  RBBB no change from 1/17/19  EKG 11/11/19 - SB 54, , RBBB, no change from 5/10/19    Erin Tilley, NP

## 2020-06-26 NOTE — PATIENT INSTRUCTIONS
If systolic pressure (top number) >120 take clonidine 0.1mg at bedtime.       Keep follow-up as scheduled; call prior if you have ongoing pressures >160/90

## 2020-07-16 ENCOUNTER — HOSPITAL ENCOUNTER (EMERGENCY)
Age: 80
Discharge: HOME | End: 2020-07-16
Attending: EMERGENCY MEDICINE
Payer: MEDICARE

## 2020-07-16 VITALS
OXYGEN SATURATION: 95 % | DIASTOLIC BLOOD PRESSURE: 86 MMHG | RESPIRATION RATE: 16 BRPM | HEIGHT: 62 IN | WEIGHT: 197 LBS | HEART RATE: 68 BPM | BODY MASS INDEX: 36.25 KG/M2 | SYSTOLIC BLOOD PRESSURE: 194 MMHG | TEMPERATURE: 97.9 F

## 2020-07-16 DIAGNOSIS — I10 ESSENTIAL HYPERTENSION: Primary | ICD-10-CM

## 2020-07-16 PROCEDURE — 99283 EMERGENCY DEPT VISIT LOW MDM: CPT

## 2020-07-16 NOTE — ED TRIAGE NOTES
Pt takes her BP before going to bed as she always does around 2230-2300h. BP was 854 systolic, took clonidine and lexapro, waited 30 min and systolic was over 568. Denies nausea , vomiting, headaches or vision changes.

## 2020-07-16 NOTE — ED PROVIDER NOTES
The history is provided by the patient. Hypertension    This is a chronic problem. The current episode started 1 to 2 hours ago (checked her BP tonight before bed which made her anxious because it was high then she took her clonidine). The problem has not changed since onset. Associated symptoms include shortness of breath (chronic, unchanged). Pertinent negatives include no chest pain, no confusion, no malaise/fatigue, no blurred vision, no headaches, no peripheral edema, no dizziness, no nausea and no vomiting. There are no associated agents to hypertension. Risk factors include hypertension. Past Medical History:   Diagnosis Date    Atrial fibrillation with RVR ([facility] [address] 6/29/2018    DDD (degenerative disc disease)     Gastroesophageal reflux disease without esophagitis 7/3/2016    GERD (gastroesophageal reflux disease)     Hiatal hernia     HTN (hypertension), benign 7/26/2016    Hypothyroidism     ANÍBAL (obstructive sleep apnea) 7/26/2016    [facility]ox[facility] ([facility] 8/28/2016    Uterine prolapse        Past Surgical History:   Procedure Laterality Date    HX AFIB ABLATION  03/2017    HX GI      COLONOSCOPY 7/14    HX GYN      TUBAL LIGATION    HX HEENT      WISDOM TEETH EXTRACTED.          Family History:   Problem Relation Age of Onset    Diabetes Mother     Hypertension Mother     COPD Mother        Social History     Socioeconomic History    Marital status:      Spouse name: Not on file    Number of children: Not on file    Years of education: Not on file    Highest education level: Not on file   Occupational History    Not on file   Social Needs    Financial resource strain: Not on file    Food insecurity     Worry: Not on file     Inability: Not on file   [employer] needs     Medical: Not on file     Non-medical: Not on file   Tobacco Use    Smoking status: Former Smoker     Packs/day: 1.50     Years: 30.00     Pack years: 45.00     Last attempt to quit: 3/3/1994     Years since quittin.3    Smokeless tobacco: Former User   Substance and Sexual Activity    Alcohol use: No    Drug use: No    Sexual activity: Never   Lifestyle    Physical activity     Days per week: Not on file     Minutes per session: Not on file    Stress: Not on file   Relationships    Social connections     Talks on phone: Not on file     Gets together: Not on file     Attends [employer] service: Not on file     Active member of club or organization: Not on file     Attends meetings of clubs or organizations: Not on file     Relationship status: Not on file    Intimate partner violence     Fear of current or ex partner: Not on file     Emotionally abused: Not on file     Physically abused: Not on file     Forced sexual activity: Not on file   Other Topics Concern    Not on file   Social History Narrative    Not on file         ALLERGIES: Citalopram hydrobromide; Chlorthalidone; Contrast agent [iodine]; Maxzide [triamterene-hydrochlorothiazid]; and Vicodin [hydrocodone-acetaminophen]    Review of Systems   Constitutional: Negative for malaise/fatigue. Eyes: Negative for blurred vision. Respiratory: Positive for shortness of breath (chronic, unchanged). Cardiovascular: Negative for chest pain. Gastrointestinal: Negative for nausea and vomiting. Neurological: Negative for dizziness and headaches. Psychiatric/Behavioral: Negative for confusion. All other systems reviewed and are negative. Vitals:    20 0147 20 0200   BP: (!) 219/90 194/86   Pulse: 68    Resp: 16    Temp: 97.9 °F (36.6 °C)    SpO2: 95%    Weight: 89.4 kg (197 lb)    Height: 5' 2\" (1.575 m)             Physical Exam  Vitals signs and nursing note reviewed. Constitutional:       General: She is not in acute distress. Appearance: She is well-developed. She is obese. HENT:      Head: Normocephalic and atraumatic.    Eyes:      Conjunctiva/sclera: Conjunctivae normal.   Neck: Musculoskeletal: Neck supple. Cardiovascular:      Rate and Rhythm: Normal rate and regular rhythm. Pulmonary:      Effort: Pulmonary effort is normal. No respiratory distress. Abdominal:      General: There is no distension. Musculoskeletal: Normal range of motion. General: No deformity. Skin:     General: Skin is warm and dry. Neurological:      General: No focal deficit present. Mental Status: She is alert and oriented to person, place, and time. Cranial Nerves: No cranial nerve deficit. Psychiatric:         Behavior: Behavior normal.          MDM     68 y.o. female presents with asymptomatic hypertension. No signs or symptoms of end-organ damage. Discussed outpatient follow up for medical management and return precautions discussed for new or concerning symptoms.      Procedures

## 2020-08-10 NOTE — PROGRESS NOTES
[address] [facility], [address] [facility]    Office [phone]  Fax [phone]        Eugenia Woody is a 68 y.o. female. Last seen by CANDIDO Barney ~ 6 weeks ago. Seen at [facility] ER on 7/16/20 with accelerated HTN. DIAGNOSES  Encounter Diagnoses     ICD-10-CM ICD-9-CM   1. GOODE (dyspnea on exertion)  R06.00 786.09   2. Murmur, cardiac  R01.1 785.2   3. LAE (left atrial enlargement)  I51.7 429.3   4. Essential hypertension  I10 401.9   5. Paroxysmal atrial fibrillation (HCC)  I48.0 427.31   6. Venous insufficiency  I87.2 459.81   7. SVT (supraventricular tachycardia) (formerly [facility])  I47.1 427.89   8. Sinus bradycardia  R00.1 427.89   9. S/P ablation of atrial fibrillation  Z98.890 V45.89    Z86.79    10. Anxiety  F41.9 300.00   11. Severe obesity (BMI 35.0-39. 9) with comorbidity ([facility]  E66.01 278.01       ASSESSMENT/PLAN  Paroxysmal AF - hx of ablation March 2017 with recurrence requiring addition of flecainide. Intermittent bradycardia to 50s. No recurrent AF s/sxs. - Continue Flecainide + low dose Diltiazem + Carvedilol.   - continue Eliquis for stroke prevention   - Reserve repeat ablation for frequent recurrences.      HTN, hx of labile pressures - Anxiety likely contributing. Normotensive in the office today and per home monitoring. She continues to occasional spikes, such as recent ER visit. - Continue PM schedule clonidine with additional PRN for SBP >180.   - Encouraged meditation, relaxation to aid with anxiety. - Continue home monitoring.      GOODE - progressed, with intermittent lightheadedness. New aortic murmur, 3/6,  on exam today. Last Echo 3/2018 with mild MR and mild TR. Last ischemic evaluation 1/2019 with normal perfusion.   - Update Echo in the near future to assess for AS.      Obesity - encouraged her to continue to follow a heart healthy diet and remain active.     She was encouraged to continue current medications, remain active, lose weight and call with any new complaints or concerns. Phone follow up to review results. HPI  Ms. Ojeda reports that her blood pressure has been doing \"pretty well\" most of the time. She is now utilizing 0.1 mg of Clonidine nightly, unless SBP is <120's. Home monitoring of  to 485'R systolic. Anxiety tends to be a trigger for any elevations in BP. When this occurs, she takes an extra Clonidine and an anxiety medication. She remains active with rigorous house chores (mopping, etc.) She denies any exertional chest pain. Notes an increase in GOODE. No edema, orthopnea or PND.   Notes intermittent dizziness when changing position too fast.       Cardiac testing  Adenosine myoview 2011 - normal perfusion, EF 81%  Echocardiogram 2/22/13 - normal left ventricular size and function, normal appearing mitral, aortic, and tricuspid valves, with mild mitral and moderate tricuspid regurgitation, bi-atrial enlargement  Lexiscan 3/3/2014 - normal perfusion EF 83%  Echo: 5/15/15- 60%, mildly dilated LA, mild MR  Cardioversion 7/23/2015 - 200J  Lexiscan cardiolite 5/10/16 - normal perfusion, EF 78%  Renal Visceral Duplex 5/2016 - No evidence of ONOFRE  3/2/17-. Successful atrial fibrillation ablation however unable to achieve entrance/exit block of LSPV per Dr. Ramirez Lopez    Echo 3/8/18 - EF 65%. No WMA. Normal RV size and function. Mild MR. Mild TR. Lexiscan Cardiolite 1/30/19 - normal perfusion. EF 77%.     Past Medical History:   Diagnosis Date    Atrial fibrillation with RVR ([address] 6/29/2018    DDD (degenerative disc disease)     Gastroesophageal reflux disease without esophagitis 7/3/2016    GERD (gastroesophageal reflux disease)     Hiatal hernia     HTN (hypertension), benign 7/26/2016    Hypothyroidism     ANÍBAL (obstructive sleep apnea) 7/26/2016    Paroxysmal atrial fibrillation ([address] 8/28/2016    Uterine prolapse       Current Outpatient Medications on File Prior to Visit   Medication Sig Dispense Refill    cloNIDine HCL (CATAPRES) 0.1 mg tablet Take 1 Tab by mouth two (2) times a day. Hold if BP less then 120 sys 90 Tab 1    flecainide (TAMBOCOR) 50 mg tablet TAKE 1 TABLET BY MOUTH TWICE A  Tab 1    apixaban (Eliquis) 5 mg tablet TAKE 1 TABLET BY MOUTH TWICE DAILY 180 Tab 1    carvediloL (COREG) 3.125 mg tablet TAKE 1 TABLET BY MOUTH TWICE A DAY WITH MEALS 180 Tab 3    dilTIAZem (CARDIZEM) 60 mg tablet Take 1 Tab by mouth three (3) times daily. TAKE 1 TABLET BY MOUTH BEFORE BREAKFAST LUNCH AND DINNER 270 Tab 3    candesartan (ATACAND) 16 mg tablet TAKE 1 TABLET BY MOUTH ONCE DAILY 90 Tab 3    escitalopram oxalate (LEXAPRO) 5 mg tablet Take  by mouth daily.  levothyroxine (SYNTHROID) 50 mcg tablet Take 50 mcg by mouth Daily (before breakfast).  calcium-vitamin D (OYSTER SHELL) 500 mg(1,250mg) -200 unit per tablet Take 1 Tab by mouth every evening.  lansoprazole (PREVACID) 30 mg capsule Take 30 mg by mouth nightly.  FOLIC ACID/MULTIVIT-MIN/LUTEIN (CENTRUM SILVER PO) Take 1 Tab by mouth every evening.  ezetimibe (ZETIA) 10 mg tablet Take 10 mg by mouth every evening. No current facility-administered medications on file prior to visit. Allergies   Allergen Reactions    Citalopram Hydrobromide Rash     With itching.  Chlorthalidone Rash    Contrast Agent [Iodine] Other (comments)     Wheezing/bronchospasm    Maxzide [Triamterene-Hydrochlorothiazid] Palpitations    Vicodin [Hydrocodone-Acetaminophen] Palpitations     Review of Systems (positive findings above)  Constitutional: Negative for fever, chills, and diaphoresis. Respiratory: Negative for cough, hemoptysis, sputum production, and wheezing. Cardiovascular: Negative for chest pain, palpitations, leg swelling and PND. Gastrointestinal: Negative for heartburn, nausea, vomiting, blood in stool and melena. Genitourinary: Negative for dysuria and flank pain.   Neurological: Negative for focal weakness, seizures, loss of consciousness,   Endo/Heme/Allergies: Negative for abnormal bleeding. Psychiatric/Behavioral: Negative for memory loss. Visit Vitals  /82 (BP 1 Location: Left arm, BP Patient Position: Sitting)   Pulse 64   Ht 5' 2\" (1.575 m)   Wt 197 lb (89.4 kg)   SpO2 95%   BMI 36.03 kg/m²        Wt Readings from Last 3 Encounters:   08/11/20 197 lb (89.4 kg)   07/16/20 197 lb (89.4 kg)   06/26/20 197 lb (89.4 kg)      Physical Exam   General - well developed well nourished  Neck - no JVD, neck supple  Cardiac - normal S1, S2, RRR, rubs or gallops. No clicks. 3/6 systolic murmur heard loudest on left sternal border. Vascular - radials pulses equal bilateral  Lungs - clear to auscultation bilaterals, no rales, wheezing or rhonchi  Abd - soft nontender, non-distended, +BS  Extremities - no edema, warm, well perfused  Neuro - nonfocal  Psych - normal mood and affect. Lab Results   Component Value Date/Time    Sodium 141 06/19/2020 04:56 AM    Potassium 3.7 06/19/2020 04:56 AM    Chloride 107 06/19/2020 04:56 AM    CO2 31 06/19/2020 04:56 AM    Anion gap 3 (L) 06/19/2020 04:56 AM    Glucose 131 (H) 06/19/2020 04:56 AM    BUN 10 06/19/2020 04:56 AM    Creatinine 0.91 06/19/2020 04:56 AM    BUN/Creatinine ratio 11 (L) 06/19/2020 04:56 AM    GFR est AA >60 06/19/2020 04:56 AM    GFR est non-AA 60 (L) 06/19/2020 04:56 AM    Calcium 9.2 06/19/2020 04:56 AM    Bilirubin, total 0.4 06/19/2020 04:56 AM    Alk.  phosphatase 86 06/19/2020 04:56 AM    Protein, total 7.8 06/19/2020 04:56 AM    Albumin 3.9 06/19/2020 04:56 AM    Globulin 3.9 06/19/2020 04:56 AM    A-G Ratio 1.0 (L) 06/19/2020 04:56 AM    ALT (SGPT) 24 06/19/2020 04:56 AM     Lab Results   Component Value Date/Time    WBC 6.0 06/19/2020 04:56 AM    HGB 13.1 06/19/2020 04:56 AM    HCT 40.6 06/19/2020 04:56 AM    PLATELET 088 82/48/5386 04:56 AM    MCV 96.9 06/19/2020 04:56 AM     Cardiographics   EKG 4/13 - normal  EKG 5/6/15 - AF with ventricular rate 96, RBBB  EKG 6/3/15 - A-fib rate 70s, RBBB, Rightward axis  EKG 8/31/15 - SR, RBBB  EKG 12/7/15 - SR, RBBB  EKG 7/26/16 - AF 80s, RBBB, rightward axis, unchanged from 7/23/16   EKG 11/28/16 - SR 60   EKG 12/05/16-AF 80-90, RBBB   EKG 01/10/17- AF 70s  EKG 8/25/17- SR 60, RBBB rightward axis.    EKG 7/6/18 - AF 80s, RBBB  EKG 10/4/18 - SR 58, first degree AV block, RBBB, no significant change from yesterday  EKG 1/7/19 - SR 59, RBBB, no change from 10/4/18  EKG 5/10/19 -  SR, 54  RBBB no change from 1/17/19  EKG 11/11/19 - SB 54, , RBBB, no change from 5/10/19    Malini Gonsalez, NP

## 2020-08-11 ENCOUNTER — OFFICE VISIT (OUTPATIENT)
Dept: CARDIOLOGY CLINIC | Age: 80
End: 2020-08-11
Payer: MEDICARE

## 2020-08-11 VITALS
BODY MASS INDEX: 36.25 KG/M2 | HEART RATE: 64 BPM | SYSTOLIC BLOOD PRESSURE: 128 MMHG | HEIGHT: 62 IN | OXYGEN SATURATION: 95 % | DIASTOLIC BLOOD PRESSURE: 82 MMHG | WEIGHT: 197 LBS

## 2020-08-11 DIAGNOSIS — I47.1 SVT (SUPRAVENTRICULAR TACHYCARDIA) (HCC): ICD-10-CM

## 2020-08-11 DIAGNOSIS — R01.1 MURMUR, CARDIAC: ICD-10-CM

## 2020-08-11 DIAGNOSIS — Z98.890 S/P ABLATION OF ATRIAL FIBRILLATION: ICD-10-CM

## 2020-08-11 DIAGNOSIS — R00.1 SINUS BRADYCARDIA: ICD-10-CM

## 2020-08-11 DIAGNOSIS — I51.7 LAE (LEFT ATRIAL ENLARGEMENT): ICD-10-CM

## 2020-08-11 DIAGNOSIS — I48.0 PAROXYSMAL ATRIAL FIBRILLATION (HCC): ICD-10-CM

## 2020-08-11 DIAGNOSIS — E66.01 SEVERE OBESITY (BMI 35.0-39.9) WITH COMORBIDITY (HCC): ICD-10-CM

## 2020-08-11 DIAGNOSIS — R06.09 DOE (DYSPNEA ON EXERTION): Primary | ICD-10-CM

## 2020-08-11 DIAGNOSIS — F41.9 ANXIETY: ICD-10-CM

## 2020-08-11 DIAGNOSIS — I10 ESSENTIAL HYPERTENSION: ICD-10-CM

## 2020-08-11 DIAGNOSIS — I87.2 VENOUS INSUFFICIENCY: ICD-10-CM

## 2020-08-11 DIAGNOSIS — Z86.79 S/P ABLATION OF ATRIAL FIBRILLATION: ICD-10-CM

## 2020-08-11 PROCEDURE — 1101F PT FALLS ASSESS-DOCD LE1/YR: CPT | Performed by: NURSE PRACTITIONER

## 2020-08-11 PROCEDURE — 99214 OFFICE O/P EST MOD 30 MIN: CPT | Performed by: NURSE PRACTITIONER

## 2020-08-11 PROCEDURE — G8536 NO DOC ELDER MAL SCRN: HCPCS | Performed by: NURSE PRACTITIONER

## 2020-08-11 PROCEDURE — G8427 DOCREV CUR MEDS BY ELIG CLIN: HCPCS | Performed by: NURSE PRACTITIONER

## 2020-08-11 PROCEDURE — G8754 DIAS BP LESS 90: HCPCS | Performed by: NURSE PRACTITIONER

## 2020-08-11 PROCEDURE — G8432 DEP SCR NOT DOC, RNG: HCPCS | Performed by: NURSE PRACTITIONER

## 2020-08-11 PROCEDURE — G8752 SYS BP LESS 140: HCPCS | Performed by: NURSE PRACTITIONER

## 2020-08-11 PROCEDURE — G8417 CALC BMI ABV UP PARAM F/U: HCPCS | Performed by: NURSE PRACTITIONER

## 2020-08-11 PROCEDURE — G8400 PT W/DXA NO RESULTS DOC: HCPCS | Performed by: NURSE PRACTITIONER

## 2020-08-11 PROCEDURE — 1090F PRES/ABSN URINE INCON ASSESS: CPT | Performed by: NURSE PRACTITIONER

## 2020-08-11 RX ORDER — EZETIMIBE 10 MG/1
10 TABLET ORAL EVERY EVENING
Qty: 90 TAB | Refills: 3 | Status: SHIPPED | OUTPATIENT
Start: 2020-08-11 | End: 2021-07-29 | Stop reason: SDUPTHER

## 2020-08-11 NOTE — PROGRESS NOTES
Visit Vitals  /82 (BP 1 Location: Left arm, BP Patient Position: Sitting)   Pulse 64   Ht 5' 2\" (1.575 m)   Wt 197 lb (89.4 kg)   SpO2 95%   BMI 36.03 kg/m²         Chest pain: no  Shortness of breath: YES  Edema: no  Palpitations: no  Dizziness: yes when low bp    New diagnosis/Surgeries: no    ER/Hospitalizations: 7-16 Elev BP    Refills: no

## 2020-08-21 RX ORDER — CANDESARTAN 16 MG/1
TABLET ORAL
Qty: 90 TAB | Refills: 3 | Status: SHIPPED | OUTPATIENT
Start: 2020-08-21 | End: 2021-08-31

## 2020-08-21 NOTE — TELEPHONE ENCOUNTER
Requested Prescriptions     Pending Prescriptions Disp Refills    candesartan (ATACAND) 16 mg tablet [Pharmacy Med Name: CANDESARTAN 16MG TABLETS] 90 Tab 3     Sig: TAKE 1 TABLET BY MOUTH ONCE DAILY     PAULINE Ulloa

## 2020-08-24 ENCOUNTER — ANCILLARY PROCEDURE (OUTPATIENT)
Dept: CARDIOLOGY CLINIC | Age: 80
End: 2020-08-24
Payer: MEDICARE

## 2020-08-24 VITALS
DIASTOLIC BLOOD PRESSURE: 76 MMHG | SYSTOLIC BLOOD PRESSURE: 150 MMHG | WEIGHT: 197 LBS | HEIGHT: 62 IN | BODY MASS INDEX: 36.25 KG/M2

## 2020-08-24 PROCEDURE — 93306 TTE W/DOPPLER COMPLETE: CPT | Performed by: SPECIALIST

## 2020-08-25 ENCOUNTER — PATIENT MESSAGE (OUTPATIENT)
Dept: CARDIOLOGY CLINIC | Age: 80
End: 2020-08-25

## 2020-08-25 DIAGNOSIS — R01.1 MURMUR, CARDIAC: ICD-10-CM

## 2020-08-25 DIAGNOSIS — R06.09 DOE (DYSPNEA ON EXERTION): ICD-10-CM

## 2020-08-25 LAB
ECHO AO ASC DIAM: 3.62 CM
ECHO AO ROOT DIAM: 3.16 CM
ECHO AV AREA PEAK VELOCITY: 2.93 CM2
ECHO AV AREA VTI: 2.68 CM2
ECHO AV AREA/BSA PEAK VELOCITY: 1.5 CM2/M2
ECHO AV AREA/BSA VTI: 1.4 CM2/M2
ECHO AV MEAN GRADIENT: 6.24 MMHG
ECHO AV PEAK GRADIENT: 12.82 MMHG
ECHO AV PEAK VELOCITY: 179 CM/S
ECHO AV VTI: 39.75 CM
ECHO EST RA PRESSURE: 8 MMHG
ECHO LA AREA 4C: 21.02 CM2
ECHO LA MAJOR AXIS: 3.84 CM
ECHO LA MINOR AXIS: 2.02 CM
ECHO LA VOL 2C: 45.23 ML (ref 22–52)
ECHO LA VOL 4C: 53.64 ML (ref 22–52)
ECHO LA VOL BP: 52.31 ML (ref 22–52)
ECHO LA VOL/BSA BIPLANE: 27.54 ML/M2 (ref 16–28)
ECHO LA VOLUME INDEX A2C: 23.81 ML/M2 (ref 16–28)
ECHO LA VOLUME INDEX A4C: 28.24 ML/M2 (ref 16–28)
ECHO LV E' LATERAL VELOCITY: 5.8 CM/S
ECHO LV E' SEPTAL VELOCITY: 5.54 CM/S
ECHO LV INTERNAL DIMENSION DIASTOLIC: 4.24 CM (ref 3.9–5.3)
ECHO LV INTERNAL DIMENSION SYSTOLIC: 2.82 CM
ECHO LV IVSD: 0.99 CM (ref 0.6–0.9)
ECHO LV MASS 2D: 135.5 G (ref 67–162)
ECHO LV MASS INDEX 2D: 71.3 G/M2 (ref 43–95)
ECHO LV POSTERIOR WALL DIASTOLIC: 0.97 CM (ref 0.6–0.9)
ECHO LVOT DIAM: 2.2 CM
ECHO LVOT PEAK GRADIENT: 7.68 MMHG
ECHO LVOT PEAK VELOCITY: 138.55 CM/S
ECHO LVOT SV: 106.6 ML
ECHO LVOT VTI: 28.13 CM
ECHO MV A VELOCITY: 79.05 CM/S
ECHO MV E DECELERATION TIME (DT): 0.2 S
ECHO MV E VELOCITY: 121.25 CM/S
ECHO MV E/A RATIO: 1.53
ECHO MV E/E' LATERAL: 20.91
ECHO MV E/E' RATIO (AVERAGED): 21.4
ECHO MV E/E' SEPTAL: 21.89
ECHO MV PRESSURE HALF TIME (PHT): 0.06 S
ECHO RA AREA 4C: 14.06 CM2
ECHO RIGHT VENTRICULAR SYSTOLIC PRESSURE (RVSP): 44.87 MMHG
ECHO RV TAPSE: 2.43 CM (ref 1.5–2)
ECHO TV REGURGITANT MAX VELOCITY: 303.62 CM/S
ECHO TV REGURGITANT PEAK GRADIENT: 36.87 MMHG
LA VOL DISK BP: 49.81 ML (ref 22–52)

## 2020-09-07 ENCOUNTER — HOSPITAL ENCOUNTER (EMERGENCY)
Age: 80
Discharge: HOME | End: 2020-09-07
Attending: EMERGENCY MEDICINE
Payer: MEDICARE

## 2020-09-07 VITALS
WEIGHT: 197 LBS | HEIGHT: 62 IN | RESPIRATION RATE: 18 BRPM | HEART RATE: 65 BPM | TEMPERATURE: 98.6 F | BODY MASS INDEX: 36.25 KG/M2 | DIASTOLIC BLOOD PRESSURE: 84 MMHG | OXYGEN SATURATION: 93 % | SYSTOLIC BLOOD PRESSURE: 184 MMHG

## 2020-09-07 DIAGNOSIS — I10 ESSENTIAL HYPERTENSION: Primary | ICD-10-CM

## 2020-09-07 LAB
ANION GAP BLD CALC-SCNC: 16 MMOL/L (ref 10–20)
BUN BLD-MCNC: 19 MG/DL (ref 9–20)
CA-I BLD-MCNC: 1.3 MMOL/L (ref 1.12–1.32)
CHLORIDE BLD-SCNC: 104 MMOL/L (ref 98–107)
CO2 BLD-SCNC: 27 MMOL/L (ref 21–32)
CREAT BLD-MCNC: 1 MG/DL (ref 0.6–1.3)
GLUCOSE BLD-MCNC: 116 MG/DL (ref 65–100)
HCT VFR BLD CALC: 43 % (ref 35–47)
POTASSIUM BLD-SCNC: 3.9 MMOL/L (ref 3.5–5.1)
SERVICE CMNT-IMP: ABNORMAL
SODIUM BLD-SCNC: 143 MMOL/L (ref 136–145)

## 2020-09-07 PROCEDURE — 80047 BASIC METABLC PNL IONIZED CA: CPT

## 2020-09-07 PROCEDURE — 99284 EMERGENCY DEPT VISIT MOD MDM: CPT

## 2020-09-07 NOTE — ED PROVIDER NOTES
HPI   69 yo F presents with elevated blood pressure. Checked at home because doctor tells her to keep a check on her BP. Denies headache, chest pain, sob, nausea, vomiting, diarrhea. No diet changes. Took all meds today. Took an extra clonidine 0.1mg about 30 min before coming to ED. Past Medical History:   Diagnosis Date    Atrial fibrillation with RVR ([address] 2018    DDD (degenerative disc disease)     Gastroesophageal reflux disease without esophagitis 7/3/2016    GERD (gastroesophageal reflux disease)     Hiatal hernia     HTN (hypertension), benign 2016    Hypothyroidism     ANÍBAL (obstructive sleep apnea) 2016    Paroxysmal atrial fibrillation ([address] 2016    Uterine prolapse        Past Surgical History:   Procedure Laterality Date    HX AFIB ABLATION  2017    HX GI      COLONOSCOPY     HX GYN      TUBAL LIGATION    HX HEENT      WISDOM TEETH EXTRACTED.          Family History:   Problem Relation Age of Onset    Diabetes Mother     Hypertension Mother     COPD Mother        Social History     Socioeconomic History    Marital status:      Spouse name: Not on file    Number of children: Not on file    Years of education: Not on file    Highest education level: Not on file   Occupational History    Not on file   Social Needs    Financial resource strain: Not on file    Food insecurity     Worry: Not on file     Inability: Not on file    Transportation needs     Medical: Not on file     Non-medical: Not on file   Tobacco Use    Smoking status: Former Smoker     Packs/day: 1.50     Years: 30.00     Pack years: 45.00     Last attempt to quit: 3/3/1994     Years since quittin.5    Smokeless tobacco: Former User   Substance and Sexual Activity    Alcohol use: No    Drug use: No    Sexual activity: Never   Lifestyle    Physical activity     Days per week: Not on file     Minutes per session: Not on file    Stress: Not on file   Relationships    Social connections     Talks on phone: Not on file     Gets together: Not on file     Attends [employer] service: Not on file     Active member of club or organization: Not on file     Attends meetings of clubs or organizations: Not on file     Relationship status: Not on file    Intimate partner violence     Fear of current or ex partner: Not on file     Emotionally abused: Not on file     Physically abused: Not on file     Forced sexual activity: Not on file   Other Topics Concern    Not on file   Social History Narrative    Not on file         ALLERGIES: Citalopram hydrobromide; Chlorthalidone; Contrast agent [iodine]; Maxzide [triamterene-hydrochlorothiazid]; and Vicodin [hydrocodone-acetaminophen]    Review of Systems   Constitutional: Negative for chills and fever. Respiratory: Negative for cough and shortness of breath. Cardiovascular: Negative for chest pain and leg swelling. Gastrointestinal: Negative for abdominal pain, diarrhea and vomiting. Genitourinary: Negative for decreased urine volume. Musculoskeletal: Negative for neck pain. Neurological: Negative for weakness, numbness and headaches. All other systems reviewed and are negative.       Vitals:    09/07/20 0145   BP: (!) 201/90   Pulse: 65   Resp: 18   Temp: 98.6 °F (37 °C)   SpO2: 95%   Weight: 89.4 kg (197 lb)   Height: 5' 2\" (1.575 m)            Physical Exam   Physical Examination: General appearance - alert, well appearing, and in no distress, oriented to person, place, and time and normal appearing weight  Eyes - pupils equal and reactive, extraocular eye movements intact  Neck - supple, no significant adenopathy  Chest - clear to auscultation, no wheezes, rales or rhonchi, symmetric air entry  Heart - normal rate, regular rhythm, normal S1, S2, no murmurs, rubs, clicks or gallops  Abdomen - soft, nontender, nondistended, no masses or organomegaly  Back exam - full range of motion, no tenderness, palpable spasm or pain on motion  Neurological - alert, oriented, normal speech, no focal findings or movement disorder noted  Musculoskeletal - no joint tenderness, deformity or swelling  Extremities - peripheral pulses normal, no pedal edema, no clubbing or cyanosis  Skin - normal coloration and turgor, no rashes, no suspicious skin lesions noted  MDM  Number of Diagnoses or Management Options     Amount and/or Complexity of Data Reviewed  Decide to obtain previous medical records or to obtain history from someone other than the patient: yes  Review and summarize past medical records: yes    Patient Progress  Patient progress: improved         Procedures    BP improved without intervention in ED. Vital signs otherwise stable. Patient denies chest pain, shortness of breath, headache, weakness, numbness. No evidence of endorgan damage. Call PCP for further evaluation and treatment of elevated blood pressure. Return to ED for worsening symptoms.

## 2020-09-07 NOTE — DISCHARGE INSTRUCTIONS
Patient Education        Learning About High Blood Pressure  What is high blood pressure? Blood pressure is a measure of how hard the blood pushes against the walls of your arteries. It's normal for blood pressure to go up and down throughout the day. But if it stays up, you have high blood pressure. Another name for high blood pressure is hypertension. Two numbers tell you your blood pressure. The first number is the systolic pressure (top number). It shows how hard the blood pushes when your heart is pumping. The second number is the diastolic pressure (bottom number). It shows how hard the blood pushes between heartbeats, when your heart is relaxed and filling with blood. Your doctor will give you a goal for your blood pressure based on your health and your age. High blood pressure (hypertension) means that the top number stays high, or the bottom number stays high, or both. High blood pressure increases the risk of stroke, heart attack, and other problems. What happens when you have high blood pressure? · Blood flows through your arteries with too much force. Over time, this can damage the heart and the walls of your arteries. But you can't feel it. High blood pressure usually doesn't cause symptoms. · High blood pressure makes your heart work harder. And that can lead to heart failure, which means your heart doesn't pump as much blood as your body needs. · Fat and calcium start to build up in your arteries. This buildup is called hardening of the arteries. It can cause many problems including a heart attack and stroke. · Arteries also carry blood and oxygen to organs like your eyes, kidneys, and brain. If high blood pressure damages those arteries, it can lead to vision loss, kidney disease, stroke, and a higher risk of dementia. How can you prevent high blood pressure? · Stay at a healthy weight. · Try to limit how much sodium you eat to less than 2,300 milligrams (mg) a day.  If you limit your sodium to 1,500 mg a day, you can lower your blood pressure even more. ? Buy foods that are labeled \"unsalted,\" \"sodium-free,\" or \"low-sodium. \" Foods labeled \"reduced-sodium\" and \"light sodium\" may still have too much sodium. ? Flavor your food with garlic, lemon juice, onion, vinegar, herbs, and spices instead of salt. Do not use soy sauce, steak sauce, onion salt, garlic salt, mustard, or ketchup on your food. ? Use less salt (or none) when recipes call for it. You can often use half the salt a recipe calls for without losing flavor. · Be physically active. Get at least 30 minutes of exercise on most days of the week. Walking is a good choice. You also may want to do other activities, such as running, swimming, cycling, or playing tennis or team sports. · Limit alcohol to 2 drinks a day for men and 1 drink a day for women. · Eat plenty of fruits, vegetables, and low-fat dairy products. Eat less saturated and total fats. How is high blood pressure treated? · Your doctor will suggest making lifestyle changes to help your heart. For example, your doctor may ask you to eat healthy foods, quit smoking, lose extra weight, and be more active. · If lifestyle changes don't help enough, your doctor may recommend that you take medicine. · When blood pressure is very high, medicines are needed to lower it. Follow-up care is a key part of your treatment and safety. Be sure to make and go to all appointments, and call your doctor if you are having problems. It's also a good idea to know your test results and keep a list of the medicines you take. Where can you learn more? Go to http://iker-isaac.info/  Enter P501 in the search box to learn more about \"Learning About High Blood Pressure. \"  Current as of: December 16, 2019               Content Version: 12.6  © 8348-9145 [employer].    Care instructions adapted under license by [employer] (which disclaims liability or warranty for this information). If you have questions about a medical condition or this instruction, always ask your healthcare professional. [place] [address] any warranty or liability for your use of this information. We hope that we have addressed all of your medical concerns. The examination and treatment you received in the Emergency Department were for an emergent problem and were not intended as complete care. It is important that you follow up with your healthcare provider(s) for ongoing care. If your symptoms worsen or do not improve as expected, and you are unable to reach your usual health care provider(s), you should return to the Emergency Department. Today's healthcare is undergoing tremendous change, and patient satisfaction surveys are one of the many tools to assess the quality of medical care. You may receive a survey from the [employer] regarding your experience in the Emergency Department. I hope that your experience has been completely positive, particularly the medical care that I provided. As such, please participate in the survey; anything less than excellent does not meet my expectations or intentions. [facility] and [facility] participate in nationally recognized quality of care measures. If your blood pressure is greater than 120/80, as reported below, we urge that you seek medical care to address the potential of high blood pressure, commonly known as hypertension. Hypertension can be hereditary or can be caused by certain medical conditions, pain, stress, or \"white coat syndrome. \"       Please make an appointment with your health care provider(s) for follow up of your Emergency Department visit.        VITALS:   Patient Vitals for the past 8 hrs:   Temp Pulse Resp BP SpO2   09/07/20 0145 98.6 °F (37 °C) 65 18 (!) 201/90 95 %          Thank you for allowing us to provide you with medical care today. We realize that you have many choices for your emergency care needs. Please choose us in the future for any continued health care needs. Anitra Rodriguez, [employer].   Office: [phone]            Recent Results (from the past 24 hour(s))   POC CHEM8    Collection Time: 09/07/20  2:26 AM   Result Value Ref Range    Calcium, ionized (POC) 1.30 1.12 - 1.32 mmol/L    Sodium (POC) 143 136 - 145 mmol/L    Potassium (POC) 3.9 3.5 - 5.1 mmol/L    Chloride (POC) 104 98 - 107 mmol/L    CO2 (POC) 27 21 - 32 mmol/L    Anion gap (POC) 16 10 - 20 mmol/L    Glucose (POC) 116 (H) 65 - 100 mg/dL    BUN (POC) 19 9 - 20 mg/dL    Creatinine (POC) 1.0 0.6 - 1.3 mg/dL    GFRAA, POC >60 >60 ml/min/1.73m2    GFRNA, POC 54 (L) >60 ml/min/1.73m2    Hematocrit (POC) 43 35.0 - 47.0 %    Comment Comment Not Indicated. No results found.

## 2020-09-07 NOTE — ED TRIAGE NOTES
Patient presents to ED for complaints of high blood pressure readings. States that her BP was as high as 200/100s before leaving her house. Reports she has been taking her antihypertensives and did take 1 dose of clonidine tonight.

## 2020-11-06 RX ORDER — FLECAINIDE ACETATE 50 MG/1
TABLET ORAL
Qty: 180 TAB | Refills: 1 | Status: SHIPPED | OUTPATIENT
Start: 2020-11-06 | End: 2021-05-17

## 2021-03-02 NOTE — PROGRESS NOTES
Chart reviewed for pre-procedure evaluation and documentation. Areas reviewed, but not limited to, Patient's current and past H&P, Labs, All notes, All media records, radiology records, and medications.
Patient arrived. ID and allergies verified verbally with patient. Pt voices understanding of procedure to be performed. Consent obtained. Pt prepped for procedure. 10:45,monitor shows a fib 70's. Last dose of Eliquis was @ 0800 today. TRANSFER - OUT REPORT:    Verbal report given to SRUTHI Hahn(name) on Kathi Gross  being transferred to EP lab(unit) for routine progression of care       Report consisted of patients Situation, Background, Assessment and   Recommendations(SBAR). Information from the following report(s) Procedure Summary was reviewed with the receiving nurse. Lines:   Peripheral IV 02/28/18 Right Antecubital (Active)   Site Assessment Clean, dry, & intact 2/28/2018 11:12 AM        Opportunity for questions and clarification was provided. Patient transported with:   Registered Nurse    TRANSFER - IN REPORT:    Verbal report received from SRUTHI Hahn(name) on Kathi Gross  being received from EP lab(unit) for routine progression of care      Report consisted of patients Situation, Background, Assessment and   Recommendations(SBAR). Information from the following report(s) Procedure Summary was reviewed with the receiving nurse. Opportunity for questions and clarification was provided. Assessment completed upon patients arrival to unit and care assumed. 1330,Discharge instructions reviewed with pt, and daughter,they all voice instructions. Pt discharged via wheelchair, driving.
Pt chart accessed to review all areas including but not limited to pt history, test results, labs, and orders in preparation for possible Angio/Cath/EP procedure.
No

## 2021-03-10 ENCOUNTER — OFFICE VISIT (OUTPATIENT)
Dept: CARDIOLOGY CLINIC | Age: 81
End: 2021-03-10
Payer: MEDICARE

## 2021-03-10 VITALS
HEIGHT: 62 IN | OXYGEN SATURATION: 95 % | BODY MASS INDEX: 36.58 KG/M2 | HEART RATE: 59 BPM | DIASTOLIC BLOOD PRESSURE: 76 MMHG | RESPIRATION RATE: 22 BRPM | SYSTOLIC BLOOD PRESSURE: 160 MMHG | WEIGHT: 198.8 LBS

## 2021-03-10 DIAGNOSIS — I45.10 RBBB: ICD-10-CM

## 2021-03-10 DIAGNOSIS — R01.1 MURMUR, CARDIAC: ICD-10-CM

## 2021-03-10 DIAGNOSIS — I10 ESSENTIAL HYPERTENSION: Primary | ICD-10-CM

## 2021-03-10 DIAGNOSIS — I47.1 SVT (SUPRAVENTRICULAR TACHYCARDIA) (HCC): ICD-10-CM

## 2021-03-10 DIAGNOSIS — I48.0 PAROXYSMAL ATRIAL FIBRILLATION (HCC): ICD-10-CM

## 2021-03-10 DIAGNOSIS — F41.9 ANXIETY: ICD-10-CM

## 2021-03-10 DIAGNOSIS — I87.2 VENOUS INSUFFICIENCY: ICD-10-CM

## 2021-03-10 DIAGNOSIS — R00.1 SINUS BRADYCARDIA: ICD-10-CM

## 2021-03-10 DIAGNOSIS — Z86.79 S/P ABLATION OF ATRIAL FIBRILLATION: ICD-10-CM

## 2021-03-10 DIAGNOSIS — I51.7 LAE (LEFT ATRIAL ENLARGEMENT): ICD-10-CM

## 2021-03-10 DIAGNOSIS — Z98.890 S/P ABLATION OF ATRIAL FIBRILLATION: ICD-10-CM

## 2021-03-10 PROCEDURE — G8400 PT W/DXA NO RESULTS DOC: HCPCS | Performed by: NURSE PRACTITIONER

## 2021-03-10 PROCEDURE — G8427 DOCREV CUR MEDS BY ELIG CLIN: HCPCS | Performed by: NURSE PRACTITIONER

## 2021-03-10 PROCEDURE — 99214 OFFICE O/P EST MOD 30 MIN: CPT | Performed by: NURSE PRACTITIONER

## 2021-03-10 PROCEDURE — G8753 SYS BP > OR = 140: HCPCS | Performed by: NURSE PRACTITIONER

## 2021-03-10 PROCEDURE — 1090F PRES/ABSN URINE INCON ASSESS: CPT | Performed by: NURSE PRACTITIONER

## 2021-03-10 PROCEDURE — 1101F PT FALLS ASSESS-DOCD LE1/YR: CPT | Performed by: NURSE PRACTITIONER

## 2021-03-10 PROCEDURE — G8432 DEP SCR NOT DOC, RNG: HCPCS | Performed by: NURSE PRACTITIONER

## 2021-03-10 PROCEDURE — G8536 NO DOC ELDER MAL SCRN: HCPCS | Performed by: NURSE PRACTITIONER

## 2021-03-10 PROCEDURE — 93000 ELECTROCARDIOGRAM COMPLETE: CPT | Performed by: NURSE PRACTITIONER

## 2021-03-10 PROCEDURE — G8417 CALC BMI ABV UP PARAM F/U: HCPCS | Performed by: NURSE PRACTITIONER

## 2021-03-10 PROCEDURE — G8754 DIAS BP LESS 90: HCPCS | Performed by: NURSE PRACTITIONER

## 2021-03-10 NOTE — PROGRESS NOTES
[id number]  [place], [address] [facility]    Office [phone]  Fax [phone]        Brady Mcclellan is a 80 y.o. female. Last seen 6 months ago by myself. Here today for routine follow up. Echo performed after last visit was unchanged. In ER 9/2020 for accelerated HTN again. DIAGNOSES  Encounter Diagnoses     ICD-10-CM ICD-9-CM   1. Essential hypertension  I10 401.9   2. Murmur, cardiac  R01.1 785.2   3. LAE (left atrial enlargement)  I51.7 429.3   4. Paroxysmal atrial fibrillation (HCC)  I48.0 427.31   5. SVT (supraventricular tachycardia) (HCC)  I47.1 427.89   6. Sinus bradycardia  R00.1 427.89   7. S/P ablation of atrial fibrillation  Z98.890 V45.89    Z86.79    8. RBBB  I45.10 426.4   9. Venous insufficiency  I87.2 459.81   10. Anxiety  F41.9 300.00       ASSESSMENT/PLAN    HTN, hx of labile pressures - on multidrug regimen. Improved control over the past several months. Last ER evaluation > 6 months ago. Hypertensive in the office today, but mostly normotensive per home monitoring.    - Continue current medication regimen including Coreg 3.125 mg BID, Diltiazem 60 mg BID, Candesartan 16 mg daily and Clonidine 0.1 mg BID with additional dose PRN SBP > 180.   - Continue regular exercise,  meditation, relaxation, etc. to aid with anxiety. - Continue home monitoring.    - Low sodium diet     Paroxysmal AF - hx of ablation March 2017 with recurrence requiring addition of flecainide. No recurrent AF sxs, although note HR variability per home monitoring (40 to 70's) Asymptomatic bradycardia on triple AV shannon therapy. - Continue Flecainide + low dose Diltiazem + Carvedilol.   - continue Eliquis for stroke prevention. No bleeding concerns.    - [place] repeat EP evaluation +/- repeat ablation for frequent recurrences. Diastolic dysfunction - Stable class 2 dyspnea. Trace ankle edema is likely due to venous insufficiency.   No other s/sxs of volume overload. - risk factor modification - weight loss, BP control     Obesity - encouraged her to continue to follow a heart healthy diet and remain active. Return to clinic again in 6 months to see Dr. Meenakshi Lopez with Echo the same day. Return sooner PRN. She was encouraged to continue current medications, remain active, lose weight and call with any new complaints or concerns. HPI  Ms. Ojeda reports that things are good. She denies any specific concerns today. She feels that her Ulis López is really helping\" as this has helped with her anxiety. She has had less frequent BP spikes. She remains very active with household chores. Home monitoring trends 089-285'I systolic. HR 48 to 62. She notes stable mild ankle edema with prolonged standing. No orthopnea or PND. Stable mild dyspnea with more rigorous activities. She denies any exertional chest pain. No palpitations, tachycardia, syncope or near syncope. No bleeding or bruising concerns on Eliquis. Routine labs and lipids followed by Dr. Barney Duff. She reports \"they were good\". Cardiac testing  Adenosine myoview 2011 - normal perfusion, EF 81%  Echocardiogram 2/22/13 - normal left ventricular size and function, normal appearing mitral, aortic, and tricuspid valves, with mild mitral and moderate tricuspid regurgitation, bi-atrial enlargement  Lexiscan 3/3/2014 - normal perfusion EF 83%  Echo: 5/15/15- 60%, mildly dilated LA, mild MR  Cardioversion 7/23/2015 - 200J  Lexiscan cardiolite 5/10/16 - normal perfusion, EF 78%  Renal Visceral Duplex 5/2016 - No evidence of ONOFRE  3/2/17-. Successful atrial fibrillation ablation however unable to achieve entrance/exit block of LSPV per Dr. Greg Martin    Echo 3/8/18 - EF 65%. No WMA. Normal RV size and function. Mild MR. Mild TR. Lexiscan Cardiolite 1/30/19 - normal perfusion. EF 77%. Echo 8/24/20 - EF 60-65%. G1DD. Mild MAC with mild MR. Mild to mod TR.  PASP 45 mm hg. Mildly elevated CVP. Past Medical History:   Diagnosis Date    Atrial fibrillation with RVR ([facility] 6/29/2018    DDD (degenerative disc disease)     Gastroesophageal reflux disease without esophagitis 7/3/2016    GERD (gastroesophageal reflux disease)     Hiatal hernia     HTN (hypertension), benign 7/26/2016    Hypothyroidism     ANÍBAL (obstructive sleep apnea) 7/26/2016    Paroxysmal atrial fibrillation ([facility] [address] 8/28/2016    Uterine prolapse       Current Outpatient Medications on File Prior to Visit   Medication Sig Dispense Refill    carvediloL (COREG) 3.125 mg tablet TAKE 1 TABLET BY MOUTH TWICE DAILY WITH MEALS 180 Tab 0    dilTIAZem IR (CARDIZEM) 60 mg tablet Take 1 Tab by mouth two (2) times a day. TAKE 1 TABLET BY MOUTH BEFORE BREAKFAST AND DINNER 180 Tab 0    apixaban (Eliquis) 5 mg tablet TAKE 1 TABLET BY MOUTH TWICE DAILY 180 Tab 1    cloNIDine HCL (CATAPRES) 0.1 mg tablet TAKE 1 TABLET BY MOUTH EVERY DAY DO NOT TAKE IF BLOOD PRESSURE IS LESS THEN 120 SYS 90 Tab 0    flecainide (TAMBOCOR) 50 mg tablet TAKE 1 TABLET BY MOUTH TWICE DAILY 180 Tab 1    candesartan (ATACAND) 16 mg tablet TAKE 1 TABLET BY MOUTH ONCE DAILY 90 Tab 3    ezetimibe (Zetia) 10 mg tablet Take 1 Tab by mouth every evening. 90 Tab 3    escitalopram oxalate (Lexapro) 5 mg tablet Take  by mouth daily.  levothyroxine (SYNTHROID) 50 mcg tablet Take 50 mcg by mouth Daily (before breakfast).  calcium-vitamin D (OYSTER SHELL) 500 mg(1,250mg) -200 unit per tablet Take 1 Tab by mouth every evening.  lansoprazole (PREVACID) 30 mg capsule Take 30 mg by mouth as needed.  FOLIC ACID/MULTIVIT-MIN/LUTEIN (CENTRUM SILVER PO) Take 1 Tab by mouth every evening. No current facility-administered medications on file prior to visit. Allergies   Allergen Reactions    Citalopram Hydrobromide Rash     With itching.     Chlorthalidone Rash    Contrast Agent [Iodine] Other (comments)     Wheezing/bronchospasm    Maxzide [Triamterene-Hydrochlorothiazid] Palpitations    Vicodin [Hydrocodone-Acetaminophen] Palpitations     Review of Systems (positive findings above)  Constitutional: Negative for fever, chills, and diaphoresis. Respiratory: Negative for cough, hemoptysis, sputum production, and wheezing. Cardiovascular: Negative for chest pain, palpitations, and PND. Gastrointestinal: Negative for heartburn, nausea, vomiting, blood in stool and melena. Genitourinary: Negative for dysuria and flank pain. Neurological: Negative for focal weakness, seizures, loss of consciousness,   Endo/Heme/Allergies: Negative for abnormal bleeding. Psychiatric/Behavioral: Negative for memory loss. Visit Vitals  BP (!) 160/76 (BP 1 Location: Left upper arm, BP Patient Position: Sitting, BP Cuff Size: Adult)   Pulse (!) 59   Resp 22   Ht 5' 2\" (1.575 m)   Wt 198 lb 12.8 oz (90.2 kg)   SpO2 95%   BMI 36.36 kg/m²        Wt Readings from Last 3 Encounters:   03/10/21 198 lb 12.8 oz (90.2 kg)   09/07/20 197 lb (89.4 kg)   08/24/20 197 lb (89.4 kg)      Physical Exam   General - well developed well nourished  Neck - no JVD, neck supple  Cardiac - normal S1, S2, RRR, rubs or gallops. No clicks. 3/6 systolic murmur heard loudest on left sternal border. Vascular - radials pulses equal bilateral  Lungs - clear to auscultation bilaterals, no rales, wheezing or rhonchi  Abd - soft nontender, non-distended, +BS  Extremities - trace ankle edema bilaterally, skin warm, well perfused  Neuro - nonfocal  Psych - normal mood and affect.     Lab Results   Component Value Date/Time    Sodium 141 06/19/2020 04:56 AM    Potassium 3.7 06/19/2020 04:56 AM    Chloride 107 06/19/2020 04:56 AM    CO2 31 06/19/2020 04:56 AM    Anion gap 3 (L) 06/19/2020 04:56 AM    Glucose 131 (H) 06/19/2020 04:56 AM    BUN 10 06/19/2020 04:56 AM    Creatinine 0.91 06/19/2020 04:56 AM    BUN/Creatinine ratio 11 (L) 06/19/2020 04:56 AM    GFR est AA >60 06/19/2020 04:56 AM    GFR est non-AA 60 (L) 06/19/2020 04:56 AM    Calcium 9.2 06/19/2020 04:56 AM    Bilirubin, total 0.4 06/19/2020 04:56 AM    Alk. phosphatase 86 06/19/2020 04:56 AM    Protein, total 7.8 06/19/2020 04:56 AM    Albumin 3.9 06/19/2020 04:56 AM    Globulin 3.9 06/19/2020 04:56 AM    A-G Ratio 1.0 (L) 06/19/2020 04:56 AM    ALT (SGPT) 24 06/19/2020 04:56 AM     Lab Results   Component Value Date/Time    WBC 6.0 06/19/2020 04:56 AM    HGB 13.1 06/19/2020 04:56 AM    Hematocrit (POC) 43 09/07/2020 02:26 AM    HCT 40.6 06/19/2020 04:56 AM    PLATELET 051 04/85/0998 04:56 AM    MCV 96.9 06/19/2020 04:56 AM     Cardiographics   EKG 4/13 - normal  EKG 5/6/15 - AF with ventricular rate 96, RBBB  EKG 6/3/15 - A-fib rate 70s, RBBB, Rightward axis  EKG 8/31/15 - SR, RBBB  EKG 12/7/15 - SR, RBBB  EKG 7/26/16 - AF 80s, RBBB, rightward axis, unchanged from 7/23/16   EKG 11/28/16 - SR 60   EKG 12/05/16-AF 80-90, RBBB   EKG 01/10/17- AF 70s  EKG 8/25/17- SR 60, RBBB rightward axis.    EKG 7/6/18 - AF 80s, RBBB  EKG 10/4/18 - SR 58, first degree AV block, RBBB, no significant change from yesterday  EKG 1/7/19 - SR 59, RBBB, no change from 10/4/18  EKG 5/10/19 -  SR, 54  RBBB no change from 1/17/19  EKG 11/11/19 - SB 54, , RBBB, no change from 5/10/19  EKG 3/10/21 - SB 55, RBBB, 1st degree AV block, [place] [address], [place]

## 2021-03-10 NOTE — PROGRESS NOTES
Room #: B2      Visit Vitals  BP (!) 160/76 (BP 1 Location: Left upper arm, BP Patient Position: Sitting, BP Cuff Size: Adult)   Pulse (!) 59   Resp 22   Ht 5' 2\" (1.575 m)   Wt 198 lb 12.8 oz (90.2 kg)   SpO2 95%   BMI 36.36 kg/m²         Chest pain:  NO  Shortness of breath:  YES  Edema: NO  Palpitations, skipped beats, rapid heartbeat:  NO  Dizziness:  NO    1. Have you been to the ER, urgent care clinic since your last visit? Hospitalized since your last visit? 9/20 [facility] - HTN    2. Have you seen or consulted any other health care providers outside of the [address] [place] [address] since your last visit? Include any pap smears or colon screening.  No      Refills:  NO

## 2021-05-03 RX ORDER — DILTIAZEM HYDROCHLORIDE 60 MG/1
TABLET, FILM COATED ORAL
Qty: 180 TAB | Refills: 0 | Status: SHIPPED | OUTPATIENT
Start: 2021-05-03 | End: 2021-08-02

## 2021-05-06 RX ORDER — CARVEDILOL 3.12 MG/1
TABLET ORAL
Qty: 180 TAB | Refills: 0 | Status: SHIPPED | OUTPATIENT
Start: 2021-05-06 | End: 2021-08-02

## 2021-05-17 RX ORDER — FLECAINIDE ACETATE 50 MG/1
TABLET ORAL
Qty: 180 TAB | Refills: 1 | Status: SHIPPED | OUTPATIENT
Start: 2021-05-17 | End: 2021-11-22

## 2021-05-27 NOTE — ED NOTES
Patient Specific Counseling (Will Not Stick From Patient To Patient): previously instructed previously to use t-sal TRANSFER - OUT REPORT:    Verbal report given to [facility] (name) on Vanesa Mcghee  being transferred to [facility] (unit) for routine progression of care       Report consisted of patients Situation, Background, Assessment and   Recommendations(SBAR). Information from the following report(s) SBAR, Kardex, ED Summary, Intake/Output, MAR, Recent Results and Cardiac Rhythm Sinus Ivin Pert was reviewed with the receiving nurse. Lines:   Peripheral IV 03/07/18 Left Antecubital (Active)   Site Assessment Clean, dry, & intact 3/7/2018  9:11 PM   Phlebitis Assessment 0 3/7/2018  9:11 PM   Infiltration Assessment 0 3/7/2018  9:11 PM   Dressing Status Clean, dry, & intact 3/7/2018  9:11 PM   Dressing Type Tape;Transparent 3/7/2018  9:11 PM   Hub Color/Line Status Pink;Flushed;Patent 3/7/2018  9:11 PM        Opportunity for questions and clarification was provided.       Patient transported with:   Registered Nurse Detail Level: Simple Sarecycline Pregnancy And Lactation Text: This medication is Pregnancy Category D and not consider safe during pregnancy. It is also excreted in breast milk. Azithromycin Pregnancy And Lactation Text: This medication is considered safe during pregnancy and is also secreted in breast milk. High Dose Vitamin A Pregnancy And Lactation Text: High dose vitamin A therapy is contraindicated during pregnancy and breast feeding. Tazorac Counseling:  Patient advised that medication is irritating and drying.  Patient may need to apply sparingly and wash off after an hour before eventually leaving it on overnight.  The patient verbalized understanding of the proper use and possible adverse effects of tazorac.  All of the patient's questions and concerns were addressed. Topical Retinoid Pregnancy And Lactation Text: This medication is Pregnancy Category C. It is unknown if this medication is excreted in breast milk. Topical Sulfur Applications Counseling: Topical Sulfur Counseling: Patient counseled that this medication may cause skin irritation or allergic reactions.  In the event of skin irritation, the patient was advised to reduce the amount of the drug applied or use it less frequently.   The patient verbalized understanding of the proper use and possible adverse effects of topical sulfur application.  All of the patient's questions and concerns were addressed. High Dose Vitamin A Counseling: Side effects reviewed, pt to contact office should one occur. Erythromycin Counseling:  I discussed with the patient the risks of erythromycin including but not limited to GI upset, allergic reaction, drug rash, diarrhea, increase in liver enzymes, and yeast infections. Minocycline Counseling: Patient advised regarding possible photosensitivity and discoloration of the teeth, skin, lips, tongue and gums.  Patient instructed to avoid sunlight, if possible.  When exposed to sunlight, patients should wear protective clothing, sunglasses, and sunscreen.  The patient was instructed to call the office immediately if the following severe adverse effects occur:  hearing changes, easy bruising/bleeding, severe headache, or vision changes.  The patient verbalized understanding of the proper use and possible adverse effects of minocycline.  All of the patient's questions and concerns were addressed. Tazorac Pregnancy And Lactation Text: This medication is not safe during pregnancy. It is unknown if this medication is excreted in breast milk. Benzoyl Peroxide Pregnancy And Lactation Text: This medication is Pregnancy Category C. It is unknown if benzoyl peroxide is excreted in breast milk. Dapsone Pregnancy And Lactation Text: This medication is Pregnancy Category C and is not considered safe during pregnancy or breast feeding. Erythromycin Pregnancy And Lactation Text: This medication is Pregnancy Category B and is considered safe during pregnancy. It is also excreted in breast milk. Benzoyl Peroxide Counseling: Patient counseled that medicine may cause skin irritation and bleach clothing.  In the event of skin irritation, the patient was advised to reduce the amount of the drug applied or use it less frequently.   The patient verbalized understanding of the proper use and possible adverse effects of benzoyl peroxide.  All of the patient's questions and concerns were addressed. Dapsone Counseling: I discussed with the patient the risks of dapsone including but not limited to hemolytic anemia, agranulocytosis, rashes, methemoglobinemia, kidney failure, peripheral neuropathy, headaches, GI upset, and liver toxicity.  Patients who start dapsone require monitoring including baseline LFTs and weekly CBCs for the first month, then every month thereafter.  The patient verbalized understanding of the proper use and possible adverse effects of dapsone.  All of the patient's questions and concerns were addressed. Spironolactone Counseling: Patient advised regarding risks of diarrhea, abdominal pain, hyperkalemia, birth defects (for female patients), liver toxicity and renal toxicity. The patient may need blood work to monitor liver and kidney function and potassium levels while on therapy. The patient verbalized understanding of the proper use and possible adverse effects of spironolactone.  All of the patient's questions and concerns were addressed. Bactrim Counseling:  I discussed with the patient the risks of sulfa antibiotics including but not limited to GI upset, allergic reaction, drug rash, diarrhea, dizziness, photosensitivity, and yeast infections.  Rarely, more serious reactions can occur including but not limited to aplastic anemia, agranulocytosis, methemoglobinemia, blood dyscrasias, liver or kidney failure, lung infiltrates or desquamative/blistering drug rashes. Topical Sulfur Applications Pregnancy And Lactation Text: This medication is Pregnancy Category C and has an unknown safety profile during pregnancy. It is unknown if this topical medication is excreted in breast milk. Doxycycline Counseling:  Patient counseled regarding possible photosensitivity and increased risk for sunburn.  Patient instructed to avoid sunlight, if possible.  When exposed to sunlight, patients should wear protective clothing, sunglasses, and sunscreen.  The patient was instructed to call the office immediately if the following severe adverse effects occur:  hearing changes, easy bruising/bleeding, severe headache, or vision changes.  The patient verbalized understanding of the proper use and possible adverse effects of doxycycline.  All of the patient's questions and concerns were addressed. Topical Clindamycin Counseling: Patient counseled that this medication may cause skin irritation or allergic reactions.  In the event of skin irritation, the patient was advised to reduce the amount of the drug applied or use it less frequently.   The patient verbalized understanding of the proper use and possible adverse effects of clindamycin.  All of the patient's questions and concerns were addressed. Topical Retinoid counseling:  Patient advised to apply a pea-sized amount only at bedtime and wait 30 minutes after washing their face before applying.  If too drying, patient may add a non-comedogenic moisturizer. The patient verbalized understanding of the proper use and possible adverse effects of retinoids.  All of the patient's questions and concerns were addressed. Bactrim Pregnancy And Lactation Text: This medication is Pregnancy Category D and is known to cause fetal risk.  It is also excreted in breast milk. Isotretinoin Counseling: Patient should get monthly blood tests, not donate blood, not drive at night if vision affected, not share medication, and not undergo elective surgery for 6 months after tx completed. Side effects reviewed, pt to contact office should one occur. Spironolactone Pregnancy And Lactation Text: This medication can cause feminization of the male fetus and should be avoided during pregnancy. The active metabolite is also found in breast milk. Use Enhanced Medication Counseling?: No Doxycycline Pregnancy And Lactation Text: This medication is Pregnancy Category D and not consider safe during pregnancy. It is also excreted in breast milk but is considered safe for shorter treatment courses. Birth Control Pills Pregnancy And Lactation Text: This medication should be avoided if pregnant and for the first 30 days post-partum. Detail Level: Zone Sarecycline Counseling: Patient advised regarding possible photosensitivity and discoloration of the teeth, skin, lips, tongue and gums.  Patient instructed to avoid sunlight, if possible.  When exposed to sunlight, patients should wear protective clothing, sunglasses, and sunscreen.  The patient was instructed to call the office immediately if the following severe adverse effects occur:  hearing changes, easy bruising/bleeding, severe headache, or vision changes.  The patient verbalized understanding of the proper use and possible adverse effects of sarecycline.  All of the patient's questions and concerns were addressed. Birth Control Pills Counseling: Birth Control Pill Counseling: I discussed with the patient the potential side effects of OCPs including but not limited to increased risk of stroke, heart attack, thrombophlebitis, deep venous thrombosis, hepatic adenomas, breast changes, GI upset, headaches, and depression.  The patient verbalized understanding of the proper use and possible adverse effects of OCPs. All of the patient's questions and concerns were addressed. Tetracycline Counseling: Patient counseled regarding possible photosensitivity and increased risk for sunburn.  Patient instructed to avoid sunlight, if possible.  When exposed to sunlight, patients should wear protective clothing, sunglasses, and sunscreen.  The patient was instructed to call the office immediately if the following severe adverse effects occur:  hearing changes, easy bruising/bleeding, severe headache, or vision changes.  The patient verbalized understanding of the proper use and possible adverse effects of tetracycline.  All of the patient's questions and concerns were addressed. Patient understands to avoid pregnancy while on therapy due to potential birth defects. Topical Clindamycin Pregnancy And Lactation Text: This medication is Pregnancy Category B and is considered safe during pregnancy. It is unknown if it is excreted in breast milk. Azithromycin Counseling:  I discussed with the patient the risks of azithromycin including but not limited to GI upset, allergic reaction, drug rash, diarrhea, and yeast infections. Isotretinoin Pregnancy And Lactation Text: This medication is Pregnancy Category X and is considered extremely dangerous during pregnancy. It is unknown if it is excreted in breast milk.

## 2021-07-29 RX ORDER — EZETIMIBE 10 MG/1
10 TABLET ORAL EVERY EVENING
Qty: 90 TABLET | Refills: 3 | Status: SHIPPED | OUTPATIENT
Start: 2021-07-29 | End: 2022-07-29 | Stop reason: SDUPTHER

## 2021-08-02 RX ORDER — CARVEDILOL 3.12 MG/1
TABLET ORAL
Qty: 180 TABLET | Refills: 0 | Status: SHIPPED | OUTPATIENT
Start: 2021-08-02 | End: 2021-10-29

## 2021-08-02 RX ORDER — DILTIAZEM HYDROCHLORIDE 60 MG/1
TABLET, FILM COATED ORAL
Qty: 180 TABLET | Refills: 0 | Status: SHIPPED | OUTPATIENT
Start: 2021-08-02 | End: 2021-10-29

## 2021-08-31 RX ORDER — CANDESARTAN 16 MG/1
TABLET ORAL
Qty: 90 TABLET | Refills: 3 | Status: SHIPPED | OUTPATIENT
Start: 2021-08-31 | End: 2021-09-14

## 2021-09-14 ENCOUNTER — ANCILLARY PROCEDURE (OUTPATIENT)
Dept: CARDIOLOGY CLINIC | Age: 81
End: 2021-09-14
Payer: MEDICARE

## 2021-09-14 ENCOUNTER — OFFICE VISIT (OUTPATIENT)
Dept: CARDIOLOGY CLINIC | Age: 81
End: 2021-09-14

## 2021-09-14 ENCOUNTER — DOCUMENTATION ONLY (OUTPATIENT)
Dept: CARDIOLOGY CLINIC | Age: 81
End: 2021-09-14

## 2021-09-14 VITALS
WEIGHT: 197 LBS | DIASTOLIC BLOOD PRESSURE: 80 MMHG | SYSTOLIC BLOOD PRESSURE: 160 MMHG | BODY MASS INDEX: 36.25 KG/M2 | HEIGHT: 62 IN

## 2021-09-14 VITALS
WEIGHT: 197 LBS | HEIGHT: 62 IN | HEART RATE: 60 BPM | BODY MASS INDEX: 36.25 KG/M2 | DIASTOLIC BLOOD PRESSURE: 80 MMHG | OXYGEN SATURATION: 94 % | SYSTOLIC BLOOD PRESSURE: 160 MMHG

## 2021-09-14 DIAGNOSIS — I10 ESSENTIAL HYPERTENSION: ICD-10-CM

## 2021-09-14 DIAGNOSIS — R06.02 SOB (SHORTNESS OF BREATH): Primary | ICD-10-CM

## 2021-09-14 DIAGNOSIS — I48.0 PAROXYSMAL ATRIAL FIBRILLATION (HCC): ICD-10-CM

## 2021-09-14 DIAGNOSIS — R01.1 MURMUR: ICD-10-CM

## 2021-09-14 LAB
ECHO AO ASC DIAM: 3.81 CM
ECHO AO ROOT DIAM: 3.25 CM
ECHO AV MEAN GRADIENT: 4.72 MMHG
ECHO AV PEAK GRADIENT: 9.44 MMHG
ECHO AV PEAK VELOCITY: 153.6 CM/S
ECHO AV VTI: 34.01 CM
ECHO EST RA PRESSURE: 3 MMHG
ECHO LA AREA 4C: 20.59 CM2
ECHO LA MAJOR AXIS: 3.48 CM
ECHO LA MINOR AXIS: 1.83 CM
ECHO LA VOL 2C: 80.76 ML (ref 22–52)
ECHO LA VOL 4C: 57.96 ML (ref 22–52)
ECHO LA VOL BP: 73.95 ML (ref 22–52)
ECHO LA VOL/BSA BIPLANE: 38.92 ML/M2 (ref 16–28)
ECHO LA VOLUME INDEX A2C: 42.51 ML/M2 (ref 16–28)
ECHO LA VOLUME INDEX A4C: 30.51 ML/M2 (ref 16–28)
ECHO LV E' LATERAL VELOCITY: 7.21 CM/S
ECHO LV E' SEPTAL VELOCITY: 6.74 CM/S
ECHO LV EDV A2C: 78.52 ML
ECHO LV EDV A4C: 86.03 ML
ECHO LV EDV BP: 84.98 ML (ref 56–104)
ECHO LV EDV INDEX A4C: 45.3 ML/M2
ECHO LV EDV INDEX BP: 44.7 ML/M2
ECHO LV EDV NDEX A2C: 41.3 ML/M2
ECHO LV EJECTION FRACTION A2C: 63 PERCENT
ECHO LV EJECTION FRACTION A4C: 61 PERCENT
ECHO LV EJECTION FRACTION BIPLANE: 62.9 PERCENT (ref 55–100)
ECHO LV ESV A2C: 29.32 ML
ECHO LV ESV A4C: 33.24 ML
ECHO LV ESV BP: 31.49 ML (ref 19–49)
ECHO LV ESV INDEX A2C: 15.4 ML/M2
ECHO LV ESV INDEX A4C: 17.5 ML/M2
ECHO LV ESV INDEX BP: 16.6 ML/M2
ECHO LV INTERNAL DIMENSION DIASTOLIC: 4.58 CM (ref 3.9–5.3)
ECHO LV INTERNAL DIMENSION SYSTOLIC: 3.12 CM
ECHO LV IVSD: 0.88 CM (ref 0.6–0.9)
ECHO LV MASS 2D: 126.7 G (ref 67–162)
ECHO LV MASS INDEX 2D: 66.7 G/M2 (ref 43–95)
ECHO LV POSTERIOR WALL DIASTOLIC: 0.82 CM (ref 0.6–0.9)
ECHO LVOT PEAK GRADIENT: 5.23 MMHG
ECHO LVOT PEAK VELOCITY: 114.37 CM/S
ECHO LVOT VTI: 27.84 CM
ECHO MV A VELOCITY: 78.81 CM/S
ECHO MV AREA PHT: 4.31 CM2
ECHO MV E DECELERATION TIME (DT): 176.22 MS
ECHO MV E VELOCITY: 151.34 CM/S
ECHO MV E/A RATIO: 1.92
ECHO MV E/E' LATERAL: 20.99
ECHO MV E/E' RATIO (AVERAGED): 21.72
ECHO MV E/E' SEPTAL: 22.45
ECHO MV PRESSURE HALF TIME (PHT): 51.1 MS
ECHO RA AREA 4C: 14.3 CM2
ECHO RIGHT VENTRICULAR SYSTOLIC PRESSURE (RVSP): 38.06 MMHG
ECHO RV INTERNAL DIMENSION: 3.34 CM
ECHO RV TAPSE: 1.82 CM (ref 1.5–2)
ECHO TV REGURGITANT MAX VELOCITY: 296.04 CM/S
ECHO TV REGURGITANT PEAK GRADIENT: 35.06 MMHG
LA VOL DISK BP: 70.94 ML (ref 22–52)

## 2021-09-14 PROCEDURE — G8754 DIAS BP LESS 90: HCPCS | Performed by: SPECIALIST

## 2021-09-14 PROCEDURE — G8536 NO DOC ELDER MAL SCRN: HCPCS | Performed by: SPECIALIST

## 2021-09-14 PROCEDURE — 93306 TTE W/DOPPLER COMPLETE: CPT | Performed by: SPECIALIST

## 2021-09-14 PROCEDURE — G8427 DOCREV CUR MEDS BY ELIG CLIN: HCPCS | Performed by: SPECIALIST

## 2021-09-14 PROCEDURE — G8400 PT W/DXA NO RESULTS DOC: HCPCS | Performed by: SPECIALIST

## 2021-09-14 PROCEDURE — 1090F PRES/ABSN URINE INCON ASSESS: CPT | Performed by: SPECIALIST

## 2021-09-14 PROCEDURE — G8417 CALC BMI ABV UP PARAM F/U: HCPCS | Performed by: SPECIALIST

## 2021-09-14 PROCEDURE — 99214 OFFICE O/P EST MOD 30 MIN: CPT | Performed by: SPECIALIST

## 2021-09-14 PROCEDURE — 1101F PT FALLS ASSESS-DOCD LE1/YR: CPT | Performed by: SPECIALIST

## 2021-09-14 PROCEDURE — G8753 SYS BP > OR = 140: HCPCS | Performed by: SPECIALIST

## 2021-09-14 PROCEDURE — G8432 DEP SCR NOT DOC, RNG: HCPCS | Performed by: SPECIALIST

## 2021-09-14 RX ORDER — CANDESARTAN 32 MG/1
16 TABLET ORAL DAILY
Qty: 30 TABLET | Refills: 5 | Status: SHIPPED | OUTPATIENT
Start: 2021-09-14 | End: 2022-06-08 | Stop reason: DRUGHIGH

## 2021-09-14 NOTE — PATIENT INSTRUCTIONS
1) your echocardiogram today looks good and you have mild leakiness of your mitral and tricuspid valves which are not concerning. And your heart function is normal.    2) blood pressure is 160/82 on recheck by me. I would increase your candesartan from 16 mg to 32 mg a day and continue to follow your blood pressures at home. I sent a prescription to your pharmacy already    3) I would like for you to receive a event loop monitor to ensure that you are not having arrhythmias that may be contributing to some of your symptoms    4) follow your blood pressures at home as you are doing and follow-up with me in 6 weeks for repeat blood pressure check    5) I would like for you to get a proBNP checked to ensure you do not have volume overload this contributing to your shortness of breath.

## 2021-09-14 NOTE — PROGRESS NOTES
CARDIOLOGY OFFICE NOTE    Kobe Nguyễn MD, [address], [place], [address] [facility]  Phone [phone]; Fax [phone]  Mobile [phone]   Voice Mail [phone]    Primary care: Teto Zavala MD       ATTENTION:   This medical record was transcribed using an electronic medical records/speech recognition system. Although proofread, it may and can contain electronic, spelling and other errors. Corrections may be executed at a later time. Please feel free to contact us for any clarifications as needed. ICD-10-CM ICD-9-CM   1. SOB (shortness of breath)  R06.02 786.05            Reading Hwang is a 80 y.o. female with  referred for shortness of breath and hypertension, paroxysmal atrial fibrillation status post ablation, SVT          Cardiac risk factors: hypertension, post-menopausal  I have personally obtained the history from the patient. HISTORY OF PRESENTING ILLNESS   She was followed by Dr. Annika Kent in the past and she is on multiple medicines including diltiazem at 60 mg twice daily, carvedilol 3.125 mg twice daily clonidine 0.1 mg twice daily and Tambocor 50 mg twice daily. Her heart rate she says is slow at times  She has baseline SOB and feel it is attriubted to her hiatal hernias. she has had SOB. She has had it for a 1-2 years and over the last several mo . Has been getting worse. She will sleep in bed and uses 2 pillows to raise head. She has no PND. She iwl lnote her heart rate is low at times. She had an ablation in the past. No bleeding on eliquis. ACTIVE PROBLEM LIST     Patient Active Problem List    Diagnosis Date Noted    Sinus bradycardia 05/12/2020    Anxiety 07/22/2018    Severe obesity (BMI 35.0-39. 9) with comorbidity ([address] 05/25/2018    SVT (supraventricular tachycardia) ([address] 08/28/2017    Hiatal hernia 08/28/2017    Venous insufficiency 05/04/2017    Acquired hypothyroidism 05/01/2017    S/P ablation of atrial fibrillation 2017    Paroxysmal atrial fibrillation ([facility] 2016    Essential hypertension 2016    Gastroesophageal reflux disease without esophagitis 2016    RBBB 2015    LAE (left atrial enlargement) 2013           PAST MEDICAL HISTORY     Past Medical History:   Diagnosis Date    [facility] fibrillation [facility] ([facility] 2018    DDD (degenerative disc disease)     Gastroesophageal reflux disease without esophagitis 7/3/2016    GERD (gastroesophageal reflux disease)     Hiatal hernia     HTN (hypertension), benign 2016    Hypothyroidism     ANÍBAL (obstructive sleep apnea) 2016    Paroxysmal atrial fibrillation ([facility] 2016    Uterine prolapse            PAST SURGICAL HISTORY     Past Surgical History:   Procedure Laterality Date    HX AFIB ABLATION  2017    HX GI      COLONOSCOPY     HX GYN      TUBAL LIGATION    HX HEENT      WISDOM TEETH EXTRACTED. ALLERGIES     Allergies   Allergen Reactions    Citalopram Hydrobromide Rash     With itching.     Chlorthalidone Rash    Contrast Agent [Iodine] Other (comments)     Wheezing/bronchospasm    Maxzide [Triamterene-Hydrochlorothiazid] Palpitations    Vicodin [Hydrocodone-Acetaminophen] Palpitations          FAMILY HISTORY     Family History   Problem Relation Age of Onset    Diabetes Mother     Hypertension Mother     COPD Mother     negative for cardiac disease       SOCIAL HISTORY     Social History     Socioeconomic History    Marital status:      Spouse name: Not on file    Number of children: Not on file    Years of education: Not on file    Highest education level: Not on file   Tobacco Use    Smoking status: Former Smoker     Packs/day: 1.50     Years: 30.00     Pack years: 45.00     Quit date: 3/3/1994     Years since quittin.5    Smokeless tobacco: Former User   Substance and Sexual Activity    Alcohol use: No    Drug use: No    Sexual activity: Never     Social Determinants of Health     Financial Resource Strain:     Difficulty of Paying Living Expenses:    Food Insecurity:     Worried About Running Out of Food in the Last Year:     [address] in the Last Year:    Transportation Needs:     Lack of Transportation (Medical):  Lack of Transportation (Non-Medical):    Physical Activity:     Days of Exercise per Week:     Minutes of Exercise per Session:    Stress:     Feeling of Stress :    Social Connections:     Frequency of Communication with Friends and Family:     Frequency of Social Gatherings with Friends and Family:     Attends Congregation Services:     Active Member of Clubs or Organizations:     Attends Club or Organization Meetings:     Marital Status:          MEDICATIONS     Current Outpatient Medications   Medication Sig    candesartan (ATACAND) 32 mg tablet Take 0.5 Tablets by mouth daily.  dilTIAZem IR (CARDIZEM) 60 mg tablet TAKE 1 TABLET BY MOUTH TWICE DAILY BEFORE BREAKFAST AND DINNER    carvediloL (COREG) 3.125 mg tablet TAKE 1 TABLET BY MOUTH TWICE DAILY WITH MEALS    ezetimibe (Zetia) 10 mg tablet Take 1 Tablet by mouth every evening.  apixaban (Eliquis) 5 mg tablet TAKE 1 TABLET BY MOUTH TWICE DAILY    cloNIDine HCL (CATAPRES) 0.1 mg tablet Take 1 Tablet by mouth two (2) times a day.  flecainide (TAMBOCOR) 50 mg tablet TAKE 1 TABLET BY MOUTH TWICE DAILY    escitalopram oxalate (Lexapro) 5 mg tablet Take  by mouth daily.  calcium-vitamin D (OYSTER SHELL) 500 mg(1,250mg) -200 unit per tablet Take 1 Tab by mouth every evening.  FOLIC ACID/MULTIVIT-MIN/LUTEIN (CENTRUM SILVER PO) Take 1 Tab by mouth every evening.  levothyroxine (SYNTHROID) 50 mcg tablet Take 50 mcg by mouth Daily (before breakfast).  lansoprazole (PREVACID) 30 mg capsule Take 30 mg by mouth as needed. No current facility-administered medications for this visit.        I have reviewed the nurses notes, vitals, problem list, allergy list, medical history, family, social history and medications. REVIEW OF SYMPTOMS      General: Pt denies excessive weight gain or loss. Pt is able to conduct ADL's  HEENT: Denies blurred vision, headaches, hearing loss, epistaxis and difficulty swallowing. Respiratory: Denies cough, congestion, shortness of breath, GOODE, wheezing or stridor. Cardiovascular: Denies precordial pain, palpitations, edema or PND  Gastrointestinal: Denies poor appetite, indigestion, abdominal pain or blood in stool  Genitourinary: Denies hematuria, dysuria, increased urinary frequency  Musculoskeletal: Denies joint pain or swelling from muscles or joints  Neurologic: Denies tremor, paresthesias, headache, or sensory motor disturbance  Psychiatric: Denies confusion, insomnia, depression  Integumentray: Denies rash, itching or ulcers. Hematologic: Denies easy bruising, bleeding     PHYSICAL EXAMINATION      Vitals:    09/14/21 1349   BP: (!) 160/80   Pulse: 60   SpO2: 94%   Weight: 197 lb (89.4 kg)   Height: 5' 2\" (1.575 m)     General: Well developed, in no acute distress. Anxious  HEENT: No jaundice, oral mucosa moist, no oral ulcers  Neck: Supple, no stiffness, no lymphadenopathy, supple  Heart:  Normal S1/S2 negative S3 or S4. Regular, no murmur, gallop or rub, no jugular venous distention  Respiratory: Clear bilaterally x 4, no wheezing or rales  Extremities: Trace edema, normal cap refill, no cyanosis. Musculoskeletal: No clubbing, no deformities  Neuro: A&Ox3, speech clear, gait stable, cooperative, no focal neurologic deficits  Skin: Skin color is normal. No rashes or lesions. Non diaphoretic, moist.                DIAGNOSTIC DATA     1. Stress Test   Adenosine myoview 2011 - normal perfusion, EF 81%   Lexiscan 3/3/2014 - normal perfusion EF 83%   Lexiscan cardiolite 5/10/16 - normal perfusion, EF 78%   Lexiscan Cardiolite 1/30/19 - normal perfusion. EF 77%.      2. Echo   2/22/13 - normal left ventricular size and function, normal appearing mitral, aortic, and tricuspid valves, with mild mitral and moderate tricuspid regurgitation, bi-atrial enlargement   5/15/15- 60%, mildly dilated LA, mild MR   3/8/18 - EF 65%. No WMA. Normal RV size and function. Mild MR. Mild TR.   8/24/20 - EF 60-65%. G1DD. Mild MAC with mild MR. Mild to mod TR. PASP 45 mm hg. Mildly elevated CVP. 3. Cardioversion    7/23/2015 - 200J     4. Renal Visceral Duplex 5/2016   No evidence of ONOFRE     5. 3/2/17-. Successful atrial fibrillation ablation however unable to achieve entrance/exit block of LSPV per Dr. Faina Villarreal    6. Lipids  8/12/21- , HDL 57, LDL 85, TG 66         LABORATORY DATA            Lab Results   Component Value Date/Time    WBC 6.0 06/19/2020 04:56 AM    HGB 13.1 06/19/2020 04:56 AM    Hematocrit (POC) 43 09/07/2020 02:26 AM    HCT 40.6 06/19/2020 04:56 AM    PLATELET 809 46/34/8775 04:56 AM    MCV 96.9 06/19/2020 04:56 AM      Lab Results   Component Value Date/Time    Sodium 141 06/19/2020 04:56 AM    Potassium 3.7 06/19/2020 04:56 AM    Chloride 107 06/19/2020 04:56 AM    CO2 31 06/19/2020 04:56 AM    Anion gap 3 (L) 06/19/2020 04:56 AM    Glucose 131 (H) 06/19/2020 04:56 AM    BUN 10 06/19/2020 04:56 AM    Creatinine 0.91 06/19/2020 04:56 AM    BUN/Creatinine ratio 11 (L) 06/19/2020 04:56 AM    GFR est AA >60 06/19/2020 04:56 AM    GFR est non-AA 60 (L) 06/19/2020 04:56 AM    Calcium 9.2 06/19/2020 04:56 AM    Bilirubin, total 0.4 06/19/2020 04:56 AM    Alk.  phosphatase 86 06/19/2020 04:56 AM    Protein, total 7.8 06/19/2020 04:56 AM    Albumin 3.9 06/19/2020 04:56 AM    Globulin 3.9 06/19/2020 04:56 AM    A-G Ratio 1.0 (L) 06/19/2020 04:56 AM    ALT (SGPT) 24 06/19/2020 04:56 AM           ASSESSMENT/RECOMMENDATIONS:.      1) HTN, hx of labile pressures   -She is on multiple agents for blood pressure  -May consider alpha-2 blocker if increasing her Atacand to 32 does not help  -Recheck her blood pressure and 6 weeks and follow at home    2) paroxysmal AF    -on multiple medicines so we will check a event loop monitor to make sure she is not having a recurrence of intermittent atrial fibrillation that he attributed to her shortness of breath with activity. 3) diastolic dysfunction/shortness of breath  Has been worsening lately so I will check a proBNP-she has no evidence of heart failure on exam  -Echocardiogram today demonstrates normal heart function at 60-  65%. She has mild mitral regurgitation and tricuspid regurgitation    4) obesity - encouraged her to continue to follow a heart healthy diet and remain active. Question ANÍBAL    Orders Placed This Encounter    NT-PRO BNP     Standing Status:   Future     Standing Expiration Date:   9/14/2022    candesartan (ATACAND) 32 mg tablet     Sig: Take 0.5 Tablets by mouth daily. Dispense:  30 Tablet     Refill:  5       We discussed the expected course, resolution and complications of the diagnosis(es) in detail. Medication risks, benefits, costs, interactions, and alternatives were discussed as indicated. I advised him to contact the office if his condition worsens, changes or fails to improve as anticipated. He expressed understanding with the diagnosis(es) and plan          Follow-up and Dispositions  ·   Return in about 6 weeks (around 10/26/2021). I have discussed the diagnosis with  Lew Archibald and the intended plan as seen in the above orders. Questions were answered concerning future plans. I have discussed medication side effects and warnings with the patient as well. Thank you,  Marshal Councilman, MD for involving me in the care of  Lew Archibald. Please do not hesitate to contact me for further questions/concerns. Kobe Holt MD, [address]      [place] [address] [address]      [phone] / [phone] Fax

## 2021-09-14 NOTE — PROGRESS NOTES
Room B0    Echo review    Seen Licha Mclean MD    Visit Vitals  BP (!) 160/80 Comment: Per Steven Morin   Pulse 60   Ht 5' 2\" (1.575 m)   Wt 197 lb (89.4 kg)   SpO2 94%   BMI 36.03 kg/m²         Chest pain: no  Shortness of breath: YES  Edema: no  Palpitations: no  Dizziness: no    New diagnosis/Surgeries: no    ER/Hospitalizations: no    Refills: no

## 2021-09-16 LAB — NT-PROBNP SERPL-MCNC: 319 PG/ML (ref 0–738)

## 2021-10-29 RX ORDER — DILTIAZEM HYDROCHLORIDE 60 MG/1
TABLET, FILM COATED ORAL
Qty: 180 TABLET | Refills: 0 | Status: SHIPPED | OUTPATIENT
Start: 2021-10-29 | End: 2022-01-31

## 2021-10-29 RX ORDER — CARVEDILOL 3.12 MG/1
TABLET ORAL
Qty: 180 TABLET | Refills: 0 | Status: SHIPPED | OUTPATIENT
Start: 2021-10-29 | End: 2022-01-31

## 2021-11-03 RX ORDER — DILTIAZEM HYDROCHLORIDE 60 MG/1
TABLET, FILM COATED ORAL
Qty: 180 TABLET | Refills: 0 | OUTPATIENT
Start: 2021-11-03

## 2021-11-03 RX ORDER — CARVEDILOL 3.12 MG/1
TABLET ORAL
Qty: 180 TABLET | Refills: 0 | OUTPATIENT
Start: 2021-11-03

## 2021-11-08 ENCOUNTER — DOCUMENTATION ONLY (OUTPATIENT)
Dept: CARDIOLOGY CLINIC | Age: 81
End: 2021-11-08

## 2021-11-08 NOTE — PROGRESS NOTES
Cardiology telephone call    Spoke with Mrs. Ojeda about her heart monitor readings. They indicate that she only had one episode of atrial fibrillation but she has been sensing some irregularity in her heart rhythm. She is currently taking flecainide as well as diltiazem 60 mg twice daily and Corag. She says her blood pressures drop significantly when she takes her blood pressure medicines and when I look at her blood pressure recordings over the years that are very high. I gave her the option of increasing her Cardizem but she is somewhat reluctant because she says her heart rate will go down into the 50s. I would like for her to again see Dr. Madelyn Cooks because she mentioned that he told her there was something else he could do.     If we could arrange as as soon as he has availability I would be greatly appreciated      Randall Prado MD

## 2021-11-10 ENCOUNTER — OFFICE VISIT (OUTPATIENT)
Dept: CARDIOLOGY CLINIC | Age: 81
End: 2021-11-10
Payer: MEDICARE

## 2021-11-10 VITALS
HEIGHT: 62 IN | DIASTOLIC BLOOD PRESSURE: 62 MMHG | OXYGEN SATURATION: 97 % | BODY MASS INDEX: 36.25 KG/M2 | HEART RATE: 64 BPM | SYSTOLIC BLOOD PRESSURE: 124 MMHG | WEIGHT: 197 LBS

## 2021-11-10 DIAGNOSIS — I48.91 ATRIAL FIBRILLATION, UNSPECIFIED TYPE (HCC): Primary | ICD-10-CM

## 2021-11-10 PROCEDURE — G8536 NO DOC ELDER MAL SCRN: HCPCS | Performed by: INTERNAL MEDICINE

## 2021-11-10 PROCEDURE — G8752 SYS BP LESS 140: HCPCS | Performed by: INTERNAL MEDICINE

## 2021-11-10 PROCEDURE — G8754 DIAS BP LESS 90: HCPCS | Performed by: INTERNAL MEDICINE

## 2021-11-10 PROCEDURE — 99215 OFFICE O/P EST HI 40 MIN: CPT | Performed by: INTERNAL MEDICINE

## 2021-11-10 PROCEDURE — 1090F PRES/ABSN URINE INCON ASSESS: CPT | Performed by: INTERNAL MEDICINE

## 2021-11-10 PROCEDURE — G8427 DOCREV CUR MEDS BY ELIG CLIN: HCPCS | Performed by: INTERNAL MEDICINE

## 2021-11-10 PROCEDURE — G8510 SCR DEP NEG, NO PLAN REQD: HCPCS | Performed by: INTERNAL MEDICINE

## 2021-11-10 PROCEDURE — G8400 PT W/DXA NO RESULTS DOC: HCPCS | Performed by: INTERNAL MEDICINE

## 2021-11-10 PROCEDURE — G8417 CALC BMI ABV UP PARAM F/U: HCPCS | Performed by: INTERNAL MEDICINE

## 2021-11-10 PROCEDURE — 1101F PT FALLS ASSESS-DOCD LE1/YR: CPT | Performed by: INTERNAL MEDICINE

## 2021-11-10 NOTE — PROGRESS NOTES
Room EP3     9-23 to 10-23-21, PLEASE REVIEW;     Cardiology telephone call     Spoke with Mrs. Ojeda about her heart monitor readings. They indicate that she only had one episode of atrial fibrillation but she has been sensing some irregularity in her heart rhythm. She is currently taking flecainide as well as diltiazem 60 mg twice daily and Corag. She says her blood pressures drop significantly when she takes her blood pressure medicines and when I look at her blood pressure recordings over the years that are very high. I gave her the option of increasing her Cardizem but she is somewhat reluctant because she says her heart rate will go down into the 50s.      I would like for her to again see Dr. ADAME [facility] because she mentioned that he told her there was something else he could do.     If we could arrange as as soon as he has availability I would be greatly appreciated        Mik Langley MD            Saw you last 12-4-2019, Ablation 2017    Visit Vitals  /62 (BP 1 Location: Left upper arm, BP Patient Position: Sitting, BP Cuff Size: Large adult)   Pulse 64   Ht 5' 2\" (1.575 m)   Wt 197 lb (89.4 kg)   SpO2 97%   BMI 36.03 kg/m²       Chest pain: no  Shortness of breath: YES (has a hiatal hernia's)  Edema: no  Palpitations, Skipped beats, Rapid heartbeat: no  Dizziness: no    New diagnosis/Surgeries: no    ER/Hospitalizations: no    Refills: no

## 2021-11-10 NOTE — PROGRESS NOTES
HISTORY OF PRESENTING ILLNESS      Noel Walter is a 80 y.o. female with ANÍBAL, HTN, RBBB and persistent AF s/p AF ablation 3/2/2017 (unable to isolate LSPV). She had previously been on Propafenone and underwent several cardioversions resulting in symptomatic improvement with NSR.  Echo demonstrated preserved LV function with LA diameter of 3.4cm in May 2015.  She reported shortness of breath post ablation and her amiodarone was discontinued. Recently she had some recurrence of AF, underwent DCCV and had ERAF after 3 days. Her diltiazem was increased and coreg was added to her regimen. She is currently on flecainide.  She underwent a monitor which demonstrated episodes of atrial fibrillation with controlled ventricular rate.  She is asymptomatic during these episodes.  She did have some episodes of bradycardia during which she feels fatigued and tired. Last visit, we discussed options of repeat attempt at AF ablation to allow discontinuation of diltiazem and flecainide however she wished to avoid this. She continues to have occ recurrences of AF according to her BP monitor and has bradycardia after taking her meds that is associated with fatigue. ACTIVE PROBLEM LIST     Patient Active Problem List    Diagnosis Date Noted    Sinus bradycardia 05/12/2020    Anxiety 07/22/2018    Severe obesity (BMI 35.0-39. 9) with comorbidity ([address] 05/25/2018    SVT (supraventricular tachycardia) ([address] 08/28/2017    Hiatal hernia 08/28/2017    Venous insufficiency 05/04/2017    Acquired hypothyroidism 05/01/2017    S/P ablation of atrial fibrillation 04/28/2017    Paroxysmal atrial fibrillation ([address] 08/28/2016    Essential hypertension 07/26/2016    Gastroesophageal reflux disease without esophagitis 07/03/2016    RBBB 05/07/2015    LAE (left atrial enlargement) 04/03/2013           PAST MEDICAL HISTORY     Past Medical History:   Diagnosis Date    Atrial fibrillation with RVR ([address] 6/29/2018    DDD (degenerative disc disease)     Gastroesophageal reflux disease without esophagitis 7/3/2016    GERD (gastroesophageal reflux disease)     Hiatal hernia     HTN (hypertension), benign 2016    Hypothyroidism     ANÍBAL (obstructive sleep apnea) 2016    Paroxysmal atrial fibrillation ([facility] 2016    Uterine prolapse            PAST SURGICAL HISTORY     Past Surgical History:   Procedure Laterality Date    HX AFIB ABLATION  2017    HX GI      COLONOSCOPY     HX GYN      TUBAL LIGATION    HX HEENT      WISDOM TEETH EXTRACTED. ALLERGIES     Allergies   Allergen Reactions    Citalopram Hydrobromide Rash     With itching.  Chlorthalidone Rash    Contrast Agent [Iodine] Other (comments)     Wheezing/bronchospasm    Maxzide [Triamterene-Hydrochlorothiazid] Palpitations    Vicodin [Hydrocodone-Acetaminophen] Palpitations          FAMILY HISTORY     Family History   Problem Relation Age of Onset    Diabetes Mother     Hypertension Mother     COPD Mother     negative for cardiac disease       SOCIAL HISTORY     Social History     Socioeconomic History    Marital status:    Tobacco Use    Smoking status: Former Smoker     Packs/day: 1.50     Years: 30.00     Pack years: 45.00     Quit date: 3/3/1994     Years since quittin.7    Smokeless tobacco: Former User   Substance and Sexual Activity    Alcohol use: No    Drug use: No    Sexual activity: Never         MEDICATIONS     Current Outpatient Medications   Medication Sig    apixaban (Eliquis) 5 mg tablet TAKE 1 TABLET BY MOUTH TWICE DAILY    dilTIAZem IR (CARDIZEM) 60 mg tablet TAKE 1 TABLET BY MOUTH TWICE DAILY BEFORE BREAKFAST AND DINNER    carvediloL (COREG) 3.125 mg tablet TAKE 1 TABLET BY MOUTH TWICE DAILY WITH MEALS    candesartan (ATACAND) 32 mg tablet Take 0.5 Tablets by mouth daily.  ezetimibe (Zetia) 10 mg tablet Take 1 Tablet by mouth every evening.     cloNIDine HCL (CATAPRES) 0.1 mg tablet Take 1 Tablet by mouth two (2) times a day.  flecainide (TAMBOCOR) 50 mg tablet TAKE 1 TABLET BY MOUTH TWICE DAILY    escitalopram oxalate (Lexapro) 5 mg tablet Take  by mouth daily.  levothyroxine (SYNTHROID) 50 mcg tablet Take 50 mcg by mouth Daily (before breakfast).  calcium-vitamin D (OYSTER SHELL) 500 mg(1,250mg) -200 unit per tablet Take 1 Tab by mouth every evening.  FOLIC ACID/MULTIVIT-MIN/LUTEIN (CENTRUM SILVER PO) Take 1 Tab by mouth every evening. No current facility-administered medications for this visit. I have reviewed the nurses notes, vitals, problem list, allergy list, medical history, family, social history and medications. REVIEW OF SYMPTOMS      General: Pt denies excessive weight gain or loss. Pt is able to conduct ADL's  HEENT: Denies blurred vision, headaches, hearing loss, epistaxis and difficulty swallowing. Respiratory: Denies cough, congestion, shortness of breath, GOODE, wheezing or stridor. Cardiovascular: Denies precordial pain, palpitations, edema or PND  Gastrointestinal: Denies poor appetite, indigestion, abdominal pain or blood in stool  Genitourinary: Denies hematuria, dysuria, increased urinary frequency  Musculoskeletal: Denies joint pain or swelling from muscles or joints  Neurologic: Denies tremor, paresthesias, headache, or sensory motor disturbance  Psychiatric: Denies confusion, insomnia, depression  Integumentray: Denies rash, itching or ulcers. Hematologic: Denies easy bruising, bleeding       PHYSICAL EXAMINATION      There were no vitals filed for this visit. General: Well developed, in no acute distress. HEENT: No jaundice, oral mucosa moist, no oral ulcers  Neck: Supple, no stiffness, no lymphadenopathy, supple  Heart:  Normal S1/S2 negative S3 or S4.  Regular, no murmur, gallop or rub, no jugular venous distention  Respiratory: Clear bilaterally x 4, no wheezing or rales  Abdomen:   Soft, non-tender, bowel sounds are active.   Extremities: No edema, normal cap refill, no cyanosis. Musculoskeletal: No clubbing, no deformities  Neuro: A&Ox3, speech clear, gait stable, cooperative, no focal neurologic deficits  Skin: Skin color is normal. No rashes or lesions. Non diaphoretic, moist.  Vascular: 2+ pulses symmetric in all extremities       DIAGNOSTIC DATA      EKG:        LABORATORY DATA      Lab Results   Component Value Date/Time    WBC 6.0 06/19/2020 04:56 AM    HGB 13.1 06/19/2020 04:56 AM    Hematocrit (POC) 43 09/07/2020 02:26 AM    HCT 40.6 06/19/2020 04:56 AM    PLATELET 341 57/57/7609 04:56 AM    MCV 96.9 06/19/2020 04:56 AM      Lab Results   Component Value Date/Time    Sodium 141 06/19/2020 04:56 AM    Potassium 3.7 06/19/2020 04:56 AM    Chloride 107 06/19/2020 04:56 AM    CO2 31 06/19/2020 04:56 AM    Anion gap 3 (L) 06/19/2020 04:56 AM    Glucose 131 (H) 06/19/2020 04:56 AM    BUN 10 06/19/2020 04:56 AM    Creatinine 0.91 06/19/2020 04:56 AM    BUN/Creatinine ratio 11 (L) 06/19/2020 04:56 AM    GFR est AA >60 06/19/2020 04:56 AM    GFR est non-AA 60 (L) 06/19/2020 04:56 AM    Calcium 9.2 06/19/2020 04:56 AM    Bilirubin, total 0.4 06/19/2020 04:56 AM    Alk. phosphatase 86 06/19/2020 04:56 AM    Protein, total 7.8 06/19/2020 04:56 AM    Albumin 3.9 06/19/2020 04:56 AM    Globulin 3.9 06/19/2020 04:56 AM    A-G Ratio 1.0 (L) 06/19/2020 04:56 AM    ALT (SGPT) 24 06/19/2020 04:56 AM           ASSESSMENT      1. Atrial fibrillation                         A. Persistent                        N. Symptomatic                        C.  AF ablation on 3/2/2017 (unable to achieve LSPV block)  2. Hiatal hernia  3. Hypertension   4. ANÍBAL  5. RBBB  6. GERD        PLAN     Discussed PPM vs repeat attempt at AF ablation (with plan to lower meds). Will contact her next week to see how she wishes to proceed.         FOLLOW-UP     1 year or earlier if she wishes to proceed with procedural approach      Thank you, Norvin Denver, MD and Dr. Libia Patel for allowing me to participate in the care of this extraordinarily pleasant female. Please do not hesitate to contact me for further questions/concerns.          Ej Taylor MD  Cardiac Electrophysiology / Cardiology    [facility]  [address] [facility], [facility], [address]  [phone] / [phone] Fax   [phone] / [phone] Fax

## 2021-11-16 ENCOUNTER — OFFICE VISIT (OUTPATIENT)
Dept: CARDIOLOGY CLINIC | Age: 81
End: 2021-11-16
Payer: MEDICARE

## 2021-11-16 VITALS
WEIGHT: 197 LBS | SYSTOLIC BLOOD PRESSURE: 160 MMHG | DIASTOLIC BLOOD PRESSURE: 80 MMHG | BODY MASS INDEX: 36.25 KG/M2 | OXYGEN SATURATION: 97 % | HEIGHT: 62 IN | HEART RATE: 62 BPM

## 2021-11-16 DIAGNOSIS — Z99.89 OSA ON CPAP: ICD-10-CM

## 2021-11-16 DIAGNOSIS — I48.0 PAROXYSMAL ATRIAL FIBRILLATION (HCC): ICD-10-CM

## 2021-11-16 DIAGNOSIS — I45.10 RBBB: ICD-10-CM

## 2021-11-16 DIAGNOSIS — Z98.890 S/P ABLATION OF ATRIAL FIBRILLATION: ICD-10-CM

## 2021-11-16 DIAGNOSIS — E03.9 ACQUIRED HYPOTHYROIDISM: ICD-10-CM

## 2021-11-16 DIAGNOSIS — I87.2 VENOUS INSUFFICIENCY: ICD-10-CM

## 2021-11-16 DIAGNOSIS — I10 ESSENTIAL HYPERTENSION: Primary | ICD-10-CM

## 2021-11-16 DIAGNOSIS — E66.01 SEVERE OBESITY (BMI 35.0-39.9) WITH COMORBIDITY (HCC): ICD-10-CM

## 2021-11-16 DIAGNOSIS — I47.1 SVT (SUPRAVENTRICULAR TACHYCARDIA) (HCC): ICD-10-CM

## 2021-11-16 DIAGNOSIS — I51.7 LAE (LEFT ATRIAL ENLARGEMENT): ICD-10-CM

## 2021-11-16 DIAGNOSIS — G47.33 OSA ON CPAP: ICD-10-CM

## 2021-11-16 DIAGNOSIS — Z86.79 S/P ABLATION OF ATRIAL FIBRILLATION: ICD-10-CM

## 2021-11-16 PROCEDURE — G8432 DEP SCR NOT DOC, RNG: HCPCS | Performed by: NURSE PRACTITIONER

## 2021-11-16 PROCEDURE — G8400 PT W/DXA NO RESULTS DOC: HCPCS | Performed by: NURSE PRACTITIONER

## 2021-11-16 PROCEDURE — G8536 NO DOC ELDER MAL SCRN: HCPCS | Performed by: NURSE PRACTITIONER

## 2021-11-16 PROCEDURE — G8417 CALC BMI ABV UP PARAM F/U: HCPCS | Performed by: NURSE PRACTITIONER

## 2021-11-16 PROCEDURE — 1101F PT FALLS ASSESS-DOCD LE1/YR: CPT | Performed by: NURSE PRACTITIONER

## 2021-11-16 PROCEDURE — 1090F PRES/ABSN URINE INCON ASSESS: CPT | Performed by: NURSE PRACTITIONER

## 2021-11-16 PROCEDURE — G8754 DIAS BP LESS 90: HCPCS | Performed by: NURSE PRACTITIONER

## 2021-11-16 PROCEDURE — 99214 OFFICE O/P EST MOD 30 MIN: CPT | Performed by: NURSE PRACTITIONER

## 2021-11-16 PROCEDURE — G8427 DOCREV CUR MEDS BY ELIG CLIN: HCPCS | Performed by: NURSE PRACTITIONER

## 2021-11-16 PROCEDURE — G8753 SYS BP > OR = 140: HCPCS | Performed by: NURSE PRACTITIONER

## 2021-11-16 NOTE — PATIENT INSTRUCTIONS
Continue your same medications. Keep checking your BP daily and let us know if your top number is > 140 consistently. We can always consider having you take Candesartan 16 mg twice daily.

## 2021-11-16 NOTE — PROGRESS NOTES
[facility] [facility]. [address], [address] [facility]    Phone [phone]   Fax [phone]        PCP:  Christin Sexton MD      ICD-10-CM ICD-9-CM   1. Essential hypertension  I10 401.9   2. Paroxysmal atrial fibrillation (HCC)  I48.0 427.31   3. Venous insufficiency  I87.2 459.81   4. SVT (supraventricular tachycardia) (HCC)  I47.1 427.89   5. LAE (left atrial enlargement)  I51.7 429.3   6. RBBB  I45.10 426.4   7. Acquired hypothyroidism  E03.9 244.9   8. S/P ablation of atrial fibrillation  Z98.890 V45.89    Z86.79    9. Severe obesity (BMI 35.0-39. 9) with comorbidity ([address]  E66.01 278.01   10. ANÍBAL on CPAP  G47.33 327.23    Z99.89 V46.8     Luisa Restrepo is a 80 y.o. female was last seen by Dr. Falguni Peterson 6 weeks ago. Holter monitor obtained and reviewed. Referred to EP for further evaluation. Seen 11/10/21. Macario Farrell     Ms. Ojeda reports that she is doing well. She remains active with housework and running errands. She continues to experience the same level of GOODE. She attributes this to her abdominal hernias. She reports that she did increase Candesartan to 32 mg daily following her last visit, but BP dropped below 80 systolic. She reduced it to half tablet. She notes that BP following AM meds typically trends down to 98'O systolic. She was recently seen by EP. They plan to \"stay the coarse\". She denies any significant palpitations or tachycardia. No bleeding or bruising concerns. She continues to experience intermittent ankle edema. Previously used Lasix in the past.         ACTIVE PROBLEM LIST     Patient Active Problem List    Diagnosis Date Noted    Sinus bradycardia 05/12/2020    Anxiety 07/22/2018    Severe obesity (BMI 35.0-39. 9) with comorbidity ([address] 05/25/2018    SVT (supraventricular tachycardia) ([address] 08/28/2017    Hiatal hernia 08/28/2017    Venous insufficiency 05/04/2017    Acquired hypothyroidism 2017    S/P ablation of atrial fibrillation 2017    Paroxysmal atrial fibrillation ([address] 2016    Essential hypertension 2016    Gastroesophageal reflux disease without esophagitis 2016    RBBB 2015    LAE (left atrial enlargement) 2013           PAST MEDICAL HISTORY     Past Medical History:   Diagnosis Date    Atrial fibrillation with RVR ([address] 2018    DDD (degenerative disc disease)     Gastroesophageal reflux disease without esophagitis 7/3/2016    GERD (gastroesophageal reflux disease)     Hiatal hernia     HTN (hypertension), benign 2016    Hypothyroidism     ANÍBAL (obstructive sleep apnea) 2016    Paroxysmal atrial fibrillation ([facility] 2016    Uterine prolapse            PAST SURGICAL HISTORY     Past Surgical History:   Procedure Laterality Date    HX AFIB ABLATION  2017    HX GI      COLONOSCOPY     HX GYN      TUBAL LIGATION    HX HEENT      WISDOM TEETH EXTRACTED. ALLERGIES     Allergies   Allergen Reactions    Citalopram Hydrobromide Rash     With itching.     Chlorthalidone Rash    Contrast Agent [Iodine] Other (comments)     Wheezing/bronchospasm    Maxzide [Triamterene-Hydrochlorothiazid] Palpitations    Vicodin [Hydrocodone-Acetaminophen] Palpitations          FAMILY HISTORY     Family History   Problem Relation Age of Onset    Diabetes Mother     Hypertension Mother     COPD Mother     negative for cardiac disease       SOCIAL HISTORY     Social History     Socioeconomic History    Marital status:    Tobacco Use    Smoking status: Former Smoker     Packs/day: 1.50     Years: 30.00     Pack years: 45.00     Quit date: 3/3/1994     Years since quittin.7    Smokeless tobacco: Former User   Substance and Sexual Activity    Alcohol use: No    Drug use: No    Sexual activity: Never       MEDICATIONS     Current Outpatient Medications   Medication Sig    apixaban (Eliquis) 5 mg tablet TAKE 1 TABLET BY MOUTH TWICE DAILY    dilTIAZem IR (CARDIZEM) 60 mg tablet TAKE 1 TABLET BY MOUTH TWICE DAILY BEFORE BREAKFAST AND DINNER    carvediloL (COREG) 3.125 mg tablet TAKE 1 TABLET BY MOUTH TWICE DAILY WITH MEALS    candesartan (ATACAND) 32 mg tablet Take 0.5 Tablets by mouth daily.  ezetimibe (Zetia) 10 mg tablet Take 1 Tablet by mouth every evening.  cloNIDine HCL (CATAPRES) 0.1 mg tablet Take 1 Tablet by mouth two (2) times a day.  flecainide (TAMBOCOR) 50 mg tablet TAKE 1 TABLET BY MOUTH TWICE DAILY    escitalopram oxalate (Lexapro) 5 mg tablet Take  by mouth daily.  levothyroxine (SYNTHROID) 50 mcg tablet Take 50 mcg by mouth Daily (before breakfast).  calcium-vitamin D (OYSTER SHELL) 500 mg(1,250mg) -200 unit per tablet Take 1 Tab by mouth every evening.  FOLIC ACID/MULTIVIT-MIN/LUTEIN (CENTRUM SILVER PO) Take 1 Tab by mouth every evening. No current facility-administered medications for this visit. I have reviewed the nurses notes, vitals, problem list, allergy list, medical history, family, social history and medications. REVIEW OF SYMPTOMS    Per HPI  General: Pt denies excessive weight gain or loss. Pt is able to conduct ADL's  HEENT: Denies blurred vision, headaches, hearing loss, epistaxis and difficulty swallowing. Respiratory: Denies cough, congestion, shortness of breath, GOODE, wheezing or stridor. Cardiovascular: Denies precordial pain, palpitations, edema or PND  Gastrointestinal: Denies poor appetite, indigestion, abdominal pain or blood in stool  Genitourinary: Denies hematuria, dysuria, increased urinary frequency  Musculoskeletal: Denies joint pain or swelling from muscles or joints  Neurologic: Denies tremor, paresthesias, headache, or sensory motor disturbance  Psychiatric: Denies confusion, insomnia, depression  Integumentray: Denies rash, itching or ulcers.   Hematologic: Denies easy bruising, bleeding     PHYSICAL EXAMINATION Vitals:    11/16/21 1457 11/16/21 1505   BP: (!) 150/70 (!) 160/80   Pulse: 62    SpO2: 97%    Weight: 197 lb (89.4 kg)    Height: 5' 2\" (1.575 m)         General: Well developed, in no acute distress. Anxious  HEENT: No jaundice, oral mucosa moist, no oral ulcers  Neck: Supple, no stiffness, no lymphadenopathy, supple  Heart:  Normal S1/S2 negative S3 or S4. Regular, no murmur, gallop or rub, no jugular venous distention  Respiratory: Clear bilaterally x 4, no wheezing or rales  Extremities: Trace edema, normal cap refill, no cyanosis. Musculoskeletal: No clubbing, no deformities  Neuro: A&Ox3, speech clear, gait stable, cooperative, no focal neurologic deficits  Skin: Skin color is normal. No rashes or lesions. Non diaphoretic, moist.       DIAGNOSTIC DATA     1. Stress Test   Adenosine myoview 2011 - normal perfusion, EF 81%   Lexiscan 3/3/2014 - normal perfusion EF 83%   Lexiscan cardiolite 5/10/16 - normal perfusion, EF 78%   Lexiscan Cardiolite 1/30/19 - normal perfusion. EF 77%. 2. Echo   2/22/13 - normal left ventricular size and function, normal appearing mitral, aortic, and tricuspid valves, with mild mitral and moderate tricuspid regurgitation, bi-atrial enlargement   5/15/15- 60%, mildly dilated LA, mild MR   3/8/18 - EF 65%. No WMA. Normal RV size and function. Mild MR. Mild TR.   8/24/20 - EF 60-65%. G1DD. Mild MAC with mild MR. Mild to mod TR. PASP 45 mm hg. Mildly elevated CVP. 3. Cardioversion    7/23/2015 - 200J     4. Renal Visceral Duplex 5/2016   No evidence of ONOFRE     5. 3/2/17-. Successful atrial fibrillation ablation however unable to achieve entrance/exit block of LSPV per Dr. Osbaldo Guerra    6.  Lipids  8/12/21- , HDL 57, LDL 85, TG 66         LABORATORY DATA        Lab Results   Component Value Date/Time    WBC 6.0 06/19/2020 04:56 AM    HGB 13.1 06/19/2020 04:56 AM    Hematocrit (POC) 43 09/07/2020 02:26 AM    HCT 40.6 06/19/2020 04:56 AM    PLATELET 338 50/86/8134 04:56 AM MCV 96.9 06/19/2020 04:56 AM      Lab Results   Component Value Date/Time    Sodium 141 06/19/2020 04:56 AM    Potassium 3.7 06/19/2020 04:56 AM    Chloride 107 06/19/2020 04:56 AM    CO2 31 06/19/2020 04:56 AM    Anion gap 3 (L) 06/19/2020 04:56 AM    Glucose 131 (H) 06/19/2020 04:56 AM    BUN 10 06/19/2020 04:56 AM    Creatinine 0.91 06/19/2020 04:56 AM    BUN/Creatinine ratio 11 (L) 06/19/2020 04:56 AM    GFR est AA >60 06/19/2020 04:56 AM    GFR est non-AA 60 (L) 06/19/2020 04:56 AM    Calcium 9.2 06/19/2020 04:56 AM    Bilirubin, total 0.4 06/19/2020 04:56 AM    Alk. phosphatase 86 06/19/2020 04:56 AM    Protein, total 7.8 06/19/2020 04:56 AM    Albumin 3.9 06/19/2020 04:56 AM    Globulin 3.9 06/19/2020 04:56 AM    A-G Ratio 1.0 (L) 06/19/2020 04:56 AM    ALT (SGPT) 24 06/19/2020 04:56 AM           ASSESSMENT/RECOMMENDATIONS:.      1) HTN, hx of labile pressures - mildly elevated in the office today, but normotensive/ hypotensive at times, per home monitoring.    - Continue Dilit IR 60 mg BID, Coreg 3.125 mg BID, Atacand 16 mg daily and Clonidine 0.1 mg BID (additional doses PRN for SBP > 180)   - Continue low sodium diet  - Continue home monitoring   - She was advised to contact me is SBP is > 140 consistently. We dicussed a trial of Candesartan 16 mg BID vs 32 mg once daily.   - Elevated BP's in the office are likely depicting some level of \"white coat syndrome\". She has significant underlying anxiety. 2) Paroxysmal AF  - recent Holter monitor and EP evaluation.    - Continue Flecainide 50 mg BID in addition to Dilt and BB as noted above. - Continue Eliquis 5 mg BID for stroke prevention     3) Diastolic dysfunction/shortness of breath - Recent Echocardiogram  demonstrates normal heart function at 60-65%. She has mild mitral regurgitation and tricuspid regurgitation.  Recent proBNP was normal.  Intermittent lower extremity edema is likely due to venous insufficiency exacerbated by obesity.    - Discussed knee high compression and leg elevation.    - Asked that she discuss \"hernia\" concerns with PCP. 4) Obesity - encouraged her to continue to follow a heart healthy diet and remain active. Plan for her to return to clinic again in 6 months. Sooner PRN.       CANDIDO Levy [address] [place] [address]      [phone] / [phone] Fax

## 2021-11-16 NOTE — PROGRESS NOTES
Ibeth Shown is a 80 y.o. female    There were no vitals taken for this visit. Chief Complaint   Patient presents with    Irregular Heart Beat     AFIB    Follow-up     6 WEEK     Vitals:    11/16/21 1457 11/16/21 1505   BP: (!) 150/70 (!) 160/80   BP 1 Location: Left upper arm Right upper arm   BP Patient Position: Sitting Sitting   BP Cuff Size: Adult Adult   Pulse: 62    Height: 5' 2\" (1.575 m)    Weight: 197 lb (89.4 kg)    SpO2: 97%          Chest pain NO  SOB YES  Dizziness NO  Swelling FEET  Recent hospital visit NO  Refills NO  COVID VACCINE STATUS YES  HAD COVID?  NO

## 2021-11-22 RX ORDER — FLECAINIDE ACETATE 50 MG/1
TABLET ORAL
Qty: 180 TABLET | Refills: 1 | Status: SHIPPED | OUTPATIENT
Start: 2021-11-22 | End: 2022-05-23

## 2022-01-31 RX ORDER — DILTIAZEM HYDROCHLORIDE 60 MG/1
TABLET, FILM COATED ORAL
Qty: 180 TABLET | Refills: 0 | Status: SHIPPED | OUTPATIENT
Start: 2022-01-31 | End: 2022-05-01

## 2022-01-31 RX ORDER — CARVEDILOL 3.12 MG/1
TABLET ORAL
Qty: 180 TABLET | Refills: 0 | Status: SHIPPED | OUTPATIENT
Start: 2022-01-31 | End: 2022-05-01

## 2022-03-18 PROBLEM — E66.01 SEVERE OBESITY (BMI 35.0-39.9) WITH COMORBIDITY (HCC): Status: ACTIVE | Noted: 2018-05-25

## 2022-03-19 PROBLEM — R00.1 SINUS BRADYCARDIA: Status: ACTIVE | Noted: 2020-05-12

## 2022-03-19 PROBLEM — Z86.79 S/P ABLATION OF ATRIAL FIBRILLATION: Status: ACTIVE | Noted: 2017-04-28

## 2022-03-19 PROBLEM — I47.10 SVT (SUPRAVENTRICULAR TACHYCARDIA): Status: ACTIVE | Noted: 2017-08-28

## 2022-03-19 PROBLEM — I87.2 VENOUS INSUFFICIENCY: Status: ACTIVE | Noted: 2017-05-04

## 2022-03-19 PROBLEM — E03.9 ACQUIRED HYPOTHYROIDISM: Status: ACTIVE | Noted: 2017-05-01

## 2022-03-19 PROBLEM — Z98.890 S/P ABLATION OF ATRIAL FIBRILLATION: Status: ACTIVE | Noted: 2017-04-28

## 2022-03-19 PROBLEM — I47.1 SVT (SUPRAVENTRICULAR TACHYCARDIA) (HCC): Status: ACTIVE | Noted: 2017-08-28

## 2022-03-19 PROBLEM — F41.9 ANXIETY: Status: ACTIVE | Noted: 2018-07-22

## 2022-03-20 PROBLEM — K44.9 HIATAL HERNIA: Status: ACTIVE | Noted: 2017-08-28

## 2022-04-10 RX ORDER — CLONIDINE HYDROCHLORIDE 0.1 MG/1
TABLET ORAL
Qty: 200 TABLET | Refills: 0 | Status: SHIPPED | OUTPATIENT
Start: 2022-04-10 | End: 2022-10-31

## 2022-05-01 RX ORDER — DILTIAZEM HYDROCHLORIDE 60 MG/1
TABLET, FILM COATED ORAL
Qty: 180 TABLET | Refills: 0 | Status: SHIPPED | OUTPATIENT
Start: 2022-05-01 | End: 2022-06-08

## 2022-05-01 RX ORDER — CARVEDILOL 3.12 MG/1
TABLET ORAL
Qty: 180 TABLET | Refills: 0 | Status: SHIPPED | OUTPATIENT
Start: 2022-05-01 | End: 2022-08-01 | Stop reason: SDUPTHER

## 2022-05-23 RX ORDER — FLECAINIDE ACETATE 50 MG/1
TABLET ORAL
Qty: 180 TABLET | Refills: 1 | Status: SHIPPED | OUTPATIENT
Start: 2022-05-23 | End: 2022-10-31 | Stop reason: ALTCHOICE

## 2022-06-08 ENCOUNTER — OFFICE VISIT (OUTPATIENT)
Dept: CARDIOLOGY CLINIC | Age: 82
End: 2022-06-08
Payer: MEDICARE

## 2022-06-08 ENCOUNTER — DOCUMENTATION ONLY (OUTPATIENT)
Dept: CARDIOLOGY CLINIC | Age: 82
End: 2022-06-08

## 2022-06-08 VITALS
HEIGHT: 62 IN | SYSTOLIC BLOOD PRESSURE: 158 MMHG | DIASTOLIC BLOOD PRESSURE: 70 MMHG | BODY MASS INDEX: 35.41 KG/M2 | HEART RATE: 96 BPM | WEIGHT: 192.4 LBS

## 2022-06-08 DIAGNOSIS — Z98.890 S/P ABLATION OF ATRIAL FIBRILLATION: ICD-10-CM

## 2022-06-08 DIAGNOSIS — Z86.79 S/P ABLATION OF ATRIAL FIBRILLATION: ICD-10-CM

## 2022-06-08 DIAGNOSIS — Z99.89 OSA ON CPAP: ICD-10-CM

## 2022-06-08 DIAGNOSIS — I10 ESSENTIAL HYPERTENSION: Primary | ICD-10-CM

## 2022-06-08 DIAGNOSIS — G47.33 OSA ON CPAP: ICD-10-CM

## 2022-06-08 DIAGNOSIS — I47.1 SVT (SUPRAVENTRICULAR TACHYCARDIA) (HCC): ICD-10-CM

## 2022-06-08 DIAGNOSIS — E66.01 SEVERE OBESITY (BMI 35.0-39.9) WITH COMORBIDITY (HCC): ICD-10-CM

## 2022-06-08 DIAGNOSIS — I51.7 LAE (LEFT ATRIAL ENLARGEMENT): ICD-10-CM

## 2022-06-08 DIAGNOSIS — I48.0 PAROXYSMAL ATRIAL FIBRILLATION (HCC): ICD-10-CM

## 2022-06-08 PROCEDURE — G8427 DOCREV CUR MEDS BY ELIG CLIN: HCPCS | Performed by: SPECIALIST

## 2022-06-08 PROCEDURE — 1090F PRES/ABSN URINE INCON ASSESS: CPT | Performed by: SPECIALIST

## 2022-06-08 PROCEDURE — G8754 DIAS BP LESS 90: HCPCS | Performed by: SPECIALIST

## 2022-06-08 PROCEDURE — G8753 SYS BP > OR = 140: HCPCS | Performed by: SPECIALIST

## 2022-06-08 PROCEDURE — G8536 NO DOC ELDER MAL SCRN: HCPCS | Performed by: SPECIALIST

## 2022-06-08 PROCEDURE — G8400 PT W/DXA NO RESULTS DOC: HCPCS | Performed by: SPECIALIST

## 2022-06-08 PROCEDURE — 1123F ACP DISCUSS/DSCN MKR DOCD: CPT | Performed by: SPECIALIST

## 2022-06-08 PROCEDURE — 1101F PT FALLS ASSESS-DOCD LE1/YR: CPT | Performed by: SPECIALIST

## 2022-06-08 PROCEDURE — G8432 DEP SCR NOT DOC, RNG: HCPCS | Performed by: SPECIALIST

## 2022-06-08 PROCEDURE — 93000 ELECTROCARDIOGRAM COMPLETE: CPT | Performed by: SPECIALIST

## 2022-06-08 PROCEDURE — 99214 OFFICE O/P EST MOD 30 MIN: CPT | Performed by: SPECIALIST

## 2022-06-08 PROCEDURE — G8417 CALC BMI ABV UP PARAM F/U: HCPCS | Performed by: SPECIALIST

## 2022-06-08 RX ORDER — CANDESARTAN 16 MG/1
TABLET ORAL DAILY
COMMUNITY
End: 2022-06-08

## 2022-06-08 RX ORDER — DILTIAZEM HYDROCHLORIDE 60 MG/1
60 TABLET, FILM COATED ORAL 3 TIMES DAILY
Qty: 90 TABLET | Refills: 5 | Status: SHIPPED | OUTPATIENT
Start: 2022-06-08 | End: 2022-07-29 | Stop reason: SDUPTHER

## 2022-06-08 RX ORDER — CANDESARTAN 8 MG/1
8 TABLET ORAL DAILY
Qty: 30 TABLET | Refills: 5 | Status: SHIPPED | OUTPATIENT
Start: 2022-06-08 | End: 2022-09-09

## 2022-06-08 NOTE — PATIENT INSTRUCTIONS
1) reduce atacand (candesartan) from 16 mg to 8 mg    2) increase the cardizem(diltiazem) from 60 mg twice a day to three times a day    3) will arrange appointment with electrophysiologist    4) will do echo and lexiscan cardiolite    5) when taking BP at home you have to check 3 times in a row and average the numbers because you are in atrial fibrillation    Take coreg at 8 am and 8 pm  Take cardizem at 9 am 4 pm and 9 pm  Take candesartan at noon    It is  my pleasure to have the opportunity to be involved in taking care of you and to provide you the best cardiac care. At the end of every visit I  encourage you to eat healthy and clean and reduce your red meat intake as well as exercise 30 minutes 5 days a week to ensure a healthy heart. If you are a smoker , it will be essential that you stop smoking to reduce your risk of heart disease. Part of providing you the best heart care possible IS AN ACCURATE KNOWLEDGE OF YOUR MEDICINE. It  will be  essential at each office visit that you provide me with an accurate list of your medicines. When you come into the office you should have that list or another option is lining up your pill bottles  and taking a snapshot with your cell phone of all the medicines you are taking currently and show it to the nurse in the examining room. Inaccurate reporting of your medication to the nurse may lead to adverse events and medical errors. Thank you again for giving me the opportunity to be part of your heart care and it is my pleasure. All the best,    Kobe Isaac MD

## 2022-06-08 NOTE — LETTER
6/8/2022    Patient: Miguel A Oconnor   YOB: 1940   Date of Visit: 6/8/2022     Rohith Addison MD  [address] [facility]  [address] [place] [address]  Via Fax: [phone]    Dear Rohith Addison MD,      Thank you for referring Ms. Mariajose Vasquez to [facility] for evaluation. My notes for this consultation are attached. If you have questions, please do not hesitate to call me. I look forward to following your patient along with you.       Sincerely,    Kristina Steinberg MD

## 2022-06-08 NOTE — PROGRESS NOTES
CARDIOLOGY OFFICE NOTE    Kobe Ashraf MD, [address], [place], [address] [facility]  Phone [phone]; Fax [phone]  Mobile [phone]   Voice Mail [phone]    Primary care: Joceilne Orantes MD       ATTENTION:   This medical record was transcribed using an electronic medical records/speech recognition system. Although proofread, it may and can contain electronic, spelling and other errors. Corrections may be executed at a later time. Please feel free to contact us for any clarifications as needed. ICD-10-CM ICD-9-CM   1. Essential hypertension  I10 401.9   2. Paroxysmal atrial fibrillation (HCC)  I48.0 427.31   3. SVT (supraventricular tachycardia) (HCC)  I47.1 427.89   4. LAE (left atrial enlargement)  I51.7 429.3   5. S/P ablation of atrial fibrillation  Z98.890 V45.89    Z86.79    6. Severe obesity (BMI 35.0-39. 9) with comorbidity ([address]  E66.01 278.01   7. ANÍBAL on CPAP  G47.33 327.23    Z99.89 V46.8            Liz Mack is a [de-identified] y.o. female with  referred for shortness of breath and hypertension, paroxysmal atrial fibrillation status post ablation, SVT          Cardiac risk factors: hypertension, post-menopausal  I have personally obtained the history from the patient. HISTORY OF PRESENTING ILLNESS   She was followed by Dr. Paris Lennon and Dr. Tej Dockery. She did have a ablation for atrial fibrillation with Dr. Tej Dockery in the past in the past and she is on multiple medicines including diltiazem at 60 mg twice daily, carvedilol 3.125 mg twice daily clonidine 0.1 mg twice daily and Tambocor 50 mg twice daily. She has had 2 episodes of COVID. She had cold sxs. She was placed on antibiotics and steroid. She has had 3 days of elevated heart rates. Her HR usually is in the 50-60's. She has not missed any of her medicines.   But she has noticed this irregularity that makes her feel little jittery and at times associate with some mild chest discomfort. She had an ablation in the past but unable to achieve entrance/exit block of LSPV per Dr. Trupti Pearson. No bleeding on eliquis. ACTIVE PROBLEM LIST     Patient Active Problem List    Diagnosis Date Noted    Sinus bradycardia 05/12/2020    Anxiety 07/22/2018    Severe obesity (BMI 35.0-39. 9) with comorbidity ([address] 05/25/2018    SVT (supraventricular tachycardia) ([address] 08/28/2017    Hiatal hernia 08/28/2017    Venous insufficiency 05/04/2017    Acquired hypothyroidism 05/01/2017    S/P ablation of atrial fibrillation 04/28/2017    Paroxysmal atrial fibrillation ([address] 08/28/2016    Essential hypertension 07/26/2016    Gastroesophageal reflux disease without esophagitis 07/03/2016    RBBB 05/07/2015    LAE (left atrial enlargement) 04/03/2013           PAST MEDICAL HISTORY     Past Medical History:   Diagnosis Date    Atrial fibrillation with RVR ([address] 6/29/2018    DDD (degenerative disc disease)     Gastroesophageal reflux disease without esophagitis 7/3/2016    GERD (gastroesophageal reflux disease)     Hiatal hernia     HTN (hypertension), benign 7/26/2016    Hypothyroidism     ANÍBAL (obstructive sleep apnea) 7/26/2016    Paroxysmal atrial fibrillation ([address] 8/28/2016    Uterine prolapse            PAST SURGICAL HISTORY     Past Surgical History:   Procedure Laterality Date    HX AFIB ABLATION  03/2017    HX GI      COLONOSCOPY 7/14    HX GYN      TUBAL LIGATION    HX HEENT      WISDOM TEETH EXTRACTED. ALLERGIES     Allergies   Allergen Reactions    Citalopram Hydrobromide Rash     With itching.     Chlorthalidone Rash    Contrast Agent [Iodine] Other (comments)     Wheezing/bronchospasm    Maxzide [Triamterene-Hydrochlorothiazid] Palpitations    Vicodin [Hydrocodone-Acetaminophen] Palpitations          FAMILY HISTORY     Family History   Problem Relation Age of Onset    Diabetes Mother     Hypertension Mother     COPD Mother     negative for cardiac disease       SOCIAL HISTORY     Social History     Socioeconomic History    Marital status:    Tobacco Use    Smoking status: Former Smoker     Packs/day: 1.50     Years: 30.00     Pack years: 45.00     Quit date: 3/3/1994     Years since quittin.2    Smokeless tobacco: Former User   Substance and Sexual Activity    Alcohol use: No    Drug use: No    Sexual activity: Never         MEDICATIONS     Current Outpatient Medications   Medication Sig    candesartan (Atacand) 16 mg tablet Take  by mouth daily.  apixaban (Eliquis) 5 mg tablet TAKE 1 TABLET BY MOUTH TWICE DAILY    flecainide (TAMBOCOR) 50 mg tablet TAKE 1 TABLET BY MOUTH TWICE DAILY    dilTIAZem IR (CARDIZEM) 60 mg tablet TAKE 1 TABLET BY MOUTH TWICE DAILY BEFORE BREAKFAST AND DINNER    carvediloL (COREG) 3.125 mg tablet TAKE 1 TABLET BY MOUTH TWICE DAILY WITH MEALS    cloNIDine HCL (CATAPRES) 0.1 mg tablet TAKE 1 TABLET BY MOUTH TWICE DAILY(ADDITIONAL DOSES AS NEEDED AS DIRECTED BY DOCTOR)    ezetimibe (Zetia) 10 mg tablet Take 1 Tablet by mouth every evening.  levothyroxine (SYNTHROID) 50 mcg tablet Take 50 mcg by mouth Daily (before breakfast).  calcium-vitamin D (OYSTER SHELL) 500 mg(1,250mg) -200 unit per tablet Take 1 Tab by mouth every evening.  FOLIC ACID/MULTIVIT-MIN/LUTEIN (CENTRUM SILVER PO) Take 1 Tab by mouth every evening. No current facility-administered medications for this visit. I have reviewed the nurses notes, vitals, problem list, allergy list, medical history, family, social history and medications. REVIEW OF SYMPTOMS   As per HPI  General: Pt denies excessive weight gain or loss. Pt is able to conduct ADL's  HEENT: Denies blurred vision, headaches, hearing loss, epistaxis and difficulty swallowing. Respiratory: Denies cough, congestion, shortness of breath, GOODE, wheezing or stridor.   Cardiovascular: Denies precordial pain, palpitations, edema or PND  Gastrointestinal: Denies poor appetite, indigestion, abdominal pain or blood in stool  Genitourinary: Denies hematuria, dysuria, increased urinary frequency  Musculoskeletal: Denies joint pain or swelling from muscles or joints  Neurologic: Denies tremor, paresthesias, headache, or sensory motor disturbance  Psychiatric: Denies confusion, insomnia, depression  Integumentray: Denies rash, itching or ulcers. Hematologic: Denies easy bruising, bleeding     PHYSICAL EXAMINATION      Vitals:    06/08/22 0943   BP: (!) 158/70   Pulse: 96   Weight: 192 lb 6.4 oz (87.3 kg)   Height: 5' 2\" (1.575 m)     General: Well developed, in no acute distress. Anxious  HEENT: No jaundice, oral mucosa moist, no oral ulcers  Neck: Supple, no stiffness, no lymphadenopathy, supple  Heart:   Irregular rate controlled  Respiratory: Clear bilaterally x 4, no wheezing or rales  Extremities: Trace edema, normal cap refill, no cyanosis. Musculoskeletal: No clubbing, no deformities  Neuro: A&Ox3, speech clear, gait stable, cooperative, no focal neurologic deficits  Skin: Skin color is normal. No rashes or lesions. Non diaphoretic, moist.                DIAGNOSTIC DATA     1. Stress Test   Adenosine myoview 2011 - normal perfusion, EF 81%   Lexiscan 3/3/2014 - normal perfusion EF 83%   Lexiscan cardiolite 5/10/16 - normal perfusion, EF 78%   Lexiscan Cardiolite 1/30/19 - normal perfusion. EF 77%. 2. Echo   2/22/13 - normal left ventricular size and function, normal appearing mitral, aortic, and tricuspid valves, with mild mitral and moderate tricuspid regurgitation, bi-atrial enlargement   5/15/15- 60%, mildly dilated LA, mild MR   3/8/18 - EF 65%. No WMA. Normal RV size and function. Mild MR. Mild TR.   8/24/20 - EF 60-65%. G1DD. Mild MAC with mild MR. Mild to mod TR. PASP 45 mm hg. Mildly elevated CVP. 3. Cardioversion    7/23/2015 - 200J     4. Renal Visceral Duplex 5/2016   No evidence of ONOFRE     5. 3/2/17-.  Successful atrial fibrillation ablation however unable to achieve entrance/exit block of LSPV per Dr. Lavinia Alexander    6. Lipids  8/12/21- , HDL 57, LDL 85, TG 66         LABORATORY DATA            Lab Results   Component Value Date/Time    WBC 6.0 06/19/2020 04:56 AM    HGB 13.1 06/19/2020 04:56 AM    Hematocrit (POC) 43 09/07/2020 02:26 AM    HCT 40.6 06/19/2020 04:56 AM    PLATELET 315 64/94/4201 04:56 AM    MCV 96.9 06/19/2020 04:56 AM      Lab Results   Component Value Date/Time    Sodium 141 06/19/2020 04:56 AM    Potassium 3.7 06/19/2020 04:56 AM    Chloride 107 06/19/2020 04:56 AM    CO2 31 06/19/2020 04:56 AM    Anion gap 3 (L) 06/19/2020 04:56 AM    Glucose 131 (H) 06/19/2020 04:56 AM    BUN 10 06/19/2020 04:56 AM    Creatinine 0.91 06/19/2020 04:56 AM    BUN/Creatinine ratio 11 (L) 06/19/2020 04:56 AM    GFR est AA >60 06/19/2020 04:56 AM    GFR est non-AA 60 (L) 06/19/2020 04:56 AM    Calcium 9.2 06/19/2020 04:56 AM    Bilirubin, total 0.4 06/19/2020 04:56 AM    Alk. phosphatase 86 06/19/2020 04:56 AM    Protein, total 7.8 06/19/2020 04:56 AM    Albumin 3.9 06/19/2020 04:56 AM    Globulin 3.9 06/19/2020 04:56 AM    A-G Ratio 1.0 (L) 06/19/2020 04:56 AM    ALT (SGPT) 24 06/19/2020 04:56 AM           ASSESSMENT/RECOMMENDATIONS:.      1) HTN, hx of labile pressures   -She feels as though her blood pressure drops in the afternoon but she takes all her medicines at once in the morning.  -I gave her a schedule as to when to take her medicines and increase her diltiazem to 60 mg and decrease her Atacand to 8 mg because of her having episodes of hypotension in the afternoon. 2) paroxysmal AF    -She is on flecainide and I think she needs possibly more calcium channel blockers on board to reduce the tachycardia. I did put a monitor on her in 2021 and she did have episodes of atrial fibrillation at that time.     3) diastolic dysfunction/shortness of breath  -We will repeat an echo as well as a stress test secondary to her shortness of breath and chest discomfort  -Echocardiogram today demonstrates normal heart function at 60-  65%. She has mild mitral regurgitation and tricuspid regurgitation    4) obesity  -Will be essential that she work on exercise and diet reduce red meat intake    Follow-up with me in 2 months    Question ANÍBAL    Orders Placed This Encounter    AMB POC EKG ROUTINE W/ 12 LEADS, INTER & REP     Order Specific Question:   Reason for Exam:     Answer:   afib    candesartan (Atacand) 16 mg tablet     Sig: Take  by mouth daily. We discussed the expected course, resolution and complications of the diagnosis(es) in detail. Medication risks, benefits, costs, interactions, and alternatives were discussed as indicated. I advised him to contact the office if his condition worsens, changes or fails to improve as anticipated. He expressed understanding with the diagnosis(es) and plan          Follow-up and Dispositions  ·   Return in about 6 months (around 12/8/2022). I have discussed the diagnosis with  Bam Dowling and the intended plan as seen in the above orders. Questions were answered concerning future plans. I have discussed medication side effects and warnings with the patient as well. Thank you,  Liborio Elkins MD for involving me in the care of  Bam Dowling. Please do not hesitate to contact me for further questions/concerns. Kobe Holt MD, [address]      [facility], [address] [place] [address] Drive      [phone] / [phone] Fax

## 2022-06-08 NOTE — PROGRESS NOTES
Visit Vitals  BP (!) 158/70   Pulse 96   Ht 5' 2\" (1.575 m)   Wt 192 lb 6.4 oz (87.3 kg)   BMI 35.19 kg/m²

## 2022-06-14 ENCOUNTER — TELEPHONE (OUTPATIENT)
Dept: CARDIOLOGY CLINIC | Age: 82
End: 2022-06-14

## 2022-06-14 NOTE — TELEPHONE ENCOUNTER
Patient called stating her ankles and feet are swelling pretty bad. She is requesting a prescription for fluid retention.         Clemente Trinidad [phone]          PHONE:[phone]

## 2022-06-14 NOTE — TELEPHONE ENCOUNTER
It appears that she is scheduled for an echocardiogram and if so we need a limited 1 looking at her heart function. We do not need to complete echo    All her proBNP is in the past have not been significantly elevated    Unclear why she has swelling in her legs and I would like to see it but in the meantime she can have 20 mg of Lasix and she is only to take it for 3 days once a day 20 mg x 3 days    Then she needs to come in and see me or the nurse practitioner and to get a limited echo.

## 2022-06-15 DIAGNOSIS — I48.0 PAROXYSMAL ATRIAL FIBRILLATION (HCC): Primary | ICD-10-CM

## 2022-06-15 RX ORDER — FUROSEMIDE 20 MG/1
TABLET ORAL
Qty: 3 TABLET | Refills: 0 | Status: SHIPPED | OUTPATIENT
Start: 2022-06-15 | End: 2022-06-24 | Stop reason: SDUPTHER

## 2022-06-15 NOTE — TELEPHONE ENCOUNTER
Refill per VO of Dr. Zulema Terrazas  Last appt: 6/8/2022  Future Appointments   Date Time Provider Ray Naranjoi   6/22/2022 10:00 AM NCIOLE BRAY AMB   6/29/2022  3:20 PM Mayank Holt MD [facility] AMB       Requested Prescriptions     Signed Prescriptions Disp Refills    furosemide (LASIX) 20 mg tablet 3 Tablet 0     Sig: Take 1 tablet by mouth for 3 days.      Authorizing Provider: Kathie Lambert     Ordering User: Cisco Steen

## 2022-06-15 NOTE — TELEPHONE ENCOUNTER
Pt would like to know about the fluid medication and when it will be prescribed, please advise.     [phone]

## 2022-06-17 RX ORDER — FUROSEMIDE 20 MG/1
TABLET ORAL
Qty: 3 TABLET | Refills: 0 | OUTPATIENT
Start: 2022-06-17

## 2022-06-17 NOTE — TELEPHONE ENCOUNTER
Spoke with The pt, identified the pt with name and . Touching base with the pt to see if anyone had spoken with her, she said they had. The lasix is helping, but they still swell at night. I suggested compression stockings, elevating her feet, and watch sodium intake. I informed her that Lillie Martinez wants her to have a limited Echo, but we do not have availability until July. Since she is having a nuclear stress on the  and seeing him on the , we will wait and see if he still wants the echo. The pt expressed understanding and agreement. Pt has no further questions or concerns at this time.

## 2022-06-17 NOTE — TELEPHONE ENCOUNTER
Requested Prescriptions     Refused Prescriptions Disp Refills    furosemide (LASIX) 20 mg tablet [Pharmacy Med Name: FUROSEMIDE 20MG TABLETS] 3 Tablet 0     Sig: TAKE 1 TABLET BY MOUTH EVERY DAY FOR 3 DAYS     Refused By: Adeel Mcnair     Reason for Refusal: Refill not appropriate     Refill refused. Only prescribed for 3 doses.

## 2022-06-20 ENCOUNTER — TELEPHONE (OUTPATIENT)
Dept: CARDIOLOGY CLINIC | Age: 82
End: 2022-06-20

## 2022-06-20 NOTE — TELEPHONE ENCOUNTER
Good Afternoon,    The Nuc Dr. Carolann Dowling ordered was DENIED by [facility]    If a Peer to Peer need to be done [facility] can be reached at [phone] with a case number of [id number]    Please call the patient to CANCEL this appt    [facility]

## 2022-06-21 ENCOUNTER — TELEPHONE (OUTPATIENT)
Dept: CARDIOLOGY CLINIC | Age: 82
End: 2022-06-21

## 2022-06-21 NOTE — TELEPHONE ENCOUNTER
ID verified per protocol x2.  Patient's  confirmed nuclear stress test appointment reminder including date, time, location, prep, and duration of test. Patient's  verbalized understanding and agrees with prep/test.

## 2022-06-24 NOTE — TELEPHONE ENCOUNTER
Pt states she needs another type of medication due to not beng able to do her nuc stress test and because she has swelling? She only got a few pills she states. I couldn't understand much more than that. She would like a call if it is sent into the pharmacy  Please advise.      [address]: [phone]

## 2022-06-27 ENCOUNTER — TELEPHONE (OUTPATIENT)
Dept: CARDIOLOGY CLINIC | Age: 82
End: 2022-06-27

## 2022-06-27 NOTE — TELEPHONE ENCOUNTER
Patient is calling because she would like to know if she should go to see Rocky Okeefe on 6/28/22 or should she wait to have her nuclear stress and her apt with  on 6/29/22 .      [phone]

## 2022-06-29 ENCOUNTER — OFFICE VISIT (OUTPATIENT)
Dept: CARDIOLOGY CLINIC | Age: 82
End: 2022-06-29

## 2022-06-29 ENCOUNTER — DOCUMENTATION ONLY (OUTPATIENT)
Dept: CARDIOLOGY CLINIC | Age: 82
End: 2022-06-29

## 2022-06-29 ENCOUNTER — ANCILLARY PROCEDURE (OUTPATIENT)
Dept: CARDIOLOGY CLINIC | Age: 82
End: 2022-06-29
Payer: MEDICARE

## 2022-06-29 VITALS
BODY MASS INDEX: 35.88 KG/M2 | WEIGHT: 195 LBS | SYSTOLIC BLOOD PRESSURE: 142 MMHG | DIASTOLIC BLOOD PRESSURE: 80 MMHG | HEIGHT: 62 IN

## 2022-06-29 VITALS
BODY MASS INDEX: 35.88 KG/M2 | DIASTOLIC BLOOD PRESSURE: 80 MMHG | SYSTOLIC BLOOD PRESSURE: 142 MMHG | HEART RATE: 88 BPM | OXYGEN SATURATION: 98 % | HEIGHT: 62 IN | WEIGHT: 195 LBS

## 2022-06-29 DIAGNOSIS — I47.1 SVT (SUPRAVENTRICULAR TACHYCARDIA) (HCC): ICD-10-CM

## 2022-06-29 DIAGNOSIS — I48.0 PAROXYSMAL ATRIAL FIBRILLATION (HCC): ICD-10-CM

## 2022-06-29 DIAGNOSIS — I10 ESSENTIAL HYPERTENSION: ICD-10-CM

## 2022-06-29 DIAGNOSIS — G47.33 OSA ON CPAP: ICD-10-CM

## 2022-06-29 DIAGNOSIS — I10 ESSENTIAL HYPERTENSION: Primary | ICD-10-CM

## 2022-06-29 DIAGNOSIS — R00.1 SINUS BRADYCARDIA: ICD-10-CM

## 2022-06-29 DIAGNOSIS — I45.10 RBBB: ICD-10-CM

## 2022-06-29 DIAGNOSIS — E66.01 SEVERE OBESITY (BMI 35.0-39.9) WITH COMORBIDITY (HCC): ICD-10-CM

## 2022-06-29 DIAGNOSIS — Z86.79 S/P ABLATION OF ATRIAL FIBRILLATION: ICD-10-CM

## 2022-06-29 DIAGNOSIS — I87.2 VENOUS INSUFFICIENCY: ICD-10-CM

## 2022-06-29 DIAGNOSIS — F41.9 ANXIETY: ICD-10-CM

## 2022-06-29 DIAGNOSIS — Z99.89 OSA ON CPAP: ICD-10-CM

## 2022-06-29 DIAGNOSIS — Z98.890 S/P ABLATION OF ATRIAL FIBRILLATION: ICD-10-CM

## 2022-06-29 PROCEDURE — G8754 DIAS BP LESS 90: HCPCS | Performed by: SPECIALIST

## 2022-06-29 PROCEDURE — A9500 TC99M SESTAMIBI: HCPCS | Performed by: INTERNAL MEDICINE

## 2022-06-29 PROCEDURE — G8417 CALC BMI ABV UP PARAM F/U: HCPCS | Performed by: SPECIALIST

## 2022-06-29 PROCEDURE — G8427 DOCREV CUR MEDS BY ELIG CLIN: HCPCS | Performed by: SPECIALIST

## 2022-06-29 PROCEDURE — 1101F PT FALLS ASSESS-DOCD LE1/YR: CPT | Performed by: SPECIALIST

## 2022-06-29 PROCEDURE — 78452 HT MUSCLE IMAGE SPECT MULT: CPT | Performed by: INTERNAL MEDICINE

## 2022-06-29 PROCEDURE — G8510 SCR DEP NEG, NO PLAN REQD: HCPCS | Performed by: SPECIALIST

## 2022-06-29 PROCEDURE — 93015 CV STRESS TEST SUPVJ I&R: CPT | Performed by: INTERNAL MEDICINE

## 2022-06-29 PROCEDURE — G8400 PT W/DXA NO RESULTS DOC: HCPCS | Performed by: SPECIALIST

## 2022-06-29 PROCEDURE — G8536 NO DOC ELDER MAL SCRN: HCPCS | Performed by: SPECIALIST

## 2022-06-29 PROCEDURE — 99214 OFFICE O/P EST MOD 30 MIN: CPT | Performed by: SPECIALIST

## 2022-06-29 PROCEDURE — 1123F ACP DISCUSS/DSCN MKR DOCD: CPT | Performed by: SPECIALIST

## 2022-06-29 PROCEDURE — 1090F PRES/ABSN URINE INCON ASSESS: CPT | Performed by: SPECIALIST

## 2022-06-29 PROCEDURE — G8753 SYS BP > OR = 140: HCPCS | Performed by: SPECIALIST

## 2022-06-29 RX ORDER — FUROSEMIDE 20 MG/1
TABLET ORAL
Qty: 30 TABLET | Refills: 4 | Status: SHIPPED | OUTPATIENT
Start: 2022-06-29 | End: 2022-07-06 | Stop reason: DRUGHIGH

## 2022-06-29 RX ORDER — TETRAKIS(2-METHOXYISOBUTYLISOCYANIDE)COPPER(I) TETRAFLUOROBORATE 1 MG/ML
30 INJECTION, POWDER, LYOPHILIZED, FOR SOLUTION INTRAVENOUS ONCE
Status: COMPLETED | OUTPATIENT
Start: 2022-06-29 | End: 2022-06-29

## 2022-06-29 RX ORDER — POTASSIUM CHLORIDE 750 MG/1
10 TABLET, EXTENDED RELEASE ORAL DAILY
Qty: 30 TABLET | Refills: 5 | Status: SHIPPED | OUTPATIENT
Start: 2022-06-29 | End: 2022-07-06 | Stop reason: DRUGHIGH

## 2022-06-29 RX ORDER — TETRAKIS(2-METHOXYISOBUTYLISOCYANIDE)COPPER(I) TETRAFLUOROBORATE 1 MG/ML
10 INJECTION, POWDER, LYOPHILIZED, FOR SOLUTION INTRAVENOUS ONCE
Status: COMPLETED | OUTPATIENT
Start: 2022-06-29 | End: 2022-06-29

## 2022-06-29 RX ADMIN — TETRAKIS(2-METHOXYISOBUTYLISOCYANIDE)COPPER(I) TETRAFLUOROBORATE 8.8 MILLICURIE: 1 INJECTION, POWDER, LYOPHILIZED, FOR SOLUTION INTRAVENOUS at 10:05

## 2022-06-29 RX ADMIN — TETRAKIS(2-METHOXYISOBUTYLISOCYANIDE)COPPER(I) TETRAFLUOROBORATE 24.1 MILLICURIE: 1 INJECTION, POWDER, LYOPHILIZED, FOR SOLUTION INTRAVENOUS at 11:10

## 2022-06-29 NOTE — LETTER
6/29/2022    Patient: Dominique Ashby   YOB: 1940   Date of Visit: 6/29/2022     Kevin Page MD  [address] [facility]  [address] [place] [address]  Via Fax: [phone]    Dear Kevin Page MD,      Thank you for referring Ms. Adolfo Barrios to [facility] for evaluation. My notes for this consultation are attached. If you have questions, please do not hesitate to call me. I look forward to following your patient along with you.       Sincerely,    Savanah Joya MD

## 2022-06-29 NOTE — PATIENT INSTRUCTIONS

## 2022-06-29 NOTE — PROGRESS NOTES
CARDIOLOGY OFFICE NOTE    Kobe Morgan MD, [address], [place], [address] [facility]  Phone [phone]; Fax [phone]  Mobile [phone]   Voice Mail [phone]    Primary care: Liborio Elkins MD       ATTENTION:   This medical record was transcribed using an electronic medical records/speech recognition system. Although proofread, it may and can contain electronic, spelling and other errors. Corrections may be executed at a later time. Please feel free to contact us for any clarifications as needed. ICD-10-CM ICD-9-CM   1. Essential hypertension  I10 401.9   2. Paroxysmal atrial fibrillation (HCC)  I48.0 427.31   3. SVT (supraventricular tachycardia) (HCC)  I47.1 427.89   4. S/P ablation of atrial fibrillation  Z98.890 V45.89    Z86.79    5. Severe obesity (BMI 35.0-39. 9) with comorbidity ([address]  E66.01 278.01   6. ANÍBAL on CPAP  G47.33 327.23    Z99.89 V46.8   7. Venous insufficiency  I87.2 459.81            Bam Dowling is a [de-identified] y.o. female with  referred for shortness of breath and hypertension, paroxysmal atrial fibrillation status post ablation, SVT          Cardiac risk factors: hypertension, post-menopausal  I have personally obtained the history from the patient. HISTORY OF PRESENTING ILLNESS   She was followed by Dr. Alyssa Smith and Dr. Marian Lynn. She did have a ablation for atrial fibrillation with Dr. Marian Lynn in the past in the past and she is on multiple medicines including diltiazem at 60 mg twice daily, carvedilol 3.125 mg twice daily clonidine 0.1 mg twice daily and Tambocor 50 mg twice daily. 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 Rory Ham. She states she is tolerating her A. fib okay today so there is no rush and she will wait to see Dr. Rory Ham. We will continue her on her current medical regimen.   Offered her Cardizem long-acting that she was happy which is where she is at this point and will take it 3 times a day 60 mg. She did have a stress test to ensure she had no evidence of ischemia contributing to her arrhythmia. We again talked about sleep apnea and she said she had to test and her  concurred. She had an ablation in the past but unable to achieve entrance/exit block of LSPV per Dr. Louis Rios. No bleeding on eliquis. ACTIVE PROBLEM LIST     Patient Active Problem List    Diagnosis Date Noted    Sinus bradycardia 05/12/2020    Anxiety 07/22/2018    Severe obesity (BMI 35.0-39. 9) with comorbidity ([address] 05/25/2018    SVT (supraventricular tachycardia) ([address] 08/28/2017    Hiatal hernia 08/28/2017    Venous insufficiency 05/04/2017    Acquired hypothyroidism 05/01/2017    S/P ablation of atrial fibrillation 04/28/2017    Paroxysmal atrial fibrillation ([address] 08/28/2016    Essential hypertension 07/26/2016    Gastroesophageal reflux disease without esophagitis 07/03/2016    RBBB 05/07/2015    LAE (left atrial enlargement) 04/03/2013           PAST MEDICAL HISTORY     Past Medical History:   Diagnosis Date    Atrial fibrillation with RVR ([address] 6/29/2018    DDD (degenerative disc disease)     Gastroesophageal reflux disease without esophagitis 7/3/2016    GERD (gastroesophageal reflux disease)     Hiatal hernia     HTN (hypertension), benign 7/26/2016    Hypothyroidism     ANÍBAL (obstructive sleep apnea) 7/26/2016    Paroxysmal atrial fibrillation ([address] 8/28/2016    Uterine prolapse            PAST SURGICAL HISTORY     Past Surgical History:   Procedure Laterality Date    HX AFIB ABLATION  03/2017    HX GI      COLONOSCOPY 7/14    HX GYN      TUBAL LIGATION    HX HEENT      WISDOM TEETH EXTRACTED. ALLERGIES     Allergies   Allergen Reactions    Citalopram Hydrobromide Rash     With itching.     Chlorthalidone Rash    Contrast Agent [Iodine] Other (comments)     Wheezing/bronchospasm    Maxzide [Triamterene-Hydrochlorothiazid] Palpitations    Vicodin [Hydrocodone-Acetaminophen] Palpitations          FAMILY HISTORY     Family History   Problem Relation Age of Onset    Diabetes Mother     Hypertension Mother     COPD Mother     negative for cardiac disease       SOCIAL HISTORY     Social History     Socioeconomic History    Marital status:    Tobacco Use    Smoking status: Former Smoker     Packs/day: 1.50     Years: 30.00     Pack years: 45.00     Quit date: 3/3/1994     Years since quittin.3    Smokeless tobacco: Former User   Substance and Sexual Activity    Alcohol use: No    Drug use: No    Sexual activity: Never         MEDICATIONS     Current Outpatient Medications   Medication Sig    furosemide (LASIX) 20 mg tablet Take 1 tablet by mouth for 3 days.  candesartan (Atacand) 8 mg tablet Take 1 Tablet by mouth daily.  dilTIAZem IR (CARDIZEM) 60 mg tablet Take 1 Tablet by mouth three (3) times daily.  apixaban (Eliquis) 5 mg tablet TAKE 1 TABLET BY MOUTH TWICE DAILY    flecainide (TAMBOCOR) 50 mg tablet TAKE 1 TABLET BY MOUTH TWICE DAILY    carvediloL (COREG) 3.125 mg tablet TAKE 1 TABLET BY MOUTH TWICE DAILY WITH MEALS    cloNIDine HCL (CATAPRES) 0.1 mg tablet TAKE 1 TABLET BY MOUTH TWICE DAILY(ADDITIONAL DOSES AS NEEDED AS DIRECTED BY DOCTOR)    ezetimibe (Zetia) 10 mg tablet Take 1 Tablet by mouth every evening.  levothyroxine (SYNTHROID) 50 mcg tablet Take 50 mcg by mouth Daily (before breakfast).  calcium-vitamin D (OYSTER SHELL) 500 mg(1,250mg) -200 unit per tablet Take 1 Tab by mouth every evening.  FOLIC ACID/MULTIVIT-MIN/LUTEIN (CENTRUM SILVER PO) Take 1 Tab by mouth every evening. No current facility-administered medications for this visit. I have reviewed the nurses notes, vitals, problem list, allergy list, medical history, family, social history and medications.        REVIEW OF SYMPTOMS   As per HPI  General: Pt denies excessive weight gain or loss. Pt is able to conduct ADL's  HEENT: Denies blurred vision, headaches, hearing loss, epistaxis and difficulty swallowing. Respiratory: Denies cough, congestion, shortness of breath, GOODE, wheezing or stridor. Cardiovascular: Denies precordial pain, palpitations, edema or PND  Gastrointestinal: Denies poor appetite, indigestion, abdominal pain or blood in stool  Genitourinary: Denies hematuria, dysuria, increased urinary frequency  Musculoskeletal: Denies joint pain or swelling from muscles or joints  Neurologic: Denies tremor, paresthesias, headache, or sensory motor disturbance  Psychiatric: Denies confusion, insomnia, depression  Integumentray: Denies rash, itching or ulcers. Hematologic: Denies easy bruising, bleeding     PHYSICAL EXAMINATION      Vitals:    06/29/22 1301   BP: (!) 142/80   Pulse: 88   SpO2: 98%   Weight: 195 lb (88.5 kg)   Height: 5' 2\" (1.575 m)     General: Well developed, in no acute distress. Anxious  HEENT: No jaundice, oral mucosa moist, no oral ulcers  Neck: Supple, no stiffness, no lymphadenopathy, supple  Heart:   Irregular rate controlled  Respiratory: Clear bilaterally x 4, no wheezing or rales  Extremities: Trace edema, normal cap refill, no cyanosis. Musculoskeletal: No clubbing, no deformities  Neuro: A&Ox3, speech clear, gait stable, cooperative, no focal neurologic deficits  Skin: Skin color is normal. No rashes or lesions. Non diaphoretic, moist.                DIAGNOSTIC DATA     1. Stress Test   Adenosine myoview 2011 - normal perfusion, EF 81%   Lexiscan 3/3/2014 - normal perfusion EF 83%   Lexiscan cardiolite 5/10/16 - normal perfusion, EF 78%   Lexiscan Cardiolite 1/30/19 - normal perfusion. EF 77%. 2. Echo   2/22/13 - normal left ventricular size and function, normal appearing mitral, aortic, and tricuspid valves, with mild mitral and moderate tricuspid regurgitation, bi-atrial enlargement   5/15/15- 60%, mildly dilated LA, mild MR   3/8/18 - EF 65%. No WMA. Normal RV size and function. Mild MR. Mild TR.   8/24/20 - EF 60-65%. G1DD. Mild MAC with mild MR. Mild to mod TR. PASP 45 mm hg. Mildly elevated CVP. 3. Cardioversion    7/23/2015 - 200J     4. Renal Visceral Duplex 5/2016   No evidence of ONOFRE     5. 3/2/17-. Successful atrial fibrillation ablation however unable to achieve entrance/exit block of LSPV per Dr. Nicolle Wang    6. Lipids  8/12/21- , HDL 57, LDL 85, TG 66         LABORATORY DATA            Lab Results   Component Value Date/Time    WBC 6.0 06/19/2020 04:56 AM    HGB 13.1 06/19/2020 04:56 AM    Hematocrit (POC) 43 09/07/2020 02:26 AM    HCT 40.6 06/19/2020 04:56 AM    PLATELET 623 28/48/9832 04:56 AM    MCV 96.9 06/19/2020 04:56 AM      Lab Results   Component Value Date/Time    Sodium 141 06/19/2020 04:56 AM    Potassium 3.7 06/19/2020 04:56 AM    Chloride 107 06/19/2020 04:56 AM    CO2 31 06/19/2020 04:56 AM    Anion gap 3 (L) 06/19/2020 04:56 AM    Glucose 131 (H) 06/19/2020 04:56 AM    BUN 10 06/19/2020 04:56 AM    Creatinine 0.91 06/19/2020 04:56 AM    BUN/Creatinine ratio 11 (L) 06/19/2020 04:56 AM    GFR est AA >60 06/19/2020 04:56 AM    GFR est non-AA 60 (L) 06/19/2020 04:56 AM    Calcium 9.2 06/19/2020 04:56 AM    Bilirubin, total 0.4 06/19/2020 04:56 AM    Alk. phosphatase 86 06/19/2020 04:56 AM    Protein, total 7.8 06/19/2020 04:56 AM    Albumin 3.9 06/19/2020 04:56 AM    Globulin 3.9 06/19/2020 04:56 AM    A-G Ratio 1.0 (L) 06/19/2020 04:56 AM    ALT (SGPT) 24 06/19/2020 04:56 AM           ASSESSMENT/RECOMMENDATIONS:.      1) HTN, hx of labile pressures   -She says her blood pressures have improved in the afternoon or not following after I gave her a schedule as to when to take her antihypertensives    2) paroxysmal AF    -Currently she is in A.  Fib  .-We will place an event monitor on her to see if she is going in and out of A. fib      3) diastolic dysfunction/shortness of breath  -EF was normal in the past of 60 to 65% with mild MR and TR    4) obesity  -Will be essential that she work on exercise and diet reduce red meat intake    5)ANÍBAL  -had negative study years ago. 6) chest discomfort  - awaiting stress test results    Follow-up with me in 3 mo. Question ANÍBAL    No orders of the defined types were placed in this encounter. We discussed the expected course, resolution and complications of the diagnosis(es) in detail. Medication risks, benefits, costs, interactions, and alternatives were discussed as indicated. I advised him to contact the office if his condition worsens, changes or fails to improve as anticipated. He expressed understanding with the diagnosis(es) and plan          Follow-up and Dispositions  ·   Return in about 6 months (around 12/29/2022). I have discussed the diagnosis with  Harry Quijano and the intended plan as seen in the above orders. Questions were answered concerning future plans. I have discussed medication side effects and warnings with the patient as well. Thank you,  Trice Read MD for involving me in the care of  Harry Quijano. Please do not hesitate to contact me for further questions/concerns. Kobe Holt MD, [address]      [facility], [address] [place] [address] [address]      [phone] / [phone] Fax

## 2022-06-29 NOTE — PROGRESS NOTES
Chief Complaint   Patient presents with    Follow-up     6month    Hypertension    Shortness of Breath    Irregular Heart Beat       Vitals:    06/29/22 1301   BP: (!) 142/80   Pulse: 88   Height: 5' 2\" (1.575 m)   Weight: 195 lb (88.5 kg)   SpO2: 98%         Chest pain: no    SOB: yes, mainly when active. Palpitations: no    Dizziness: yes, when BP get low. Swelling: yes    Refills:       1. Have you been to the ER, urgent care clinic since your last visit? Hospitalized since your last visit?no     2. Have you sen or consulted other health care providers outside of the [facility] since your last visit? (Include any pap smears or colon screening.)     Pt would like lasix full time. Pt was told to stop/ change medication from Dr. Paul Davila. Pt would like to talk to you about it.

## 2022-06-29 NOTE — H&P (VIEW-ONLY)
CARDIOLOGY OFFICE NOTE    Kobe Reddy MD, [address], [place], [address] [facility]  Phone [phone]; Fax [phone]  Mobile [phone]   Voice Mail [phone]    Primary care: Izabella Wong MD       ATTENTION:   This medical record was transcribed using an electronic medical records/speech recognition system. Although proofread, it may and can contain electronic, spelling and other errors. Corrections may be executed at a later time. Please feel free to contact us for any clarifications as needed. ICD-10-CM ICD-9-CM   1. Essential hypertension  I10 401.9   2. Paroxysmal atrial fibrillation (HCC)  I48.0 427.31   3. SVT (supraventricular tachycardia) (HCC)  I47.1 427.89   4. S/P ablation of atrial fibrillation  Z98.890 V45.89    Z86.79    5. Severe obesity (BMI 35.0-39. 9) with comorbidity ([facility]  E66.01 278.01   6. ANÍBAL on CPAP  G47.33 327.23    Z99.89 V46.8   7. Venous insufficiency  I87.2 459.81            Karolina Olive Hill is a [de-identified] y.o. female with  referred for shortness of breath and hypertension, paroxysmal atrial fibrillation status post ablation, SVT          Cardiac risk factors: hypertension, post-menopausal  I have personally obtained the history from the patient. HISTORY OF PRESENTING ILLNESS   She was followed by Dr. Jason Forde and Dr. Amaris Prescott. She did have a ablation for atrial fibrillation with Dr. Amaris Prescott in the past in the past and she is on multiple medicines including diltiazem at 60 mg twice daily, carvedilol 3.125 mg twice daily clonidine 0.1 mg twice daily and Tambocor 50 mg twice daily. 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 Liliana Reed. She states she is tolerating her A. fib okay today so there is no rush and she will wait to see Dr. Liliana Reed. We will continue her on her current medical regimen.   Offered her Cardizem long-acting that she was happy which is where she is at this point and will take it 3 times a day 60 mg. She did have a stress test to ensure she had no evidence of ischemia contributing to her arrhythmia. We again talked about sleep apnea and she said she had to test and her  concurred. She had an ablation in the past but unable to achieve entrance/exit block of LSPV per Dr. Jewell Blackmon. No bleeding on eliquis. ACTIVE PROBLEM LIST     Patient Active Problem List    Diagnosis Date Noted    Sinus bradycardia 05/12/2020    Anxiety 07/22/2018    Severe obesity (BMI 35.0-39. 9) with comorbidity ([address] 05/25/2018    SVT (supraventricular tachycardia) ([address] 08/28/2017    Hiatal hernia 08/28/2017    Venous insufficiency 05/04/2017    Acquired hypothyroidism 05/01/2017    S/P ablation of atrial fibrillation 04/28/2017    Paroxysmal atrial fibrillation ([address] 08/28/2016    Essential hypertension 07/26/2016    Gastroesophageal reflux disease without esophagitis 07/03/2016    RBBB 05/07/2015    LAE (left atrial enlargement) 04/03/2013           PAST MEDICAL HISTORY     Past Medical History:   Diagnosis Date    Atrial fibrillation with RVR ([address] 6/29/2018    DDD (degenerative disc disease)     Gastroesophageal reflux disease without esophagitis 7/3/2016    GERD (gastroesophageal reflux disease)     Hiatal hernia     HTN (hypertension), benign 7/26/2016    Hypothyroidism     ANÍBAL (obstructive sleep apnea) 7/26/2016    Paroxysmal atrial fibrillation ([address] 8/28/2016    Uterine prolapse            PAST SURGICAL HISTORY     Past Surgical History:   Procedure Laterality Date    HX AFIB ABLATION  03/2017    HX GI      COLONOSCOPY 7/14    HX GYN      TUBAL LIGATION    HX HEENT      WISDOM TEETH EXTRACTED. ALLERGIES     Allergies   Allergen Reactions    Citalopram Hydrobromide Rash     With itching.     Chlorthalidone Rash    Contrast Agent [Iodine] Other (comments)     Wheezing/bronchospasm    Maxzide [Triamterene-Hydrochlorothiazid] Palpitations    Vicodin [Hydrocodone-Acetaminophen] Palpitations          FAMILY HISTORY     Family History   Problem Relation Age of Onset    Diabetes Mother     Hypertension Mother     COPD Mother     negative for cardiac disease       SOCIAL HISTORY     Social History     Socioeconomic History    Marital status:    Tobacco Use    Smoking status: Former Smoker     Packs/day: 1.50     Years: 30.00     Pack years: 45.00     Quit date: 3/3/1994     Years since quittin.3    Smokeless tobacco: Former User   Substance and Sexual Activity    Alcohol use: No    Drug use: No    Sexual activity: Never         MEDICATIONS     Current Outpatient Medications   Medication Sig    furosemide (LASIX) 20 mg tablet Take 1 tablet by mouth for 3 days.  candesartan (Atacand) 8 mg tablet Take 1 Tablet by mouth daily.  dilTIAZem IR (CARDIZEM) 60 mg tablet Take 1 Tablet by mouth three (3) times daily.  apixaban (Eliquis) 5 mg tablet TAKE 1 TABLET BY MOUTH TWICE DAILY    flecainide (TAMBOCOR) 50 mg tablet TAKE 1 TABLET BY MOUTH TWICE DAILY    carvediloL (COREG) 3.125 mg tablet TAKE 1 TABLET BY MOUTH TWICE DAILY WITH MEALS    cloNIDine HCL (CATAPRES) 0.1 mg tablet TAKE 1 TABLET BY MOUTH TWICE DAILY(ADDITIONAL DOSES AS NEEDED AS DIRECTED BY DOCTOR)    ezetimibe (Zetia) 10 mg tablet Take 1 Tablet by mouth every evening.  levothyroxine (SYNTHROID) 50 mcg tablet Take 50 mcg by mouth Daily (before breakfast).  calcium-vitamin D (OYSTER SHELL) 500 mg(1,250mg) -200 unit per tablet Take 1 Tab by mouth every evening.  FOLIC ACID/MULTIVIT-MIN/LUTEIN (CENTRUM SILVER PO) Take 1 Tab by mouth every evening. No current facility-administered medications for this visit. I have reviewed the nurses notes, vitals, problem list, allergy list, medical history, family, social history and medications.        REVIEW OF SYMPTOMS   As per HPI  General: Pt denies excessive weight gain or loss. Pt is able to conduct ADL's  HEENT: Denies blurred vision, headaches, hearing loss, epistaxis and difficulty swallowing. Respiratory: Denies cough, congestion, shortness of breath, GOODE, wheezing or stridor. Cardiovascular: Denies precordial pain, palpitations, edema or PND  Gastrointestinal: Denies poor appetite, indigestion, abdominal pain or blood in stool  Genitourinary: Denies hematuria, dysuria, increased urinary frequency  Musculoskeletal: Denies joint pain or swelling from muscles or joints  Neurologic: Denies tremor, paresthesias, headache, or sensory motor disturbance  Psychiatric: Denies confusion, insomnia, depression  Integumentray: Denies rash, itching or ulcers. Hematologic: Denies easy bruising, bleeding     PHYSICAL EXAMINATION      Vitals:    06/29/22 1301   BP: (!) 142/80   Pulse: 88   SpO2: 98%   Weight: 195 lb (88.5 kg)   Height: 5' 2\" (1.575 m)     General: Well developed, in no acute distress. Anxious  HEENT: No jaundice, oral mucosa moist, no oral ulcers  Neck: Supple, no stiffness, no lymphadenopathy, supple  Heart:   Irregular rate controlled  Respiratory: Clear bilaterally x 4, no wheezing or rales  Extremities: Trace edema, normal cap refill, no cyanosis. Musculoskeletal: No clubbing, no deformities  Neuro: A&Ox3, speech clear, gait stable, cooperative, no focal neurologic deficits  Skin: Skin color is normal. No rashes or lesions. Non diaphoretic, moist.                DIAGNOSTIC DATA     1. Stress Test   Adenosine myoview 2011 - normal perfusion, EF 81%   Lexiscan 3/3/2014 - normal perfusion EF 83%   Lexiscan cardiolite 5/10/16 - normal perfusion, EF 78%   Lexiscan Cardiolite 1/30/19 - normal perfusion. EF 77%. 2. Echo   2/22/13 - normal left ventricular size and function, normal appearing mitral, aortic, and tricuspid valves, with mild mitral and moderate tricuspid regurgitation, bi-atrial enlargement   5/15/15- 60%, mildly dilated LA, mild MR   3/8/18 - EF 65%. No WMA. Normal RV size and function. Mild MR. Mild TR.   8/24/20 - EF 60-65%. G1DD. Mild MAC with mild MR. Mild to mod TR. PASP 45 mm hg. Mildly elevated CVP. 3. Cardioversion    7/23/2015 - 200J     4. Renal Visceral Duplex 5/2016   No evidence of ONOFRE     5. 3/2/17-. Successful atrial fibrillation ablation however unable to achieve entrance/exit block of LSPV per Dr. Reji Ambriz    6. Lipids  8/12/21- , HDL 57, LDL 85, TG 66         LABORATORY DATA            Lab Results   Component Value Date/Time    WBC 6.0 06/19/2020 04:56 AM    HGB 13.1 06/19/2020 04:56 AM    Hematocrit (POC) 43 09/07/2020 02:26 AM    HCT 40.6 06/19/2020 04:56 AM    PLATELET 350 12/01/9597 04:56 AM    MCV 96.9 06/19/2020 04:56 AM      Lab Results   Component Value Date/Time    Sodium 141 06/19/2020 04:56 AM    Potassium 3.7 06/19/2020 04:56 AM    Chloride 107 06/19/2020 04:56 AM    CO2 31 06/19/2020 04:56 AM    Anion gap 3 (L) 06/19/2020 04:56 AM    Glucose 131 (H) 06/19/2020 04:56 AM    BUN 10 06/19/2020 04:56 AM    Creatinine 0.91 06/19/2020 04:56 AM    BUN/Creatinine ratio 11 (L) 06/19/2020 04:56 AM    GFR est AA >60 06/19/2020 04:56 AM    GFR est non-AA 60 (L) 06/19/2020 04:56 AM    Calcium 9.2 06/19/2020 04:56 AM    Bilirubin, total 0.4 06/19/2020 04:56 AM    Alk. phosphatase 86 06/19/2020 04:56 AM    Protein, total 7.8 06/19/2020 04:56 AM    Albumin 3.9 06/19/2020 04:56 AM    Globulin 3.9 06/19/2020 04:56 AM    A-G Ratio 1.0 (L) 06/19/2020 04:56 AM    ALT (SGPT) 24 06/19/2020 04:56 AM           ASSESSMENT/RECOMMENDATIONS:.      1) HTN, hx of labile pressures   -She says her blood pressures have improved in the afternoon or not following after I gave her a schedule as to when to take her antihypertensives    2) paroxysmal AF    -Currently she is in A.  Fib  .-We will place an event monitor on her to see if she is going in and out of A. fib      3) diastolic dysfunction/shortness of breath  -EF was normal in the past of 60 to 65% with mild MR and TR    4) obesity  -Will be essential that she work on exercise and diet reduce red meat intake    5)ANÍBAL  -had negative study years ago. 6) chest discomfort  - awaiting stress test results    Follow-up with me in 3 mo. Question ANÍBAL    No orders of the defined types were placed in this encounter. We discussed the expected course, resolution and complications of the diagnosis(es) in detail. Medication risks, benefits, costs, interactions, and alternatives were discussed as indicated. I advised him to contact the office if his condition worsens, changes or fails to improve as anticipated. He expressed understanding with the diagnosis(es) and plan          Follow-up and Dispositions  ·   Return in about 6 months (around 12/29/2022). I have discussed the diagnosis with  Ti Lawrence and the intended plan as seen in the above orders. Questions were answered concerning future plans. I have discussed medication side effects and warnings with the patient as well. Thank you,  Sonia Lewis MD for involving me in the care of  Ti Lawrence. Please do not hesitate to contact me for further questions/concerns. Kobe Holt MD, [address]      [facility], [address] [place] [address] [address]      [phone] / [phone] Fax

## 2022-07-05 DIAGNOSIS — Z99.89 OSA ON CPAP: ICD-10-CM

## 2022-07-05 DIAGNOSIS — Z98.890 S/P ABLATION OF ATRIAL FIBRILLATION: ICD-10-CM

## 2022-07-05 DIAGNOSIS — I47.1 SVT (SUPRAVENTRICULAR TACHYCARDIA) (HCC): ICD-10-CM

## 2022-07-05 DIAGNOSIS — Z86.79 S/P ABLATION OF ATRIAL FIBRILLATION: ICD-10-CM

## 2022-07-05 DIAGNOSIS — I10 ESSENTIAL HYPERTENSION: ICD-10-CM

## 2022-07-05 DIAGNOSIS — I48.0 PAROXYSMAL ATRIAL FIBRILLATION (HCC): ICD-10-CM

## 2022-07-05 DIAGNOSIS — G47.33 OSA ON CPAP: ICD-10-CM

## 2022-07-05 DIAGNOSIS — I87.2 VENOUS INSUFFICIENCY: ICD-10-CM

## 2022-07-05 DIAGNOSIS — E66.01 SEVERE OBESITY (BMI 35.0-39.9) WITH COMORBIDITY (HCC): ICD-10-CM

## 2022-07-05 LAB
ANION GAP SERPL CALC-SCNC: 4 MMOL/L (ref 5–15)
BUN SERPL-MCNC: 16 MG/DL (ref 6–20)
BUN/CREAT SERPL: 16 (ref 12–20)
CALCIUM SERPL-MCNC: 9.8 MG/DL (ref 8.5–10.1)
CHLORIDE SERPL-SCNC: 107 MMOL/L (ref 97–108)
CO2 SERPL-SCNC: 30 MMOL/L (ref 21–32)
CREAT SERPL-MCNC: 1.02 MG/DL (ref 0.55–1.02)
GLUCOSE SERPL-MCNC: 128 MG/DL (ref 65–100)
POTASSIUM SERPL-SCNC: 3.7 MMOL/L (ref 3.5–5.1)
SODIUM SERPL-SCNC: 141 MMOL/L (ref 136–145)

## 2022-07-06 ENCOUNTER — DOCUMENTATION ONLY (OUTPATIENT)
Dept: CARDIOLOGY CLINIC | Age: 82
End: 2022-07-06

## 2022-07-06 DIAGNOSIS — I10 ESSENTIAL HYPERTENSION: ICD-10-CM

## 2022-07-06 DIAGNOSIS — I48.0 PAROXYSMAL ATRIAL FIBRILLATION (HCC): Primary | ICD-10-CM

## 2022-07-06 RX ORDER — FUROSEMIDE 20 MG/1
20 TABLET ORAL 2 TIMES DAILY
COMMUNITY
End: 2022-07-06 | Stop reason: SDUPTHER

## 2022-07-06 RX ORDER — POTASSIUM CHLORIDE 20 MEQ/1
20 TABLET, EXTENDED RELEASE ORAL DAILY
COMMUNITY
End: 2022-07-06 | Stop reason: SDUPTHER

## 2022-07-06 RX ORDER — POTASSIUM CHLORIDE 20 MEQ/1
20 TABLET, EXTENDED RELEASE ORAL DAILY
Qty: 30 TABLET | Refills: 3 | Status: SHIPPED | OUTPATIENT
Start: 2022-07-06

## 2022-07-06 RX ORDER — FUROSEMIDE 20 MG/1
20 TABLET ORAL 2 TIMES DAILY
Qty: 60 TABLET | Refills: 3 | Status: SHIPPED | OUTPATIENT
Start: 2022-07-06

## 2022-07-06 NOTE — PROGRESS NOTES
Cardiology telephone call    I spoke with Mrs. Bello for about 12 minutes today. I tried to go over her stress test with her and she kept bringing up the EP study she had in the past.  I asked her to focus in on what I was talking about and that was a plumbing to her heart. She says she does get chest discomfort at times and even when she bends over she will notice it more. She does have a hiatal hernia that may be the cause of this but it is concerning that she has a small defect and she still has chest pain and she is on flecainide . I told her she would need to go forward with heart catheterization. Should not you need to come off her Eliquis 2 days prior to the procedure    She has a iodine allergy: We give her Benadryl 40 mg at 6 PM the night before and 6 AM day of and 25 mg of Benadryl 6 PM the night before and 6 AM the morning of the procedure. Please call her and let her know when she is scheduled.     Elenora Harada, MD

## 2022-07-08 ENCOUNTER — TELEPHONE (OUTPATIENT)
Dept: CARDIOLOGY CLINIC | Age: 82
End: 2022-07-08

## 2022-07-08 DIAGNOSIS — I48.0 PAROXYSMAL ATRIAL FIBRILLATION (HCC): ICD-10-CM

## 2022-07-08 DIAGNOSIS — Z01.818 PREOP TESTING: Primary | ICD-10-CM

## 2022-07-08 DIAGNOSIS — I10 ESSENTIAL HYPERTENSION: ICD-10-CM

## 2022-07-08 RX ORDER — PREDNISONE 20 MG/1
TABLET ORAL
Qty: 4 TABLET | Refills: 0 | Status: SHIPPED | OUTPATIENT
Start: 2022-07-08 | End: 2022-10-31 | Stop reason: ALTCHOICE

## 2022-07-08 RX ORDER — PREDNISONE 20 MG/1
TABLET ORAL
COMMUNITY
End: 2022-07-08 | Stop reason: SDUPTHER

## 2022-07-08 NOTE — PROGRESS NOTES
Adela Harden MD  You Yesterday (11:57 AM)     MD KRAFT meant 40 mg of prednisone and 25 of Benadryl    Message text

## 2022-07-11 ENCOUNTER — TELEPHONE (OUTPATIENT)
Dept: CARDIOLOGY CLINIC | Age: 82
End: 2022-07-11

## 2022-07-11 NOTE — TELEPHONE ENCOUNTER
Patient is calling because the nurse called her and told her to get some benadryl,and a rx was called in for prednisone. Patient would like to know what this is for. Patient said she needs to know something early this morning.     [phone]

## 2022-07-11 NOTE — TELEPHONE ENCOUNTER
Spoke with Pt of [facility] schd. For 7/22/22 at 9:15am arrive at 7:45am. Pt aware that they need a  NPO from [facility] [address] the night before. Check in at the second floor Reg. Desk. Pt is to have Labs done on 7/12/22. Pt is to hold these's Meds.   Hold Eliquis 48 hrs prior to procedure taking last does on 7/20/22  Hold Lasix day of procedure  Take Prednisone 40mg and Benadryl 25 mg the night before the Cath at 6pm   Prednisone 40mg and Benadryl 25mg morning of Cath   VO by Dev//nurse Pastor Mary LYMAN

## 2022-07-12 ENCOUNTER — ANCILLARY PROCEDURE (OUTPATIENT)
Dept: CARDIOLOGY CLINIC | Age: 82
End: 2022-07-12
Payer: MEDICARE

## 2022-07-12 VITALS
WEIGHT: 195 LBS | DIASTOLIC BLOOD PRESSURE: 84 MMHG | HEIGHT: 62 IN | SYSTOLIC BLOOD PRESSURE: 156 MMHG | BODY MASS INDEX: 35.88 KG/M2

## 2022-07-12 DIAGNOSIS — I51.7 LAE (LEFT ATRIAL ENLARGEMENT): ICD-10-CM

## 2022-07-12 DIAGNOSIS — I10 ESSENTIAL HYPERTENSION: ICD-10-CM

## 2022-07-12 DIAGNOSIS — G47.33 OSA ON CPAP: ICD-10-CM

## 2022-07-12 DIAGNOSIS — I48.0 PAROXYSMAL ATRIAL FIBRILLATION (HCC): ICD-10-CM

## 2022-07-12 DIAGNOSIS — Z99.89 OSA ON CPAP: ICD-10-CM

## 2022-07-12 DIAGNOSIS — E66.01 SEVERE OBESITY (BMI 35.0-39.9) WITH COMORBIDITY (HCC): ICD-10-CM

## 2022-07-12 DIAGNOSIS — Z86.79 S/P ABLATION OF ATRIAL FIBRILLATION: ICD-10-CM

## 2022-07-12 DIAGNOSIS — Z01.818 PREOP TESTING: ICD-10-CM

## 2022-07-12 DIAGNOSIS — Z98.890 S/P ABLATION OF ATRIAL FIBRILLATION: ICD-10-CM

## 2022-07-12 DIAGNOSIS — I47.1 SVT (SUPRAVENTRICULAR TACHYCARDIA) (HCC): ICD-10-CM

## 2022-07-12 LAB
ANION GAP SERPL CALC-SCNC: 5 MMOL/L (ref 5–15)
BUN SERPL-MCNC: 21 MG/DL (ref 6–20)
BUN/CREAT SERPL: 19 (ref 12–20)
CALCIUM SERPL-MCNC: 9.8 MG/DL (ref 8.5–10.1)
CHLORIDE SERPL-SCNC: 105 MMOL/L (ref 97–108)
CO2 SERPL-SCNC: 31 MMOL/L (ref 21–32)
CREAT SERPL-MCNC: 1.12 MG/DL (ref 0.55–1.02)
ECHO AO ASC DIAM: 3.8 CM
ECHO AO ASCENDING AORTA INDEX: 2.01 CM/M2
ECHO AO ROOT DIAM: 3.2 CM
ECHO AO ROOT INDEX: 1.69 CM/M2
ECHO AV MEAN GRADIENT: 4 MMHG
ECHO AV MEAN VELOCITY: 1 M/S
ECHO AV PEAK GRADIENT: 8 MMHG
ECHO AV PEAK VELOCITY: 1.4 M/S
ECHO AV VELOCITY RATIO: 0.79
ECHO AV VTI: 26.6 CM
ECHO EST RA PRESSURE: 3 MMHG
ECHO LA DIAMETER INDEX: 2.12 CM/M2
ECHO LA DIAMETER: 4 CM
ECHO LA TO AORTIC ROOT RATIO: 1.25
ECHO LA VOL 2C: 55 ML (ref 22–52)
ECHO LA VOL 4C: 71 ML (ref 22–52)
ECHO LA VOL BP: 66 ML (ref 22–52)
ECHO LA VOL/BSA BIPLANE: 35 ML/M2 (ref 16–34)
ECHO LA VOLUME AREA LENGTH: 69 ML
ECHO LA VOLUME INDEX A2C: 29 ML/M2 (ref 16–34)
ECHO LA VOLUME INDEX A4C: 38 ML/M2 (ref 16–34)
ECHO LA VOLUME INDEX AREA LENGTH: 37 ML/M2 (ref 16–34)
ECHO LV FRACTIONAL SHORTENING: 33 % (ref 28–44)
ECHO LV INTERNAL DIMENSION DIASTOLE INDEX: 2.43 CM/M2
ECHO LV INTERNAL DIMENSION DIASTOLIC: 4.6 CM (ref 3.9–5.3)
ECHO LV INTERNAL DIMENSION SYSTOLIC INDEX: 1.64 CM/M2
ECHO LV INTERNAL DIMENSION SYSTOLIC: 3.1 CM
ECHO LV IVSD: 1.1 CM (ref 0.6–0.9)
ECHO LV MASS 2D: 181.2 G (ref 67–162)
ECHO LV MASS INDEX 2D: 95.9 G/M2 (ref 43–95)
ECHO LV POSTERIOR WALL DIASTOLIC: 1.1 CM (ref 0.6–0.9)
ECHO LV RELATIVE WALL THICKNESS RATIO: 0.48
ECHO LVOT AV VTI INDEX: 0.78
ECHO LVOT MEAN GRADIENT: 2 MMHG
ECHO LVOT PEAK GRADIENT: 5 MMHG
ECHO LVOT PEAK VELOCITY: 1.1 M/S
ECHO LVOT VTI: 20.8 CM
ECHO RIGHT VENTRICULAR SYSTOLIC PRESSURE (RVSP): 37 MMHG
ECHO RV INTERNAL DIMENSION: 3.5 CM
ECHO RV TAPSE: 1.7 CM (ref 1.7–?)
ECHO TV REGURGITANT MAX VELOCITY: 2.92 M/S
ECHO TV REGURGITANT PEAK GRADIENT: 34 MMHG
ERYTHROCYTE [DISTWIDTH] IN BLOOD BY AUTOMATED COUNT: 13.9 % (ref 11.5–14.5)
GLUCOSE SERPL-MCNC: 132 MG/DL (ref 65–100)
HCT VFR BLD AUTO: 43.5 % (ref 35–47)
HGB BLD-MCNC: 13.7 G/DL (ref 11.5–16)
MCH RBC QN AUTO: 31.9 PG (ref 26–34)
MCHC RBC AUTO-ENTMCNC: 31.5 G/DL (ref 30–36.5)
MCV RBC AUTO: 101.2 FL (ref 80–99)
NRBC # BLD: 0 K/UL (ref 0–0.01)
NRBC BLD-RTO: 0 PER 100 WBC
PLATELET # BLD AUTO: 224 K/UL (ref 150–400)
PMV BLD AUTO: 10.5 FL (ref 8.9–12.9)
POTASSIUM SERPL-SCNC: 4.2 MMOL/L (ref 3.5–5.1)
RBC # BLD AUTO: 4.3 M/UL (ref 3.8–5.2)
SODIUM SERPL-SCNC: 141 MMOL/L (ref 136–145)
WBC # BLD AUTO: 5.7 K/UL (ref 3.6–11)

## 2022-07-12 PROCEDURE — 93306 TTE W/DOPPLER COMPLETE: CPT | Performed by: SPECIALIST

## 2022-07-13 ENCOUNTER — TELEPHONE (OUTPATIENT)
Dept: CARDIOLOGY CLINIC | Age: 82
End: 2022-07-13

## 2022-07-13 NOTE — PROGRESS NOTES
Your potassium and kidney function are stable and this is great news. Your blood counts are good. I hope all is well.

## 2022-07-14 RX ORDER — SODIUM CHLORIDE 9 MG/ML
75 INJECTION, SOLUTION INTRAVENOUS CONTINUOUS
Status: CANCELLED | OUTPATIENT
Start: 2022-07-22

## 2022-07-14 RX ORDER — SODIUM CHLORIDE 0.9 % (FLUSH) 0.9 %
5-40 SYRINGE (ML) INJECTION EVERY 8 HOURS
Status: CANCELLED | OUTPATIENT
Start: 2022-07-22

## 2022-07-14 RX ORDER — SODIUM CHLORIDE 0.9 % (FLUSH) 0.9 %
5-40 SYRINGE (ML) INJECTION AS NEEDED
Status: CANCELLED | OUTPATIENT
Start: 2022-07-22

## 2022-07-18 NOTE — TELEPHONE ENCOUNTER
Verified holding Eliquis x 2 days prior and holding Lasix day of procedure. Reminded pt about prednisone and benadryl night prior and day of procedure.

## 2022-07-18 NOTE — TELEPHONE ENCOUNTER
Patient is calling because she would like to know why she has to take medications. Patient said she would like to know why she is taking the prednisone and benadryl before her surgery.     [phone]

## 2022-07-22 ENCOUNTER — HOSPITAL ENCOUNTER (OUTPATIENT)
Age: 82
Setting detail: OUTPATIENT SURGERY
Discharge: HOME | End: 2022-07-22
Attending: INTERNAL MEDICINE | Admitting: INTERNAL MEDICINE
Payer: MEDICARE

## 2022-07-22 VITALS
WEIGHT: 195 LBS | TEMPERATURE: 97.2 F | RESPIRATION RATE: 22 BRPM | BODY MASS INDEX: 35.88 KG/M2 | DIASTOLIC BLOOD PRESSURE: 77 MMHG | SYSTOLIC BLOOD PRESSURE: 162 MMHG | OXYGEN SATURATION: 91 % | HEART RATE: 100 BPM | HEIGHT: 62 IN

## 2022-07-22 DIAGNOSIS — R94.39 ABNORMAL STRESS TEST: ICD-10-CM

## 2022-07-22 LAB — ACT BLD: 202 SECS (ref 79–138)

## 2022-07-22 PROCEDURE — 85347 COAGULATION TIME ACTIVATED: CPT

## 2022-07-22 PROCEDURE — 99153 MOD SED SAME PHYS/QHP EA: CPT | Performed by: INTERNAL MEDICINE

## 2022-07-22 PROCEDURE — 74011250636 HC RX REV CODE- 250/636: Performed by: INTERNAL MEDICINE

## 2022-07-22 PROCEDURE — 93571 IV DOP VEL&/PRESS C FLO 1ST: CPT | Performed by: INTERNAL MEDICINE

## 2022-07-22 PROCEDURE — 74011000250 HC RX REV CODE- 250: Performed by: INTERNAL MEDICINE

## 2022-07-22 PROCEDURE — C1769 GUIDE WIRE: HCPCS | Performed by: INTERNAL MEDICINE

## 2022-07-22 PROCEDURE — 99152 MOD SED SAME PHYS/QHP 5/>YRS: CPT | Performed by: INTERNAL MEDICINE

## 2022-07-22 PROCEDURE — 77030008543 HC TBNG MON PRSS MRTM -A: Performed by: INTERNAL MEDICINE

## 2022-07-22 PROCEDURE — 2709999900 HC NON-CHARGEABLE SUPPLY: Performed by: INTERNAL MEDICINE

## 2022-07-22 PROCEDURE — C1887 CATHETER, GUIDING: HCPCS | Performed by: INTERNAL MEDICINE

## 2022-07-22 PROCEDURE — 93458 L HRT ARTERY/VENTRICLE ANGIO: CPT | Performed by: INTERNAL MEDICINE

## 2022-07-22 PROCEDURE — 85347 COAGULATION TIME ACTIVATED: CPT | Performed by: INTERNAL MEDICINE

## 2022-07-22 PROCEDURE — 74011000636 HC RX REV CODE- 636: Performed by: INTERNAL MEDICINE

## 2022-07-22 PROCEDURE — C1894 INTRO/SHEATH, NON-LASER: HCPCS | Performed by: INTERNAL MEDICINE

## 2022-07-22 PROCEDURE — 93572 IV DOP VEL&/PRESS C FLO EA: CPT

## 2022-07-22 PROCEDURE — 77030041064 HC CATH DX ANGI DXTRTY MEDT -B: Performed by: INTERNAL MEDICINE

## 2022-07-22 PROCEDURE — 77030019569 HC BND COMPR RAD TERU -B: Performed by: INTERNAL MEDICINE

## 2022-07-22 PROCEDURE — 77030029065 HC DRSG HEMO QCLOT ZMED -B

## 2022-07-22 RX ORDER — LIDOCAINE HYDROCHLORIDE 10 MG/ML
INJECTION INFILTRATION; PERINEURAL AS NEEDED
Status: DISCONTINUED | OUTPATIENT
Start: 2022-07-22 | End: 2022-07-22 | Stop reason: HOSPADM

## 2022-07-22 RX ORDER — MIDAZOLAM HYDROCHLORIDE 1 MG/ML
INJECTION, SOLUTION INTRAMUSCULAR; INTRAVENOUS AS NEEDED
Status: DISCONTINUED | OUTPATIENT
Start: 2022-07-22 | End: 2022-07-22 | Stop reason: HOSPADM

## 2022-07-22 RX ORDER — HEPARIN SODIUM 200 [USP'U]/100ML
INJECTION, SOLUTION INTRAVENOUS
Status: COMPLETED | OUTPATIENT
Start: 2022-07-22 | End: 2022-07-22

## 2022-07-22 RX ORDER — SODIUM CHLORIDE 0.9 % (FLUSH) 0.9 %
5-40 SYRINGE (ML) INJECTION AS NEEDED
Status: DISCONTINUED | OUTPATIENT
Start: 2022-07-22 | End: 2022-07-22 | Stop reason: HOSPADM

## 2022-07-22 RX ORDER — FENTANYL CITRATE 50 UG/ML
INJECTION, SOLUTION INTRAMUSCULAR; INTRAVENOUS AS NEEDED
Status: DISCONTINUED | OUTPATIENT
Start: 2022-07-22 | End: 2022-07-22 | Stop reason: HOSPADM

## 2022-07-22 RX ORDER — HEPARIN SODIUM 1000 [USP'U]/ML
INJECTION, SOLUTION INTRAVENOUS; SUBCUTANEOUS AS NEEDED
Status: DISCONTINUED | OUTPATIENT
Start: 2022-07-22 | End: 2022-07-22 | Stop reason: HOSPADM

## 2022-07-22 RX ORDER — SODIUM CHLORIDE 9 MG/ML
75 INJECTION, SOLUTION INTRAVENOUS CONTINUOUS
Status: DISCONTINUED | OUTPATIENT
Start: 2022-07-22 | End: 2022-07-22 | Stop reason: HOSPADM

## 2022-07-22 RX ORDER — VERAPAMIL HYDROCHLORIDE 2.5 MG/ML
INJECTION, SOLUTION INTRAVENOUS AS NEEDED
Status: DISCONTINUED | OUTPATIENT
Start: 2022-07-22 | End: 2022-07-22 | Stop reason: HOSPADM

## 2022-07-22 RX ORDER — SODIUM CHLORIDE 0.9 % (FLUSH) 0.9 %
5-40 SYRINGE (ML) INJECTION EVERY 8 HOURS
Status: DISCONTINUED | OUTPATIENT
Start: 2022-07-22 | End: 2022-07-22 | Stop reason: HOSPADM

## 2022-07-22 NOTE — INTERVAL H&P NOTE
Update History & Physical    The Patient's History and Physical of 6/29/22 was reviewed with the patient and I examined the patient. There was no change. Plan:  The risk, benefits, expected outcome, and alternative to the recommended procedure have been discussed with the patient. Patient understands and wants to proceed with the procedure.     Electronically signed by Jeannie Holt MD on 7/22/2022 at 9:34 AM

## 2022-07-22 NOTE — PROCEDURES
BRIEF PROCEDURE NOTE    Date of Procedure: 7/22/2022   Preoperative Diagnosis: Abnormal stress test  Postoperative Diagnosis: Non obstructive CAD; Distal L main ( at bifurcation ) - mild dz; IFR LAD - nml; IFR lcflex nml  Procedure: Left heart cath, LV angiography, coronary angiography  Interventional Cardiologist: Debra Syed MD  Assistant : none  Anesthesia: local + IV sedation  I administered moderate sedation throughout this procedure. An independent trained observer pushed medications at my direction, and monitored the patients level of consciousness and physiological status throughout. Estimated Blood Loss: Minimal    Access: R Radial Access - 6 F sheath        Catheters: RCA :JR4                    LCA : JL4     Versicor wire used to advance catheters    Findings:     L Main:  Med to large; Ostial - 10%; Distal ( at bifurcation ) 30% -     LAD: Med; Mid 40%; D1 - Small to med; MLI    LCflex: Med to large; MLI; OM1/OM2 - MLI    RCA: Med; MLI; PDA and PLB - small; MLI    LVEDP: 9 mm Hg    LVEF: Not assessed    No significant gradient across aortic valve. PCI: none  EBU 3.5 guide  Distal L Main 30%  iFR LAD - 0.96  iFR Lcflex - 1  Ostial L main spasm with guiding catheter with some dampening noted        Specimens Removed : None    Devices implanted : None    Complications: None    Closure Device: R Radial - TR band        See full cath note.     Complications: none    Debra Syed MD

## 2022-07-22 NOTE — Clinical Note
Diagnostic wire inserted. pt diagnosed with RSV last week. fever wed and today. tmax 102.2. mother states blood work completed on wed and lymphocytes were elevated on wed and told to return if she has a fever. lungs clear B/L.  decrease in PO intake  but  normal UOP. pt well appearing. NKDA.

## 2022-07-22 NOTE — ROUTINE PROCESS
8:00 AM  Patient arrived. ID and allergies verified verbally with patient. Pt voices understanding of procedure to be performed. Consent obtained. Pt prepped for procedure. Pt denies contrast allergy. Patient denies taking any blood thinners. 9:45 AM  TRANSFER - OUT REPORT:    Verbal report given to Melani(name) on [facility]  being transferred to cath lab(unit) for ordered procedure       Report consisted of patients Situation, Background, Assessment and   Recommendations(SBAR). Information from the following report(s) SBAR was reviewed with the receiving nurse. Lines:   Peripheral IV 07/22/22 Anterior;Proximal;Right Forearm (Active)   Site Assessment Clean, dry, & intact 07/22/22 0827   Phlebitis Assessment 0 07/22/22 0827   Dressing Status Clean, dry, & intact 07/22/22 0827   Dressing Type Transparent 07/22/22 0827   Hub Color/Line Status Blue 07/22/22 0827   Alcohol Cap Used No 07/22/22 0827        Opportunity for questions and clarification was provided. Patient transported with:   Registered Nurse            10:40 AM  TRANSFER - IN REPORT:    Verbal report received from Rajani Hernandez(name) on [facility]  being received from cath lab(unit) for routine post - op      Report consisted of patients Situation, Background, Assessment and   Recommendations(SBAR). Information from the following report(s) SBAR and Procedure Summary was reviewed with the receiving nurse. Opportunity for questions and clarification was provided. Assessment completed upon patients arrival to unit and care assumed. 11:40 AM  2 ml air released from TR Band. No bleeding or hematoma noted. Radial and Ulnar pulse on right wrist palpable. Pt tolerated well. Will continue to monitor. 11:45 AM  2 ml air released from TR Band. No bleeding or hematoma noted. Radial and Ulnar pulse on right wrist palpable. Pt tolerated well. Will continue to monitor. 11:50 AM  2 ml air released from TR Band.  No bleeding or hematoma noted. Radial and Ulnar pulse on right wrist palpable. Pt tolerated well. Will continue to monitor. 11:55 AM  3 ml air released from TR Band. No bleeding or hematoma noted. Radial and Ulnar pulse on right wrist palpable. Pt tolerated well. Will continue to monitor. 12:00 PM  Air release completed. TR Band removed from right wrist. No bleeding or  Hematoma. Dressing applied. Wrist immobilizer in place. Radial and ulnar pulse remain palpable on affected extremity. Pt tolerated well. Instructions given to pt regarding movement and activity restrictions. Pt voiced understanding. 12:15 PM  Discharge instructions and prescriptions reviewed with  Patient and . Opportunity provided for questions. Patient and  verbalized understanding. Signed copy of discharge placed in the front of patient's chart.       1:06 PM  Patient ambulated, tolerated well. IV and tele removed. 1:15 PM  Patient escorted via wheelchair to entrance.  driving. Patient discharged into care of .

## 2022-07-22 NOTE — Clinical Note
TRANSFER - IN REPORT:     Verbal report received from: supa. Report consisted of patient's Situation, Background, Assessment and   Recommendations(SBAR). Opportunity for questions and clarification was provided. Assessment completed upon patient's arrival to unit and care assumed. Patient transported with a Registered Nurse.  VANESSA

## 2022-07-22 NOTE — DISCHARGE INSTRUCTIONS
Transradial Cardiac Catheterization/Angiography Discharge Instructions    It is normal to feel tired the first couple days. Take it easy and follow the physicians instructions. Wear wrist splint for first 24 hours to keep the wrist stable. No repetitive flexing of wrist.       CHECK THE CATHETER INSERTION SITE DAILY:  Remove the wrist dressing 24 hours after the procedure. You may shower 24 hours after the procedure. Wash with soap and water and pat dry. Gentle cleaning of the site with soap and water is sufficient, cover with a dry clean dressing or bandage. Do not apply creams or powders to the area. No soaking the wrist for 3 days. Leave the puncture site open to air after 24 hours post-procedure. CALL THE PHYSICIANS:   If you have signs of infection, such as:            - A fever  If the site becomes red, swollen or feels warm to the touch  If there is drainage or if there is unusual pain at the radial site. MONITOR FOR BLEEDING:    If there is any minor oozing, you may apply a band-aid and remove after 12 hours. If the bleeding continues or if there is a lot of bleeding hold pressure with the middle finger against the puncture site and the thumb against the back of the wrist,call 911 to be transported to the hospital.  DO NOT DRIVE YOURSELF, [facility]. ACTIVITY:   For the first 24 hours do not manipulate the wrist.  No lifting, pushing or pulling over 3-5 pounds with the affected wrist for 7 daysand no straining the insertion site. Do not life grocery bags or the garbage can, do not run the vacuum  or  for 7 days. Start with short walks as in the hospital and gradually increase as tolerated each day. It is recommended to walk 30 minutes 5-7 days per week. Follow your physicians instructions on activity. Avoid walking outside in extremes of heat or cold. Walk inside when it is cold and windy or hot and humid.      Things to keep in mind:  No driving for at least 24 hours, or as designated by your physician. Limit the number of times you go up and down the stairs  Take rests and pace yourself with activity. Be careful and do not strain with bowel movements. Diet:  Drink plenty of fluids for the next 24 - 48 hours to help your body flush out the dye. If you have kidney, heart, or liver disease and have to limit fluids, talk with your doctor before you increase the amount of fluids you drink. Keep eating a heart-healthy, low-fat diet that has lots of fruits, vegetables, and whole grains.

## 2022-07-29 NOTE — TELEPHONE ENCOUNTER
Pt requested a 90 day refill ezetimibe 10mg, pt only has 3 days left, [place] [facility] [place] would like two weeks sent to a local pharmacy [facility]) [phone]  Pt would also like [facility]iazem Ir [facility]    Deonte Baez8 [phone]

## 2022-08-01 RX ORDER — CARVEDILOL 3.12 MG/1
3.12 TABLET ORAL 2 TIMES DAILY WITH MEALS
Qty: 180 TABLET | Refills: 0 | Status: SHIPPED | OUTPATIENT
Start: 2022-08-01 | End: 2022-09-09

## 2022-08-04 RX ORDER — EZETIMIBE 10 MG/1
10 TABLET ORAL EVERY EVENING
Qty: 90 TABLET | Refills: 3 | Status: SHIPPED | OUTPATIENT
Start: 2022-08-04

## 2022-08-04 RX ORDER — DILTIAZEM HYDROCHLORIDE 60 MG/1
60 TABLET, FILM COATED ORAL 3 TIMES DAILY
Qty: 90 TABLET | Refills: 5 | Status: SHIPPED | OUTPATIENT
Start: 2022-08-04 | End: 2022-10-31 | Stop reason: ALTCHOICE

## 2022-08-04 NOTE — TELEPHONE ENCOUNTER
Refill per VO of DR. HOLT:    Last appt:  Visit date not found    Future Appointments   Date Time Provider Ray Zamora   9/28/2022  3:00 PM Mj Holt MD [facility]       Requested Prescriptions     Pending Prescriptions Disp Refills    ezetimibe (Zetia) 10 mg tablet 90 Tablet 3     Sig: Take 1 Tablet by mouth every evening. dilTIAZem IR (CARDIZEM) 60 mg tablet 90 Tablet 5     Sig: Take 1 Tablet by mouth three (3) times daily.

## 2022-08-11 ENCOUNTER — OFFICE VISIT (OUTPATIENT)
Dept: CARDIOLOGY CLINIC | Age: 82
End: 2022-08-11
Payer: MEDICARE

## 2022-08-11 VITALS
DIASTOLIC BLOOD PRESSURE: 88 MMHG | BODY MASS INDEX: 34.96 KG/M2 | HEART RATE: 78 BPM | HEIGHT: 62 IN | OXYGEN SATURATION: 97 % | WEIGHT: 190 LBS | SYSTOLIC BLOOD PRESSURE: 138 MMHG

## 2022-08-11 DIAGNOSIS — G47.33 OSA ON CPAP: ICD-10-CM

## 2022-08-11 DIAGNOSIS — I48.0 PAROXYSMAL ATRIAL FIBRILLATION (HCC): ICD-10-CM

## 2022-08-11 DIAGNOSIS — F41.9 ANXIETY: ICD-10-CM

## 2022-08-11 DIAGNOSIS — Z99.89 OSA ON CPAP: ICD-10-CM

## 2022-08-11 DIAGNOSIS — Z86.79 S/P ABLATION OF ATRIAL FIBRILLATION: ICD-10-CM

## 2022-08-11 DIAGNOSIS — I10 ESSENTIAL HYPERTENSION: Primary | ICD-10-CM

## 2022-08-11 DIAGNOSIS — Z98.890 S/P ABLATION OF ATRIAL FIBRILLATION: ICD-10-CM

## 2022-08-11 DIAGNOSIS — E66.01 SEVERE OBESITY (BMI 35.0-39.9) WITH COMORBIDITY (HCC): ICD-10-CM

## 2022-08-11 DIAGNOSIS — I51.7 LAE (LEFT ATRIAL ENLARGEMENT): ICD-10-CM

## 2022-08-11 PROCEDURE — G8400 PT W/DXA NO RESULTS DOC: HCPCS | Performed by: SPECIALIST

## 2022-08-11 PROCEDURE — G8752 SYS BP LESS 140: HCPCS | Performed by: SPECIALIST

## 2022-08-11 PROCEDURE — G8536 NO DOC ELDER MAL SCRN: HCPCS | Performed by: SPECIALIST

## 2022-08-11 PROCEDURE — 1090F PRES/ABSN URINE INCON ASSESS: CPT | Performed by: SPECIALIST

## 2022-08-11 PROCEDURE — 1101F PT FALLS ASSESS-DOCD LE1/YR: CPT | Performed by: SPECIALIST

## 2022-08-11 PROCEDURE — G8432 DEP SCR NOT DOC, RNG: HCPCS | Performed by: SPECIALIST

## 2022-08-11 PROCEDURE — G8417 CALC BMI ABV UP PARAM F/U: HCPCS | Performed by: SPECIALIST

## 2022-08-11 PROCEDURE — G8427 DOCREV CUR MEDS BY ELIG CLIN: HCPCS | Performed by: SPECIALIST

## 2022-08-11 PROCEDURE — 99214 OFFICE O/P EST MOD 30 MIN: CPT | Performed by: SPECIALIST

## 2022-08-11 PROCEDURE — G8754 DIAS BP LESS 90: HCPCS | Performed by: SPECIALIST

## 2022-08-11 PROCEDURE — 1123F ACP DISCUSS/DSCN MKR DOCD: CPT | Performed by: SPECIALIST

## 2022-08-11 NOTE — LETTER
8/11/2022    Patient: Nguyễn Coelho   YOB: 1940   Date of Visit: 8/11/2022     Mendel Rack, MD  [address]  [address] [place] [address]  Via Fax: [phone]    Dear Mendel Rack, MD,      Thank you for referring Ms. Merlinda Penna to [facility] for evaluation. My notes for this consultation are attached. If you have questions, please do not hesitate to call me. I look forward to following your patient along with you.       Sincerely,    Azeem Khan MD

## 2022-08-11 NOTE — PROGRESS NOTES
CARDIOLOGY OFFICE NOTE    Kobe Alvarado MD, [address], [place], [address] [facility]  Phone [phone]; Fax [phone]  Mobile [phone]   Voice Mail [phone]    Primary care: Ngoc Orantes MD       ATTENTION:   This medical record was transcribed using an electronic medical records/speech recognition system. Although proofread, it may and can contain electronic, spelling and other errors. Corrections may be executed at a later time. Please feel free to contact us for any clarifications as needed. No diagnosis found. Aby Shaver is a [de-identified] y.o. female with  referred for shortness of breath and hypertension, paroxysmal atrial fibrillation status post ablation, SVT          Cardiac risk factors: hypertension, post-menopausal  I have personally obtained the history from the patient. HISTORY OF PRESENTING ILLNESS   She was followed by Dr. Juliet Ayon and Dr. Pat Carlson in the past.  She did have a ablation for atrial fibrillation with Dr. Pat Carlson in the past in the past and she is on multiple medicines including diltiazem at 60 mg twice daily, carvedilol 3.125 mg twice daily clonidine 0.1 mg twice daily and Tambocor 50 mg twice daily. She wanted to stay with the [facility] system so she will see Dr. Hans Lam or our new electrophysiologist.  She has persistent atrial fibrillation currently. She does get short of breath but I do not think she is extremely active. She probably is somewhat deconditioned but she may need her a short check so I am not sure if it would be reasonable to go forward with a another ablation. She had an ablation in the past but unable to achieve entrance/exit block of LSPV per Dr. Hermilo Lopez. No bleeding on eliquis. ACTIVE PROBLEM LIST     Patient Active Problem List    Diagnosis Date Noted    Sinus bradycardia 05/12/2020    Anxiety 07/22/2018    Severe obesity (BMI 35.0-39. 9) with comorbidity ([address] 05/25/2018    SVT (supraventricular tachycardia) ([facility] 2017    Hiatal hernia 2017    Venous insufficiency 2017    Acquired hypothyroidism 2017    S/P ablation of atrial fibrillation 2017    Paroxysmal atrial fibrillation ([facility] 2016    Essential hypertension 2016    Gastroesophageal reflux disease without esophagitis 2016    RBBB 2015    LAE (left atrial enlargement) 2013           PAST MEDICAL HISTORY     Past Medical History:   Diagnosis Date    Atrial fibrillation with RVR ([facility] 2018    DDD (degenerative disc disease)     Gastroesophageal reflux disease without esophagitis 7/3/2016    GERD (gastroesophageal reflux disease)     Hiatal hernia     HTN (hypertension), benign 2016    Hypothyroidism     ANÍBAL (obstructive sleep apnea) 2016    Paroxysmal atrial fibrillation ([facility] 2016    Uterine prolapse            PAST SURGICAL HISTORY     Past Surgical History:   Procedure Laterality Date    HX AFIB ABLATION  2017    HX GI      COLONOSCOPY     HX GYN      TUBAL LIGATION    HX HEENT      WISDOM TEETH EXTRACTED. ALLERGIES     Allergies   Allergen Reactions    Citalopram Hydrobromide Rash     With itching.     Chlorthalidone Rash    Contrast Agent [Iodine] Other (comments)     Wheezing/bronchospasm    Maxzide [Triamterene-Hydrochlorothiazid] Palpitations    Vicodin [Hydrocodone-Acetaminophen] Palpitations          FAMILY HISTORY     Family History   Problem Relation Age of Onset    Diabetes Mother     Hypertension Mother     COPD Mother     negative for cardiac disease       SOCIAL HISTORY     Social History     Socioeconomic History    Marital status:    Tobacco Use    Smoking status: Former     Packs/day: 1.50     Years: 30.00     Pack years: 45.00     Types: Cigarettes     Quit date: 3/3/1994     Years since quittin.4    Smokeless tobacco: Former   Substance and Sexual Activity    Alcohol use: No    Drug use: No    Sexual activity: Never         MEDICATIONS     Current Outpatient Medications   Medication Sig    ezetimibe (Zetia) 10 mg tablet Take 1 Tablet by mouth every evening. dilTIAZem IR (CARDIZEM) 60 mg tablet Take 1 Tablet by mouth three (3) times daily. carvediloL (COREG) 3.125 mg tablet Take 1 Tablet by mouth two (2) times daily (with meals). potassium chloride (Klor-Con M20) 20 mEq tablet Take 1 Tablet by mouth daily. furosemide (LASIX) 20 mg tablet Take 1 Tablet by mouth two (2) times a day. candesartan (Atacand) 8 mg tablet Take 1 Tablet by mouth daily. apixaban (Eliquis) 5 mg tablet TAKE 1 TABLET BY MOUTH TWICE DAILY    flecainide (TAMBOCOR) 50 mg tablet TAKE 1 TABLET BY MOUTH TWICE DAILY    cloNIDine HCL (CATAPRES) 0.1 mg tablet TAKE 1 TABLET BY MOUTH TWICE DAILY(ADDITIONAL DOSES AS NEEDED AS DIRECTED BY DOCTOR)    levothyroxine (SYNTHROID) 50 mcg tablet Take 50 mcg by mouth Daily (before breakfast). calcium-vitamin D (OYSTER SHELL) 500 mg(1,250mg) -200 unit per tablet Take 1 Tab by mouth every evening. FOLIC ACID/MULTIVIT-MIN/LUTEIN (CENTRUM SILVER PO) Take 1 Tab by mouth every evening. predniSONE (DELTASONE) 20 mg tablet Take 40 mg with Benadryl 25 mg at 6 pm night prior to Cath and 40 mg with Benadryl 25 mg morning of Cath (Patient not taking: Reported on 8/11/2022)     No current facility-administered medications for this visit. I have reviewed the nurses notes, vitals, problem list, allergy list, medical history, family, social history and medications. REVIEW OF SYMPTOMS   As per HPI  General: Pt denies excessive weight gain or loss. Pt is able to conduct ADL's  HEENT: Denies blurred vision, headaches, hearing loss, epistaxis and difficulty swallowing. Respiratory: Denies cough, congestion, shortness of breath, GOODE, wheezing or stridor.   Cardiovascular: Denies precordial pain, palpitations, edema or PND  Gastrointestinal: Denies poor appetite, indigestion, abdominal pain or blood in stool  Genitourinary: Denies hematuria, dysuria, increased urinary frequency  Musculoskeletal: Denies joint pain or swelling from muscles or joints  Neurologic: Denies tremor, paresthesias, headache, or sensory motor disturbance  Psychiatric: Denies confusion, insomnia, depression  Integumentray: Denies rash, itching or ulcers. Hematologic: Denies easy bruising, bleeding     PHYSICAL EXAMINATION      Vitals:    08/11/22 1022   BP: 138/88   Pulse: 78   SpO2: 97%   Weight: 190 lb (86.2 kg)   Height: 5' 2\" (1.575 m)     General: Well developed, in no acute distress. Anxious  HEENT: No jaundice, oral mucosa moist, no oral ulcers  Neck: Supple, no stiffness, no lymphadenopathy, supple  Heart:   Irregular rate controlled  Respiratory: Clear bilaterally x 4, no wheezing or rales  Extremities: Trace edema, normal cap refill, no cyanosis. Musculoskeletal: No clubbing, no deformities  Neuro: A&Ox3, speech clear, gait stable, cooperative, no focal neurologic deficits  Skin: Skin color is normal. No rashes or lesions. Non diaphoretic, moist.                DIAGNOSTIC DATA     1. Stress Test   Adenosine myoview 2011 - normal perfusion, EF 81%   Lexiscan 3/3/2014 - normal perfusion EF 83%   Lexiscan cardiolite 5/10/16 - normal perfusion, EF 78%   Lexiscan Cardiolite 1/30/19 - normal perfusion. EF 77%. 2. Echo   2/22/13 - normal left ventricular size and function, normal appearing mitral, aortic, and tricuspid valves, with mild mitral and moderate tricuspid regurgitation, bi-atrial enlargement   5/15/15- 60%, mildly dilated LA, mild MR   3/8/18 - EF 65%. No WMA. Normal RV size and function. Mild MR. Mild TR.   8/24/20 - EF 60-65%. G1DD. Mild MAC with mild MR. Mild to mod TR. PASP 45 mm hg. Mildly elevated CVP. 3. Cardioversion    7/23/2015 - 200J     4. Renal Visceral Duplex 5/2016   No evidence of ONOFRE     5. 3/2/17-.  Successful atrial fibrillation ablation however unable to achieve entrance/exit block of LSPV per  [facility]    6. Lipids  8/12/21- , HDL 57, LDL 85, TG 66         LABORATORY DATA            Lab Results   Component Value Date/Time    WBC 5.7 07/12/2022 09:15 AM    HGB 13.7 07/12/2022 09:15 AM    Hematocrit (POC) 43 09/07/2020 02:26 AM    HCT 43.5 07/12/2022 09:15 AM    PLATELET 484 27/25/5536 09:15 AM    .2 (H) 07/12/2022 09:15 AM      Lab Results   Component Value Date/Time    Sodium 141 07/12/2022 09:15 AM    Potassium 4.2 07/12/2022 09:15 AM    Chloride 105 07/12/2022 09:15 AM    CO2 31 07/12/2022 09:15 AM    Anion gap 5 07/12/2022 09:15 AM    Glucose 132 (H) 07/12/2022 09:15 AM    BUN 21 (H) 07/12/2022 09:15 AM    Creatinine 1.12 (H) 07/12/2022 09:15 AM    BUN/Creatinine ratio 19 07/12/2022 09:15 AM    GFR est AA 57 (L) 07/12/2022 09:15 AM    GFR est non-AA 47 (L) 07/12/2022 09:15 AM    Calcium 9.8 07/12/2022 09:15 AM    Bilirubin, total 0.4 06/19/2020 04:56 AM    Alk. phosphatase 86 06/19/2020 04:56 AM    Protein, total 7.8 06/19/2020 04:56 AM    Albumin 3.9 06/19/2020 04:56 AM    Globulin 3.9 06/19/2020 04:56 AM    A-G Ratio 1.0 (L) 06/19/2020 04:56 AM    ALT (SGPT) 24 06/19/2020 04:56 AM           ASSESSMENT/RECOMMENDATIONS:.      1) HTN, hx of labile pressures   -Blood pressure is reasonable today but she did not want to just I think it was a combination of her atrial fibrillation and having to walk a distance that she is not typically used to doing. 2) paroxysmal AF    -Currently she is in A.  Fib  -She is persistently atrial fibrillation so and asked that she see EP  -I think she needs to be on normal sinus rhythm, and question the need for an ablation  -She is anticoagulant Eliquis      3) diastolic dysfunction/shortness of breath  -EF was normal on her most recent echo in July 60-65%  -She also had a [facility] on June 29, 2022 with small apical mild intensity defect which was felt to be possibly breast attenuation    4) obesity  -Encourage her to exercise regularly at least walking daily    5)ANÍBAL  -had negative study years ago. 6) chest discomfort  -Stress test that suggested a small apical defect but thought it was related to breast attenuation    Follow-up with me in 6 months      No orders of the defined types were placed in this encounter. We discussed the expected course, resolution and complications of the diagnosis(es) in detail. Medication risks, benefits, costs, interactions, and alternatives were discussed as indicated. I advised him to contact the office if his condition worsens, changes or fails to improve as anticipated. He expressed understanding with the diagnosis(es) and plan          Follow-up and Dispositions    Return in about 6 months (around 2/11/2023). I have discussed the diagnosis with  Neftali Damico and the intended plan as seen in the above orders. Questions were answered concerning future plans. I have discussed medication side effects and warnings with the patient as well. Thank you,  Carolina Engle MD for involving me in the care of  Neftali Damico. Please do not hesitate to contact me for further questions/concerns. Kobe Holt MD, [address]      [facility], [address] [place] [address] Drive      [phone] / [phone] Fax

## 2022-08-11 NOTE — PROGRESS NOTES
Neftali Damico is a [de-identified] y.o. female    Visit Vitals  /88 (BP 1 Location: Left upper arm, BP Patient Position: Sitting, BP Cuff Size: Adult)   Pulse 78   Ht 5' 2\" (1.575 m)   Wt 190 lb (86.2 kg)   SpO2 97%   BMI 34.75 kg/m²       Chief Complaint   Patient presents with    Peripheral Edema       Chest pain NO  SOB NO  Dizziness NO  Swelling LEGS  Recent hospital visit NO  Refills NO  COVID VACCINE STATUS YES  HAD COVID?  YES

## 2022-08-11 NOTE — PATIENT INSTRUCTIONS

## 2022-08-24 RX ORDER — FUROSEMIDE 20 MG/1
20 TABLET ORAL 2 TIMES DAILY
Qty: 60 TABLET | Refills: 3 | Status: CANCELLED | OUTPATIENT
Start: 2022-08-24

## 2022-08-24 NOTE — TELEPHONE ENCOUNTER
Patient called stating her prescription for the furosemide (LASIX) 20 mg tablet, was increased to 40mg bid. She states she is running our because she increased her dosage. Please advise (prescription has been requested because she is almost out of this med.           TIM:[phone]

## 2022-09-09 ENCOUNTER — HOSPITAL ENCOUNTER (EMERGENCY)
Age: 82
Discharge: HOME | End: 2022-09-09
Attending: STUDENT IN AN ORGANIZED HEALTH CARE EDUCATION/TRAINING PROGRAM
Payer: MEDICARE

## 2022-09-09 VITALS
BODY MASS INDEX: 32.44 KG/M2 | RESPIRATION RATE: 18 BRPM | WEIGHT: 190 LBS | OXYGEN SATURATION: 96 % | HEIGHT: 64 IN | TEMPERATURE: 97.1 F | HEART RATE: 100 BPM | DIASTOLIC BLOOD PRESSURE: 76 MMHG | SYSTOLIC BLOOD PRESSURE: 123 MMHG

## 2022-09-09 DIAGNOSIS — I10 ASYMPTOMATIC HYPERTENSION: Primary | ICD-10-CM

## 2022-09-09 LAB
ALBUMIN SERPL-MCNC: 3.9 G/DL (ref 3.5–5)
ALBUMIN/GLOB SERPL: 1.1 {RATIO} (ref 1.1–2.2)
ALP SERPL-CCNC: 81 U/L (ref 45–117)
ALT SERPL-CCNC: 20 U/L (ref 12–78)
ANION GAP SERPL CALC-SCNC: 4 MMOL/L (ref 5–15)
AST SERPL-CCNC: 21 U/L (ref 15–37)
BASOPHILS # BLD: 0 K/UL (ref 0–0.1)
BASOPHILS NFR BLD: 0 % (ref 0–1)
BILIRUB SERPL-MCNC: 0.5 MG/DL (ref 0.2–1)
BUN SERPL-MCNC: 13 MG/DL (ref 6–20)
BUN/CREAT SERPL: 13 (ref 12–20)
CALCIUM SERPL-MCNC: 10.3 MG/DL (ref 8.5–10.1)
CALCULATED R AXIS, ECG10: 94 DEGREES
CALCULATED T AXIS, ECG11: -24 DEGREES
CHLORIDE SERPL-SCNC: 106 MMOL/L (ref 97–108)
CO2 SERPL-SCNC: 30 MMOL/L (ref 21–32)
CREAT SERPL-MCNC: 1.04 MG/DL (ref 0.55–1.02)
DIAGNOSIS, 93000: NORMAL
DIFFERENTIAL METHOD BLD: NORMAL
EOSINOPHIL # BLD: 0.2 K/UL (ref 0–0.4)
EOSINOPHIL NFR BLD: 2 % (ref 0–7)
ERYTHROCYTE [DISTWIDTH] IN BLOOD BY AUTOMATED COUNT: 13.4 % (ref 11.5–14.5)
GLOBULIN SER CALC-MCNC: 3.4 G/DL (ref 2–4)
GLUCOSE SERPL-MCNC: 152 MG/DL (ref 65–100)
HCT VFR BLD AUTO: 42.3 % (ref 35–47)
HGB BLD-MCNC: 13.6 G/DL (ref 11.5–16)
IMM GRANULOCYTES # BLD AUTO: 0 K/UL (ref 0–0.04)
IMM GRANULOCYTES NFR BLD AUTO: 0 % (ref 0–0.5)
LYMPHOCYTES # BLD: 1.4 K/UL (ref 0.8–3.5)
LYMPHOCYTES NFR BLD: 15 % (ref 12–49)
MCH RBC QN AUTO: 30.2 PG (ref 26–34)
MCHC RBC AUTO-ENTMCNC: 32.2 G/DL (ref 30–36.5)
MCV RBC AUTO: 94 FL (ref 80–99)
MONOCYTES # BLD: 0.9 K/UL (ref 0–1)
MONOCYTES NFR BLD: 9 % (ref 5–13)
NEUTS SEG # BLD: 6.9 K/UL (ref 1.8–8)
NEUTS SEG NFR BLD: 74 % (ref 32–75)
NRBC # BLD: 0 K/UL (ref 0–0.01)
NRBC BLD-RTO: 0 PER 100 WBC
PLATELET # BLD AUTO: 202 K/UL (ref 150–400)
PMV BLD AUTO: 10.3 FL (ref 8.9–12.9)
POTASSIUM SERPL-SCNC: 3.8 MMOL/L (ref 3.5–5.1)
PROT SERPL-MCNC: 7.3 G/DL (ref 6.4–8.2)
Q-T INTERVAL, ECG07: 354 MS
QRS DURATION, ECG06: 158 MS
QTC CALCULATION (BEZET), ECG08: 449 MS
RBC # BLD AUTO: 4.5 M/UL (ref 3.8–5.2)
SODIUM SERPL-SCNC: 140 MMOL/L (ref 136–145)
TROPONIN-HIGH SENSITIVITY: 8 NG/L (ref 0–51)
VENTRICULAR RATE, ECG03: 97 BPM
WBC # BLD AUTO: 9.5 K/UL (ref 3.6–11)

## 2022-09-09 PROCEDURE — 84484 ASSAY OF TROPONIN QUANT: CPT

## 2022-09-09 PROCEDURE — 99284 EMERGENCY DEPT VISIT MOD MDM: CPT

## 2022-09-09 PROCEDURE — 36415 COLL VENOUS BLD VENIPUNCTURE: CPT

## 2022-09-09 PROCEDURE — 80053 COMPREHEN METABOLIC PANEL: CPT

## 2022-09-09 PROCEDURE — 93005 ELECTROCARDIOGRAM TRACING: CPT

## 2022-09-09 PROCEDURE — 85025 COMPLETE CBC W/AUTO DIFF WBC: CPT

## 2022-09-09 PROCEDURE — 74011250637 HC RX REV CODE- 250/637: Performed by: STUDENT IN AN ORGANIZED HEALTH CARE EDUCATION/TRAINING PROGRAM

## 2022-09-09 RX ORDER — CARVEDILOL 3.12 MG/1
6.25 TABLET ORAL 2 TIMES DAILY WITH MEALS
Qty: 180 TABLET | Refills: 0 | Status: SHIPPED | OUTPATIENT
Start: 2022-09-09 | End: 2022-10-31

## 2022-09-09 RX ORDER — CARVEDILOL 12.5 MG/1
25 TABLET ORAL
Status: COMPLETED | OUTPATIENT
Start: 2022-09-09 | End: 2022-09-09

## 2022-09-09 RX ORDER — LOSARTAN POTASSIUM 50 MG/1
100 TABLET ORAL ONCE
Status: COMPLETED | OUTPATIENT
Start: 2022-09-09 | End: 2022-09-09

## 2022-09-09 RX ORDER — CANDESARTAN 8 MG/1
32 TABLET ORAL DAILY
Qty: 30 TABLET | Refills: 5 | Status: SHIPPED | OUTPATIENT
Start: 2022-09-09 | End: 2022-11-01 | Stop reason: SDUPTHER

## 2022-09-09 RX ORDER — DILTIAZEM HYDROCHLORIDE 30 MG/1
60 TABLET, FILM COATED ORAL ONCE
Status: COMPLETED | OUTPATIENT
Start: 2022-09-09 | End: 2022-09-09

## 2022-09-09 RX ADMIN — DILTIAZEM HYDROCHLORIDE 60 MG: 30 TABLET, FILM COATED ORAL at 03:10

## 2022-09-09 RX ADMIN — LOSARTAN POTASSIUM 100 MG: 50 TABLET, FILM COATED ORAL at 03:10

## 2022-09-09 RX ADMIN — CARVEDILOL 25 MG: 12.5 TABLET, FILM COATED ORAL at 03:10

## 2022-09-09 NOTE — ED PROVIDER NOTES
HISTORY OF PRESENT ILLNESS  The history is provided by the patient. Hypertension   82-year-old female presenting for chief complaint of hypertension tachycardia. However, she states that she is completely asymptomatic. She denies any chest pain, shortness of breath, palpitations. Patient states that she has been compliant with her medications. She took her blood pressure and it was 200. Heart rate was 103. She denies any fevers, cough, cold. Pain is 10. No exacerbating factors. MEDICAL HISTORY  Past Medical History:   Diagnosis Date    Atrial fibrillation with RVR ([facility] [address] [facility] 2018    DDD (degenerative disc disease)     Gastroesophageal reflux disease without esophagitis 7/3/2016    GERD (gastroesophageal reflux disease)     Hiatal hernia     HTN (hypertension), benign 2016    Hypothyroidism     ANÍBAL (obstructive sleep apnea) 2016    [facility]    Uterine prolapse      Past Surgical History:   Procedure Laterality Date    HX AFIB ABLATION  2017    HX GI      COLONOSCOPY     HX GYN      TUBAL LIGATION    HX HEENT      WISDOM TEETH EXTRACTED.      Family History:   Problem Relation Age of Onset    Diabetes Mother     Hypertension Mother     COPD Mother      Social History     Socioeconomic History    Marital status:      Spouse name: Not on file    Number of children: Not on file    Years of education: Not on file    Highest education level: Not on file   Occupational History    Not on file   Tobacco Use    Smoking status: Former     Packs/day: 1.50     Years: 30.00     Pack years: 45.00     Types: Cigarettes     Quit date: 3/3/1994     Years since quittin.5    Smokeless tobacco: Former   Substance and Sexual Activity    Alcohol use: No    Drug use: No    Sexual activity: Never   Other Topics Concern    Not on file   Social History Narrative    Not on file     Social Determinants of Health     Financial Resource Strain: Not on file   Food Insecurity: Not on file   Transportation Needs: Not on file   Physical Activity: Not on file   Stress: Not on file   Social Connections: Not on file   Intimate Partner Violence: Not on file   Housing Stability: Not on file     ALLERGIES: Citalopram hydrobromide, Chlorthalidone, Contrast agent [iodine], Maxzide [triamterene-hydrochlorothiazid], and Vicodin [hydrocodone-acetaminophen]    REVIEW OF SYSTEMS  Review of Systems  A 10-POINT SYSTEMS HAS BEEN REVIEWED, AND ALL SYSTEMS ARE NEGATIVE UNLESS OTHERWISE STATED IN THE HPI ARE OTHERWISE NEGATIVE    PHYSICAL EXAM  Vitals:    09/09/22 0136   BP: (!) 242/122   Pulse: 98   Resp: 18   Temp: 97.1 °F (36.2 °C)   SpO2: 96%   Weight: 86.2 kg (190 lb)   Height: 5' 4\" (1.626 m)     Physical Exam  Vitals and nursing note reviewed. Constitutional:       General: She is not in acute distress. Appearance: She is well-developed. She is not ill-appearing. HENT:      Head: Normocephalic and atraumatic. Right Ear: External ear normal.      Left Ear: External ear normal.   Eyes:      Extraocular Movements: Extraocular movements intact. Conjunctiva/sclera: Conjunctivae normal.   Cardiovascular:      Rate and Rhythm: Normal rate. Rhythm irregular. Pulses: Normal pulses. Heart sounds: No murmur heard. No friction rub. No gallop. Pulmonary:      Effort: Pulmonary effort is normal.      Breath sounds: No wheezing, rhonchi or rales. Abdominal:      General: There is no distension. Palpations: Abdomen is soft. Tenderness: There is no abdominal tenderness. There is no guarding or rebound. Musculoskeletal:         General: No swelling, tenderness or deformity. Normal range of motion. Cervical back: No rigidity. Skin:     General: Skin is warm. Capillary Refill: Capillary refill takes less than 2 seconds. Coloration: Skin is not jaundiced. Findings: No rash. Neurological:      General: No focal deficit present.       Mental Status: She is alert and oriented to person, place, and time. Motor: No weakness. INITIAL ASSESSMENT AND PLAN  [facility]    Susan Ricks is a [de-identified] y.o. female who presents with asymptomatic hypertension  Differential diagnosis includes but is not limited to hypertensive urgency/medical hypertension, hypertensive emergency including ACS, dissection, subarachnoid hemorrhage    Her subclinical guidelines, patient actually has no indication for any emergent evaluation/work-up. However, patient is quite anxious here.  , No focal logic weakness or numbness. Diagnostic essentially unremarkable other than the mild CKD actually slightly improved as compared to most recent prior. EKG was overall nonischemic. Did give patient p.o. blood pressure medications and will start patient on the same. Counseled length regarding good blood pressure management and checks and possibility for need for more dosing/increased dosing. Patient actually has a follow-up with her primary care physician quite soon. Advised follow-up with them/cardiology.   Was given otherwise strict precautions include not limited to any chest pain, shortness breath, fevers, headaches      ED Course as of 09/10/22 0322   Fri Sep 09, 2022   0131 Ekg interp  97 bpm afib  Rbbb  [AL]      ED Course User Index  [AL] Jesús Human, DO       DIAGNOSTIC STUDIES  Recent Results (from the past 6 hour(s))   TROPONIN-HIGH SENSITIVITY    Collection Time: 09/09/22  1:43 AM   Result Value Ref Range    Troponin-High Sensitivity 8 0 - 51 ng/L   CBC WITH AUTOMATED DIFF    Collection Time: 09/09/22  1:43 AM   Result Value Ref Range    WBC 9.5 3.6 - 11.0 K/uL    RBC 4.50 3.80 - 5.20 M/uL    HGB 13.6 11.5 - 16.0 g/dL    HCT 42.3 35.0 - 47.0 %    MCV 94.0 80.0 - 99.0 FL    MCH 30.2 26.0 - 34.0 PG    MCHC 32.2 30.0 - 36.5 g/dL    RDW 13.4 11.5 - 14.5 %    PLATELET 174 043 - 477 K/uL    MPV 10.3 8.9 - 12.9 FL    NRBC 0.0 0  WBC    ABSOLUTE NRBC 0.00 0.00 - 0.01 K/uL    NEUTROPHILS 74 32 - 75 %    LYMPHOCYTES 15 12 - 49 %    MONOCYTES 9 5 - 13 %    EOSINOPHILS 2 0 - 7 %    BASOPHILS 0 0 - 1 %    IMMATURE GRANULOCYTES 0 0.0 - 0.5 %    ABS. NEUTROPHILS 6.9 1.8 - 8.0 K/UL    ABS. LYMPHOCYTES 1.4 0.8 - 3.5 K/UL    ABS. MONOCYTES 0.9 0.0 - 1.0 K/UL    ABS. EOSINOPHILS 0.2 0.0 - 0.4 K/UL    ABS. BASOPHILS 0.0 0.0 - 0.1 K/UL    ABS. IMM. GRANS. 0.0 0.00 - 0.04 K/UL    DF AUTOMATED     METABOLIC PANEL, COMPREHENSIVE    Collection Time: 09/09/22  1:43 AM   Result Value Ref Range    Sodium 140 136 - 145 mmol/L    Potassium 3.8 3.5 - 5.1 mmol/L    Chloride 106 97 - 108 mmol/L    CO2 30 21 - 32 mmol/L    Anion gap 4 (L) 5 - 15 mmol/L    Glucose 152 (H) 65 - 100 mg/dL    BUN 13 6 - 20 MG/DL    Creatinine 1.04 (H) 0.55 - 1.02 MG/DL    BUN/Creatinine ratio 13 12 - 20      GFR est AA >60 >60 ml/min/1.73m2    GFR est non-AA 51 (L) >60 ml/min/1.73m2    Calcium 10.3 (H) 8.5 - 10.1 MG/DL    Bilirubin, total 0.5 0.2 - 1.0 MG/DL    ALT (SGPT) 20 12 - 78 U/L    AST (SGOT) 21 15 - 37 U/L    Alk. phosphatase 81 45 - 117 U/L    Protein, total 7.3 6.4 - 8.2 g/dL    Albumin 3.9 3.5 - 5.0 g/dL    Globulin 3.4 2.0 - 4.0 g/dL    A-G Ratio 1.1 1.1 - 2.2       No orders to display       FINAL ASSESSMENT      ED DIAGNOSIS  1.  Asymptomatic hypertension        DISPOSITION  dc      MEDICATIONS ADMINISTERED IN THE EMERGENCY DEPARTMENT  Medications   carvediloL (COREG) tablet 25 mg (25 mg Oral Given 9/9/22 0310)   losartan (COZAAR) tablet 100 mg (100 mg Oral Given 9/9/22 0310)   dilTIAZem IR (CARDIZEM) tablet 60 mg (60 mg Oral Given 9/9/22 0310)       PROCEDURES  Procedures

## 2022-09-09 NOTE — ED TRIAGE NOTES
Patient reports she took her BP at home and it was in the 180's and she had a HR of 103. Endorses history of A.Fib.  Denies any CP, states she is SOB but that is normal     Took an additional dose of clonidine prior to coming to ER, patient appears to be anxious

## 2022-09-09 NOTE — DISCHARGE INSTRUCTIONS
TAKE YOUR BLOOD PRESSURE ONLY TWICE PER DAY  ONCE BEFORE YOU TAKE MEDICATION IN THE MORNING  ONCE IN THE MIDDLE OF THE DAY  RECORD AND GIVE RECORD TO YOUR PRIMARY CARE PHYSICIAN OR CARDIOLOGIST

## 2022-10-20 PROBLEM — Z79.01 ANTICOAGULATION ADEQUATE: Status: ACTIVE | Noted: 2022-10-20

## 2022-10-30 PROBLEM — Z79.899 HIGH RISK MEDICATION USE: Status: ACTIVE | Noted: 2022-10-30

## 2022-10-31 ENCOUNTER — OFFICE VISIT (OUTPATIENT)
Dept: CARDIOLOGY CLINIC | Age: 82
End: 2022-10-31
Payer: MEDICARE

## 2022-10-31 VITALS
RESPIRATION RATE: 18 BRPM | OXYGEN SATURATION: 97 % | WEIGHT: 191.6 LBS | SYSTOLIC BLOOD PRESSURE: 132 MMHG | HEIGHT: 64 IN | DIASTOLIC BLOOD PRESSURE: 88 MMHG | HEART RATE: 88 BPM | BODY MASS INDEX: 32.71 KG/M2

## 2022-10-31 DIAGNOSIS — Z98.890 S/P ABLATION OF ATRIAL FIBRILLATION: ICD-10-CM

## 2022-10-31 DIAGNOSIS — Z79.01 ANTICOAGULATION ADEQUATE: ICD-10-CM

## 2022-10-31 DIAGNOSIS — Z86.79 S/P ABLATION OF ATRIAL FIBRILLATION: ICD-10-CM

## 2022-10-31 DIAGNOSIS — I10 ESSENTIAL HYPERTENSION: ICD-10-CM

## 2022-10-31 DIAGNOSIS — Z79.899 HIGH RISK MEDICATION USE: ICD-10-CM

## 2022-10-31 DIAGNOSIS — I48.0 PAROXYSMAL ATRIAL FIBRILLATION (HCC): Primary | ICD-10-CM

## 2022-10-31 PROCEDURE — 3078F DIAST BP <80 MM HG: CPT | Performed by: INTERNAL MEDICINE

## 2022-10-31 PROCEDURE — 3074F SYST BP LT 130 MM HG: CPT | Performed by: INTERNAL MEDICINE

## 2022-10-31 PROCEDURE — 1123F ACP DISCUSS/DSCN MKR DOCD: CPT | Performed by: INTERNAL MEDICINE

## 2022-10-31 PROCEDURE — 99215 OFFICE O/P EST HI 40 MIN: CPT | Performed by: INTERNAL MEDICINE

## 2022-10-31 PROCEDURE — 93000 ELECTROCARDIOGRAM COMPLETE: CPT | Performed by: INTERNAL MEDICINE

## 2022-10-31 RX ORDER — CLONIDINE HYDROCHLORIDE 0.1 MG/1
0.1 TABLET ORAL
Qty: 30 TABLET | Refills: 3
Start: 2022-10-31 | End: 2022-11-09 | Stop reason: SDUPTHER

## 2022-10-31 RX ORDER — CARVEDILOL 6.25 MG/1
6.25 TABLET ORAL 2 TIMES DAILY WITH MEALS
Qty: 60 TABLET | Refills: 5 | Status: SHIPPED | OUTPATIENT
Start: 2022-10-31

## 2022-10-31 RX ORDER — ESCITALOPRAM OXALATE 5 MG/1
5 TABLET ORAL DAILY
COMMUNITY
Start: 2022-09-16

## 2022-10-31 NOTE — PROGRESS NOTES
Cardiac Electrophysiology Office Follow-up    NAME: Evaristo Oneal   :  1940  MRM:  [id number]    Date:  10/31/2022         Assessment and Plan:     1. Paroxysmal atrial fibrillation (HCC)  -     AMB POC EKG ROUTINE W/ 12 LEADS, INTER & REP  2. S/P ablation of atrial fibrillation  -     AMB POC EKG ROUTINE W/ 12 LEADS, INTER & REP  3. Essential hypertension  -     AMB POC EKG ROUTINE W/ 12 LEADS, INTER & REP  4. Anticoagulation adequate  -     AMB POC EKG ROUTINE W/ 12 LEADS, INTER & REP  5. High risk medication use  -     AMB POC EKG ROUTINE W/ 12 LEADS, INTER & REP     Paroxysmal atrial fibrillation  History of prior A. fib ablation in 2018 with reported unable to achieve complete block in the LS PV. She had been on flecainide ever since. I have had multiple bouts of reported atrial fibrillation, EKG today demonstrated QRS of 162 ms. Her symptoms are hard to pin down, however she is has had a normal echocardiogram and cardiac catheterization. Symptoms could be related to polypharmacy versus paroxysmal atrial fibrillation. Given her incomplete A. fib ablation in the past and recurrent atrial fibrillation despite flecainide therapy, could consider repeat A. fib ablation.  - Due to wide QRS of greater than 150 ms we will stop flecainide today  - To limit polypharmacy we will stop diltiazem  - Increase carvedilol 6.25 mg twice daily  - Continue Eliquis 5 mg twice daily for thromboembolic prophylaxis  - I have discussed options including continued medical therapy and ablation in detail. I have discussed the risks of left atrial ablation including vascular complications, infection, MI, death, stroke, cardiac tamponade, esophageal fistula, phrenic nerve paralysis, PV stenosis and need for a 2nd procedure with the pt. Patient reports complete understanding of discussions and recommendations and wishes to proceed with the procedure.   - Hold Eliquis on the morning procedure  - Schedule for A. fib ablation next available to prevent reliance on antiarrhythmic medications long-term    High risk med management  - We discussed in great detail regarding high risk medication management and the associated risks of antiarrhythmic therapy. Patient reports full understanding of recommendations.  -Risks associated with IC agent including but not limited to risk of QRS widening, ventricular arrhythmias and need for long term monitoring with routine imaging and stress testing was explained to the patient in great details. - EKG demonstrated QRS of 162 ms  -Stop as noted above    Anticoagulation  Denies of any bleeding issues. Know to contact clinic with any bleeding side effects (BRBPR, melena, hemotysis, hematuria). - Continue Eliquis 5 mg twice daily               Hypertension  Significant polypharmacy  - We will stop diltiazem today as noted above  - Increase carvedilol to 6.25 mg twice daily  - Advised to take clonidine only as needed      ATTENTION:   This medical record was transcribed using an electronic medical records/speech recognition system. Although proofread, it may and can contain electronic, spelling and other errors. Corrections may be executed at a later time. Please feel free to contact us for any clarifications as needed. Subjective:     Andreas Fulton, a [de-identified]y.o. year-old who presents for followup of atrial fibrillation. She's a prior patient of Dr. Pa Valente. Prior Afib ablation in 2018 with reported unable able to achieve complete block in the LSPV. She has been on flecainide since that time. She is now in persistent atrial fibrillation. Heart rates at home average 70-90. She is on Eliquis without any bleeding issues. She doesn't appear to be symptomatic. She has had dyspnea for many years and this is likely multifactorial and due to AF, hiatal hernia which she reports is large, and deconditioning. She denies any chest pain, palpitations, dizziness or syncope.        Review of Systems:   12 point review of systems was performed. All negative except for HPI    Exam:     Physical Exam:  /88 (BP 1 Location: Left upper arm, BP Patient Position: Sitting, BP Cuff Size: Adult)   Pulse 88   Resp 18   Ht 5' 4\" (1.626 m)   Wt 191 lb 9.6 oz (86.9 kg)   SpO2 97%   BMI 32.89 kg/m²     PHYSICAL EXAM  General:  Alert and oriented, in no acute distress; obese  Head:  Atraumatic, normocephalic  Eyes:  extraocular muscles intact  Neck:  Supple, normal range of motion  Lungs:  Clear to auscultation bilaterally, no wheezes/rales/rhonchi   Cardiovascular:  Irregular rhythm, rate controlled, normal S1-S2, no murmurs/rubs/gallops  Abdomen:  Soft, nontender, nondistended, normoactive bowel sounds  Skin:  Intact, no rash  Extremities:, no clubbing, cyanosis, or edema  Musculoskeletal: normal range of motion  Neurological:  Alert and oriented, no focal neurologic deficits  Psychiatric:  Normal mood and affect      Medications:     Current Outpatient Medications   Medication Sig    escitalopram oxalate (LEXAPRO) 5 mg tablet Take 5 mg by mouth daily. candesartan (Atacand) 8 mg tablet Take 4 Tablets by mouth daily. (Patient taking differently: Take 24 mg by mouth daily.)    carvediloL (COREG) 3.125 mg tablet Take 2 Tablets by mouth two (2) times daily (with meals). apixaban (Eliquis) 5 mg tablet TAKE 1 TABLET BY MOUTH TWICE DAILY    ezetimibe (Zetia) 10 mg tablet Take 1 Tablet by mouth every evening. dilTIAZem IR (CARDIZEM) 60 mg tablet Take 1 Tablet by mouth three (3) times daily. potassium chloride (Klor-Con M20) 20 mEq tablet Take 1 Tablet by mouth daily. furosemide (LASIX) 20 mg tablet Take 1 Tablet by mouth two (2) times a day. (Patient taking differently: Take 20 mg by mouth two (2) times a day.  Has not taken for the last two days, no swelling noted in her legs)    flecainide (TAMBOCOR) 50 mg tablet TAKE 1 TABLET BY MOUTH TWICE DAILY    cloNIDine HCL (CATAPRES) 0.1 mg tablet TAKE 1 TABLET BY MOUTH TWICE DAILY(ADDITIONAL DOSES AS NEEDED AS DIRECTED BY DOCTOR)    levothyroxine (SYNTHROID) 50 mcg tablet Take 50 mcg by mouth Daily (before breakfast). calcium-vitamin D (OYSTER SHELL) 500 mg(1,250mg) -200 unit per tablet Take 1 Tab by mouth every evening. FOLIC ACID/MULTIVIT-MIN/LUTEIN (CENTRUM SILVER PO) Take 1 Tab by mouth every evening. No current facility-administered medications for this visit. Diagnostic Data Review:       Results for orders placed or performed during the hospital encounter of 09/09/22   EKG, 12 LEAD, INITIAL   Result Value Ref Range    Ventricular Rate 97 BPM    QRS Duration 158 ms    Q-T Interval 354 ms    QTC Calculation (Bezet) 449 ms    Calculated R Axis 94 degrees    Calculated T Axis -24 degrees    Diagnosis       Atrial fibrillation  Right bundle branch block  Abnormal ECG  When compared with ECG of 19-JUN-2020 05:00,  Atrial fibrillation has replaced Sinus rhythm  T wave inversion more evident in Inferior leads  T wave inversion now evident in Anterior leads  Confirmed by Qasim Marsh M.D., Alexandra Hoof ([id number]) on 9/9/2022 3:40:19 PM        1. Stress Test   Adenosine myoview 2011 - normal perfusion, EF 81%   Lexiscan 3/3/2014 - normal perfusion EF 83%   Lexiscan cardiolite 5/10/16 - normal perfusion, EF 78%   Lexiscan Cardiolite 1/30/19 - normal perfusion. EF 77%. Lexiscan-6/29/22-small apical mild intensity defect which is slightly worse on stress images. Possibility of breast attenuation cannot be completely excluded. SDS is only 1, LVEF 85%, No EKG changes of ischemia. 2. Echo   2/22/13 - normal left ventricular size and function, normal appearing mitral, aortic, and tricuspid valves, with mild mitral and moderate tricuspid regurgitation, bi-atrial enlargement   5/15/15- 60%, mildly dilated LA, mild MR   3/8/18 - EF 65%. No WMA. Normal RV size and function. Mild MR. Mild TR.   8/24/20 - EF 60-65%. G1DD. Mild MAC with mild MR. Mild to mod TR. PASP 45 mm hg. Mildly elevated CVP. 9/14/21-EF 60 - 65%, grade 2 DD, Mild mitral annular calcification, mild/mod MR, mild TR, RVSP 38 mmHg, pulm HTN mild, LAE, Mild ascending aorta dilatation. Ascending aorta diameter = 3.8 cm.  7/12/22- EF 60 - 65%, mild concentric hypertrophy, Global hypokinesis, Ao Ascending diameter is 3.8 cm, Annular calcification of MV, mild MR, LAE, mild/mod TR, RVSP 37 mmHg    3. Cardioversion    7/23/2015 - 200J     4. Renal Visceral Duplex 5/2016   No evidence of ONOFRE     5. 3/2/17-. Successful atrial fibrillation ablation however unable to achieve entrance/exit block of LSPV per Dr. Chester Jenkins    6. Lipids  8/12/21- , HDL 57, LDL 85, TG 66  4/13/22- , HDL 60, LDL 99, TG 69    7. Cardiac Cath  7/22/22- L Main:  Med to large; Ostial - 10%; Distal ( at bifurcation ) 30% -   LAD: Med; Mid 40%; D1 - Small to med; MLI  LCflex: Med to large; MLI; OM1/OM2 - MLI  RCA: Med; MLI; PDA and PLB - small; MLI  LVEDP: 9 mm Hg  LVEF: Not assessed  No significant gradient across aortic valve. PCI: none  EBU 3.5 guide  Distal L Main 30%  iFR LAD - 0.96  iFR Lcflex - 1  Ostial L main spasm with guiding catheter with some dampening noted      Lab Review:   No results found for: CHOL, CHOLX, CHLST, CHOLV, 303309, HDL, HDLP, LDL, LDLC, DLDLP, TGLX, TRIGL, TRIGP, CHHD, CHHDX  Lab Results   Component Value Date/Time    Creatinine (POC) 1.0 09/07/2020 02:26 AM    Creatinine 1.04 (H) 09/09/2022 01:43 AM     Lab Results   Component Value Date/Time    BUN 13 09/09/2022 01:43 AM    BUN (POC) 19 09/07/2020 02:26 AM     Lab Results   Component Value Date/Time    Potassium 3.8 09/09/2022 01:43 AM     No results found for: HBA1C, PIO6AHKR, QPD5ANSP  Lab Results   Component Value Date/Time    HGB 13.6 09/09/2022 01:43 AM     Lab Results   Component Value Date/Time    PLATELET 295 97/28/1614 01:43 AM     No results for input(s): CPK, CKMB, TROIQ in the last 72 hours.     No lab exists for component: CKQMB, CPKMB             ___________________________________________________    An Lon Nguyen MD, [facility]  Cardiac Electrophysiology  [facility]  [address] [place] [address], [address] [place] [address] Avenue                             [phone]     [address] [place] [address].  [address] [place] [address] [place], [address] [facility]  [phone]

## 2022-10-31 NOTE — PROGRESS NOTES
Room EP 1    Chief Complaint   Patient presents with    Irregular Heart Beat     Visit Vitals  /88 (BP 1 Location: Left upper arm, BP Patient Position: Sitting, BP Cuff Size: Adult)   Pulse 88   Resp 18   Ht 5' 4\" (1.626 m)   Wt 191 lb 9.6 oz (86.9 kg)   SpO2 97%   BMI 32.89 kg/m²         Chest pain: no    Shortness of breath: yes, with least exertion and at rest    Edema: no    Palpitations, Skipped beats, Rapid heartbeat: fast heart beats    Dizziness: no    Fatigue:no    New diagnosis/Surgeries: no    1. Have you been to the ER, urgent care clinic since your last visit? Hospitalized since your last visit? Yes, 9/9/22- SFM= Afib, HTN     2. Have you seen or consulted any other health care providers outside of the [address] [place] [address] since your last visit? Include any pap smears or colon screening.  PCP    Refills:no

## 2022-10-31 NOTE — PATIENT INSTRUCTIONS
Increase Carvedilol to 6.25 mg twice a day  Stop Diltiazem today  Stop Flecainide today  Please take Clonidine only as needed  Schedule for Afib ablation      You are scheduled for the following procedure with Dr. Inell Opitz:  AFIB ablation at [facility], [address] [facility], [address]        PLEASE be aware that your procedure date/time is tentative and subject to change due to emergency cases. Procedure date/time:    December 8, 2022  Time: 8:00 am - please arrive by  6:15 am      ARRIVAL time:  (You will need  to be discharged home with.)       [X]  [facility] procedures: arrive to check in on 1st floor near [address] [facility] entrance two hours prior to your procedure. Pre-procedure Labs:    PRE-PROCEDURE LABS NEEDED: YES   Forms for labwork may be given at appointment. If not, they will be mailed to you. Labs should be completed within 5-7 days of procedure. These can be done at any [employer] or [employer]. [address], [address] [facility],4Th Floor  [address]  Monday-Friday 730A-430P (closed 4974-577O)  [phone]    [facility] [address]  [address], [facility]  [address] [facility] 7A-5P  [address] [facility], [address] [facility], [address] [facility]  [address]  Monday-Friday 363W-255T  [phone] (closed 1-2P)    [facility] , [address] [facility],8Th Floor 200  [place], [address] [facility]  Monday-Friday 730A-430P (FDGPDF [id number])  [phone]    [address]  [address], [address] [facility]  Monday-Friday 7A-430P  200 [facility], [facility]  Monday-Friday 730A-430P (closed 1-2P)  [phone]            Medication Instructions:    Please HOLD your eliquis the morning of your procedure.           Nothing to eat or drink after midnight before your procedure. You may take all other medications as directed the morning of your procedure with a sip of water unless otherwise indicated. Please contact the office [phone] and ask for a member of Dr. Marta Roca team for procedure questions. There is a physician on call for the office after hours for immediate needs. FOLLOW UP:   Your appointments will be made for you post procedure based off of discharge instructions. You may have driving restrictions for a short time after your procedure (usually 2-3 days). Pacemaker/ICD patients will be unable to have an MRI until 6 weeks after implant, NO dental work for 8 weeks after your implant. Atrial Fibrillation Ablation   Discharge Instructions      You have just had an Atrial Fibrillation Ablation. There were catheters temporarily placed in your heart through a puncture in the veins and/or arteries in your groin. WHAT TO EXPECT     If you have had an Atrial Fibrillation Ablation please be aware that you may experience mild chest pain that will resolve within 24-48 hours. If the chest pain persists or becomes severe, please call the office. Mild to moderate, non-painful, bruising or mild swelling at the puncture site is not un-common, and will resolve in 7 - 14 days, and may extend down your thigh as it heals. Application of Ice to the site may help with any tenderness. If a closure device was used to seal your artery or vein, a separate pamphlet will be given to you with these discharge instructions. It is very important that you review the information in the pamphlet for different restrictions and precautions. You have a small gauze dressing applied to the puncture site in your groin. You may remove this the following morning. It is not uncommon to feel palpitations during the healing phase after your ablation.  If you feel as though you are having recurrence of atrial fibrillation lasting longer than 30 minutes, please contact the office. Palpitations/AFIB can occur during the healing phase (1-2 months) post ablation. MEDICATIONS     Take only the medications prescribed to you at discharge. ACTIVITY     A responsible adult must take you home. Do not drive a car for 24 hours. Rest quietly for the remainder of the day. Do not lift anything greater than 10 pounds for 7 days. Limit bending at the puncture site and use of stairs for at least 2 days. You may remove the bandage and shower the morning after the procedure. Do not take a bath for 3 days. SYMPTOMS THAT NEED TO BE REPORTED IMMEDIATELY     Bleeding at the puncture site. If there is bleeding, lie down and hold firm direct pressure for at least 5 minutes. If the bleeding does not stop, go to the closest emergency room, or call 911. Temperature more than 100.5 F. Redness or warmth at the puncture site. Increasing pain, numbness, coolness or blue discoloration of the extremity where the puncture is located. Pulsating mass at the puncture site. A new lump at the puncture site, or increasing swelling at the site. Please be aware that a pea or marble sized lump is normal, anything larger or increasing in size should be reported. Bruising at the puncture site that enlarges or becomes painful (some bruising at the site is common and will go away in 7-14 days). Dizziness, lightheadedness, fainting. REMEMBER: If you feel something is an emergency or cannot be handled over the phone, call 911 or go to the closest emergency room.       Reyna Demarco in 1 month  Amparo Alexander NP in 3 months        Elza Wilson MD  Cardiac Electrophysiology / Cardiology    [address]  [address] [facility], [address], [facility] [address] [facility]  [phone] / [phone] Fax  [phone] / [phone] Fax

## 2022-11-01 ENCOUNTER — TELEPHONE (OUTPATIENT)
Dept: CARDIOLOGY CLINIC | Age: 82
End: 2022-11-01

## 2022-11-01 RX ORDER — CANDESARTAN 32 MG/1
32 TABLET ORAL DAILY
Qty: 30 TABLET | Refills: 3 | Status: SHIPPED | OUTPATIENT
Start: 2022-11-01

## 2022-11-01 NOTE — TELEPHONE ENCOUNTER
Patient is schedule for an ablation on 12/8/22 and would like to know what time she needs to arrive at the hospital.    Nurse said for the patient to arrive at 10 am.    [phone]

## 2022-11-01 NOTE — TELEPHONE ENCOUNTER
Called patient, ID verified using two patient identifiers. Notified aptient that her procedure time needs to be adjusted on 12/8/22 due to a scheduling conflict. Patient's procedure time changed to 12/8/22 at 12:00 pm.  Instructed to arrive at 10:00 am.    Patient verbalized understanding and will call with any other questions.

## 2022-11-01 NOTE — TELEPHONE ENCOUNTER
PSR Evita relayed message to patient that she should arrive at 10:00 am for her procedure on 12/8/22.

## 2022-11-01 NOTE — TELEPHONE ENCOUNTER
Requested Prescriptions     Signed Prescriptions Disp Refills    candesartan (Atacand) 32 mg tablet 30 Tablet 3     Sig: Take 1 Tablet by mouth daily.      Authorizing Provider: Tony Andres     Ordering User: Tahira Angel     Refills per VO from Dr. Kash Gallo.

## 2022-11-09 NOTE — TELEPHONE ENCOUNTER
Patient is calling because she needs to have a prescription refill. Clonidine HCL 0.1 mg. Patient believes the Clonidine is not working. Pharmacy is confirmed. Candesartan makes her blood pressure low 89/65 P 89 today she cut the 32  mg in half to 16 mg. Patient would like to know if her medicine may needs some adjusting. Please assist and advise.       [phone]

## 2022-11-10 ENCOUNTER — TELEPHONE (OUTPATIENT)
Dept: CARDIOLOGY CLINIC | Age: 82
End: 2022-11-10

## 2022-11-10 RX ORDER — CLONIDINE HYDROCHLORIDE 0.1 MG/1
0.1 TABLET ORAL
Qty: 30 TABLET | Refills: 3 | Status: SHIPPED | OUTPATIENT
Start: 2022-11-10

## 2022-11-10 NOTE — TELEPHONE ENCOUNTER
Pt was returning a call to Delma Cifuentes informed me that she transferred the pt to  and Judy Pena. Pt is waiting on a call back in reference to her medication and updates about her BP.        Pt #   [phone]

## 2022-11-10 NOTE — TELEPHONE ENCOUNTER
Pt wanted new Clonidine rx sent due to rx she has at home is over 3 yr old.   Faxed rx and she will call office if new rx does not work for her high BP.

## 2022-12-01 ENCOUNTER — TELEPHONE (OUTPATIENT)
Dept: CARDIOLOGY CLINIC | Age: 82
End: 2022-12-01

## 2022-12-01 ENCOUNTER — PREP FOR PROCEDURE (OUTPATIENT)
Dept: CARDIOLOGY CLINIC | Age: 82
End: 2022-12-01

## 2022-12-01 RX ORDER — SODIUM CHLORIDE 0.9 % (FLUSH) 0.9 %
5-40 SYRINGE (ML) INJECTION AS NEEDED
OUTPATIENT
Start: 2022-12-01

## 2022-12-01 RX ORDER — SODIUM CHLORIDE 0.9 % (FLUSH) 0.9 %
5-40 SYRINGE (ML) INJECTION EVERY 8 HOURS
OUTPATIENT
Start: 2022-12-01

## 2022-12-01 NOTE — TELEPHONE ENCOUNTER
Patient is calling because she need to cancel the ablation that she is schedule for 12/8/22 at [facility]. Patient also is calling because she needs to discuss her medication changes.     [phone]

## 2022-12-01 NOTE — TELEPHONE ENCOUNTER
Returned patient call, ID verified using two patient identifiers. Patient calling to cancel her AFIB ablation scheduled for next week 12/8/22. She states her  has just been diagnosed with cancer and they are just starting treatment. She is feeling very overwhelmed and would like to call back to reschedule when she knows more about how things are going. Ms. Meghana Aguilar is also having difficulty with her blood pressure. She states she called a few weeks ago and never heard back from a nurse. Her blood pressures and heart rates have remained elevated since her Diltiazem was discontinued. Her Coreg was increased to 6.25mg BID. She was taking clonidine PRN and her blood pressures were remaining high (ex - 159/110, HR 92.)    Since she never had a return call she resumed taking her Diltiazem and her blood pressures improved. Today /84, HR 88. She states she is not taking clonidine if she takes the diltiazem. She states if she needs to come in to be seen she is willing to do so. Advised Ms. Ojeda that I will notify Dr. Mark Rodriguez and someone will call her back with recommendations. Patient verbalized understanding and will call with any other questions.       Future Appointments   Date Time Provider Ray Zamora   2/15/2023  1:20 PM Billy Holt MD [facility]

## 2022-12-02 RX ORDER — DILTIAZEM HYDROCHLORIDE 60 MG/1
60 TABLET, FILM COATED ORAL 3 TIMES DAILY
Qty: 90 TABLET | Refills: 3
Start: 2022-12-02

## 2022-12-02 NOTE — TELEPHONE ENCOUNTER
Lele Good MD       Resume her diltiazem and please give her refills. Returned patient call, ID verified using two patient identifiers. Notified patient of Dr. Hua Masters recommendations above. Patient verbalized understanding and will call with any other questions. Requested Prescriptions     Signed Prescriptions Disp Refills    dilTIAZem IR (CARDIZEM) 60 mg tablet 90 Tablet 3     Sig: Take 1 Tablet by mouth three (3) times daily.      Authorizing Provider: Mindi Cover     Ordering User: Jennifer Vu

## 2023-02-08 ENCOUNTER — TELEPHONE (OUTPATIENT)
Dept: CARDIOLOGY CLINIC | Age: 83
End: 2023-02-08

## 2023-02-08 NOTE — TELEPHONE ENCOUNTER
Pt requested to with Dr. Yenny Sutton, pt stated her bp has been low all day, 93/67, pt would like to discuss with the doctor, pt stated she felt weak, please advise    Tried calling the nurse, not available      Pt# [phone]

## 2023-02-10 NOTE — TELEPHONE ENCOUNTER
Pt c/o low BP for several days 80'E systolic. She has cut her candesartan in half to 16 mg and take diltiazem 60 mg BID instead of TID, BP better 407-359 systolic. She will bring her medications and BP machine to 2/15/23 appointment.

## 2023-02-14 NOTE — PATIENT INSTRUCTIONS

## 2023-02-14 NOTE — PROGRESS NOTES
CARDIOLOGY OFFICE NOTE    Kobe Morrison MD, [address], [place], [address] [facility]  Phone [phone]; Fax [phone]  Mobile [phone]   Voice Mail [phone]    Primary care: Sebas Vazquez MD       ATTENTION:   This medical record was transcribed using an electronic medical records/speech recognition system. Although proofread, it may and can contain electronic, spelling and other errors. Corrections may be executed at a later time. Please feel free to contact us for any clarifications as needed. ICD-10-CM ICD-9-CM   1. Paroxysmal atrial fibrillation (HCC)  I48.0 427.31   2. Essential hypertension  I10 401.9   3. S/P ablation of atrial fibrillation  Z98.890 V45.89    Z86.79    4. Anticoagulation adequate  Z79.01 V58.61   5. ANÍBAL on CPAP  G47.33 327.23    Z99.89 V46.8   6. Severe obesity (BMI 35.0-39. 9) with comorbidity ([address]  E66.01 278.01              Bora Castillo is a 82 y.o. female with  referred for shortness of breath and hypertension, paroxysmal atrial fibrillation status post ablation, SVT          Cardiac risk factors: hypertension, post-menopausal  I have personally obtained the history from the patient. HISTORY OF PRESENTING ILLNESS   She was followed by Dr. Mumtaz Garcia and Dr. Joe Rivera in the past.  She did have a ablation for atrial fibrillation with Dr. Joe Rivera in the past in the past and she is on multiple medicines including diltiazem at 60 mg twice daily, carvedilol 3.125 mg twice daily clonidine 0.1 mg twice daily and Tambocor 50 mg twice daily. She wanted to stay with the [facility] system so she will see Dr. Jack Olivier or our new electrophysiologist.  She has persistent atrial fibrillation currently. She does get short of breath but I do not think she is extremely active. She had an ablation in the past but unable to achieve entrance/exit block of LSPV per Dr. Lisa Cardona. No bleeding on eliquis.     She has no chest pain with activity. She will have some discomfort in her chest if she is lying on her left side and will move over to the right side and all improved. She was scheduled to have an ablation but her  was diagnosed with prostate cancer so there is a lot going on that prevented her from going forward with the ablation. She is now interested in readdressing this. Additionally her medicines were adjusted and she was having drop in her blood pressure in the afternoon into the 60s and 80s. She is altering her medicines somewhat but is difficult to get what he is taking on a regular basis. I do note that she has no topical or taking Lasix and the potassium she is taking half of her Atacand she stopped the clonidine and is taking Cardizem 60 mg 3 times a day and we talked about going on it only twice a day. ACTIVE PROBLEM LIST     Patient Active Problem List    Diagnosis Date Noted    High risk medication use 10/30/2022    Anticoagulation adequate 10/20/2022    Sinus bradycardia 05/12/2020    Anxiety 07/22/2018    Severe obesity (BMI 35.0-39. 9) with comorbidity ([address] 05/25/2018    SVT (supraventricular tachycardia) ([address] 08/28/2017    Hiatal hernia 08/28/2017    Venous insufficiency 05/04/2017    Acquired hypothyroidism 05/01/2017    S/P ablation of atrial fibrillation 04/28/2017    Paroxysmal atrial fibrillation ([address] 08/28/2016    Essential hypertension 07/26/2016    Gastroesophageal reflux disease without esophagitis 07/03/2016    RBBB 05/07/2015    LAE (left atrial enlargement) 04/03/2013           PAST MEDICAL HISTORY     Past Medical History:   Diagnosis Date    Atrial fibrillation with RVR ([address] 6/29/2018    DDD (degenerative disc disease)     Gastroesophageal reflux disease without esophagitis 7/3/2016    GERD (gastroesophageal reflux disease)     Hiatal hernia     HTN (hypertension), benign 7/26/2016    Hypothyroidism     ANÍBAL (obstructive sleep apnea) 7/26/2016    Paroxysmal atrial fibrillation ([address] 2016    Uterine prolapse            PAST SURGICAL HISTORY     Past Surgical History:   Procedure Laterality Date    HX AFIB ABLATION  2017    HX GI      COLONOSCOPY     HX GYN      TUBAL LIGATION    HX HEENT      WISDOM TEETH EXTRACTED. ALLERGIES     Allergies   Allergen Reactions    Citalopram Hydrobromide Rash     With itching. Chlorthalidone Rash    Contrast Agent [Iodine] Other (comments)     Wheezing/bronchospasm    Maxzide [Triamterene-Hydrochlorothiazid] Palpitations    Vicodin [Hydrocodone-Acetaminophen] Palpitations          FAMILY HISTORY     Family History   Problem Relation Age of Onset    Diabetes Mother     Hypertension Mother     COPD Mother     negative for cardiac disease       SOCIAL HISTORY     Social History     Socioeconomic History    Marital status:    Tobacco Use    Smoking status: Former     Packs/day: 1.50     Years: 30.00     Pack years: 45.00     Types: Cigarettes     Quit date: 3/3/1994     Years since quittin.9    Smokeless tobacco: Former   Substance and Sexual Activity    Alcohol use: No    Drug use: No    Sexual activity: Never         MEDICATIONS     Current Outpatient Medications   Medication Sig    dilTIAZem IR (CARDIZEM) 60 mg tablet Take 1 Tablet by mouth three (3) times daily. cloNIDine HCL (CATAPRES) 0.1 mg tablet Take 1 Tablet by mouth daily as needed (Hypertension). candesartan (Atacand) 32 mg tablet Take 1 Tablet by mouth daily. escitalopram oxalate (LEXAPRO) 5 mg tablet Take 5 mg by mouth daily. carvediloL (COREG) 6.25 mg tablet Take 1 Tablet by mouth two (2) times daily (with meals). apixaban (Eliquis) 5 mg tablet TAKE 1 TABLET BY MOUTH TWICE DAILY    ezetimibe (Zetia) 10 mg tablet Take 1 Tablet by mouth every evening. levothyroxine (SYNTHROID) 50 mcg tablet Take 50 mcg by mouth Daily (before breakfast). calcium-vitamin D (OYSTER SHELL) 500 mg(1,250mg) -200 unit per tablet Take 1 Tab by mouth every evening.     FOLIC ACID/MULTIVIT-MIN/LUTEIN (CENTRUM SILVER PO) Take 1 Tab by mouth every evening. No current facility-administered medications for this visit. I have reviewed the nurses notes, vitals, problem list, allergy list, medical history, family, social history and medications. REVIEW OF SYMPTOMS   As per HPI  General: Pt denies excessive weight gain or loss. Pt is able to conduct ADL's  HEENT: Denies blurred vision, headaches, hearing loss, epistaxis and difficulty swallowing. Respiratory: Denies cough, congestion, shortness of breath, GOODE, wheezing or stridor. Cardiovascular: Denies precordial pain, palpitations, edema or PND  Gastrointestinal: Denies poor appetite, indigestion, abdominal pain or blood in stool  Genitourinary: Denies hematuria, dysuria, increased urinary frequency  Musculoskeletal: Denies joint pain or swelling from muscles or joints  Neurologic: Denies tremor, paresthesias, headache, or sensory motor disturbance  Psychiatric: Denies confusion, insomnia, depression  Integumentray: Denies rash, itching or ulcers. Hematologic: Denies easy bruising, bleeding     PHYSICAL EXAMINATION      Vitals:    02/15/23 1335   BP: 124/80   Pulse: 86   SpO2: 98%   Weight: 189 lb (85.7 kg)   Height: 5' 4\" (1.626 m)       General: Well developed, in no acute distress. Anxious  HEENT: No jaundice, oral mucosa moist, no oral ulcers  Neck: Supple, no stiffness, no lymphadenopathy, supple  Heart:   Irregular rate controlled  Respiratory: Clear bilaterally x 4, no wheezing or rales  Extremities: Trace edema, normal cap refill, no cyanosis. Musculoskeletal: No clubbing, no deformities  Neuro: A&Ox3, speech clear, gait stable, cooperative, no focal neurologic deficits  Skin: Skin color is normal. No rashes or lesions. Non diaphoretic, moist.                DIAGNOSTIC DATA     1.  Stress Test   Adenosine myoview 2011 - normal perfusion, EF 81%   Lexiscan 3/3/2014 - normal perfusion EF 83%   Lexiscan cardiolite 5/10/16 - normal perfusion, EF 78%   Lexiscan Cardiolite 1/30/19 - normal perfusion. EF 77%. 2. Echo   2/22/13 - normal left ventricular size and function, normal appearing mitral, aortic, and tricuspid valves, with mild mitral and moderate tricuspid regurgitation, bi-atrial enlargement   5/15/15- 60%, mildly dilated LA, mild MR   3/8/18 - EF 65%. No WMA. Normal RV size and function. Mild MR. Mild TR.   8/24/20 - EF 60-65%. G1DD. Mild MAC with mild MR. Mild to mod TR. PASP 45 mm hg. Mildly elevated CVP. 3. Cardioversion    7/23/2015 - 200J     4. Renal Visceral Duplex 5/2016   No evidence of ONOFRE     5. 3/2/17-. Successful atrial fibrillation ablation however unable to achieve entrance/exit block of LSPV per Dr. Chelly Hdez    6. Lipids  8/12/21- , HDL 57, LDL 85, TG 66         LABORATORY DATA            Lab Results   Component Value Date/Time    WBC 9.5 09/09/2022 01:43 AM    HGB 13.6 09/09/2022 01:43 AM    Hematocrit (POC) 43 09/07/2020 02:26 AM    HCT 42.3 09/09/2022 01:43 AM    PLATELET 622 32/61/5850 01:43 AM    MCV 94.0 09/09/2022 01:43 AM      Lab Results   Component Value Date/Time    Sodium 140 09/09/2022 01:43 AM    Potassium 3.8 09/09/2022 01:43 AM    Chloride 106 09/09/2022 01:43 AM    CO2 30 09/09/2022 01:43 AM    Anion gap 4 (L) 09/09/2022 01:43 AM    Glucose 152 (H) 09/09/2022 01:43 AM    BUN 13 09/09/2022 01:43 AM    Creatinine 1.04 (H) 09/09/2022 01:43 AM    BUN/Creatinine ratio 13 09/09/2022 01:43 AM    GFR est AA >60 09/09/2022 01:43 AM    GFR est non-AA 51 (L) 09/09/2022 01:43 AM    Calcium 10.3 (H) 09/09/2022 01:43 AM    Bilirubin, total 0.5 09/09/2022 01:43 AM    Alk.  phosphatase 81 09/09/2022 01:43 AM    Protein, total 7.3 09/09/2022 01:43 AM    Albumin 3.9 09/09/2022 01:43 AM    Globulin 3.4 09/09/2022 01:43 AM    A-G Ratio 1.1 09/09/2022 01:43 AM    ALT (SGPT) 20 09/09/2022 01:43 AM           ASSESSMENT/RECOMMENDATIONS:.      1) HTN, hx of labile pressures   -Blood pressure is reasonable today but she did not want to just I think it was a combination of her atrial fibrillation and having to walk a distance that she is not typically used to doing. 2) paroxysmal AF    -Currently she is in A. Fib  -She is persistently atrial fibrillation so and asked that she see EP  -I think she needs to be on normal sinus rhythm, and question the need for an ablation  -She is anticoagulant Eliquis      3) diastolic dysfunction/shortness of breath  -EF was normal on her most recent echo in July 60-65%  -She also had a [facility] on June 29, 2022 with small apical mild intensity defect which was felt to be possibly breast attenuation    4) obesity  -Encourage her to exercise regularly at least walking daily    5)ANÍBAL  -had negative study years ago. 6) chest discomfort  -Stress test that suggested a small apical defect but thought it was related to breast attenuation    Follow-up with me in 6 months      No orders of the defined types were placed in this encounter. We discussed the expected course, resolution and complications of the diagnosis(es) in detail. Medication risks, benefits, costs, interactions, and alternatives were discussed as indicated. I advised him to contact the office if his condition worsens, changes or fails to improve as anticipated. He expressed understanding with the diagnosis(es) and plan          Follow-up and Dispositions    Return in about 6 months (around 8/15/2023). I have discussed the diagnosis with  Jenel Claude and the intended plan as seen in the above orders. Questions were answered concerning future plans. I have discussed medication side effects and warnings with the patient as well. Thank you,  Erik Lyon MD for involving me in the care of  Jenel Claude. Please do not hesitate to contact me for further questions/concerns. Kobe Holt MD, [address] [facility], [address] [place] [address], [address]      [phone] / [phone] Fax                                          CARDIOLOGY OFFICE NOTE    Primitivo Stern MD, [address], [facility], [address] [facility]  Phone [phone]; Fax [phone]  Mobile [phone]   Voice Mail [phone]    Primary care: Mckay Forte MD       ATTENTION:   This medical record was transcribed using an electronic medical records/speech recognition system. Although proofread, it may and can contain electronic, spelling and other errors. Corrections may be executed at a later time. Please feel free to contact us for any clarifications as needed. ICD-10-CM ICD-9-CM   1. Paroxysmal atrial fibrillation (HCC)  I48.0 427.31   2. Essential hypertension  I10 401.9   3. S/P ablation of atrial fibrillation  Z98.890 V45.89    Z86.79    4. Anticoagulation adequate  Z79.01 V58.61   5. ANÍBAL on CPAP  G47.33 327.23    Z99.89 V46.8   6. Severe obesity (BMI 35.0-39. 9) with comorbidity ([facility]  E66.01 278.01              Olivia Martines is a 82 y.o. female with  referred for shortness of breath and hypertension, paroxysmal atrial fibrillation status post ablation, SVT          Cardiac risk factors: hypertension, post-menopausal  I have personally obtained the history from the patient. HISTORY OF PRESENTING ILLNESS   She was followed by Dr. Charla Abdul and Dr. Arnaud Chin in the past.  She did have a ablation for atrial fibrillation with Dr. Arnaud Chin in the past in the past and she is on multiple medicines including diltiazem at 60 mg twice daily, carvedilol 3.125 mg twice daily clonidine 0.1 mg twice daily and Tambocor 50 mg twice daily. She wanted to stay with the [facility] system so she will see Dr. Laure Tan or our new electrophysiologist.  She has persistent atrial fibrillation currently. She does get short of breath but I do not think she is extremely active.   She probably is somewhat deconditioned but she may need her a short check so I am not sure if it would be reasonable to go forward with a another ablation. She had an ablation in the past but unable to achieve entrance/exit block of LSPV per Dr. Rafael Braxton. No bleeding on eliquis. ACTIVE PROBLEM LIST     Patient Active Problem List    Diagnosis Date Noted    High risk medication use 10/30/2022    Anticoagulation adequate 10/20/2022    Sinus bradycardia 05/12/2020    Anxiety 07/22/2018    Severe obesity (BMI 35.0-39. 9) with comorbidity ([address] 05/25/2018    SVT (supraventricular tachycardia) ([facility] 08/28/2017    Hiatal hernia 08/28/2017    Venous insufficiency 05/04/2017    Acquired hypothyroidism 05/01/2017    S/P ablation of atrial fibrillation 04/28/2017    Paroxysmal atrial fibrillation ([facility] 08/28/2016    Essential hypertension 07/26/2016    Gastroesophageal reflux disease without esophagitis 07/03/2016    RBBB 05/07/2015    LAE (left atrial enlargement) 04/03/2013           PAST MEDICAL HISTORY     Past Medical History:   Diagnosis Date    Atrial fibrillation with RVR ([address] 6/29/2018    DDD (degenerative disc disease)     Gastroesophageal reflux disease without esophagitis 7/3/2016    GERD (gastroesophageal reflux disease)     Hiatal hernia     HTN (hypertension), benign 7/26/2016    Hypothyroidism     ANÍBAL (obstructive sleep apnea) 7/26/2016    Paroxysmal atrial fibrillation ([address] 8/28/2016    Uterine prolapse            PAST SURGICAL HISTORY     Past Surgical History:   Procedure Laterality Date    HX AFIB ABLATION  03/2017    HX GI      COLONOSCOPY 7/14    HX GYN      TUBAL LIGATION    HX HEENT      WISDOM TEETH EXTRACTED. ALLERGIES     Allergies   Allergen Reactions    Citalopram Hydrobromide Rash     With itching.     Chlorthalidone Rash    Contrast Agent [Iodine] Other (comments)     Wheezing/bronchospasm    Maxzide [Triamterene-Hydrochlorothiazid] Palpitations    Vicodin [Hydrocodone-Acetaminophen] Palpitations          FAMILY HISTORY     Family History   Problem Relation Age of Onset    Diabetes Mother     Hypertension Mother     COPD Mother     negative for cardiac disease       SOCIAL HISTORY     Social History     Socioeconomic History    Marital status:    Tobacco Use    Smoking status: Former     Packs/day: 1.50     Years: 30.00     Pack years: 45.00     Types: Cigarettes     Quit date: 3/3/1994     Years since quittin.9    Smokeless tobacco: Former   Substance and Sexual Activity    Alcohol use: No    Drug use: No    Sexual activity: Never         MEDICATIONS     Current Outpatient Medications   Medication Sig    dilTIAZem IR (CARDIZEM) 60 mg tablet Take 1 Tablet by mouth three (3) times daily. cloNIDine HCL (CATAPRES) 0.1 mg tablet Take 1 Tablet by mouth daily as needed (Hypertension). candesartan (Atacand) 32 mg tablet Take 1 Tablet by mouth daily. escitalopram oxalate (LEXAPRO) 5 mg tablet Take 5 mg by mouth daily. carvediloL (COREG) 6.25 mg tablet Take 1 Tablet by mouth two (2) times daily (with meals). apixaban (Eliquis) 5 mg tablet TAKE 1 TABLET BY MOUTH TWICE DAILY    ezetimibe (Zetia) 10 mg tablet Take 1 Tablet by mouth every evening. levothyroxine (SYNTHROID) 50 mcg tablet Take 50 mcg by mouth Daily (before breakfast). calcium-vitamin D (OYSTER SHELL) 500 mg(1,250mg) -200 unit per tablet Take 1 Tab by mouth every evening. FOLIC ACID/MULTIVIT-MIN/LUTEIN (CENTRUM SILVER PO) Take 1 Tab by mouth every evening. No current facility-administered medications for this visit. I have reviewed the nurses notes, vitals, problem list, allergy list, medical history, family, social history and medications. REVIEW OF SYMPTOMS   As per HPI  General: Pt denies excessive weight gain or loss. Pt is able to conduct ADL's  HEENT: Denies blurred vision, headaches, hearing loss, epistaxis and difficulty swallowing.   Respiratory: Denies cough, congestion, shortness of breath, GOODE, wheezing or stridor. Cardiovascular: Denies precordial pain, palpitations, edema or PND  Gastrointestinal: Denies poor appetite, indigestion, abdominal pain or blood in stool  Genitourinary: Denies hematuria, dysuria, increased urinary frequency  Musculoskeletal: Denies joint pain or swelling from muscles or joints  Neurologic: Denies tremor, paresthesias, headache, or sensory motor disturbance  Psychiatric: Denies confusion, insomnia, depression  Integumentray: Denies rash, itching or ulcers. Hematologic: Denies easy bruising, bleeding     PHYSICAL EXAMINATION      Vitals:    02/15/23 1335   BP: 124/80   Pulse: 86   SpO2: 98%   Weight: 189 lb (85.7 kg)   Height: 5' 4\" (1.626 m)       General: Well developed, in no acute distress. Anxious  HEENT: No jaundice, oral mucosa moist, no oral ulcers  Neck: Supple, no stiffness, no lymphadenopathy, supple  Heart:   Irregular rate controlled  Respiratory: Clear bilaterally x 4, no wheezing or rales  Extremities: Trace edema, normal cap refill, no cyanosis. Musculoskeletal: No clubbing, no deformities  Neuro: A&Ox3, speech clear, gait stable, cooperative, no focal neurologic deficits  Skin: Skin color is normal. No rashes or lesions. Non diaphoretic, moist.                DIAGNOSTIC DATA     1. Stress Test   Adenosine myoview 2011 - normal perfusion, EF 81%   Lexiscan 3/3/2014 - normal perfusion EF 83%   Lexiscan cardiolite 5/10/16 - normal perfusion, EF 78%   Lexiscan Cardiolite 1/30/19 - normal perfusion. EF 77%. 2. Echo   2/22/13 - normal left ventricular size and function, normal appearing mitral, aortic, and tricuspid valves, with mild mitral and moderate tricuspid regurgitation, bi-atrial enlargement   5/15/15- 60%, mildly dilated LA, mild MR   3/8/18 - EF 65%. No WMA. Normal RV size and function. Mild MR. Mild TR.   8/24/20 - EF 60-65%. G1DD. Mild MAC with mild MR. Mild to mod TR. PASP 45 mm hg. Mildly elevated CVP.      3. Cardioversion 7/23/2015 - 200J     4. Renal Visceral Duplex 5/2016   No evidence of ONOFRE     5. 3/2/17-. Successful atrial fibrillation ablation however unable to achieve entrance/exit block of LSPV per Dr. Tiffanie Horton    6. Lipids  8/12/21- , HDL 57, LDL 85, TG 66         LABORATORY DATA            Lab Results   Component Value Date/Time    WBC 9.5 09/09/2022 01:43 AM    HGB 13.6 09/09/2022 01:43 AM    Hematocrit (POC) 43 09/07/2020 02:26 AM    HCT 42.3 09/09/2022 01:43 AM    PLATELET 437 70/68/8747 01:43 AM    MCV 94.0 09/09/2022 01:43 AM      Lab Results   Component Value Date/Time    Sodium 140 09/09/2022 01:43 AM    Potassium 3.8 09/09/2022 01:43 AM    Chloride 106 09/09/2022 01:43 AM    CO2 30 09/09/2022 01:43 AM    Anion gap 4 (L) 09/09/2022 01:43 AM    Glucose 152 (H) 09/09/2022 01:43 AM    BUN 13 09/09/2022 01:43 AM    Creatinine 1.04 (H) 09/09/2022 01:43 AM    BUN/Creatinine ratio 13 09/09/2022 01:43 AM    GFR est AA >60 09/09/2022 01:43 AM    GFR est non-AA 51 (L) 09/09/2022 01:43 AM    Calcium 10.3 (H) 09/09/2022 01:43 AM    Bilirubin, total 0.5 09/09/2022 01:43 AM    Alk. phosphatase 81 09/09/2022 01:43 AM    Protein, total 7.3 09/09/2022 01:43 AM    Albumin 3.9 09/09/2022 01:43 AM    Globulin 3.4 09/09/2022 01:43 AM    A-G Ratio 1.1 09/09/2022 01:43 AM    ALT (SGPT) 20 09/09/2022 01:43 AM           ASSESSMENT/RECOMMENDATIONS:.      1) HTN, hx of labile pressures   -Blood pressure is good today but all of her numbers in the afternoon seem to be low on her morning blood pressure sometimes can be high  -We reduced her diltiazem 60 mg twice a day if she is off the clonidine and she will continue to take the candesartan that have a tablet of the 32 mg tablet and continue to take the Coreg at 6.25 twice daily    2) paroxysmal AF    -Currently she is in A. Fib  -She will have follow-up with  to reschedule ablation.   I asked if she wanted to see him first or go forward with the ablation and schedule it she says she would prefer to see him first      3) diastolic dysfunction/shortness of breath  -EF was normal on her most recent echo in July 60-65%  -She also had a [facility] on June 29, 2022 with small apical mild intensity defect which was felt to be possibly breast attenuation    4) obesity  -Encourage her to exercise regularly at least walking daily    5)ANÍBAL  -had negative study years ago. 6) chest discomfort  -Stress test that suggested a small apical defect but thought it was related to breast attenuation    Follow-up with me in 2 months      No orders of the defined types were placed in this encounter. We discussed the expected course, resolution and complications of the diagnosis(es) in detail. Medication risks, benefits, costs, interactions, and alternatives were discussed as indicated. I advised him to contact the office if his condition worsens, changes or fails to improve as anticipated. He expressed understanding with the diagnosis(es) and plan          Follow-up and Dispositions    Return in about 6 months (around 8/15/2023). I have discussed the diagnosis with  Ming Ribera and the intended plan as seen in the above orders. Questions were answered concerning future plans. I have discussed medication side effects and warnings with the patient as well. Thank you,  Memo Riggins MD for involving me in the care of  Ming Ribera. Please do not hesitate to contact me for further questions/concerns. Kobe Holt MD, [facility] [address]      [facility], [address] [place] [address] Drive      [phone] / [phone] Fax

## 2023-02-15 ENCOUNTER — OFFICE VISIT (OUTPATIENT)
Dept: CARDIOLOGY CLINIC | Age: 83
End: 2023-02-15
Payer: MEDICARE

## 2023-02-15 VITALS
HEIGHT: 64 IN | BODY MASS INDEX: 32.27 KG/M2 | SYSTOLIC BLOOD PRESSURE: 124 MMHG | DIASTOLIC BLOOD PRESSURE: 80 MMHG | OXYGEN SATURATION: 98 % | WEIGHT: 189 LBS | HEART RATE: 86 BPM

## 2023-02-15 DIAGNOSIS — Z99.89 OSA ON CPAP: ICD-10-CM

## 2023-02-15 DIAGNOSIS — Z79.01 ANTICOAGULATION ADEQUATE: ICD-10-CM

## 2023-02-15 DIAGNOSIS — I48.0 PAROXYSMAL ATRIAL FIBRILLATION (HCC): Primary | ICD-10-CM

## 2023-02-15 DIAGNOSIS — I10 ESSENTIAL HYPERTENSION: ICD-10-CM

## 2023-02-15 DIAGNOSIS — Z86.79 S/P ABLATION OF ATRIAL FIBRILLATION: ICD-10-CM

## 2023-02-15 DIAGNOSIS — Z98.890 S/P ABLATION OF ATRIAL FIBRILLATION: ICD-10-CM

## 2023-02-15 DIAGNOSIS — G47.33 OSA ON CPAP: ICD-10-CM

## 2023-02-15 DIAGNOSIS — E66.01 SEVERE OBESITY (BMI 35.0-39.9) WITH COMORBIDITY (HCC): ICD-10-CM

## 2023-02-15 RX ORDER — DILTIAZEM HYDROCHLORIDE 60 MG/1
TABLET, FILM COATED ORAL 2 TIMES DAILY
COMMUNITY

## 2023-02-15 RX ORDER — CANDESARTAN 32 MG/1
16 TABLET ORAL DAILY
COMMUNITY

## 2023-02-15 NOTE — LETTER
2/15/2023    Patient: Roxanna Ibarra   YOB: 1940   Date of Visit: 2/15/2023     Sushil Kearney MD  [address] [address] [facility]  Via Fax: [phone]    Dear Sushil Kearney MD,      Thank you for referring Ms. So Nieto to [address] [place] [address] for evaluation. My notes for this consultation are attached. If you have questions, please do not hesitate to call me. I look forward to following your patient along with you.       Sincerely,    Torri Mckinley MD

## 2023-02-15 NOTE — PROGRESS NOTES
Kvng Valdez is a 82 y.o. female    Vitals:    02/15/23 1335   BP: 124/80   BP 1 Location: Left upper arm   BP Patient Position: Sitting   BP Cuff Size: Adult   Pulse: 86   Height: 5' 4\" (1.626 m)   Weight: 189 lb (85.7 kg)   SpO2: 98%       Chief Complaint   Patient presents with    Hypertension    Peripheral Edema       Chest pain YES  SOB YES  Dizziness NO  Swelling NO  Recent hospital visit NO  Refills NO  COVID VACCINE YES  HAD COVID?  YES

## 2023-02-28 LAB — HBA1C MFR BLD HPLC: 5.9 %

## 2023-03-03 ENCOUNTER — TELEPHONE (OUTPATIENT)
Dept: CARDIOLOGY CLINIC | Age: 83
End: 2023-03-03

## 2023-03-17 ENCOUNTER — TELEPHONE (OUTPATIENT)
Dept: CARDIOLOGY CLINIC | Age: 83
End: 2023-03-17

## 2023-04-17 RX ORDER — CANDESARTAN 32 MG/1
32 TABLET ORAL DAILY
Qty: 90 TABLET | Refills: 1 | Status: SHIPPED | OUTPATIENT
Start: 2023-04-17

## 2023-04-17 NOTE — TELEPHONE ENCOUNTER
Requested Prescriptions     Signed Prescriptions Disp Refills    candesartan (Atacand) 32 mg tablet 90 Tablet 1     Sig: Take 1 Tablet by mouth daily.      Authorizing Provider: Mariela Cabezas     Ordering User: Salas Michael     Refills per verbal order from Dr. Yesenia Mejia.  Last visit: 2/15/23  Next visit: 4/26/23

## 2023-04-17 NOTE — TELEPHONE ENCOUNTER
Pt is calling because she needs a refill on the candesartan 32 mg. Pharmacy is confirmed. Pt said that doctor Catia Madsen had changed her medicine to the 32 mg. Pt would like a 90 day refill.     Pharmacy is [facility] on   [address]   [phone]

## 2023-04-26 ENCOUNTER — OFFICE VISIT (OUTPATIENT)
Dept: CARDIOLOGY CLINIC | Age: 83
End: 2023-04-26
Payer: MEDICARE

## 2023-04-26 VITALS
BODY MASS INDEX: 31.89 KG/M2 | WEIGHT: 186.8 LBS | OXYGEN SATURATION: 96 % | SYSTOLIC BLOOD PRESSURE: 150 MMHG | DIASTOLIC BLOOD PRESSURE: 90 MMHG | HEIGHT: 64 IN | HEART RATE: 82 BPM

## 2023-04-26 DIAGNOSIS — I51.7 LAE (LEFT ATRIAL ENLARGEMENT): ICD-10-CM

## 2023-04-26 DIAGNOSIS — Z86.79 S/P ABLATION OF ATRIAL FIBRILLATION: ICD-10-CM

## 2023-04-26 DIAGNOSIS — Z99.89 OSA ON CPAP: ICD-10-CM

## 2023-04-26 DIAGNOSIS — I48.0 PAROXYSMAL ATRIAL FIBRILLATION (HCC): Primary | ICD-10-CM

## 2023-04-26 DIAGNOSIS — I10 ESSENTIAL HYPERTENSION: ICD-10-CM

## 2023-04-26 DIAGNOSIS — G47.33 OSA ON CPAP: ICD-10-CM

## 2023-04-26 DIAGNOSIS — Z79.01 ANTICOAGULATION ADEQUATE: ICD-10-CM

## 2023-04-26 DIAGNOSIS — E66.01 SEVERE OBESITY (BMI 35.0-39.9) WITH COMORBIDITY (HCC): ICD-10-CM

## 2023-04-26 DIAGNOSIS — Z98.890 S/P ABLATION OF ATRIAL FIBRILLATION: ICD-10-CM

## 2023-04-26 PROCEDURE — G8400 PT W/DXA NO RESULTS DOC: HCPCS | Performed by: SPECIALIST

## 2023-04-26 PROCEDURE — G8417 CALC BMI ABV UP PARAM F/U: HCPCS | Performed by: SPECIALIST

## 2023-04-26 PROCEDURE — G8432 DEP SCR NOT DOC, RNG: HCPCS | Performed by: SPECIALIST

## 2023-04-26 PROCEDURE — G8536 NO DOC ELDER MAL SCRN: HCPCS | Performed by: SPECIALIST

## 2023-04-26 PROCEDURE — 1101F PT FALLS ASSESS-DOCD LE1/YR: CPT | Performed by: SPECIALIST

## 2023-04-26 PROCEDURE — 3080F DIAST BP >= 90 MM HG: CPT | Performed by: SPECIALIST

## 2023-04-26 PROCEDURE — 1123F ACP DISCUSS/DSCN MKR DOCD: CPT | Performed by: SPECIALIST

## 2023-04-26 PROCEDURE — 99214 OFFICE O/P EST MOD 30 MIN: CPT | Performed by: SPECIALIST

## 2023-04-26 PROCEDURE — 3077F SYST BP >= 140 MM HG: CPT | Performed by: SPECIALIST

## 2023-04-26 PROCEDURE — 1090F PRES/ABSN URINE INCON ASSESS: CPT | Performed by: SPECIALIST

## 2023-04-26 PROCEDURE — G8427 DOCREV CUR MEDS BY ELIG CLIN: HCPCS | Performed by: SPECIALIST

## 2023-04-26 NOTE — PATIENT INSTRUCTIONS

## 2023-04-26 NOTE — PROGRESS NOTES
Kalin Marina is a 82 y.o. female    Chief Complaint   Patient presents with    Follow-up    Hypertension    Irregular Heart Beat       Vitals:    04/26/23 1549   BP: (!) 150/90   BP 1 Location: Left arm   BP Patient Position: Sitting   Pulse: 82   Height: 5' 4\" (1.626 m)   Weight: 186 lb 12.8 oz (84.7 kg)   SpO2: 96%   Patient hasn't taken her BP medication, due to being in the office    Patient has a hernia on both side           Chest pain no    SOB yes    Dizziness no, sometimes     Swelling no    Refills no      1. Have you been to the ER, urgent care clinic since your last visit? Hospitalized since your last visit? No    2. Have you seen or consulted any other health care providers outside of the [address] [place] [address] since your last visit? Include any pap smears or colon screening.  No

## 2023-05-31 ENCOUNTER — APPOINTMENT (OUTPATIENT)
Facility: HOSPITAL | Age: 83
End: 2023-05-31
Payer: COMMERCIAL

## 2023-05-31 ENCOUNTER — HOSPITAL ENCOUNTER (EMERGENCY)
Facility: HOSPITAL | Age: 83
Discharge: HOME | End: 2023-06-01
Attending: STUDENT IN AN ORGANIZED HEALTH CARE EDUCATION/TRAINING PROGRAM
Payer: COMMERCIAL

## 2023-05-31 DIAGNOSIS — Z87.19 HISTORY OF HIATAL HERNIA: ICD-10-CM

## 2023-05-31 DIAGNOSIS — Z87.891 FORMER SMOKER: ICD-10-CM

## 2023-05-31 DIAGNOSIS — R07.89 ATYPICAL CHEST PAIN: Primary | ICD-10-CM

## 2023-05-31 DIAGNOSIS — I77.810 ASCENDING AORTA DILATION (HCC): ICD-10-CM

## 2023-05-31 DIAGNOSIS — J43.9 PULMONARY EMPHYSEMA, UNSPECIFIED EMPHYSEMA TYPE (HCC): ICD-10-CM

## 2023-05-31 DIAGNOSIS — R06.02 SHORTNESS OF BREATH: ICD-10-CM

## 2023-05-31 DIAGNOSIS — K80.20 CALCULUS OF GALLBLADDER WITHOUT CHOLECYSTITIS WITHOUT OBSTRUCTION: ICD-10-CM

## 2023-05-31 LAB
ALBUMIN SERPL-MCNC: 3.7 G/DL (ref 3.5–5)
ALBUMIN/GLOB SERPL: 1.2 (ref 1.1–2.2)
ALP SERPL-CCNC: 73 U/L (ref 45–117)
ALT SERPL-CCNC: 24 U/L (ref 12–78)
ANION GAP SERPL CALC-SCNC: 4 MMOL/L (ref 5–15)
AST SERPL-CCNC: 23 U/L (ref 15–37)
BASOPHILS # BLD: 0 K/UL (ref 0–0.1)
BASOPHILS NFR BLD: 1 % (ref 0–1)
BILIRUB SERPL-MCNC: 0.7 MG/DL (ref 0.2–1)
BUN SERPL-MCNC: 16 MG/DL (ref 6–20)
BUN/CREAT SERPL: 13 (ref 12–20)
CALCIUM SERPL-MCNC: 9.3 MG/DL (ref 8.5–10.1)
CHLORIDE SERPL-SCNC: 110 MMOL/L (ref 97–108)
CO2 SERPL-SCNC: 29 MMOL/L (ref 21–32)
COMMENT:: NORMAL
CREAT SERPL-MCNC: 1.19 MG/DL (ref 0.55–1.02)
DIFFERENTIAL METHOD BLD: NORMAL
EOSINOPHIL # BLD: 0.1 K/UL (ref 0–0.4)
EOSINOPHIL NFR BLD: 3 % (ref 0–7)
ERYTHROCYTE [DISTWIDTH] IN BLOOD BY AUTOMATED COUNT: 13.1 % (ref 11.5–14.5)
GLOBULIN SER CALC-MCNC: 3.1 G/DL (ref 2–4)
GLUCOSE SERPL-MCNC: 173 MG/DL (ref 65–100)
HCT VFR BLD AUTO: 43.7 % (ref 35–47)
HGB BLD-MCNC: 14.1 G/DL (ref 11.5–16)
IMM GRANULOCYTES # BLD AUTO: 0 K/UL (ref 0–0.04)
IMM GRANULOCYTES NFR BLD AUTO: 0 % (ref 0–0.5)
LIPASE SERPL-CCNC: 136 U/L (ref 73–393)
LYMPHOCYTES # BLD: 1.5 K/UL (ref 0.8–3.5)
LYMPHOCYTES NFR BLD: 31 % (ref 12–49)
MCH RBC QN AUTO: 30.9 PG (ref 26–34)
MCHC RBC AUTO-ENTMCNC: 32.3 G/DL (ref 30–36.5)
MCV RBC AUTO: 95.6 FL (ref 80–99)
MONOCYTES # BLD: 0.6 K/UL (ref 0–1)
MONOCYTES NFR BLD: 12 % (ref 5–13)
NEUTS SEG # BLD: 2.6 K/UL (ref 1.8–8)
NEUTS SEG NFR BLD: 53 % (ref 32–75)
NRBC # BLD: 0 K/UL (ref 0–0.01)
NRBC BLD-RTO: 0 PER 100 WBC
NT PRO BNP: 1018 PG/ML
PLATELET # BLD AUTO: 181 K/UL (ref 150–400)
PMV BLD AUTO: 10.2 FL (ref 8.9–12.9)
POTASSIUM SERPL-SCNC: 3.8 MMOL/L (ref 3.5–5.1)
PROT SERPL-MCNC: 6.8 G/DL (ref 6.4–8.2)
RBC # BLD AUTO: 4.57 M/UL (ref 3.8–5.2)
SODIUM SERPL-SCNC: 143 MMOL/L (ref 136–145)
SPECIMEN HOLD: NORMAL
TROPONIN I SERPL HS-MCNC: 5 NG/L (ref 0–51)
TROPONIN I SERPL HS-MCNC: 6 NG/L (ref 0–51)
WBC # BLD AUTO: 4.8 K/UL (ref 3.6–11)

## 2023-05-31 PROCEDURE — 71275 CT ANGIOGRAPHY CHEST: CPT

## 2023-05-31 PROCEDURE — 74177 CT ABD & PELVIS W/CONTRAST: CPT

## 2023-05-31 PROCEDURE — 84484 ASSAY OF TROPONIN QUANT: CPT

## 2023-05-31 PROCEDURE — 80053 COMPREHEN METABOLIC PANEL: CPT

## 2023-05-31 PROCEDURE — 93005 ELECTROCARDIOGRAM TRACING: CPT | Performed by: STUDENT IN AN ORGANIZED HEALTH CARE EDUCATION/TRAINING PROGRAM

## 2023-05-31 PROCEDURE — 94640 AIRWAY INHALATION TREATMENT: CPT

## 2023-05-31 PROCEDURE — 6360000004 HC RX CONTRAST MEDICATION: Performed by: RADIOLOGY

## 2023-05-31 PROCEDURE — 99285 EMERGENCY DEPT VISIT HI MDM: CPT

## 2023-05-31 PROCEDURE — 6370000000 HC RX 637 (ALT 250 FOR IP): Performed by: STUDENT IN AN ORGANIZED HEALTH CARE EDUCATION/TRAINING PROGRAM

## 2023-05-31 PROCEDURE — 83880 ASSAY OF NATRIURETIC PEPTIDE: CPT

## 2023-05-31 PROCEDURE — 83690 ASSAY OF LIPASE: CPT

## 2023-05-31 PROCEDURE — 85025 COMPLETE CBC W/AUTO DIFF WBC: CPT

## 2023-05-31 PROCEDURE — 36415 COLL VENOUS BLD VENIPUNCTURE: CPT

## 2023-05-31 PROCEDURE — 71045 X-RAY EXAM CHEST 1 VIEW: CPT

## 2023-05-31 RX ORDER — IPRATROPIUM BROMIDE AND ALBUTEROL SULFATE 2.5; .5 MG/3ML; MG/3ML
1 SOLUTION RESPIRATORY (INHALATION)
Status: COMPLETED | OUTPATIENT
Start: 2023-05-31 | End: 2023-05-31

## 2023-05-31 RX ADMIN — IPRATROPIUM BROMIDE AND ALBUTEROL SULFATE 1 DOSE: .5; 3 SOLUTION RESPIRATORY (INHALATION) at 18:45

## 2023-05-31 RX ADMIN — ALUMINUM HYDROXIDE, MAGNESIUM HYDROXIDE, AND SIMETHICONE 40 ML: 200; 200; 20 SUSPENSION ORAL at 21:36

## 2023-05-31 RX ADMIN — IOPAMIDOL 100 ML: 755 INJECTION, SOLUTION INTRAVENOUS at 23:18

## 2023-05-31 NOTE — ED TRIAGE NOTES
Pt arrives to ED with complaints of CP that started today while sweeping up peanuts when she rome SOB and CP. Pt reports pain across top of chest behind breasts. Pt reports hx of Afib and hernias. Pt denies NVD.

## 2023-06-01 ENCOUNTER — APPOINTMENT (OUTPATIENT)
Facility: HOSPITAL | Age: 83
End: 2023-06-01
Payer: COMMERCIAL

## 2023-06-01 VITALS
OXYGEN SATURATION: 92 % | RESPIRATION RATE: 18 BRPM | TEMPERATURE: 97.9 F | BODY MASS INDEX: 31.76 KG/M2 | WEIGHT: 186 LBS | HEIGHT: 64 IN | SYSTOLIC BLOOD PRESSURE: 117 MMHG | HEART RATE: 68 BPM | DIASTOLIC BLOOD PRESSURE: 67 MMHG

## 2023-06-01 LAB
EKG DIAGNOSIS: NORMAL
EKG Q-T INTERVAL: 444 MS
EKG QRS DURATION: 132 MS
EKG QTC CALCULATION (BAZETT): 446 MS
EKG R AXIS: 58 DEGREES
EKG T AXIS: -15 DEGREES
EKG VENTRICULAR RATE: 61 BPM

## 2023-06-01 PROCEDURE — 76705 ECHO EXAM OF ABDOMEN: CPT

## 2023-06-01 RX ORDER — ALBUTEROL SULFATE 90 UG/1
2 AEROSOL, METERED RESPIRATORY (INHALATION) 4 TIMES DAILY PRN
Qty: 54 G | Refills: 1 | Status: SHIPPED | OUTPATIENT
Start: 2023-06-01

## 2023-06-01 RX ORDER — POLYETHYLENE GLYCOL 3350 17 G/17G
17 POWDER, FOR SOLUTION ORAL DAILY
Qty: 118 G | Refills: 0 | Status: SHIPPED | OUTPATIENT
Start: 2023-06-01 | End: 2023-06-08

## 2023-06-01 RX ORDER — ONDANSETRON 4 MG/1
4 TABLET, ORALLY DISINTEGRATING ORAL 3 TIMES DAILY PRN
Qty: 21 TABLET | Refills: 0 | Status: SHIPPED | OUTPATIENT
Start: 2023-06-01

## 2023-06-01 ASSESSMENT — HEART SCORE: ECG: 0

## 2023-06-01 NOTE — ED PROVIDER NOTES
ED Course as of 06/01/23 0314   Thu Jun 01, 2023   0026 Received sign-out from Dr. Garrison Boeck   [ZD]      ED Course User Index  [ZD] Storm Skiff, MD       3:15 AM  Patient not having any pain at this time. No leukocytosis normal LFTs no signs of obstruction on ultrasound. No signs of pulmonary embolism normal troponin EKG. Patient's symptoms likely biliary colic. Discussed low-fat diet and referral for general surgery. Patient requesting be discharged home as patient has no symptoms at this time can safely discharge. US GALLBLADDER RUQ   Final Result   Gallbladder stones and sludge, with right upper quadrant tenderness and   borderline wall thickening. No biliary dilatation or sonographic Chen's sign. CT ABDOMEN PELVIS W IV CONTRAST Additional Contrast? None   Final Result      1. No acute pulmonary embolus. 2. Unchanged ascending aortic dilatation, 4.2 cm. No acute dissection. 3. No acute process in the abdomen/pelvis. CTA CHEST W WO CONTRAST   Final Result      1. No acute pulmonary embolus. 2. Unchanged ascending aortic dilatation, 4.2 cm. No acute dissection. 3. No acute process in the abdomen/pelvis. XR CHEST PORTABLE   Final Result   No Acute Disease.                   Storm Skiff, MD  06/01/23 4203
components:    NT Pro-BNP 1,018 (*)     All other components within normal limits   CBC WITH AUTO DIFFERENTIAL   TROPONIN   EXTRA TUBES HOLD   TROPONIN   LIPASE   EXTRA TUBES HOLD       All other labs were within normal range or not returned as of this dictation. EMERGENCY DEPARTMENT COURSE and DIFFERENTIAL DIAGNOSIS/MDM:   Vitals:    Vitals:    06/01/23 0000 06/01/23 0015 06/01/23 0100 06/01/23 0130   BP: 123/72 121/74 107/67 117/67   Pulse: 59 71 59 68   Resp: 20 20 20 18   Temp:       TempSrc:       SpO2: 96% 95% 93% 92%   Weight:       Height:           Medical Decision Making  DDx: ACS, musculoskeletal chest pain, anxiety, GERD, gastritis, PUD, PE, pneumonia, CHF, undiagnosed underlying COPD from approximately 30-year PPD smoking history, etc.    Plan: EKG, labs, chest x-ray. We will administer DuoNeb x1 and GI cocktail while work-up is in process to see if this alleviates patient's current symptoms. Amount and/or Complexity of Data Reviewed  Labs: ordered. Radiology: ordered. ECG/medicine tests: ordered. Risk  Prescription drug management. REASSESSMENT     Work-up in the ER is remarkable for elevated proBNP of 1018. Chest x-ray is without pulmonary edema or other acute intrathoracic process. Do not suspect CHF as patient does not appear overall volume overloaded. Troponin x2 is negative. Low suspicion for ACS, especially in light of patient's atypical presentation despite her high heart score. Results thus far discussed with the patient. She still continues to endorse discomfort, now localizes it to the bilateral lower chest region as well as the diffuse epigastric region, including the left upper quadrant, right upper quadrant, and mid epigastrium. Lipase is normal, do not suspect pancreatitis.   Considering ongoing symptoms, will check CTA chest as well as CT abdomen pelvis to rule out for any subtle intrathoracic abnormality, PE, complications related to known ascending aortic

## 2023-06-01 NOTE — ED NOTES
I have reviewed discharge paperwork with the pt at this time. Opportunity for questions given.       Manual MINDA De La Vega  06/01/23 4961

## 2023-06-05 PROCEDURE — 93010 ELECTROCARDIOGRAM REPORT: CPT | Performed by: SPECIALIST

## 2023-06-06 RX ORDER — CARVEDILOL 6.25 MG/1
TABLET ORAL
Qty: 60 TABLET | Refills: 5 | Status: SHIPPED | OUTPATIENT
Start: 2023-06-06

## 2023-06-06 NOTE — TELEPHONE ENCOUNTER
Requested Prescriptions     Signed Prescriptions Disp Refills    carvedilol (COREG) 6.25 MG tablet 60 tablet 5     Sig: TAKE 1 TABLET BY MOUTH TWICE DAILY WITH MEALS     Authorizing Provider: ORVILLE CHOI     Ordering User: Ashish MENG     Refills per verbal order from Dr. Kaykay Méndez.   Last OV: 10/31/22  Next OV: 6/14/23

## 2023-06-15 ENCOUNTER — TELEPHONE (OUTPATIENT)
Age: 83
End: 2023-06-15

## 2023-06-15 NOTE — TELEPHONE ENCOUNTER
Spoke with Pt of Afib Ablation w/Dr. Radha Stokes at [facility]. For 8/30/23 at 2pm arrive at 12pm Pt aware that they need a  NPO from [address] the night before. Check in at the First floor OutPt Reg. Desk. Pt is to have Labs done on 7/30/23-8/23/23.    Medications:  Hold Eliquis day of procedure   VO by /nurse King Wylie.

## 2023-06-27 NOTE — TELEPHONE ENCOUNTER
Pt is calling to see if she should take her blood pressure medication. She is unsure of the dose and she does have an appointment tomorrow but she states that when she's taking it as instructed she cannot function. She can be reached @ [phone].      Thanks
Will address at office visit today.
Statement Selected

## 2023-07-19 ENCOUNTER — PREP FOR PROCEDURE (OUTPATIENT)
Age: 83
End: 2023-07-19

## 2023-07-20 RX ORDER — SODIUM CHLORIDE 9 MG/ML
INJECTION, SOLUTION INTRAVENOUS PRN
Status: CANCELLED | OUTPATIENT
Start: 2023-07-20

## 2023-07-20 RX ORDER — SODIUM CHLORIDE 0.9 % (FLUSH) 0.9 %
5-40 SYRINGE (ML) INJECTION PRN
Status: CANCELLED | OUTPATIENT
Start: 2023-07-20

## 2023-07-20 RX ORDER — SODIUM CHLORIDE 0.9 % (FLUSH) 0.9 %
5-40 SYRINGE (ML) INJECTION EVERY 12 HOURS SCHEDULED
Status: CANCELLED | OUTPATIENT
Start: 2023-07-20

## 2023-08-16 DIAGNOSIS — I48.0 PAROXYSMAL ATRIAL FIBRILLATION (HCC): ICD-10-CM

## 2023-08-16 DIAGNOSIS — Z98.890 S/P ABLATION OF ATRIAL FIBRILLATION: ICD-10-CM

## 2023-08-16 DIAGNOSIS — Z79.899 HIGH RISK MEDICATION USE: ICD-10-CM

## 2023-08-16 DIAGNOSIS — I45.10 RBBB: ICD-10-CM

## 2023-08-16 DIAGNOSIS — I10 ESSENTIAL HYPERTENSION: ICD-10-CM

## 2023-08-16 DIAGNOSIS — I47.10 SVT (SUPRAVENTRICULAR TACHYCARDIA): ICD-10-CM

## 2023-08-16 DIAGNOSIS — Z79.01 ANTICOAGULATION ADEQUATE: ICD-10-CM

## 2023-08-16 DIAGNOSIS — Z86.79 S/P ABLATION OF ATRIAL FIBRILLATION: ICD-10-CM

## 2023-08-16 DIAGNOSIS — E66.01 SEVERE OBESITY (BMI 35.0-39.9) WITH COMORBIDITY (HCC): ICD-10-CM

## 2023-08-16 DIAGNOSIS — R00.1 SINUS BRADYCARDIA: ICD-10-CM

## 2023-08-16 LAB
ANION GAP SERPL CALC-SCNC: 3 MMOL/L (ref 5–15)
BUN SERPL-MCNC: 14 MG/DL (ref 6–20)
BUN/CREAT SERPL: 15 (ref 12–20)
CALCIUM SERPL-MCNC: 9.8 MG/DL (ref 8.5–10.1)
CHLORIDE SERPL-SCNC: 106 MMOL/L (ref 97–108)
CO2 SERPL-SCNC: 32 MMOL/L (ref 21–32)
CREAT SERPL-MCNC: 0.94 MG/DL (ref 0.55–1.02)
ERYTHROCYTE [DISTWIDTH] IN BLOOD BY AUTOMATED COUNT: 13.5 % (ref 11.5–14.5)
GLUCOSE SERPL-MCNC: 98 MG/DL (ref 65–100)
HCT VFR BLD AUTO: 42.7 % (ref 35–47)
HGB BLD-MCNC: 13.5 G/DL (ref 11.5–16)
MCH RBC QN AUTO: 30.8 PG (ref 26–34)
MCHC RBC AUTO-ENTMCNC: 31.6 G/DL (ref 30–36.5)
MCV RBC AUTO: 97.5 FL (ref 80–99)
NRBC # BLD: 0 K/UL (ref 0–0.01)
NRBC BLD-RTO: 0 PER 100 WBC
PLATELET # BLD AUTO: 179 K/UL (ref 150–400)
PMV BLD AUTO: 10.8 FL (ref 8.9–12.9)
POTASSIUM SERPL-SCNC: 4.1 MMOL/L (ref 3.5–5.1)
RBC # BLD AUTO: 4.38 M/UL (ref 3.8–5.2)
SODIUM SERPL-SCNC: 141 MMOL/L (ref 136–145)
WBC # BLD AUTO: 5.8 K/UL (ref 3.6–11)

## 2023-08-18 DIAGNOSIS — I48.91 ATRIAL FIBRILLATION, UNSPECIFIED TYPE (HCC): Primary | ICD-10-CM

## 2023-08-18 NOTE — TELEPHONE ENCOUNTER
Requested Prescriptions     Signed Prescriptions Disp Refills    apixaban (ELIQUIS) 5 MG TABS tablet 180 tablet 3     Sig: Take 1 tablet by mouth 2 times daily     Authorizing Provider: Dede Joy     Ordering User: Noman Lee

## 2023-08-28 ENCOUNTER — TELEPHONE (OUTPATIENT)
Age: 83
End: 2023-08-28

## 2023-08-28 NOTE — DISCHARGE INSTRUCTIONS
Atrial Fibrillation Ablation   Discharge Instructions      You have just had an Atrial Fibrillation Ablation. There were catheters temporarily placed in your heart through a puncture in the veins and/or arteries in your groin. WHAT TO EXPECT     If you have had an Atrial Fibrillation Ablation please be aware that you may experience mild chest pain that will resolve within 24-48 hours. If the chest pain persists or becomes severe, please call the office. Mild to moderate, non-painful, bruising or mild swelling at the puncture site is not un-common, and will resolve in 7 - 14 days, and may extend down your thigh as it heals. Application of Ice to the site may help with any tenderness. You have a small gauze dressing applied to the puncture site in your groin. You may remove this the following morning. It is not uncommon to feel palpitations during the healing phase after your ablation. If you feel as though you are having recurrence of atrial fibrillation lasting longer than 30 minutes, please contact the office. Palpitations/AFIB can occur during the healing phase (1-2 months) post ablation. MEDICATIONS     Start pantoprazole 40 mg by mouth twice daily x 30 days post ablation. Start sucralfate 1 g before meals and nightly x 30 days post ablation. Start Multaq 400 mg twice daily x 30 days post ablation. Stop diltiazem. Reduce carvedilol to 3.125 mg twice daily (1/2 tablet). Continue other medications as previously prescribed. New prescriptions sent to [facility]. ACTIVITY     A responsible adult must take you home. Do not drive a car for 24 hours. Rest quietly for the remainder of the day. Do not lift anything greater than 10 pounds for 7 days. Limit bending at the puncture site and use of stairs for at least 2 days. You may remove the bandage and shower the morning after the procedure. Do not take a bath for 3 days.         SYMPTOMS THAT NEED TO BE REPORTED IMMEDIATELY

## 2023-08-29 ENCOUNTER — ANESTHESIA EVENT (OUTPATIENT)
Facility: HOSPITAL | Age: 83
End: 2023-08-29
Payer: MEDICARE

## 2023-08-29 NOTE — TELEPHONE ENCOUNTER
Spoke To Pt:  Just a reminder not to forget your Afib Ablation scheduled for tomorrow. Arriving at [address] will need a    NPO from midnight   check in at the first floor outpt. reg. Desk.   Medications:  Hold Eliquis day of procedure  VO by /nurse Aliya Mccauley.

## 2023-08-30 ENCOUNTER — HOSPITAL ENCOUNTER (OUTPATIENT)
Facility: HOSPITAL | Age: 83
Setting detail: OBSERVATION
LOS: 1 days | Discharge: HOME | End: 2023-08-31
Attending: INTERNAL MEDICINE | Admitting: INTERNAL MEDICINE
Payer: MEDICARE

## 2023-08-30 ENCOUNTER — ANESTHESIA (OUTPATIENT)
Facility: HOSPITAL | Age: 83
End: 2023-08-30
Payer: MEDICARE

## 2023-08-30 DIAGNOSIS — I48.91 ATRIAL FIBRILLATION, UNSPECIFIED TYPE (HCC): ICD-10-CM

## 2023-08-30 LAB
ABO + RH BLD: NORMAL
ACT BLD: 323 SECS (ref 79–138)
ACT BLD: 341 SECS (ref 79–138)
BLOOD GROUP ANTIBODIES SERPL: NORMAL
ECHO BSA: 1.92 M2
SPECIMEN EXP DATE BLD: NORMAL

## 2023-08-30 PROCEDURE — C1894 INTRO/SHEATH, NON-LASER: HCPCS | Performed by: INTERNAL MEDICINE

## 2023-08-30 PROCEDURE — 93623 PRGRMD STIMJ&PACG IV RX NFS: CPT | Performed by: INTERNAL MEDICINE

## 2023-08-30 PROCEDURE — G0378 HOSPITAL OBSERVATION PER HR: HCPCS

## 2023-08-30 PROCEDURE — 93005 ELECTROCARDIOGRAM TRACING: CPT | Performed by: NURSE PRACTITIONER

## 2023-08-30 PROCEDURE — 36415 COLL VENOUS BLD VENIPUNCTURE: CPT

## 2023-08-30 PROCEDURE — 93657 TX L/R ATRIAL FIB ADDL: CPT | Performed by: INTERNAL MEDICINE

## 2023-08-30 PROCEDURE — C1732 CATH, EP, DIAG/ABL, 3D/VECT: HCPCS | Performed by: INTERNAL MEDICINE

## 2023-08-30 PROCEDURE — 85347 COAGULATION TIME ACTIVATED: CPT

## 2023-08-30 PROCEDURE — 3700000001 HC ADD 15 MINUTES (ANESTHESIA): Performed by: INTERNAL MEDICINE

## 2023-08-30 PROCEDURE — 2580000003 HC RX 258: Performed by: ANESTHESIOLOGY

## 2023-08-30 PROCEDURE — C1760 CLOSURE DEV, VASC: HCPCS

## 2023-08-30 PROCEDURE — 93622 COMP EP EVAL L VENTR PAC&REC: CPT | Performed by: INTERNAL MEDICINE

## 2023-08-30 PROCEDURE — 2709999900 HC NON-CHARGEABLE SUPPLY: Performed by: INTERNAL MEDICINE

## 2023-08-30 PROCEDURE — C1766 INTRO/SHEATH,STRBLE,NON-PEEL: HCPCS | Performed by: INTERNAL MEDICINE

## 2023-08-30 PROCEDURE — 6370000000 HC RX 637 (ALT 250 FOR IP): Performed by: NURSE PRACTITIONER

## 2023-08-30 PROCEDURE — 93655 ICAR CATH ABLTJ DSCRT ARRHYT: CPT | Performed by: INTERNAL MEDICINE

## 2023-08-30 PROCEDURE — 3700000000 HC ANESTHESIA ATTENDED CARE: Performed by: INTERNAL MEDICINE

## 2023-08-30 PROCEDURE — 93613 INTRACARDIAC EPHYS 3D MAPG: CPT | Performed by: INTERNAL MEDICINE

## 2023-08-30 PROCEDURE — C1759 CATH, INTRA ECHOCARDIOGRAPHY: HCPCS | Performed by: INTERNAL MEDICINE

## 2023-08-30 PROCEDURE — 93662 INTRACARDIAC ECG (ICE): CPT | Performed by: INTERNAL MEDICINE

## 2023-08-30 PROCEDURE — 86900 BLOOD TYPING SEROLOGIC ABO: CPT

## 2023-08-30 PROCEDURE — 6370000000 HC RX 637 (ALT 250 FOR IP): Performed by: ANESTHESIOLOGY

## 2023-08-30 PROCEDURE — 76937 US GUIDE VASCULAR ACCESS: CPT | Performed by: INTERNAL MEDICINE

## 2023-08-30 PROCEDURE — 93656 COMPRE EP EVAL ABLTJ ATR FIB: CPT | Performed by: INTERNAL MEDICINE

## 2023-08-30 PROCEDURE — 86850 RBC ANTIBODY SCREEN: CPT

## 2023-08-30 PROCEDURE — C1760 CLOSURE DEV, VASC: HCPCS | Performed by: INTERNAL MEDICINE

## 2023-08-30 PROCEDURE — 86901 BLOOD TYPING SEROLOGIC RH(D): CPT

## 2023-08-30 PROCEDURE — 2720000010 HC SURG SUPPLY STERILE: Performed by: INTERNAL MEDICINE

## 2023-08-30 PROCEDURE — 92960 CARDIOVERSION ELECTRIC EXT: CPT | Performed by: INTERNAL MEDICINE

## 2023-08-30 PROCEDURE — 2500000003 HC RX 250 WO HCPCS: Performed by: ANESTHESIOLOGY

## 2023-08-30 PROCEDURE — 6360000002 HC RX W HCPCS: Performed by: ANESTHESIOLOGY

## 2023-08-30 RX ORDER — SODIUM CHLORIDE 9 MG/ML
INJECTION, SOLUTION INTRAVENOUS PRN
Status: DISCONTINUED | OUTPATIENT
Start: 2023-08-30 | End: 2023-08-31 | Stop reason: HOSPADM

## 2023-08-30 RX ORDER — ROCURONIUM BROMIDE 10 MG/ML
INJECTION, SOLUTION INTRAVENOUS PRN
Status: DISCONTINUED | OUTPATIENT
Start: 2023-08-30 | End: 2023-08-30 | Stop reason: SDUPTHER

## 2023-08-30 RX ORDER — FENTANYL CITRATE 50 UG/ML
25 INJECTION, SOLUTION INTRAMUSCULAR; INTRAVENOUS EVERY 5 MIN PRN
Status: DISCONTINUED | OUTPATIENT
Start: 2023-08-30 | End: 2023-08-31 | Stop reason: HOSPADM

## 2023-08-30 RX ORDER — HYDROMORPHONE HYDROCHLORIDE 1 MG/ML
0.5 INJECTION, SOLUTION INTRAMUSCULAR; INTRAVENOUS; SUBCUTANEOUS EVERY 5 MIN PRN
Status: DISCONTINUED | OUTPATIENT
Start: 2023-08-30 | End: 2023-08-31 | Stop reason: HOSPADM

## 2023-08-30 RX ORDER — HEPARIN SODIUM 1000 [USP'U]/ML
INJECTION, SOLUTION INTRAVENOUS; SUBCUTANEOUS PRN
Status: DISCONTINUED | OUTPATIENT
Start: 2023-08-30 | End: 2023-08-30 | Stop reason: SDUPTHER

## 2023-08-30 RX ORDER — ESCITALOPRAM OXALATE 10 MG/1
5 TABLET ORAL DAILY
Status: DISCONTINUED | OUTPATIENT
Start: 2023-08-31 | End: 2023-08-31 | Stop reason: HOSPADM

## 2023-08-30 RX ORDER — LIDOCAINE HYDROCHLORIDE 10 MG/ML
1 INJECTION, SOLUTION EPIDURAL; INFILTRATION; INTRACAUDAL; PERINEURAL
Status: DISCONTINUED | OUTPATIENT
Start: 2023-08-30 | End: 2023-08-31 | Stop reason: HOSPADM

## 2023-08-30 RX ORDER — VALSARTAN 160 MG/1
320 TABLET ORAL DAILY
Status: DISCONTINUED | OUTPATIENT
Start: 2023-08-31 | End: 2023-08-31 | Stop reason: HOSPADM

## 2023-08-30 RX ORDER — HYDRALAZINE HYDROCHLORIDE 20 MG/ML
10 INJECTION INTRAMUSCULAR; INTRAVENOUS
Status: DISCONTINUED | OUTPATIENT
Start: 2023-08-30 | End: 2023-08-31 | Stop reason: HOSPADM

## 2023-08-30 RX ORDER — PROTAMINE SULFATE 10 MG/ML
INJECTION, SOLUTION INTRAVENOUS PRN
Status: DISCONTINUED | OUTPATIENT
Start: 2023-08-30 | End: 2023-08-30 | Stop reason: SDUPTHER

## 2023-08-30 RX ORDER — PROCHLORPERAZINE EDISYLATE 5 MG/ML
5 INJECTION INTRAMUSCULAR; INTRAVENOUS
Status: DISCONTINUED | OUTPATIENT
Start: 2023-08-30 | End: 2023-08-31 | Stop reason: HOSPADM

## 2023-08-30 RX ORDER — CARVEDILOL 3.12 MG/1
3.12 TABLET ORAL 2 TIMES DAILY WITH MEALS
Status: DISCONTINUED | OUTPATIENT
Start: 2023-08-31 | End: 2023-08-31 | Stop reason: HOSPADM

## 2023-08-30 RX ORDER — SODIUM CHLORIDE 0.9 % (FLUSH) 0.9 %
5-40 SYRINGE (ML) INJECTION EVERY 12 HOURS SCHEDULED
Status: DISCONTINUED | OUTPATIENT
Start: 2023-08-30 | End: 2023-08-31 | Stop reason: HOSPADM

## 2023-08-30 RX ORDER — SODIUM CHLORIDE 0.9 % (FLUSH) 0.9 %
5-40 SYRINGE (ML) INJECTION PRN
Status: DISCONTINUED | OUTPATIENT
Start: 2023-08-30 | End: 2023-08-31 | Stop reason: HOSPADM

## 2023-08-30 RX ORDER — DEXAMETHASONE SODIUM PHOSPHATE 4 MG/ML
INJECTION, SOLUTION INTRA-ARTICULAR; INTRALESIONAL; INTRAMUSCULAR; INTRAVENOUS; SOFT TISSUE PRN
Status: DISCONTINUED | OUTPATIENT
Start: 2023-08-30 | End: 2023-08-30 | Stop reason: SDUPTHER

## 2023-08-30 RX ORDER — PANTOPRAZOLE SODIUM 40 MG/1
40 TABLET, DELAYED RELEASE ORAL 2 TIMES DAILY
Status: DISCONTINUED | OUTPATIENT
Start: 2023-08-30 | End: 2023-08-31 | Stop reason: HOSPADM

## 2023-08-30 RX ORDER — ONDANSETRON 2 MG/ML
4 INJECTION INTRAMUSCULAR; INTRAVENOUS
Status: DISCONTINUED | OUTPATIENT
Start: 2023-08-30 | End: 2023-08-31 | Stop reason: HOSPADM

## 2023-08-30 RX ORDER — NEOSTIGMINE METHYLSULFATE 1 MG/ML
INJECTION, SOLUTION INTRAVENOUS PRN
Status: DISCONTINUED | OUTPATIENT
Start: 2023-08-30 | End: 2023-08-30 | Stop reason: SDUPTHER

## 2023-08-30 RX ORDER — IPRATROPIUM BROMIDE AND ALBUTEROL SULFATE 2.5; .5 MG/3ML; MG/3ML
1 SOLUTION RESPIRATORY (INHALATION) EVERY 4 HOURS PRN
Status: DISCONTINUED | OUTPATIENT
Start: 2023-08-30 | End: 2023-08-31 | Stop reason: HOSPADM

## 2023-08-30 RX ORDER — SODIUM CHLORIDE, SODIUM LACTATE, POTASSIUM CHLORIDE, CALCIUM CHLORIDE 600; 310; 30; 20 MG/100ML; MG/100ML; MG/100ML; MG/100ML
INJECTION, SOLUTION INTRAVENOUS CONTINUOUS
Status: DISCONTINUED | OUTPATIENT
Start: 2023-08-30 | End: 2023-08-31 | Stop reason: HOSPADM

## 2023-08-30 RX ORDER — ACETAMINOPHEN 325 MG/1
650 TABLET ORAL EVERY 4 HOURS PRN
Status: DISCONTINUED | OUTPATIENT
Start: 2023-08-30 | End: 2023-08-31 | Stop reason: HOSPADM

## 2023-08-30 RX ORDER — HEPARIN SODIUM 10000 [USP'U]/100ML
INJECTION, SOLUTION INTRAVENOUS CONTINUOUS PRN
Status: DISCONTINUED | OUTPATIENT
Start: 2023-08-30 | End: 2023-08-30 | Stop reason: SDUPTHER

## 2023-08-30 RX ORDER — SUCCINYLCHOLINE/SOD CL,ISO/PF 200MG/10ML
SYRINGE (ML) INTRAVENOUS PRN
Status: DISCONTINUED | OUTPATIENT
Start: 2023-08-30 | End: 2023-08-30 | Stop reason: SDUPTHER

## 2023-08-30 RX ORDER — MIDAZOLAM HYDROCHLORIDE 2 MG/2ML
2 INJECTION, SOLUTION INTRAMUSCULAR; INTRAVENOUS
Status: DISCONTINUED | OUTPATIENT
Start: 2023-08-30 | End: 2023-08-31 | Stop reason: HOSPADM

## 2023-08-30 RX ORDER — FENTANYL CITRATE 50 UG/ML
100 INJECTION, SOLUTION INTRAMUSCULAR; INTRAVENOUS
Status: DISCONTINUED | OUTPATIENT
Start: 2023-08-30 | End: 2023-08-31 | Stop reason: HOSPADM

## 2023-08-30 RX ORDER — SUCRALFATE 1 G/1
1 TABLET ORAL
Qty: 120 TABLET | Refills: 0 | Status: SHIPPED | OUTPATIENT
Start: 2023-08-30

## 2023-08-30 RX ORDER — EZETIMIBE 10 MG/1
10 TABLET ORAL EVERY EVENING
Status: DISCONTINUED | OUTPATIENT
Start: 2023-08-30 | End: 2023-08-31 | Stop reason: HOSPADM

## 2023-08-30 RX ORDER — CARVEDILOL 6.25 MG/1
3.12 TABLET ORAL 2 TIMES DAILY WITH MEALS
Qty: 60 TABLET | Refills: 5 | Status: SHIPPED
Start: 2023-08-30 | End: 2023-09-01 | Stop reason: SDUPTHER

## 2023-08-30 RX ORDER — SUCRALFATE 1 G/1
1 TABLET ORAL
Status: DISCONTINUED | OUTPATIENT
Start: 2023-08-30 | End: 2023-08-31 | Stop reason: HOSPADM

## 2023-08-30 RX ORDER — LEVOTHYROXINE SODIUM 0.05 MG/1
50 TABLET ORAL
Status: DISCONTINUED | OUTPATIENT
Start: 2023-08-31 | End: 2023-08-31 | Stop reason: HOSPADM

## 2023-08-30 RX ORDER — LIDOCAINE HYDROCHLORIDE 20 MG/ML
INJECTION, SOLUTION EPIDURAL; INFILTRATION; INTRACAUDAL; PERINEURAL PRN
Status: DISCONTINUED | OUTPATIENT
Start: 2023-08-30 | End: 2023-08-30 | Stop reason: SDUPTHER

## 2023-08-30 RX ORDER — EPHEDRINE SULFATE 50 MG/ML
INJECTION INTRAVENOUS PRN
Status: DISCONTINUED | OUTPATIENT
Start: 2023-08-30 | End: 2023-08-30 | Stop reason: SDUPTHER

## 2023-08-30 RX ORDER — OXYCODONE HYDROCHLORIDE 5 MG/1
5 TABLET ORAL
Status: DISCONTINUED | OUTPATIENT
Start: 2023-08-30 | End: 2023-08-31 | Stop reason: HOSPADM

## 2023-08-30 RX ORDER — SODIUM CHLORIDE 9 MG/ML
INJECTION, SOLUTION INTRAVENOUS CONTINUOUS PRN
Status: DISCONTINUED | OUTPATIENT
Start: 2023-08-30 | End: 2023-08-30 | Stop reason: SDUPTHER

## 2023-08-30 RX ORDER — CLONIDINE HYDROCHLORIDE 0.1 MG/1
0.1 TABLET ORAL DAILY PRN
Status: DISCONTINUED | OUTPATIENT
Start: 2023-08-30 | End: 2023-08-31 | Stop reason: HOSPADM

## 2023-08-30 RX ORDER — PANTOPRAZOLE SODIUM 40 MG/1
40 TABLET, DELAYED RELEASE ORAL
Qty: 60 TABLET | Refills: 0 | Status: SHIPPED | OUTPATIENT
Start: 2023-08-30 | End: 2023-09-29

## 2023-08-30 RX ORDER — GLYCOPYRROLATE 0.2 MG/ML
INJECTION INTRAMUSCULAR; INTRAVENOUS PRN
Status: DISCONTINUED | OUTPATIENT
Start: 2023-08-30 | End: 2023-08-30 | Stop reason: SDUPTHER

## 2023-08-30 RX ORDER — DIPHENHYDRAMINE HYDROCHLORIDE 50 MG/ML
INJECTION INTRAMUSCULAR; INTRAVENOUS PRN
Status: DISCONTINUED | OUTPATIENT
Start: 2023-08-30 | End: 2023-08-30 | Stop reason: SDUPTHER

## 2023-08-30 RX ORDER — ALBUTEROL SULFATE 90 UG/1
AEROSOL, METERED RESPIRATORY (INHALATION) PRN
Status: DISCONTINUED | OUTPATIENT
Start: 2023-08-30 | End: 2023-08-30 | Stop reason: SDUPTHER

## 2023-08-30 RX ORDER — ACETAMINOPHEN 500 MG
1000 TABLET ORAL ONCE
Status: DISCONTINUED | OUTPATIENT
Start: 2023-08-30 | End: 2023-08-31 | Stop reason: HOSPADM

## 2023-08-30 RX ADMIN — Medication 1 MG: at 15:15

## 2023-08-30 RX ADMIN — GLYCOPYRROLATE 0.2 MG: 0.2 INJECTION INTRAMUSCULAR; INTRAVENOUS at 15:15

## 2023-08-30 RX ADMIN — SODIUM CHLORIDE: 9 INJECTION, SOLUTION INTRAVENOUS at 13:30

## 2023-08-30 RX ADMIN — ROCURONIUM BROMIDE 10 MG: 10 SOLUTION INTRAVENOUS at 13:31

## 2023-08-30 RX ADMIN — HEPARIN SODIUM 11000 UNITS: 1000 INJECTION, SOLUTION INTRAVENOUS; SUBCUTANEOUS at 14:08

## 2023-08-30 RX ADMIN — PANTOPRAZOLE SODIUM 40 MG: 40 TABLET, DELAYED RELEASE ORAL at 20:26

## 2023-08-30 RX ADMIN — DIPHENHYDRAMINE HYDROCHLORIDE 12.5 MG: 50 INJECTION, SOLUTION INTRAMUSCULAR; INTRAVENOUS at 15:45

## 2023-08-30 RX ADMIN — PROPOFOL 140 MG: 10 INJECTION, EMULSION INTRAVENOUS at 13:31

## 2023-08-30 RX ADMIN — ISOPROTERENOL HYDROCHLORIDE 10 MCG/MIN: 0.2 INJECTION, SOLUTION INTRAMUSCULAR; INTRAVENOUS at 15:01

## 2023-08-30 RX ADMIN — EPHEDRINE SULFATE 15 MG: 50 INJECTION INTRAVENOUS at 16:10

## 2023-08-30 RX ADMIN — DRONEDARONE 400 MG: 400 TABLET, FILM COATED ORAL at 19:50

## 2023-08-30 RX ADMIN — Medication 120 MG: at 13:31

## 2023-08-30 RX ADMIN — APIXABAN 5 MG: 5 TABLET, FILM COATED ORAL at 20:26

## 2023-08-30 RX ADMIN — HEPARIN SODIUM 1000 UNITS: 1000 INJECTION, SOLUTION INTRAVENOUS; SUBCUTANEOUS at 14:51

## 2023-08-30 RX ADMIN — HEPARIN SODIUM 1100 UNITS/HR: 10000 INJECTION, SOLUTION INTRAVENOUS at 14:08

## 2023-08-30 RX ADMIN — HEPARIN SODIUM 1000 UNITS: 1000 INJECTION, SOLUTION INTRAVENOUS; SUBCUTANEOUS at 14:25

## 2023-08-30 RX ADMIN — PROPOFOL 60 MG: 10 INJECTION, EMULSION INTRAVENOUS at 13:48

## 2023-08-30 RX ADMIN — EZETIMIBE 10 MG: 10 TABLET ORAL at 19:50

## 2023-08-30 RX ADMIN — HYDROCORTISONE SODIUM SUCCINATE 100 MG: 100 INJECTION, POWDER, FOR SOLUTION INTRAMUSCULAR; INTRAVENOUS at 15:41

## 2023-08-30 RX ADMIN — SUCRALFATE 1 G: 1 TABLET ORAL at 20:26

## 2023-08-30 RX ADMIN — LIDOCAINE HYDROCHLORIDE 80 MG: 20 INJECTION, SOLUTION EPIDURAL; INFILTRATION; INTRACAUDAL; PERINEURAL at 13:31

## 2023-08-30 RX ADMIN — PHENYLEPHRINE HYDROCHLORIDE 40 MCG/MIN: 10 INJECTION INTRAVENOUS at 13:37

## 2023-08-30 RX ADMIN — PROTAMINE SULFATE 50 MG: 10 INJECTION, SOLUTION INTRAVENOUS at 15:20

## 2023-08-30 RX ADMIN — ALBUTEROL SULFATE 2 PUFF: 90 AEROSOL, METERED RESPIRATORY (INHALATION) at 15:42

## 2023-08-30 RX ADMIN — DEXAMETHASONE SODIUM PHOSPHATE 4 MG: 4 INJECTION, SOLUTION INTRAMUSCULAR; INTRAVENOUS at 13:40

## 2023-08-30 NOTE — PROGRESS NOTES
1930 Bedside shift change report given to Brandon Landry RN (oncoming nurse) by Raleigh Sarabia RN (offgoing nurse). Report included the following information Nurse Handoff Report, Intake/Output, MAR, Recent Results, and Cardiac Rhythm NSR/ST . Bilat groin sites CDI. No bleeding or hematoma. [address] Pt called out w/ c/o 5/10 chest pain that radiates to back. EKG obtained. On call cardiology paged     0300 D/w Alvino RAYMOND about pt's symptoms. No orders at this time, nothing acute appearing     0730 Bedside shift change report given to MINDA Umanzor (oncoming nurse) by Brandon Landry RN (offgoing nurse). Report included the following information Nurse Handoff Report, Intake/Output, MAR, Recent Results, and Cardiac Rhythm NSR/ST.

## 2023-08-30 NOTE — PROGRESS NOTES
1730  Pt transfer complete. CCU RN and CCL RN assessed sites together. No bleeding and no hematoma present at sites. Pt sent to floor with chart, Site sheet for RN, tyler x3, site care/who took care of you in cath lab card/perclose pamphlet packet stapled together, and belongings from home.

## 2023-08-30 NOTE — PROGRESS NOTES
Cardiac Cath Lab Procedure Area Arrival Note:    Grisel Singh arrived to Cardiac Cath Lab, Procedure Area. Patient identifiers verified with NAME and DATE OF BIRTH. Procedure verified with patient. Consent forms verified. Allergies verified. Patient informed of procedure and plan of care. Questions answered with review. Patient voiced understanding of procedure and plan of care. Patient on cardiac monitor, non-invasive blood pressure, SPO2 monitor. On room air, IV of NS on pump at 25 ml/hr. Patient status doing well without problems. Patient is A&Ox 4. Patient reports no complaints of pain. Patient medicated during procedure with orders obtained and verified by Dr. Leonides Simon. Refer to patients Cardiac Cath Lab PROCEDURE REPORT for vital signs, assessment, status, and response during procedure, printed at end of case. Printed report on chart or scanned into chart.

## 2023-08-30 NOTE — PROGRESS NOTES
EP LAB to Recovery Room Report    Procedure: Afib Ablation    MD: Dr Rainey Spatz heart rhythm: Afib  Verbal Report given to Recovery Nurse on patient being transferred to Recovery Room for routine post-op. Patient stable upon transfer to . Pt had general anesthesia. Vitals, mental status, MAR, procedural summary discussed with recovery RN. Patient with possible angioedema prior to extubation, meds given per anesthesia. Dr Polo Vegas and Dr Trinh Siddiqi at bedside. Patient extubated to CPAP without complications. Patient cardioverted x3 during procedure. A total of Heparin 13255 units given. Protamine 50 mg given post ablation. Post-procedure heart rhythm: SR/SB      Sheaths:    Right femoral vein 8.5 fr sheath removed, closed with Perclose at 1519. Left femoral vein 8 fr sheath removed, closed with Manual Hold at 1530. Left femoral vein 11 fr sheath removed, closed with Perclose at 1523.

## 2023-08-30 NOTE — PROGRESS NOTES
56  Pt arrived to recovery room post procedure. GROIN ACCESS:  Bed Rest for 3 hours.  (Left perclose failed; hand held site)    Pt may be Sat up after 2 hours post sheath pull or at 1730

## 2023-08-30 NOTE — PROGRESS NOTES
4 Eyes Skin Assessment     NAME:  Carlotta Shirley  YOB: 1940  MEDICAL RECORD NUMBER:  [id number]    The patient is being assessed for  Admission    I agree that at least one RN has performed a thorough Head to Toe Skin Assessment on the patient. ALL assessment sites listed below have been assessed. Areas assessed by both nurses:    Head, Face, Ears, Shoulders, Back, Chest, Arms, Elbows, Hands, Sacrum. Buttock, Coccyx, Ischium, and Legs. Feet and Heels        Does the Patient have a Wound?  No noted wound(s)       Steven Prevention initiated by RN: Yes  Wound Care Orders initiated by RN: No    Pressure Injury (Stage 3,4, Unstageable, DTI, NWPT, and Complex wounds) if present, place Wound referral order by RN under : No    New Ostomies, if present place, Ostomy referral order under : No     Nurse 1 eSignature: Electronically signed by Rj Diaz RN on 8/30/23 at 6:02 PM EDT    **SHARE this note so that the co-signing nurse can place an eSignature**    Nurse 2 eSignature: {Esignature:[id number]}

## 2023-08-30 NOTE — PROCEDURES
RADIOFREQUENCY ATRIAL FIBRILLATION ABLATION  TYPICAL ATRIAL FLUTTER ABLATION  POSTERIOR WALL ISOLATION ABLATION  SUBSTRATE MODIFICATION ABLATION    Procedure Date: 08/30/23   Lab Physician: Michelle Barney MD    Indications:  83 yo woman with a history of HTN, severe obesity, bradycardia, RBBB, history of SVT, persistent atrial fibrillation s/p prior ablation with Dr. Chantell Pina in 2018 with inability to isolate the LSPV, history of Flecainide therapy with QRS widening now referred for Afib ablation. SHEATH INSERTION  All sheaths were placed using the modified Seldinger technique with ultrasound guided assistance  Right Femoral Vein: 8.5Fr Agilis sheath  Left Femoral Vein: 8Fr and a 11F sheath    CATHETER INSERTION  Catheters were advanced to the following positions using fluoroscopic guidance:  Coronary Sinus: : ShopPad Bidirectional Decapolar  LA: ShopPad OctaRay Mapping catheter  Ablation: Biosense ST/SF D/F ablation catheter  ICE in RA/RV: BiosYieldBuild Intracardiac Ultrasound    PROCEDURE NARRATIVE:  EPS and RFA were discussed with the patient in great detail. The risks of bleeding, infection, vascular injury, pulmonary embolus, cardiac perforation, heart block necessitating pacemaker insertion, stroke, MI and death were among those discussed. Alternatives were reviewed including the option of no therapy. All questions were answered. The patient agreed to proceed. Written informed consent was obtained from the patient after a full explanation of the risks and benefits of the procedure. The patient was brought to the lab in the fasting state. Continuous electrocardiographic and hemodynamic monitoring was initiated. The patient was prepped and draped in the usual sterile fashion. General anesthesia with intubation and mechanical conventional ventilation was used. Anesthesia staff performed the intubation and monitoring during the case.   Esophageal temperature monitoring was performed with the CIRCA esophageal inferior pulmonary veins were circumferentially encircled as a pair. Entry and exit block was demonstrated in 4 of 4 pulmonary veins. Pace testing demonstrated bidirectional block. The pulmonary veins were considered isolated if there was evidence of bidirectional block demonstrated by the lack of pulmonary vein potentials recorded from the OctaRay catheter when placed just inside the pulmonary vein ostia and inability to capture the atria with pacing from each electrode pair when placed just inside the pulmonary vein ostia. Roof-dependent atrial flutter ablation  There was a patch of channel down the posterior wall to facilitate roof-dependent atrial flutter, therefore, the decision was to proceed with roof-line ablation. RF was performed to create a roof-line connecting the RSPV to the LSPV. Bidirectional block was confirmed with differential pacing. Posterior Wall Isolation Ablation  Given evidence of left atrial scar and fractionated abnormal electrogram in the LA posterior wall, the decision was to proceed with posterior wall isolation ablation. A roof line ablation was created connecting the left superior pulmonary vein to the right superior pulmonary vein. An additional inferior posterior line was created connecting the left inferior pulmonary vein to the right inferior pulmonary vein forming a box lesion set. At this point there was evidence of epicardial connection into the mid posterior wall requiring additional spot ablation lesions. Careful monitoring of esophageal temperature was performed using the circa esophageal probe. Posterior wall was successfully isolated. Bidirectional block was confirmed. There was evidence of entrance block with absence of any conduction within the posterior wall. Exit block was also confirmed by a lack of exit conduction during high-output pacing from within the posterior wall.       CARDIOVERSION  Patient was cardioverted with externalized synchronized

## 2023-08-30 NOTE — H&P
Cardiac Electrophysiology Office Follow-up    NAME: Domonique Ozuna   :  1940  MRM:  [id number]    Date:  2023         Assessment and Plan:     1. Paroxysmal atrial fibrillation (HCC)  2. RBBB  3. Sinus bradycardia  4. Essential hypertension  5. SVT (supraventricular tachycardia) ([address])  6. Severe obesity (BMI 35.0-39. 9) with comorbidity ([address])  7. S/P ablation of atrial fibrillation  8. Anticoagulation adequate  9. High risk medication use       Persistent atrial fibrillation  -Initially PAF, now progressive to persistent Afib. -S/p A. fib ablation in 2018 with reported inability to achieve complete block in the LS PV. On flecainide for a period of time afterward. Multiple recurrent episodes noted. -Normal echocardiogram and cardiac catheterization. Symptoms could be related to polypharmacy versus AF.    -Stopped flecainide due to wide QRS. -Continue carvedilol as prescribed. -Continue Eliquis 5 mg twice daily for thromboembolic prophylaxis  - I have discussed options including continued medical therapy and ablation in detail. I have discussed the risks of left atrial ablation including vascular complications, infection, MI, death, stroke, cardiac tamponade, esophageal fistula, phrenic nerve paralysis, PV stenosis and need for a 2nd procedure with the pt. Patient reports complete understanding of discussions and recommendations and wishes to proceed with the procedure.  -Plan to proceed with AF ablation today as scheduled. Anticoagulation:  -Continue Eliquis for thromboembolic prophylaxis. -Denies bleeding issues. -Knows to contact clinic for BRBPR, melena, hematuria, or hemoptysis.                Hypertension  Significant polypharmacy, labile at times  - We will stop diltiazem today as noted above  - Increase carvedilol to 6.25 mg twice daily  - Advised to take clonidine only as needed      ATTENTION:   This medical record was transcribed using an electronic medical records/speech

## 2023-08-30 NOTE — PROGRESS NOTES
Cardiac Cath Lab Recovery Arrival Note:      Sonia Winston arrived to Cardiac Cath Lab, Recovery Area. Staff introduced to patient. Patient identifiers verified with NAME and DATE OF BIRTH. Procedure verified with patient. Consent forms reviewed and signed by patient or authorized representative and verified. Allergies verified. Patient and family oriented to department. Patient and family informed of procedure and plan of care. Questions answered with review. Patient prepped for procedure, per orders from physician, prior to arrival.    Patient on cardiac monitor, non-invasive blood pressure, SPO2 monitor. On RA. Patient is A&Ox 4. Patient reports no complaints. Patient in stretcher, in low position, with side rails up, call bell within reach, patient instructed to call if assistance as needed. Patient prep in: Fast Track 2. Patient family has pager #   Family in: [place],  [phone].    Prep by: Harshad Padilla

## 2023-08-30 NOTE — PROGRESS NOTES
1715  TRANSFER - OUT REPORT:    Verbal report given to Axel Mercedes RN on Bridget Arrington  being transferred to CCU RM 25 for routine progression of patient care       Report consisted of patient's Situation, Background, Assessment and   Recommendations(SBAR). Information from the following report(s) Nurse Handoff Report was reviewed with the receiving nurse. Lines:   Peripheral IV 08/30/23 Right Antecubital (Active)   Site Assessment Clean, dry & intact 08/30/23 1619   Line Status Infusing 08/30/23 1619   Line Care Connections checked and tightened 08/30/23 1619   Phlebitis Assessment No symptoms 08/30/23 1619   Infiltration Assessment 0 08/30/23 1619   Alcohol Cap Used Yes 08/30/23 1619   Dressing Status Clean, dry & intact 08/30/23 1619   Dressing Type Transparent 08/30/23 1619       Peripheral IV 08/30/23 Right; Anterior Forearm (Active)   Site Assessment Clean, dry & intact 08/30/23 1619   Line Status Flushed 08/30/23 1619   Line Care Connections checked and tightened 08/30/23 1619   Phlebitis Assessment No symptoms 08/30/23 1619   Infiltration Assessment 0 08/30/23 1619   Alcohol Cap Used Yes 08/30/23 1619   Dressing Status Clean, dry & intact 08/30/23 1619   Dressing Type Transparent 08/30/23 1619        Opportunity for questions and clarification was provided.       Patient transported with:  Monitor and Tech

## 2023-08-31 VITALS
HEIGHT: 63 IN | RESPIRATION RATE: 21 BRPM | BODY MASS INDEX: 33.79 KG/M2 | TEMPERATURE: 97.5 F | OXYGEN SATURATION: 90 % | HEART RATE: 84 BPM | SYSTOLIC BLOOD PRESSURE: 148 MMHG | WEIGHT: 190.7 LBS | DIASTOLIC BLOOD PRESSURE: 79 MMHG

## 2023-08-31 LAB
ANION GAP SERPL CALC-SCNC: 4 MMOL/L (ref 5–15)
BUN SERPL-MCNC: 12 MG/DL (ref 6–20)
BUN/CREAT SERPL: 12 (ref 12–20)
CALCIUM SERPL-MCNC: 9.1 MG/DL (ref 8.5–10.1)
CHLORIDE SERPL-SCNC: 111 MMOL/L (ref 97–108)
CO2 SERPL-SCNC: 27 MMOL/L (ref 21–32)
CREAT SERPL-MCNC: 1.03 MG/DL (ref 0.55–1.02)
EKG ATRIAL RATE: 59 BPM
EKG ATRIAL RATE: 72 BPM
EKG DIAGNOSIS: NORMAL
EKG DIAGNOSIS: NORMAL
EKG P AXIS: 85 DEGREES
EKG P AXIS: 95 DEGREES
EKG P-R INTERVAL: 170 MS
EKG P-R INTERVAL: 170 MS
EKG Q-T INTERVAL: 424 MS
EKG Q-T INTERVAL: 472 MS
EKG QRS DURATION: 134 MS
EKG QRS DURATION: 136 MS
EKG QTC CALCULATION (BAZETT): 464 MS
EKG QTC CALCULATION (BAZETT): 467 MS
EKG R AXIS: 72 DEGREES
EKG R AXIS: 79 DEGREES
EKG T AXIS: -15 DEGREES
EKG T AXIS: -37 DEGREES
EKG VENTRICULAR RATE: 59 BPM
EKG VENTRICULAR RATE: 72 BPM
ERYTHROCYTE [DISTWIDTH] IN BLOOD BY AUTOMATED COUNT: 13.4 % (ref 11.5–14.5)
GLUCOSE SERPL-MCNC: 156 MG/DL (ref 65–100)
HCT VFR BLD AUTO: 40.3 % (ref 35–47)
HGB BLD-MCNC: 12.7 G/DL (ref 11.5–16)
MCH RBC QN AUTO: 31.1 PG (ref 26–34)
MCHC RBC AUTO-ENTMCNC: 31.5 G/DL (ref 30–36.5)
MCV RBC AUTO: 98.8 FL (ref 80–99)
NRBC # BLD: 0 K/UL (ref 0–0.01)
NRBC BLD-RTO: 0 PER 100 WBC
PLATELET # BLD AUTO: 141 K/UL (ref 150–400)
PMV BLD AUTO: 10.3 FL (ref 8.9–12.9)
POTASSIUM SERPL-SCNC: 4.2 MMOL/L (ref 3.5–5.1)
RBC # BLD AUTO: 4.08 M/UL (ref 3.8–5.2)
SODIUM SERPL-SCNC: 142 MMOL/L (ref 136–145)
WBC # BLD AUTO: 5.4 K/UL (ref 3.6–11)

## 2023-08-31 PROCEDURE — 6370000000 HC RX 637 (ALT 250 FOR IP): Performed by: NURSE PRACTITIONER

## 2023-08-31 PROCEDURE — 80048 BASIC METABOLIC PNL TOTAL CA: CPT

## 2023-08-31 PROCEDURE — G0378 HOSPITAL OBSERVATION PER HR: HCPCS

## 2023-08-31 PROCEDURE — 85027 COMPLETE CBC AUTOMATED: CPT

## 2023-08-31 PROCEDURE — 2580000003 HC RX 258: Performed by: NURSE PRACTITIONER

## 2023-08-31 PROCEDURE — 2580000003 HC RX 258: Performed by: ANESTHESIOLOGY

## 2023-08-31 PROCEDURE — 36415 COLL VENOUS BLD VENIPUNCTURE: CPT

## 2023-08-31 PROCEDURE — 93005 ELECTROCARDIOGRAM TRACING: CPT | Performed by: ANESTHESIOLOGY

## 2023-08-31 RX ADMIN — DRONEDARONE 400 MG: 400 TABLET, FILM COATED ORAL at 08:07

## 2023-08-31 RX ADMIN — CARVEDILOL 3.12 MG: 3.12 TABLET, FILM COATED ORAL at 08:07

## 2023-08-31 RX ADMIN — VALSARTAN 160 MG: 160 TABLET, FILM COATED ORAL at 08:06

## 2023-08-31 RX ADMIN — SODIUM CHLORIDE, PRESERVATIVE FREE 10 ML: 5 INJECTION INTRAVENOUS at 08:23

## 2023-08-31 RX ADMIN — PANTOPRAZOLE SODIUM 40 MG: 40 TABLET, DELAYED RELEASE ORAL at 08:07

## 2023-08-31 RX ADMIN — LEVOTHYROXINE SODIUM 50 MCG: 0.05 TABLET ORAL at 06:40

## 2023-08-31 RX ADMIN — APIXABAN 5 MG: 5 TABLET, FILM COATED ORAL at 08:07

## 2023-08-31 RX ADMIN — SUCRALFATE 1 G: 1 TABLET ORAL at 06:40

## 2023-08-31 NOTE — DISCHARGE SUMMARY
Cardiology Discharge Summary     Patient ID:  Mack Bella  [id number]  89 y.o.  1940    Admit Date: 8/30/2023    Discharge Date: 8/31/23    Admitting Physician: Michelle Santillan MD     Discharge Physician: Bing Levin MD    Admission Diagnoses:   Atrial fibrillation, unspecified type ([address]) [I48.91]  Atrial fibrillation ([address]) [I48.91]  Permanent atrial fibrillation ([address]) [I48.21]    Discharge Diagnoses:   Principal Problem:    Atrial fibrillation ([address])  Resolved Problems:    * No resolved hospital problems. *      Discharge Condition: good    Cardiology Procedures this Admission:  /afib ablation    Consults: none/    Hospital Course: Pt with history of afib, ablation, SVT essential HTN. Pt  has most recently had persistent afib and thus underwent afib/aflutter ablation on 8/31/23. She had delayed extubation due to reactive airway but was extubated. She was monitored overnight but had no respiratory distress overnight. She was weaned to room air by day of discharge. She had some lower chest tightness overnight which she states she has intermittently and chronically prior to admission -attributes to her hernias. Not exertional or pleuritic. No midsternal pain or burning sensation. EKG showed NSR with RBBB. She was started on dronederone, protonix and sucralfate. Coreg dose was reduced , She remains in NSR with no PAF noted on tele review. She was instructed to stop diltiazem. *(see med list below). Will followup with EP clinic in 1 month. 02 sats on room air were 95% on exam. On room air. No respiratory distress or SOB    Vitals:    08/31/23 0900   BP: (!) 148/79   Pulse: 84   Resp: 21   Temp:    SpO2: 90%       Physical Exam  Abdomen: soft, non-tender. Bowel sounds normal. No masses,  no organomegaly  Extremities: no LE edema, + PP bilaterally Bilateral groin sites with no edema, hematoma or bleeding.   Heart: regular rate and rhythm, S1, S2 normal, no murmurs, clicks, rubs or gallops  Lungs: clear to auscultation bilaterally  Neck: supple, symmetrical, trachea midline, no adenopathy, no JVD, no carotid bruits  Neurologic: Grossly normal  Pulses: 2+ and symmetrical      Labs:   Recent Labs     08/31/23 0202   WBC 5.4   HGB 12.7   HCT 40.3   *     Recent Labs     08/31/23 0202      K 4.2   *   CO2 27   BUN 12       No results for input(s): CPK, CKMB in the last 72 hours. Invalid input(s): TROIQ  Procedure summary:  Persistent atrial fibrillation s/p successful redo pulmonary vein isolation in an antral circumferential approach. 2. Additional substrate modification ablation targeting extrapulmonary veins triggers with ganglionated plexi (GP) ablation. 3. S/p successful roof line ablation. 4. Posterior LA scar s/p successful  posterior wall isolation ablation with demonstration of bidirectional block. 5. S/p successful cavotricuspid isthmus ablation. 6. No acute PVs reconnection noted after a waiting period and with administration of Isuprel. 7. No inducible arrhythmia at the end of case. EKG:   CXR:   Other Diagnostic Tests:   Device check:     Disposition: Home with daughter.     Patient Instructions:      Medication List        START taking these medications      dronedarone hcl 400 MG Tabs  Commonly known as: Multaq  Take 1 tablet by mouth 2 times daily (with meals)     pantoprazole 40 MG tablet  Commonly known as: PROTONIX  Take 1 tablet by mouth 2 times daily (before meals) For 30 days post ablation     sucralfate 1 GM tablet  Commonly known as: Carafate  Take 1 tablet by mouth 4 times daily (before meals and nightly)            CHANGE how you take these medications      carvedilol 6.25 MG tablet  Commonly known as: COREG  Take 0.5 tablets by mouth with breakfast and with evening meal  What changed: how much to take            CONTINUE taking these medications      apixaban 5 MG Tabs tablet  Commonly known as: ELIQUIS  Take 1 tablet by mouth 2 times daily     candesartan 32 MG

## 2023-08-31 NOTE — PROGRESS NOTES
4 Eyes Skin Assessment     NAME:  Maurizio Vogt  YOB: 1940  MEDICAL RECORD NUMBER:  [id number]    The patient is being assessed for  Admission    I agree that at least one RN has performed a thorough Head to Toe Skin Assessment on the patient. ALL assessment sites listed below have been assessed. Areas assessed by both nurses:    Head, Face, Ears, Shoulders, Back, Chest, Arms, Elbows, Hands, Sacrum. Buttock, Coccyx, Ischium, and Legs. Feet and Heels        Does the Patient have a Wound?  No noted wound(s)       Steven Prevention initiated by RN: Yes  Wound Care Orders initiated by RN: No    Pressure Injury (Stage 3,4, Unstageable, DTI, NWPT, and Complex wounds) if present, place Wound referral order by RN under : No    New Ostomies, if present place, Ostomy referral order under : No     Nurse 1 eSignature: Electronically signed by Daylin Maravilla RN on 8/30/23 at 6:02 PM EDT    **SHARE this note so that the co-signing nurse can place an eSignature**    Nurse 2 eSignature: Electronically signed by Cullen Turner RN on 8/30/23 at 10:02 PM EDT

## 2023-08-31 NOTE — PROGRESS NOTES
0730 Bedside and Verbal shift change report given to Danni Schwartz (oncoming nurse) by Fiorella Dang (offgoing nurse). Report included the following information Nurse Handoff Report, Index, Intake/Output, MAR, and Recent Results. 1000 Discharge instructions gone over with patient and all questions answered. Discharge instructions given to patient.   Patient discharged

## 2023-09-01 ENCOUNTER — TELEPHONE (OUTPATIENT)
Age: 83
End: 2023-09-01

## 2023-09-01 RX ORDER — CARVEDILOL 3.12 MG/1
3.12 TABLET ORAL 2 TIMES DAILY WITH MEALS
Qty: 60 TABLET | Refills: 3 | Status: SHIPPED | OUTPATIENT
Start: 2023-09-01

## 2023-09-01 NOTE — TELEPHONE ENCOUNTER
Returned patient call, ID verified using two patient identifiers. Ms Petty Watson is calling because her dose of Carvedilol was decreased post ablation to 3.125 mg BID. She went to her pharmacy and they would not give her the medication. Advised her that she can cut her 6.25 mg tablets in half. She would prefer to have a new prescription sent since her tablets do not have score joseph. Ms. Petty Watson is also asking about the protonix and carafate prescriptions and if it is necessary that she take both of these. She does not have any acid reflux or other symptoms. She is also concerned because she has osteoporosis and it says she shouldn't take protonix. Advised her that these medications are prescribed to aid in healing post ablation and prevent any complications. They are only ordered for 30 days and should not cause any issues with her osteoporosis since it is only short term. Patient verbalized understanding and will call back with any other questions or concerns.     Requested Prescriptions     Signed Prescriptions Disp Refills    carvedilol (COREG) 3.125 MG tablet 60 tablet 3     Sig: Take 1 tablet by mouth with breakfast and with evening meal     Authorizing Provider: Jae Adkins     Ordering User: Bin Hoover     Future Appointments   Date Time Provider [address]   10/18/2023  1:00 PM MD DOTTIE Price BS AMB   10/26/2023  1:00 PM MD DOTTIE Alejandre BS AMB

## 2023-09-01 NOTE — TELEPHONE ENCOUNTER
Pt stated that she had a ablation surgery on 8/30 and received a prescription for carvedilol 6.25, pt thought the prescription was for carvedilol 3.125 , pt calling to discuss the correct dosage  for carvedilol.      Pt # [phone]

## 2023-09-08 ENCOUNTER — TELEPHONE (OUTPATIENT)
Age: 83
End: 2023-09-08

## 2023-09-08 NOTE — TELEPHONE ENCOUNTER
Pt taking multaq 400mg, said that she is concerned with the side effects (kidney disease) and also read up on other possible side effects, would like to discuss with the nurse, pt stated she is not going take any more of the medication until she she speaks with the nurse.      Pt# [phone]

## 2023-09-08 NOTE — TELEPHONE ENCOUNTER
Returned patient call, ID verified using two patient identifiers. Ms. Dread Dela Cruz is calling because she was reading about possible side effects of Multaq on her kidneys so she didn't take any this morning. She states her PCP was worried at previous visits about her kidney function. Advised Ms. Wynn that the multaq was prescribed as a 30 day script only post ablation. Typically those side effects occur after long term use. She read that it can cause dark urine and she states that her urine was darker than normal this morning. She also endorses some ankle swelling. Instructed her to continue taking the multaq until we can notify Dr. Casa Epstein of her concerns. We will call her back on Monday. Patient verbalized understanding and will call back with any other questions or concerns.

## 2023-09-11 NOTE — TELEPHONE ENCOUNTER
Sunita Frazire, APRN - NP    Yes she should take Multaq as prescribed for 30 days     Called patient, ID verified using two patient identifiers. Notified of above. Patient states she has been taking this as prescribed and will continue until her prescription is done. Her follow up with Dr. Leonides Simon is not until 10/18/23 and she would like to see if she can get an earlier appointment. Her BP cuff says that she has an \"irregular rhythm. \"  Her heart rates have been controlled 56-60's bpm.  She denies feeling any palpitations. Appointment changed to [facility] to allow for an earlier visit. Patient verbalized understanding and will call back with any other questions or concerns.     Future Appointments   Date Time Provider [address] [place] [address]   9/26/2023  2:00 PM An Sharry Meckel, MD CAVREY BS AMB   10/26/2023  1:00 PM MD DOTTIE Montague AMB

## 2023-09-24 ENCOUNTER — APPOINTMENT (OUTPATIENT)
Facility: HOSPITAL | Age: 83
End: 2023-09-24
Payer: MEDICARE

## 2023-09-24 ENCOUNTER — HOSPITAL ENCOUNTER (OUTPATIENT)
Facility: HOSPITAL | Age: 83
Setting detail: OBSERVATION
Discharge: HOME | End: 2023-09-25
Attending: EMERGENCY MEDICINE | Admitting: HOSPITALIST
Payer: MEDICARE

## 2023-09-24 DIAGNOSIS — I10 HYPERTENSION, UNSPECIFIED TYPE: ICD-10-CM

## 2023-09-24 DIAGNOSIS — R07.9 CHEST PAIN, UNSPECIFIED TYPE: Primary | ICD-10-CM

## 2023-09-24 LAB
ALBUMIN SERPL-MCNC: 3.6 G/DL (ref 3.5–5)
ALBUMIN/GLOB SERPL: 1.1 (ref 1.1–2.2)
ALP SERPL-CCNC: 59 U/L (ref 45–117)
ALT SERPL-CCNC: 27 U/L (ref 12–78)
ANION GAP SERPL CALC-SCNC: 2 MMOL/L (ref 5–15)
AST SERPL-CCNC: 23 U/L (ref 15–37)
BASOPHILS # BLD: 0 K/UL (ref 0–0.1)
BASOPHILS NFR BLD: 0 % (ref 0–1)
BILIRUB SERPL-MCNC: 0.9 MG/DL (ref 0.2–1)
BUN SERPL-MCNC: 9 MG/DL (ref 6–20)
BUN/CREAT SERPL: 9 (ref 12–20)
CALCIUM SERPL-MCNC: 9.4 MG/DL (ref 8.5–10.1)
CHLORIDE SERPL-SCNC: 108 MMOL/L (ref 97–108)
CO2 SERPL-SCNC: 32 MMOL/L (ref 21–32)
COMMENT:: NORMAL
CREAT SERPL-MCNC: 0.96 MG/DL (ref 0.55–1.02)
DIFFERENTIAL METHOD BLD: NORMAL
EKG ATRIAL RATE: 66 BPM
EKG DIAGNOSIS: NORMAL
EKG P AXIS: 67 DEGREES
EKG P-R INTERVAL: 142 MS
EKG Q-T INTERVAL: 468 MS
EKG QRS DURATION: 124 MS
EKG QTC CALCULATION (BAZETT): 490 MS
EKG R AXIS: 80 DEGREES
EKG T AXIS: -6 DEGREES
EKG VENTRICULAR RATE: 66 BPM
EOSINOPHIL # BLD: 0.2 K/UL (ref 0–0.4)
EOSINOPHIL NFR BLD: 4 % (ref 0–7)
ERYTHROCYTE [DISTWIDTH] IN BLOOD BY AUTOMATED COUNT: 13.4 % (ref 11.5–14.5)
GLOBULIN SER CALC-MCNC: 3.2 G/DL (ref 2–4)
GLUCOSE SERPL-MCNC: 148 MG/DL (ref 65–100)
HCT VFR BLD AUTO: 38.1 % (ref 35–47)
HGB BLD-MCNC: 12.4 G/DL (ref 11.5–16)
IMM GRANULOCYTES # BLD AUTO: 0 K/UL (ref 0–0.04)
IMM GRANULOCYTES NFR BLD AUTO: 0 % (ref 0–0.5)
LYMPHOCYTES # BLD: 0.9 K/UL (ref 0.8–3.5)
LYMPHOCYTES NFR BLD: 18 % (ref 12–49)
MCH RBC QN AUTO: 31.3 PG (ref 26–34)
MCHC RBC AUTO-ENTMCNC: 32.5 G/DL (ref 30–36.5)
MCV RBC AUTO: 96.2 FL (ref 80–99)
MONOCYTES # BLD: 0.6 K/UL (ref 0–1)
MONOCYTES NFR BLD: 12 % (ref 5–13)
NEUTS SEG # BLD: 3.4 K/UL (ref 1.8–8)
NEUTS SEG NFR BLD: 66 % (ref 32–75)
NRBC # BLD: 0 K/UL (ref 0–0.01)
NRBC BLD-RTO: 0 PER 100 WBC
PLATELET # BLD AUTO: 177 K/UL (ref 150–400)
PMV BLD AUTO: 10.3 FL (ref 8.9–12.9)
POTASSIUM SERPL-SCNC: 3.5 MMOL/L (ref 3.5–5.1)
PROT SERPL-MCNC: 6.8 G/DL (ref 6.4–8.2)
RBC # BLD AUTO: 3.96 M/UL (ref 3.8–5.2)
SODIUM SERPL-SCNC: 142 MMOL/L (ref 136–145)
SPECIMEN HOLD: NORMAL
TROPONIN I SERPL HS-MCNC: 12 NG/L (ref 0–51)
WBC # BLD AUTO: 5.1 K/UL (ref 3.6–11)

## 2023-09-24 PROCEDURE — 36415 COLL VENOUS BLD VENIPUNCTURE: CPT

## 2023-09-24 PROCEDURE — G0378 HOSPITAL OBSERVATION PER HR: HCPCS

## 2023-09-24 PROCEDURE — 2580000003 HC RX 258: Performed by: HOSPITALIST

## 2023-09-24 PROCEDURE — 80053 COMPREHEN METABOLIC PANEL: CPT

## 2023-09-24 PROCEDURE — 94761 N-INVAS EAR/PLS OXIMETRY MLT: CPT

## 2023-09-24 PROCEDURE — 96374 THER/PROPH/DIAG INJ IV PUSH: CPT

## 2023-09-24 PROCEDURE — 6360000002 HC RX W HCPCS: Performed by: HOSPITALIST

## 2023-09-24 PROCEDURE — 85025 COMPLETE CBC W/AUTO DIFF WBC: CPT

## 2023-09-24 PROCEDURE — 6370000000 HC RX 637 (ALT 250 FOR IP): Performed by: EMERGENCY MEDICINE

## 2023-09-24 PROCEDURE — 93010 ELECTROCARDIOGRAM REPORT: CPT | Performed by: SPECIALIST

## 2023-09-24 PROCEDURE — 6370000000 HC RX 637 (ALT 250 FOR IP): Performed by: HOSPITALIST

## 2023-09-24 PROCEDURE — 99285 EMERGENCY DEPT VISIT HI MDM: CPT

## 2023-09-24 PROCEDURE — 93005 ELECTROCARDIOGRAM TRACING: CPT | Performed by: EMERGENCY MEDICINE

## 2023-09-24 PROCEDURE — 71045 X-RAY EXAM CHEST 1 VIEW: CPT

## 2023-09-24 PROCEDURE — 84484 ASSAY OF TROPONIN QUANT: CPT

## 2023-09-24 RX ORDER — CLONIDINE HYDROCHLORIDE 0.1 MG/1
0.1 TABLET ORAL
Status: COMPLETED | OUTPATIENT
Start: 2023-09-24 | End: 2023-09-24

## 2023-09-24 RX ORDER — SODIUM CHLORIDE 0.9 % (FLUSH) 0.9 %
5-40 SYRINGE (ML) INJECTION PRN
Status: DISCONTINUED | OUTPATIENT
Start: 2023-09-24 | End: 2023-09-25 | Stop reason: HOSPADM

## 2023-09-24 RX ORDER — ONDANSETRON 4 MG/1
4 TABLET, ORALLY DISINTEGRATING ORAL EVERY 8 HOURS PRN
Status: DISCONTINUED | OUTPATIENT
Start: 2023-09-24 | End: 2023-09-25 | Stop reason: HOSPADM

## 2023-09-24 RX ORDER — POLYETHYLENE GLYCOL 3350 17 G/17G
17 POWDER, FOR SOLUTION ORAL DAILY PRN
Status: DISCONTINUED | OUTPATIENT
Start: 2023-09-24 | End: 2023-09-25 | Stop reason: HOSPADM

## 2023-09-24 RX ORDER — ATORVASTATIN CALCIUM 20 MG/1
40 TABLET, FILM COATED ORAL NIGHTLY
Status: DISCONTINUED | OUTPATIENT
Start: 2023-09-24 | End: 2023-09-25 | Stop reason: HOSPADM

## 2023-09-24 RX ORDER — SUCRALFATE 1 G/1
1 TABLET ORAL
Status: DISCONTINUED | OUTPATIENT
Start: 2023-09-24 | End: 2023-09-25 | Stop reason: HOSPADM

## 2023-09-24 RX ORDER — LEVOTHYROXINE SODIUM 0.05 MG/1
50 TABLET ORAL
Status: DISCONTINUED | OUTPATIENT
Start: 2023-09-25 | End: 2023-09-25 | Stop reason: HOSPADM

## 2023-09-24 RX ORDER — ONDANSETRON 2 MG/ML
4 INJECTION INTRAMUSCULAR; INTRAVENOUS EVERY 6 HOURS PRN
Status: DISCONTINUED | OUTPATIENT
Start: 2023-09-24 | End: 2023-09-25 | Stop reason: HOSPADM

## 2023-09-24 RX ORDER — VALSARTAN 80 MG/1
40 TABLET ORAL DAILY
Status: DISCONTINUED | OUTPATIENT
Start: 2023-09-25 | End: 2023-09-25

## 2023-09-24 RX ORDER — SODIUM CHLORIDE 9 MG/ML
INJECTION, SOLUTION INTRAVENOUS PRN
Status: DISCONTINUED | OUTPATIENT
Start: 2023-09-24 | End: 2023-09-25 | Stop reason: HOSPADM

## 2023-09-24 RX ORDER — PANTOPRAZOLE SODIUM 40 MG/1
40 TABLET, DELAYED RELEASE ORAL
Status: DISCONTINUED | OUTPATIENT
Start: 2023-09-24 | End: 2023-09-25 | Stop reason: HOSPADM

## 2023-09-24 RX ORDER — ASPIRIN 81 MG/1
81 TABLET, CHEWABLE ORAL DAILY
Status: DISCONTINUED | OUTPATIENT
Start: 2023-09-25 | End: 2023-09-25

## 2023-09-24 RX ORDER — HYDRALAZINE HYDROCHLORIDE 20 MG/ML
10 INJECTION INTRAMUSCULAR; INTRAVENOUS EVERY 6 HOURS PRN
Status: DISCONTINUED | OUTPATIENT
Start: 2023-09-24 | End: 2023-09-25 | Stop reason: HOSPADM

## 2023-09-24 RX ORDER — ESCITALOPRAM OXALATE 10 MG/1
5 TABLET ORAL DAILY
Status: DISCONTINUED | OUTPATIENT
Start: 2023-09-25 | End: 2023-09-25 | Stop reason: HOSPADM

## 2023-09-24 RX ORDER — EZETIMIBE 10 MG/1
10 TABLET ORAL EVERY EVENING
Status: DISCONTINUED | OUTPATIENT
Start: 2023-09-24 | End: 2023-09-25 | Stop reason: HOSPADM

## 2023-09-24 RX ORDER — HYDRALAZINE HYDROCHLORIDE 25 MG/1
50 TABLET, FILM COATED ORAL ONCE
Status: COMPLETED | OUTPATIENT
Start: 2023-09-24 | End: 2023-09-24

## 2023-09-24 RX ORDER — CARVEDILOL 3.12 MG/1
3.12 TABLET ORAL 2 TIMES DAILY WITH MEALS
Status: DISCONTINUED | OUTPATIENT
Start: 2023-09-24 | End: 2023-09-25 | Stop reason: HOSPADM

## 2023-09-24 RX ORDER — SODIUM CHLORIDE 0.9 % (FLUSH) 0.9 %
5-40 SYRINGE (ML) INJECTION EVERY 12 HOURS SCHEDULED
Status: DISCONTINUED | OUTPATIENT
Start: 2023-09-24 | End: 2023-09-25 | Stop reason: HOSPADM

## 2023-09-24 RX ADMIN — Medication 1 TABLET: at 18:12

## 2023-09-24 RX ADMIN — ATORVASTATIN CALCIUM 40 MG: 20 TABLET, FILM COATED ORAL at 20:40

## 2023-09-24 RX ADMIN — NITROGLYCERIN 0.5 INCH: 20 OINTMENT TOPICAL at 14:51

## 2023-09-24 RX ADMIN — EZETIMIBE 10 MG: 10 TABLET ORAL at 18:12

## 2023-09-24 RX ADMIN — HYDRALAZINE HYDROCHLORIDE 10 MG: 20 INJECTION, SOLUTION INTRAMUSCULAR; INTRAVENOUS at 18:20

## 2023-09-24 RX ADMIN — CLONIDINE HYDROCHLORIDE 0.1 MG: 0.1 TABLET ORAL at 11:37

## 2023-09-24 RX ADMIN — SUCRALFATE 1 G: 1 TABLET ORAL at 20:40

## 2023-09-24 RX ADMIN — HYDRALAZINE HYDROCHLORIDE 50 MG: 25 TABLET, FILM COATED ORAL at 20:40

## 2023-09-24 RX ADMIN — SODIUM CHLORIDE, PRESERVATIVE FREE 10 ML: 5 INJECTION INTRAVENOUS at 23:16

## 2023-09-24 RX ADMIN — APIXABAN 5 MG: 5 TABLET, FILM COATED ORAL at 20:40

## 2023-09-24 RX ADMIN — LIDOCAINE HYDROCHLORIDE 40 ML: 20 SOLUTION ORAL; TOPICAL at 13:16

## 2023-09-24 ASSESSMENT — PAIN - FUNCTIONAL ASSESSMENT: PAIN_FUNCTIONAL_ASSESSMENT: NONE - DENIES PAIN

## 2023-09-24 NOTE — H&P
Intracardiac echocardiogram performed by An Flori Dey MD at [address] [place] [address] CATH LAB    EP DEVICE PROCEDURE N/A 8/30/2023    Ablation A-fib w complete ep study performed by An Flori Dey MD at [address] [place] [address] CATH LAB    EP DEVICE PROCEDURE N/A 8/30/2023    Ep 3d mapping performed by An Flori Dey MD at [address] [place] [address] [address] [place] [address] N/A 8/30/2023    Drug stimulation performed by An Flori Dey MD at [address] [place] [address] CATH LAB    EP DEVICE PROCEDURE N/A 8/30/2023    Ablation following A-fib addl performed by An Flori Dey MD at [address] [place] [address]    GI      COLONOSCOPY 7/14    GYN      TUBAL LIGATION    HEENT      WISDOM TEETH EXTRACTED. INVASIVE VASCULAR N/A 8/30/2023    Ultrasound guided vascular access performed by An Flori Dey MD at [address] [place] [address] CATH LAB     Prior to Admission medications    Medication Sig Start Date End Date Taking?  Authorizing Provider   carvedilol (COREG) 3.125 MG tablet Take 1 tablet by mouth with breakfast and with evening meal 9/1/23   CHELSIE Correa NP   pantoprazole (PROTONIX) 40 MG tablet Take 1 tablet by mouth 2 times daily (before meals) For 30 days post ablation 8/30/23 9/29/23  CHELSIE Merino NP   sucralfate (CARAFATE) 1 GM tablet Take 1 tablet by mouth 4 times daily (before meals and nightly) 8/30/23   CHELSIE Correa NP   dronedarone hcl (MULTAQ) 400 MG TABS Take 1 tablet by mouth 2 times daily (with meals) 8/30/23   CHELSIE Correa NP   apixaban (ELIQUIS) 5 MG TABS tablet Take 1 tablet by mouth 2 times daily 8/18/23   Kay Harding MD   Calcium Carbonate-Vitamin D (OYSTER SHELL CALCIUM/D) 500-5 MG-MCG TABS Take 1 tablet by mouth every evening    Ar Automatic Reconciliation   candesartan (ATACAND) 32 MG tablet Take 0.5 tablets by mouth daily    Ar Automatic Reconciliation   cloNIDine (CATAPRES) 0.1 MG tablet Take 1 tablet by mouth daily as needed 11/10/22   Ar Automatic Reconciliation   escitalopram (LEXAPRO) 5 MG tablet Take 1 tablet by mouth daily 9/16/22   Ar

## 2023-09-24 NOTE — ED NOTES
TRANSFER - OUT REPORT:    Verbal report given to 4th Floor on Shefali Sickle  being transferred to 4th Floor  for routine progression of patient care       Report consisted of patient's Situation, Background, Assessment and   Recommendations(SBAR). Information from the following report(s) Nurse Handoff Report, Index, ED Encounter Summary, ED SBAR, Intake/Output, MAR, and Recent Results was reviewed with the receiving nurse. Wellman Fall Assessment:                           Lines:   Peripheral IV 09/24/23 Right Antecubital (Active)        Opportunity for questions and clarification was provided.       Patient transported with:  Tri Ma RN  09/24/23 0143

## 2023-09-24 NOTE — ED PROVIDER NOTES
of 66, normal axis. Recommend block is present. No STEMI changes. No STEMI [SK]      ED Course User Index  [SK] Alli Dupont MD           CONSULTS:  None    PROCEDURES:  Unless otherwise noted below, none     Procedures      FINAL IMPRESSION      1. Chest pain, unspecified type    2. Hypertension, unspecified type          DISPOSITION/PLAN   DISPOSITION      Perfect Serve Consult for Admission  2:17 PM    ED Room Number: ER11/11  Patient Name and age:  Lieutenant Starks 82 y.o.  female  Working Diagnosis:   1. Chest pain, unspecified type    2. Hypertension, unspecified type        COVID-19 Suspicion: No  Sepsis present:  No  Reassessment needed: No  Readmission: No  Isolation Requirements: no  Recommended Level of Care: telemetry  Department: [facility] ED - [phone]      Other:  82 y.o. female would like to admit for CP obs - pmh of afib s/p ablation on eliquis, htn, cardiologist is dr Anna Marie Dorantes. Presenting for some intermittent chest discomfort central and concerned with elevated BP readings up int eh 996G systolic. EKG sinus and hs-trop wnl. CXR and labs ok. Got some clonidine, bp in 180s, already on eliquis    PATIENT REFERRED TO:  No follow-up provider specified.     DISCHARGE MEDICATIONS:  New Prescriptions    No medications on file         (Please note that portions of this note were completed with a voice recognition program.  Efforts were made to edit the dictations but occasionally words are mis-transcribed.)    Alli Dupont MD (electronically signed)  Emergency Medicine Attending Physician            Alli Dupont MD  09/25/23 8651

## 2023-09-24 NOTE — ED TRIAGE NOTES
82 yof arrives from home ambulatory to triage. Patient states her blood pressure has been elevated for the last couple days. Patient complains of some shortness of breath, no CP. Patient alert and oriented x4.

## 2023-09-25 VITALS
WEIGHT: 182 LBS | HEART RATE: 60 BPM | OXYGEN SATURATION: 98 % | SYSTOLIC BLOOD PRESSURE: 122 MMHG | BODY MASS INDEX: 33.49 KG/M2 | HEIGHT: 62 IN | RESPIRATION RATE: 18 BRPM | TEMPERATURE: 97.9 F | DIASTOLIC BLOOD PRESSURE: 69 MMHG

## 2023-09-25 LAB
ERYTHROCYTE [DISTWIDTH] IN BLOOD BY AUTOMATED COUNT: 13.3 % (ref 11.5–14.5)
HCT VFR BLD AUTO: 38 % (ref 35–47)
HGB BLD-MCNC: 12.4 G/DL (ref 11.5–16)
MCH RBC QN AUTO: 31.2 PG (ref 26–34)
MCHC RBC AUTO-ENTMCNC: 32.6 G/DL (ref 30–36.5)
MCV RBC AUTO: 95.7 FL (ref 80–99)
NRBC # BLD: 0 K/UL (ref 0–0.01)
NRBC BLD-RTO: 0 PER 100 WBC
PLATELET # BLD AUTO: 168 K/UL (ref 150–400)
PMV BLD AUTO: 10.7 FL (ref 8.9–12.9)
RBC # BLD AUTO: 3.97 M/UL (ref 3.8–5.2)
TROPONIN I SERPL HS-MCNC: 14 NG/L (ref 0–51)
WBC # BLD AUTO: 6.4 K/UL (ref 3.6–11)

## 2023-09-25 PROCEDURE — 84484 ASSAY OF TROPONIN QUANT: CPT

## 2023-09-25 PROCEDURE — 85027 COMPLETE CBC AUTOMATED: CPT

## 2023-09-25 PROCEDURE — 99204 OFFICE O/P NEW MOD 45 MIN: CPT | Performed by: SPECIALIST

## 2023-09-25 PROCEDURE — 6370000000 HC RX 637 (ALT 250 FOR IP): Performed by: HOSPITALIST

## 2023-09-25 PROCEDURE — 36415 COLL VENOUS BLD VENIPUNCTURE: CPT

## 2023-09-25 PROCEDURE — 2580000003 HC RX 258: Performed by: HOSPITALIST

## 2023-09-25 PROCEDURE — 94761 N-INVAS EAR/PLS OXIMETRY MLT: CPT

## 2023-09-25 PROCEDURE — G0378 HOSPITAL OBSERVATION PER HR: HCPCS

## 2023-09-25 RX ORDER — CANDESARTAN 32 MG/1
32 TABLET ORAL DAILY
Qty: 30 TABLET | Refills: 3 | Status: SHIPPED
Start: 2023-09-25

## 2023-09-25 RX ORDER — ACETAMINOPHEN 325 MG/1
650 TABLET ORAL EVERY 4 HOURS PRN
Status: DISCONTINUED | OUTPATIENT
Start: 2023-09-25 | End: 2023-09-25 | Stop reason: HOSPADM

## 2023-09-25 RX ORDER — ATORVASTATIN CALCIUM 40 MG/1
40 TABLET, FILM COATED ORAL NIGHTLY
Qty: 30 TABLET | Refills: 0 | Status: SHIPPED | OUTPATIENT
Start: 2023-09-25

## 2023-09-25 RX ORDER — VALSARTAN 80 MG/1
160 TABLET ORAL DAILY
Status: DISCONTINUED | OUTPATIENT
Start: 2023-09-25 | End: 2023-09-25 | Stop reason: HOSPADM

## 2023-09-25 RX ADMIN — SUCRALFATE 1 G: 1 TABLET ORAL at 11:51

## 2023-09-25 RX ADMIN — LEVOTHYROXINE SODIUM 50 MCG: 0.05 TABLET ORAL at 08:27

## 2023-09-25 RX ADMIN — CARVEDILOL 3.12 MG: 3.12 TABLET, FILM COATED ORAL at 08:27

## 2023-09-25 RX ADMIN — SODIUM CHLORIDE, PRESERVATIVE FREE 10 ML: 5 INJECTION INTRAVENOUS at 08:41

## 2023-09-25 RX ADMIN — APIXABAN 5 MG: 5 TABLET, FILM COATED ORAL at 08:26

## 2023-09-25 RX ADMIN — VALSARTAN 40 MG: 80 TABLET ORAL at 08:27

## 2023-09-25 RX ADMIN — DRONEDARONE 400 MG: 400 TABLET, FILM COATED ORAL at 08:28

## 2023-09-25 RX ADMIN — PANTOPRAZOLE SODIUM 40 MG: 40 TABLET, DELAYED RELEASE ORAL at 08:27

## 2023-09-25 RX ADMIN — SUCRALFATE 1 G: 1 TABLET ORAL at 08:26

## 2023-09-25 RX ADMIN — ASPIRIN 81 MG: 81 TABLET, CHEWABLE ORAL at 08:26

## 2023-09-25 ASSESSMENT — ENCOUNTER SYMPTOMS
CHEST TIGHTNESS: 1
NAUSEA: 0
VOMITING: 0
SHORTNESS OF BREATH: 0

## 2023-09-25 NOTE — CONSULTS
Lymphocytes % 18 12 - 49 %    Monocytes % 12 5 - 13 %    Eosinophils % 4 0 - 7 %    Basophils % 0 0 - 1 %    Immature Granulocytes 0 0.0 - 0.5 %    Neutrophils Absolute 3.4 1.8 - 8.0 K/UL    Lymphocytes Absolute 0.9 0.8 - 3.5 K/UL    Monocytes Absolute 0.6 0.0 - 1.0 K/UL    Eosinophils Absolute 0.2 0.0 - 0.4 K/UL    Basophils Absolute 0.0 0.0 - 0.1 K/UL    Absolute Immature Granulocyte 0.0 0.00 - 0.04 K/UL    Differential Type AUTOMATED     Comprehensive Metabolic Panel    Collection Time: 09/24/23 10:36 AM   Result Value Ref Range    Sodium 142 136 - 145 mmol/L    Potassium 3.5 3.5 - 5.1 mmol/L    Chloride 108 97 - 108 mmol/L    CO2 32 21 - 32 mmol/L    Anion Gap 2 (L) 5 - 15 mmol/L    Glucose 148 (H) 65 - 100 mg/dL    BUN 9 6 - 20 MG/DL    Creatinine 0.96 0.55 - 1.02 MG/DL    Bun/Cre Ratio 9 (L) 12 - 20      Est, Glom Filt Rate 59 (L) >60 ml/min/1.73m2    Calcium 9.4 8.5 - 10.1 MG/DL    Total Bilirubin 0.9 0.2 - 1.0 MG/DL    ALT 27 12 - 78 U/L    AST 23 15 - 37 U/L    Alk Phosphatase 59 45 - 117 U/L    Total Protein 6.8 6.4 - 8.2 g/dL    Albumin 3.6 3.5 - 5.0 g/dL    Globulin 3.2 2.0 - 4.0 g/dL    Albumin/Globulin Ratio 1.1 1.1 - 2.2     Troponin    Collection Time: 09/24/23 10:36 AM   Result Value Ref Range    Troponin, High Sensitivity 12 0 - 51 ng/L   Extra Tubes Hold    Collection Time: 09/24/23 10:36 AM   Result Value Ref Range    Specimen HOld RED,SST,BLUE     Comment:        Add-on orders for these samples will be processed based on acceptable specimen integrity and analyte stability, which may vary by analyte.    EKG 12 Lead    Collection Time: 09/24/23 10:38 AM   Result Value Ref Range    Ventricular Rate 66 BPM    Atrial Rate 66 BPM    P-R Interval 142 ms    QRS Duration 124 ms    Q-T Interval 468 ms    QTc Calculation (Bazett) 490 ms    P Axis 67 degrees    R Axis 80 degrees    T Axis -6 degrees    Diagnosis       Normal sinus rhythm  Right bundle branch block  Abnormal ECG  When compared with ECG of

## 2023-09-25 NOTE — PLAN OF CARE
Problem: Discharge Planning  Goal: Discharge to home or other facility with appropriate resources  Outcome: Progressing     Problem: Skin/Tissue Integrity  Goal: Absence of new skin breakdown  Description: 1. Monitor for areas of redness and/or skin breakdown  2. Assess vascular access sites hourly  3. Every 4-6 hours minimum:  Change oxygen saturation probe site  4. Every 4-6 hours:  If on nasal continuous positive airway pressure, respiratory therapy assess nares and determine need for appliance change or resting period.   Outcome: Progressing     Problem: ABCDS Injury Assessment  Goal: Absence of physical injury  Outcome: Progressing     Problem: Safety - Adult  Goal: Free from fall injury  9/25/2023 0934 by Deny Bahena RN  Outcome: Progressing  9/25/2023 0524 by Miesha Armas RN  Outcome: Progressing     Problem: Pain  Goal: Verbalizes/displays adequate comfort level or baseline comfort level  Outcome: Progressing
Problem: Discharge Planning  Goal: Discharge to home or other facility with appropriate resources  Outcome: Progressing     Problem: Skin/Tissue Integrity  Goal: Absence of new skin breakdown  Description: 1. Monitor for areas of redness and/or skin breakdown  2. Assess vascular access sites hourly  3. Every 4-6 hours minimum:  Change oxygen saturation probe site  4. Every 4-6 hours:  If on nasal continuous positive airway pressure, respiratory therapy assess nares and determine need for appliance change or resting period.   Outcome: Progressing     Problem: ABCDS Injury Assessment  Goal: Absence of physical injury  Outcome: Progressing     Problem: Safety - Adult  Goal: Free from fall injury  Outcome: Progressing     Problem: Pain  Goal: Verbalizes/displays adequate comfort level or baseline comfort level  Outcome: Progressing
Problem: Safety - Adult  Goal: Free from fall injury  9/25/2023 0524 by Sol Trent RN  Outcome: Progressing
stated

## 2023-09-25 NOTE — PROGRESS NOTES
Pt removed from telemetry and PIV removed. Pt verbalized understanding of all discharge teaching, discharged home with .

## 2023-09-25 NOTE — DISCHARGE SUMMARY
[facility]  [facility]  [phone]    [address] [place] [address] Adult  Hospitalist Group     Discharge Summary       PATIENT ID: Princess Pelaez  MRN: [id number]   YOB: 1940    DATE OF ADMISSION: 9/24/2023 10:32 AM    DATE OF DISCHARGE: 09/25/23   PRIMARY CARE PROVIDER: Corazon Elizabeth MD     DISCHARGING PROVIDER: Leia Hitchcock MD      CONSULTATIONS: IP CONSULT TO CARDIOLOGY    PROCEDURES/SURGERIES: * No surgery found *    ADMITTING [facility], [address] COURSE:     1. Chest pain-negative troponin, likely related to uncontrolled hypertension. Patient will be started on IV hydralazine as needed. Cardiology evaluated and candesartan increased from 60 mg to 32 mg daily. 2.  Uncontrolled hypertension-continue Coreg, and candesartan. 3.  Hypothyroidism-continue Synthroid. 4.  Paroxysmal atrial fibrillation-continue Coreg, Eliquis. Follows with Dr. Candido Luz    5. GERD-continue Protonix.         PENDING TEST RESULTS:   At the time of discharge the following test results are still pending: None    FOLLOW UP APPOINTMENTS:    CHELSIE Perez - NP  [address] [facility] [address] [facility],[address]  [phone]    Follow up on 9/27/2023  1:40 pm         DIET: Regular    ACTIVITY: As tolerated      DISCHARGE MEDICATIONS:     Medication List        START taking these medications      atorvastatin 40 MG tablet  Commonly known as: LIPITOR  Take 1 tablet by mouth nightly            CHANGE how you take these medications      candesartan 32 MG tablet  Commonly known as: ATACAND  Take 1 tablet by mouth daily  What changed: how much to take            CONTINUE taking these medications      apixaban 5 MG Tabs tablet  Commonly known as: ELIQUIS  Take 1 tablet by mouth 2 times daily     carvedilol 3.125 MG tablet  Commonly known as: COREG  Take 1 tablet by mouth with breakfast and with evening meal     cloNIDine 0.1 MG tablet  Commonly

## 2023-09-25 NOTE — CARE COORDINATION
9/25/2023  2:23 PM    Care Management Progress Note      ICD-10-CM    1. Chest pain, unspecified type  R07.9       2. Hypertension, unspecified type  I10           RUR:  Calc  Risk Level: [x]Low []Moderate []High  Value-based purchasing: [] Yes [x] No  Bundle patient: [] Yes [x] No   Specify:     Transition of care plan:  Discharge today. Pt has medically cleared. Home. No CM needs indicated. Outpatient follow-up. Pt's family to transport. 09/25/23 3672   Service Assessment   Patient Orientation Alert and Oriented   Cognition Alert   History Provided By Patient   Primary Caregiver Self   Support Systems Spouse/Significant Other   Patient's Healthcare Decision Maker is: Legal Next of Kin   PCP Verified by CM Yes   Last Visit to PCP Within last 3 months   Prior Functional Level Independent in ADLs/IADLs   Current Functional Level Independent in ADLs/IADLs   Can patient return to prior living arrangement Yes   Ability to make needs known: Good   Family able to assist with home care needs: Yes   Would you like for me to discuss the discharge plan with any other family members/significant others, and if so, who?  No   Financial Resources Medicare   Community Resources None   Social/Functional History   Lives With Spouse   Type of [address] [place] [address] Help From Family   ADL Assistance Independent   [facility]   Ambulation Assistance Independent   Transfer Assistance Independent   Active  Yes   Discharge Planning   Living Arrangements Spouse/Significant Other   Current Services Prior To Admission None   Potential Assistance Purchasing Medications No   Type of Home Care Services None   Patient expects to be discharged to: [facility] Discharge   Services At/After Discharge None   Mode of Transport at Discharge Other (see comment)     Readmission Assessment  Number of Days since last admission?: 8-30 days  Previous Disposition: Home with Family  Who

## 2023-09-25 NOTE — PROGRESS NOTES
At [address] message to dr jamison   Bp still elevated 180s/90s after recieving prn hydralazine already. No bp med scheduled now. 2015 sent message to Dr. Herminio Alegria  Bp still elevated 180s/90s after recieving prn hydralazine already. No bp med scheduled now. Dr cadena ordered hydralazine po now and given    2146 sent message to dr Herminio Alegria  bp 176/91 map 117, hr 66. one hour after hydralazine 50 mg po now dose given, at around 1830 was given a dose of prn iv hydralazine. seems quite anxious but does not seem to think she needs anything for it, hyperventilates some when discussing bp etc    Dr. Herminio Alegria responded, no new orders for now. 2318 informed  Dr. Herminio Alegria   bp 171/81   Md responded, no new orders       [phone] message dr Herminio Alegria    has allergy to hydrocodone with tylenol but she says she takes tylenol arthritis and has no allergies to tylenol itself. She has no pain meds prn ordered. She has a headache and asked for tylenol    0111 message to dr Herminio Alegria  Patient asking about tylenol again    0202: message to dr Herminio Alegria  RN sees message was read however no new orders? 0214: called dr Herminio Alegria  No answer    0250 Dr Herminio Alegria asked how patient headache was currently. 3846 RN responded she still has headache and said she has not been sleeping well because of it.     0424 messaged dr Herminio Alegria about order for pain med prn being ordered due to patient complained of headache prior to falling asleep around 0400 and would like to have medication for this in  case she needs it when she awakens    MD placed order so that if patient awakens and still needs tylenol prn she will have it.

## 2023-09-26 ENCOUNTER — OFFICE VISIT (OUTPATIENT)
Age: 83
End: 2023-09-26
Payer: MEDICARE

## 2023-09-26 VITALS
WEIGHT: 180.8 LBS | DIASTOLIC BLOOD PRESSURE: 66 MMHG | HEART RATE: 68 BPM | BODY MASS INDEX: 33.27 KG/M2 | HEIGHT: 62 IN | OXYGEN SATURATION: 97 % | SYSTOLIC BLOOD PRESSURE: 138 MMHG

## 2023-09-26 DIAGNOSIS — I47.10 SVT (SUPRAVENTRICULAR TACHYCARDIA): ICD-10-CM

## 2023-09-26 DIAGNOSIS — Z98.890 S/P ABLATION OF ATRIAL FIBRILLATION: ICD-10-CM

## 2023-09-26 DIAGNOSIS — Z79.899 HIGH RISK MEDICATION USE: ICD-10-CM

## 2023-09-26 DIAGNOSIS — E66.01 SEVERE OBESITY (BMI 35.0-39.9) WITH COMORBIDITY (HCC): ICD-10-CM

## 2023-09-26 DIAGNOSIS — I10 ESSENTIAL HYPERTENSION: ICD-10-CM

## 2023-09-26 DIAGNOSIS — E03.9 ACQUIRED HYPOTHYROIDISM: ICD-10-CM

## 2023-09-26 DIAGNOSIS — Z86.79 S/P ABLATION OF ATRIAL FIBRILLATION: ICD-10-CM

## 2023-09-26 DIAGNOSIS — Z79.01 ANTICOAGULATION ADEQUATE: ICD-10-CM

## 2023-09-26 DIAGNOSIS — I48.0 PAROXYSMAL ATRIAL FIBRILLATION (HCC): Primary | ICD-10-CM

## 2023-09-26 PROCEDURE — 3078F DIAST BP <80 MM HG: CPT | Performed by: INTERNAL MEDICINE

## 2023-09-26 PROCEDURE — G8417 CALC BMI ABV UP PARAM F/U: HCPCS | Performed by: INTERNAL MEDICINE

## 2023-09-26 PROCEDURE — 1090F PRES/ABSN URINE INCON ASSESS: CPT | Performed by: INTERNAL MEDICINE

## 2023-09-26 PROCEDURE — 99214 OFFICE O/P EST MOD 30 MIN: CPT | Performed by: INTERNAL MEDICINE

## 2023-09-26 PROCEDURE — 93010 ELECTROCARDIOGRAM REPORT: CPT | Performed by: INTERNAL MEDICINE

## 2023-09-26 PROCEDURE — 1123F ACP DISCUSS/DSCN MKR DOCD: CPT | Performed by: INTERNAL MEDICINE

## 2023-09-26 PROCEDURE — 1036F TOBACCO NON-USER: CPT | Performed by: INTERNAL MEDICINE

## 2023-09-26 PROCEDURE — 93005 ELECTROCARDIOGRAM TRACING: CPT | Performed by: INTERNAL MEDICINE

## 2023-09-26 PROCEDURE — G8427 DOCREV CUR MEDS BY ELIG CLIN: HCPCS | Performed by: INTERNAL MEDICINE

## 2023-09-26 PROCEDURE — 3075F SYST BP GE 130 - 139MM HG: CPT | Performed by: INTERNAL MEDICINE

## 2023-09-26 PROCEDURE — G8400 PT W/DXA NO RESULTS DOC: HCPCS | Performed by: INTERNAL MEDICINE

## 2023-09-26 ASSESSMENT — PATIENT HEALTH QUESTIONNAIRE - PHQ9
SUM OF ALL RESPONSES TO PHQ QUESTIONS 1-9: 0
SUM OF ALL RESPONSES TO PHQ QUESTIONS 1-9: 0
SUM OF ALL RESPONSES TO PHQ9 QUESTIONS 1 & 2: 0
SUM OF ALL RESPONSES TO PHQ QUESTIONS 1-9: 0
SUM OF ALL RESPONSES TO PHQ QUESTIONS 1-9: 0
1. LITTLE INTEREST OR PLEASURE IN DOING THINGS: 0
2. FEELING DOWN, DEPRESSED OR HOPELESS: 0

## 2023-09-26 NOTE — PROGRESS NOTES
Cardiac Electrophysiology Office Follow-up    NAME: Lieutenant Starks   :  1940  MRM:  [id number]    Date:  2023         Assessment and Plan:     1. Paroxysmal atrial fibrillation (HCC)  -     EKG 12 Lead  2. SVT (supraventricular tachycardia) (HCC)  -     EKG 12 Lead  3. Essential hypertension  -     EKG 12 Lead  4. Acquired hypothyroidism  -     EKG 12 Lead  5. Severe obesity (BMI 35.0-39. 9) with comorbidity (HCC)  -     EKG 12 Lead  6. S/P ablation of atrial fibrillation  -     EKG 12 Lead  7. Anticoagulation adequate  -     EKG 12 Lead  8. High risk medication use  -     EKG 12 Lead         Persistent atrial fibrillation  PAF now progressive to persistent Afib. History of prior A. fib ablation in 2018 with Dr. Sandie Garcia with reported unable to achieve complete block in the LS PV.    - Due to wide QRS of greater than 150 ms we stopped flecainide   - Significant risk for polypharmacy, on Diltiazem  - cont carvedilol 6.25 mg twice daily  - Continue Eliquis 5 mg twice daily for thromboembolic prophylaxis  -Subsequently s/p PVI, GP ablation, roof-line ablation, PWI, CTI ablation ([facility] [place]  - I'm pleased to hear how well the patient has done post ablation. We spoke in great detail regarding the importance of multidisciplinary management of AFib and the role of risk factors modification in preventing recurrent AF including focusing on diet, weight loss, control of blood pressure, glycemic control, TRISTAN diagnosis and treatment, and medication compliance.   - No longer on Flecainide due to QRS widening  - history of iodine allergy, limits Amiodarone therapy  -Referral for repeat in-hospital sleep study  - Remains in normal sinus rhythm for the first time in a while  - Stop Carafate and pantoprazole today  - Plan to stop Multaq on 2023  - Weight loss goal of 18 pounds  - Follow-up with NP at [facility] in 3 months  - Obtain 2-week event monitor in 11 months  - Follow-up with me in 1

## 2023-09-26 NOTE — PATIENT INSTRUCTIONS
Continue Atacand at 16 mg daily  Stop Carafate today  Stop Pantoprazole today  Stop Multaq on 11/1/23    Schedule for home sleep study  Weight loss goal 10%/ 18 lbs    FU with NP in 3 months ([facility])  Obtain 2 week event monitor in 11 months  FU with Dr. Akbar Born in 1 year

## 2023-09-28 NOTE — TELEPHONE ENCOUNTER
Requested Prescriptions     Signed Prescriptions Disp Refills    dronedarone hcl (MULTAQ) 400 MG TABS 68 tablet 0     Sig: Take 1 tablet by mouth with breakfast and with evening meal Per Dr. Olga Mora stop medication on 11/1/23     Authorizing Provider: Isabell Contreras     Ordering User: Hank Ramírezcel     Refill per verbal order Meg Aranda. Last Visit: 9/26/23  Next Visit: 12/18/23  Per Dr. Olga Mora on 9/26/23 patient is to stop medication on 11/1/23.

## 2023-10-02 ENCOUNTER — TELEPHONE (OUTPATIENT)
Age: 83
End: 2023-10-02

## 2023-10-02 ENCOUNTER — CLINICAL DOCUMENTATION (OUTPATIENT)
Age: 83
End: 2023-10-02

## 2023-10-02 NOTE — PROGRESS NOTES
Your request has been approved for Multaq  PA Case: [id number], Status: Approved, Coverage Starts on: 10/2/2023 2:28:26 PM, Coverage Ends on: 10/2/2023 2:28:26 PM. Questions? Contact [phone].

## 2023-10-02 NOTE — TELEPHONE ENCOUNTER
Called patient, ID verified using two patient identifiers. Clarified which medication she needs resent to the pharmacy. She states pharmacy did not receive refill for Multaq that was sent on 9/28/23. Confirmed pharmacy with patient and will resend prescription. She is aware that she is to stop the medication on 11/1/23. Requested Prescriptions     Signed Prescriptions Disp Refills    dronedarone hcl (MULTAQ) 400 MG TABS 68 tablet 0     Sig: Take 1 tablet by mouth with breakfast and with evening meal Per Dr. Simone Arana stop medication on 11/1/23     Authorizing Provider: ORVILLE Hyatt     Ordering User: Nicole Ness     Refill per verbal order River Fonseca. Last Visit: 9/26/23  Next Visit: 12/18/23  Per Dr. Simone Arana on 9/26/23 patient is to stop medication on 11/1/23.

## 2023-10-02 NOTE — TELEPHONE ENCOUNTER
Patient called Pharmacy needs prescription for medication resubmitted to pharmacy didn't get request      Patient# [phone]

## 2023-10-16 ENCOUNTER — TELEPHONE (OUTPATIENT)
Age: 83
End: 2023-10-16

## 2023-10-16 NOTE — TELEPHONE ENCOUNTER
HSAT in r-drive. Cardiology patient. Tech to convey results to patient    Mildred Peña positive for severe sleep apnea. AHI 65/hour.  Lowest oxygen desaturation 81%     Staff to set up sleep medicine new patient consultation for further treatment

## 2023-10-26 ENCOUNTER — OFFICE VISIT (OUTPATIENT)
Age: 83
End: 2023-10-26
Payer: MEDICARE

## 2023-10-26 VITALS
HEART RATE: 67 BPM | DIASTOLIC BLOOD PRESSURE: 78 MMHG | WEIGHT: 175 LBS | OXYGEN SATURATION: 97 % | BODY MASS INDEX: 32.2 KG/M2 | HEIGHT: 62 IN | SYSTOLIC BLOOD PRESSURE: 140 MMHG

## 2023-10-26 DIAGNOSIS — I45.10 RBBB: ICD-10-CM

## 2023-10-26 DIAGNOSIS — Z98.890 S/P ABLATION OF ATRIAL FIBRILLATION: ICD-10-CM

## 2023-10-26 DIAGNOSIS — Z86.79 S/P ABLATION OF ATRIAL FIBRILLATION: ICD-10-CM

## 2023-10-26 DIAGNOSIS — I10 ESSENTIAL HYPERTENSION: ICD-10-CM

## 2023-10-26 DIAGNOSIS — I48.0 PAROXYSMAL ATRIAL FIBRILLATION (HCC): Primary | ICD-10-CM

## 2023-10-26 DIAGNOSIS — E66.01 SEVERE OBESITY (BMI 35.0-39.9) WITH COMORBIDITY (HCC): ICD-10-CM

## 2023-10-26 DIAGNOSIS — Z79.01 ANTICOAGULATION ADEQUATE: ICD-10-CM

## 2023-10-26 PROCEDURE — 3078F DIAST BP <80 MM HG: CPT | Performed by: SPECIALIST

## 2023-10-26 PROCEDURE — G8400 PT W/DXA NO RESULTS DOC: HCPCS | Performed by: SPECIALIST

## 2023-10-26 PROCEDURE — G8427 DOCREV CUR MEDS BY ELIG CLIN: HCPCS | Performed by: SPECIALIST

## 2023-10-26 PROCEDURE — 99214 OFFICE O/P EST MOD 30 MIN: CPT | Performed by: SPECIALIST

## 2023-10-26 PROCEDURE — G8484 FLU IMMUNIZE NO ADMIN: HCPCS | Performed by: SPECIALIST

## 2023-10-26 PROCEDURE — G8417 CALC BMI ABV UP PARAM F/U: HCPCS | Performed by: SPECIALIST

## 2023-10-26 PROCEDURE — 3077F SYST BP >= 140 MM HG: CPT | Performed by: SPECIALIST

## 2023-10-26 PROCEDURE — 1036F TOBACCO NON-USER: CPT | Performed by: SPECIALIST

## 2023-10-26 PROCEDURE — 1090F PRES/ABSN URINE INCON ASSESS: CPT | Performed by: SPECIALIST

## 2023-10-26 PROCEDURE — 1123F ACP DISCUSS/DSCN MKR DOCD: CPT | Performed by: SPECIALIST

## 2023-10-26 NOTE — PATIENT INSTRUCTIONS

## 2023-10-26 NOTE — PROGRESS NOTES
VITAL SIGNS  Wt Readings from Last 3 Encounters:   10/26/23 79.4 kg (175 lb)   09/26/23 82 kg (180 lb 12.8 oz)   09/24/23 82.6 kg (182 lb)     BP Readings from Last 3 Encounters:   10/26/23 (!) 140/78   09/26/23 138/66   09/25/23 122/69     Pulse Readings from Last 3 Encounters:   10/26/23 67   09/26/23 68   09/25/23 60         SPECIALTY COMMENTS  1. Stress Test    Adenosine myoview 2011 - normal perfusion, EF 81%    Lexiscan 3/3/2014 - normal perfusion EF 83%    Lexiscan cardiolite 5/10/16 - normal perfusion, EF 78%    Lexiscan Cardiolite 1/30/19 - normal perfusion. EF 77%. Lexiscan-6/29/22-small apical mild intensity defect which is slightly worse on stress images. Possibility of breast attenuation cannot be completely excluded. SDS is only 1. LVEF 85%. No EKG changes of ischemia. 2. Echo    2/22/13 - normal left ventricular size and function, normal appearing mitral, aortic, and tricuspid valves, with mild mitral and moderate tricuspid regurgitation, bi-atrial enlargement    5/15/15- 60%, mildly dilated LA, mild MR    3/8/18 - EF 65%. No WMA. Normal RV size and function. Mild MR. Mild TR.    8/24/20 - EF 60-65%. G1DD. Mild MAC with mild MR. Mild to mod TR. PASP 45 mm hg. Mildly elevated CVP. 7/12/22-EF 60 - 65%, mild concentric hypertrophy, Global hypokinesis, Normal sized aortic root. Dilated ascending aorta. Ao Ascending diameter is 3.8 cm, Thickened MV leaflet. Annular calcification of MV, mild MR, LAE, mild/mod TR, RVSP 37 mmHg      3. Cardioversion     7/23/2015 - 200J       4. Renal Visceral Duplex 5/2016    No evidence of KYLE       5. 3/2/17-. Successful atrial fibrillation ablation however unable to achieve entrance/exit block of LSPV per Dr. Viri Handley  8/30/23-  1. Persistent atrial fibrillation s/p successful redo pulmonary vein isolation in an antral circumferential approach.   2. Additional substrate modification ablation targeting extrapulmonary veins triggers with ganglionated plexi
suggested she call  their office. Follow-up with me in 6 months       No orders of the defined types were placed in this encounter. We discussed the expected course, resolution and complications of the diagnosis(es) in detail. Medication risks, benefits, costs, interactions, and alternatives were discussed as indicated. I advised him to contact the office if his condition worsens, changes or fails to improve as anticipated. He expressed understanding with the diagnosis(es) and plan        No follow-up provider specified. I have discussed the diagnosis with  Zeferino Grimaldo and the intended plan as seen in the above orders. Questions were answered concerning future plans. I have discussed medication side effects and warnings with the patient as well. Thank you,  Anum Kirk MD for involving me in the care of  Zeferino Grimaldo. Please do not hesitate to contact me for further questions/concerns. Suleman Heath MD, [address]      [phone] / [phone] Fax

## 2023-10-31 RX ORDER — CANDESARTAN 32 MG/1
32 TABLET ORAL DAILY
Qty: 90 TABLET | Refills: 3 | Status: SHIPPED | OUTPATIENT
Start: 2023-10-31 | End: 2023-11-20 | Stop reason: SDUPTHER

## 2023-10-31 NOTE — TELEPHONE ENCOUNTER
Requested Prescriptions     Signed Prescriptions Disp Refills    candesartan (ATACAND) 32 MG tablet 90 tablet 3     Sig: TAKE 1 TABLET BY MOUTH DAILY     Authorizing Provider: Solitario Duggan     Ordering User: Jaky Marmolejo

## 2023-11-06 ENCOUNTER — OFFICE VISIT (OUTPATIENT)
Age: 83
End: 2023-11-06
Payer: MEDICARE

## 2023-11-06 VITALS
BODY MASS INDEX: 32.97 KG/M2 | DIASTOLIC BLOOD PRESSURE: 78 MMHG | HEIGHT: 62 IN | TEMPERATURE: 98 F | WEIGHT: 179.2 LBS | OXYGEN SATURATION: 94 % | SYSTOLIC BLOOD PRESSURE: 149 MMHG | HEART RATE: 64 BPM

## 2023-11-06 DIAGNOSIS — G47.33 OBSTRUCTIVE SLEEP APNEA (ADULT) (PEDIATRIC): Primary | ICD-10-CM

## 2023-11-06 DIAGNOSIS — I10 ESSENTIAL HYPERTENSION: ICD-10-CM

## 2023-11-06 DIAGNOSIS — I48.0 PAROXYSMAL ATRIAL FIBRILLATION (HCC): ICD-10-CM

## 2023-11-06 PROCEDURE — 1090F PRES/ABSN URINE INCON ASSESS: CPT | Performed by: INTERNAL MEDICINE

## 2023-11-06 PROCEDURE — G8484 FLU IMMUNIZE NO ADMIN: HCPCS | Performed by: INTERNAL MEDICINE

## 2023-11-06 PROCEDURE — G8427 DOCREV CUR MEDS BY ELIG CLIN: HCPCS | Performed by: INTERNAL MEDICINE

## 2023-11-06 PROCEDURE — 1036F TOBACCO NON-USER: CPT | Performed by: INTERNAL MEDICINE

## 2023-11-06 PROCEDURE — 99204 OFFICE O/P NEW MOD 45 MIN: CPT | Performed by: INTERNAL MEDICINE

## 2023-11-06 PROCEDURE — 3077F SYST BP >= 140 MM HG: CPT | Performed by: INTERNAL MEDICINE

## 2023-11-06 PROCEDURE — 1123F ACP DISCUSS/DSCN MKR DOCD: CPT | Performed by: INTERNAL MEDICINE

## 2023-11-06 PROCEDURE — G8417 CALC BMI ABV UP PARAM F/U: HCPCS | Performed by: INTERNAL MEDICINE

## 2023-11-06 PROCEDURE — 3078F DIAST BP <80 MM HG: CPT | Performed by: INTERNAL MEDICINE

## 2023-11-06 PROCEDURE — G8400 PT W/DXA NO RESULTS DOC: HCPCS | Performed by: INTERNAL MEDICINE

## 2023-11-06 ASSESSMENT — SLEEP AND FATIGUE QUESTIONNAIRES
HOW LIKELY ARE YOU TO NOD OFF OR FALL ASLEEP WHILE SITTING INACTIVE IN A PUBLIC PLACE: 2
ESS TOTAL SCORE: 3
HOW LIKELY ARE YOU TO NOD OFF OR FALL ASLEEP WHILE LYING DOWN TO REST IN THE AFTERNOON WHEN CIRCUMSTANCES PERMIT: 1
HOW LIKELY ARE YOU TO NOD OFF OR FALL ASLEEP IN A CAR, WHILE STOPPED FOR A FEW MINUTES IN TRAFFIC: 0
HOW LIKELY ARE YOU TO NOD OFF OR FALL ASLEEP WHILE SITTING AND READING: 0
HOW LIKELY ARE YOU TO NOD OFF OR FALL ASLEEP WHILE SITTING QUIETLY AFTER LUNCH WITHOUT ALCOHOL: 0
NECK CIRCUMFERENCE (INCHES): 15
HOW LIKELY ARE YOU TO NOD OFF OR FALL ASLEEP WHEN YOU ARE A PASSENGER IN A CAR FOR AN HOUR WITHOUT A BREAK: 0
HOW LIKELY ARE YOU TO NOD OFF OR FALL ASLEEP WHILE SITTING AND TALKING TO SOMEONE: 0
HOW LIKELY ARE YOU TO NOD OFF OR FALL ASLEEP WHILE WATCHING TV: 0

## 2023-11-06 NOTE — PATIENT INSTRUCTIONS
[address] [facility].[place], [address]  Tel.  [phone]  Fax. [address] [facility], [address] [place] [address]  Tel.  [phone]  Fax. [phone] [facility], [address] [facility]  Tel.  [phone]  Fax. [phone]     Sleep Apnea: After Your Visit  Your Care Instructions  Sleep apnea occurs when you frequently stop breathing for 10 seconds or longer during sleep. It can be mild to severe, based on the number of times per hour that you stop breathing or have slowed breathing. Blocked or narrowed airways in your nose, mouth, or throat can cause sleep apnea. Your airway can become blocked when your throat muscles and tongue relax during sleep. Sleep apnea is common, occurring in 1 out of 20 individuals. Individuals having any of the following characteristics should be evaluated and treated right away due to high risk and detrimental consequences from untreated sleep apnea:  Obesity  Congestive Heart failure  Atrial Fibrillation  Uncontrolled Hypertension  Type II Diabetes  Night-time Arrhythmias  Stroke  Pulmonary Hypertension  High-risk Driving Populations (pilots, truck drivers, etc.)  Patients Considering Weight-loss Surgery    How do you know you have sleep apnea? You probably have sleep apnea if you answer 'yes' to 3 or more of the following questions:  S - Have you been told that you Snore? T - Are you often Tired during the day? O - Has anyone Observed you stop breathing while sleeping? P- Do you have (or are being treated for) high blood Pressure? B - Are you obese (Body Mass Index > 35)? A - Is your Age 48years old or older? N - Is your Neck size greater than 16 inches? G - Are you male Gender? A sleep physician can prescribe a breathing device that prevents tissues in the throat from blocking your airway. Or your doctor may recommend using a dental device (oral breathing device) to help keep your airway open.  In some cases, surgery may

## 2023-11-08 ENCOUNTER — CLINICAL DOCUMENTATION (OUTPATIENT)
Age: 83
End: 2023-11-08

## 2023-11-20 ENCOUNTER — TELEPHONE (OUTPATIENT)
Age: 83
End: 2023-11-20

## 2023-11-20 RX ORDER — CANDESARTAN 32 MG/1
32 TABLET ORAL DAILY
Qty: 90 TABLET | Refills: 3 | Status: SHIPPED | OUTPATIENT
Start: 2023-11-20

## 2023-11-24 RX ORDER — CARVEDILOL 6.25 MG/1
TABLET ORAL
Qty: 60 TABLET | Refills: 5 | OUTPATIENT
Start: 2023-11-24

## 2023-11-24 RX ORDER — CARVEDILOL 3.12 MG/1
3.12 TABLET ORAL 2 TIMES DAILY WITH MEALS
Qty: 180 TABLET | Refills: 3 | Status: SHIPPED | OUTPATIENT
Start: 2023-11-24

## 2023-11-24 NOTE — TELEPHONE ENCOUNTER
Requested Prescriptions     Refused Prescriptions Disp Refills    carvedilol (COREG) 6.25 MG tablet [Pharmacy Med Name: CARVEDILOL 6.25MG TABLETS] 60 tablet 5     Sig: TAKE 1 TABLET BY MOUTH TWICE DAILY WITH MEALS     Refused By: Nilton Nunez     Reason for Refusal: Refill not appropriate     Patient taking 3.125 mg tablet twice daily as of 8/31/23

## 2023-11-24 NOTE — TELEPHONE ENCOUNTER
Requested Prescriptions     Signed Prescriptions Disp Refills    carvedilol (COREG) 3.125 MG tablet 180 tablet 3     Sig: Take 1 tablet by mouth 2 times daily (with meals)     Authorizing Provider: ORVILLE CHOI     Ordering User: Faheem Hodges     Refill per verbal order Dr. Flex Szymanski.    Last visit:9/26/23  Next visit: 12/18/23

## 2023-12-11 ENCOUNTER — TELEPHONE (OUTPATIENT)
Age: 83
End: 2023-12-11

## 2023-12-11 NOTE — TELEPHONE ENCOUNTER
Pt called requesting a refill for medication CANDESARTAN and instead of 32 mg she says she needs it to be 16 mg because at this time she has to cut the pill in half. Pt would like a call back. Pt would like new prescription sent to [facility].       [address] [place] [address] ph # [phone]    Pt ph# [phone]

## 2023-12-11 NOTE — TELEPHONE ENCOUNTER
\" Her candesartan dose was increased and she is now taking 32 mg. \"  Need to confirm dose.     Refill per VO of Dr. Edouard Frausto  Last appt: 10/26/2023    Future Appointments   Date Time Provider [address]   12/18/2023  1:00 PM CHELSIE Bush - NP [facility] AMB   2/6/2024 11:30 AM Mauricio Alba APRN - NP [facility] BS AMB   5/1/2024  4:00 PM Hermila Heath MD [facility] AMB   10/16/2024  1:20 PM Michelle Rothman MD [facility] AMB       Requested Prescriptions     Pending Prescriptions Disp Refills    candesartan (ATACAND) 16 MG tablet       Sig: Take 1 tablet by mouth daily

## 2023-12-13 RX ORDER — CANDESARTAN 16 MG/1
16 TABLET ORAL DAILY
Qty: 90 TABLET | Refills: 3 | Status: SHIPPED | OUTPATIENT
Start: 2023-12-13

## 2023-12-13 NOTE — TELEPHONE ENCOUNTER
Spoke to pt,  Since ablation, Dr. Heather Montano asked her to take 1/2 tablet. She has been cutting the 32 mg in half. She had tried taking 32 mg previously, but her BP couldn't tolerate it.     Future Appointments   Date Time Provider [address]   12/18/2023  1:00 PM Rowan Gamez, APRN - NP [facility] AMB   2/6/2024 11:30 AM McAdoo, Chapman Boeck, APRN - NP [facility] BS AMB   5/1/2024  4:00 PM Doloresco, Gigi Cowden, MD [facility] AMB   10/16/2024  1:20 PM Michelle Rothman MD [facility] AMB

## 2023-12-20 RX ORDER — FUROSEMIDE 20 MG/1
20 TABLET ORAL 2 TIMES DAILY
Qty: 60 TABLET | OUTPATIENT
Start: 2023-12-20

## 2024-02-08 ENCOUNTER — TELEPHONE (OUTPATIENT)
Age: 84
End: 2024-02-08

## 2024-02-08 NOTE — TELEPHONE ENCOUNTER
Patient was called to make aware that [employer] has been unsuccessful with contacting her to set her up with her device. Spoke with  today, he stated she was at a doctor's appointment and she will call when she returns.

## 2024-02-19 DIAGNOSIS — I48.91 ATRIAL FIBRILLATION, UNSPECIFIED TYPE (HCC): ICD-10-CM

## 2024-03-27 ENCOUNTER — HOSPITAL ENCOUNTER (EMERGENCY)
Facility: HOSPITAL | Age: 84
Discharge: HOME | End: 2024-03-27
Attending: EMERGENCY MEDICINE
Payer: MEDICARE

## 2024-03-27 ENCOUNTER — APPOINTMENT (OUTPATIENT)
Facility: HOSPITAL | Age: 84
End: 2024-03-27
Payer: MEDICARE

## 2024-03-27 VITALS
HEART RATE: 92 BPM | WEIGHT: 180.12 LBS | SYSTOLIC BLOOD PRESSURE: 158 MMHG | RESPIRATION RATE: 21 BRPM | TEMPERATURE: 98 F | DIASTOLIC BLOOD PRESSURE: 87 MMHG | HEIGHT: 62 IN | BODY MASS INDEX: 33.15 KG/M2 | OXYGEN SATURATION: 99 %

## 2024-03-27 DIAGNOSIS — R07.9 CHEST PAIN, UNSPECIFIED TYPE: Primary | ICD-10-CM

## 2024-03-27 DIAGNOSIS — I48.0 PAROXYSMAL ATRIAL FIBRILLATION (HCC): ICD-10-CM

## 2024-03-27 LAB
ALBUMIN SERPL-MCNC: 3.7 G/DL (ref 3.5–5)
ALBUMIN/GLOB SERPL: 1.1 (ref 1.1–2.2)
ALP SERPL-CCNC: 74 U/L (ref 45–117)
ALT SERPL-CCNC: 23 U/L (ref 12–78)
ANION GAP SERPL CALC-SCNC: 2 MMOL/L (ref 5–15)
AST SERPL-CCNC: 23 U/L (ref 15–37)
BASOPHILS # BLD: 0 K/UL (ref 0–0.1)
BASOPHILS NFR BLD: 0 % (ref 0–1)
BILIRUB SERPL-MCNC: 0.6 MG/DL (ref 0.2–1)
BUN SERPL-MCNC: 13 MG/DL (ref 6–20)
BUN/CREAT SERPL: 13 (ref 12–20)
CALCIUM SERPL-MCNC: 9.7 MG/DL (ref 8.5–10.1)
CHLORIDE SERPL-SCNC: 106 MMOL/L (ref 97–108)
CO2 SERPL-SCNC: 32 MMOL/L (ref 21–32)
COMMENT:: NORMAL
CREAT SERPL-MCNC: 1.04 MG/DL (ref 0.55–1.02)
DIFFERENTIAL METHOD BLD: NORMAL
EKG DIAGNOSIS: NORMAL
EKG Q-T INTERVAL: 354 MS
EKG QRS DURATION: 122 MS
EKG QTC CALCULATION (BAZETT): 493 MS
EKG R AXIS: 70 DEGREES
EKG T AXIS: 0 DEGREES
EKG VENTRICULAR RATE: 117 BPM
EOSINOPHIL # BLD: 0.2 K/UL (ref 0–0.4)
EOSINOPHIL NFR BLD: 5 % (ref 0–7)
ERYTHROCYTE [DISTWIDTH] IN BLOOD BY AUTOMATED COUNT: 13.1 % (ref 11.5–14.5)
GLOBULIN SER CALC-MCNC: 3.5 G/DL (ref 2–4)
GLUCOSE SERPL-MCNC: 115 MG/DL (ref 65–100)
HCT VFR BLD AUTO: 41.1 % (ref 35–47)
HGB BLD-MCNC: 13.7 G/DL (ref 11.5–16)
IMM GRANULOCYTES # BLD AUTO: 0 K/UL (ref 0–0.04)
IMM GRANULOCYTES NFR BLD AUTO: 0 % (ref 0–0.5)
LYMPHOCYTES # BLD: 1.3 K/UL (ref 0.8–3.5)
LYMPHOCYTES NFR BLD: 27 % (ref 12–49)
MAGNESIUM SERPL-MCNC: 2 MG/DL (ref 1.6–2.4)
MCH RBC QN AUTO: 32.1 PG (ref 26–34)
MCHC RBC AUTO-ENTMCNC: 33.3 G/DL (ref 30–36.5)
MCV RBC AUTO: 96.3 FL (ref 80–99)
MONOCYTES # BLD: 0.6 K/UL (ref 0–1)
MONOCYTES NFR BLD: 12 % (ref 5–13)
NEUTS SEG # BLD: 2.7 K/UL (ref 1.8–8)
NEUTS SEG NFR BLD: 56 % (ref 32–75)
NRBC # BLD: 0 K/UL (ref 0–0.01)
NRBC BLD-RTO: 0 PER 100 WBC
PLATELET # BLD AUTO: 164 K/UL (ref 150–400)
PMV BLD AUTO: 9.9 FL (ref 8.9–12.9)
POTASSIUM SERPL-SCNC: 4 MMOL/L (ref 3.5–5.1)
PROT SERPL-MCNC: 7.2 G/DL (ref 6.4–8.2)
RBC # BLD AUTO: 4.27 M/UL (ref 3.8–5.2)
SODIUM SERPL-SCNC: 140 MMOL/L (ref 136–145)
SPECIMEN HOLD: NORMAL
TROPONIN I SERPL HS-MCNC: 12 NG/L (ref 0–51)
TROPONIN I SERPL HS-MCNC: 9 NG/L (ref 0–51)
TSH SERPL DL<=0.05 MIU/L-ACNC: 1.97 UIU/ML (ref 0.36–3.74)
WBC # BLD AUTO: 4.9 K/UL (ref 3.6–11)

## 2024-03-27 PROCEDURE — 83735 ASSAY OF MAGNESIUM: CPT

## 2024-03-27 PROCEDURE — 80053 COMPREHEN METABOLIC PANEL: CPT

## 2024-03-27 PROCEDURE — 85025 COMPLETE CBC W/AUTO DIFF WBC: CPT

## 2024-03-27 PROCEDURE — 6360000002 HC RX W HCPCS: Performed by: EMERGENCY MEDICINE

## 2024-03-27 PROCEDURE — 2500000003 HC RX 250 WO HCPCS: Performed by: EMERGENCY MEDICINE

## 2024-03-27 PROCEDURE — 36415 COLL VENOUS BLD VENIPUNCTURE: CPT

## 2024-03-27 PROCEDURE — 84484 ASSAY OF TROPONIN QUANT: CPT

## 2024-03-27 PROCEDURE — 6370000000 HC RX 637 (ALT 250 FOR IP): Performed by: EMERGENCY MEDICINE

## 2024-03-27 PROCEDURE — 93005 ELECTROCARDIOGRAM TRACING: CPT

## 2024-03-27 PROCEDURE — 99285 EMERGENCY DEPT VISIT HI MDM: CPT

## 2024-03-27 PROCEDURE — 93005 ELECTROCARDIOGRAM TRACING: CPT | Performed by: EMERGENCY MEDICINE

## 2024-03-27 PROCEDURE — 96374 THER/PROPH/DIAG INJ IV PUSH: CPT

## 2024-03-27 PROCEDURE — 93005 ELECTROCARDIOGRAM TRACING: CPT | Performed by: HOSPITALIST

## 2024-03-27 PROCEDURE — 71275 CT ANGIOGRAPHY CHEST: CPT

## 2024-03-27 PROCEDURE — 84443 ASSAY THYROID STIM HORMONE: CPT

## 2024-03-27 PROCEDURE — 96375 TX/PRO/DX INJ NEW DRUG ADDON: CPT

## 2024-03-27 PROCEDURE — 93010 ELECTROCARDIOGRAM REPORT: CPT | Performed by: SPECIALIST

## 2024-03-27 PROCEDURE — 6360000004 HC RX CONTRAST MEDICATION: Performed by: EMERGENCY MEDICINE

## 2024-03-27 RX ORDER — DILTIAZEM HYDROCHLORIDE 5 MG/ML
10 INJECTION INTRAVENOUS
Status: COMPLETED | OUTPATIENT
Start: 2024-03-27 | End: 2024-03-27

## 2024-03-27 RX ORDER — CARVEDILOL 12.5 MG/1
12.5 TABLET ORAL
Status: DISCONTINUED | OUTPATIENT
Start: 2024-03-27 | End: 2024-03-27

## 2024-03-27 RX ORDER — CARVEDILOL 3.12 MG/1
3.12 TABLET ORAL
Status: COMPLETED | OUTPATIENT
Start: 2024-03-27 | End: 2024-03-27

## 2024-03-27 RX ORDER — KETOROLAC TROMETHAMINE 30 MG/ML
15 INJECTION, SOLUTION INTRAMUSCULAR; INTRAVENOUS
Status: COMPLETED | OUTPATIENT
Start: 2024-03-27 | End: 2024-03-27

## 2024-03-27 RX ADMIN — CARVEDILOL 3.12 MG: 3.12 TABLET, FILM COATED ORAL at 23:00

## 2024-03-27 RX ADMIN — DILTIAZEM HYDROCHLORIDE 10 MG: 5 INJECTION, SOLUTION INTRAVENOUS at 19:54

## 2024-03-27 RX ADMIN — KETOROLAC TROMETHAMINE 15 MG: 30 INJECTION, SOLUTION INTRAMUSCULAR; INTRAVENOUS at 19:52

## 2024-03-27 RX ADMIN — IOPAMIDOL 80 ML: 755 INJECTION, SOLUTION INTRAVENOUS at 20:08

## 2024-03-27 ASSESSMENT — PAIN - FUNCTIONAL ASSESSMENT
PAIN_FUNCTIONAL_ASSESSMENT: PREVENTS OR INTERFERES SOME ACTIVE ACTIVITIES AND ADLS
PAIN_FUNCTIONAL_ASSESSMENT: PREVENTS OR INTERFERES SOME ACTIVE ACTIVITIES AND ADLS
PAIN_FUNCTIONAL_ASSESSMENT: NONE - DENIES PAIN

## 2024-03-27 ASSESSMENT — PAIN DESCRIPTION - ORIENTATION
ORIENTATION: LEFT
ORIENTATION: LEFT

## 2024-03-27 ASSESSMENT — PAIN DESCRIPTION - DESCRIPTORS
DESCRIPTORS: DISCOMFORT
DESCRIPTORS: DISCOMFORT;PATIENT UNABLE TO DESCRIBE

## 2024-03-27 ASSESSMENT — PAIN DESCRIPTION - LOCATION
LOCATION: CHEST
LOCATION: CHEST

## 2024-03-27 ASSESSMENT — PAIN DESCRIPTION - PAIN TYPE: TYPE: ACUTE PAIN

## 2024-03-27 ASSESSMENT — PAIN SCALES - GENERAL
PAINLEVEL_OUTOF10: 10
PAINLEVEL_OUTOF10: 10

## 2024-03-27 ASSESSMENT — PAIN DESCRIPTION - FREQUENCY: FREQUENCY: CONTINUOUS

## 2024-03-27 ASSESSMENT — PAIN DESCRIPTION - ONSET: ONSET: SUDDEN

## 2024-03-27 NOTE — ED TRIAGE NOTES
Pt comes to ED from home with reports of calling EMS to her home earlier today for palpitations and HTN. States her HR at home has been > 100 and her baseline has been in the 60s.     DANETTE Ramirez assessing pt in triage.

## 2024-03-27 NOTE — ED PROVIDER NOTES
[facility] EMERGENCY DEP  EMERGENCY DEPARTMENT ENCOUNTER      Pt Name: Radha Wynn  MRN: [id number]  Birthdate 1940  Date of evaluation: 3/27/2024  Provider: Scott Thompson MD    CHIEF COMPLAINT       Chief Complaint   Patient presents with    Palpitations         HISTORY OF PRESENT ILLNESS   (Location/Symptom, Timing/Onset, Context/Setting, Quality, Duration, Modifying Factors, Severity)  Note limiting factors.   Pt comes to ED from home with reports of calling EMS to her home earlier today for palpitations and HTN. States her HR at home has been > 100 and her baseline has been in the 60s.               Review of External Medical Records:     Nursing Notes were reviewed.    REVIEW OF SYSTEMS    (2-9 systems for level 4, 10 or more for level 5)     Review of Systems    Except as noted above the remainder of the review of systems was reviewed and negative.       PAST MEDICAL HISTORY     Past Medical History:   Diagnosis Date    Atrial fibrillation with RVR (HCC) 6/29/2018    DDD (degenerative disc disease)     Gastroesophageal reflux disease without esophagitis 7/3/2016    GERD (gastroesophageal reflux disease)     Hiatal hernia     HTN (hypertension), benign 7/26/2016    Hypothyroidism     TRISTAN (obstructive sleep apnea) 7/26/2016    Paroxysmal atrial fibrillation (HCC) 8/28/2016    Uterine prolapse          SURGICAL HISTORY       Past Surgical History:   Procedure Laterality Date    ABLATION OF DYSRHYTHMIC FOCUS  03/2017    CARDIAC PROCEDURE N/A 8/30/2023    Intracardiac echocardiogram performed by Michelle Rothman MD at [facility] CARDIAC CATH LAB    EP DEVICE PROCEDURE N/A 8/30/2023    Ablation A-fib w complete ep study performed by Michelle Rothman MD at [facility] CARDIAC CATH LAB    EP DEVICE PROCEDURE N/A 8/30/2023    Ep 3d mapping performed by Michelle Rothman MD at [facility] CARDIAC CATH LAB    EP DEVICE PROCEDURE N/A 8/30/2023    Drug stimulation performed by An ISAAC Rothman MD at [facility] CARDIAC CATH LAB    EP DEVICE PROCEDURE N/A 8/30/2023     [place] [address]    Phone Number:  Office: [phone]  Schedule an appointment as soon as possible for a visit       [facility]  Phone: [phone] [address]  [place] [address]  Schedule an appointment as soon as possible for a visit         DISCHARGE MEDICATIONS:  Discharge Medication List as of 3/27/2024 11:25 PM            (Please note that portions of this note were completed with a voice recognition program.  Efforts were made to edit the dictations but occasionally words are mis-transcribed.)    Scott Thompson MD (electronically signed)  Emergency Attending Physician / Physician Assistant / Nurse Practitioner            Scott Thompson MD  04/02/24 [id number]

## 2024-03-27 NOTE — ED NOTES
3:04 PM  I have evaluated the patient as the Provider in Rapid Medical Evaluation (RME). I have reviewed her vital signs and the triage nurse assessment. I have talked with the patient and any available family and advised that I am the provider in triage and have ordered the appropriate study to initiate their work up based on the clinical presentation during my assessment. I have advised that the patient will be accommodated in the Main ED as soon as possible. I have also requested to contact the triage nurse or myself immediately if the patient experiences any changes in their condition during this brief waiting period.    84-year-old female with past medical history of atrial fibrillation, hypertension, hypothyroidism, GERD presents with complaint of heart beating fast.  She reports that she has had 2 ablations in the past for atrial fibrillation.  Most recent about 6 months ago.  This morning, called 911 because she felt like her heart rate was higher than normal.  She says she usually rest in the 60s.  Her heart rate this morning was in the high 90s and low 100s.  She thought she was maybe going back into A-fib.  Denies any chest pain.  On exam, heartbeat sounds irregular.    HARLEY Cabrera Andrea K, PA-C  03/27/24 [id number]

## 2024-03-28 NOTE — ED NOTES
Pt and nurse discuss discharge paperwork and education on medications and findings. Pt denies questions or concerns at this time. IV removed and final vitals updated in system. Pt ambulatory out of ED without distress or issue.

## 2024-03-28 NOTE — DISCHARGE INSTRUCTIONS
You were seen in the emergency department for chest pain.  The results of your tests were reassuring, although you were found to be in atrial fibrillation but with a normal rate.  Although an exact cause of your symptoms was not identified, the most likely cause is a hiatal hernia.  Please take any medications prescribed at this visit as instructed.  Please follow-up with your PCP or return to the emergency department if you experience a worsening of symptoms or any new symptoms that are concerning to you.

## 2024-03-29 ENCOUNTER — TELEPHONE (OUTPATIENT)
Age: 84
End: 2024-03-29

## 2024-03-29 LAB
EKG ATRIAL RATE: 90 BPM
EKG DIAGNOSIS: NORMAL
EKG DIAGNOSIS: NORMAL
EKG P AXIS: 52 DEGREES
EKG P-R INTERVAL: 188 MS
EKG Q-T INTERVAL: 318 MS
EKG Q-T INTERVAL: 376 MS
EKG QRS DURATION: 122 MS
EKG QRS DURATION: 90 MS
EKG QTC CALCULATION (BAZETT): 424 MS
EKG QTC CALCULATION (BAZETT): 459 MS
EKG R AXIS: -47 DEGREES
EKG R AXIS: 129 DEGREES
EKG T AXIS: 24 DEGREES
EKG T AXIS: 55 DEGREES
EKG VENTRICULAR RATE: 107 BPM
EKG VENTRICULAR RATE: 90 BPM

## 2024-03-29 PROCEDURE — 93010 ELECTROCARDIOGRAM REPORT: CPT | Performed by: SPECIALIST

## 2024-03-29 NOTE — PROGRESS NOTES
CARDIOLOGY OFFICE NOTE    [address] [facility], [place] [address]  Phone [phone]; Fax [phone]    Primary Cardiologist: Suleman Heath MD, [facility]  Last Office Visit: 10/26/23  Primary care: Neena Hammer MD    Radha Wynn is a 84 y.o. female with  referred for  shortness of breath and hypertension, paroxysmal atrial fibrillation status post ablation, SVT          Cardiac risk factors: hypertension, post-menopausal  I have personally obtained the history from the patient.       HISTORY OF PRESENTING ILLNESS   Ms.Linda SULY Wynn  84 y.o. presents today for follow-up. Went to ER on 3/27/24 with palpitations with associated chest pressure and HTN. EKG initially was afib  and then EKG a few hours later was NSR with 90 bpm. Labs were normal.   BP at home 90-120s/70s. Elevated today in office, just took her medications prior to visit.   Today she has chest tightness when she thinks she is in afib. Denies worsening shortness of breath. Has a hiatal hernia.   Had lower leg perfusion study done with [facility], says results were abnormal. Plans to see vascular tomorrow.   Had sleep study with sleep apnea, has never received CPAP in the mail.    ACTIVE PROBLEM LIST     Patient Active Problem List    Diagnosis Date Noted    Atrial fibrillation (HCC) 08/30/2023    Chest pain 09/24/2023    High risk medication use 10/30/2022    Anticoagulation adequate 10/20/2022    Sinus bradycardia 05/12/2020    Anxiety 07/22/2018    Severe obesity (BMI 35.0-39.9) with comorbidity (HCC) 05/25/2018    SVT (supraventricular tachycardia) (HCC) 08/28/2017    Hiatal hernia 08/28/2017    Venous insufficiency 05/04/2017    Acquired hypothyroidism 05/01/2017    S/P ablation of atrial fibrillation 04/28/2017    Paroxysmal atrial fibrillation (HCC) 08/28/2016    Essential hypertension 07/26/2016    Gastroesophageal reflux disease without esophagitis 07/03/2016    RBBB 05/07/2015    LAE (left

## 2024-03-29 NOTE — TELEPHONE ENCOUNTER
Pt was seen in ER yesterday for CP/palps and is scheduled to see NP Monday 4/1/24. She is concerned about HTN and HR. BP running 160/109 . She took Coreg and Atacand this am and took a Clonidine about a hour ago. Asked her to monitor BP/HR and at 6:30pm if still elevated to take 2 3.125 mg Coreg instead of 1. She will call over the weekend of not any better. Asked her to watch her salt intake over the holiday.

## 2024-04-01 ENCOUNTER — ANCILLARY PROCEDURE (OUTPATIENT)
Age: 84
End: 2024-04-01
Payer: MEDICARE

## 2024-04-01 ENCOUNTER — OFFICE VISIT (OUTPATIENT)
Age: 84
End: 2024-04-01
Payer: MEDICARE

## 2024-04-01 VITALS
BODY MASS INDEX: 33.13 KG/M2 | OXYGEN SATURATION: 97 % | RESPIRATION RATE: 20 BRPM | WEIGHT: 180 LBS | SYSTOLIC BLOOD PRESSURE: 146 MMHG | HEART RATE: 95 BPM | DIASTOLIC BLOOD PRESSURE: 90 MMHG | HEIGHT: 62 IN

## 2024-04-01 DIAGNOSIS — I77.819 AORTIC DILATATION (HCC): ICD-10-CM

## 2024-04-01 DIAGNOSIS — G47.33 OBSTRUCTIVE SLEEP APNEA (ADULT) (PEDIATRIC): ICD-10-CM

## 2024-04-01 DIAGNOSIS — I10 ESSENTIAL HYPERTENSION: ICD-10-CM

## 2024-04-01 DIAGNOSIS — Z98.890 S/P ABLATION OF ATRIAL FIBRILLATION: ICD-10-CM

## 2024-04-01 DIAGNOSIS — I48.0 PAROXYSMAL ATRIAL FIBRILLATION (HCC): Primary | ICD-10-CM

## 2024-04-01 DIAGNOSIS — I48.91 ATRIAL FIBRILLATION (HCC): ICD-10-CM

## 2024-04-01 DIAGNOSIS — Z79.01 ANTICOAGULATION ADEQUATE: ICD-10-CM

## 2024-04-01 DIAGNOSIS — Z86.79 S/P ABLATION OF ATRIAL FIBRILLATION: ICD-10-CM

## 2024-04-01 PROCEDURE — 3079F DIAST BP 80-89 MM HG: CPT

## 2024-04-01 PROCEDURE — 99214 OFFICE O/P EST MOD 30 MIN: CPT

## 2024-04-01 PROCEDURE — 1123F ACP DISCUSS/DSCN MKR DOCD: CPT

## 2024-04-01 PROCEDURE — G8427 DOCREV CUR MEDS BY ELIG CLIN: HCPCS

## 2024-04-01 PROCEDURE — 3077F SYST BP >= 140 MM HG: CPT

## 2024-04-01 PROCEDURE — G8417 CALC BMI ABV UP PARAM F/U: HCPCS

## 2024-04-01 PROCEDURE — 93246 EXT ECG>7D<15D RECORDING: CPT | Performed by: INTERNAL MEDICINE

## 2024-04-01 PROCEDURE — 1090F PRES/ABSN URINE INCON ASSESS: CPT

## 2024-04-01 PROCEDURE — G8400 PT W/DXA NO RESULTS DOC: HCPCS

## 2024-04-01 PROCEDURE — 1036F TOBACCO NON-USER: CPT

## 2024-04-01 NOTE — PROGRESS NOTES
had concerns including Hypertension and Other (PAF, RBBB).    Vitals:    04/01/24 0926 04/01/24 0938   BP: (!) 150/80 (!) 146/90   Site: Left Upper Arm Right Upper Arm   Position: Sitting Sitting   Pulse: 95    Resp: 20    SpO2: 97%    Weight: 81.6 kg (180 lb)    Height: 1.575 m (5' 2\")         Chest pain No    Refills No        1. Have you been to the ER, urgent care clinic since your last visit? No       Hospitalized since your last visit? No       Where?     [facility]   Date? 3/27/24

## 2024-04-02 ASSESSMENT — ENCOUNTER SYMPTOMS
RESPIRATORY NEGATIVE: 1
EYES NEGATIVE: 1
GASTROINTESTINAL NEGATIVE: 1

## 2024-04-09 RX ORDER — CLONIDINE HYDROCHLORIDE 0.1 MG/1
0.1 TABLET ORAL DAILY PRN
Qty: 30 TABLET | Refills: 5 | Status: SHIPPED | OUTPATIENT
Start: 2024-04-09

## 2024-04-09 NOTE — TELEPHONE ENCOUNTER
Requested Prescriptions     Signed Prescriptions Disp Refills    cloNIDine (CATAPRES) 0.1 MG tablet 30 tablet 5     Sig: Take 1 tablet by mouth daily as needed for High Blood Pressure     Authorizing Provider: KODY HEATH     Ordering User: KO CACERES     Refill per verbal order Dr. Heath.   Last visit:10/26/23  Next visit: 5/1/24

## 2024-04-18 ENCOUNTER — TELEPHONE (OUTPATIENT)
Age: 84
End: 2024-04-18

## 2024-04-18 NOTE — TELEPHONE ENCOUNTER
Patient called in because she was wondering if she still needs to come to her appointment scheduled for Tuesday. She still has yet to receive her new PAP device from [employer] or hear from them; I informed her I would reach out the representative for [facility] to check and see what happened with her order and give her a call back with an update.    Sent a secure email to [employer] representative to ask to expedite order for patient.

## 2024-04-22 LAB — ECHO BSA: 1.89 M2

## 2024-04-22 PROCEDURE — 93248 EXT ECG>7D<15D REV&INTERPJ: CPT | Performed by: INTERNAL MEDICINE

## 2024-04-23 ENCOUNTER — TELEPHONE (OUTPATIENT)
Age: 84
End: 2024-04-23

## 2024-04-23 DIAGNOSIS — I48.91 ATRIAL FIBRILLATION (HCC): Primary | ICD-10-CM

## 2024-04-23 DIAGNOSIS — I48.91 ATRIAL FIBRILLATION, UNSPECIFIED TYPE (HCC): ICD-10-CM

## 2024-04-23 DIAGNOSIS — Z86.79 S/P ABLATION OF ATRIAL FIBRILLATION: ICD-10-CM

## 2024-04-23 DIAGNOSIS — Z98.890 S/P ABLATION OF ATRIAL FIBRILLATION: ICD-10-CM

## 2024-04-23 DIAGNOSIS — I48.0 PAROXYSMAL ATRIAL FIBRILLATION (HCC): ICD-10-CM

## 2024-04-23 RX ORDER — AMIODARONE HYDROCHLORIDE 200 MG/1
400 TABLET ORAL DAILY
Qty: 45 TABLET | Refills: 5 | Status: SHIPPED | OUTPATIENT
Start: 2024-04-23

## 2024-04-23 NOTE — TELEPHONE ENCOUNTER
Spoke to Pt of DCCV Scheduled for 5/3/24 at 1pm arrive at 11:30am  Check in at the 2nd floor outpt Reg. Desk   You will need a  must stay on site  NPO from midnight prior to procedure  Have  Labs done on 4/23/24-4/29/24  Medications:  Ok to take   VO by /nurse Dolly MG

## 2024-04-23 NOTE — TELEPHONE ENCOUNTER
----- Message from Orville Rothman MD sent at 4/22/2024  -----  Hi Bev,    She's in Afib right now based on the event monitor. Can we schedule for her to come in for a DCCV with me next week, ensure she's on OAC. If she has been on OAC for >1 month, we can start her on Amiodarone 400 mg daily x 2 weeks, then down to 200 mg daily. Thanks.     Called patient, ID verified using two patient identifiers.  Notified of above results and recommendations.  Patient agreeable to this plan.    Prescription sent to patient's confirmed pharmacy.  Advised patient that our  will reach out to her to get her scheduled for the cardioversion next week.  Patient verbalized understanding and will call back with any other questions or concerns.    Requested Prescriptions     Signed Prescriptions Disp Refills    amiodarone (PACERONE) 200 MG tablet 45 tablet 5     Sig: Take 2 tablets by mouth daily 400 mg daily x 2 weeks, then decrease to 200 mg daily thereafter     Authorizing Provider: ORVILLE ROTHMAN     Ordering User: BEV CANO

## 2024-04-24 ENCOUNTER — TELEPHONE (OUTPATIENT)
Age: 84
End: 2024-04-24

## 2024-04-24 NOTE — TELEPHONE ENCOUNTER
Patient is calling because she says that she doesn't want to take the medicine Amiodarone 200 mg.Patient says because it causes too many side effects.    [phone]

## 2024-04-25 NOTE — TELEPHONE ENCOUNTER
Received call from patient, ID verified using two patient identifiers. Patient verbalizing concerns about the serious side effects of Amiodarone. She is hesitant to start the medication. Advised patient that the side effects are usually associated with long term use of the Amiodarone. Informed her that the goal is to have her take the Amiodarone only for a short time, and if she needs to stay on the medication for a longer time we will monitor her thyroid and liver function every 6 months and have her do annual chest X-rays and eye exams to monitor her status. Patient agreeable to start the Amiodarone as prescribed. She will call back with any further questions or concerns.     Future Appointments   Date Time Provider Department Center   5/1/2024  4:00 PM Suleman Heath MD [facility] AM[facility]   5/17/2024  1:00 PM Amy Toth APRN - CNP [facility]   7/16/2024 11:50 AM [facility]   10/16/2024  1:20 PM Michelle Rothman MD [facility] AM[facility]

## 2024-04-29 DIAGNOSIS — I48.91 ATRIAL FIBRILLATION, UNSPECIFIED TYPE (HCC): ICD-10-CM

## 2024-04-29 DIAGNOSIS — I48.91 ATRIAL FIBRILLATION (HCC): ICD-10-CM

## 2024-04-29 DIAGNOSIS — Z98.890 S/P ABLATION OF ATRIAL FIBRILLATION: ICD-10-CM

## 2024-04-29 DIAGNOSIS — I48.0 PAROXYSMAL ATRIAL FIBRILLATION (HCC): ICD-10-CM

## 2024-04-29 DIAGNOSIS — Z86.79 S/P ABLATION OF ATRIAL FIBRILLATION: ICD-10-CM

## 2024-04-29 LAB
ANION GAP SERPL CALC-SCNC: 5 MMOL/L (ref 5–15)
BUN SERPL-MCNC: 16 MG/DL (ref 6–20)
BUN/CREAT SERPL: 15 (ref 12–20)
CALCIUM SERPL-MCNC: 10 MG/DL (ref 8.5–10.1)
CHLORIDE SERPL-SCNC: 108 MMOL/L (ref 97–108)
CO2 SERPL-SCNC: 29 MMOL/L (ref 21–32)
CREAT SERPL-MCNC: 1.09 MG/DL (ref 0.55–1.02)
GLUCOSE SERPL-MCNC: 114 MG/DL (ref 65–100)
MAGNESIUM SERPL-MCNC: 2.1 MG/DL (ref 1.6–2.4)
POTASSIUM SERPL-SCNC: 3.9 MMOL/L (ref 3.5–5.1)
SODIUM SERPL-SCNC: 142 MMOL/L (ref 136–145)

## 2024-04-30 ENCOUNTER — PREP FOR PROCEDURE (OUTPATIENT)
Age: 84
End: 2024-04-30

## 2024-04-30 RX ORDER — SODIUM CHLORIDE 0.9 % (FLUSH) 0.9 %
5-40 SYRINGE (ML) INJECTION EVERY 12 HOURS SCHEDULED
Status: CANCELLED | OUTPATIENT
Start: 2024-04-30

## 2024-04-30 RX ORDER — SODIUM CHLORIDE 0.9 % (FLUSH) 0.9 %
5-40 SYRINGE (ML) INJECTION PRN
Status: CANCELLED | OUTPATIENT
Start: 2024-04-30

## 2024-04-30 RX ORDER — SODIUM CHLORIDE 9 MG/ML
INJECTION, SOLUTION INTRAVENOUS PRN
Status: CANCELLED | OUTPATIENT
Start: 2024-04-30

## 2024-04-30 NOTE — PATIENT INSTRUCTIONS

## 2024-04-30 NOTE — PROGRESS NOTES
CARDIOLOGY OFFICE NOTE    Suleman Heath MD, [facility]    [address], [place] [address]  Phone [phone]; Fax [phone]  Mobile [phone]   Voice Mail [phone]    Primary care: Neena Hammer MD       ATTENTION:   This medical record was transcribed using an electronic medical records/speech recognition system.  Although proofread, it may and can contain electronic, spelling and other errors.  Corrections may be executed at a later time.  Please feel free to contact us for any clarifications as needed.             Radha Wynn is a 84 y.o. female with  referred for  shortness of breath and hypertension, paroxysmal atrial fibrillation status post ablation, SVT            Cardiac risk factors: hypertension, post-menopausal   I have personally obtained the history from the patient.            HISTORY OF PRESENTING ILLNESS    Ms./Mr. Radha Wynn  84 y.o. is very nice lady who was admitted to the hospital on 9/24/2023 to 9/25/2023 with chest discomfort.  She had negative troponins and was felt at that time that her chest discomfort was related to uncontrolled hypertension.  Her candesartan dose was increased and she is now taking 32 mg.  She did have an ablation back in September and has remained in a normal sinus rhythm.  This was performed by .  She did have a ablation in the distant past with Dr. Reagan pérez.    She does state that she is back in atrial fibrillation and is also taking amiodarone and that she is scheduled for a cardioversion on Friday.  She is tolerating Eliquis.    LDL is on the higher side of normal but I favor her going on Crestor and we talked about going on 5 mg Monday Wednesday and Friday and continuing the Zetia   ACTIVE PROBLEM LIST     Patient Active Problem List    Diagnosis Date Noted    Atrial fibrillation (HCC) 08/30/2023    Chest pain 09/24/2023    High risk medication use 10/30/2022    Anticoagulation adequate

## 2024-05-01 ENCOUNTER — OFFICE VISIT (OUTPATIENT)
Age: 84
End: 2024-05-01
Payer: MEDICARE

## 2024-05-01 VITALS
BODY MASS INDEX: 32.02 KG/M2 | HEIGHT: 62 IN | SYSTOLIC BLOOD PRESSURE: 138 MMHG | HEART RATE: 90 BPM | OXYGEN SATURATION: 97 % | WEIGHT: 174 LBS | DIASTOLIC BLOOD PRESSURE: 84 MMHG

## 2024-05-01 DIAGNOSIS — Z98.890 S/P ABLATION OF ATRIAL FIBRILLATION: ICD-10-CM

## 2024-05-01 DIAGNOSIS — I48.0 PAROXYSMAL ATRIAL FIBRILLATION (HCC): Primary | ICD-10-CM

## 2024-05-01 DIAGNOSIS — Z79.01 ANTICOAGULATION ADEQUATE: ICD-10-CM

## 2024-05-01 DIAGNOSIS — I10 ESSENTIAL HYPERTENSION: ICD-10-CM

## 2024-05-01 DIAGNOSIS — Z86.79 S/P ABLATION OF ATRIAL FIBRILLATION: ICD-10-CM

## 2024-05-01 DIAGNOSIS — G47.33 OBSTRUCTIVE SLEEP APNEA (ADULT) (PEDIATRIC): ICD-10-CM

## 2024-05-01 DIAGNOSIS — E78.00 HYPERCHOLESTEREMIA: Primary | ICD-10-CM

## 2024-05-01 PROCEDURE — 93010 ELECTROCARDIOGRAM REPORT: CPT | Performed by: SPECIALIST

## 2024-05-01 PROCEDURE — G8417 CALC BMI ABV UP PARAM F/U: HCPCS | Performed by: SPECIALIST

## 2024-05-01 PROCEDURE — 3079F DIAST BP 80-89 MM HG: CPT | Performed by: SPECIALIST

## 2024-05-01 PROCEDURE — 99214 OFFICE O/P EST MOD 30 MIN: CPT | Performed by: SPECIALIST

## 2024-05-01 PROCEDURE — 1123F ACP DISCUSS/DSCN MKR DOCD: CPT | Performed by: SPECIALIST

## 2024-05-01 PROCEDURE — 1036F TOBACCO NON-USER: CPT | Performed by: SPECIALIST

## 2024-05-01 PROCEDURE — 1090F PRES/ABSN URINE INCON ASSESS: CPT | Performed by: SPECIALIST

## 2024-05-01 PROCEDURE — G8400 PT W/DXA NO RESULTS DOC: HCPCS | Performed by: SPECIALIST

## 2024-05-01 PROCEDURE — 3075F SYST BP GE 130 - 139MM HG: CPT | Performed by: SPECIALIST

## 2024-05-01 PROCEDURE — 93005 ELECTROCARDIOGRAM TRACING: CPT | Performed by: SPECIALIST

## 2024-05-01 PROCEDURE — G8427 DOCREV CUR MEDS BY ELIG CLIN: HCPCS | Performed by: SPECIALIST

## 2024-05-01 RX ORDER — ROSUVASTATIN CALCIUM 5 MG/1
5 TABLET, COATED ORAL 3 TIMES DAILY
COMMUNITY
End: 2024-05-01 | Stop reason: SDUPTHER

## 2024-05-01 RX ORDER — ROSUVASTATIN CALCIUM 5 MG/1
5 TABLET, COATED ORAL 3 TIMES DAILY
Qty: 30 TABLET | Refills: 3 | Status: SHIPPED | OUTPATIENT
Start: 2024-05-01 | End: 2024-05-03 | Stop reason: ALTCHOICE

## 2024-05-01 NOTE — PROGRESS NOTES
NAME Radha STANFORD    1940      MRN    [id number]      LAST OFFICE APPOINTMENT: 2024     DIAGNOSIS    ICD-10-CM    1. Paroxysmal atrial fibrillation (HCC)  I48.0       2. S/P ablation of atrial fibrillation  Z98.890     Z86.79       3. Essential hypertension  I10       4. Anticoagulation adequate  Z79.01       5. Obstructive sleep apnea (adult) (pediatric)  G47.33       6. BMI 32.0-32.9,adult  Z68.32           HOME MEDICATION  Current Outpatient Medications   Medication Sig    amiodarone (PACERONE) 200 MG tablet Take 2 tablets by mouth daily 400 mg daily x 2 weeks, then decrease to 200 mg daily thereafter    cloNIDine (CATAPRES) 0.1 MG tablet Take 1 tablet by mouth daily as needed for High Blood Pressure    Multiple Vitamins-Minerals (CENTRUM SILVER PO) Take by mouth    vitamin D (CHOLECALCIFEROL) 125 MCG (5000 UT) CAPS capsule Take 1 capsule by mouth daily    apixaban (ELIQUIS) 5 MG TABS tablet Take 1 tablet by mouth 2 times daily    candesartan (ATACAND) 16 MG tablet Take 1 tablet by mouth daily    carvedilol (COREG) 3.125 MG tablet Take 1 tablet by mouth 2 times daily (with meals)    escitalopram (LEXAPRO) 5 MG tablet Take 1 tablet by mouth daily    ezetimibe (ZETIA) 10 MG tablet Take 1 tablet by mouth every evening    levothyroxine (SYNTHROID) 50 MCG tablet Take 1 tablet by mouth every morning (before breakfast)    atorvastatin (LIPITOR) 40 MG tablet Take 1 tablet by mouth nightly (Patient not taking: Reported on 2024)    Calcium Carbonate-Vitamin D (OYSTER SHELL CALCIUM/D) 500-5 MG-MCG TABS Take 1 tablet by mouth every evening (Patient not taking: Reported on 2024)     No current facility-administered medications for this visit.       VITAL SIGNS  Wt Readings from Last 3 Encounters:   24 78.9 kg (174 lb)   24 81.6 kg (180 lb)   24 81.7 kg (180 lb 1.9 oz)     BP Readings from Last 3 Encounters:   24 138/84   24 (!) 146/90   24 (!) 158/87     Pulse

## 2024-05-02 NOTE — TELEPHONE ENCOUNTER
Left Message For Pt:       Just a reminder not to forget you DCCV scheduled for tomorrow.  Arriving at  11:30am  You will need a  must stay on site  NPO from midnight   check in at the 2nd floor outpt. reg. Desk.  Medications:  Ok to take  VO by /nurse Dolly RAMOS

## 2024-05-02 NOTE — TELEPHONE ENCOUNTER
Called and left message for pt on both numbers to come in at 10:45am for her procedure the procedure time is 12:15pm

## 2024-05-03 ENCOUNTER — HOSPITAL ENCOUNTER (OUTPATIENT)
Facility: HOSPITAL | Age: 84
Setting detail: OUTPATIENT SURGERY
Discharge: HOME | End: 2024-05-03
Attending: INTERNAL MEDICINE | Admitting: INTERNAL MEDICINE
Payer: MEDICARE

## 2024-05-03 ENCOUNTER — TELEPHONE (OUTPATIENT)
Age: 84
End: 2024-05-03

## 2024-05-03 VITALS — DIASTOLIC BLOOD PRESSURE: 85 MMHG | SYSTOLIC BLOOD PRESSURE: 196 MMHG

## 2024-05-03 DIAGNOSIS — I48.91 A-FIB (HCC): ICD-10-CM

## 2024-05-03 LAB
EKG ATRIAL RATE: 58 BPM
EKG DIAGNOSIS: NORMAL
EKG P AXIS: 78 DEGREES
EKG P-R INTERVAL: 176 MS
EKG Q-T INTERVAL: 492 MS
EKG QRS DURATION: 140 MS
EKG QTC CALCULATION (BAZETT): 482 MS
EKG R AXIS: 94 DEGREES
EKG T AXIS: 13 DEGREES
EKG VENTRICULAR RATE: 58 BPM

## 2024-05-03 PROCEDURE — 6370000000 HC RX 637 (ALT 250 FOR IP)

## 2024-05-03 PROCEDURE — 93010 ELECTROCARDIOGRAM REPORT: CPT | Performed by: INTERNAL MEDICINE

## 2024-05-03 PROCEDURE — 93005 ELECTROCARDIOGRAM TRACING: CPT | Performed by: INTERNAL MEDICINE

## 2024-05-03 RX ORDER — HYDRALAZINE HYDROCHLORIDE 20 MG/ML
10 INJECTION INTRAMUSCULAR; INTRAVENOUS ONCE
Status: DISCONTINUED | OUTPATIENT
Start: 2024-05-03 | End: 2024-05-03

## 2024-05-03 RX ORDER — SODIUM CHLORIDE 9 MG/ML
INJECTION, SOLUTION INTRAVENOUS PRN
Status: DISCONTINUED | OUTPATIENT
Start: 2024-05-03 | End: 2024-05-03 | Stop reason: HOSPADM

## 2024-05-03 RX ORDER — CLONIDINE HYDROCHLORIDE 0.1 MG/1
0.1 TABLET ORAL ONCE
Status: COMPLETED | OUTPATIENT
Start: 2024-05-03 | End: 2024-05-03

## 2024-05-03 RX ORDER — SODIUM CHLORIDE 0.9 % (FLUSH) 0.9 %
5-40 SYRINGE (ML) INJECTION PRN
Status: DISCONTINUED | OUTPATIENT
Start: 2024-05-03 | End: 2024-05-03 | Stop reason: HOSPADM

## 2024-05-03 RX ORDER — SODIUM CHLORIDE 0.9 % (FLUSH) 0.9 %
5-40 SYRINGE (ML) INJECTION EVERY 12 HOURS SCHEDULED
Status: CANCELLED | OUTPATIENT
Start: 2024-05-03

## 2024-05-03 RX ORDER — SODIUM CHLORIDE 0.9 % (FLUSH) 0.9 %
5-40 SYRINGE (ML) INJECTION PRN
Status: CANCELLED | OUTPATIENT
Start: 2024-05-03

## 2024-05-03 RX ORDER — ROSUVASTATIN CALCIUM 5 MG/1
5 TABLET, COATED ORAL
Qty: 30 TABLET | Refills: 5 | Status: SHIPPED | OUTPATIENT
Start: 2024-05-03

## 2024-05-03 RX ORDER — ROSUVASTATIN CALCIUM 5 MG/1
5 TABLET, COATED ORAL
COMMUNITY
End: 2024-05-03 | Stop reason: SDUPTHER

## 2024-05-03 RX ORDER — SODIUM CHLORIDE 0.9 % (FLUSH) 0.9 %
5-40 SYRINGE (ML) INJECTION EVERY 12 HOURS SCHEDULED
Status: DISCONTINUED | OUTPATIENT
Start: 2024-05-03 | End: 2024-05-03 | Stop reason: HOSPADM

## 2024-05-03 RX ADMIN — CLONIDINE HYDROCHLORIDE 0.1 MG: 0.1 TABLET ORAL at 12:07

## 2024-05-03 NOTE — PROGRESS NOTES
11:00 AM  Patient brought back from waiting area. ID band and allergies confirmed. Consents signed.     11:05 AM  Patient in Sinus Bradycardia. EKG performed to confirm. No cardioversion necessary today. Patients blood pressure 224/91. MD notified. Waiting for orders.    12:00 PM  Blood pressure medicine ordered for patient.     12:07 PM  Blood pressure medication given to patient. Patient resting comfortably. Patient escorted back to waiting room with family to wait for conference with Dr. Rothman.

## 2024-05-07 ENCOUNTER — TELEPHONE (OUTPATIENT)
Age: 84
End: 2024-05-07

## 2024-05-07 DIAGNOSIS — Z86.79 S/P ABLATION OF ATRIAL FIBRILLATION: ICD-10-CM

## 2024-05-07 DIAGNOSIS — Z98.890 S/P ABLATION OF ATRIAL FIBRILLATION: ICD-10-CM

## 2024-05-07 DIAGNOSIS — I48.0 PAROXYSMAL ATRIAL FIBRILLATION (HCC): Primary | ICD-10-CM

## 2024-05-07 DIAGNOSIS — I48.91 ATRIAL FIBRILLATION, UNSPECIFIED TYPE (HCC): ICD-10-CM

## 2024-05-07 DIAGNOSIS — I48.91 ATRIAL FIBRILLATION (HCC): ICD-10-CM

## 2024-05-07 DIAGNOSIS — G47.33 OBSTRUCTIVE SLEEP APNEA (ADULT) (PEDIATRIC): ICD-10-CM

## 2024-05-07 NOTE — TELEPHONE ENCOUNTER
Attempted to reach patient by telephone. HIPAA compliant message was left for return call.     *Need to inform patient of rescheduled follow up appointment and referral to sleep medicine.    Orders Placed This Encounter   Procedures    Ambulatory referral to Sleep Medicine     Referral Priority:   Routine     Referral Type:   Eval and Treat     Referral Reason:   Specialty Services Required     Requested Specialty:   Sleep Medicine Physician     Number of Visits Requested:   1     Future Appointments   Date Time Provider Department Center   7/11/2024 11:00 AM Cata Hernandez, APRN - NP [facility] BS AMB   7/16/2024 11:50 AM Rayna Alba, [facility]N - [facility]   10/16/2024  1:20 PM Michelle Rothman MD [facility] BS AMB   11/20/2024  1:00 PM Suleman Heath MD [facility] BS AMB

## 2024-05-08 ENCOUNTER — TELEPHONE (OUTPATIENT)
Age: 84
End: 2024-05-08

## 2024-05-08 NOTE — TELEPHONE ENCOUNTER
Returned call to Destiny at [facility] sleep medicine, ID verified using two patient identifiers.    She received our referral for the in hospital sleep study.  Per Destiny, Ms. Wynn already had an in home sleep study done last year and a CPAP machine was ordered by Dr. Medina.  Her insurance approved the cpap device.  One was ordered for her but Ms. Wynn never answered the phone when the [employer] tried to reach her to set it up.  When contacted about this she stated she did not want it.    Advised Destiny that Dr. Rothman wanted an in hospital sleep study done.  I am unsure if he is aware of the above.  I will clarify with him if he wants patient to proceed with additional testing or if she just needs to follow up with sleep medicine to try and reorder the cpap machine.    I will notify Destiny the decision via secure chat.    Future Appointments   Date Time Provider Department Center   7/11/2024 11:00 AM Cata Hernandez APRN - NP [facility] BS AMB   10/16/2024  1:20 PM Michelle Rothman MD [facility] BS AMB   11/20/2024  1:00 PM Suleman Heath MD [facility] BS AMB

## 2024-05-08 NOTE — TELEPHONE ENCOUNTER
Destiny with [facility] called and would like to speak to nurse to get clarification on a referral for a sleep study.     Dr. Garcia's office:   Destiny phone # [phone]

## 2024-05-08 NOTE — TELEPHONE ENCOUNTER
Returned patient call, ID verified using two patient identifiers.  Advised patient of the change in follow up appointment and referral to Sleep medicine for sleep study.  Notified her that the referral is for an in hospital sleep study.  Contact number provided.    Patient verbalized understanding and will call back with any other questions or concerns.    Future Appointments   Date Time Provider Department Center   7/11/2024 11:00 AM Cata Hernandez APRN - NP [facility] AMB   10/16/2024  1:20 PM Michelle Rothman MD [facility] AMB   11/20/2024  1:00 PM Suleman Heath MD [facility] BS AMB

## 2024-05-08 NOTE — TELEPHONE ENCOUNTER
Patient called in regarding the status of her order, and also scheduling a sleep study. I made the patient aware of the protocol dealing with scheduling sleep studies. She has let the office know that her PCP has told her it was no need to continue establishing care. There was some bit of confusion with what was said between the patient and the PCP. Patient has been rescheduled to continue care with center, PCP's office will be contacted for follow up on information.

## 2024-05-15 ENCOUNTER — TELEPHONE (OUTPATIENT)
Age: 84
End: 2024-05-15

## 2024-05-16 ENCOUNTER — TELEPHONE (OUTPATIENT)
Age: 84
End: 2024-05-16

## 2024-05-16 RX ORDER — NIFEDIPINE 30 MG/1
30 TABLET, EXTENDED RELEASE ORAL DAILY
Qty: 30 TABLET | Refills: 5 | Status: ON HOLD | OUTPATIENT
Start: 2024-05-16 | End: 2024-05-18 | Stop reason: HOSPADM

## 2024-05-16 RX ORDER — NIFEDIPINE 30 MG/1
30 TABLET, EXTENDED RELEASE ORAL DAILY
COMMUNITY
End: 2024-05-16 | Stop reason: SDUPTHER

## 2024-05-16 NOTE — TELEPHONE ENCOUNTER
Patient was told the doctor wants her to have sleep study at the hospital, patient is [facility], is this ok? She is not sure who she needs to call, she is waiting on someone to reach out to her.       Pt# [phone] (cell)

## 2024-05-16 NOTE — TELEPHONE ENCOUNTER
Suleman Heath MD Thompson, Evelyn, LPN  Caller: Unspecified (Today, 11:38 AM)  Begin Procardia XL 30 mg a day and tell her to hold the clonidine.  Let me know next week what her blood pressure is reading

## 2024-05-16 NOTE — TELEPHONE ENCOUNTER
Pt states that she has noticed that BP readings have been elevated x 1 week and Clonidine as needed not helping. 189/98 HR 51 and 196/95 HR 49. Want to adjust something else since HR running 49-51.

## 2024-05-17 ENCOUNTER — HOSPITAL ENCOUNTER (OUTPATIENT)
Facility: HOSPITAL | Age: 84
Setting detail: OBSERVATION
Discharge: HOME | End: 2024-05-18
Attending: EMERGENCY MEDICINE | Admitting: INTERNAL MEDICINE
Payer: MEDICARE

## 2024-05-17 ENCOUNTER — APPOINTMENT (OUTPATIENT)
Facility: HOSPITAL | Age: 84
End: 2024-05-17
Payer: MEDICARE

## 2024-05-17 ENCOUNTER — CLINICAL DOCUMENTATION (OUTPATIENT)
Age: 84
End: 2024-05-17

## 2024-05-17 DIAGNOSIS — R42 DIZZINESS: ICD-10-CM

## 2024-05-17 DIAGNOSIS — I16.0 HYPERTENSIVE URGENCY: ICD-10-CM

## 2024-05-17 DIAGNOSIS — I16.1 HYPERTENSIVE EMERGENCY: Primary | ICD-10-CM

## 2024-05-17 DIAGNOSIS — I10 ESSENTIAL HYPERTENSION: ICD-10-CM

## 2024-05-17 LAB
APTT PPP: 27.1 SEC (ref 22.1–31)
BASOPHILS # BLD: 0 K/UL (ref 0–0.1)
BASOPHILS NFR BLD: 1 % (ref 0–1)
CK SERPL-CCNC: 329 U/L (ref 26–192)
COMMENT:: NORMAL
DIFFERENTIAL METHOD BLD: NORMAL
EOSINOPHIL # BLD: 0.3 K/UL (ref 0–0.4)
EOSINOPHIL NFR BLD: 5 % (ref 0–7)
ERYTHROCYTE [DISTWIDTH] IN BLOOD BY AUTOMATED COUNT: 13 % (ref 11.5–14.5)
HCT VFR BLD AUTO: 39 % (ref 35–47)
HGB BLD-MCNC: 13.1 G/DL (ref 11.5–16)
IMM GRANULOCYTES # BLD AUTO: 0 K/UL (ref 0–0.04)
IMM GRANULOCYTES NFR BLD AUTO: 0 % (ref 0–0.5)
INR PPP: 1.1 (ref 0.9–1.1)
LYMPHOCYTES # BLD: 1.5 K/UL (ref 0.8–3.5)
LYMPHOCYTES NFR BLD: 23 % (ref 12–49)
MAGNESIUM SERPL-MCNC: NORMAL MG/DL (ref 1.6–2.4)
MCH RBC QN AUTO: 31.9 PG (ref 26–34)
MCHC RBC AUTO-ENTMCNC: 33.6 G/DL (ref 30–36.5)
MCV RBC AUTO: 94.9 FL (ref 80–99)
MONOCYTES # BLD: 0.7 K/UL (ref 0–1)
MONOCYTES NFR BLD: 11 % (ref 5–13)
NEUTS SEG # BLD: 4 K/UL (ref 1.8–8)
NEUTS SEG NFR BLD: 60 % (ref 32–75)
NRBC # BLD: 0 K/UL (ref 0–0.01)
NRBC BLD-RTO: 0 PER 100 WBC
NT PRO BNP: 730 PG/ML
PHOSPHATE SERPL-MCNC: 3.6 MG/DL (ref 2.6–4.7)
PLATELET # BLD AUTO: 159 K/UL (ref 150–400)
PMV BLD AUTO: 10.5 FL (ref 8.9–12.9)
PROTHROMBIN TIME: 11.9 SEC (ref 9–11.1)
RBC # BLD AUTO: 4.11 M/UL (ref 3.8–5.2)
SPECIMEN HOLD: NORMAL
THERAPEUTIC RANGE: NORMAL SECS (ref 58–77)
TROPONIN I SERPL HS-MCNC: 8 NG/L (ref 0–51)
TSH SERPL DL<=0.05 MIU/L-ACNC: 9.96 UIU/ML (ref 0.36–3.74)
WBC # BLD AUTO: 6.5 K/UL (ref 3.6–11)

## 2024-05-17 PROCEDURE — 85610 PROTHROMBIN TIME: CPT

## 2024-05-17 PROCEDURE — 96366 THER/PROPH/DIAG IV INF ADDON: CPT

## 2024-05-17 PROCEDURE — 85730 THROMBOPLASTIN TIME PARTIAL: CPT

## 2024-05-17 PROCEDURE — 71045 X-RAY EXAM CHEST 1 VIEW: CPT

## 2024-05-17 PROCEDURE — 6360000002 HC RX W HCPCS: Performed by: EMERGENCY MEDICINE

## 2024-05-17 PROCEDURE — 94761 N-INVAS EAR/PLS OXIMETRY MLT: CPT

## 2024-05-17 PROCEDURE — G0378 HOSPITAL OBSERVATION PER HR: HCPCS

## 2024-05-17 PROCEDURE — 2580000003 HC RX 258: Performed by: EMERGENCY MEDICINE

## 2024-05-17 PROCEDURE — 85025 COMPLETE CBC W/AUTO DIFF WBC: CPT

## 2024-05-17 PROCEDURE — 84443 ASSAY THYROID STIM HORMONE: CPT

## 2024-05-17 PROCEDURE — 96375 TX/PRO/DX INJ NEW DRUG ADDON: CPT

## 2024-05-17 PROCEDURE — 93005 ELECTROCARDIOGRAM TRACING: CPT | Performed by: EMERGENCY MEDICINE

## 2024-05-17 PROCEDURE — 6370000000 HC RX 637 (ALT 250 FOR IP): Performed by: INTERNAL MEDICINE

## 2024-05-17 PROCEDURE — 83880 ASSAY OF NATRIURETIC PEPTIDE: CPT

## 2024-05-17 PROCEDURE — 82550 ASSAY OF CK (CPK): CPT

## 2024-05-17 PROCEDURE — 96365 THER/PROPH/DIAG IV INF INIT: CPT

## 2024-05-17 PROCEDURE — 99285 EMERGENCY DEPT VISIT HI MDM: CPT

## 2024-05-17 PROCEDURE — 70450 CT HEAD/BRAIN W/O DYE: CPT

## 2024-05-17 PROCEDURE — 2580000003 HC RX 258: Performed by: INTERNAL MEDICINE

## 2024-05-17 PROCEDURE — 84484 ASSAY OF TROPONIN QUANT: CPT

## 2024-05-17 PROCEDURE — 84100 ASSAY OF PHOSPHORUS: CPT

## 2024-05-17 PROCEDURE — 83735 ASSAY OF MAGNESIUM: CPT

## 2024-05-17 PROCEDURE — 36415 COLL VENOUS BLD VENIPUNCTURE: CPT

## 2024-05-17 PROCEDURE — 93005 ELECTROCARDIOGRAM TRACING: CPT

## 2024-05-17 PROCEDURE — 6360000002 HC RX W HCPCS: Performed by: HOSPITALIST

## 2024-05-17 RX ORDER — POTASSIUM CHLORIDE 750 MG/1
40 TABLET, FILM COATED, EXTENDED RELEASE ORAL PRN
Status: DISCONTINUED | OUTPATIENT
Start: 2024-05-17 | End: 2024-05-18 | Stop reason: HOSPADM

## 2024-05-17 RX ORDER — ONDANSETRON 4 MG/1
4 TABLET, ORALLY DISINTEGRATING ORAL EVERY 8 HOURS PRN
Status: DISCONTINUED | OUTPATIENT
Start: 2024-05-17 | End: 2024-05-18 | Stop reason: HOSPADM

## 2024-05-17 RX ORDER — LEVOTHYROXINE SODIUM 0.05 MG/1
50 TABLET ORAL
Status: DISCONTINUED | OUTPATIENT
Start: 2024-05-17 | End: 2024-05-18 | Stop reason: HOSPADM

## 2024-05-17 RX ORDER — POLYETHYLENE GLYCOL 3350 17 G/17G
17 POWDER, FOR SOLUTION ORAL DAILY PRN
Status: DISCONTINUED | OUTPATIENT
Start: 2024-05-17 | End: 2024-05-18 | Stop reason: HOSPADM

## 2024-05-17 RX ORDER — VALSARTAN 80 MG/1
160 TABLET ORAL DAILY
Status: DISCONTINUED | OUTPATIENT
Start: 2024-05-17 | End: 2024-05-18 | Stop reason: HOSPADM

## 2024-05-17 RX ORDER — POTASSIUM CHLORIDE 7.45 MG/ML
10 INJECTION INTRAVENOUS PRN
Status: DISCONTINUED | OUTPATIENT
Start: 2024-05-17 | End: 2024-05-18 | Stop reason: HOSPADM

## 2024-05-17 RX ORDER — NIFEDIPINE 30 MG/1
30 TABLET, EXTENDED RELEASE ORAL DAILY
Status: DISCONTINUED | OUTPATIENT
Start: 2024-05-17 | End: 2024-05-17

## 2024-05-17 RX ORDER — FUROSEMIDE 10 MG/ML
20 INJECTION INTRAMUSCULAR; INTRAVENOUS ONCE
Status: COMPLETED | OUTPATIENT
Start: 2024-05-17 | End: 2024-05-17

## 2024-05-17 RX ORDER — ENOXAPARIN SODIUM 100 MG/ML
40 INJECTION SUBCUTANEOUS DAILY
Status: DISCONTINUED | OUTPATIENT
Start: 2024-05-17 | End: 2024-05-17

## 2024-05-17 RX ORDER — ACETAMINOPHEN 325 MG/1
650 TABLET ORAL EVERY 6 HOURS PRN
Status: DISCONTINUED | OUTPATIENT
Start: 2024-05-17 | End: 2024-05-18 | Stop reason: HOSPADM

## 2024-05-17 RX ORDER — NIFEDIPINE 60 MG/1
60 TABLET, EXTENDED RELEASE ORAL DAILY
Status: DISCONTINUED | OUTPATIENT
Start: 2024-05-17 | End: 2024-05-18 | Stop reason: HOSPADM

## 2024-05-17 RX ORDER — EZETIMIBE 10 MG/1
10 TABLET ORAL EVERY EVENING
Status: DISCONTINUED | OUTPATIENT
Start: 2024-05-17 | End: 2024-05-18 | Stop reason: HOSPADM

## 2024-05-17 RX ORDER — ACETAMINOPHEN 650 MG/1
650 SUPPOSITORY RECTAL EVERY 6 HOURS PRN
Status: DISCONTINUED | OUTPATIENT
Start: 2024-05-17 | End: 2024-05-18 | Stop reason: HOSPADM

## 2024-05-17 RX ORDER — SODIUM CHLORIDE 9 MG/ML
INJECTION, SOLUTION INTRAVENOUS PRN
Status: DISCONTINUED | OUTPATIENT
Start: 2024-05-17 | End: 2024-05-18 | Stop reason: HOSPADM

## 2024-05-17 RX ORDER — MULTIVIT WITH MINERALS/LUTEIN
1 TABLET ORAL DAILY
Status: DISCONTINUED | OUTPATIENT
Start: 2024-05-17 | End: 2024-05-17

## 2024-05-17 RX ORDER — SODIUM CHLORIDE 0.9 % (FLUSH) 0.9 %
5-40 SYRINGE (ML) INJECTION EVERY 12 HOURS SCHEDULED
Status: DISCONTINUED | OUTPATIENT
Start: 2024-05-17 | End: 2024-05-18 | Stop reason: HOSPADM

## 2024-05-17 RX ORDER — ONDANSETRON 2 MG/ML
4 INJECTION INTRAMUSCULAR; INTRAVENOUS EVERY 6 HOURS PRN
Status: DISCONTINUED | OUTPATIENT
Start: 2024-05-17 | End: 2024-05-18 | Stop reason: HOSPADM

## 2024-05-17 RX ORDER — ESCITALOPRAM OXALATE 10 MG/1
5 TABLET ORAL DAILY
Status: DISCONTINUED | OUTPATIENT
Start: 2024-05-17 | End: 2024-05-18 | Stop reason: HOSPADM

## 2024-05-17 RX ORDER — MAGNESIUM SULFATE IN WATER 40 MG/ML
2000 INJECTION, SOLUTION INTRAVENOUS PRN
Status: DISCONTINUED | OUTPATIENT
Start: 2024-05-17 | End: 2024-05-18 | Stop reason: HOSPADM

## 2024-05-17 RX ORDER — SODIUM CHLORIDE 0.9 % (FLUSH) 0.9 %
5-40 SYRINGE (ML) INJECTION PRN
Status: DISCONTINUED | OUTPATIENT
Start: 2024-05-17 | End: 2024-05-18 | Stop reason: HOSPADM

## 2024-05-17 RX ORDER — AMIODARONE HYDROCHLORIDE 200 MG/1
200 TABLET ORAL DAILY
Status: DISCONTINUED | OUTPATIENT
Start: 2024-05-17 | End: 2024-05-18 | Stop reason: HOSPADM

## 2024-05-17 RX ADMIN — AMIODARONE HYDROCHLORIDE 200 MG: 200 TABLET ORAL at 09:08

## 2024-05-17 RX ADMIN — ACETAMINOPHEN 650 MG: 325 TABLET ORAL at 10:57

## 2024-05-17 RX ADMIN — SODIUM CHLORIDE, PRESERVATIVE FREE 10 ML: 5 INJECTION INTRAVENOUS at 20:40

## 2024-05-17 RX ADMIN — NIFEDIPINE 60 MG: 60 TABLET, EXTENDED RELEASE ORAL at 06:26

## 2024-05-17 RX ADMIN — APIXABAN 5 MG: 5 TABLET, FILM COATED ORAL at 20:39

## 2024-05-17 RX ADMIN — SODIUM CHLORIDE 5 MG/HR: 9 INJECTION, SOLUTION INTRAVENOUS at 01:54

## 2024-05-17 RX ADMIN — CHOLECALCIFEROL TAB 125 MCG (5000 UNIT) 5000 UNITS: 125 TAB at 09:06

## 2024-05-17 RX ADMIN — VALSARTAN 160 MG: 80 TABLET ORAL at 09:06

## 2024-05-17 RX ADMIN — APIXABAN 5 MG: 5 TABLET, FILM COATED ORAL at 09:06

## 2024-05-17 RX ADMIN — LEVOTHYROXINE SODIUM 50 MCG: 0.05 TABLET ORAL at 07:32

## 2024-05-17 RX ADMIN — FUROSEMIDE 20 MG: 10 INJECTION, SOLUTION INTRAMUSCULAR; INTRAVENOUS at 14:25

## 2024-05-17 RX ADMIN — EZETIMIBE 10 MG: 10 TABLET ORAL at 17:46

## 2024-05-17 RX ADMIN — ESCITALOPRAM OXALATE 5 MG: 10 TABLET ORAL at 09:08

## 2024-05-17 ASSESSMENT — PAIN - FUNCTIONAL ASSESSMENT: PAIN_FUNCTIONAL_ASSESSMENT: NONE - DENIES PAIN

## 2024-05-17 ASSESSMENT — PAIN SCALES - GENERAL
PAINLEVEL_OUTOF10: 7
PAINLEVEL_OUTOF10: 0

## 2024-05-17 NOTE — ED TRIAGE NOTES
Pt ambulatory into ED room with CC of dizziness and high blood pressure.     Pt reports her BP has been high all week, but came in this evening since her blood pressure machine was not reading.     Pt takes eliquis, candesartan and carvedilol.     Pt has a hx of a-fib

## 2024-05-17 NOTE — PROGRESS NOTES
I was called by answering service at 12 40 am last night that she wanted to ask why her bp machine does not work  I called back but no answer

## 2024-05-17 NOTE — PROGRESS NOTES
[facility] ANGEL[facility]  [address] [place], [place] [id number] [phone]      Medical Progress Note      NAME: Radha Wynn   :  1940  MRM:  [id number]    Date/Time: 2024  1:47 PM           Assessment / Plan:   Hypertensive urgency  History of essential hypertension  Patient presented with systolic blood pressures of 270s.  She tells me she is compliant with her antihypertensives and unclear why her blood pressure started creeping up last night.  Patient started on nicardipine drip which has been appropriately weaned off and transition to oral medications.  Noted CT scan of the head negative for acute pathology.  Currently, blood pressure 130/70 after increasing nifedipine to 60 mg daily, and restarting patient's home valsartan 160 mg.  Will continue to monitor blood pressure on this current above regimen.    Acute hypoxic respiratory failure, suspect acute diastolic CHF from elevated blood pressure  Patient symptomatically feels better with 2 L of supplemental oxygen, and noted on ambulation 88%.  Chest x-ray with no acute pathology.  Check BNP; noted elevated BNP in May 2023.  Recheck echocardiogram; reviewed echocardiogram done 2022.  Trial of Lasix 20 mg IV x 1.  Wean off oxygen as tolerated.    Paroxysmal atrial fibrillation  Currently normal sinus rhythm.  To continue with amiodarone and apixaban.  Resume home low-dose Coreg tomorrow.    Other chronic conditions:  Acquired hypothyroidism  Obstructive sleep apnea  Major depressive disorder  Hyperlipidemia  Home medications and management to be resumed appropriately.               Care Plan discussed with: Patient    Prophylaxis:   Eliquis    Disposition:  Home w/Family           ___________________________________________________    Attending Physician: Nando Rangel MD        Subjective:     Chief Complaint: Tells me she feels better currently.  When she had elevated blood pressure she admitted to having a

## 2024-05-17 NOTE — CARE COORDINATION
Initial Case Management Assessment             05/17/24 1348   Service Assessment   Patient Orientation Alert and Oriented   Cognition Alert   History Provided By Patient   Primary Caregiver Self   Support Systems Spouse/Significant Other   Patient's Healthcare Decision Maker is: Legal Next of Kin  ( Bakari Wynn [phone])   PCP Verified by CM Yes  (Dr. Hammer)   Last Visit to PCP Within last 3 months   Prior Functional Level Independent in ADLs/IADLs   Can patient return to prior living arrangement Yes   Ability to make needs known: Good   Family able to assist with home care needs: Yes   Financial Resources Other (Comment)  ([employer] medicare)   Community Resources None   Social/Functional History   Lives With Spouse   Type of Home House   Home Layout Two level   Home Access Stairs to enter with rails   Entrance Stairs - Number of Steps 3   Bathroom Toilet Handicap height   Bathroom Equipment Grab bars in shower   Home Equipment Cane   ADL Assistance Independent   Homemaking Assistance Independent   Ambulation Assistance Independent   Active  Yes   Occupation Retired   Discharge Planning   Type of Residence House   Living Arrangements Spouse/Significant Other   Current Services Prior To Admission None   DME Ordered? No   Potential Assistance Purchasing Medications No   Type of Home Care Services None   Patient expects to be discharged to: House   Services At/After Discharge    Resource Information Provided? No   Mode of Transport at Discharge Other (see comment)  ()           Patient was admitted on 5/17/24 for hypertensive urgency. This CM met with patient and patient's  at bedside, introduced self, explained role, and confirmed demographic information on face sheet. Patient lives at home with . Pt is independent with all ADL's and drives. Pt states she does not use a cane but has one at home. PT also has grab bars in the shower and an ADA height toilet.          HH:

## 2024-05-17 NOTE — ED PROVIDER NOTES
[facility] EMERGENCY DEPT  EMERGENCY DEPARTMENT ENCOUNTER      Pt Name: Radha Wynn  MRN: [id number]  Birthdate 1940  Date of evaluation: 5/17/2024  Provider: Giovani Gonzalez MD    CHIEF COMPLAINT       Chief Complaint   Patient presents with    Dizziness    Hypertension         HISTORY OF PRESENT ILLNESS   (Location/Symptom, Timing/Onset, Context/Setting, Quality, Duration, Modifying Factors, Severity)  Note limiting factors.   85-year-old female with a past medical history significant for hypertension, hypercholesterolemia, atrial fibrillation with RVR, degenerative disease, GERD, hiatal hernia, hypothyroidism, obstructive sleep apnea uterine prolapse who presents to the ER with a complaint of dizziness off-and-on for the past week and elevated blood pressure today with a slight headache.  The patient stated that her blood pressure was 199/110 at home despite taking her medication as prescribed.  She denies any fever and chills, neck and back pain, chest pain, shortness of breath, nausea, vomiting, diarrhea, constipation, dysuria, dizziness, extremity weakness and numbness or previous history of the same.            Review of External Medical Records:     Nursing Notes were reviewed.    REVIEW OF SYSTEMS    (2-9 systems for level 4, 10 or more for level 5)     Review of Systems    Except as noted above the remainder of the review of systems was reviewed and negative.       PAST MEDICAL HISTORY     Past Medical History:   Diagnosis Date    Atrial fibrillation with RVR (HCC) 6/29/2018    DDD (degenerative disc disease)     Gastroesophageal reflux disease without esophagitis 7/3/2016    GERD (gastroesophageal reflux disease)     Hiatal hernia     HTN (hypertension), benign 7/26/2016    Hypothyroidism     TRISTAN (obstructive sleep apnea) 7/26/2016    Paroxysmal atrial fibrillation (HCC) 8/28/2016    Uterine prolapse          SURGICAL HISTORY       Past Surgical History:   Procedure Laterality Date    ABLATION OF  female  Working Diagnosis:   1. Hypertensive emergency    2. Dizziness        COVID-19 Suspicion: No  Sepsis present:  No  Reassessment needed: No  Code Status:  Full Code  Readmission: No  Isolation Requirements: no  Recommended Level of Care: telemetry  Department: [facility] - [phone]  Consulting Provider: Dr. Rizvi    Other: The patient is currently on a Cardene drip    Total critical care time spent exclusive of procedures:  40 minutes.            CONSULTS:  IP CONSULT TO HOSPITALIST    PROCEDURES:  Unless otherwise noted below, none     Procedures      FINAL IMPRESSION      1. Hypertensive emergency    2. Dizziness          DISPOSITION/PLAN   DISPOSITION Decision To Admit 05/17/2024 02:56:21 AM      PATIENT REFERRED TO:  No follow-up provider specified.    DISCHARGE MEDICATIONS:  New Prescriptions    No medications on file         (Please note that portions of this note were completed with a voice recognition program.  Efforts were made to edit the dictations but occasionally words are mis-transcribed.)    Giovani Gonzalez MD (electronically signed)  Emergency Attending Physician / Physician Assistant / Nurse Practitioner            Giovani Gonzalez MD  05/17/24 5343

## 2024-05-17 NOTE — H&P
Patient: Radha Wynn   84 y.o. female   : 1940   MRN: [id number] Attending: Cornelio Rizvi DO  CODE STATUS: Full Code   PRIMARY CARE MD: Neena Hammer MD      ASSESSMENT & PLAN:  Hypertensive urgency.  Patient was started on nicardipine drip, running at 4 mg/h in the emergency department.  However, we will transition to her home medication of nifedipine increased to 60 mg p.o. daily from current dose of 30 mg p.o. daily.  Patient is also on low-dose carvedilol 3.125 mg twice daily which I will hold at this time due to bradycardia.  Valsartan 160 mg p.o. daily in place of candesartan 16 mg daily.  Paroxysmal atrial fibrillation.  Hold beta-blocker at this time.  Continue amiodarone 200 mg p.o. daily.  Request cardiology consultation.  Patient may also need amiodarone adjusted to lower dose?  Continue Eliquis 5 mg twice a day.  Acquired hypothyroidism.  Continue levothyroxine 50 mcg p.o. daily.  TSH is elevated at 9.96, check free T4.  Obstructive sleep apnea.  Likely the cause of desaturation to 87% on room air in the middle of the night in the ED.    Chief Complaint:   Chief Complaint   Patient presents with    Dizziness    Hypertension       History Gathered From: patient    History of Present Illness    Patient is an 84-year-old female who lives at home with her .  She states that she checks her blood pressure at home, and came to the hospital because the blood pressure has been extremely elevated.  She states that early in the morning on Thursday, May 16, she noted the systolic blood pressure to be 199, and she took clonidine (0.1 mg) which she is prescribed to be taken as needed for elevated blood pressures.  She could not give parameters as to the guidelines for taking clonidine however.  Patient states that the blood pressure continued to quite high last night, and she states that when she checked her blood pressure on her machine, she just got an error message.  She  No CT  evidence of acute infarct.    The bone windows demonstrate no abnormalities. The visualized portions of the  paranasal sinuses and mastoid air cells are clear.  Impression: No acute findings.  XR CHEST PORTABLE  Narrative: EXAM:  XR CHEST PORTABLE    INDICATION: CP     COMPARISON: CT 3/27/2024 and chest x-ray 9/24/2023.    TECHNIQUE: Upright portable chest AP view    FINDINGS: Cardiac monitoring leads are noted. The cardiac silhouette is within  normal limits. The pulmonary vasculature is within normal limits.     The lungs and pleural spaces are clear. The visualized bones and upper abdomen  are age-appropriate.  Impression: No acute process on portable chest.        The purpose of this note is to communicate optimally with other physicians, advanced care practitioners and appropriate medical staff involved in the care of this patient and facilitate management.  It is written using standard medical terminology.    SIGNATURE:    Cornelio Rizvi DO

## 2024-05-17 NOTE — ED NOTES
Verbal order to d/c cardene drip and medicate with Nifedipine before bringing pt to their room on 4th floor.

## 2024-05-17 NOTE — PROGRESS NOTES
Spiritual Care Assessment/Progress Note  [facility]    Name: Radha Wynn MRN: [id number]    Age: 84 y.o.     Sex: female   Language: English     Date: 5/17/2024            Total Time Calculated: 10 min              Spiritual Assessment begun in [facility] A4 INTENSIVE CARE UNIT  Service Provided For: Patient and family together  Referral/Consult From: Rounding  Encounter Overview/Reason: Initial Encounter    Spiritual beliefs:      [x] Involved in a sebas tradition/spiritual practice: [employer]     [] Supported by a sebas community:      [] Claims no spiritual orientation:      [] Seeking spiritual identity:           [] Adheres to an individual form of spirituality:      [] Not able to assess:                Identified resources for coping and support system:   Support System: Spouse       [x] Prayer                  [] Devotional reading               [] Music                  [] Guided Imagery     [] Pet visits                                        [] Other: (COMMENT)     Specific area/focus of visit   Encounter:    Crisis:    Spiritual/Emotional needs: Type: Spiritual Support, Other (comment) (emotional support)  Ritual, Rites and Sacraments:    Grief, Loss, and Adjustments: Type: Adjustment to illness  Ethics/Mediation:    Behavioral Health:    Palliative Care:    Advance Care Planning:      Plan/Referrals: No future visits requested    Narrative:   Visited Ms Wynn in room A481 for initial spiritual assessment. Reviewed patient's chart prior to visit. Ms Wynn was lying in bed; she was restless and appeared anxious. Patient's  was at her bedside.    Provided spiritual presence and active listening as patient shared about the health issues she had been dealing with recently. Shared her concerns about the symptoms she was experiencing. Acknowledged her feelings and offered words of support.    Ms Wynn said that she was a member of [employer]. She

## 2024-05-17 NOTE — ED NOTES
TRANSFER - OUT REPORT:    Verbal report given to MINDA Guevara on Radha Wynn  being transferred to ICU for routine progression of patient care       Report consisted of patient's Situation, Background, Assessment and   Recommendations(SBAR).     Information from the following report(s) Nurse Handoff Report, Index, ED Encounter Summary, ED SBAR, Adult Overview, MAR, Recent Results, and Cardiac Rhythm Sinus w/ BBB  was reviewed with the receiving nurse.    [place] Fall Assessment:    Presents to emergency department  because of falls (Syncope, seizure, or loss of consciousness): No  Age > 70: Yes  Altered Mental Status, Intoxication with alcohol or substance confusion (Disorientation, impaired judgment, poor safety awaremess, or inability to follow instructions): No  Impaired Mobility: Ambulates or transfers with assistive devices or assistance; Unable to ambulate or transer.: No  Nursing Judgement: Yes          Lines:   Peripheral IV 05/17/24 Left Antecubital (Active)        Opportunity for questions and clarification was provided.      Patient transported with:  Monitor and Registered Nurse

## 2024-05-18 VITALS
HEART RATE: 65 BPM | RESPIRATION RATE: 16 BRPM | HEIGHT: 62 IN | BODY MASS INDEX: 33.77 KG/M2 | TEMPERATURE: 97.7 F | OXYGEN SATURATION: 96 % | SYSTOLIC BLOOD PRESSURE: 101 MMHG | WEIGHT: 183.5 LBS | DIASTOLIC BLOOD PRESSURE: 59 MMHG

## 2024-05-18 LAB
ALBUMIN SERPL-MCNC: 3.8 G/DL (ref 3.5–5)
ALBUMIN/GLOB SERPL: 1.2 (ref 1.1–2.2)
ALP SERPL-CCNC: 66 U/L (ref 45–117)
ALT SERPL-CCNC: 22 U/L (ref 12–78)
ANION GAP SERPL CALC-SCNC: 1 MMOL/L (ref 5–15)
AST SERPL-CCNC: 21 U/L (ref 15–37)
BILIRUB SERPL-MCNC: 0.8 MG/DL (ref 0.2–1)
BUN SERPL-MCNC: 11 MG/DL (ref 6–20)
BUN/CREAT SERPL: 13 (ref 12–20)
CALCIUM SERPL-MCNC: 9.9 MG/DL (ref 8.5–10.1)
CHLORIDE SERPL-SCNC: 104 MMOL/L (ref 97–108)
CO2 SERPL-SCNC: 32 MMOL/L (ref 21–32)
CREAT SERPL-MCNC: 0.87 MG/DL (ref 0.55–1.02)
ERYTHROCYTE [DISTWIDTH] IN BLOOD BY AUTOMATED COUNT: 12.9 % (ref 11.5–14.5)
GLOBULIN SER CALC-MCNC: 3.2 G/DL (ref 2–4)
GLUCOSE SERPL-MCNC: 127 MG/DL (ref 65–100)
HCT VFR BLD AUTO: 40.5 % (ref 35–47)
HGB BLD-MCNC: 13.5 G/DL (ref 11.5–16)
MCH RBC QN AUTO: 31.5 PG (ref 26–34)
MCHC RBC AUTO-ENTMCNC: 33.3 G/DL (ref 30–36.5)
MCV RBC AUTO: 94.4 FL (ref 80–99)
NRBC # BLD: 0 K/UL (ref 0–0.01)
NRBC BLD-RTO: 0 PER 100 WBC
PLATELET # BLD AUTO: 174 K/UL (ref 150–400)
PMV BLD AUTO: 10.2 FL (ref 8.9–12.9)
POTASSIUM SERPL-SCNC: 3.2 MMOL/L (ref 3.5–5.1)
PROT SERPL-MCNC: 7 G/DL (ref 6.4–8.2)
RBC # BLD AUTO: 4.29 M/UL (ref 3.8–5.2)
SODIUM SERPL-SCNC: 137 MMOL/L (ref 136–145)
TROPONIN I SERPL HS-MCNC: 8 NG/L (ref 0–51)
WBC # BLD AUTO: 6.9 K/UL (ref 3.6–11)

## 2024-05-18 PROCEDURE — 94761 N-INVAS EAR/PLS OXIMETRY MLT: CPT

## 2024-05-18 PROCEDURE — 36415 COLL VENOUS BLD VENIPUNCTURE: CPT

## 2024-05-18 PROCEDURE — 6370000000 HC RX 637 (ALT 250 FOR IP): Performed by: HOSPITALIST

## 2024-05-18 PROCEDURE — 6370000000 HC RX 637 (ALT 250 FOR IP)

## 2024-05-18 PROCEDURE — 84484 ASSAY OF TROPONIN QUANT: CPT

## 2024-05-18 PROCEDURE — 6370000000 HC RX 637 (ALT 250 FOR IP): Performed by: INTERNAL MEDICINE

## 2024-05-18 PROCEDURE — 96366 THER/PROPH/DIAG IV INF ADDON: CPT

## 2024-05-18 PROCEDURE — 2580000003 HC RX 258: Performed by: INTERNAL MEDICINE

## 2024-05-18 PROCEDURE — 94618 PULMONARY STRESS TESTING: CPT

## 2024-05-18 PROCEDURE — 6360000002 HC RX W HCPCS: Performed by: HOSPITALIST

## 2024-05-18 PROCEDURE — G0378 HOSPITAL OBSERVATION PER HR: HCPCS

## 2024-05-18 PROCEDURE — 80053 COMPREHEN METABOLIC PANEL: CPT

## 2024-05-18 PROCEDURE — 85027 COMPLETE CBC AUTOMATED: CPT

## 2024-05-18 PROCEDURE — 96376 TX/PRO/DX INJ SAME DRUG ADON: CPT

## 2024-05-18 RX ORDER — POTASSIUM CHLORIDE 750 MG/1
40 TABLET, FILM COATED, EXTENDED RELEASE ORAL ONCE
Status: COMPLETED | OUTPATIENT
Start: 2024-05-18 | End: 2024-05-18

## 2024-05-18 RX ORDER — FUROSEMIDE 10 MG/ML
20 INJECTION INTRAMUSCULAR; INTRAVENOUS ONCE
Status: COMPLETED | OUTPATIENT
Start: 2024-05-18 | End: 2024-05-18

## 2024-05-18 RX ORDER — FUROSEMIDE 10 MG/ML
20 INJECTION INTRAMUSCULAR; INTRAVENOUS ONCE
Status: DISCONTINUED | OUTPATIENT
Start: 2024-05-18 | End: 2024-05-18 | Stop reason: SDUPTHER

## 2024-05-18 RX ORDER — OXYCODONE HYDROCHLORIDE 5 MG/1
5 TABLET ORAL ONCE
Status: DISCONTINUED | OUTPATIENT
Start: 2024-05-18 | End: 2024-05-18

## 2024-05-18 RX ADMIN — VALSARTAN 160 MG: 80 TABLET ORAL at 09:07

## 2024-05-18 RX ADMIN — ACETAMINOPHEN 650 MG: 325 TABLET ORAL at 00:30

## 2024-05-18 RX ADMIN — APIXABAN 5 MG: 5 TABLET, FILM COATED ORAL at 09:07

## 2024-05-18 RX ADMIN — FUROSEMIDE 20 MG: 10 INJECTION, SOLUTION INTRAMUSCULAR; INTRAVENOUS at 09:08

## 2024-05-18 RX ADMIN — LEVOTHYROXINE SODIUM 50 MCG: 0.05 TABLET ORAL at 06:52

## 2024-05-18 RX ADMIN — SODIUM CHLORIDE, PRESERVATIVE FREE 10 ML: 5 INJECTION INTRAVENOUS at 09:09

## 2024-05-18 RX ADMIN — ESCITALOPRAM OXALATE 5 MG: 10 TABLET ORAL at 09:07

## 2024-05-18 RX ADMIN — POTASSIUM BICARBONATE 20 MEQ: 782 TABLET, EFFERVESCENT ORAL at 06:52

## 2024-05-18 RX ADMIN — POTASSIUM CHLORIDE 40 MEQ: 750 TABLET, FILM COATED, EXTENDED RELEASE ORAL at 09:07

## 2024-05-18 RX ADMIN — AMIODARONE HYDROCHLORIDE 200 MG: 200 TABLET ORAL at 09:07

## 2024-05-18 RX ADMIN — CHOLECALCIFEROL TAB 125 MCG (5000 UNIT) 5000 UNITS: 125 TAB at 09:06

## 2024-05-18 ASSESSMENT — PAIN SCALES - GENERAL: PAINLEVEL_OUTOF10: 7

## 2024-05-18 ASSESSMENT — PAIN DESCRIPTION - ORIENTATION: ORIENTATION: LEFT;UPPER;OUTER

## 2024-05-18 ASSESSMENT — PAIN DESCRIPTION - LOCATION: LOCATION: CHEST;ABDOMEN

## 2024-05-18 ASSESSMENT — PAIN DESCRIPTION - DESCRIPTORS: DESCRIPTORS: SORE

## 2024-05-18 NOTE — FLOWSHEET NOTE
RR w/ CP - Patient offers intermittent epigastric pain that radiates to under left breast and flank. Patient denies, CP, palpitations, dizziness, cough, SOB, dyspnea, indigestion, nausea, vomiting, urgency, frequency, burning w/ urination. EKG w/ sinus demetrius w/ RBBB. Both noted in previous EKGs. V - 157/74, 63, 16, 97% 2 L NC  A & O, NML WOB, lungs CTA, RRR, S1/S2, no edema, normoactive BS, no HSM, epigastric region mildly tender to palpation. US from 2023 w/ gallbladder stones and sludge. CT abd/pelvis from 2023 w/ large hiatal hernia and colonic diverticulitis. Consider repeat of both and GI consult w/ continued pain. CBC, CMP, trop now. Patient declined pain medication and GI cocktail. Will hold nifedipine due to bradycardia. Patient requesting referral for sleep study. Will continue to monitor.

## 2024-05-18 NOTE — PROGRESS NOTES
0241 Patient getting discharged home , IV s removed . Educated the discharge instructions and given a chance to ask questions .    Daughter at bedside to transport home .

## 2024-05-18 NOTE — FLOWSHEET NOTE
05/17/24 2036   Handoff   Communication Given Transfer Handoff   Handoff Given To Rayna PERLA from 5th floor   Handoff Received From Sultana PERLA   Handoff Communication Telephone   Time Handoff Given 2036     Pt will be transported with her paper chart, labels, meds, and personal belongings via WC on 2L NC. Family walking with the pt to her new room. Pt was transported by Charge MINDA Cardenas at 2050 to room 522.

## 2024-05-18 NOTE — DISCHARGE SUMMARY
Physician Discharge Summary     Patient ID:  Radha Wynn  [id number]  84 y.o.  1940    Admit date: 5/17/2024    Discharge date and time: 5/18/2024    HPI: Patient is an 84-year-old female who lives at home with her .  She states that she checks her blood pressure at home, and came to the hospital because the blood pressure has been extremely elevated.  She states that early in the morning on Thursday, May 16, she noted the systolic blood pressure to be 199, and she took clonidine (0.1 mg) which she is prescribed to be taken as needed for elevated blood pressures.  She could not give parameters as to the guidelines for taking clonidine however.  Patient states that the blood pressure continued to quite high last night, and she states that when she checked her blood pressure on her machine, she just got an error message.  She decided come to the hospital.     Admission Diagnoses: Dizziness [R42]  Hypertensive urgency [I16.0]  Hypertensive emergency [I16.1]    Discharge Diagnoses and Hospital Course:     Hypertensive urgency  History of essential hypertension  Patient presented with systolic blood pressures of 270s.  Reports she is compliant with her medications candesartan, and Coreg with as needed clonidine; despite that, she had spikes of elevated blood pressure; over the last couple days prior to admission was started on nifedipine 30 mg daily but had not started taking it yet..  During admission, patient started on nicardipine drip which has been appropriately weaned off and transition to oral medications.  Blood pressure noted to be controlled on oral regimen, with last blood pressure upon discharge 101/59; this makes me concerned that patient may have hypotensive episodes at home if discharged on all of her home blood pressure medications including her recently prescribed nifedipine; patient did tell me that she has had blood pressures in the 90s at home.  Upon discharge, informed patient and  discharged home today after an admission for elevated blood pressure.  We treated you with home medications as well as some other additional medications.  Your blood pressure is now normal.  I extensively discussed with you and your daughter at bedside that your current blood pressure is relatively low.  I recommended not to start the new nifedipine medication.  On the contrary, continue taking your other current blood pressure medications (candesartan, carvedilol, and as needed clonidine).  If your blood pressure is consistently higher than 160/100, or lower than 100/60, please call your primary care doctor for further adjustment in your medication doses.    I have sent you to do an echocardiogram as an outpatient to assess your heart.  Last echocardiogram was done almost 2 years ago.    Current Discharge Medication List        CONTINUE these medications which have NOT CHANGED    Details   rosuvastatin (CRESTOR) 5 MG tablet Take 1 tablet by mouth three times a week  Qty: 30 tablet, Refills: 5      amiodarone (PACERONE) 200 MG tablet Take 2 tablets by mouth daily 400 mg daily x 2 weeks, then decrease to 200 mg daily thereafter  Qty: 45 tablet, Refills: 5      cloNIDine (CATAPRES) 0.1 MG tablet Take 1 tablet by mouth daily as needed for High Blood Pressure  Qty: 30 tablet, Refills: 5      Multiple Vitamins-Minerals (CENTRUM SILVER PO) Take by mouth      vitamin D (CHOLECALCIFEROL) 125 MCG (5000 UT) CAPS capsule Take 1 capsule by mouth daily      apixaban (ELIQUIS) 5 MG TABS tablet Take 1 tablet by mouth 2 times daily  Qty: 180 tablet, Refills: 1    Associated Diagnoses: Atrial fibrillation, unspecified type (HCC)      candesartan (ATACAND) 16 MG tablet Take 1 tablet by mouth daily  Qty: 90 tablet, Refills: 3    Comments: NOTE decreased tablet size.      carvedilol (COREG) 3.125 MG tablet Take 1 tablet by mouth 2 times daily (with meals)  Qty: 180 tablet, Refills: 3    Comments: Patient taking 3.125 mg tablet twice

## 2024-05-18 NOTE — PROGRESS NOTES
05/18/24 1350   Resting (Room Air)   SpO2 95   HR 79   During Walk (Room Air)   SpO2 90   HR 78   Rate of Dyspnea 0   After Walk   SpO2 92   HR 82   Rate of Dyspnea 0   Does the Patient Qualify for Home O2 No

## 2024-05-18 NOTE — PLAN OF CARE
Problem: Discharge Planning  Goal: Discharge to home or other facility with appropriate resources  Outcome: Progressing  Flowsheets (Taken 5/17/2024 2000)  Discharge to home or other facility with appropriate resources:   Identify barriers to discharge with patient and caregiver   Arrange for needed discharge resources and transportation as appropriate   Identify discharge learning needs (meds, wound care, etc)   Refer to discharge planning if patient needs post-hospital services based on physician order or complex needs related to functional status, cognitive ability or social support system     Problem: Safety - Adult  Goal: Free from fall injury  Outcome: Progressing  Flowsheets (Taken 5/17/2024 2000)  Free From Fall Injury: Instruct family/caregiver on patient safety     Problem: Respiratory - Adult  Goal: Achieves optimal ventilation and oxygenation  Outcome: Progressing  Flowsheets (Taken 5/17/2024 2000)  Achieves optimal ventilation and oxygenation:   Assess for changes in respiratory status   Position to facilitate oxygenation and minimize respiratory effort   Assess the need for suctioning and aspirate as needed   Respiratory therapy support as indicated   Assess for changes in mentation and behavior   Oxygen supplementation based on oxygen saturation or arterial blood gases   Encourage broncho-pulmonary hygiene including cough, deep breathe, incentive spirometry   Assess and instruct to report shortness of breath or any respiratory difficulty     Problem: Cardiovascular - Adult  Goal: Maintains optimal cardiac output and hemodynamic stability  Outcome: Progressing  Flowsheets (Taken 5/17/2024 3123)  Maintains optimal cardiac output and hemodynamic stability:   Monitor blood pressure and heart rate   Monitor urine output and notify Licensed Independent Practitioner for values outside of normal range   Assess for signs of decreased cardiac output     Problem: Skin/Tissue Integrity - Adult  Goal: Skin  including repeat lab results as appropriate   Administer electrolyte replacement as ordered  Goal: Hemodynamic stability and optimal renal function maintained  Outcome: Progressing  Flowsheets (Taken 5/17/2024 6043)  Hemodynamic stability and optimal renal function maintained:   Monitor labs and assess for signs and symptoms of volume excess or deficit   Monitor intake, output and patient weight

## 2024-05-18 NOTE — PROGRESS NOTES
Rapid Called at 2336    Responded to RRT at 2336 for Chest Pain    Provider at bedside: YES  Interventions ordered: EKG, labs.   Sepsis Suspected: No  Transfer to Higher Level of Care: na  Blood Glucose: na     Pt CC left side chest and abd pressure. Heart and lung sound within normal range to writer. Skin color is also appropriate for her ethnicity. VSS, hx of bundle block which also showed in latest EKG. Now provider is assessing pt. Keep current POC.     Vitals:    05/17/24 2336   BP: (!) 157/74   Pulse: 63   Resp:    Temp: 97.9 °F (36.6 °C)   SpO2: 97%        Rapid Ended at 0000  RRT RN assisted with transport to accepting unit NA.    Sara Bowden RN

## 2024-05-18 NOTE — DISCHARGE INSTRUCTIONS
HOSPITALIST DISCHARGE INSTRUCTIONS  NAME: Radha Wynn   :  1940   MRN:  [id number]     Date/Time:  2024 2:37 PM    ADMIT DATE: 2024     DISCHARGE DATE: 2024     DISCHARGE DIAGNOSIS and DISCHARGE INSTRUCTIONS:    You are being discharged home today after an admission for elevated blood pressure.  We treated you with home medications as well as some other additional medications.  Your blood pressure is now normal.  I extensively discussed with you and your daughter at bedside that your current blood pressure is relatively low.  I recommended not to start the new nifedipine medication.  On the contrary, continue taking your other current blood pressure medications (candesartan, carvedilol, and as needed clonidine).  If your blood pressure is consistently higher than 160/100, or lower than 100/60, please call your primary care doctor for further adjustment in your medication doses.    I have sent you to do an echocardiogram as an outpatient to assess your heart.  Last echocardiogram was done almost 2 years ago.    Follow Up: Cardiologist.    MEDICATIONS:    It is important that you take the medication exactly as they are prescribed.   Keep your medication in the bottles provided by the pharmacist and keep a list of the medication names, dosages, and times to be taken in your wallet.   Do not take other medications without consulting your doctor.     Current Discharge Medication List        CONTINUE these medications which have NOT CHANGED    Details   rosuvastatin (CRESTOR) 5 MG tablet Take 1 tablet by mouth three times a week  Qty: 30 tablet, Refills: 5      amiodarone (PACERONE) 200 MG tablet Take 2 tablets by mouth daily 400 mg daily x 2 weeks, then decrease to 200 mg daily thereafter  Qty: 45 tablet, Refills: 5      cloNIDine (CATAPRES) 0.1 MG tablet Take 1 tablet by mouth daily as needed for High Blood Pressure  Qty: 30 tablet, Refills: 5      Multiple Vitamins-Minerals (CENTRUM SILVER  PO) Take by mouth      vitamin D (CHOLECALCIFEROL) 125 MCG (5000 UT) CAPS capsule Take 1 capsule by mouth daily      apixaban (ELIQUIS) 5 MG TABS tablet Take 1 tablet by mouth 2 times daily  Qty: 180 tablet, Refills: 1    Associated Diagnoses: Atrial fibrillation, unspecified type (HCC)      candesartan (ATACAND) 16 MG tablet Take 1 tablet by mouth daily  Qty: 90 tablet, Refills: 3    Comments: NOTE decreased tablet size.      carvedilol (COREG) 3.125 MG tablet Take 1 tablet by mouth 2 times daily (with meals)  Qty: 180 tablet, Refills: 3    Comments: Patient taking 3.125 mg tablet twice daily as of 8/31/23.      atorvastatin (LIPITOR) 40 MG tablet Take 1 tablet by mouth nightly  Qty: 30 tablet, Refills: 0      Calcium Carbonate-Vitamin D (OYSTER SHELL CALCIUM/D) 500-5 MG-MCG TABS Take 1 tablet by mouth every evening      escitalopram (LEXAPRO) 5 MG tablet Take 1 tablet by mouth daily      ezetimibe (ZETIA) 10 MG tablet Take 1 tablet by mouth every evening      levothyroxine (SYNTHROID) 50 MCG tablet Take 1 tablet by mouth every morning (before breakfast)           STOP taking these medications       NIFEdipine (PROCARDIA XL) 30 MG extended release tablet Comments:   Reason for Stopping:               If you start feeling unwell or experience any of the following symptoms (including but not limited to), please call your primary care physician or return to the emergency room if you cannot get hold of your doctor:  Fever, chills, nausea, vomiting, diarrhea, change in mentation, falling, bleeding, shortness of breath.

## 2024-05-18 NOTE — PROGRESS NOTES
Upon educating the pt and her family member on her BP medication, RN learns that the pt was not taking 30 mg of Nifedipine at home. Pt looked up the medication once it was prescribed to her and learned of the possible side effects. She called her provider and left a message regarding her concerns for taking the medication and that she wanted an alternative BP med. Pt states she has not taken this medication until she was admitted today. CHANCE Quezada notified as an FYI.

## 2024-05-19 LAB
EKG ATRIAL RATE: 56 BPM
EKG ATRIAL RATE: 65 BPM
EKG DIAGNOSIS: NORMAL
EKG DIAGNOSIS: NORMAL
EKG P AXIS: 47 DEGREES
EKG P AXIS: 9 DEGREES
EKG P-R INTERVAL: 156 MS
EKG P-R INTERVAL: 156 MS
EKG Q-T INTERVAL: 482 MS
EKG Q-T INTERVAL: 496 MS
EKG QRS DURATION: 130 MS
EKG QRS DURATION: 134 MS
EKG QTC CALCULATION (BAZETT): 478 MS
EKG QTC CALCULATION (BAZETT): 501 MS
EKG R AXIS: 106 DEGREES
EKG R AXIS: 85 DEGREES
EKG T AXIS: -1 DEGREES
EKG T AXIS: -2 DEGREES
EKG VENTRICULAR RATE: 56 BPM
EKG VENTRICULAR RATE: 65 BPM

## 2024-05-19 PROCEDURE — 93010 ELECTROCARDIOGRAM REPORT: CPT | Performed by: SPECIALIST

## 2024-05-20 ENCOUNTER — TELEPHONE (OUTPATIENT)
Age: 84
End: 2024-05-20

## 2024-05-20 NOTE — TELEPHONE ENCOUNTER
Called patient to inform her to proceed with the order for her PAP device. She will call [facility] to reinitiate the order.

## 2024-05-20 NOTE — TELEPHONE ENCOUNTER
Michelle Rothman MD       Yes I would recommend whatever sleep team recommend, I just want her to get the appropriate treatment if TRISTAN is ordered. Thanks.     Called patient, ID verified using two patient identifiers.  Notified of above.  Discussed that an in hospital sleep study is not necessary as she had a sleep study done and they recommended a cpap.    Advised Ms. Wynn that I will reach out to sleep medicine and let them know we talked.  Patient verbalized understanding and will call back with any other questions or concerns.    Future Appointments   Date Time Provider Department Center   7/11/2024 11:00 AM Cata Hernandez APRN - NP [facility]   10/16/2024  1:20 PM Michelle Rothman MD [facility]   11/20/2024  1:00 PM Suleman Heath MD [facility]

## 2024-05-21 ENCOUNTER — TELEPHONE (OUTPATIENT)
Age: 84
End: 2024-05-21

## 2024-06-09 ENCOUNTER — HOSPITAL ENCOUNTER (EMERGENCY)
Facility: HOSPITAL | Age: 84
Discharge: HOME | End: 2024-06-10
Attending: EMERGENCY MEDICINE
Payer: MEDICARE

## 2024-06-09 DIAGNOSIS — I16.0 ASYMPTOMATIC HYPERTENSIVE URGENCY: Primary | ICD-10-CM

## 2024-06-09 DIAGNOSIS — F41.0 PANIC ANXIETY SYNDROME: ICD-10-CM

## 2024-06-09 PROCEDURE — 93005 ELECTROCARDIOGRAM TRACING: CPT | Performed by: EMERGENCY MEDICINE

## 2024-06-09 PROCEDURE — 84443 ASSAY THYROID STIM HORMONE: CPT

## 2024-06-09 PROCEDURE — 85025 COMPLETE CBC W/AUTO DIFF WBC: CPT

## 2024-06-09 PROCEDURE — 84100 ASSAY OF PHOSPHORUS: CPT

## 2024-06-09 PROCEDURE — 82550 ASSAY OF CK (CPK): CPT

## 2024-06-09 PROCEDURE — 85610 PROTHROMBIN TIME: CPT

## 2024-06-09 PROCEDURE — 36415 COLL VENOUS BLD VENIPUNCTURE: CPT

## 2024-06-09 PROCEDURE — 83735 ASSAY OF MAGNESIUM: CPT

## 2024-06-09 PROCEDURE — 84484 ASSAY OF TROPONIN QUANT: CPT

## 2024-06-09 PROCEDURE — 83880 ASSAY OF NATRIURETIC PEPTIDE: CPT

## 2024-06-09 PROCEDURE — 99284 EMERGENCY DEPT VISIT MOD MDM: CPT

## 2024-06-09 PROCEDURE — 96374 THER/PROPH/DIAG INJ IV PUSH: CPT

## 2024-06-09 PROCEDURE — 85730 THROMBOPLASTIN TIME PARTIAL: CPT

## 2024-06-09 PROCEDURE — 80053 COMPREHEN METABOLIC PANEL: CPT

## 2024-06-09 ASSESSMENT — PAIN - FUNCTIONAL ASSESSMENT: PAIN_FUNCTIONAL_ASSESSMENT: NONE - DENIES PAIN

## 2024-06-10 VITALS
WEIGHT: 177.69 LBS | SYSTOLIC BLOOD PRESSURE: 142 MMHG | HEIGHT: 62 IN | RESPIRATION RATE: 14 BRPM | TEMPERATURE: 98.7 F | BODY MASS INDEX: 32.7 KG/M2 | OXYGEN SATURATION: 95 % | DIASTOLIC BLOOD PRESSURE: 65 MMHG | HEART RATE: 57 BPM

## 2024-06-10 LAB
ALBUMIN SERPL-MCNC: 3.7 G/DL (ref 3.5–5)
ALBUMIN/GLOB SERPL: 1.1 (ref 1.1–2.2)
ALP SERPL-CCNC: 74 U/L (ref 45–117)
ALT SERPL-CCNC: 23 U/L (ref 12–78)
ANION GAP SERPL CALC-SCNC: 4 MMOL/L (ref 5–15)
APTT PPP: 26.1 SEC (ref 22.1–31)
AST SERPL-CCNC: 21 U/L (ref 15–37)
BASOPHILS # BLD: 0 K/UL (ref 0–0.1)
BASOPHILS NFR BLD: 1 % (ref 0–1)
BILIRUB SERPL-MCNC: 0.5 MG/DL (ref 0.2–1)
BUN SERPL-MCNC: 14 MG/DL (ref 6–20)
BUN/CREAT SERPL: 12 (ref 12–20)
CALCIUM SERPL-MCNC: 9.6 MG/DL (ref 8.5–10.1)
CHLORIDE SERPL-SCNC: 105 MMOL/L (ref 97–108)
CK SERPL-CCNC: 100 U/L (ref 26–192)
CO2 SERPL-SCNC: 31 MMOL/L (ref 21–32)
CREAT SERPL-MCNC: 1.16 MG/DL (ref 0.55–1.02)
DIFFERENTIAL METHOD BLD: ABNORMAL
EOSINOPHIL # BLD: 0.3 K/UL (ref 0–0.4)
EOSINOPHIL NFR BLD: 6 % (ref 0–7)
ERYTHROCYTE [DISTWIDTH] IN BLOOD BY AUTOMATED COUNT: 13.2 % (ref 11.5–14.5)
GLOBULIN SER CALC-MCNC: 3.4 G/DL (ref 2–4)
GLUCOSE SERPL-MCNC: 126 MG/DL (ref 65–100)
HCT VFR BLD AUTO: 38.4 % (ref 35–47)
HGB BLD-MCNC: 12.7 G/DL (ref 11.5–16)
IMM GRANULOCYTES # BLD AUTO: 0 K/UL (ref 0–0.04)
IMM GRANULOCYTES NFR BLD AUTO: 0 % (ref 0–0.5)
INR PPP: 1.1 (ref 0.9–1.1)
LYMPHOCYTES # BLD: 1.6 K/UL (ref 0.8–3.5)
LYMPHOCYTES NFR BLD: 30 % (ref 12–49)
MAGNESIUM SERPL-MCNC: 2.2 MG/DL (ref 1.6–2.4)
MCHC RBC AUTO-ENTMCNC: 33.1 G/DL (ref 30–36.5)
MCV RBC AUTO: 95.3 FL (ref 80–99)
MONOCYTES # BLD: 0.8 K/UL (ref 0–1)
MONOCYTES NFR BLD: 15 % (ref 5–13)
NEUTS SEG # BLD: 2.6 K/UL (ref 1.8–8)
NEUTS SEG NFR BLD: 48 % (ref 32–75)
NRBC # BLD: 0 K/UL (ref 0–0.01)
NRBC BLD-RTO: 0 PER 100 WBC
NT PRO BNP: 426 PG/ML
PLATELET # BLD AUTO: 172 K/UL (ref 150–400)
PMV BLD AUTO: 10.4 FL (ref 8.9–12.9)
POTASSIUM SERPL-SCNC: 4 MMOL/L (ref 3.5–5.1)
PROT SERPL-MCNC: 7.1 G/DL (ref 6.4–8.2)
PROTHROMBIN TIME: 11.9 SEC (ref 9–11.1)
RBC # BLD AUTO: 4.03 M/UL (ref 3.8–5.2)
SODIUM SERPL-SCNC: 140 MMOL/L (ref 136–145)
THERAPEUTIC RANGE: NORMAL SECS (ref 58–77)
TROPONIN I SERPL HS-MCNC: 7 NG/L (ref 0–51)
TSH SERPL DL<=0.05 MIU/L-ACNC: 5.3 UIU/ML (ref 0.36–3.74)
WBC # BLD AUTO: 5.3 K/UL (ref 3.6–11)

## 2024-06-10 PROCEDURE — 6370000000 HC RX 637 (ALT 250 FOR IP): Performed by: EMERGENCY MEDICINE

## 2024-06-10 PROCEDURE — 6360000002 HC RX W HCPCS: Performed by: EMERGENCY MEDICINE

## 2024-06-10 RX ORDER — HYDRALAZINE HYDROCHLORIDE 25 MG/1
25 TABLET, FILM COATED ORAL 3 TIMES DAILY
Qty: 90 TABLET | Refills: 3 | Status: SHIPPED | OUTPATIENT
Start: 2024-06-10

## 2024-06-10 RX ORDER — CLONIDINE HYDROCHLORIDE 0.1 MG/1
0.2 TABLET ORAL
Status: COMPLETED | OUTPATIENT
Start: 2024-06-10 | End: 2024-06-10

## 2024-06-10 RX ORDER — HYDRALAZINE HYDROCHLORIDE 20 MG/ML
20 INJECTION INTRAMUSCULAR; INTRAVENOUS ONCE
Status: COMPLETED | OUTPATIENT
Start: 2024-06-10 | End: 2024-06-10

## 2024-06-10 RX ADMIN — CLONIDINE HYDROCHLORIDE 0.2 MG: 0.1 TABLET ORAL at 02:47

## 2024-06-10 RX ADMIN — HYDRALAZINE HYDROCHLORIDE 20 MG: 20 INJECTION INTRAMUSCULAR; INTRAVENOUS at 00:12

## 2024-06-10 NOTE — ED PROVIDER NOTES
[facility] EMERGENCY DEPT  EMERGENCY DEPARTMENT ENCOUNTER      Pt Name: Radha Wynn  MRN: [id number]  Birthdate 1940  Date of evaluation: 6/9/2024  Provider: Giovani Gonzalez MD    CHIEF COMPLAINT       Chief Complaint   Patient presents with    Hypertension         HISTORY OF PRESENT ILLNESS   (Location/Symptom, Timing/Onset, Context/Setting, Quality, Duration, Modifying Factors, Severity)  Note limiting factors.   84-year-old female with a past medical history significant for degenerative disease, anxiety, GERD, hiatal hernia, hypertension, hypothyroidism, obstructive sleep apnea, uterine prolapse who presents to the ER with a complaint of general malaise with elevated blood pressure at home.  The patient states that she is taking her medication as prescribed and noted that her blood pressure kept going up and she has been able to bring it down.  She also complains of chest discomfort that has subsided.  She denies any headache, nausea or vomiting, diarrhea constipation, neck and back pain, sick contact, recent travel.            Review of External Medical Records:     Nursing Notes were reviewed.    REVIEW OF SYSTEMS    (2-9 systems for level 4, 10 or more for level 5)     Review of Systems    Except as noted above the remainder of the review of systems was reviewed and negative.       PAST MEDICAL HISTORY     Past Medical History:   Diagnosis Date    DDD (degenerative disc disease)     Gastroesophageal reflux disease without esophagitis 7/3/2016    GERD (gastroesophageal reflux disease)     Hiatal hernia     HTN (hypertension), benign 7/26/2016    Hypothyroidism     TRISTAN (obstructive sleep apnea) 7/26/2016    Paroxysmal atrial fibrillation (HCC) 8/28/2016    Uterine prolapse          SURGICAL HISTORY       Past Surgical History:   Procedure Laterality Date    ABLATION OF DYSRHYTHMIC FOCUS  03/2017    CARDIAC PROCEDURE N/A 8/30/2023    Intracardiac echocardiogram performed by Michelle Rothman MD at [facility] CARDIAC CATH

## 2024-06-10 NOTE — ED TRIAGE NOTES
Patient states her blood pressure has been high today, 238 at the highest. Patent states her could not get a reading tonight which concerned her. Patient denies CP, palpitations and SOB. Denies headache

## 2024-06-11 LAB
EKG ATRIAL RATE: 65 BPM
EKG DIAGNOSIS: NORMAL
EKG P AXIS: 81 DEGREES
EKG P-R INTERVAL: 194 MS
EKG Q-T INTERVAL: 470 MS
EKG QRS DURATION: 140 MS
EKG QTC CALCULATION (BAZETT): 488 MS
EKG R AXIS: 96 DEGREES
EKG T AXIS: 8 DEGREES
EKG VENTRICULAR RATE: 65 BPM

## 2024-06-11 PROCEDURE — 93010 ELECTROCARDIOGRAM REPORT: CPT | Performed by: SPECIALIST

## 2024-06-25 DIAGNOSIS — E78.00 HYPERCHOLESTEREMIA: ICD-10-CM

## 2024-06-25 LAB
ALBUMIN SERPL-MCNC: 3.5 G/DL (ref 3.5–5)
ALBUMIN/GLOB SERPL: 1.3 (ref 1.1–2.2)
ALP SERPL-CCNC: 54 U/L (ref 45–117)
ALT SERPL-CCNC: 19 U/L (ref 12–78)
AST SERPL-CCNC: 18 U/L (ref 15–37)
BILIRUB DIRECT SERPL-MCNC: 0.2 MG/DL (ref 0–0.2)
BILIRUB SERPL-MCNC: 0.7 MG/DL (ref 0.2–1)
CHOLEST SERPL-MCNC: 126 MG/DL
GLOBULIN SER CALC-MCNC: 2.6 G/DL (ref 2–4)
HDLC SERPL-MCNC: 64 MG/DL
HDLC SERPL: 2 (ref 0–5)
LDLC SERPL CALC-MCNC: 49.4 MG/DL (ref 0–100)
PROT SERPL-MCNC: 6.1 G/DL (ref 6.4–8.2)
TRIGL SERPL-MCNC: 63 MG/DL
VLDLC SERPL CALC-MCNC: 12.6 MG/DL

## 2024-07-06 ENCOUNTER — HOSPITAL ENCOUNTER (EMERGENCY)
Facility: HOSPITAL | Age: 84
Discharge: HOME | End: 2024-07-07
Attending: EMERGENCY MEDICINE
Payer: MEDICARE

## 2024-07-06 DIAGNOSIS — I10 ASYMPTOMATIC HYPERTENSION: Primary | ICD-10-CM

## 2024-07-06 PROCEDURE — 99284 EMERGENCY DEPT VISIT MOD MDM: CPT

## 2024-07-06 PROCEDURE — 93005 ELECTROCARDIOGRAM TRACING: CPT | Performed by: EMERGENCY MEDICINE

## 2024-07-06 ASSESSMENT — PAIN - FUNCTIONAL ASSESSMENT
PAIN_FUNCTIONAL_ASSESSMENT: 0-10
PAIN_FUNCTIONAL_ASSESSMENT: NONE - DENIES PAIN

## 2024-07-06 ASSESSMENT — LIFESTYLE VARIABLES
HOW MANY STANDARD DRINKS CONTAINING ALCOHOL DO YOU HAVE ON A TYPICAL DAY: PATIENT DOES NOT DRINK
HOW OFTEN DO YOU HAVE A DRINK CONTAINING ALCOHOL: NEVER

## 2024-07-06 ASSESSMENT — PAIN SCALES - GENERAL: PAINLEVEL_OUTOF10: 0

## 2024-07-07 VITALS
WEIGHT: 178.13 LBS | BODY MASS INDEX: 32.78 KG/M2 | OXYGEN SATURATION: 91 % | HEART RATE: 57 BPM | RESPIRATION RATE: 19 BRPM | SYSTOLIC BLOOD PRESSURE: 197 MMHG | DIASTOLIC BLOOD PRESSURE: 84 MMHG | HEIGHT: 62 IN | TEMPERATURE: 98.6 F

## 2024-07-07 NOTE — ED TRIAGE NOTES
Pt amb to ED with c/o high BP, SBP>200 at home despite bp med routine. Denies headaches or dizziness.   Pt aaox4, gcs 15, rr even and unlabored.

## 2024-07-07 NOTE — ED PROVIDER NOTES
[facility] EMERGENCY DEPT  EMERGENCY DEPARTMENT ENCOUNTER      Pt Name: Radha Wynn  MRN: [id number]  Birthdate 1940  Date of evaluation: 7/6/2024  Provider: Florence Gaspar MD    CHIEF COMPLAINT       Chief Complaint   Patient presents with    Hypertension         HISTORY OF PRESENT ILLNESS   (Location/Symptom, Timing/Onset, Context/Setting, Quality, Duration, Modifying Factors, Severity)  Note limiting factors.   Patient is a 84-year-old female with history of degenerative disc disease, GERD, hiatal hernia, hypertension, hypothyroidism, sleep apnea, paroxysmal atrial fibrillation, uterine prolapse who presents with elevated blood pressure readings at home.  Patient denies chest pain, headache, nausea, vomiting, focal weakness.  Reports that she took her blood pressure this evening and it was elevated, systolic in the 180s.  Patient then reports she took an extra dose of her ARB as prescribed by her PCP.  Patient reports she rechecked her blood pressure, it remained elevated so she took an extra dose of hydralazine and then followed by clonidine.  Patient says she continues to be in the 200s systolic, was concerned that she could have a stroke so came to the ED.  Patient denies any recent travel, sick contacts.  Reports compliance with her usual medications.  Says that her cardiologist just retired and she will need a new one.        Nursing Notes were reviewed.    REVIEW OF SYSTEMS    Not Required     Review of Systems    PAST MEDICAL HISTORY     Past Medical History:   Diagnosis Date    DDD (degenerative disc disease)     Gastroesophageal reflux disease without esophagitis 7/3/2016    GERD (gastroesophageal reflux disease)     Hiatal hernia     HTN (hypertension), benign 7/26/2016    Hypothyroidism     TRISTAN (obstructive sleep apnea) 7/26/2016    Paroxysmal atrial fibrillation (HCC) 8/28/2016    Uterine prolapse        SURGICAL HISTORY       Past Surgical History:   Procedure Laterality Date    ABLATION OF

## 2024-07-07 NOTE — DISCHARGE INSTRUCTIONS
Please check your blood pressure once in the morning and once in the evening and recorded.  Establish care with Dr. Devlin to reevaluate your current blood pressure regimen.  Thank you

## 2024-07-07 NOTE — ED NOTES
Patient ambulatory at discharge with discharge instructions reviewed and opportunity to answer questions given.

## 2024-07-08 LAB
EKG ATRIAL RATE: 59 BPM
EKG DIAGNOSIS: NORMAL
EKG P AXIS: 99 DEGREES
EKG P-R INTERVAL: 142 MS
EKG Q-T INTERVAL: 496 MS
EKG QRS DURATION: 144 MS
EKG QTC CALCULATION (BAZETT): 491 MS
EKG R AXIS: 98 DEGREES
EKG T AXIS: 11 DEGREES
EKG VENTRICULAR RATE: 59 BPM

## 2024-07-08 PROCEDURE — 93010 ELECTROCARDIOGRAM REPORT: CPT | Performed by: SPECIALIST

## 2024-07-09 ENCOUNTER — CLINICAL DOCUMENTATION (OUTPATIENT)
Age: 84
End: 2024-07-09

## 2024-07-09 ENCOUNTER — OFFICE VISIT (OUTPATIENT)
Age: 84
End: 2024-07-09
Payer: MEDICARE

## 2024-07-09 VITALS
HEART RATE: 53 BPM | BODY MASS INDEX: 32.2 KG/M2 | SYSTOLIC BLOOD PRESSURE: 130 MMHG | WEIGHT: 175 LBS | OXYGEN SATURATION: 95 % | DIASTOLIC BLOOD PRESSURE: 62 MMHG | HEIGHT: 62 IN

## 2024-07-09 DIAGNOSIS — I48.0 PAROXYSMAL ATRIAL FIBRILLATION (HCC): ICD-10-CM

## 2024-07-09 DIAGNOSIS — G47.33 OBSTRUCTIVE SLEEP APNEA (ADULT) (PEDIATRIC): Primary | ICD-10-CM

## 2024-07-09 PROCEDURE — 3078F DIAST BP <80 MM HG: CPT | Performed by: NURSE PRACTITIONER

## 2024-07-09 PROCEDURE — 1036F TOBACCO NON-USER: CPT | Performed by: NURSE PRACTITIONER

## 2024-07-09 PROCEDURE — G8427 DOCREV CUR MEDS BY ELIG CLIN: HCPCS | Performed by: NURSE PRACTITIONER

## 2024-07-09 PROCEDURE — G8400 PT W/DXA NO RESULTS DOC: HCPCS | Performed by: NURSE PRACTITIONER

## 2024-07-09 PROCEDURE — G8417 CALC BMI ABV UP PARAM F/U: HCPCS | Performed by: NURSE PRACTITIONER

## 2024-07-09 PROCEDURE — 3075F SYST BP GE 130 - 139MM HG: CPT | Performed by: NURSE PRACTITIONER

## 2024-07-09 PROCEDURE — 1123F ACP DISCUSS/DSCN MKR DOCD: CPT | Performed by: NURSE PRACTITIONER

## 2024-07-09 PROCEDURE — 1090F PRES/ABSN URINE INCON ASSESS: CPT | Performed by: NURSE PRACTITIONER

## 2024-07-09 PROCEDURE — 99214 OFFICE O/P EST MOD 30 MIN: CPT | Performed by: NURSE PRACTITIONER

## 2024-07-09 ASSESSMENT — SLEEP AND FATIGUE QUESTIONNAIRES
HOW LIKELY ARE YOU TO NOD OFF OR FALL ASLEEP WHILE SITTING AND TALKING TO SOMEONE: WOULD NEVER DOZE
ESS TOTAL SCORE: 3
HOW LIKELY ARE YOU TO NOD OFF OR FALL ASLEEP WHILE SITTING INACTIVE IN A PUBLIC PLACE: WOULD NEVER DOZE
HOW LIKELY ARE YOU TO NOD OFF OR FALL ASLEEP WHILE WATCHING TV: MODERATE CHANCE OF DOZING
HOW LIKELY ARE YOU TO NOD OFF OR FALL ASLEEP WHILE SITTING AND READING: WOULD NEVER DOZE
HOW LIKELY ARE YOU TO NOD OFF OR FALL ASLEEP WHILE SITTING QUIETLY AFTER LUNCH WITHOUT ALCOHOL: WOULD NEVER DOZE
HOW LIKELY ARE YOU TO NOD OFF OR FALL ASLEEP WHEN YOU ARE A PASSENGER IN A CAR FOR AN HOUR WITHOUT A BREAK: WOULD NEVER DOZE
HOW LIKELY ARE YOU TO NOD OFF OR FALL ASLEEP IN A CAR, WHILE STOPPED FOR A FEW MINUTES IN TRAFFIC: WOULD NEVER DOZE
HOW LIKELY ARE YOU TO NOD OFF OR FALL ASLEEP WHILE LYING DOWN TO REST IN THE AFTERNOON WHEN CIRCUMSTANCES PERMIT: SLIGHT CHANCE OF DOZING

## 2024-07-09 NOTE — PATIENT INSTRUCTIONS
[address]  [place] [address]  Tel.  [phone]  Fax. [phone] [address]  [place] [address]  Tel.  [phone]  Fax. [phone] [address] [place] [address]  Tel.  [phone]  Fax. [phone]     Learning About CPAP for Sleep Apnea  What is CPAP?              CPAP is a small machine that you use at home every night while you sleep. It increases air pressure in your throat to keep your airway open. When you have sleep apnea, this can help you sleep better so you feel much better. CPAP stands for \"continuous positive airway pressure.\"  The CPAP machine will have one of the following:  A mask that covers your nose and mouth  Prongs that fit into your nose  A mask that covers your nose only, the most common type. This type is called NCPAP. The N stands for \"nasal.\"  Why is it done?  CPAP is usually the best treatment for obstructive sleep apnea. It is the first treatment choice and the most widely used. Your doctor may suggest CPAP if you have:  Moderate to severe sleep apnea.  Sleep apnea and coronary artery disease (CAD) or heart failure.  How does it help?  CPAP can help you have more normal sleep, so you feel less sleepy and more alert during the daytime.  CPAP may help keep heart failure or other heart problems from getting worse.  NCPAP may help lower your blood pressure.  If you use CPAP, your bed partner may also sleep better because you are not snoring or restless.  What are the side effects?  Some people who use CPAP have:  A dry or stuffy nose and a sore throat.  Irritated skin on the face.  Sore eyes.  Bloating.  If you have any of these problems, work with your doctor to fix them. Here are some things you can try:  Be sure the mask or nasal prongs fit well.  See if your doctor can adjust the pressure of your CPAP.  If your nose is dry, try a humidifier.  If your nose is runny or stuffy, try decongestant medicine or a steroid

## 2024-07-09 NOTE — PROGRESS NOTES
[address]   [place] [address]   Tel.  [phone]   Fax. [phone]  [address]   [place] [address]   Tel.  [phone]   Fax. [phone] [address] [place] [address]   Tel.  [phone]   Fax. [phone]     Radha Wynn (: 1940) is a 84 y.o. female, established patient, seen for positive airway pressure follow-up and evaluation.  She was last seen by Dr. Medina on 2023, prior notes and sleep testing reviewed in detail. Initial sleep apnea diagnosis in approx . [facility] sleep test 2023 ordered by Dr. Rothman showed AHI of 65/hr with a lowest SpO2 of 81%, duration of SpO2 < 88% 46 min.  Weight at time of sleep testing 180 pounds.    ASSESSMENT/PLAN:   Diagnosis Orders   1. Obstructive sleep apnea (adult) (pediatric)  DME Order for (Specify) as OP    SLEEP LAB (PAP TITRATION)      2. Paroxysmal atrial fibrillation (HCC)        3. Adult BMI 32.0-32.9 kg/sq m            AHI = 65.1(2023).  On Resmed APAP :  4-9 cmH2O. Set up 2024.    She is not compliant with PAP therapy although when used PAP shows benefit to the patient and remains necessary for control of her sleep apnea. There is continued clinical benefit from the hours of use demonstrated by AHI reduced from 65/hr to 5.0/hr.  She has prior CPAP failure history.      Follow-up and Dispositions    Return if symptoms worsen or fail to improve.         Sleep Apnea-  Sleep apnea condition has been exacerbated and treatment is causing negative side effects.  Change in treatment plan is needed.  Change current pressure prescription settings to APAP 4-8 cm H2O, ramp set to auto.  Changes made by provider in airview system and sent to [facility].  Her clinical response has not been adequate to date, patient continues to have symptoms despite efforts to use PAP device.  In lab sleep study titration to determine optimal PAP device pressure and complete mask

## 2024-07-11 ENCOUNTER — OFFICE VISIT (OUTPATIENT)
Age: 84
End: 2024-07-11
Payer: MEDICARE

## 2024-07-11 ENCOUNTER — HOSPITAL ENCOUNTER (INPATIENT)
Facility: HOSPITAL | Age: 84
LOS: 2 days | Discharge: HOME | DRG: 305 | End: 2024-07-13
Attending: EMERGENCY MEDICINE | Admitting: FAMILY MEDICINE
Payer: MEDICARE

## 2024-07-11 VITALS
SYSTOLIC BLOOD PRESSURE: 216 MMHG | DIASTOLIC BLOOD PRESSURE: 94 MMHG | HEIGHT: 62 IN | WEIGHT: 175.2 LBS | OXYGEN SATURATION: 96 % | BODY MASS INDEX: 32.24 KG/M2 | RESPIRATION RATE: 18 BRPM | HEART RATE: 56 BPM

## 2024-07-11 DIAGNOSIS — I48.3 TYPICAL ATRIAL FLUTTER (HCC): ICD-10-CM

## 2024-07-11 DIAGNOSIS — I10 MALIGNANT HYPERTENSION: Primary | ICD-10-CM

## 2024-07-11 DIAGNOSIS — I48.19 PERSISTENT ATRIAL FIBRILLATION (HCC): Primary | ICD-10-CM

## 2024-07-11 DIAGNOSIS — Z98.890 S/P ABLATION OF ATRIAL FIBRILLATION: ICD-10-CM

## 2024-07-11 DIAGNOSIS — Z79.01 ANTICOAGULATED: ICD-10-CM

## 2024-07-11 DIAGNOSIS — Z86.79 S/P ABLATION OF ATRIAL FIBRILLATION: ICD-10-CM

## 2024-07-11 DIAGNOSIS — I10 PRIMARY HYPERTENSION: ICD-10-CM

## 2024-07-11 DIAGNOSIS — Z98.890 S/P ABLATION OF ATRIAL FLUTTER: ICD-10-CM

## 2024-07-11 DIAGNOSIS — Z86.79 S/P ABLATION OF ATRIAL FLUTTER: ICD-10-CM

## 2024-07-11 LAB
ALBUMIN SERPL-MCNC: 3.9 G/DL (ref 3.5–5)
ALBUMIN/GLOB SERPL: 1.3 (ref 1.1–2.2)
ALP SERPL-CCNC: 53 U/L (ref 45–117)
ALT SERPL-CCNC: 23 U/L (ref 12–78)
ANION GAP SERPL CALC-SCNC: 3 MMOL/L (ref 5–15)
AST SERPL-CCNC: 19 U/L (ref 15–37)
BASOPHILS # BLD: 0 K/UL (ref 0–0.1)
BASOPHILS NFR BLD: 1 % (ref 0–1)
BILIRUB SERPL-MCNC: 0.9 MG/DL (ref 0.2–1)
BUN SERPL-MCNC: 12 MG/DL (ref 6–20)
BUN/CREAT SERPL: 12 (ref 12–20)
CALCIUM SERPL-MCNC: 9.3 MG/DL (ref 8.5–10.1)
CHLORIDE SERPL-SCNC: 107 MMOL/L (ref 97–108)
CO2 SERPL-SCNC: 30 MMOL/L (ref 21–32)
COMMENT:: NORMAL
CREAT SERPL-MCNC: 1.02 MG/DL (ref 0.55–1.02)
DIFFERENTIAL METHOD BLD: NORMAL
EOSINOPHIL # BLD: 0.1 K/UL (ref 0–0.4)
EOSINOPHIL NFR BLD: 2 % (ref 0–7)
ERYTHROCYTE [DISTWIDTH] IN BLOOD BY AUTOMATED COUNT: 14.2 % (ref 11.5–14.5)
GLOBULIN SER CALC-MCNC: 3.1 G/DL (ref 2–4)
GLUCOSE SERPL-MCNC: 118 MG/DL (ref 65–100)
HCT VFR BLD AUTO: 39.3 % (ref 35–47)
HGB BLD-MCNC: 12.7 G/DL (ref 11.5–16)
IMM GRANULOCYTES # BLD AUTO: 0 K/UL (ref 0–0.04)
IMM GRANULOCYTES NFR BLD AUTO: 0 % (ref 0–0.5)
LYMPHOCYTES # BLD: 1.1 K/UL (ref 0.8–3.5)
LYMPHOCYTES NFR BLD: 18 % (ref 12–49)
MAGNESIUM SERPL-MCNC: 2.2 MG/DL (ref 1.6–2.4)
MCH RBC QN AUTO: 31.7 PG (ref 26–34)
MCHC RBC AUTO-ENTMCNC: 32.3 G/DL (ref 30–36.5)
MCV RBC AUTO: 98 FL (ref 80–99)
MONOCYTES # BLD: 0.6 K/UL (ref 0–1)
MONOCYTES NFR BLD: 10 % (ref 5–13)
NEUTS SEG # BLD: 4.1 K/UL (ref 1.8–8)
NEUTS SEG NFR BLD: 69 % (ref 32–75)
NRBC # BLD: 0 K/UL (ref 0–0.01)
NRBC BLD-RTO: 0 PER 100 WBC
PLATELET # BLD AUTO: 167 K/UL (ref 150–400)
PMV BLD AUTO: 10.3 FL (ref 8.9–12.9)
POTASSIUM SERPL-SCNC: 4 MMOL/L (ref 3.5–5.1)
PROT SERPL-MCNC: 7 G/DL (ref 6.4–8.2)
RBC # BLD AUTO: 4.01 M/UL (ref 3.8–5.2)
SODIUM SERPL-SCNC: 140 MMOL/L (ref 136–145)
SPECIMEN HOLD: NORMAL
WBC # BLD AUTO: 5.9 K/UL (ref 3.6–11)

## 2024-07-11 PROCEDURE — 80053 COMPREHEN METABOLIC PANEL: CPT

## 2024-07-11 PROCEDURE — 99214 OFFICE O/P EST MOD 30 MIN: CPT | Performed by: NURSE PRACTITIONER

## 2024-07-11 PROCEDURE — 96374 THER/PROPH/DIAG INJ IV PUSH: CPT

## 2024-07-11 PROCEDURE — 2580000003 HC RX 258: Performed by: EMERGENCY MEDICINE

## 2024-07-11 PROCEDURE — G8400 PT W/DXA NO RESULTS DOC: HCPCS | Performed by: NURSE PRACTITIONER

## 2024-07-11 PROCEDURE — 96365 THER/PROPH/DIAG IV INF INIT: CPT

## 2024-07-11 PROCEDURE — 93005 ELECTROCARDIOGRAM TRACING: CPT | Performed by: NURSE PRACTITIONER

## 2024-07-11 PROCEDURE — 96366 THER/PROPH/DIAG IV INF ADDON: CPT

## 2024-07-11 PROCEDURE — 93005 ELECTROCARDIOGRAM TRACING: CPT | Performed by: INTERNAL MEDICINE

## 2024-07-11 PROCEDURE — 83735 ASSAY OF MAGNESIUM: CPT

## 2024-07-11 PROCEDURE — G0378 HOSPITAL OBSERVATION PER HR: HCPCS

## 2024-07-11 PROCEDURE — 1123F ACP DISCUSS/DSCN MKR DOCD: CPT | Performed by: NURSE PRACTITIONER

## 2024-07-11 PROCEDURE — 36415 COLL VENOUS BLD VENIPUNCTURE: CPT

## 2024-07-11 PROCEDURE — 6370000000 HC RX 637 (ALT 250 FOR IP): Performed by: FAMILY MEDICINE

## 2024-07-11 PROCEDURE — 96375 TX/PRO/DX INJ NEW DRUG ADDON: CPT

## 2024-07-11 PROCEDURE — G8427 DOCREV CUR MEDS BY ELIG CLIN: HCPCS | Performed by: NURSE PRACTITIONER

## 2024-07-11 PROCEDURE — 93010 ELECTROCARDIOGRAM REPORT: CPT | Performed by: NURSE PRACTITIONER

## 2024-07-11 PROCEDURE — 6360000002 HC RX W HCPCS: Performed by: EMERGENCY MEDICINE

## 2024-07-11 PROCEDURE — 2060000000 HC ICU INTERMEDIATE R&B

## 2024-07-11 PROCEDURE — 1036F TOBACCO NON-USER: CPT | Performed by: NURSE PRACTITIONER

## 2024-07-11 PROCEDURE — G8417 CALC BMI ABV UP PARAM F/U: HCPCS | Performed by: NURSE PRACTITIONER

## 2024-07-11 PROCEDURE — 94761 N-INVAS EAR/PLS OXIMETRY MLT: CPT

## 2024-07-11 PROCEDURE — 2580000003 HC RX 258: Performed by: FAMILY MEDICINE

## 2024-07-11 PROCEDURE — 3077F SYST BP >= 140 MM HG: CPT | Performed by: NURSE PRACTITIONER

## 2024-07-11 PROCEDURE — 99285 EMERGENCY DEPT VISIT HI MDM: CPT

## 2024-07-11 PROCEDURE — 3080F DIAST BP >= 90 MM HG: CPT | Performed by: NURSE PRACTITIONER

## 2024-07-11 PROCEDURE — 85025 COMPLETE CBC W/AUTO DIFF WBC: CPT

## 2024-07-11 PROCEDURE — 6370000000 HC RX 637 (ALT 250 FOR IP): Performed by: EMERGENCY MEDICINE

## 2024-07-11 PROCEDURE — 1090F PRES/ABSN URINE INCON ASSESS: CPT | Performed by: NURSE PRACTITIONER

## 2024-07-11 PROCEDURE — 93005 ELECTROCARDIOGRAM TRACING: CPT | Performed by: EMERGENCY MEDICINE

## 2024-07-11 RX ORDER — SODIUM CHLORIDE 0.9 % (FLUSH) 0.9 %
5-40 SYRINGE (ML) INJECTION PRN
Status: DISCONTINUED | OUTPATIENT
Start: 2024-07-11 | End: 2024-07-13 | Stop reason: HOSPADM

## 2024-07-11 RX ORDER — CARVEDILOL 3.12 MG/1
3.12 TABLET ORAL 2 TIMES DAILY WITH MEALS
Status: DISCONTINUED | OUTPATIENT
Start: 2024-07-11 | End: 2024-07-13 | Stop reason: HOSPADM

## 2024-07-11 RX ORDER — EZETIMIBE 10 MG/1
10 TABLET ORAL EVERY EVENING
Status: DISCONTINUED | OUTPATIENT
Start: 2024-07-11 | End: 2024-07-13 | Stop reason: HOSPADM

## 2024-07-11 RX ORDER — HYDRALAZINE HYDROCHLORIDE 25 MG/1
25 TABLET, FILM COATED ORAL 3 TIMES DAILY
Status: DISCONTINUED | OUTPATIENT
Start: 2024-07-11 | End: 2024-07-13

## 2024-07-11 RX ORDER — VALSARTAN 80 MG/1
160 TABLET ORAL DAILY
Status: DISCONTINUED | OUTPATIENT
Start: 2024-07-12 | End: 2024-07-13 | Stop reason: HOSPADM

## 2024-07-11 RX ORDER — VITAMIN B COMPLEX
5000 TABLET ORAL DAILY
Status: DISCONTINUED | OUTPATIENT
Start: 2024-07-12 | End: 2024-07-13 | Stop reason: HOSPADM

## 2024-07-11 RX ORDER — SODIUM CHLORIDE 9 MG/ML
INJECTION, SOLUTION INTRAVENOUS PRN
Status: DISCONTINUED | OUTPATIENT
Start: 2024-07-11 | End: 2024-07-13 | Stop reason: HOSPADM

## 2024-07-11 RX ORDER — POLYETHYLENE GLYCOL 3350 17 G/17G
17 POWDER, FOR SOLUTION ORAL DAILY PRN
Status: DISCONTINUED | OUTPATIENT
Start: 2024-07-11 | End: 2024-07-13 | Stop reason: HOSPADM

## 2024-07-11 RX ORDER — HYDRALAZINE HYDROCHLORIDE 25 MG/1
50 TABLET, FILM COATED ORAL
Status: COMPLETED | OUTPATIENT
Start: 2024-07-11 | End: 2024-07-11

## 2024-07-11 RX ORDER — ONDANSETRON 4 MG/1
4 TABLET, ORALLY DISINTEGRATING ORAL EVERY 8 HOURS PRN
Status: DISCONTINUED | OUTPATIENT
Start: 2024-07-11 | End: 2024-07-13 | Stop reason: HOSPADM

## 2024-07-11 RX ORDER — ONDANSETRON 2 MG/ML
4 INJECTION INTRAMUSCULAR; INTRAVENOUS EVERY 6 HOURS PRN
Status: DISCONTINUED | OUTPATIENT
Start: 2024-07-11 | End: 2024-07-13 | Stop reason: HOSPADM

## 2024-07-11 RX ORDER — AMIODARONE HYDROCHLORIDE 200 MG/1
200 TABLET ORAL DAILY
Status: DISCONTINUED | OUTPATIENT
Start: 2024-07-12 | End: 2024-07-13 | Stop reason: HOSPADM

## 2024-07-11 RX ORDER — ACETAMINOPHEN 650 MG/1
650 SUPPOSITORY RECTAL EVERY 6 HOURS PRN
Status: DISCONTINUED | OUTPATIENT
Start: 2024-07-11 | End: 2024-07-13 | Stop reason: HOSPADM

## 2024-07-11 RX ORDER — SODIUM CHLORIDE 0.9 % (FLUSH) 0.9 %
5-40 SYRINGE (ML) INJECTION EVERY 12 HOURS SCHEDULED
Status: DISCONTINUED | OUTPATIENT
Start: 2024-07-11 | End: 2024-07-13 | Stop reason: HOSPADM

## 2024-07-11 RX ORDER — ESCITALOPRAM OXALATE 10 MG/1
5 TABLET ORAL DAILY
Status: DISCONTINUED | OUTPATIENT
Start: 2024-07-12 | End: 2024-07-13 | Stop reason: HOSPADM

## 2024-07-11 RX ORDER — CLONIDINE HYDROCHLORIDE 0.1 MG/1
0.1 TABLET ORAL DAILY PRN
Status: DISCONTINUED | OUTPATIENT
Start: 2024-07-11 | End: 2024-07-13 | Stop reason: HOSPADM

## 2024-07-11 RX ORDER — LORAZEPAM 1 MG/1
1 TABLET ORAL
Status: COMPLETED | OUTPATIENT
Start: 2024-07-11 | End: 2024-07-11

## 2024-07-11 RX ORDER — ACETAMINOPHEN 325 MG/1
650 TABLET ORAL EVERY 6 HOURS PRN
Status: DISCONTINUED | OUTPATIENT
Start: 2024-07-11 | End: 2024-07-13 | Stop reason: HOSPADM

## 2024-07-11 RX ORDER — HYDRALAZINE HYDROCHLORIDE 20 MG/ML
10 INJECTION INTRAMUSCULAR; INTRAVENOUS ONCE
Status: COMPLETED | OUTPATIENT
Start: 2024-07-11 | End: 2024-07-11

## 2024-07-11 RX ORDER — LEVOTHYROXINE SODIUM 0.05 MG/1
50 TABLET ORAL
Status: DISCONTINUED | OUTPATIENT
Start: 2024-07-12 | End: 2024-07-13 | Stop reason: HOSPADM

## 2024-07-11 RX ORDER — ROSUVASTATIN CALCIUM 5 MG/1
5 TABLET, COATED ORAL
Status: DISCONTINUED | OUTPATIENT
Start: 2024-07-12 | End: 2024-07-13 | Stop reason: HOSPADM

## 2024-07-11 RX ADMIN — CARVEDILOL 3.12 MG: 3.12 TABLET, FILM COATED ORAL at 18:05

## 2024-07-11 RX ADMIN — SODIUM CHLORIDE 5 MG/HR: 9 INJECTION, SOLUTION INTRAVENOUS at 16:45

## 2024-07-11 RX ADMIN — LORAZEPAM 1 MG: 1 TABLET ORAL at 16:28

## 2024-07-11 RX ADMIN — APIXABAN 5 MG: 5 TABLET, FILM COATED ORAL at 20:06

## 2024-07-11 RX ADMIN — CLONIDINE HYDROCHLORIDE 0.1 MG: 0.1 TABLET ORAL at 20:05

## 2024-07-11 RX ADMIN — HYDRALAZINE HYDROCHLORIDE 10 MG: 20 INJECTION INTRAMUSCULAR; INTRAVENOUS at 15:49

## 2024-07-11 RX ADMIN — HYDRALAZINE HYDROCHLORIDE 50 MG: 25 TABLET ORAL at 13:07

## 2024-07-11 RX ADMIN — SODIUM CHLORIDE, PRESERVATIVE FREE 10 ML: 5 INJECTION INTRAVENOUS at 20:07

## 2024-07-11 RX ADMIN — HYDRALAZINE HYDROCHLORIDE 25 MG: 25 TABLET ORAL at 20:06

## 2024-07-11 RX ADMIN — EZETIMIBE 10 MG: 10 TABLET ORAL at 18:05

## 2024-07-11 RX ADMIN — ACETAMINOPHEN 650 MG: 325 TABLET ORAL at 20:05

## 2024-07-11 ASSESSMENT — ENCOUNTER SYMPTOMS
VOMITING: 0
SORE THROAT: 0
COUGH: 0

## 2024-07-11 ASSESSMENT — PAIN DESCRIPTION - LOCATION: LOCATION: NECK;BACK

## 2024-07-11 ASSESSMENT — PAIN DESCRIPTION - ORIENTATION: ORIENTATION: MID

## 2024-07-11 ASSESSMENT — PAIN - FUNCTIONAL ASSESSMENT: PAIN_FUNCTIONAL_ASSESSMENT: NONE - DENIES PAIN

## 2024-07-11 ASSESSMENT — PAIN SCALES - GENERAL: PAINLEVEL_OUTOF10: 0

## 2024-07-11 ASSESSMENT — PAIN DESCRIPTION - DESCRIPTORS: DESCRIPTORS: ACHING

## 2024-07-11 NOTE — PROGRESS NOTES
Room #:  2    Chief Complaint   Patient presents with    Atrial Fibrillation    Other     SVT       Vitals:    07/11/24 1104 07/11/24 1118   BP: (!) 236/90 (!) 230/92  Comment: BP rechecked   Site: Left Upper Arm Right Upper Arm   Position: Sitting Sitting   Cuff Size: Medium Adult Medium Adult   Pulse: 56    Resp: 18    SpO2: 96%    Weight: 79.5 kg (175 lb 3.2 oz)    Height: 1.575 m (5' 2\")          Chest pain: NO       1. Have you been to the ER, urgent care clinic outside [facility] since your last visit?  Hospitalized since your last visit?  NO        Refills:  NO  
non-tender. Bowel sounds normal.    MSK:   Extremities normal, atraumatic.  Moves extremities independently.   Vasc/lymph:   No lower extremity edema.   Skin:   Skin color normal. No rashes or lesions on visible areas.   Neurologic:   Alert, moves all extremities.        Data Review:     Radiology:   XR Results (most recent):    CT Result (most recent):  CT HEAD WO CONTRAST 05/17/2024    Narrative  EXAM: CT HEAD WO CONTRAST    INDICATION: Dizziness/HA    COMPARISON: CT 3/7/2018.    CONTRAST: None.    TECHNIQUE: Unenhanced CT of the head was performed using 5 mm images. Brain and  bone windows were generated. Coronal and sagittal reformats. CT dose reduction  was achieved through use of a standardized protocol tailored for this  examination and automatic exposure control for dose modulation.    FINDINGS:  The ventricles and sulci are normal in size, shape and configuration. There is  no significant white matter disease. There is no intracranial hemorrhage,  extra-axial collection, or mass effect. The basilar cisterns are open. No CT  evidence of acute infarct.    The bone windows demonstrate no abnormalities. The visualized portions of the  paranasal sinuses and mastoid air cells are clear.    Impression  No acute findings.    MRI Result (most recent):  No results found for this or any previous visit from the past 3650 days.          Current meds:  Current Outpatient Medications   Medication Sig Dispense Refill    hydrALAZINE (APRESOLINE) 25 MG tablet Take 1 tablet by mouth 3 times daily 90 tablet 3    rosuvastatin (CRESTOR) 5 MG tablet Take 1 tablet by mouth three times a week 30 tablet 5    amiodarone (PACERONE) 200 MG tablet Take 2 tablets by mouth daily 400 mg daily x 2 weeks, then decrease to 200 mg daily thereafter 45 tablet 5    cloNIDine (CATAPRES) 0.1 MG tablet Take 1 tablet by mouth daily as needed for High Blood Pressure 30 tablet 5    Multiple Vitamins-Minerals (CENTRUM SILVER PO) Take by mouth

## 2024-07-11 NOTE — H&P
[facility]  [address] [place], [place] [id number] [phone]    [facility] Adult  Hospitalist Group    History & Physical    Date of service: 7/11/2024    Patient name: Radha Wynn  MRN: [id number]  YOB: 1940  Age: 84 y.o.     Primary care provider:  Neena Hammer MD     Source of Information: patient, medical records                                 Chief complain: Hypertension    History of present illness  Radha Wynn is a 84 y.o. female who was sent in from her cardiologist office for severe hypertension.  His blood pressure is elevated 234/92. HE is currently reporting dizziness and lightheadedness. She is on multiple antihypertensives and is compliant with her meds. She denies any chest pain, headache, abdominal pain, n/v.  She was started on Cardene drip in the ER.    Past Medical History:   Diagnosis Date    DDD (degenerative disc disease)     Gastroesophageal reflux disease without esophagitis 7/3/2016    GERD (gastroesophageal reflux disease)     Hiatal hernia     HTN (hypertension), benign 7/26/2016    Hypothyroidism     TRISTAN (obstructive sleep apnea) 7/26/2016    Paroxysmal atrial fibrillation (HCC) 8/28/2016    Uterine prolapse       Past Surgical History:   Procedure Laterality Date    ABLATION OF DYSRHYTHMIC FOCUS  03/2017    CARDIAC PROCEDURE N/A 8/30/2023    Intracardiac echocardiogram performed by Michelle Rothman MD at [facility] CARDIAC CATH LAB    EP DEVICE PROCEDURE N/A 8/30/2023    Ablation A-fib w complete ep study performed by Michelle Rothman MD at [facility] CARDIAC CATH LAB    EP DEVICE PROCEDURE N/A 8/30/2023    Ep 3d mapping performed by Michelle Rothman MD at [facility] CARDIAC CATH LAB    EP DEVICE PROCEDURE N/A 8/30/2023    Drug stimulation performed by Michelle Rothman MD at [facility] CARDIAC CATH LAB    EP DEVICE PROCEDURE N/A 8/30/2023    Ablation following A-fib addl performed by Michelle Rothman MD at [facility] CARDIAC CATH LAB    GI      COLONOSCOPY 7/14    GYN       TUBAL LIGATION    HEENT      WISDOM TEETH EXTRACTED.    INVASIVE VASCULAR N/A 8/30/2023    Ultrasound guided vascular access performed by Michelle Rothman MD at [facility] CARDIAC CATH LAB     Prior to Admission medications    Medication Sig Start Date End Date Taking? Authorizing Provider   hydrALAZINE (APRESOLINE) 25 MG tablet Take 1 tablet by mouth 3 times daily 6/10/24   Giovani Gonzalez MD   rosuvastatin (CRESTOR) 5 MG tablet Take 1 tablet by mouth three times a week 5/3/24   Suleman Heath MD   amiodarone (PACERONE) 200 MG tablet Take 2 tablets by mouth daily 400 mg daily x 2 weeks, then decrease to 200 mg daily thereafter 4/23/24   Michelle Rothman MD   cloNIDine (CATAPRES) 0.1 MG tablet Take 1 tablet by mouth daily as needed for High Blood Pressure 4/9/24   Suleman Heath MD   Multiple Vitamins-Minerals (CENTRUM SILVER PO) Take by mouth    Brian Byrne MD   vitamin D (CHOLECALCIFEROL) 125 MCG (5000 UT) CAPS capsule Take 1 capsule by mouth daily    ProviderBrian MD   apixaban (ELIQUIS) 5 MG TABS tablet Take 1 tablet by mouth 2 times daily 2/19/24   Suleman Heath MD   candesartan (ATACAND) 16 MG tablet Take 1 tablet by mouth daily 12/13/23   Suleman Heath MD   carvedilol (COREG) 3.125 MG tablet Take 1 tablet by mouth 2 times daily (with meals) 11/24/23   Michelle Rothman MD   Calcium Carbonate-Vitamin D (OYSTER SHELL CALCIUM/D) 500-5 MG-MCG TABS Take 1 tablet by mouth every evening    Automatic Reconciliation, Ar   escitalopram (LEXAPRO) 5 MG tablet Take 1 tablet by mouth daily 9/16/22   Automatic Reconciliation, Ar   ezetimibe (ZETIA) 10 MG tablet Take 1 tablet by mouth every evening 8/4/22   Automatic Reconciliation, Ar   levothyroxine (SYNTHROID) 50 MCG tablet Take 1 tablet by mouth every morning (before breakfast)    Automatic Reconciliation, Ar     Allergies   Allergen Reactions    Citalopram Hydrobromide Rash     With itching.    Iodine Other (See Comments)     Wheezing/bronchospasm    *HAS BEEN ABLE TO

## 2024-07-11 NOTE — ED PROVIDER NOTES
[facility] EMERGENCY DEPT  EMERGENCY DEPARTMENT ENCOUNTER      Pt Name: Radha Wynn  MRN: [id number]  Birthdate 1940  Date of evaluation: 7/11/2024  Provider: Geoffrey Alfredo MD    CHIEF COMPLAINT       Chief Complaint   Patient presents with    Hypertension         HISTORY OF PRESENT ILLNESS   (Location/Symptom, Timing/Onset, Context/Setting, Quality, Duration, Modifying Factors, Severity)  Note limiting factors.   84-year-old female presents from cardiology office with concerns for uncontrolled high blood pressure.  Patient states that she was having a routine visit today at her cardiologist office where she was noted to have extremely high blood pressure.  Patient reports she has a history of difficult to control blood pressure.  She states has been taking all of her medications as prescribed.  She denies any chest pain, headaches, abdominal pain.  She had reported some lightheadedness in the cardiology office but she states she is no longer having the symptoms at this time.    Review of her medical record shows patient also has a history of atrial fibrillation and is on Eliquis.  She has a history of GERD, hypothyroidism, hiatal hernia, obstructive sleep apnea.    The history is provided by the patient and medical records.         Review of External Medical Records:     Nursing Notes were reviewed.    REVIEW OF SYSTEMS    (2-9 systems for level 4, 10 or more for level 5)     Review of Systems   Constitutional:  Negative for fatigue.   HENT:  Negative for sore throat.    Eyes:  Negative for visual disturbance.   Respiratory:  Negative for cough.    Cardiovascular:  Negative for palpitations.   Gastrointestinal:  Negative for vomiting.   Genitourinary:  Negative for difficulty urinating.   Musculoskeletal:  Negative for myalgias.   Skin:  Negative for rash.   Neurological:  Negative for weakness.       Except as noted above the remainder of the review of systems was reviewed and negative.       PAST MEDICAL

## 2024-07-11 NOTE — ED TRIAGE NOTES
Pt arrives to ED via POV from outpatient cardiology c/o elevated BP. Pt reports her BP was 210/102 in the cardiology office this morning. Pt denies CP, SOB, HA, dizziness, numbness, tingling. Provider in triage.    Initial BP in triage was 229/94  Recheck of BP in triage was 234/92

## 2024-07-11 NOTE — ED NOTES
Bedside and Verbal shift change report given to Sebastian (oncoming nurse) by Kylee (offgoing nurse). Report included the following information ED Encounter Summary.

## 2024-07-12 LAB
ANION GAP SERPL CALC-SCNC: 7 MMOL/L (ref 5–15)
BUN SERPL-MCNC: 29 MG/DL (ref 6–20)
BUN/CREAT SERPL: 20 (ref 12–20)
CALCIUM SERPL-MCNC: 8.2 MG/DL (ref 8.5–10.1)
CHLORIDE SERPL-SCNC: 112 MMOL/L (ref 97–108)
CO2 SERPL-SCNC: 19 MMOL/L (ref 21–32)
COMMENT:: NORMAL
CREAT SERPL-MCNC: 1.42 MG/DL (ref 0.55–1.02)
EKG ATRIAL RATE: 53 BPM
EKG ATRIAL RATE: 58 BPM
EKG DIAGNOSIS: NORMAL
EKG DIAGNOSIS: NORMAL
EKG P AXIS: 80 DEGREES
EKG P AXIS: 84 DEGREES
EKG P-R INTERVAL: 174 MS
EKG P-R INTERVAL: 184 MS
EKG Q-T INTERVAL: 514 MS
EKG Q-T INTERVAL: 516 MS
EKG QRS DURATION: 134 MS
EKG QRS DURATION: 138 MS
EKG QTC CALCULATION (BAZETT): 484 MS
EKG QTC CALCULATION (BAZETT): 504 MS
EKG R AXIS: 88 DEGREES
EKG R AXIS: 98 DEGREES
EKG T AXIS: 48 DEGREES
EKG T AXIS: 59 DEGREES
EKG VENTRICULAR RATE: 53 BPM
EKG VENTRICULAR RATE: 58 BPM
GLUCOSE SERPL-MCNC: 183 MG/DL (ref 65–100)
POTASSIUM SERPL-SCNC: 4.3 MMOL/L (ref 3.5–5.1)
SODIUM SERPL-SCNC: 138 MMOL/L (ref 136–145)
SPECIMEN HOLD: NORMAL
TSH SERPL DL<=0.05 MIU/L-ACNC: 3.27 UIU/ML (ref 0.36–3.74)

## 2024-07-12 PROCEDURE — 36415 COLL VENOUS BLD VENIPUNCTURE: CPT

## 2024-07-12 PROCEDURE — 84443 ASSAY THYROID STIM HORMONE: CPT

## 2024-07-12 PROCEDURE — 96376 TX/PRO/DX INJ SAME DRUG ADON: CPT

## 2024-07-12 PROCEDURE — 2580000003 HC RX 258: Performed by: FAMILY MEDICINE

## 2024-07-12 PROCEDURE — G0378 HOSPITAL OBSERVATION PER HR: HCPCS

## 2024-07-12 PROCEDURE — 80048 BASIC METABOLIC PNL TOTAL CA: CPT

## 2024-07-12 PROCEDURE — 2060000000 HC ICU INTERMEDIATE R&B

## 2024-07-12 PROCEDURE — 6360000002 HC RX W HCPCS

## 2024-07-12 PROCEDURE — 93010 ELECTROCARDIOGRAM REPORT: CPT | Performed by: SPECIALIST

## 2024-07-12 PROCEDURE — 94761 N-INVAS EAR/PLS OXIMETRY MLT: CPT

## 2024-07-12 PROCEDURE — 6370000000 HC RX 637 (ALT 250 FOR IP): Performed by: FAMILY MEDICINE

## 2024-07-12 RX ORDER — HYDRALAZINE HYDROCHLORIDE 20 MG/ML
10 INJECTION INTRAMUSCULAR; INTRAVENOUS ONCE
Status: COMPLETED | OUTPATIENT
Start: 2024-07-12 | End: 2024-07-12

## 2024-07-12 RX ADMIN — HYDRALAZINE HYDROCHLORIDE 25 MG: 25 TABLET ORAL at 20:32

## 2024-07-12 RX ADMIN — APIXABAN 5 MG: 5 TABLET, FILM COATED ORAL at 08:45

## 2024-07-12 RX ADMIN — ESCITALOPRAM OXALATE 5 MG: 10 TABLET ORAL at 08:44

## 2024-07-12 RX ADMIN — HYDRALAZINE HYDROCHLORIDE 25 MG: 25 TABLET ORAL at 08:44

## 2024-07-12 RX ADMIN — Medication 5000 UNITS: at 08:44

## 2024-07-12 RX ADMIN — EZETIMIBE 10 MG: 10 TABLET ORAL at 17:35

## 2024-07-12 RX ADMIN — HYDRALAZINE HYDROCHLORIDE 10 MG: 20 INJECTION INTRAMUSCULAR; INTRAVENOUS at 22:51

## 2024-07-12 RX ADMIN — HYDRALAZINE HYDROCHLORIDE 25 MG: 25 TABLET ORAL at 14:12

## 2024-07-12 RX ADMIN — SODIUM CHLORIDE, PRESERVATIVE FREE 10 ML: 5 INJECTION INTRAVENOUS at 08:45

## 2024-07-12 RX ADMIN — LEVOTHYROXINE SODIUM 50 MCG: 0.05 TABLET ORAL at 06:58

## 2024-07-12 RX ADMIN — APIXABAN 5 MG: 5 TABLET, FILM COATED ORAL at 20:32

## 2024-07-12 RX ADMIN — ROSUVASTATIN CALCIUM 5 MG: 5 TABLET, FILM COATED ORAL at 20:32

## 2024-07-12 NOTE — PROGRESS NOTES
Physician Progress Note      PATIENT:               DOROTHY ARCEO  [facility] #:                  [id number]  :                       1940  ADMIT DATE:       2024 12:37 PM  DISCH DATE:  RESPONDING  PROVIDER #:        Abhilash Martell MD          QUERY TEXT:    Pt admitted with Malignant hypertension.  Pt noted to have BP: 234/92 and was   placed on a Nicardipine gtt. If possible, please document in progress notes   and discharge summary if you are evaluating and/or treating any of the   following:    The medical record reflects the following:  Risk Factors: Hx. uncontrolled HTN  Clinical Indicators: BP: 234/92; 243/102; 255/83; 204/80; 243/65; 248/74     AP: \"Malignant hypertension\".  --Noted: \"She is currently reporting dizziness and lightheadedness\".    Treatment: IV Nicardipine gtt; Continue oral antihypertensives clonidine,   hydralazine, candesartan, Coreg    Thank you,    Isha Graf  CDI  [email or web address]    Hypertensive Urgency: Defined as SBP of >180 OR DBP >120 w/o associated organ   damage. S/s may or may not be present, but can include severe headache, SOB,   epistaxis, severe anxiety. Tx: adjustment of oral BP medication; IV meds not   usually required.  Hypertensive Emergency: SBP of >180 OR DBP >120 w/ associated organ damage   (CVA, MI, acute CHF, MOLLY, encephalopathy, pulmonary edema, and unstable   angina). Documentation should include specific organ affected.  Requires   immediate treatment, usually with IV meds.  Hypertensive Crisis, unspecified: SBP of > 180 OR DBP > 120 and includes   damage to blood vessels, including inflammation, leakage of fluid or blood and   can cause stroke, headache, heart failure and eclampsia.  Source: [employer] Training Guide and Quick Reference Guide  Options provided:  -- Hypertensive Urgency  -- Other - I will add my own diagnosis  -- Disagree - Not applicable / Not valid  -- Disagree - Clinically unable to determine / Unknown  -- Refer  to Clinical Documentation Reviewer    PROVIDER RESPONSE TEXT:    This patient has hypertensive urgency.    Query created by: Isha Graf on 7/12/2024 1:24 PM      QUERY TEXT:    Patient admitted with Malignant hypertension, noted to have atrial   fibrillation and is maintained on Eliquis. If possible, please document in   progress notes and discharge summary if you are evaluating and/or treating any   of the following:?  ?  The medical record reflects the following:  Risk Factors: 85 yo. F. w/ Hx. HTN; TRISTAN; PAF w/ chronic Eliquis use  Clinical Indicators:    7/11 AP: \"Atrial fibrillation\".    Treatment: Eliquis 5mg 2x Daily po    Thank you,    [email or web address]  Options provided:  -- Secondary hypercoagulable state in a patient with atrial fibrillation  -- Other - I will add my own diagnosis  -- Disagree - Not applicable / Not valid  -- Disagree - Clinically unable to determine / Unknown  -- Refer to Clinical Documentation Reviewer    PROVIDER RESPONSE TEXT:    This patient has secondary hypercoagulable state in a patient with atrial   fibrillation.    Query created by: Isha Graf on 7/12/2024 1:32 PM      Electronically signed by:  Abhilash Martell MD 7/12/2024 5:14 PM

## 2024-07-12 NOTE — PROGRESS NOTES
2229 Verbal shift change report given to Dominga RN (oncoming nurse) by Sebastian RN (offgoing nurse). Report included the following information Nurse Handoff Report, MAR, Recent Results, and Cardiac Rhythm NSR .     2315 Received pt from ER via stretcher. Patient was assisted into bed.Patient is A&O x 4. Patient's  is at the bedside.

## 2024-07-12 NOTE — PROGRESS NOTES
[facility]  [address] [place] [address] [phone]      Hospitalist Progress Note      NAME: Radha Wynn   :  1940  MRM:  [id number]    Date of service: 2024  2:36 PM       Assessment and Plan:   Hypertensive urgency.  BP better controlled now.  Off nicardipine drip.  Continue oral antihypertensives hydralazine, Coreg.  Holding valsartan due to worsening renal function.  May need to add amlodipine if blood pressure is not well-controlled.     2.  Chronic atrial fibrillation. Continue with amiodarone, Eliquis     3.  Hyperlipidemia. Continue Crestor and Zetia     4.  Depression and anxiety. Continue Lexapro     5.  Hypothyroidism. Continue Synthroid         Subjective:     Chief Complaint:: Patient was seen and examined as a follow up for hypertensive urgency.  Chart was reviewed.  Feels well    ROS:  (bold if positive, if negative)    Tolerating PT  Tolerating Diet        Objective:     Last 24hrs VS reviewed since prior progress note. Most recent are:    Vitals:    24 1412   BP: (!) 162/72   Pulse:    Resp:    Temp:    SpO2:      SpO2 Readings from Last 6 Encounters:   24 94%   24 96%   24 95%   24 91%   06/10/24 95%   24 96%          Intake/Output Summary (Last 24 hours) at 2024 1436  Last data filed at 2024 1245  Gross per 24 hour   Intake 740 ml   Output --   Net 740 ml        Physical Exam:    Gen:  Well-developed, well-nourished, in no acute distress  HEENT:  Pink conjunctivae, PERRL, hearing intact to voice, moist mucous membranes  Neck:  Supple, without masses, thyroid non-tender  Resp:  No accessory muscle use, clear breath sounds without wheezes rales or rhonchi  Card:  No murmurs, normal S1, S2 without thrills, bruits or peripheral edema  Abd:  Soft, non-tender, non-distended, normoactive bowel sounds are present, no palpable organomegaly and no detectable hernias  Lymph:  No cervical or  inguinal adenopathy  Musc:  No cyanosis or clubbing  Skin:  No rashes or ulcers, skin turgor is good  Neuro:  Cranial nerves are grossly intact, no focal motor weakness, follows commands appropriately  Psych:  Good insight, oriented to person, place and time, alert  __________________________________________________________________  Medications Reviewed: (see below)  Medications:     Current Facility-Administered Medications   Medication Dose Route Frequency    sodium chloride flush 0.9 % injection 5-40 mL  5-40 mL IntraVENous 2 times per day    sodium chloride flush 0.9 % injection 5-40 mL  5-40 mL IntraVENous PRN    0.9 % sodium chloride infusion   IntraVENous PRN    ondansetron (ZOFRAN-ODT) disintegrating tablet 4 mg  4 mg Oral Q8H PRN    Or    ondansetron (ZOFRAN) injection 4 mg  4 mg IntraVENous Q6H PRN    polyethylene glycol (GLYCOLAX) packet 17 g  17 g Oral Daily PRN    acetaminophen (TYLENOL) tablet 650 mg  650 mg Oral Q6H PRN    Or    acetaminophen (TYLENOL) suppository 650 mg  650 mg Rectal Q6H PRN    amiodarone (CORDARONE) tablet 200 mg  200 mg Oral Daily    apixaban (ELIQUIS) tablet 5 mg  5 mg Oral BID    [Held by provider] valsartan (DIOVAN) tablet 160 mg  160 mg Oral Daily    carvedilol (COREG) tablet 3.125 mg  3.125 mg Oral BID WC    cloNIDine (CATAPRES) tablet 0.1 mg  0.1 mg Oral Daily PRN    escitalopram (LEXAPRO) tablet 5 mg  5 mg Oral Daily    ezetimibe (ZETIA) tablet 10 mg  10 mg Oral QPM    hydrALAZINE (APRESOLINE) tablet 25 mg  25 mg Oral TID    levothyroxine (SYNTHROID) tablet 50 mcg  50 mcg Oral QAM AC    rosuvastatin (CRESTOR) tablet 5 mg  5 mg Oral Once per day on Mon Wed Fri    Vitamin D (CHOLECALCIFEROL) tablet 5,000 Units  5,000 Units Oral Daily        Lab Data Reviewed: (see below)  Lab Review:     Recent Labs     07/11/24  1234   WBC 5.9   HGB 12.7   HCT 39.3        Recent Labs     07/11/24  1234 07/12/24  0021    138   K 4.0 4.3    112*   CO2 30 19*   BUN 12 29*

## 2024-07-13 VITALS
SYSTOLIC BLOOD PRESSURE: 119 MMHG | DIASTOLIC BLOOD PRESSURE: 61 MMHG | BODY MASS INDEX: 32.25 KG/M2 | OXYGEN SATURATION: 96 % | WEIGHT: 175.27 LBS | RESPIRATION RATE: 22 BRPM | HEART RATE: 63 BPM | TEMPERATURE: 97.4 F | HEIGHT: 62 IN

## 2024-07-13 LAB
ANION GAP SERPL CALC-SCNC: 3 MMOL/L (ref 5–15)
BUN SERPL-MCNC: 14 MG/DL (ref 6–20)
BUN/CREAT SERPL: 16 (ref 12–20)
CALCIUM SERPL-MCNC: 8.7 MG/DL (ref 8.5–10.1)
CHLORIDE SERPL-SCNC: 108 MMOL/L (ref 97–108)
CO2 SERPL-SCNC: 30 MMOL/L (ref 21–32)
COMMENT:: NORMAL
CREAT SERPL-MCNC: 0.89 MG/DL (ref 0.55–1.02)
GLUCOSE SERPL-MCNC: 102 MG/DL (ref 65–100)
POTASSIUM SERPL-SCNC: 3.7 MMOL/L (ref 3.5–5.1)
SODIUM SERPL-SCNC: 141 MMOL/L (ref 136–145)
SPECIMEN HOLD: NORMAL

## 2024-07-13 PROCEDURE — 94761 N-INVAS EAR/PLS OXIMETRY MLT: CPT

## 2024-07-13 PROCEDURE — 36415 COLL VENOUS BLD VENIPUNCTURE: CPT

## 2024-07-13 PROCEDURE — 6370000000 HC RX 637 (ALT 250 FOR IP): Performed by: INTERNAL MEDICINE

## 2024-07-13 PROCEDURE — 6370000000 HC RX 637 (ALT 250 FOR IP): Performed by: FAMILY MEDICINE

## 2024-07-13 PROCEDURE — 80048 BASIC METABOLIC PNL TOTAL CA: CPT

## 2024-07-13 PROCEDURE — G0378 HOSPITAL OBSERVATION PER HR: HCPCS

## 2024-07-13 PROCEDURE — 2580000003 HC RX 258: Performed by: FAMILY MEDICINE

## 2024-07-13 RX ORDER — HYDRALAZINE HYDROCHLORIDE 25 MG/1
50 TABLET, FILM COATED ORAL 3 TIMES DAILY
Status: DISCONTINUED | OUTPATIENT
Start: 2024-07-13 | End: 2024-07-13 | Stop reason: HOSPADM

## 2024-07-13 RX ORDER — HYDRALAZINE HYDROCHLORIDE 50 MG/1
50 TABLET, FILM COATED ORAL 3 TIMES DAILY
Qty: 90 TABLET | Refills: 3 | Status: SHIPPED | OUTPATIENT
Start: 2024-07-13

## 2024-07-13 RX ADMIN — VALSARTAN 160 MG: 80 TABLET ORAL at 08:16

## 2024-07-13 RX ADMIN — Medication 5000 UNITS: at 08:22

## 2024-07-13 RX ADMIN — SODIUM CHLORIDE, PRESERVATIVE FREE 10 ML: 5 INJECTION INTRAVENOUS at 08:29

## 2024-07-13 RX ADMIN — ESCITALOPRAM OXALATE 5 MG: 10 TABLET ORAL at 08:17

## 2024-07-13 RX ADMIN — AMIODARONE HYDROCHLORIDE 200 MG: 200 TABLET ORAL at 08:16

## 2024-07-13 RX ADMIN — HYDRALAZINE HYDROCHLORIDE 50 MG: 25 TABLET ORAL at 08:15

## 2024-07-13 RX ADMIN — APIXABAN 5 MG: 5 TABLET, FILM COATED ORAL at 08:16

## 2024-07-13 RX ADMIN — LEVOTHYROXINE SODIUM 50 MCG: 0.05 TABLET ORAL at 05:47

## 2024-07-13 NOTE — PROGRESS NOTES
[facility]  [address] [place] [address] [phone]      Hospitalist Progress Note      NAME: Radha Wynn   :  1940  MRM:  [id number]    Date of service: 2024  8:32 AM       Assessment and Plan:   Hypertensive urgency.  BP better controlled now.  Off nicardipine drip.  Continue oral antihypertensives hydralazine(increased dose),  valsartan. Stopped coreg due to bradycardia.      2.  Chronic atrial fibrillation. Continue with amiodarone, Eliquis     3.  Hyperlipidemia. Continue Crestor and Zetia     4.  Depression and anxiety. Continue Lexapro     5.  Hypothyroidism. Continue Synthroid    6.  Bradycardia. Pt and family are concerned about bradycardia reaching in the 40s at home. Stopped coreg an advised to se her cardiology          Subjective:     Chief Complaint:: Patient was seen and examined as a follow up for hypertensive urgency.  Chart was reviewed.  concerned about low HR.     ROS:  (bold if positive, if negative)    Tolerating PT  Tolerating Diet        Objective:     Last 24hrs VS reviewed since prior progress note. Most recent are:    Vitals:    24 0724   BP: (!) 174/73   Pulse: 66   Resp: 22   Temp: 97.4 °F (36.3 °C)   SpO2: 92%     SpO2 Readings from Last 6 Encounters:   24 92%   24 96%   24 95%   24 91%   06/10/24 95%   24 96%          Intake/Output Summary (Last 24 hours) at 2024 0832  Last data filed at 2024 2340  Gross per 24 hour   Intake 700 ml   Output --   Net 700 ml          Physical Exam:    Gen:  Well-developed, well-nourished, in no acute distress  HEENT:  Pink conjunctivae, PERRL, hearing intact to voice, moist mucous membranes  Neck:  Supple, without masses, thyroid non-tender  Resp:  No accessory muscle use, clear breath sounds without wheezes rales or rhonchi  Card:  No murmurs, normal S1, S2 without thrills, bruits or peripheral edema  Abd:  Soft, non-tender, non-distended,        Recent Labs     07/11/24  1234 07/12/24  0021 07/13/24  0101    138 141   K 4.0 4.3 3.7    112* 108   CO2 30 19* 30   BUN 12 29* 14   MG 2.2  --   --    ALT 23  --   --        Lab Results   Component Value Date/Time    GLUCPOC 116 09/07/2020 02:26 AM     No results for input(s): \"PH\", \"PCO2\", \"PO2\", \"HCO3\", \"FIO2\" in the last 72 hours.  No results for input(s): \"INR\" in the last 72 hours.  [unfilled]    I have reviewed notes of prior 24hr.    Other pertinent lab:     Total time: -35- minutes. I personally saw and examined the patient during this time period.  Greater than 50% of this time was spent in counseling and coordination of care.    I personally reviewed chart, notes, data and current medications in the medical record.  I have personally examined and treated the patient at bedside during this period.                 Care Plan discussed with: Patient, Nursing Staff, and >50% of time spent in counseling and coordination of care    Discussed:  Care Plan    Prophylaxis:    Eliquis    Disposition:  Home w/Family           ___________________________________________________    Attending Physician: Abhilash Schultz MD

## 2024-07-13 NOTE — DISCHARGE SUMMARY
Hospitalist Discharge Summary     Patient ID:    Radha Wynn  [id number]  84 y.o.  1940    Admit date of service: 7/11/2024    Discharge date of service: 7/13/2024    Admission Diagnoses: Malignant hypertension [I10]  Hypertensive urgency [I16.0]    Chronic Diagnoses:      Discharge Medications:   Current Discharge Medication List        CONTINUE these medications which have CHANGED    Details   hydrALAZINE (APRESOLINE) 50 MG tablet Take 1 tablet by mouth 3 times daily  Qty: 90 tablet, Refills: 3           CONTINUE these medications which have NOT CHANGED    Details   rosuvastatin (CRESTOR) 5 MG tablet Take 1 tablet by mouth three times a week  Qty: 30 tablet, Refills: 5      amiodarone (PACERONE) 200 MG tablet Take 2 tablets by mouth daily 400 mg daily x 2 weeks, then decrease to 200 mg daily thereafter  Qty: 45 tablet, Refills: 5      cloNIDine (CATAPRES) 0.1 MG tablet Take 1 tablet by mouth daily as needed for High Blood Pressure  Qty: 30 tablet, Refills: 5      Multiple Vitamins-Minerals (CENTRUM SILVER PO) Take by mouth      vitamin D (CHOLECALCIFEROL) 125 MCG (5000 UT) CAPS capsule Take 1 capsule by mouth daily      apixaban (ELIQUIS) 5 MG TABS tablet Take 1 tablet by mouth 2 times daily  Qty: 180 tablet, Refills: 1    Associated Diagnoses: Atrial fibrillation, unspecified type (HCC)      candesartan (ATACAND) 16 MG tablet Take 1 tablet by mouth daily  Qty: 90 tablet, Refills: 3    Comments: NOTE decreased tablet size.      Calcium Carbonate-Vitamin D (OYSTER SHELL CALCIUM/D) 500-5 MG-MCG TABS Take 1 tablet by mouth every evening      escitalopram (LEXAPRO) 5 MG tablet Take 1 tablet by mouth daily      ezetimibe (ZETIA) 10 MG tablet Take 1 tablet by mouth every evening      levothyroxine (SYNTHROID) 50 MCG tablet Take 1 tablet by mouth every morning (before breakfast)           STOP taking these medications       carvedilol (COREG) 3.125 MG tablet Comments:   Reason for Stopping:               Follow

## 2024-07-13 NOTE — PROGRESS NOTES
1900  Bedside shift change report given to Sue (oncoming nurse) by MINDA Snyder (offgoing nurse). Report included the following information SBAR, Kardex, ED Summary, Intake/Output, and Recent Results.    2010  Concerns for giving PRN clonidine for elevated BP due to HR being in the 60s. Per NP give scheduled dose of hydralazine and re-check.     2215  Patient's BP still elevated. NP notified. Orders received.        0700  Bedside shift change report given to MINDA Royal (oncoming nurse) by Sue (offgoing nurse). Report included the following information SBAR, Kardex, ED Summary, Intake/Output, and Recent Results.         This patient was assisted with Intentional Toileting every 2 hours during this shift as appropriate.  Documentation of ambulation and output reflected on Flowsheet as appropriate.  Purposeful hourly rounding was completed using AIDET and 5Ps.  Outcomes of PHR documented as they occurred. Bed alarm in use as appropriate.  Dual Suction and ambubag in place.

## 2024-07-13 NOTE — DISCHARGE INSTRUCTIONS
HOSPITALIST DISCHARGE INSTRUCTIONS  NAME:  Radha Wynn   :  1940   MRN:  [id number]     Date/Time:  2024 8:39 AM    ADMIT DATE: 2024     DISCHARGE DATE: 2024     DISCHARGE DIAGNOSIS:  Uncontrolled hypertension    DISCHARGE INSTRUCTIONS:  Thank you for allowing us to participate in your care. Your discharging Hospitalist is Abhilash Schultz MD. You were admitted for evaluation and treatment of the above.       MEDICATIONS:    It is important that you take the medication exactly as they are prescribed.   Keep your medication in the bottles provided by the pharmacist and keep a list of the medication names, dosages, and times to be taken in your wallet.   Do not take other medications without consulting your doctor.             If you experience any of the following symptoms then please call your primary care physician or return to the emergency room if you cannot get hold of your doctor:  Fever, chills, nausea, vomiting, diarrhea, change in mentation, falling, bleeding, shortness of breath    Follow Up:  Please call the below provider to arrange hospital follow up appointment      No follow-up provider specified.    For questions regarding your Hospitalization or to contact the Hospital Medicine team, please call [phone].      Information obtained by :  I understand that if any problems occur once I am at home I am to contact my physician.    I understand and acknowledge receipt of the instructions indicated above.                                                                                                                                           Physician's or R.N.'s Signature                                                                  Date/Time                                                                                                                                              Patient or Representative Signature                                                           Date/Time

## 2024-07-17 ENCOUNTER — ANCILLARY PROCEDURE (OUTPATIENT)
Age: 84
End: 2024-07-17
Payer: MEDICARE

## 2024-07-17 VITALS
SYSTOLIC BLOOD PRESSURE: 119 MMHG | HEIGHT: 62 IN | WEIGHT: 175 LBS | BODY MASS INDEX: 32.2 KG/M2 | DIASTOLIC BLOOD PRESSURE: 61 MMHG

## 2024-07-17 DIAGNOSIS — I10 ESSENTIAL HYPERTENSION: ICD-10-CM

## 2024-07-17 PROCEDURE — 93306 TTE W/DOPPLER COMPLETE: CPT | Performed by: INTERNAL MEDICINE

## 2024-07-19 ENCOUNTER — TELEPHONE (OUTPATIENT)
Age: 84
End: 2024-07-19

## 2024-07-19 NOTE — TELEPHONE ENCOUNTER
Patient is calling because she said she believes that Amiodarone is causing some problems for her.Patient said she is experiencing some SOB, and chest pain and tightness,vision problems from taking this medications.Please advise    [phone]

## 2024-07-19 NOTE — TELEPHONE ENCOUNTER
Amy Toth, APRGEORGIANA - CNP      Yes, let's have her stop Amiodarone. We could offer to bring her in sooner with EP NP to discuss other options (repeat ablation). Thanks!       Called patient, ID verified using two patient identifiers.  Notified of recommendations above.  Patient agreeable and appointment scheduled.  Medication discontinued.  Advised her that it may take some time for the amiodarone to get out of her system.  Patient verbalized understanding and will call back with any other questions or concerns.    Future Appointments   Date Time Provider Department Center   7/29/2024  9:20 AM Sara Peña APRN - NP [facility] AMB   8/7/2024  8:30 PM BEDROOM [address] [place] [address] Sleep Lab    9/5/2024  2:20 PM Cata Hernandez APRN - NP [facility] AMB   10/16/2024  1:20 PM Michelle Rothman MD [facility] AMB

## 2024-07-21 LAB
ECHO AO ASC DIAM: 4 CM
ECHO AO ASCENDING AORTA INDEX: 2.21 CM/M2
ECHO AO ROOT DIAM: 3.2 CM
ECHO AO ROOT INDEX: 1.77 CM/M2
ECHO AR MAX VEL PISA: 3.8 M/S
ECHO AV MEAN GRADIENT: 7 MMHG
ECHO AV MEAN VELOCITY: 1.2 M/S
ECHO AV PEAK GRADIENT: 18 MMHG
ECHO AV PEAK VELOCITY: 2.1 M/S
ECHO AV REGURGITANT PHT: 460.6 MILLISECOND
ECHO AV VELOCITY RATIO: 0.76
ECHO AV VTI: 38.4 CM
ECHO BSA: 1.86 M2
ECHO EST RA PRESSURE: 3 MMHG
ECHO LA DIAMETER INDEX: 2.1 CM/M2
ECHO LA DIAMETER: 3.8 CM
ECHO LA TO AORTIC ROOT RATIO: 1.19
ECHO LA VOL A-L A2C: 68 ML (ref 22–52)
ECHO LA VOL A-L A4C: 79 ML (ref 22–52)
ECHO LA VOL BP: 76 ML (ref 22–52)
ECHO LA VOL MOD A2C: 66 ML (ref 22–52)
ECHO LA VOL MOD A4C: 75 ML (ref 22–52)
ECHO LA VOL/BSA BIPLANE: 42 ML/M2 (ref 16–34)
ECHO LA VOLUME AREA LENGTH: 79 ML
ECHO LA VOLUME INDEX A-L A2C: 38 ML/M2 (ref 16–34)
ECHO LA VOLUME INDEX A-L A4C: 44 ML/M2 (ref 16–34)
ECHO LA VOLUME INDEX AREA LENGTH: 44 ML/M2 (ref 16–34)
ECHO LA VOLUME INDEX MOD A2C: 36 ML/M2 (ref 16–34)
ECHO LA VOLUME INDEX MOD A4C: 41 ML/M2 (ref 16–34)
ECHO LV E' LATERAL VELOCITY: 5 CM/S
ECHO LV E' SEPTAL VELOCITY: 5 CM/S
ECHO LV EDV A2C: 46 ML
ECHO LV EDV A4C: 47 ML
ECHO LV EDV BP: 48 ML (ref 56–104)
ECHO LV EDV INDEX A4C: 26 ML/M2
ECHO LV EDV INDEX BP: 27 ML/M2
ECHO LV EDV NDEX A2C: 25 ML/M2
ECHO LV EJECTION FRACTION A2C: 70 %
ECHO LV EJECTION FRACTION A4C: 74 %
ECHO LV EJECTION FRACTION BIPLANE: 73 % (ref 55–100)
ECHO LV ESV A2C: 14 ML
ECHO LV ESV A4C: 12 ML
ECHO LV ESV BP: 13 ML (ref 19–49)
ECHO LV ESV INDEX A2C: 8 ML/M2
ECHO LV ESV INDEX A4C: 7 ML/M2
ECHO LV ESV INDEX BP: 7 ML/M2
ECHO LV FRACTIONAL SHORTENING: 43 % (ref 28–44)
ECHO LV INTERNAL DIMENSION DIASTOLE INDEX: 2.43 CM/M2
ECHO LV INTERNAL DIMENSION DIASTOLIC: 4.4 CM (ref 3.9–5.3)
ECHO LV INTERNAL DIMENSION SYSTOLIC INDEX: 1.38 CM/M2
ECHO LV INTERNAL DIMENSION SYSTOLIC: 2.5 CM
ECHO LV IVSD: 1.1 CM (ref 0.6–0.9)
ECHO LV MASS 2D: 158.2 G (ref 67–162)
ECHO LV MASS INDEX 2D: 87.4 G/M2 (ref 43–95)
ECHO LV POSTERIOR WALL DIASTOLIC: 1 CM (ref 0.6–0.9)
ECHO LV RELATIVE WALL THICKNESS RATIO: 0.45
ECHO LVOT AV VTI INDEX: 0.82
ECHO LVOT MEAN GRADIENT: 4 MMHG
ECHO LVOT PEAK GRADIENT: 11 MMHG
ECHO LVOT PEAK VELOCITY: 1.6 M/S
ECHO LVOT VTI: 31.3 CM
ECHO MV A VELOCITY: 0.58 M/S
ECHO MV AREA PHT: 3.3 CM2
ECHO MV E DECELERATION TIME (DT): 231.2 MS
ECHO MV E VELOCITY: 1.31 M/S
ECHO MV E/A RATIO: 2.26
ECHO MV E/E' LATERAL: 26.2
ECHO MV E/E' RATIO (AVERAGED): 26.2
ECHO MV E/E' SEPTAL: 26.2
ECHO MV PRESSURE HALF TIME (PHT): 67.1 MS
ECHO RA AREA 4C: 15.7 CM2
ECHO RA END SYSTOLIC VOLUME APICAL 4 CHAMBER INDEX BSA: 22 ML/M2
ECHO RA VOLUME AREA LENGTH APICAL 4 CHAMBER: 41 ML
ECHO RA VOLUME: 40 ML
ECHO RIGHT VENTRICULAR SYSTOLIC PRESSURE (RVSP): 67 MMHG
ECHO RV FREE WALL PEAK S': 16 CM/S
ECHO RV INTERNAL DIMENSION: 3.6 CM
ECHO RV TAPSE: 2.7 CM (ref 1.7–?)
ECHO TV REGURGITANT MAX VELOCITY: 3.99 M/S
ECHO TV REGURGITANT PEAK GRADIENT: 64 MMHG

## 2024-07-21 PROCEDURE — 93306 TTE W/DOPPLER COMPLETE: CPT | Performed by: INTERNAL MEDICINE

## 2024-07-29 ENCOUNTER — OFFICE VISIT (OUTPATIENT)
Age: 84
End: 2024-07-29
Payer: MEDICARE

## 2024-07-29 VITALS
WEIGHT: 174.4 LBS | HEIGHT: 62 IN | BODY MASS INDEX: 32.09 KG/M2 | OXYGEN SATURATION: 97 % | SYSTOLIC BLOOD PRESSURE: 156 MMHG | DIASTOLIC BLOOD PRESSURE: 74 MMHG | HEART RATE: 66 BPM

## 2024-07-29 DIAGNOSIS — Z79.01 ANTICOAGULATED: ICD-10-CM

## 2024-07-29 DIAGNOSIS — I10 ESSENTIAL HYPERTENSION: Primary | ICD-10-CM

## 2024-07-29 DIAGNOSIS — I27.20 PULMONARY HYPERTENSION (HCC): ICD-10-CM

## 2024-07-29 DIAGNOSIS — I48.19 PERSISTENT ATRIAL FIBRILLATION (HCC): ICD-10-CM

## 2024-07-29 DIAGNOSIS — G47.33 OBSTRUCTIVE SLEEP APNEA (ADULT) (PEDIATRIC): ICD-10-CM

## 2024-07-29 DIAGNOSIS — Z86.79 S/P ABLATION OF ATRIAL FIBRILLATION: ICD-10-CM

## 2024-07-29 DIAGNOSIS — Z98.890 S/P ABLATION OF ATRIAL FIBRILLATION: ICD-10-CM

## 2024-07-29 PROCEDURE — 1036F TOBACCO NON-USER: CPT

## 2024-07-29 PROCEDURE — G8417 CALC BMI ABV UP PARAM F/U: HCPCS

## 2024-07-29 PROCEDURE — G8400 PT W/DXA NO RESULTS DOC: HCPCS

## 2024-07-29 PROCEDURE — 1090F PRES/ABSN URINE INCON ASSESS: CPT

## 2024-07-29 PROCEDURE — 99215 OFFICE O/P EST HI 40 MIN: CPT

## 2024-07-29 PROCEDURE — 3077F SYST BP >= 140 MM HG: CPT

## 2024-07-29 PROCEDURE — G8427 DOCREV CUR MEDS BY ELIG CLIN: HCPCS

## 2024-07-29 PROCEDURE — 1123F ACP DISCUSS/DSCN MKR DOCD: CPT

## 2024-07-29 PROCEDURE — 3078F DIAST BP <80 MM HG: CPT

## 2024-07-29 PROCEDURE — 1111F DSCHRG MED/CURRENT MED MERGE: CPT

## 2024-07-29 RX ORDER — METOPROLOL SUCCINATE 25 MG/1
12.5 TABLET, EXTENDED RELEASE ORAL DAILY
Qty: 45 TABLET | Refills: 1 | Status: SHIPPED | OUTPATIENT
Start: 2024-07-29

## 2024-07-29 NOTE — PROGRESS NOTES
CARDIOLOGY OFFICE NOTE    [address] [facility], [place] [address]  Phone [phone]; Fax [phone]    Primary care: Neena Hammer MD       Radha Wynn is a 84 y.o. female with  referred for  shortness of breath and hypertension, paroxysmal atrial fibrillation status post ablation, SVT            Cardiac risk factors: hypertension, post-menopausal   I have personally obtained the history from the patient.        HISTORY OF PRESENTING ILLNESS   Radha Wynn  84 y.o. female presents today for follow-up. She has had multiple ER visits for uncontrolled HTN.   Most recently admitted to [facility] 7/11-7/13/24 with hypertensive urgency. Medications were adjusted. Coreg was stopped due to bradycardia.   Says BP at home fluctuates. Says up to the 170s and down to 120s systolic. Takes clonidine sometimes.    Recently stopped taking amiodarone due to vision issues, but thinks she is taking half her original dose ?   Denies chest pain. Continues with shortness of breath. Said she has bilateral hiatal hernias and concerned this is causing this. Her recent echo with severely elevated pulmonary pressures. She is not wearing her CPAP so encouraged this.      ACTIVE PROBLEM LIST     Patient Active Problem List    Diagnosis Date Noted    Atrial fibrillation (HCC) 08/30/2023    Hypertensive urgency 05/17/2024    Chest pain 09/24/2023    High risk medication use 10/30/2022    Anticoagulation adequate 10/20/2022    Sinus bradycardia 05/12/2020    Anxiety 07/22/2018    Severe obesity (BMI 35.0-39.9) with comorbidity (HCC) 05/25/2018    SVT (supraventricular tachycardia) (HCC) 08/28/2017    Hiatal hernia 08/28/2017    Venous insufficiency 05/04/2017    Acquired hypothyroidism 05/01/2017    S/P ablation of atrial fibrillation 04/28/2017    Paroxysmal atrial fibrillation (HCC) 08/28/2016    Essential hypertension 07/26/2016    Gastroesophageal reflux disease without esophagitis 07/03/2016

## 2024-07-29 NOTE — PROGRESS NOTES
Chief Complaint   Patient presents with    Follow-Up from Hospital     7/11-7/13 [facility]     Atrial Fibrillation    Hypertension    Anticoagulation     Vitals:    07/29/24 0907 07/29/24 0916   BP: (!) 174/68 (!) 176/80   Site: Left Upper Arm Right Upper Arm   Position: Sitting Sitting   Pulse: 66    SpO2: 97%    Weight: 79.1 kg (174 lb 6.4 oz)    Height: 1.575 m (5' 2\")          Chest pain: DENIED     Recent hospital stays: DENIED     Refills: DENIED

## 2024-07-29 NOTE — PATIENT INSTRUCTIONS
Please begin taking metoprolol once daily 12.5 mg daily. Continue to monitor your heart rate.     Please continue to monitor blood pressure at home.   Please bring your BP cuff and your chart from home.     Parameters:  Systolic (top number) 100-160  Diastolic (bottom number) 50-99  Heart rate       How to get your blood pressure checked:   Before  During  After    In the 30 minutes before your BP is taken:   NO Smoking   NO Caffeine   NO Exercise  Make sure the cuff is the right size and in the right place.  Wait 5 minutes and retake your BP if needed. In case of machine error.    Keep your cuffed arm on a flat surface, like a table, and at heart level. Cuff should be at heart level not your arm Keep a log and bring it to every check up.    Sit STILL for 5 minutes prior to taking your BP.  Sit upright, back straight, feet flat on the floor.        Do not check your blood pressure more then once at a time, repeated attempts will only increase the numbers. Only take your B/P twice a day at least an hour after taking your medication. Preferably once in the morning and once in the evening. If you get 3 or more consecutive readings outside of these parameters or have symptoms please let us know so that we can adjust your medication.      If you recently have started, stopped, or changed a medication dosage please give your body at least a week or two to get use to the change.    Avoid high-sodium foods  Avoid eating:  Smoked, cured, salted, and canned meat, fish, and poultry.  Ham, vinson, hot dogs, and luncheon meats.  Regular, hard, and processed cheese and regular peanut butter.  Crackers with salted tops, and other salted snack foods such as pretzels, chips, and salted popcorn.  Frozen prepared meals, unless labeled low-sodium.  Canned and dried soups, broths, and bouillon, unless labeled sodium-free or low-sodium.  Canned vegetables, unless labeled sodium-free or low-sodium.  French fries, pizza, tacos, and

## 2024-08-07 ENCOUNTER — HOSPITAL ENCOUNTER (OUTPATIENT)
Facility: HOSPITAL | Age: 84
Discharge: HOME | End: 2024-08-10
Payer: MEDICARE

## 2024-08-07 VITALS
HEIGHT: 62 IN | SYSTOLIC BLOOD PRESSURE: 188 MMHG | WEIGHT: 174 LBS | RESPIRATION RATE: 20 BRPM | OXYGEN SATURATION: 95 % | HEART RATE: 58 BPM | BODY MASS INDEX: 32.02 KG/M2 | TEMPERATURE: 97.9 F | DIASTOLIC BLOOD PRESSURE: 76 MMHG

## 2024-08-07 DIAGNOSIS — G47.33 OBSTRUCTIVE SLEEP APNEA (ADULT) (PEDIATRIC): ICD-10-CM

## 2024-08-07 PROCEDURE — 95811 POLYSOM 6/>YRS CPAP 4/> PARM: CPT | Performed by: INTERNAL MEDICINE

## 2024-08-12 ENCOUNTER — TELEPHONE (OUTPATIENT)
Age: 84
End: 2024-08-12

## 2024-08-12 NOTE — TELEPHONE ENCOUNTER
Fax received from PCP.  \"Pt's HR 68-71 and hasn't yet started Toprol.  Home BP's in good range.  Should she take Toprol?  Asked pt to contact your office to discuss.\"    Spoke to pt,    Confirmed she is no longer taking Amiodarone.  Has been taking Hydralazine TID for BP, SBP mostly 124-144 in mornings.    Confirmed pt tried taking Toprol for a few days, but it brought her pulse down around 50 and she started feeling really fatigued, so she stopped it.    Will discuss at NOV; asked her to bring BP log.  Future Appointments   Date Time Provider Department Center   9/3/2024 11:20 AM Sara Peña APRN - NP [facility] AMB   9/5/2024  2:20 PM Cata Hernandez APRN - NP [facility] AMB   10/16/2024  1:20 PM Michelle Rothman MD [facility] AMB

## 2024-08-21 ENCOUNTER — CLINICAL DOCUMENTATION (OUTPATIENT)
Age: 84
End: 2024-08-21

## 2024-08-21 DIAGNOSIS — G47.33 OBSTRUCTIVE SLEEP APNEA (ADULT) (PEDIATRIC): Primary | ICD-10-CM

## 2024-08-21 NOTE — PROGRESS NOTES
Updated order faxed to [employer]. Per Destiny patient just got a new device from an order that her PCP had done.    Anusha

## 2024-08-21 NOTE — PROGRESS NOTES
Results of sleep study in [facility]  Lead tech to convey results to patient    (Saw CHELSIE Vo-CHANCE in clinic)     PAP titration was performed to optimize airflow settings to control her apnea/respiratory events. It was found that a setting of 12 cmH20 adequately controlled her respiratory events. Oxygen saturation was maintained at or above 90% at this setting.   She appeared to tolerate well.  Chart reviewed.    She was having difficult time tolerating APAP at home and was not meeting compliance.  She had mentioned that airflow felt too high.  Now during the sleep study she did tolerate settings of 12 very well when she was asleep.  I will have our technical staff adjust settings on her APAP to 4 to 12 cm.  That weight will start slow and will go up only when asleep.  It will adjust up to necessary pressure to a max of 12 cm during sleep.    Follow-up within 90 days for first adherence to satisfy Medicare requirements.  I am resending order for APAP although patient still has at home to reset the first adherence.    Staff to schedule follow      Staff to change settings to 4-12 cmH20

## 2024-08-30 ENCOUNTER — TELEPHONE (OUTPATIENT)
Age: 84
End: 2024-08-30

## 2024-08-30 NOTE — TELEPHONE ENCOUNTER
Enrolled with [employer] - Ordered and being shipped to patient's home address on file.  ETA within 5-7 business days.        RE: 14 day holter  Received: Today  Annette Benites Carrie; Annette Benites         Previous Messages       ----- Message -----  From: Mariana Reyes RN  Sent: 9/26/2023   3:05 PM EDT  To: nAnette Benites  Subject: 14 day holter                                    Please order 14 day holter per Dr. Rothman in 11 MONTHS dx: A fib

## 2024-09-04 ENCOUNTER — TELEPHONE (OUTPATIENT)
Age: 84
End: 2024-09-04

## 2024-09-04 NOTE — TELEPHONE ENCOUNTER
Patient called stating that she received a heart monitor. Patient wants to know if the monitor is needed being that the patient had one back in April.     # [phone]

## 2024-09-05 NOTE — TELEPHONE ENCOUNTER
Ally Duran APRN - NP       I would because last monitor showed AF and she required cardioversion. Good to assess new baseline.     Called patient, ID verified using two patient identifiers.  Notified of above.  Advised her to go ahead and wear her monitor so we have the information for her appointment next month with Dr. Rothman.    Rescheduled CHANCE Ruiz to coincide with Dr. Rothman's appointment.    Patient verbalized understanding and will call back with any other questions or concerns.    Future Appointments   Date Time Provider Department Center   10/16/2024  1:00 PM Sara Peña APRN - NP [facility] AMB   10/16/2024  1:20 PM Michelle Rothman MD [facility] AMB

## 2024-10-16 ENCOUNTER — OFFICE VISIT (OUTPATIENT)
Age: 84
End: 2024-10-16
Payer: MEDICARE

## 2024-10-16 VITALS
DIASTOLIC BLOOD PRESSURE: 60 MMHG | SYSTOLIC BLOOD PRESSURE: 118 MMHG | WEIGHT: 171 LBS | OXYGEN SATURATION: 98 % | BODY MASS INDEX: 31.47 KG/M2 | HEART RATE: 71 BPM | HEIGHT: 62 IN

## 2024-10-16 DIAGNOSIS — J18.9 PNEUMONIA OF BOTH LUNGS DUE TO INFECTIOUS ORGANISM, UNSPECIFIED PART OF LUNG: ICD-10-CM

## 2024-10-16 DIAGNOSIS — I47.10 SVT (SUPRAVENTRICULAR TACHYCARDIA) (HCC): ICD-10-CM

## 2024-10-16 DIAGNOSIS — I10 ESSENTIAL HYPERTENSION: ICD-10-CM

## 2024-10-16 DIAGNOSIS — Z79.01 ANTICOAGULATION ADEQUATE: ICD-10-CM

## 2024-10-16 DIAGNOSIS — I48.0 PAROXYSMAL ATRIAL FIBRILLATION (HCC): Primary | ICD-10-CM

## 2024-10-16 DIAGNOSIS — E03.9 ACQUIRED HYPOTHYROIDISM: ICD-10-CM

## 2024-10-16 PROCEDURE — 3074F SYST BP LT 130 MM HG: CPT | Performed by: INTERNAL MEDICINE

## 2024-10-16 PROCEDURE — G8427 DOCREV CUR MEDS BY ELIG CLIN: HCPCS | Performed by: INTERNAL MEDICINE

## 2024-10-16 PROCEDURE — 1036F TOBACCO NON-USER: CPT | Performed by: INTERNAL MEDICINE

## 2024-10-16 PROCEDURE — 1123F ACP DISCUSS/DSCN MKR DOCD: CPT | Performed by: INTERNAL MEDICINE

## 2024-10-16 PROCEDURE — 99214 OFFICE O/P EST MOD 30 MIN: CPT | Performed by: INTERNAL MEDICINE

## 2024-10-16 PROCEDURE — G8417 CALC BMI ABV UP PARAM F/U: HCPCS | Performed by: INTERNAL MEDICINE

## 2024-10-16 PROCEDURE — 1090F PRES/ABSN URINE INCON ASSESS: CPT | Performed by: INTERNAL MEDICINE

## 2024-10-16 PROCEDURE — G8400 PT W/DXA NO RESULTS DOC: HCPCS | Performed by: INTERNAL MEDICINE

## 2024-10-16 PROCEDURE — G8484 FLU IMMUNIZE NO ADMIN: HCPCS | Performed by: INTERNAL MEDICINE

## 2024-10-16 PROCEDURE — 3078F DIAST BP <80 MM HG: CPT | Performed by: INTERNAL MEDICINE

## 2024-10-16 RX ORDER — AZITHROMYCIN 250 MG/1
TABLET, FILM COATED ORAL
Qty: 6 TABLET | Refills: 0 | Status: SHIPPED | OUTPATIENT
Start: 2024-10-16 | End: 2024-10-26

## 2024-10-16 NOTE — PATIENT INSTRUCTIONS
Start Azithromycin 500 mg on day 1, followed by 250 mg once daily for 4 days     FU with PCP if your respiratory symptoms persist.    FU with 1 year with 1 week monitor - this will be mailed to you

## 2024-10-16 NOTE — PROGRESS NOTES
had concerns including Atrial Fibrillation.    Vitals:    10/16/24 1329   BP: 118/60   Site: Left Upper Arm   Position: Sitting   Pulse: 71   SpO2: 98%   Weight: 77.6 kg (171 lb)   Height: 1.575 m (5' 2\")        Chest pain No    Refills No        1. Have you been to the ER, urgent care clinic since your last visit? No       Hospitalized since your last visit? No       Where?        Date?  
[facility]                         [place] [address] [phone]     [address] [facility]  [place] [address] [phone]

## 2024-11-12 ENCOUNTER — APPOINTMENT (OUTPATIENT)
Facility: HOSPITAL | Age: 84
DRG: 480 | End: 2024-11-12
Payer: MEDICARE

## 2024-11-12 ENCOUNTER — HOSPITAL ENCOUNTER (INPATIENT)
Facility: HOSPITAL | Age: 84
LOS: 9 days | Discharge: NURSING, REHAB OR LONG-TERM CARE | DRG: 480 | End: 2024-11-21
Attending: EMERGENCY MEDICINE | Admitting: HOSPITALIST
Payer: MEDICARE

## 2024-11-12 DIAGNOSIS — I48.91 ATRIAL FIBRILLATION, UNSPECIFIED TYPE (HCC): ICD-10-CM

## 2024-11-12 DIAGNOSIS — J96.01 ACUTE RESPIRATORY FAILURE WITH HYPOXIA: ICD-10-CM

## 2024-11-12 DIAGNOSIS — I50.9 CONGESTIVE HEART FAILURE, UNSPECIFIED HF CHRONICITY, UNSPECIFIED HEART FAILURE TYPE (HCC): ICD-10-CM

## 2024-11-12 DIAGNOSIS — Z79.01 ANTICOAGULATED: ICD-10-CM

## 2024-11-12 DIAGNOSIS — S72.002A CLOSED LEFT HIP FRACTURE, INITIAL ENCOUNTER (HCC): Primary | ICD-10-CM

## 2024-11-12 LAB
ALBUMIN SERPL-MCNC: 3.1 G/DL (ref 3.5–5)
ALBUMIN/GLOB SERPL: 1.2 (ref 1.1–2.2)
ALP SERPL-CCNC: 46 U/L (ref 45–117)
ALT SERPL-CCNC: 18 U/L (ref 12–78)
ANION GAP SERPL CALC-SCNC: 2 MMOL/L (ref 2–12)
AST SERPL-CCNC: 24 U/L (ref 15–37)
BASOPHILS # BLD: 0 K/UL (ref 0–0.1)
BASOPHILS NFR BLD: 0 % (ref 0–1)
BILIRUB SERPL-MCNC: 0.4 MG/DL (ref 0.2–1)
BUN SERPL-MCNC: 14 MG/DL (ref 6–20)
BUN/CREAT SERPL: 16 (ref 12–20)
CALCIUM SERPL-MCNC: 8.2 MG/DL (ref 8.5–10.1)
CHLORIDE SERPL-SCNC: 112 MMOL/L (ref 97–108)
CO2 SERPL-SCNC: 28 MMOL/L (ref 21–32)
COMMENT:: NORMAL
CREAT SERPL-MCNC: 0.9 MG/DL (ref 0.55–1.02)
DIFFERENTIAL METHOD BLD: ABNORMAL
EOSINOPHIL # BLD: 0.1 K/UL (ref 0–0.4)
EOSINOPHIL NFR BLD: 2 % (ref 0–7)
ERYTHROCYTE [DISTWIDTH] IN BLOOD BY AUTOMATED COUNT: 13.7 % (ref 11.5–14.5)
GLOBULIN SER CALC-MCNC: 2.5 G/DL (ref 2–4)
GLUCOSE SERPL-MCNC: 87 MG/DL (ref 65–100)
HCT VFR BLD AUTO: 32.7 % (ref 35–47)
HGB BLD-MCNC: 10.7 G/DL (ref 11.5–16)
IMM GRANULOCYTES # BLD AUTO: 0 K/UL (ref 0–0.04)
IMM GRANULOCYTES NFR BLD AUTO: 0 % (ref 0–0.5)
INR PPP: 1.8 (ref 0.9–1.1)
LYMPHOCYTES # BLD: 1.1 K/UL (ref 0.8–3.5)
LYMPHOCYTES NFR BLD: 24 % (ref 12–49)
MCH RBC QN AUTO: 31.8 PG (ref 26–34)
MCHC RBC AUTO-ENTMCNC: 32.7 G/DL (ref 30–36.5)
MCV RBC AUTO: 97.3 FL (ref 80–99)
MONOCYTES # BLD: 0.6 K/UL (ref 0–1)
MONOCYTES NFR BLD: 14 % (ref 5–13)
NEUTS SEG # BLD: 2.7 K/UL (ref 1.8–8)
NEUTS SEG NFR BLD: 60 % (ref 32–75)
NRBC # BLD: 0 K/UL (ref 0–0.01)
NRBC BLD-RTO: 0 PER 100 WBC
PLATELET # BLD AUTO: 168 K/UL (ref 150–400)
PMV BLD AUTO: 10.3 FL (ref 8.9–12.9)
POTASSIUM SERPL-SCNC: 3.3 MMOL/L (ref 3.5–5.1)
PROT SERPL-MCNC: 5.6 G/DL (ref 6.4–8.2)
PROTHROMBIN TIME: 17.6 SEC (ref 9–11.1)
RBC # BLD AUTO: 3.36 M/UL (ref 3.8–5.2)
SODIUM SERPL-SCNC: 142 MMOL/L (ref 136–145)
SPECIMEN HOLD: NORMAL
TSH SERPL DL<=0.05 MIU/L-ACNC: 7.22 UIU/ML (ref 0.36–3.74)
WBC # BLD AUTO: 4.5 K/UL (ref 3.6–11)

## 2024-11-12 PROCEDURE — 86901 BLOOD TYPING SEROLOGIC RH(D): CPT

## 2024-11-12 PROCEDURE — 71250 CT THORAX DX C-: CPT

## 2024-11-12 PROCEDURE — 80053 COMPREHEN METABOLIC PANEL: CPT

## 2024-11-12 PROCEDURE — 85025 COMPLETE CBC W/AUTO DIFF WBC: CPT

## 2024-11-12 PROCEDURE — 1100000000 HC RM PRIVATE

## 2024-11-12 PROCEDURE — 94761 N-INVAS EAR/PLS OXIMETRY MLT: CPT

## 2024-11-12 PROCEDURE — 6370000000 HC RX 637 (ALT 250 FOR IP): Performed by: HOSPITALIST

## 2024-11-12 PROCEDURE — APPNB60 APP NON BILLABLE TIME 46-60 MINS: Performed by: PHYSICIAN ASSISTANT

## 2024-11-12 PROCEDURE — 6360000002 HC RX W HCPCS: Performed by: EMERGENCY MEDICINE

## 2024-11-12 PROCEDURE — 36415 COLL VENOUS BLD VENIPUNCTURE: CPT

## 2024-11-12 PROCEDURE — 73552 X-RAY EXAM OF FEMUR 2/>: CPT

## 2024-11-12 PROCEDURE — 86900 BLOOD TYPING SEROLOGIC ABO: CPT

## 2024-11-12 PROCEDURE — 99285 EMERGENCY DEPT VISIT HI MDM: CPT

## 2024-11-12 PROCEDURE — 96374 THER/PROPH/DIAG INJ IV PUSH: CPT

## 2024-11-12 PROCEDURE — 73502 X-RAY EXAM HIP UNI 2-3 VIEWS: CPT

## 2024-11-12 PROCEDURE — 85610 PROTHROMBIN TIME: CPT

## 2024-11-12 PROCEDURE — 86850 RBC ANTIBODY SCREEN: CPT

## 2024-11-12 PROCEDURE — 2580000003 HC RX 258: Performed by: HOSPITALIST

## 2024-11-12 PROCEDURE — 71045 X-RAY EXAM CHEST 1 VIEW: CPT

## 2024-11-12 PROCEDURE — 6360000002 HC RX W HCPCS: Performed by: PHYSICIAN ASSISTANT

## 2024-11-12 PROCEDURE — 6360000002 HC RX W HCPCS: Performed by: HOSPITALIST

## 2024-11-12 PROCEDURE — APPNB15 APP NON BILLABLE TIME 0-15 MINS: Performed by: PHYSICIAN ASSISTANT

## 2024-11-12 PROCEDURE — 84443 ASSAY THYROID STIM HORMONE: CPT

## 2024-11-12 RX ORDER — OXYCODONE HYDROCHLORIDE 10 MG/1
10 TABLET ORAL EVERY 4 HOURS PRN
Status: DISCONTINUED | OUTPATIENT
Start: 2024-11-12 | End: 2024-11-14

## 2024-11-12 RX ORDER — SODIUM CHLORIDE 9 MG/ML
INJECTION, SOLUTION INTRAVENOUS PRN
Status: DISCONTINUED | OUTPATIENT
Start: 2024-11-12 | End: 2024-11-21 | Stop reason: HOSPADM

## 2024-11-12 RX ORDER — LEVOTHYROXINE SODIUM 50 UG/1
50 TABLET ORAL
Status: DISCONTINUED | OUTPATIENT
Start: 2024-11-13 | End: 2024-11-21 | Stop reason: HOSPADM

## 2024-11-12 RX ORDER — HYDRALAZINE HYDROCHLORIDE 20 MG/ML
10 INJECTION INTRAMUSCULAR; INTRAVENOUS EVERY 6 HOURS PRN
Status: DISCONTINUED | OUTPATIENT
Start: 2024-11-12 | End: 2024-11-21 | Stop reason: HOSPADM

## 2024-11-12 RX ORDER — ROSUVASTATIN CALCIUM 5 MG/1
5 TABLET, COATED ORAL
Status: DISCONTINUED | OUTPATIENT
Start: 2024-11-13 | End: 2024-11-21 | Stop reason: HOSPADM

## 2024-11-12 RX ORDER — MAGNESIUM SULFATE IN WATER 40 MG/ML
2000 INJECTION, SOLUTION INTRAVENOUS PRN
Status: DISCONTINUED | OUTPATIENT
Start: 2024-11-12 | End: 2024-11-21 | Stop reason: HOSPADM

## 2024-11-12 RX ORDER — ONDANSETRON 4 MG/1
4 TABLET, ORALLY DISINTEGRATING ORAL EVERY 8 HOURS PRN
Status: DISCONTINUED | OUTPATIENT
Start: 2024-11-12 | End: 2024-11-17

## 2024-11-12 RX ORDER — OXYCODONE HYDROCHLORIDE 5 MG/1
5 TABLET ORAL EVERY 4 HOURS PRN
Status: DISCONTINUED | OUTPATIENT
Start: 2024-11-12 | End: 2024-11-14

## 2024-11-12 RX ORDER — MORPHINE SULFATE 4 MG/ML
4 INJECTION, SOLUTION INTRAMUSCULAR; INTRAVENOUS
Status: COMPLETED | OUTPATIENT
Start: 2024-11-12 | End: 2024-11-12

## 2024-11-12 RX ORDER — SODIUM CHLORIDE 0.9 % (FLUSH) 0.9 %
5-40 SYRINGE (ML) INJECTION EVERY 12 HOURS SCHEDULED
Status: DISCONTINUED | OUTPATIENT
Start: 2024-11-12 | End: 2024-11-21 | Stop reason: HOSPADM

## 2024-11-12 RX ORDER — VALSARTAN 80 MG/1
160 TABLET ORAL DAILY
Status: DISCONTINUED | OUTPATIENT
Start: 2024-11-13 | End: 2024-11-21 | Stop reason: HOSPADM

## 2024-11-12 RX ORDER — POTASSIUM CHLORIDE 750 MG/1
40 TABLET, EXTENDED RELEASE ORAL PRN
Status: DISCONTINUED | OUTPATIENT
Start: 2024-11-12 | End: 2024-11-21 | Stop reason: HOSPADM

## 2024-11-12 RX ORDER — MORPHINE SULFATE 4 MG/ML
4 INJECTION, SOLUTION INTRAMUSCULAR; INTRAVENOUS
Status: DISCONTINUED | OUTPATIENT
Start: 2024-11-12 | End: 2024-11-12

## 2024-11-12 RX ORDER — EZETIMIBE 10 MG/1
10 TABLET ORAL EVERY EVENING
Status: DISCONTINUED | OUTPATIENT
Start: 2024-11-12 | End: 2024-11-21 | Stop reason: HOSPADM

## 2024-11-12 RX ORDER — ESCITALOPRAM OXALATE 10 MG/1
5 TABLET ORAL DAILY
Status: DISCONTINUED | OUTPATIENT
Start: 2024-11-13 | End: 2024-11-21 | Stop reason: HOSPADM

## 2024-11-12 RX ORDER — METOPROLOL SUCCINATE 25 MG/1
12.5 TABLET, EXTENDED RELEASE ORAL DAILY
Status: DISCONTINUED | OUTPATIENT
Start: 2024-11-13 | End: 2024-11-21 | Stop reason: HOSPADM

## 2024-11-12 RX ORDER — POTASSIUM CHLORIDE 750 MG/1
40 TABLET, EXTENDED RELEASE ORAL ONCE
Status: COMPLETED | OUTPATIENT
Start: 2024-11-12 | End: 2024-11-12

## 2024-11-12 RX ORDER — POTASSIUM CHLORIDE 7.45 MG/ML
10 INJECTION INTRAVENOUS PRN
Status: DISCONTINUED | OUTPATIENT
Start: 2024-11-12 | End: 2024-11-21 | Stop reason: HOSPADM

## 2024-11-12 RX ORDER — SODIUM CHLORIDE 0.9 % (FLUSH) 0.9 %
5-40 SYRINGE (ML) INJECTION PRN
Status: DISCONTINUED | OUTPATIENT
Start: 2024-11-12 | End: 2024-11-21 | Stop reason: HOSPADM

## 2024-11-12 RX ORDER — ONDANSETRON 2 MG/ML
4 INJECTION INTRAMUSCULAR; INTRAVENOUS EVERY 6 HOURS PRN
Status: DISCONTINUED | OUTPATIENT
Start: 2024-11-12 | End: 2024-11-17

## 2024-11-12 RX ORDER — SODIUM CHLORIDE 9 MG/ML
INJECTION, SOLUTION INTRAVENOUS CONTINUOUS
Status: DISCONTINUED | OUTPATIENT
Start: 2024-11-12 | End: 2024-11-14

## 2024-11-12 RX ADMIN — MORPHINE SULFATE 4 MG: 4 INJECTION INTRAVENOUS at 15:45

## 2024-11-12 RX ADMIN — MORPHINE SULFATE 4 MG: 4 INJECTION INTRAVENOUS at 14:02

## 2024-11-12 RX ADMIN — POTASSIUM CHLORIDE 40 MEQ: 750 TABLET, EXTENDED RELEASE ORAL at 18:31

## 2024-11-12 RX ADMIN — SODIUM CHLORIDE: 9 INJECTION, SOLUTION INTRAVENOUS at 15:45

## 2024-11-12 RX ADMIN — HYDROMORPHONE HYDROCHLORIDE 0.5 MG: 1 INJECTION, SOLUTION INTRAMUSCULAR; INTRAVENOUS; SUBCUTANEOUS at 18:29

## 2024-11-12 RX ADMIN — EZETIMIBE 10 MG: 10 TABLET ORAL at 18:32

## 2024-11-12 RX ADMIN — PANTOPRAZOLE SODIUM 40 MG: 40 INJECTION, POWDER, FOR SOLUTION INTRAVENOUS at 15:45

## 2024-11-12 ASSESSMENT — PAIN SCALES - GENERAL
PAINLEVEL_OUTOF10: 10

## 2024-11-12 ASSESSMENT — PAIN DESCRIPTION - ORIENTATION: ORIENTATION: LEFT

## 2024-11-12 ASSESSMENT — LIFESTYLE VARIABLES
HOW OFTEN DO YOU HAVE A DRINK CONTAINING ALCOHOL: NEVER
HOW MANY STANDARD DRINKS CONTAINING ALCOHOL DO YOU HAVE ON A TYPICAL DAY: PATIENT DOES NOT DRINK

## 2024-11-12 ASSESSMENT — PAIN - FUNCTIONAL ASSESSMENT: PAIN_FUNCTIONAL_ASSESSMENT: 0-10

## 2024-11-12 ASSESSMENT — PAIN DESCRIPTION - DESCRIPTORS: DESCRIPTORS: SORE

## 2024-11-12 ASSESSMENT — PAIN DESCRIPTION - LOCATION: LOCATION: HIP

## 2024-11-12 NOTE — ED PROVIDER NOTES
(HCC)    2. Atrial fibrillation, unspecified type (HCC)    3. Anticoagulated          DISPOSITION/PLAN   DISPOSITION Decision To Admit 11/12/2024 02:11:41 PM    Perfect Serve Consult for Admission  2:13 PM    ED Room Number: ER17/17  Patient Name and age:  Radha Wynn 84 y.o.  female  Working Diagnosis:   1. Closed left hip fracture, initial encounter (HCC)    2. Atrial fibrillation, unspecified type (HCC)    3. Anticoagulated        COVID-19 Suspicion: No  Sepsis present:  No  Reassessment needed: No  Code Status:  Full Code  Readmission: No  Isolation Requirements: no  Recommended Level of Care: med/surg  Department: [facility] - [phone]  Consulting Provider:     Other:  GLF with L hip fracture.  On eliquis.  Ortho being notified.     Total critical care time spent exclusive of procedures:  0 minutes.     PATIENT REFERRED TO:  No follow-up provider specified.    DISCHARGE MEDICATIONS:  New Prescriptions    No medications on file         (Please note that portions of this note were completed with a voice recognition program.  Efforts were made to edit the dictations but occasionally words are mis-transcribed.)    Geoffrey Alfredo MD (electronically signed)  Emergency Attending Physician / Physician Assistant / Nurse Practitioner           Geoffrey Alfredo MD  11/12/24 2933

## 2024-11-12 NOTE — ED TRIAGE NOTES
Pt arrives to ED via EMS from her Vet's office where pt reports she was \"getting her dog out of the car when she feel onto left hip\".  Pt reports landing on black top.  Pt denies hitting head, no LOC.  Pt take Elliquis for Afib.  Pt denies headache, dizziness.  Pt reports pain 10/10. Pt unable to stand or ambulate after fall.

## 2024-11-12 NOTE — H&P
Hospitalist Admission Note      NAME:  Radha Wynn   :  1940   MRN:  [id number]     Date/Time:  2024 2:37 PM    Patient PCP: Neena Hammer MD    ________________________________________________________________________    Given the patient's current clinical presentation, I have a high level of concern for decompensation if discharged from the emergency department.  Complex decision making was performed, which includes reviewing the patient's available past medical records, laboratory results, and x-ray films.       My assessment of this patient's clinical condition and my plan of care is as follows.    Assessment / Plan:  Patient is a 84-year-old female with a history of hypertension, hyperlipidemia, paroxysmal atrial fibrillation comes to the hospital after having a fall at home.  Patient admitted for pain control, Ortho evaluation.    1.  Left hip fracture  After a fall.  Patient will be kept n.p.o. after midnight and Ortho is consulted.  Will go with pain control.    2.  Chest pain  Chest x-ray did not show any acute findings except there is an opacity at the left costophrenic angle favored subsegmental atelectasis.    I will do CT of the chest.    3.  Hypokalemia  Will replace potassium.    4.  Paroxysmal atrial fibrillation  Patient is taking Eliquis and Toprol-XL at home.  Holding Eliquis at this time.    5.  Hyperlipidemia  Continuing rosuvastatin 5 mg nightly and Zetia 10 mg.    6.  Hypothyroidism  Patient is on Synthroid 50 mcg daily.  Check TSH level.    7.  Hypertension  Patient is on Toprol-XL and hydralazine.  Patient is also taking clonidine as needed.    8.  Anxiety and depression  Patient is taking Lexapro at home.    I have personally reviewed the radiographs, laboratory data in Epic and decisions and statements above are based partially on this personal interpretation.    Code Status: Full Code  DVT Prophylaxis: SCD's  GI Prophylaxis: not indicated    Plan of care discussed

## 2024-11-12 NOTE — ED NOTES
TRANSFER - OUT REPORT:    Verbal report given to Gunjan jami Wynn  being transferred to [facility] for routine progression of patient care       Report consisted of patient's Situation, Background, Assessment and   Recommendations(SBAR).     Information from the following report(s) Nurse Handoff Report, Index, ED Encounter Summary, ED SBAR, Adult Overview, Intake/Output, MAR, Recent Results, Cardiac Rhythm sinus rhythm history of A-fib , Quality Measures, Neuro Assessment, and Event Log was reviewed with the receiving nurse.    [place] Fall Assessment:    Presents to emergency department  because of falls (Syncope, seizure, or loss of consciousness): Yes  Age > 70: Yes  Altered Mental Status, Intoxication with alcohol or substance confusion (Disorientation, impaired judgment, poor safety awaremess, or inability to follow instructions): No  Impaired Mobility: Ambulates or transfers with assistive devices or assistance; Unable to ambulate or transer.: Yes  Nursing Judgement: Yes          Lines:   Peripheral IV 11/12/24 Left Antecubital (Active)        Opportunity for questions and clarification was provided.      Patient transported with:  Tech

## 2024-11-13 ENCOUNTER — APPOINTMENT (OUTPATIENT)
Facility: HOSPITAL | Age: 84
DRG: 480 | End: 2024-11-13
Payer: MEDICARE

## 2024-11-13 ENCOUNTER — ANESTHESIA EVENT (OUTPATIENT)
Facility: HOSPITAL | Age: 84
End: 2024-11-13
Payer: MEDICARE

## 2024-11-13 ENCOUNTER — ANESTHESIA (OUTPATIENT)
Facility: HOSPITAL | Age: 84
End: 2024-11-13
Payer: MEDICARE

## 2024-11-13 PROBLEM — S72.142A INTERTROCHANTERIC FRACTURE OF LEFT FEMUR (HCC): Status: ACTIVE | Noted: 2024-11-13

## 2024-11-13 PROBLEM — I25.10 CORONARY ARTERY CALCIFICATION SEEN ON CT SCAN: Status: ACTIVE | Noted: 2024-11-13

## 2024-11-13 PROBLEM — K44.9 PARAESOPHAGEAL HERNIA: Status: ACTIVE | Noted: 2017-08-28

## 2024-11-13 PROBLEM — E78.5 HYPERLIPIDEMIA: Status: ACTIVE | Noted: 2024-11-13

## 2024-11-13 LAB
ANION GAP SERPL CALC-SCNC: 2 MMOL/L (ref 2–12)
BASOPHILS # BLD: 0 K/UL (ref 0–0.1)
BASOPHILS NFR BLD: 0 % (ref 0–1)
BUN SERPL-MCNC: 16 MG/DL (ref 6–20)
BUN/CREAT SERPL: 16 (ref 12–20)
CALCIUM SERPL-MCNC: 8.9 MG/DL (ref 8.5–10.1)
CHLORIDE SERPL-SCNC: 109 MMOL/L (ref 97–108)
CO2 SERPL-SCNC: 29 MMOL/L (ref 21–32)
CREAT SERPL-MCNC: 1.01 MG/DL (ref 0.55–1.02)
DIFFERENTIAL METHOD BLD: ABNORMAL
EOSINOPHIL # BLD: 0.1 K/UL (ref 0–0.4)
EOSINOPHIL NFR BLD: 1 % (ref 0–7)
ERYTHROCYTE [DISTWIDTH] IN BLOOD BY AUTOMATED COUNT: 13.8 % (ref 11.5–14.5)
GLUCOSE SERPL-MCNC: 121 MG/DL (ref 65–100)
HCT VFR BLD AUTO: 29.3 % (ref 35–47)
HGB BLD-MCNC: 9.4 G/DL (ref 11.5–16)
IMM GRANULOCYTES # BLD AUTO: 0 K/UL (ref 0–0.04)
IMM GRANULOCYTES NFR BLD AUTO: 0 % (ref 0–0.5)
INR PPP: 1.2 (ref 0.9–1.1)
LYMPHOCYTES # BLD: 0.8 K/UL (ref 0.8–3.5)
LYMPHOCYTES NFR BLD: 13 % (ref 12–49)
MCH RBC QN AUTO: 31.8 PG (ref 26–34)
MCHC RBC AUTO-ENTMCNC: 32.1 G/DL (ref 30–36.5)
MCV RBC AUTO: 99 FL (ref 80–99)
MONOCYTES # BLD: 0.8 K/UL (ref 0–1)
MONOCYTES NFR BLD: 14 % (ref 5–13)
NEUTS SEG # BLD: 4.3 K/UL (ref 1.8–8)
NEUTS SEG NFR BLD: 72 % (ref 32–75)
NRBC # BLD: 0 K/UL (ref 0–0.01)
NRBC BLD-RTO: 0 PER 100 WBC
PLATELET # BLD AUTO: 135 K/UL (ref 150–400)
PMV BLD AUTO: 10.1 FL (ref 8.9–12.9)
POTASSIUM SERPL-SCNC: 4.6 MMOL/L (ref 3.5–5.1)
PROTHROMBIN TIME: 11.9 SEC (ref 9–11.1)
RBC # BLD AUTO: 2.96 M/UL (ref 3.8–5.2)
SODIUM SERPL-SCNC: 140 MMOL/L (ref 136–145)
WBC # BLD AUTO: 6 K/UL (ref 3.6–11)

## 2024-11-13 PROCEDURE — 6360000002 HC RX W HCPCS: Performed by: STUDENT IN AN ORGANIZED HEALTH CARE EDUCATION/TRAINING PROGRAM

## 2024-11-13 PROCEDURE — 6360000002 HC RX W HCPCS: Performed by: NURSE ANESTHETIST, CERTIFIED REGISTERED

## 2024-11-13 PROCEDURE — 2580000003 HC RX 258: Performed by: STUDENT IN AN ORGANIZED HEALTH CARE EDUCATION/TRAINING PROGRAM

## 2024-11-13 PROCEDURE — 2500000003 HC RX 250 WO HCPCS: Performed by: NURSE ANESTHETIST, CERTIFIED REGISTERED

## 2024-11-13 PROCEDURE — 3600000004 HC SURGERY LEVEL 4 BASE: Performed by: STUDENT IN AN ORGANIZED HEALTH CARE EDUCATION/TRAINING PROGRAM

## 2024-11-13 PROCEDURE — 6360000002 HC RX W HCPCS: Performed by: PHYSICIAN ASSISTANT

## 2024-11-13 PROCEDURE — 0QS706Z REPOSITION LEFT UPPER FEMUR WITH INTRAMEDULLARY INTERNAL FIXATION DEVICE, OPEN APPROACH: ICD-10-PCS | Performed by: STUDENT IN AN ORGANIZED HEALTH CARE EDUCATION/TRAINING PROGRAM

## 2024-11-13 PROCEDURE — 6360000002 HC RX W HCPCS: Performed by: HOSPITALIST

## 2024-11-13 PROCEDURE — 7100000000 HC PACU RECOVERY - FIRST 15 MIN: Performed by: STUDENT IN AN ORGANIZED HEALTH CARE EDUCATION/TRAINING PROGRAM

## 2024-11-13 PROCEDURE — 2580000003 HC RX 258: Performed by: NURSE ANESTHETIST, CERTIFIED REGISTERED

## 2024-11-13 PROCEDURE — 3700000001 HC ADD 15 MINUTES (ANESTHESIA): Performed by: STUDENT IN AN ORGANIZED HEALTH CARE EDUCATION/TRAINING PROGRAM

## 2024-11-13 PROCEDURE — 2720000010 HC SURG SUPPLY STERILE: Performed by: STUDENT IN AN ORGANIZED HEALTH CARE EDUCATION/TRAINING PROGRAM

## 2024-11-13 PROCEDURE — 94761 N-INVAS EAR/PLS OXIMETRY MLT: CPT

## 2024-11-13 PROCEDURE — 2709999900 HC NON-CHARGEABLE SUPPLY: Performed by: STUDENT IN AN ORGANIZED HEALTH CARE EDUCATION/TRAINING PROGRAM

## 2024-11-13 PROCEDURE — 6370000000 HC RX 637 (ALT 250 FOR IP): Performed by: PHYSICIAN ASSISTANT

## 2024-11-13 PROCEDURE — 2580000003 HC RX 258: Performed by: PHYSICIAN ASSISTANT

## 2024-11-13 PROCEDURE — 85025 COMPLETE CBC W/AUTO DIFF WBC: CPT

## 2024-11-13 PROCEDURE — C1713 ANCHOR/SCREW BN/BN,TIS/BN: HCPCS | Performed by: STUDENT IN AN ORGANIZED HEALTH CARE EDUCATION/TRAINING PROGRAM

## 2024-11-13 PROCEDURE — 36415 COLL VENOUS BLD VENIPUNCTURE: CPT

## 2024-11-13 PROCEDURE — 7100000001 HC PACU RECOVERY - ADDTL 15 MIN: Performed by: STUDENT IN AN ORGANIZED HEALTH CARE EDUCATION/TRAINING PROGRAM

## 2024-11-13 PROCEDURE — 1100000000 HC RM PRIVATE

## 2024-11-13 PROCEDURE — 3600000014 HC SURGERY LEVEL 4 ADDTL 15MIN: Performed by: STUDENT IN AN ORGANIZED HEALTH CARE EDUCATION/TRAINING PROGRAM

## 2024-11-13 PROCEDURE — 85610 PROTHROMBIN TIME: CPT

## 2024-11-13 PROCEDURE — 80048 BASIC METABOLIC PNL TOTAL CA: CPT

## 2024-11-13 PROCEDURE — 2580000003 HC RX 258: Performed by: HOSPITALIST

## 2024-11-13 PROCEDURE — 3700000000 HC ANESTHESIA ATTENDED CARE: Performed by: STUDENT IN AN ORGANIZED HEALTH CARE EDUCATION/TRAINING PROGRAM

## 2024-11-13 DEVICE — TROCHANTERIC NAIL
Type: IMPLANTABLE DEVICE | Site: HIP | Status: FUNCTIONAL
Brand: GAMMA

## 2024-11-13 DEVICE — LAG SCREW
Type: IMPLANTABLE DEVICE | Site: HIP | Status: FUNCTIONAL
Brand: GAMMA

## 2024-11-13 DEVICE — LOCKING SCREW
Type: IMPLANTABLE DEVICE | Site: HIP | Status: FUNCTIONAL
Brand: T2 ALPHA

## 2024-11-13 RX ORDER — MIDAZOLAM HYDROCHLORIDE 1 MG/ML
INJECTION, SOLUTION INTRAMUSCULAR; INTRAVENOUS
Status: DISCONTINUED | OUTPATIENT
Start: 2024-11-13 | End: 2024-11-13 | Stop reason: SDUPTHER

## 2024-11-13 RX ORDER — DIPHENHYDRAMINE HYDROCHLORIDE 50 MG/ML
12.5 INJECTION INTRAMUSCULAR; INTRAVENOUS
Status: DISCONTINUED | OUTPATIENT
Start: 2024-11-13 | End: 2024-11-13 | Stop reason: HOSPADM

## 2024-11-13 RX ORDER — MIDAZOLAM HYDROCHLORIDE 2 MG/2ML
2 INJECTION, SOLUTION INTRAMUSCULAR; INTRAVENOUS
Status: CANCELLED | OUTPATIENT
Start: 2024-11-13 | End: 2024-11-14

## 2024-11-13 RX ORDER — FENTANYL CITRATE 50 UG/ML
100 INJECTION, SOLUTION INTRAMUSCULAR; INTRAVENOUS
Status: CANCELLED | OUTPATIENT
Start: 2024-11-13 | End: 2024-11-14

## 2024-11-13 RX ORDER — SODIUM CHLORIDE, SODIUM LACTATE, POTASSIUM CHLORIDE, CALCIUM CHLORIDE 600; 310; 30; 20 MG/100ML; MG/100ML; MG/100ML; MG/100ML
INJECTION, SOLUTION INTRAVENOUS CONTINUOUS
Status: DISCONTINUED | OUTPATIENT
Start: 2024-11-13 | End: 2024-11-13 | Stop reason: HOSPADM

## 2024-11-13 RX ORDER — DEXAMETHASONE SODIUM PHOSPHATE 4 MG/ML
INJECTION, SOLUTION INTRA-ARTICULAR; INTRALESIONAL; INTRAMUSCULAR; INTRAVENOUS; SOFT TISSUE
Status: DISCONTINUED | OUTPATIENT
Start: 2024-11-13 | End: 2024-11-13 | Stop reason: SDUPTHER

## 2024-11-13 RX ORDER — ONDANSETRON 2 MG/ML
4 INJECTION INTRAMUSCULAR; INTRAVENOUS EVERY 6 HOURS PRN
Status: DISCONTINUED | OUTPATIENT
Start: 2024-11-13 | End: 2024-11-21 | Stop reason: HOSPADM

## 2024-11-13 RX ORDER — SODIUM CHLORIDE 0.9 % (FLUSH) 0.9 %
5-40 SYRINGE (ML) INJECTION EVERY 12 HOURS SCHEDULED
Status: DISCONTINUED | OUTPATIENT
Start: 2024-11-13 | End: 2024-11-21 | Stop reason: HOSPADM

## 2024-11-13 RX ORDER — PHENYLEPHRINE HCL IN 0.9% NACL 0.4MG/10ML
SYRINGE (ML) INTRAVENOUS
Status: DISCONTINUED | OUTPATIENT
Start: 2024-11-13 | End: 2024-11-13 | Stop reason: SDUPTHER

## 2024-11-13 RX ORDER — ONDANSETRON 2 MG/ML
4 INJECTION INTRAMUSCULAR; INTRAVENOUS
Status: DISCONTINUED | OUTPATIENT
Start: 2024-11-13 | End: 2024-11-13 | Stop reason: HOSPADM

## 2024-11-13 RX ORDER — SODIUM CHLORIDE, SODIUM LACTATE, POTASSIUM CHLORIDE, CALCIUM CHLORIDE 600; 310; 30; 20 MG/100ML; MG/100ML; MG/100ML; MG/100ML
INJECTION, SOLUTION INTRAVENOUS CONTINUOUS
Status: CANCELLED | OUTPATIENT
Start: 2024-11-13

## 2024-11-13 RX ORDER — NALOXONE HYDROCHLORIDE 0.4 MG/ML
INJECTION, SOLUTION INTRAMUSCULAR; INTRAVENOUS; SUBCUTANEOUS PRN
Status: DISCONTINUED | OUTPATIENT
Start: 2024-11-13 | End: 2024-11-13 | Stop reason: HOSPADM

## 2024-11-13 RX ORDER — SODIUM CHLORIDE 0.9 % (FLUSH) 0.9 %
5-40 SYRINGE (ML) INJECTION PRN
Status: DISCONTINUED | OUTPATIENT
Start: 2024-11-13 | End: 2024-11-21 | Stop reason: HOSPADM

## 2024-11-13 RX ORDER — ROCURONIUM BROMIDE 10 MG/ML
INJECTION, SOLUTION INTRAVENOUS
Status: DISCONTINUED | OUTPATIENT
Start: 2024-11-13 | End: 2024-11-13 | Stop reason: SDUPTHER

## 2024-11-13 RX ORDER — FENTANYL CITRATE 50 UG/ML
INJECTION, SOLUTION INTRAMUSCULAR; INTRAVENOUS
Status: DISCONTINUED | OUTPATIENT
Start: 2024-11-13 | End: 2024-11-13 | Stop reason: SDUPTHER

## 2024-11-13 RX ORDER — LIDOCAINE HYDROCHLORIDE 10 MG/ML
1 INJECTION, SOLUTION EPIDURAL; INFILTRATION; INTRACAUDAL; PERINEURAL
Status: CANCELLED | OUTPATIENT
Start: 2024-11-13 | End: 2024-11-14

## 2024-11-13 RX ORDER — ONDANSETRON 4 MG/1
4 TABLET, ORALLY DISINTEGRATING ORAL EVERY 8 HOURS PRN
Status: DISCONTINUED | OUTPATIENT
Start: 2024-11-13 | End: 2024-11-21 | Stop reason: HOSPADM

## 2024-11-13 RX ORDER — EPHEDRINE SULFATE/0.9% NACL/PF 25 MG/5 ML
SYRINGE (ML) INTRAVENOUS
Status: DISCONTINUED | OUTPATIENT
Start: 2024-11-13 | End: 2024-11-13 | Stop reason: SDUPTHER

## 2024-11-13 RX ORDER — ENOXAPARIN SODIUM 100 MG/ML
40 INJECTION SUBCUTANEOUS DAILY
Status: DISCONTINUED | OUTPATIENT
Start: 2024-11-14 | End: 2024-11-18

## 2024-11-13 RX ORDER — SODIUM CHLORIDE 9 MG/ML
INJECTION, SOLUTION INTRAVENOUS PRN
Status: DISCONTINUED | OUTPATIENT
Start: 2024-11-13 | End: 2024-11-21 | Stop reason: HOSPADM

## 2024-11-13 RX ORDER — PROPOFOL 10 MG/ML
INJECTION, EMULSION INTRAVENOUS
Status: DISCONTINUED | OUTPATIENT
Start: 2024-11-13 | End: 2024-11-13 | Stop reason: SDUPTHER

## 2024-11-13 RX ORDER — SODIUM CHLORIDE 9 MG/ML
INJECTION, SOLUTION INTRAVENOUS
Status: DISCONTINUED | OUTPATIENT
Start: 2024-11-13 | End: 2024-11-13 | Stop reason: SDUPTHER

## 2024-11-13 RX ORDER — SUCCINYLCHOLINE CHLORIDE 20 MG/ML
INJECTION INTRAMUSCULAR; INTRAVENOUS
Status: DISCONTINUED | OUTPATIENT
Start: 2024-11-13 | End: 2024-11-13 | Stop reason: SDUPTHER

## 2024-11-13 RX ORDER — ONDANSETRON 2 MG/ML
INJECTION INTRAMUSCULAR; INTRAVENOUS
Status: DISCONTINUED | OUTPATIENT
Start: 2024-11-13 | End: 2024-11-13 | Stop reason: SDUPTHER

## 2024-11-13 RX ADMIN — SODIUM CHLORIDE, PRESERVATIVE FREE 10 ML: 5 INJECTION INTRAVENOUS at 00:34

## 2024-11-13 RX ADMIN — DEXAMETHASONE SODIUM PHOSPHATE 8 MG: 4 INJECTION INTRA-ARTICULAR; INTRALESIONAL; INTRAMUSCULAR; INTRAVENOUS; SOFT TISSUE at 14:27

## 2024-11-13 RX ADMIN — Medication 120 MCG: at 13:27

## 2024-11-13 RX ADMIN — ONDANSETRON 4 MG: 2 INJECTION INTRAMUSCULAR; INTRAVENOUS at 14:27

## 2024-11-13 RX ADMIN — PANTOPRAZOLE SODIUM 40 MG: 40 INJECTION, POWDER, FOR SOLUTION INTRAVENOUS at 08:21

## 2024-11-13 RX ADMIN — PHENYLEPHRINE HYDROCHLORIDE 100 MCG/MIN: 10 INJECTION INTRAVENOUS at 13:25

## 2024-11-13 RX ADMIN — Medication 120 MCG: at 13:20

## 2024-11-13 RX ADMIN — Medication 200 MCG: at 13:32

## 2024-11-13 RX ADMIN — FENTANYL CITRATE 50 MCG: 50 INJECTION, SOLUTION INTRAMUSCULAR; INTRAVENOUS at 13:12

## 2024-11-13 RX ADMIN — OXYCODONE 5 MG: 5 TABLET ORAL at 08:20

## 2024-11-13 RX ADMIN — HYDROMORPHONE HYDROCHLORIDE 0.5 MG: 1 INJECTION, SOLUTION INTRAMUSCULAR; INTRAVENOUS; SUBCUTANEOUS at 05:42

## 2024-11-13 RX ADMIN — WATER 2000 MG: 1 INJECTION INTRAMUSCULAR; INTRAVENOUS; SUBCUTANEOUS at 21:27

## 2024-11-13 RX ADMIN — FENTANYL CITRATE 50 MCG: 50 INJECTION, SOLUTION INTRAMUSCULAR; INTRAVENOUS at 13:55

## 2024-11-13 RX ADMIN — SODIUM CHLORIDE: 9 INJECTION, SOLUTION INTRAVENOUS at 11:37

## 2024-11-13 RX ADMIN — SODIUM CHLORIDE: 9 INJECTION, SOLUTION INTRAVENOUS at 13:12

## 2024-11-13 RX ADMIN — PROPOFOL 80 MG: 10 INJECTION, EMULSION INTRAVENOUS at 13:20

## 2024-11-13 RX ADMIN — EPHEDRINE SULFATE 15 MG: 5 INJECTION INTRAVENOUS at 13:34

## 2024-11-13 RX ADMIN — SODIUM CHLORIDE: 9 INJECTION, SOLUTION INTRAVENOUS at 00:39

## 2024-11-13 RX ADMIN — SODIUM CHLORIDE, PRESERVATIVE FREE 5 ML: 5 INJECTION INTRAVENOUS at 08:23

## 2024-11-13 RX ADMIN — MIDAZOLAM HYDROCHLORIDE 1 MG: 1 INJECTION, SOLUTION INTRAMUSCULAR; INTRAVENOUS at 13:12

## 2024-11-13 RX ADMIN — HYDROMORPHONE HYDROCHLORIDE 0.5 MG: 1 INJECTION, SOLUTION INTRAMUSCULAR; INTRAVENOUS; SUBCUTANEOUS at 00:33

## 2024-11-13 RX ADMIN — ROCURONIUM BROMIDE 30 MG: 10 INJECTION INTRAVENOUS at 13:34

## 2024-11-13 RX ADMIN — WATER 2000 MG: 1 INJECTION INTRAMUSCULAR; INTRAVENOUS; SUBCUTANEOUS at 13:39

## 2024-11-13 RX ADMIN — SODIUM CHLORIDE, PRESERVATIVE FREE 10 ML: 5 INJECTION INTRAVENOUS at 21:27

## 2024-11-13 RX ADMIN — ROCURONIUM BROMIDE 10 MG: 10 INJECTION INTRAVENOUS at 13:20

## 2024-11-13 RX ADMIN — SUGAMMADEX 200 MG: 100 INJECTION, SOLUTION INTRAVENOUS at 14:27

## 2024-11-13 RX ADMIN — SUCCINYLCHOLINE CHLORIDE 100 MG: 20 INJECTION, SOLUTION INTRAMUSCULAR; INTRAVENOUS at 13:20

## 2024-11-13 RX ADMIN — SODIUM CHLORIDE: 9 INJECTION, SOLUTION INTRAVENOUS at 18:06

## 2024-11-13 RX ADMIN — EPHEDRINE SULFATE 10 MG: 5 INJECTION INTRAVENOUS at 13:38

## 2024-11-13 ASSESSMENT — PAIN DESCRIPTION - LOCATION
LOCATION: LEG;HIP
LOCATION: HIP
LOCATION: LEG

## 2024-11-13 ASSESSMENT — PAIN SCALES - GENERAL
PAINLEVEL_OUTOF10: 9
PAINLEVEL_OUTOF10: 6
PAINLEVEL_OUTOF10: 7

## 2024-11-13 ASSESSMENT — PAIN DESCRIPTION - ORIENTATION
ORIENTATION: LEFT

## 2024-11-13 ASSESSMENT — PAIN DESCRIPTION - DESCRIPTORS
DESCRIPTORS: SORE
DESCRIPTORS: ACHING

## 2024-11-13 ASSESSMENT — PAIN - FUNCTIONAL ASSESSMENT: PAIN_FUNCTIONAL_ASSESSMENT: 0-10

## 2024-11-13 NOTE — ANESTHESIA PRE PROCEDURE
Department of Anesthesiology  Preprocedure Note       Name:  Radha Wynn   Age:  84 y.o.  :  1940                                          MRN:  [id number]         Date:  2024      Surgeon: Surgeon(s):  Eduardo Dela Cruz MD    Procedure: Procedure(s):  LEFT HIP INTRAMEDULLARY NAIL    Medications prior to admission:   Prior to Admission medications    Medication Sig Start Date End Date Taking? Authorizing Provider   hydrALAZINE (APRESOLINE) 50 MG tablet Take 1 tablet by mouth 3 times daily 24   Abhilash Schlutz MD   rosuvastatin (CRESTOR) 5 MG tablet Take 1 tablet by mouth three times a week 5/3/24   Suleman Heath MD   cloNIDine (CATAPRES) 0.1 MG tablet Take 1 tablet by mouth daily as needed for High Blood Pressure 24   Suleman Heath MD   Multiple Vitamins-Minerals (CENTRUM SILVER PO) Take by mouth    Brian Byrne MD   vitamin D (CHOLECALCIFEROL) 125 MCG (5000 UT) CAPS capsule Take 1 capsule by mouth daily    ProviderBrian MD   apixaban (ELIQUIS) 5 MG TABS tablet Take 1 tablet by mouth 2 times daily 24   Suleman Heath MD   candesartan (ATACAND) 16 MG tablet Take 1 tablet by mouth daily 23   Suleman Heath MD   Calcium Carbonate-Vitamin D (OYSTER SHELL CALCIUM/D) 500-5 MG-MCG TABS Take 1 tablet by mouth every evening    Automatic Reconciliation, Ar   escitalopram (LEXAPRO) 5 MG tablet Take 1 tablet by mouth daily 22   Automatic Reconciliation, Ar   ezetimibe (ZETIA) 10 MG tablet Take 1 tablet by mouth every evening 22   Automatic Reconciliation, Ar   levothyroxine (SYNTHROID) 50 MCG tablet Take 1 tablet by mouth every morning (before breakfast)    Automatic Reconciliation, Ar       Current medications:    Current Facility-Administered Medications   Medication Dose Route Frequency Provider Last Rate Last Admin   • ceFAZolin (ANCEF) 2,000 mg in sterile water 20 mL IV syringe  2,000 mg IntraVENous On Call to OR Tico Qureshi PA       •

## 2024-11-13 NOTE — OP NOTE
Operative Note      Patient: Radha Wynn  YOB: 1940  MRN: [id number]    Date of Procedure: 11/13/2024    Pre-Op Diagnosis Codes:      * Closed fracture of left hip, initial encounter ([facility]) [S72.002A]    Post-Op Diagnosis: Same       Procedure(s):  LEFT HIP INTRAMEDULLARY NAIL    Surgeon(s):  Eduardo Dela Cruz MD    Assistant:   * No surgical staff found *    Anesthesia: General    Estimated Blood Loss (mL): less than 100     Complications: None    Specimens:   * No specimens in log *    Implants:  * No implants in log *      Drains: * No LDAs found *    Findings:  Infection Present At Time Of Surgery (PATOS) (choose all levels that have infection present):  No infection present  Other Findings: displaced left intertrochanteric femur fracture    Detailed Description of Procedure:     INDICATIONS FOR PROCEDURE: This patient is a patient who presented to our institution after a fall. Radiographs were obtained which demonstrated an intertrochanteric hip fracture. I discussed with the patient the nature of the diagnosis as well as treatment options, and that I would recommend stabilization with an intramedullary nail. I discussed with them the risks, benefits, and alternatives to surgical treatment. The patient had a good understanding of these risks, and gave informed consent to proceed with the surgery as outlined above. The patient presents here today for that operative procedure.    DETAILED DESCRIPTION OF PROCEDURE: The patient was identified in the preoperative holding area. The operative extremity was marked. The patient was taken back to the operating room table where anesthesia was induced. The patient was placed supine on the fracture table with all bony prominences well padded. The operative boots were placed and the perineal post was placed. We brought in fluoroscopy where AP and lateral were obtained. Appropriate reduction maneuvers were undertaken to obtain what we deemed was a

## 2024-11-13 NOTE — PERIOP NOTE
TRANSFER - OUT REPORT:    Verbal report given to MINDA Hollins on Radha Wynn  being transferred to 4th floor for routine post-op       Report consisted of patient's Situation, Background, Assessment and   Recommendations(SBAR).     Information from the following report(s) Nurse Handoff Report, Adult Overview, Surgery Report, Intake/Output, and MAR was reviewed with the receiving nurse.           Lines:   Peripheral IV 11/12/24 Left Antecubital (Active)   Site Assessment Clean, dry & intact 11/13/24 1512   Line Status Infusing 11/13/24 1512   Line Care Connections checked and tightened 11/13/24 1512   Phlebitis Assessment No symptoms 11/13/24 1512   Infiltration Assessment 0 11/13/24 1512   Alcohol Cap Used Yes 11/13/24 1512   Dressing Status Intact w/seal 11/13/24 1512   Dressing Type Transparent 11/13/24 1512       Peripheral IV 11/13/24 Right Hand (Active)        Opportunity for questions and clarification was provided.      Patient transported with:  O2 @ 2lpm and Registered Nurse

## 2024-11-13 NOTE — ANESTHESIA POSTPROCEDURE EVALUATION
Department of Anesthesiology  Postprocedure Note    Patient: Radha Wynn  MRN: [id number]  YOB: 1940  Date of evaluation: 11/13/2024    Procedure Summary       Date: 11/13/24 Room / Location: [facility] MAIN OR 7 / [facility] MAIN OR    Anesthesia Start: 1312 Anesthesia Stop: 1522    Procedure: LEFT HIP INTRAMEDULLARY NAIL (Left: Hip) Diagnosis:       Closed fracture of left hip, initial encounter ([facility])      (Closed fracture of left hip, initial encounter ([facility]) [S72.002A])    Surgeons: Eduardo Dela Cruz MD Responsible Provider: Víctor Shell MD    Anesthesia Type: General ASA Status: 3            Anesthesia Type: General    Indra Phase I: Indra Score: 8    Indra Phase II:      Anesthesia Post Evaluation    Patient location during evaluation: PACU  Patient participation: complete - patient participated  Level of consciousness: awake  Airway patency: patent  Nausea & Vomiting: no vomiting and no nausea  Cardiovascular status: hemodynamically stable  Respiratory status: acceptable  Hydration status: stable  Pain management: adequate    No notable events documented.

## 2024-11-14 ENCOUNTER — APPOINTMENT (OUTPATIENT)
Facility: HOSPITAL | Age: 84
DRG: 480 | End: 2024-11-14
Payer: MEDICARE

## 2024-11-14 LAB
ANION GAP SERPL CALC-SCNC: 10 MMOL/L (ref 2–12)
ARTERIAL PATENCY WRIST A: POSITIVE
ARTERIAL PATENCY WRIST A: POSITIVE
BASE DEFICIT BLD-SCNC: 4.8 MMOL/L
BASE EXCESS BLD CALC-SCNC: 0.4 MMOL/L
BASOPHILS # BLD: 0 K/UL (ref 0–0.1)
BASOPHILS # BLD: 0 K/UL (ref 0–0.1)
BASOPHILS NFR BLD: 0 % (ref 0–1)
BASOPHILS NFR BLD: 0 % (ref 0–1)
BDY SITE: ABNORMAL
BDY SITE: ABNORMAL
BUN SERPL-MCNC: 29 MG/DL (ref 6–20)
BUN/CREAT SERPL: 20 (ref 12–20)
CALCIUM SERPL-MCNC: 8.8 MG/DL (ref 8.5–10.1)
CHLORIDE SERPL-SCNC: 108 MMOL/L (ref 97–108)
CO2 SERPL-SCNC: 23 MMOL/L (ref 21–32)
CREAT SERPL-MCNC: 1.47 MG/DL (ref 0.55–1.02)
DIFFERENTIAL METHOD BLD: ABNORMAL
DIFFERENTIAL METHOD BLD: ABNORMAL
EKG ATRIAL RATE: 117 BPM
EKG DIAGNOSIS: NORMAL
EKG P AXIS: 92 DEGREES
EKG P-R INTERVAL: 172 MS
EKG Q-T INTERVAL: 332 MS
EKG QRS DURATION: 144 MS
EKG QTC CALCULATION (BAZETT): 463 MS
EKG R AXIS: 102 DEGREES
EKG T AXIS: -9 DEGREES
EKG VENTRICULAR RATE: 117 BPM
EOSINOPHIL # BLD: 0 K/UL (ref 0–0.4)
EOSINOPHIL # BLD: 0 K/UL (ref 0–0.4)
EOSINOPHIL NFR BLD: 0 % (ref 0–7)
EOSINOPHIL NFR BLD: 0 % (ref 0–7)
ERYTHROCYTE [DISTWIDTH] IN BLOOD BY AUTOMATED COUNT: 14 % (ref 11.5–14.5)
ERYTHROCYTE [DISTWIDTH] IN BLOOD BY AUTOMATED COUNT: 14.2 % (ref 11.5–14.5)
GAS FLOW.O2 O2 DELIVERY SYS: ABNORMAL
GAS FLOW.O2 O2 DELIVERY SYS: ABNORMAL
GAS FLOW.O2 SETTING OXYMISER: 20 BPM
GLUCOSE BLD STRIP.AUTO-MCNC: 187 MG/DL (ref 65–117)
GLUCOSE BLD STRIP.AUTO-MCNC: 242 MG/DL (ref 65–117)
GLUCOSE SERPL-MCNC: 219 MG/DL (ref 65–100)
HCO3 BLD-SCNC: 23.9 MMOL/L (ref 21–28)
HCO3 BLD-SCNC: 24.1 MMOL/L (ref 21–28)
HCT VFR BLD AUTO: 27 % (ref 35–47)
HCT VFR BLD AUTO: 28.7 % (ref 35–47)
HGB BLD-MCNC: 8.4 G/DL (ref 11.5–16)
HGB BLD-MCNC: 8.6 G/DL (ref 11.5–16)
IMM GRANULOCYTES # BLD AUTO: 0.1 K/UL (ref 0–0.04)
IMM GRANULOCYTES # BLD AUTO: 0.2 K/UL (ref 0–0.04)
IMM GRANULOCYTES NFR BLD AUTO: 1 % (ref 0–0.5)
IMM GRANULOCYTES NFR BLD AUTO: 1 % (ref 0–0.5)
LACTATE SERPL-SCNC: 3.9 MMOL/L (ref 0.4–2)
LACTATE SERPL-SCNC: 8.8 MMOL/L (ref 0.4–2)
LYMPHOCYTES # BLD: 0.4 K/UL (ref 0.8–3.5)
LYMPHOCYTES # BLD: 4.1 K/UL (ref 0.8–3.5)
LYMPHOCYTES NFR BLD: 22 % (ref 12–49)
LYMPHOCYTES NFR BLD: 4 % (ref 12–49)
MCH RBC QN AUTO: 31.6 PG (ref 26–34)
MCH RBC QN AUTO: 32.2 PG (ref 26–34)
MCHC RBC AUTO-ENTMCNC: 30 G/DL (ref 30–36.5)
MCHC RBC AUTO-ENTMCNC: 31.1 G/DL (ref 30–36.5)
MCV RBC AUTO: 101.5 FL (ref 80–99)
MCV RBC AUTO: 107.5 FL (ref 80–99)
MONOCYTES # BLD: 0.4 K/UL (ref 0–1)
MONOCYTES # BLD: 2.2 K/UL (ref 0–1)
MONOCYTES NFR BLD: 12 % (ref 5–13)
MONOCYTES NFR BLD: 4 % (ref 5–13)
NEUTS SEG # BLD: 12.1 K/UL (ref 1.8–8)
NEUTS SEG # BLD: 8 K/UL (ref 1.8–8)
NEUTS SEG NFR BLD: 65 % (ref 32–75)
NEUTS SEG NFR BLD: 91 % (ref 32–75)
NRBC # BLD: 0 K/UL (ref 0–0.01)
NRBC # BLD: 0 K/UL (ref 0–0.01)
NRBC BLD-RTO: 0 PER 100 WBC
NRBC BLD-RTO: 0 PER 100 WBC
NT PRO BNP: 1267 PG/ML
O2/TOTAL GAS SETTING VFR VENT: 100 %
O2/TOTAL GAS SETTING VFR VENT: 60 %
PCO2 BLD: 32.3 MMHG (ref 35–48)
PCO2 BLD: 68.5 MMHG (ref 35–48)
PEEP RESPIRATORY: 6 CMH2O
PH BLD: 7.15 (ref 7.35–7.45)
PH BLD: 7.48 (ref 7.35–7.45)
PLATELET # BLD AUTO: 121 K/UL (ref 150–400)
PLATELET # BLD AUTO: 140 K/UL (ref 150–400)
PMV BLD AUTO: 10.2 FL (ref 8.9–12.9)
PMV BLD AUTO: 10.4 FL (ref 8.9–12.9)
PO2 BLD: 169 MMHG (ref 83–108)
PO2 BLD: 284 MMHG (ref 83–108)
POTASSIUM SERPL-SCNC: 4.5 MMOL/L (ref 3.5–5.1)
RBC # BLD AUTO: 2.66 M/UL (ref 3.8–5.2)
RBC # BLD AUTO: 2.67 M/UL (ref 3.8–5.2)
RBC MORPH BLD: ABNORMAL
RBC MORPH BLD: ABNORMAL
SAO2 % BLD: 99.6 % (ref 92–97)
SAO2 % BLD: 99.8 % (ref 92–97)
SERVICE CMNT-IMP: ABNORMAL
SODIUM SERPL-SCNC: 141 MMOL/L (ref 136–145)
SPECIMEN TYPE: ABNORMAL
SPECIMEN TYPE: ABNORMAL
TROPONIN I SERPL HS-MCNC: 23 NG/L (ref 0–51)
VENTILATION MODE VENT: ABNORMAL
VT SETTING VENT: 400 ML
WBC # BLD AUTO: 18.6 K/UL (ref 3.6–11)
WBC # BLD AUTO: 8.9 K/UL (ref 3.6–11)

## 2024-11-14 PROCEDURE — 97530 THERAPEUTIC ACTIVITIES: CPT

## 2024-11-14 PROCEDURE — 74018 RADEX ABDOMEN 1 VIEW: CPT

## 2024-11-14 PROCEDURE — 31500 INSERT EMERGENCY AIRWAY: CPT

## 2024-11-14 PROCEDURE — 36415 COLL VENOUS BLD VENIPUNCTURE: CPT

## 2024-11-14 PROCEDURE — 2580000003 HC RX 258: Performed by: STUDENT IN AN ORGANIZED HEALTH CARE EDUCATION/TRAINING PROGRAM

## 2024-11-14 PROCEDURE — 82962 GLUCOSE BLOOD TEST: CPT

## 2024-11-14 PROCEDURE — 6360000004 HC RX CONTRAST MEDICATION: Performed by: INTERNAL MEDICINE

## 2024-11-14 PROCEDURE — APPNB30 APP NON BILLABLE TIME 0-30 MINS: Performed by: NURSE PRACTITIONER

## 2024-11-14 PROCEDURE — 6360000002 HC RX W HCPCS: Performed by: NURSE PRACTITIONER

## 2024-11-14 PROCEDURE — 94002 VENT MGMT INPAT INIT DAY: CPT

## 2024-11-14 PROCEDURE — 83880 ASSAY OF NATRIURETIC PEPTIDE: CPT

## 2024-11-14 PROCEDURE — 85025 COMPLETE CBC W/AUTO DIFF WBC: CPT

## 2024-11-14 PROCEDURE — 6370000000 HC RX 637 (ALT 250 FOR IP): Performed by: STUDENT IN AN ORGANIZED HEALTH CARE EDUCATION/TRAINING PROGRAM

## 2024-11-14 PROCEDURE — 71275 CT ANGIOGRAPHY CHEST: CPT

## 2024-11-14 PROCEDURE — 94761 N-INVAS EAR/PLS OXIMETRY MLT: CPT

## 2024-11-14 PROCEDURE — 36600 WITHDRAWAL OF ARTERIAL BLOOD: CPT

## 2024-11-14 PROCEDURE — 6360000002 HC RX W HCPCS: Performed by: STUDENT IN AN ORGANIZED HEALTH CARE EDUCATION/TRAINING PROGRAM

## 2024-11-14 PROCEDURE — 74177 CT ABD & PELVIS W/CONTRAST: CPT

## 2024-11-14 PROCEDURE — 97161 PT EVAL LOW COMPLEX 20 MIN: CPT

## 2024-11-14 PROCEDURE — 71045 X-RAY EXAM CHEST 1 VIEW: CPT

## 2024-11-14 PROCEDURE — 97165 OT EVAL LOW COMPLEX 30 MIN: CPT

## 2024-11-14 PROCEDURE — 80048 BASIC METABOLIC PNL TOTAL CA: CPT

## 2024-11-14 PROCEDURE — 97116 GAIT TRAINING THERAPY: CPT

## 2024-11-14 PROCEDURE — 2500000003 HC RX 250 WO HCPCS: Performed by: NURSE PRACTITIONER

## 2024-11-14 PROCEDURE — 82803 BLOOD GASES ANY COMBINATION: CPT

## 2024-11-14 PROCEDURE — 6370000000 HC RX 637 (ALT 250 FOR IP): Performed by: NURSE PRACTITIONER

## 2024-11-14 PROCEDURE — 2700000000 HC OXYGEN THERAPY PER DAY

## 2024-11-14 PROCEDURE — 93005 ELECTROCARDIOGRAM TRACING: CPT | Performed by: EMERGENCY MEDICINE

## 2024-11-14 PROCEDURE — 2580000003 HC RX 258: Performed by: NURSE PRACTITIONER

## 2024-11-14 PROCEDURE — 84484 ASSAY OF TROPONIN QUANT: CPT

## 2024-11-14 PROCEDURE — 93005 ELECTROCARDIOGRAM TRACING: CPT | Performed by: INTERNAL MEDICINE

## 2024-11-14 PROCEDURE — 83605 ASSAY OF LACTIC ACID: CPT

## 2024-11-14 PROCEDURE — 2000000000 HC ICU R&B

## 2024-11-14 RX ORDER — ROCURONIUM BROMIDE 10 MG/ML
0.6 INJECTION, SOLUTION INTRAVENOUS ONCE
Status: COMPLETED | OUTPATIENT
Start: 2024-11-14 | End: 2024-11-14

## 2024-11-14 RX ORDER — PROPOFOL 10 MG/ML
INJECTION, EMULSION INTRAVENOUS
Status: DISCONTINUED
Start: 2024-11-14 | End: 2024-11-15 | Stop reason: WASHOUT

## 2024-11-14 RX ORDER — PROPOFOL 10 MG/ML
5-50 INJECTION, EMULSION INTRAVENOUS CONTINUOUS
Status: DISCONTINUED | OUTPATIENT
Start: 2024-11-14 | End: 2024-11-15

## 2024-11-14 RX ORDER — INSULIN LISPRO 100 [IU]/ML
0-8 INJECTION, SOLUTION INTRAVENOUS; SUBCUTANEOUS EVERY 6 HOURS
Status: DISCONTINUED | OUTPATIENT
Start: 2024-11-14 | End: 2024-11-17

## 2024-11-14 RX ORDER — OXYMETAZOLINE HYDROCHLORIDE 0.05 G/100ML
2 SPRAY NASAL ONCE
Status: DISPENSED | OUTPATIENT
Start: 2024-11-14 | End: 2024-11-17

## 2024-11-14 RX ORDER — DEXMEDETOMIDINE HYDROCHLORIDE 4 UG/ML
.1-1.5 INJECTION, SOLUTION INTRAVENOUS CONTINUOUS
Status: DISCONTINUED | OUTPATIENT
Start: 2024-11-14 | End: 2024-11-17

## 2024-11-14 RX ORDER — MIDAZOLAM HYDROCHLORIDE 1 MG/ML
2 INJECTION, SOLUTION INTRAMUSCULAR; INTRAVENOUS ONCE
Status: COMPLETED | OUTPATIENT
Start: 2024-11-14 | End: 2024-11-14

## 2024-11-14 RX ORDER — ETOMIDATE 2 MG/ML
20 INJECTION INTRAVENOUS ONCE
Status: COMPLETED | OUTPATIENT
Start: 2024-11-14 | End: 2024-11-14

## 2024-11-14 RX ORDER — SODIUM CHLORIDE, SODIUM LACTATE, POTASSIUM CHLORIDE, AND CALCIUM CHLORIDE .6; .31; .03; .02 G/100ML; G/100ML; G/100ML; G/100ML
500 INJECTION, SOLUTION INTRAVENOUS ONCE
Status: DISCONTINUED | OUTPATIENT
Start: 2024-11-15 | End: 2024-11-16

## 2024-11-14 RX ORDER — IOPAMIDOL 755 MG/ML
100 INJECTION, SOLUTION INTRAVASCULAR
Status: COMPLETED | OUTPATIENT
Start: 2024-11-14 | End: 2024-11-14

## 2024-11-14 RX ORDER — FENTANYL CITRATE-0.9 % NACL/PF 10 MCG/ML
25-200 PLASTIC BAG, INJECTION (ML) INTRAVENOUS CONTINUOUS
Status: DISCONTINUED | OUTPATIENT
Start: 2024-11-14 | End: 2024-11-16

## 2024-11-14 RX ORDER — LIDOCAINE HYDROCHLORIDE 20 MG/ML
JELLY TOPICAL ONCE
Status: DISCONTINUED | OUTPATIENT
Start: 2024-11-14 | End: 2024-11-21 | Stop reason: HOSPADM

## 2024-11-14 RX ORDER — FENTANYL CITRATE-0.9 % NACL/PF 20 MCG/2ML
50 SYRINGE (ML) INTRAVENOUS EVERY 30 MIN PRN
Status: DISCONTINUED | OUTPATIENT
Start: 2024-11-14 | End: 2024-11-16

## 2024-11-14 RX ORDER — PANTOPRAZOLE SODIUM 40 MG/1
40 TABLET, DELAYED RELEASE ORAL
Status: DISCONTINUED | OUTPATIENT
Start: 2024-11-15 | End: 2024-11-15

## 2024-11-14 RX ORDER — SODIUM CHLORIDE, SODIUM LACTATE, POTASSIUM CHLORIDE, AND CALCIUM CHLORIDE .6; .31; .03; .02 G/100ML; G/100ML; G/100ML; G/100ML
500 INJECTION, SOLUTION INTRAVENOUS ONCE
Status: COMPLETED | OUTPATIENT
Start: 2024-11-14 | End: 2024-11-14

## 2024-11-14 RX ORDER — FENTANYL CITRATE 50 UG/ML
25 INJECTION, SOLUTION INTRAMUSCULAR; INTRAVENOUS EVERY 4 HOURS PRN
Status: DISCONTINUED | OUTPATIENT
Start: 2024-11-14 | End: 2024-11-14

## 2024-11-14 RX ORDER — LORAZEPAM 2 MG/ML
0.5 INJECTION INTRAMUSCULAR ONCE
Status: DISCONTINUED | OUTPATIENT
Start: 2024-11-14 | End: 2024-11-14

## 2024-11-14 RX ORDER — FENTANYL CITRATE 50 UG/ML
50 INJECTION, SOLUTION INTRAMUSCULAR; INTRAVENOUS ONCE
Status: COMPLETED | OUTPATIENT
Start: 2024-11-14 | End: 2024-11-14

## 2024-11-14 RX ADMIN — SODIUM CHLORIDE: 9 INJECTION, SOLUTION INTRAVENOUS at 04:17

## 2024-11-14 RX ADMIN — ENOXAPARIN SODIUM 40 MG: 100 INJECTION SUBCUTANEOUS at 09:10

## 2024-11-14 RX ADMIN — PANTOPRAZOLE SODIUM 40 MG: 40 INJECTION, POWDER, FOR SOLUTION INTRAVENOUS at 09:09

## 2024-11-14 RX ADMIN — SODIUM CHLORIDE, PRESERVATIVE FREE 10 ML: 5 INJECTION INTRAVENOUS at 09:10

## 2024-11-14 RX ADMIN — EZETIMIBE 10 MG: 10 TABLET ORAL at 17:23

## 2024-11-14 RX ADMIN — SODIUM CHLORIDE, PRESERVATIVE FREE 10 ML: 5 INJECTION INTRAVENOUS at 23:46

## 2024-11-14 RX ADMIN — SODIUM CHLORIDE, POTASSIUM CHLORIDE, SODIUM LACTATE AND CALCIUM CHLORIDE 500 ML: 600; 310; 30; 20 INJECTION, SOLUTION INTRAVENOUS at 21:53

## 2024-11-14 RX ADMIN — FENTANYL CITRATE 50 MCG: 50 INJECTION INTRAMUSCULAR; INTRAVENOUS at 21:54

## 2024-11-14 RX ADMIN — OXYCODONE 10 MG: 5 TABLET ORAL at 06:19

## 2024-11-14 RX ADMIN — Medication 50 MCG/HR: at 22:19

## 2024-11-14 RX ADMIN — SODIUM CHLORIDE, PRESERVATIVE FREE 10 ML: 5 INJECTION INTRAVENOUS at 23:47

## 2024-11-14 RX ADMIN — ETOMIDATE 20 MG: 2 INJECTION, SOLUTION INTRAVENOUS at 21:34

## 2024-11-14 RX ADMIN — CHOLECALCIFEROL TAB 125 MCG (5000 UNIT) 5000 UNITS: 125 TAB at 09:09

## 2024-11-14 RX ADMIN — INSULIN LISPRO 2 UNITS: 100 INJECTION, SOLUTION INTRAVENOUS; SUBCUTANEOUS at 23:46

## 2024-11-14 RX ADMIN — DEXMEDETOMIDINE HYDROCHLORIDE 0.2 MCG/KG/HR: 400 INJECTION INTRAVENOUS at 21:12

## 2024-11-14 RX ADMIN — WATER 2000 MG: 1 INJECTION INTRAMUSCULAR; INTRAVENOUS; SUBCUTANEOUS at 06:16

## 2024-11-14 RX ADMIN — LEVOTHYROXINE SODIUM 50 MCG: 0.05 TABLET ORAL at 06:16

## 2024-11-14 RX ADMIN — MIDAZOLAM 2 MG: 1 INJECTION INTRAMUSCULAR; INTRAVENOUS at 22:22

## 2024-11-14 RX ADMIN — IOPAMIDOL 95 ML: 755 INJECTION, SOLUTION INTRAVENOUS at 20:22

## 2024-11-14 RX ADMIN — ESCITALOPRAM OXALATE 5 MG: 10 TABLET ORAL at 09:10

## 2024-11-14 RX ADMIN — OXYCODONE 5 MG: 5 TABLET ORAL at 14:09

## 2024-11-14 RX ADMIN — ROCURONIUM BROMIDE 47 MG: 10 INJECTION, SOLUTION INTRAVENOUS at 21:34

## 2024-11-14 RX ADMIN — DEXMEDETOMIDINE HYDROCHLORIDE 0.2 MCG/KG/HR: 400 INJECTION INTRAVENOUS at 21:14

## 2024-11-14 RX ADMIN — Medication 50 MCG: at 22:19

## 2024-11-14 ASSESSMENT — PAIN SCALES - GENERAL
PAINLEVEL_OUTOF10: 3
PAINLEVEL_OUTOF10: 2
PAINLEVEL_OUTOF10: 7
PAINLEVEL_OUTOF10: 8

## 2024-11-14 ASSESSMENT — PAIN DESCRIPTION - DESCRIPTORS: DESCRIPTORS: ACHING

## 2024-11-14 ASSESSMENT — PAIN SCALES - WONG BAKER: WONGBAKER_NUMERICALRESPONSE: NO HURT

## 2024-11-15 ENCOUNTER — APPOINTMENT (OUTPATIENT)
Facility: HOSPITAL | Age: 84
DRG: 480 | End: 2024-11-15
Attending: STUDENT IN AN ORGANIZED HEALTH CARE EDUCATION/TRAINING PROGRAM
Payer: MEDICARE

## 2024-11-15 ENCOUNTER — ANESTHESIA EVENT (OUTPATIENT)
Facility: HOSPITAL | Age: 84
End: 2024-11-15
Payer: MEDICARE

## 2024-11-15 ENCOUNTER — ANESTHESIA (OUTPATIENT)
Facility: HOSPITAL | Age: 84
End: 2024-11-15
Payer: MEDICARE

## 2024-11-15 PROBLEM — J96.01 ACUTE RESPIRATORY FAILURE WITH HYPOXIA: Status: ACTIVE | Noted: 2024-11-15

## 2024-11-15 PROBLEM — S72.002A CLOSED LEFT HIP FRACTURE, INITIAL ENCOUNTER (HCC): Status: ACTIVE | Noted: 2024-11-15

## 2024-11-15 LAB
ABO + RH BLD: NORMAL
ALBUMIN SERPL-MCNC: 2.6 G/DL (ref 3.5–5)
ALBUMIN/GLOB SERPL: 1 (ref 1.1–2.2)
ALP SERPL-CCNC: 46 U/L (ref 45–117)
ALT SERPL-CCNC: 68 U/L (ref 12–78)
ANION GAP SERPL CALC-SCNC: 4 MMOL/L (ref 2–12)
ANION GAP SERPL CALC-SCNC: 5 MMOL/L (ref 2–12)
AST SERPL-CCNC: 73 U/L (ref 15–37)
BILIRUB DIRECT SERPL-MCNC: 0.1 MG/DL (ref 0–0.2)
BILIRUB SERPL-MCNC: 0.5 MG/DL (ref 0.2–1)
BLOOD GROUP ANTIBODIES SERPL: NORMAL
BUN SERPL-MCNC: 27 MG/DL (ref 6–20)
BUN SERPL-MCNC: 29 MG/DL (ref 6–20)
BUN/CREAT SERPL: 26 (ref 12–20)
BUN/CREAT SERPL: 27 (ref 12–20)
CALCIUM SERPL-MCNC: 8.6 MG/DL (ref 8.5–10.1)
CALCIUM SERPL-MCNC: 8.7 MG/DL (ref 8.5–10.1)
CHLORIDE SERPL-SCNC: 110 MMOL/L (ref 97–108)
CHLORIDE SERPL-SCNC: 111 MMOL/L (ref 97–108)
CO2 SERPL-SCNC: 25 MMOL/L (ref 21–32)
CO2 SERPL-SCNC: 25 MMOL/L (ref 21–32)
CREAT SERPL-MCNC: 1 MG/DL (ref 0.55–1.02)
CREAT SERPL-MCNC: 1.13 MG/DL (ref 0.55–1.02)
ECHO AO ARCH DIAM: 2.5 CM
ECHO AR MAX VEL PISA: 3.1 M/S
ECHO AV MEAN GRADIENT: 3 MMHG
ECHO AV MEAN VELOCITY: 0.9 M/S
ECHO AV PEAK GRADIENT: 7 MMHG
ECHO AV PEAK VELOCITY: 1.3 M/S
ECHO AV REGURGITANT PHT: 680.1 MILLISECOND
ECHO AV VELOCITY RATIO: 0.85
ECHO AV VTI: 31.3 CM
ECHO BSA: 1.84 M2
ECHO LA VOL A-L A2C: 47 ML (ref 22–52)
ECHO LA VOL A-L A4C: 59 ML (ref 22–52)
ECHO LA VOL BP: 52 ML (ref 22–52)
ECHO LA VOL MOD A2C: 44 ML (ref 22–52)
ECHO LA VOL MOD A4C: 55 ML (ref 22–52)
ECHO LA VOL/BSA BIPLANE: 29 ML/M2 (ref 16–34)
ECHO LA VOLUME AREA LENGTH: 56 ML
ECHO LA VOLUME INDEX A-L A2C: 26 ML/M2 (ref 16–34)
ECHO LA VOLUME INDEX A-L A4C: 33 ML/M2 (ref 16–34)
ECHO LA VOLUME INDEX AREA LENGTH: 31 ML/M2 (ref 16–34)
ECHO LA VOLUME INDEX MOD A2C: 25 ML/M2 (ref 16–34)
ECHO LA VOLUME INDEX MOD A4C: 31 ML/M2 (ref 16–34)
ECHO LV E' LATERAL VELOCITY: 4.6 CM/S
ECHO LV E' SEPTAL VELOCITY: 4.55 CM/S
ECHO LV EDV A2C: 39 ML
ECHO LV EDV A4C: 70 ML
ECHO LV EDV BP: 55 ML (ref 56–104)
ECHO LV EDV INDEX A4C: 39 ML/M2
ECHO LV EDV INDEX BP: 31 ML/M2
ECHO LV EDV NDEX A2C: 22 ML/M2
ECHO LV EJECTION FRACTION A2C: 59 %
ECHO LV EJECTION FRACTION A4C: 69 %
ECHO LV EJECTION FRACTION BIPLANE: 66 % (ref 55–100)
ECHO LV ESV A2C: 16 ML
ECHO LV ESV A4C: 22 ML
ECHO LV ESV BP: 19 ML (ref 19–49)
ECHO LV ESV INDEX A2C: 9 ML/M2
ECHO LV ESV INDEX A4C: 12 ML/M2
ECHO LV ESV INDEX BP: 11 ML/M2
ECHO LVOT AV VTI INDEX: 0.88
ECHO LVOT MEAN GRADIENT: 2 MMHG
ECHO LVOT PEAK GRADIENT: 5 MMHG
ECHO LVOT PEAK VELOCITY: 1.1 M/S
ECHO LVOT VTI: 27.6 CM
ECHO MV A VELOCITY: 0.61 M/S
ECHO MV E DECELERATION TIME (DT): 312.2 MS
ECHO MV E VELOCITY: 0.98 M/S
ECHO MV E/A RATIO: 1.61
ECHO MV E/E' LATERAL: 21.3
ECHO MV E/E' RATIO (AVERAGED): 21.42
ECHO MV E/E' SEPTAL: 21.54
ECHO MV LVOT VTI INDEX: 1.37
ECHO MV MAX VELOCITY: 1.3 M/S
ECHO MV MEAN GRADIENT: 2 MMHG
ECHO MV MEAN VELOCITY: 0.5 M/S
ECHO MV PEAK GRADIENT: 6 MMHG
ECHO MV REGURGITANT PEAK GRADIENT: 71 MMHG
ECHO MV REGURGITANT PEAK VELOCITY: 4.2 M/S
ECHO MV VTI: 37.8 CM
ECHO RV FREE WALL PEAK S': 8.4 CM/S
ECHO RV INTERNAL DIMENSION: 3.2 CM
ECHO RV TAPSE: 1 CM (ref 1.7–?)
EKG ATRIAL RATE: 118 BPM
EKG ATRIAL RATE: 95 BPM
EKG DIAGNOSIS: NORMAL
EKG DIAGNOSIS: NORMAL
EKG P AXIS: 72 DEGREES
EKG P AXIS: 76 DEGREES
EKG P-R INTERVAL: 168 MS
EKG P-R INTERVAL: 176 MS
EKG Q-T INTERVAL: 366 MS
EKG Q-T INTERVAL: 394 MS
EKG QRS DURATION: 136 MS
EKG QRS DURATION: 144 MS
EKG QTC CALCULATION (BAZETT): 495 MS
EKG QTC CALCULATION (BAZETT): 513 MS
EKG R AXIS: 105 DEGREES
EKG R AXIS: 125 DEGREES
EKG T AXIS: 1 DEGREES
EKG T AXIS: 37 DEGREES
EKG VENTRICULAR RATE: 118 BPM
EKG VENTRICULAR RATE: 95 BPM
ERYTHROCYTE [DISTWIDTH] IN BLOOD BY AUTOMATED COUNT: 13.8 % (ref 11.5–14.5)
GLOBULIN SER CALC-MCNC: 2.5 G/DL (ref 2–4)
GLUCOSE BLD STRIP.AUTO-MCNC: 144 MG/DL (ref 65–117)
GLUCOSE BLD STRIP.AUTO-MCNC: 146 MG/DL (ref 65–117)
GLUCOSE BLD STRIP.AUTO-MCNC: 148 MG/DL (ref 65–117)
GLUCOSE BLD STRIP.AUTO-MCNC: 171 MG/DL (ref 65–117)
GLUCOSE SERPL-MCNC: 138 MG/DL (ref 65–100)
GLUCOSE SERPL-MCNC: 182 MG/DL (ref 65–100)
HCT VFR BLD AUTO: 22.1 % (ref 35–47)
HGB BLD-MCNC: 7.1 G/DL (ref 11.5–16)
HISTORY CHECK: NORMAL
LACTATE SERPL-SCNC: 2 MMOL/L (ref 0.4–2)
MAGNESIUM SERPL-MCNC: 2.2 MG/DL (ref 1.6–2.4)
MCH RBC QN AUTO: 31.7 PG (ref 26–34)
MCHC RBC AUTO-ENTMCNC: 32.1 G/DL (ref 30–36.5)
MCV RBC AUTO: 98.7 FL (ref 80–99)
NRBC # BLD: 0 K/UL (ref 0–0.01)
NRBC BLD-RTO: 0 PER 100 WBC
PHOSPHATE SERPL-MCNC: 0.9 MG/DL (ref 2.6–4.7)
PLATELET # BLD AUTO: 104 K/UL (ref 150–400)
PMV BLD AUTO: 10.2 FL (ref 8.9–12.9)
POTASSIUM SERPL-SCNC: 4 MMOL/L (ref 3.5–5.1)
POTASSIUM SERPL-SCNC: 4.7 MMOL/L (ref 3.5–5.1)
PROCALCITONIN SERPL-MCNC: 0.16 NG/ML
PROT SERPL-MCNC: 5.1 G/DL (ref 6.4–8.2)
RBC # BLD AUTO: 2.24 M/UL (ref 3.8–5.2)
SERVICE CMNT-IMP: ABNORMAL
SODIUM SERPL-SCNC: 140 MMOL/L (ref 136–145)
SODIUM SERPL-SCNC: 140 MMOL/L (ref 136–145)
SPECIMEN EXP DATE BLD: NORMAL
WBC # BLD AUTO: 6.9 K/UL (ref 3.6–11)

## 2024-11-15 PROCEDURE — 36415 COLL VENOUS BLD VENIPUNCTURE: CPT

## 2024-11-15 PROCEDURE — C9290 INJ, BUPIVACAINE LIPOSOME: HCPCS | Performed by: SURGERY

## 2024-11-15 PROCEDURE — 3700000001 HC ADD 15 MINUTES (ANESTHESIA): Performed by: SURGERY

## 2024-11-15 PROCEDURE — 93010 ELECTROCARDIOGRAM REPORT: CPT | Performed by: INTERNAL MEDICINE

## 2024-11-15 PROCEDURE — 93306 TTE W/DOPPLER COMPLETE: CPT

## 2024-11-15 PROCEDURE — 85027 COMPLETE CBC AUTOMATED: CPT

## 2024-11-15 PROCEDURE — 3600000019 HC SURGERY ROBOT ADDTL 15MIN: Performed by: SURGERY

## 2024-11-15 PROCEDURE — 2580000003 HC RX 258: Performed by: NURSE ANESTHETIST, CERTIFIED REGISTERED

## 2024-11-15 PROCEDURE — 83735 ASSAY OF MAGNESIUM: CPT

## 2024-11-15 PROCEDURE — 86900 BLOOD TYPING SEROLOGIC ABO: CPT

## 2024-11-15 PROCEDURE — 2580000003 HC RX 258: Performed by: STUDENT IN AN ORGANIZED HEALTH CARE EDUCATION/TRAINING PROGRAM

## 2024-11-15 PROCEDURE — 80048 BASIC METABOLIC PNL TOTAL CA: CPT

## 2024-11-15 PROCEDURE — 3600000009 HC SURGERY ROBOT BASE: Performed by: SURGERY

## 2024-11-15 PROCEDURE — 6360000002 HC RX W HCPCS: Performed by: NURSE PRACTITIONER

## 2024-11-15 PROCEDURE — 86901 BLOOD TYPING SEROLOGIC RH(D): CPT

## 2024-11-15 PROCEDURE — 94003 VENT MGMT INPAT SUBQ DAY: CPT

## 2024-11-15 PROCEDURE — 2500000003 HC RX 250 WO HCPCS: Performed by: NURSE PRACTITIONER

## 2024-11-15 PROCEDURE — 2720000010 HC SURG SUPPLY STERILE: Performed by: SURGERY

## 2024-11-15 PROCEDURE — 93306 TTE W/DOPPLER COMPLETE: CPT | Performed by: STUDENT IN AN ORGANIZED HEALTH CARE EDUCATION/TRAINING PROGRAM

## 2024-11-15 PROCEDURE — 80076 HEPATIC FUNCTION PANEL: CPT

## 2024-11-15 PROCEDURE — 2500000003 HC RX 250 WO HCPCS: Performed by: NURSE ANESTHETIST, CERTIFIED REGISTERED

## 2024-11-15 PROCEDURE — 82962 GLUCOSE BLOOD TEST: CPT

## 2024-11-15 PROCEDURE — 83605 ASSAY OF LACTIC ACID: CPT

## 2024-11-15 PROCEDURE — 88302 TISSUE EXAM BY PATHOLOGIST: CPT

## 2024-11-15 PROCEDURE — 2000000000 HC ICU R&B

## 2024-11-15 PROCEDURE — 6360000002 HC RX W HCPCS: Performed by: STUDENT IN AN ORGANIZED HEALTH CARE EDUCATION/TRAINING PROGRAM

## 2024-11-15 PROCEDURE — APPSS30 APP SPLIT SHARED TIME 16-30 MINUTES: Performed by: NURSE PRACTITIONER

## 2024-11-15 PROCEDURE — 94761 N-INVAS EAR/PLS OXIMETRY MLT: CPT

## 2024-11-15 PROCEDURE — 51702 INSERT TEMP BLADDER CATH: CPT

## 2024-11-15 PROCEDURE — 86850 RBC ANTIBODY SCREEN: CPT

## 2024-11-15 PROCEDURE — 86923 COMPATIBILITY TEST ELECTRIC: CPT

## 2024-11-15 PROCEDURE — 6360000002 HC RX W HCPCS: Performed by: NURSE ANESTHETIST, CERTIFIED REGISTERED

## 2024-11-15 PROCEDURE — 84145 PROCALCITONIN (PCT): CPT

## 2024-11-15 PROCEDURE — 99024 POSTOP FOLLOW-UP VISIT: CPT | Performed by: PHYSICIAN ASSISTANT

## 2024-11-15 PROCEDURE — 6360000002 HC RX W HCPCS: Performed by: SURGERY

## 2024-11-15 PROCEDURE — 2580000003 HC RX 258: Performed by: NURSE PRACTITIONER

## 2024-11-15 PROCEDURE — 3700000000 HC ANESTHESIA ATTENDED CARE: Performed by: SURGERY

## 2024-11-15 PROCEDURE — 84100 ASSAY OF PHOSPHORUS: CPT

## 2024-11-15 PROCEDURE — 2709999900 HC NON-CHARGEABLE SUPPLY: Performed by: SURGERY

## 2024-11-15 PROCEDURE — S2900 ROBOTIC SURGICAL SYSTEM: HCPCS | Performed by: SURGERY

## 2024-11-15 RX ORDER — ROCURONIUM BROMIDE 10 MG/ML
INJECTION, SOLUTION INTRAVENOUS
Status: DISCONTINUED | OUTPATIENT
Start: 2024-11-15 | End: 2024-11-15 | Stop reason: SDUPTHER

## 2024-11-15 RX ORDER — FENTANYL CITRATE 50 UG/ML
INJECTION, SOLUTION INTRAMUSCULAR; INTRAVENOUS
Status: DISCONTINUED | OUTPATIENT
Start: 2024-11-15 | End: 2024-11-15 | Stop reason: SDUPTHER

## 2024-11-15 RX ORDER — EPHEDRINE SULFATE/0.9% NACL/PF 50 MG/5 ML
SYRINGE (ML) INTRAVENOUS
Status: DISCONTINUED | OUTPATIENT
Start: 2024-11-15 | End: 2024-11-15 | Stop reason: SDUPTHER

## 2024-11-15 RX ORDER — SODIUM CHLORIDE 9 MG/ML
INJECTION, SOLUTION INTRAVENOUS PRN
Status: DISCONTINUED | OUTPATIENT
Start: 2024-11-15 | End: 2024-11-21 | Stop reason: HOSPADM

## 2024-11-15 RX ORDER — SODIUM CHLORIDE, SODIUM LACTATE, POTASSIUM CHLORIDE, CALCIUM CHLORIDE 600; 310; 30; 20 MG/100ML; MG/100ML; MG/100ML; MG/100ML
INJECTION, SOLUTION INTRAVENOUS
Status: DISCONTINUED | OUTPATIENT
Start: 2024-11-15 | End: 2024-11-15 | Stop reason: SDUPTHER

## 2024-11-15 RX ADMIN — Medication 20 MG: at 20:15

## 2024-11-15 RX ADMIN — Medication 20 MG: at 22:25

## 2024-11-15 RX ADMIN — DEXMEDETOMIDINE HYDROCHLORIDE 1 MCG/KG/HR: 400 INJECTION INTRAVENOUS at 07:54

## 2024-11-15 RX ADMIN — ROCURONIUM BROMIDE 10 MG: 10 INJECTION INTRAVENOUS at 19:50

## 2024-11-15 RX ADMIN — DEXMEDETOMIDINE HYDROCHLORIDE 1 MCG/KG/HR: 400 INJECTION INTRAVENOUS at 12:45

## 2024-11-15 RX ADMIN — Medication 10 MG: at 22:26

## 2024-11-15 RX ADMIN — DEXMEDETOMIDINE HYDROCHLORIDE 0.2 MCG/KG/HR: 400 INJECTION INTRAVENOUS at 23:57

## 2024-11-15 RX ADMIN — Medication 50 MCG: at 03:09

## 2024-11-15 RX ADMIN — SODIUM CHLORIDE, PRESERVATIVE FREE 10 ML: 5 INJECTION INTRAVENOUS at 19:20

## 2024-11-15 RX ADMIN — ROCURONIUM BROMIDE 40 MG: 10 INJECTION INTRAVENOUS at 20:14

## 2024-11-15 RX ADMIN — SODIUM CHLORIDE, POTASSIUM CHLORIDE, SODIUM LACTATE AND CALCIUM CHLORIDE: 600; 310; 30; 20 INJECTION, SOLUTION INTRAVENOUS at 20:05

## 2024-11-15 RX ADMIN — Medication 10 MG: at 20:04

## 2024-11-15 RX ADMIN — DEXMEDETOMIDINE HYDROCHLORIDE 1 MCG/KG/HR: 400 INJECTION INTRAVENOUS at 17:38

## 2024-11-15 RX ADMIN — FENTANYL CITRATE 50 MCG: 50 INJECTION, SOLUTION INTRAMUSCULAR; INTRAVENOUS at 20:17

## 2024-11-15 RX ADMIN — PANTOPRAZOLE SODIUM 40 MG: 40 INJECTION, POWDER, FOR SOLUTION INTRAVENOUS at 09:18

## 2024-11-15 RX ADMIN — ROCURONIUM BROMIDE 10 MG: 10 INJECTION INTRAVENOUS at 22:18

## 2024-11-15 RX ADMIN — ENOXAPARIN SODIUM 40 MG: 100 INJECTION SUBCUTANEOUS at 09:17

## 2024-11-15 RX ADMIN — SODIUM CHLORIDE, PRESERVATIVE FREE 10 ML: 5 INJECTION INTRAVENOUS at 09:18

## 2024-11-15 RX ADMIN — ROCURONIUM BROMIDE 10 MG: 10 INJECTION INTRAVENOUS at 21:32

## 2024-11-15 RX ADMIN — FENTANYL CITRATE 25 MCG: 50 INJECTION, SOLUTION INTRAMUSCULAR; INTRAVENOUS at 20:00

## 2024-11-15 RX ADMIN — POTASSIUM PHOSPHATE 30 MMOL: 236; 224 INJECTION, SOLUTION INTRAVENOUS at 05:51

## 2024-11-15 RX ADMIN — Medication 5 MG: at 20:39

## 2024-11-15 RX ADMIN — Medication 10 MG: at 21:37

## 2024-11-15 RX ADMIN — DEXMEDETOMIDINE HYDROCHLORIDE 0.8 MCG/KG/HR: 400 INJECTION INTRAVENOUS at 02:23

## 2024-11-15 RX ADMIN — Medication 75 MCG/HR: at 09:41

## 2024-11-15 RX ADMIN — CEFAZOLIN SODIUM 2000 MG: 1 POWDER, FOR SOLUTION INTRAMUSCULAR; INTRAVENOUS at 20:01

## 2024-11-15 RX ADMIN — Medication 10 MG: at 19:46

## 2024-11-15 RX ADMIN — Medication 50 MCG: at 07:05

## 2024-11-15 RX ADMIN — FENTANYL CITRATE 25 MCG: 50 INJECTION, SOLUTION INTRAMUSCULAR; INTRAVENOUS at 19:48

## 2024-11-15 ASSESSMENT — PAIN SCALES - GENERAL: PAINLEVEL_OUTOF10: 0

## 2024-11-15 ASSESSMENT — PULMONARY FUNCTION TESTS
PIF_VALUE: 26
PIF_VALUE: 26
PIF_VALUE: 22
PIF_VALUE: 28
PIF_VALUE: 22
PIF_VALUE: 28

## 2024-11-15 NOTE — PROCEDURES
PROCEDURE NOTE  Date: 11/14/2024   Name: Radha Wynn  YOB: 1940         Procedure Note - Intubation:   Performed by CHELSIE Terry NP .     Diagnosis: Respiratory Failure  Insertion Date: 11/14/24 Time:2100   Obtained Consent? N/A; emergent   Procedure Location:  ICU.      Immediately prior to the procedure, the patient was reevaluated and found suitable for the planned procedure and any planned medications.  Immediately prior to the procedure a time out was called to verify the correct patient, procedure, equipment, staff, and marking as appropriate.    Preoxygenation applied via BVM  Medications given were etomidate and rocuronium (Zemuron).   A number 7.5 cuffed   ETT was placed to 23 cm at the teeth.   Placement was evaluated by noting bilateral, symmetric breath sounds, good end-tidal CO2 detector color change , and no breath sounds over stomach.    Attempts required: 1.    Complications: none.    RSI was used..  The procedure was tolerated well.  A follow-up chest x-ray was ordered post procedure.          CHELSIE Terry NP  Critical Care Medicine  [facility]

## 2024-11-15 NOTE — SIGNIFICANT EVENT
laboratory data, radiology results, discussions with ICU VINNIE, nursing, patient's family and monitoring for potential decompensation.

## 2024-11-16 ENCOUNTER — APPOINTMENT (OUTPATIENT)
Facility: HOSPITAL | Age: 84
DRG: 480 | End: 2024-11-16
Payer: MEDICARE

## 2024-11-16 LAB
ALBUMIN SERPL-MCNC: 2.5 G/DL (ref 3.5–5)
ALBUMIN/GLOB SERPL: 0.9 (ref 1.1–2.2)
ALP SERPL-CCNC: 53 U/L (ref 45–117)
ALT SERPL-CCNC: 66 U/L (ref 12–78)
ANION GAP SERPL CALC-SCNC: 5 MMOL/L (ref 2–12)
APPEARANCE UR: CLEAR
ARTERIAL PATENCY WRIST A: YES
AST SERPL-CCNC: 74 U/L (ref 15–37)
BACTERIA URNS QL MICRO: NEGATIVE /HPF
BASE EXCESS BLDA CALC-SCNC: 0.9 MMOL/L
BDY SITE: ABNORMAL
BILIRUB DIRECT SERPL-MCNC: 0.4 MG/DL (ref 0–0.2)
BILIRUB SERPL-MCNC: 0.8 MG/DL (ref 0.2–1)
BILIRUB UR QL: NEGATIVE
BUN SERPL-MCNC: 22 MG/DL (ref 6–20)
BUN/CREAT SERPL: 23 (ref 12–20)
CALCIUM SERPL-MCNC: 8.5 MG/DL (ref 8.5–10.1)
CHLORIDE SERPL-SCNC: 111 MMOL/L (ref 97–108)
CO2 SERPL-SCNC: 25 MMOL/L (ref 21–32)
COLOR UR: ABNORMAL
COMMENT:: NORMAL
CREAT SERPL-MCNC: 0.94 MG/DL (ref 0.55–1.02)
EPITH CASTS URNS QL MICRO: ABNORMAL /LPF
ERYTHROCYTE [DISTWIDTH] IN BLOOD BY AUTOMATED COUNT: 14.3 % (ref 11.5–14.5)
FIO2 ON VENT: 30 %
GLOBULIN SER CALC-MCNC: 2.7 G/DL (ref 2–4)
GLUCOSE BLD STRIP.AUTO-MCNC: 128 MG/DL (ref 65–117)
GLUCOSE BLD STRIP.AUTO-MCNC: 151 MG/DL (ref 65–117)
GLUCOSE SERPL-MCNC: 143 MG/DL (ref 65–100)
GLUCOSE UR STRIP.AUTO-MCNC: NEGATIVE MG/DL
HCO3 BLDA-SCNC: 25 MMOL/L (ref 22–26)
HCT VFR BLD AUTO: 24 % (ref 35–47)
HGB BLD-MCNC: 7.7 G/DL (ref 11.5–16)
HGB UR QL STRIP: ABNORMAL
HYALINE CASTS URNS QL MICRO: ABNORMAL /LPF (ref 0–2)
KETONES UR QL STRIP.AUTO: NEGATIVE MG/DL
LEUKOCYTE ESTERASE UR QL STRIP.AUTO: ABNORMAL
MAGNESIUM SERPL-MCNC: 2 MG/DL (ref 1.6–2.4)
MCH RBC QN AUTO: 31.6 PG (ref 26–34)
MCHC RBC AUTO-ENTMCNC: 32.1 G/DL (ref 30–36.5)
MCV RBC AUTO: 98.4 FL (ref 80–99)
NITRITE UR QL STRIP.AUTO: NEGATIVE
NRBC # BLD: 0 K/UL (ref 0–0.01)
NRBC BLD-RTO: 0 PER 100 WBC
PCO2 BLDA: 37 MMHG (ref 35–45)
PEEP RESPIRATORY: 5
PH BLDA: 7.45 (ref 7.35–7.45)
PH UR STRIP: 5.5 (ref 5–8)
PHOSPHATE SERPL-MCNC: 2.1 MG/DL (ref 2.6–4.7)
PLATELET # BLD AUTO: 123 K/UL (ref 150–400)
PMV BLD AUTO: 10.6 FL (ref 8.9–12.9)
PO2 BLDA: 79 MMHG (ref 80–100)
POTASSIUM SERPL-SCNC: 4 MMOL/L (ref 3.5–5.1)
PRESSURE SUPPORT SETTING VENT: 10
PROCALCITONIN SERPL-MCNC: 0.11 NG/ML
PROT SERPL-MCNC: 5.2 G/DL (ref 6.4–8.2)
PROT UR STRIP-MCNC: ABNORMAL MG/DL
RBC # BLD AUTO: 2.44 M/UL (ref 3.8–5.2)
RBC #/AREA URNS HPF: ABNORMAL /HPF (ref 0–5)
SAO2 % BLD: 96 % (ref 92–97)
SAO2% DEVICE SAO2% SENSOR NAME: ABNORMAL
SERVICE CMNT-IMP: ABNORMAL
SERVICE CMNT-IMP: ABNORMAL
SODIUM SERPL-SCNC: 141 MMOL/L (ref 136–145)
SP GR UR REFRACTOMETRY: 1.03 (ref 1–1.03)
SPECIMEN HOLD: NORMAL
SPECIMEN SITE: ABNORMAL
URINE CULTURE IF INDICATED: ABNORMAL
UROBILINOGEN UR QL STRIP.AUTO: 1 EU/DL (ref 0.2–1)
WBC # BLD AUTO: 7.7 K/UL (ref 3.6–11)
WBC URNS QL MICRO: ABNORMAL /HPF (ref 0–4)

## 2024-11-16 PROCEDURE — 6360000002 HC RX W HCPCS: Performed by: NURSE PRACTITIONER

## 2024-11-16 PROCEDURE — 97530 THERAPEUTIC ACTIVITIES: CPT

## 2024-11-16 PROCEDURE — 36415 COLL VENOUS BLD VENIPUNCTURE: CPT

## 2024-11-16 PROCEDURE — 84145 PROCALCITONIN (PCT): CPT

## 2024-11-16 PROCEDURE — 2500000003 HC RX 250 WO HCPCS: Performed by: NURSE PRACTITIONER

## 2024-11-16 PROCEDURE — 83735 ASSAY OF MAGNESIUM: CPT

## 2024-11-16 PROCEDURE — 81001 URINALYSIS AUTO W/SCOPE: CPT

## 2024-11-16 PROCEDURE — 2580000003 HC RX 258: Performed by: STUDENT IN AN ORGANIZED HEALTH CARE EDUCATION/TRAINING PROGRAM

## 2024-11-16 PROCEDURE — 0DBU4ZZ EXCISION OF OMENTUM, PERCUTANEOUS ENDOSCOPIC APPROACH: ICD-10-PCS | Performed by: SURGERY

## 2024-11-16 PROCEDURE — 82962 GLUCOSE BLOOD TEST: CPT

## 2024-11-16 PROCEDURE — 99024 POSTOP FOLLOW-UP VISIT: CPT | Performed by: PHYSICIAN ASSISTANT

## 2024-11-16 PROCEDURE — 0DV44ZZ RESTRICTION OF ESOPHAGOGASTRIC JUNCTION, PERCUTANEOUS ENDOSCOPIC APPROACH: ICD-10-PCS | Performed by: SURGERY

## 2024-11-16 PROCEDURE — 5A1945Z RESPIRATORY VENTILATION, 24-96 CONSECUTIVE HOURS: ICD-10-PCS | Performed by: INTERNAL MEDICINE

## 2024-11-16 PROCEDURE — 6370000000 HC RX 637 (ALT 250 FOR IP): Performed by: NURSE PRACTITIONER

## 2024-11-16 PROCEDURE — 82803 BLOOD GASES ANY COMBINATION: CPT

## 2024-11-16 PROCEDURE — 84100 ASSAY OF PHOSPHORUS: CPT

## 2024-11-16 PROCEDURE — 87040 BLOOD CULTURE FOR BACTERIA: CPT

## 2024-11-16 PROCEDURE — 94003 VENT MGMT INPAT SUBQ DAY: CPT

## 2024-11-16 PROCEDURE — 8E0W4CZ ROBOTIC ASSISTED PROCEDURE OF TRUNK REGION, PERCUTANEOUS ENDOSCOPIC APPROACH: ICD-10-PCS | Performed by: SURGERY

## 2024-11-16 PROCEDURE — 6360000002 HC RX W HCPCS: Performed by: INTERNAL MEDICINE

## 2024-11-16 PROCEDURE — 2580000003 HC RX 258: Performed by: NURSE PRACTITIONER

## 2024-11-16 PROCEDURE — 36600 WITHDRAWAL OF ARTERIAL BLOOD: CPT

## 2024-11-16 PROCEDURE — 85027 COMPLETE CBC AUTOMATED: CPT

## 2024-11-16 PROCEDURE — 2700000000 HC OXYGEN THERAPY PER DAY

## 2024-11-16 PROCEDURE — 2000000000 HC ICU R&B

## 2024-11-16 PROCEDURE — 89220 SPUTUM SPECIMEN COLLECTION: CPT

## 2024-11-16 PROCEDURE — 0BH17EZ INSERTION OF ENDOTRACHEAL AIRWAY INTO TRACHEA, VIA NATURAL OR ARTIFICIAL OPENING: ICD-10-PCS | Performed by: INTERNAL MEDICINE

## 2024-11-16 PROCEDURE — 71045 X-RAY EXAM CHEST 1 VIEW: CPT

## 2024-11-16 PROCEDURE — 80076 HEPATIC FUNCTION PANEL: CPT

## 2024-11-16 PROCEDURE — 94761 N-INVAS EAR/PLS OXIMETRY MLT: CPT

## 2024-11-16 PROCEDURE — 0BQT4ZZ REPAIR DIAPHRAGM, PERCUTANEOUS ENDOSCOPIC APPROACH: ICD-10-PCS | Performed by: SURGERY

## 2024-11-16 PROCEDURE — 80048 BASIC METABOLIC PNL TOTAL CA: CPT

## 2024-11-16 PROCEDURE — 87086 URINE CULTURE/COLONY COUNT: CPT

## 2024-11-16 RX ORDER — FENTANYL CITRATE-0.9 % NACL/PF 10 MCG/ML
25-200 PLASTIC BAG, INJECTION (ML) INTRAVENOUS CONTINUOUS
Status: DISCONTINUED | OUTPATIENT
Start: 2024-11-16 | End: 2024-11-16

## 2024-11-16 RX ORDER — FENTANYL CITRATE-0.9 % NACL/PF 10 MCG/ML
25 PLASTIC BAG, INJECTION (ML) INTRAVENOUS CONTINUOUS
Status: DISCONTINUED | OUTPATIENT
Start: 2024-11-16 | End: 2024-11-17

## 2024-11-16 RX ORDER — FENTANYL CITRATE 50 UG/ML
25 INJECTION, SOLUTION INTRAMUSCULAR; INTRAVENOUS
Status: DISCONTINUED | OUTPATIENT
Start: 2024-11-16 | End: 2024-11-21 | Stop reason: HOSPADM

## 2024-11-16 RX ORDER — ACETAMINOPHEN 650 MG/1
650 SUPPOSITORY RECTAL EVERY 6 HOURS PRN
Status: DISCONTINUED | OUTPATIENT
Start: 2024-11-16 | End: 2024-11-20

## 2024-11-16 RX ORDER — FENTANYL CITRATE-0.9 % NACL/PF 20 MCG/2ML
25 SYRINGE (ML) INTRAVENOUS EVERY 30 MIN PRN
Status: DISCONTINUED | OUTPATIENT
Start: 2024-11-16 | End: 2024-11-16

## 2024-11-16 RX ORDER — FENTANYL CITRATE 50 UG/ML
50 INJECTION, SOLUTION INTRAMUSCULAR; INTRAVENOUS ONCE
Status: COMPLETED | OUTPATIENT
Start: 2024-11-16 | End: 2024-11-16

## 2024-11-16 RX ADMIN — PIPERACILLIN AND TAZOBACTAM 3375 MG: 3; .375 INJECTION, POWDER, LYOPHILIZED, FOR SOLUTION INTRAVENOUS at 09:24

## 2024-11-16 RX ADMIN — PIPERACILLIN AND TAZOBACTAM 4500 MG: 4; .5 INJECTION, POWDER, FOR SOLUTION INTRAVENOUS at 04:57

## 2024-11-16 RX ADMIN — SODIUM CHLORIDE, PRESERVATIVE FREE 10 ML: 5 INJECTION INTRAVENOUS at 07:35

## 2024-11-16 RX ADMIN — POTASSIUM PHOSPHATE 20 MMOL: 236; 224 INJECTION, SOLUTION INTRAVENOUS at 05:30

## 2024-11-16 RX ADMIN — DEXMEDETOMIDINE HYDROCHLORIDE 0.8 MCG/KG/HR: 400 INJECTION INTRAVENOUS at 06:15

## 2024-11-16 RX ADMIN — DEXMEDETOMIDINE HYDROCHLORIDE 0.8 MCG/KG/HR: 400 INJECTION INTRAVENOUS at 18:57

## 2024-11-16 RX ADMIN — ACETAMINOPHEN 650 MG: 650 SUPPOSITORY RECTAL at 11:13

## 2024-11-16 RX ADMIN — ACETAMINOPHEN 650 MG: 650 SUPPOSITORY RECTAL at 03:52

## 2024-11-16 RX ADMIN — FENTANYL CITRATE 50 MCG: 50 INJECTION INTRAMUSCULAR; INTRAVENOUS at 02:45

## 2024-11-16 RX ADMIN — FENTANYL CITRATE 25 MCG: 50 INJECTION INTRAMUSCULAR; INTRAVENOUS at 05:18

## 2024-11-16 RX ADMIN — DEXMEDETOMIDINE HYDROCHLORIDE 0.8 MCG/KG/HR: 400 INJECTION INTRAVENOUS at 12:05

## 2024-11-16 RX ADMIN — Medication 75 MCG/HR: at 00:38

## 2024-11-16 RX ADMIN — Medication 50 MCG: at 00:38

## 2024-11-16 RX ADMIN — PIPERACILLIN AND TAZOBACTAM 3375 MG: 3; .375 INJECTION, POWDER, LYOPHILIZED, FOR SOLUTION INTRAVENOUS at 17:57

## 2024-11-16 RX ADMIN — PANTOPRAZOLE SODIUM 40 MG: 40 INJECTION, POWDER, FOR SOLUTION INTRAVENOUS at 07:35

## 2024-11-16 RX ADMIN — Medication 25 MCG/HR: at 07:51

## 2024-11-16 ASSESSMENT — PULMONARY FUNCTION TESTS
PIF_VALUE: 22
PIF_VALUE: 29
PIF_VALUE: 30
PIF_VALUE: 24
PIF_VALUE: 16
PIF_VALUE: 10
PIF_VALUE: 22
PIF_VALUE: 24

## 2024-11-16 ASSESSMENT — PAIN SCALES - GENERAL
PAINLEVEL_OUTOF10: 0

## 2024-11-16 ASSESSMENT — PAIN SCALES - WONG BAKER
WONGBAKER_NUMERICALRESPONSE: NO HURT

## 2024-11-16 ASSESSMENT — PAIN DESCRIPTION - FREQUENCY: FREQUENCY: INTERMITTENT

## 2024-11-16 ASSESSMENT — PAIN DESCRIPTION - ORIENTATION: ORIENTATION: LEFT

## 2024-11-16 ASSESSMENT — PAIN DESCRIPTION - DESCRIPTORS: DESCRIPTORS: ACHING

## 2024-11-16 ASSESSMENT — PAIN DESCRIPTION - LOCATION: LOCATION: HIP

## 2024-11-16 ASSESSMENT — PAIN DESCRIPTION - PAIN TYPE: TYPE: SURGICAL PAIN

## 2024-11-16 NOTE — ANESTHESIA PRE PROCEDURE
Department of Anesthesiology  Preprocedure Note       Name:  Radha Wynn   Age:  84 y.o.  :  1940                                          MRN:  [id number]         Date:  11/15/2024      Surgeon: Surgeon(s):  Burt Bangura MD    Procedure: Procedure(s):  ROBOT ASSISTED PARAESOPHAGEAL HERNIA REPAIR TOUPET FUNDOPLICATION LAPAROSCOPIC ROBOTIC    Medications prior to admission:   Prior to Admission medications    Medication Sig Start Date End Date Taking? Authorizing Provider   hydrALAZINE (APRESOLINE) 50 MG tablet Take 1 tablet by mouth 3 times daily 24   Abhilash Schultz MD   rosuvastatin (CRESTOR) 5 MG tablet Take 1 tablet by mouth three times a week 5/3/24   Suleman Heath MD   cloNIDine (CATAPRES) 0.1 MG tablet Take 1 tablet by mouth daily as needed for High Blood Pressure 24   Suleman Heath MD   Multiple Vitamins-Minerals (CENTRUM SILVER PO) Take by mouth    Brian Byrne MD   vitamin D (CHOLECALCIFEROL) 125 MCG (5000 UT) CAPS capsule Take 1 capsule by mouth daily    ProviderBrian MD   apixaban (ELIQUIS) 5 MG TABS tablet Take 1 tablet by mouth 2 times daily 24   Suleman Heath MD   candesartan (ATACAND) 16 MG tablet Take 1 tablet by mouth daily 23   Suleman Heath MD   Calcium Carbonate-Vitamin D (OYSTER SHELL CALCIUM/D) 500-5 MG-MCG TABS Take 1 tablet by mouth every evening    Automatic Reconciliation, Ar   escitalopram (LEXAPRO) 5 MG tablet Take 1 tablet by mouth daily 22   Automatic Reconciliation, Ar   ezetimibe (ZETIA) 10 MG tablet Take 1 tablet by mouth every evening 22   Automatic Reconciliation, Ar   levothyroxine (SYNTHROID) 50 MCG tablet Take 1 tablet by mouth every morning (before breakfast)    Automatic Reconciliation, Ar       Current medications:    Current Facility-Administered Medications   Medication Dose Route Frequency Provider Last Rate Last Admin   • pantoprazole (PROTONIX) 40 mg in sodium chloride (PF) 0.9 % 10 mL

## 2024-11-16 NOTE — BRIEF OP NOTE
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]    Post-Op Diagnosis:  Sa me       Procedure(s):  ROBOT ASSISTED REDUCTION OF INCARCERATED STOMACH AND COLON,PARAESOPHAGEAL HERNIA REPAIR TOUPET FUNDOPLICATION,PARTIAL OMENENTECTOMY    Surgeon(s):  Burt Bangura MD    Assistant:  Surgical Assistant: Falguni Carrillo Mohammad    Anesthesia: General    Estimated Blood Loss (mL): 50cc    Complications: None    Specimens:   ID Type Source Tests Collected by Time Destination   1 : HERNIA SAC AND PART OF OMENTUM Tissue Abdomen SURGICAL PATHOLOGY Burt Bangura MD 11/15/2024 5741        Implants:  * No implants in log *    Type  Drains:   NG/OG/NJ/NE Tube Left nostril (Active)   Surrounding Skin Clean, dry & intact 11/16/24 0000   Securement device Bridle 11/16/24 0000   Status Suction-low intermittent 11/16/24 0000   Placement Verified External Catheter Length 11/16/24 0000   NG/OG/NJ/NE External Measurement (cm) 64 cm 11/16/24 0000   Drainage Appearance Brown 11/16/24 0000   Output (mL) 50 ml 11/16/24 0000       Urinary Catheter 11/15/24 Saunders-Temperature (Active)   $ Urethral catheter insertion $ Not inserted for procedure 11/15/24 0400   Catheter Indications Need for fluid volume management of the critically ill patient in a critical care setting 11/16/24 0000   Site Assessment No urethral drainage 11/16/24 0000   Urine Color Yellow 11/16/24 0000   Urine Appearance Clear 11/16/24 0000   Urine Odor Other (Comment) 11/16/24 0000   Collection Container Standard 11/16/24 0000   Securement Method Securing device (Describe) 11/16/24 0000   Catheter Care  Perineal wipes 11/15/24 1915   Catheter Best Practices  Drainage bag less than half full;Drainage tube clipped to bed;Bag below bladder 11/16/24 0000   Status Draining;Patent 11/16/24 0000   Output (mL) 275 mL

## 2024-11-17 ENCOUNTER — APPOINTMENT (OUTPATIENT)
Facility: HOSPITAL | Age: 84
DRG: 480 | End: 2024-11-17
Payer: MEDICARE

## 2024-11-17 LAB
ALBUMIN SERPL-MCNC: 2.2 G/DL (ref 3.5–5)
ALBUMIN/GLOB SERPL: 0.8 (ref 1.1–2.2)
ALP SERPL-CCNC: 59 U/L (ref 45–117)
ALT SERPL-CCNC: 43 U/L (ref 12–78)
ANION GAP SERPL CALC-SCNC: 2 MMOL/L (ref 2–12)
ANION GAP SERPL CALC-SCNC: 3 MMOL/L (ref 2–12)
ARTERIAL PATENCY WRIST A: POSITIVE
AST SERPL-CCNC: 47 U/L (ref 15–37)
BACTERIA SPEC CULT: NORMAL
BASE EXCESS BLD CALC-SCNC: 1.2 MMOL/L
BDY SITE: NORMAL
BILIRUB DIRECT SERPL-MCNC: 0.4 MG/DL (ref 0–0.2)
BILIRUB SERPL-MCNC: 1.5 MG/DL (ref 0.2–1)
BUN SERPL-MCNC: 15 MG/DL (ref 6–20)
BUN SERPL-MCNC: 16 MG/DL (ref 6–20)
BUN/CREAT SERPL: 17 (ref 12–20)
BUN/CREAT SERPL: 20 (ref 12–20)
CALCIUM SERPL-MCNC: 7.8 MG/DL (ref 8.5–10.1)
CALCIUM SERPL-MCNC: 8 MG/DL (ref 8.5–10.1)
CHLORIDE SERPL-SCNC: 113 MMOL/L (ref 97–108)
CHLORIDE SERPL-SCNC: 115 MMOL/L (ref 97–108)
CO2 SERPL-SCNC: 26 MMOL/L (ref 21–32)
CO2 SERPL-SCNC: 27 MMOL/L (ref 21–32)
CREAT SERPL-MCNC: 0.82 MG/DL (ref 0.55–1.02)
CREAT SERPL-MCNC: 0.89 MG/DL (ref 0.55–1.02)
ERYTHROCYTE [DISTWIDTH] IN BLOOD BY AUTOMATED COUNT: 14.6 % (ref 11.5–14.5)
GAS FLOW.O2 O2 DELIVERY SYS: NORMAL
GLOBULIN SER CALC-MCNC: 2.6 G/DL (ref 2–4)
GLUCOSE BLD STRIP.AUTO-MCNC: 116 MG/DL (ref 65–117)
GLUCOSE BLD STRIP.AUTO-MCNC: 118 MG/DL (ref 65–117)
GLUCOSE BLD STRIP.AUTO-MCNC: 151 MG/DL (ref 65–117)
GLUCOSE BLD STRIP.AUTO-MCNC: 99 MG/DL (ref 65–117)
GLUCOSE BLD STRIP.AUTO-MCNC: 99 MG/DL (ref 65–117)
GLUCOSE SERPL-MCNC: 103 MG/DL (ref 65–100)
GLUCOSE SERPL-MCNC: 129 MG/DL (ref 65–100)
HCO3 BLD-SCNC: 26.5 MMOL/L (ref 21–28)
HCT VFR BLD AUTO: 20.8 % (ref 35–47)
HCT VFR BLD AUTO: 25 % (ref 35–47)
HGB BLD-MCNC: 6.8 G/DL (ref 11.5–16)
HGB BLD-MCNC: 8.2 G/DL (ref 11.5–16)
HISTORY CHECK: NORMAL
MAGNESIUM SERPL-MCNC: 2 MG/DL (ref 1.6–2.4)
MCH RBC QN AUTO: 32.1 PG (ref 26–34)
MCHC RBC AUTO-ENTMCNC: 32.7 G/DL (ref 30–36.5)
MCV RBC AUTO: 98.1 FL (ref 80–99)
NRBC # BLD: 0 K/UL (ref 0–0.01)
NRBC BLD-RTO: 0 PER 100 WBC
O2/TOTAL GAS SETTING VFR VENT: 2 %
PCO2 BLD: 45 MMHG (ref 35–48)
PH BLD: 7.38 (ref 7.35–7.45)
PHOSPHATE SERPL-MCNC: 2.4 MG/DL (ref 2.6–4.7)
PLATELET # BLD AUTO: 105 K/UL (ref 150–400)
PMV BLD AUTO: 10.7 FL (ref 8.9–12.9)
PO2 BLD: 83 MMHG (ref 83–108)
POTASSIUM SERPL-SCNC: 3.6 MMOL/L (ref 3.5–5.1)
POTASSIUM SERPL-SCNC: 3.9 MMOL/L (ref 3.5–5.1)
PROT SERPL-MCNC: 4.8 G/DL (ref 6.4–8.2)
RBC # BLD AUTO: 2.12 M/UL (ref 3.8–5.2)
SAO2 % BLD: 95.8 % (ref 92–97)
SERVICE CMNT-IMP: ABNORMAL
SERVICE CMNT-IMP: ABNORMAL
SERVICE CMNT-IMP: NORMAL
SODIUM SERPL-SCNC: 142 MMOL/L (ref 136–145)
SODIUM SERPL-SCNC: 144 MMOL/L (ref 136–145)
SPECIMEN TYPE: NORMAL
WBC # BLD AUTO: 6.4 K/UL (ref 3.6–11)

## 2024-11-17 PROCEDURE — 94003 VENT MGMT INPAT SUBQ DAY: CPT

## 2024-11-17 PROCEDURE — 84100 ASSAY OF PHOSPHORUS: CPT

## 2024-11-17 PROCEDURE — 6370000000 HC RX 637 (ALT 250 FOR IP): Performed by: HOSPITALIST

## 2024-11-17 PROCEDURE — 6360000002 HC RX W HCPCS: Performed by: HOSPITALIST

## 2024-11-17 PROCEDURE — 36430 TRANSFUSION BLD/BLD COMPNT: CPT

## 2024-11-17 PROCEDURE — 99024 POSTOP FOLLOW-UP VISIT: CPT | Performed by: PHYSICIAN ASSISTANT

## 2024-11-17 PROCEDURE — 94761 N-INVAS EAR/PLS OXIMETRY MLT: CPT

## 2024-11-17 PROCEDURE — 51798 US URINE CAPACITY MEASURE: CPT

## 2024-11-17 PROCEDURE — 2500000003 HC RX 250 WO HCPCS: Performed by: NURSE PRACTITIONER

## 2024-11-17 PROCEDURE — 85027 COMPLETE CBC AUTOMATED: CPT

## 2024-11-17 PROCEDURE — 82803 BLOOD GASES ANY COMBINATION: CPT

## 2024-11-17 PROCEDURE — 2000000000 HC ICU R&B

## 2024-11-17 PROCEDURE — 2580000003 HC RX 258: Performed by: STUDENT IN AN ORGANIZED HEALTH CARE EDUCATION/TRAINING PROGRAM

## 2024-11-17 PROCEDURE — 6360000002 HC RX W HCPCS: Performed by: INTERNAL MEDICINE

## 2024-11-17 PROCEDURE — 80076 HEPATIC FUNCTION PANEL: CPT

## 2024-11-17 PROCEDURE — 80048 BASIC METABOLIC PNL TOTAL CA: CPT

## 2024-11-17 PROCEDURE — 82962 GLUCOSE BLOOD TEST: CPT

## 2024-11-17 PROCEDURE — 71045 X-RAY EXAM CHEST 1 VIEW: CPT

## 2024-11-17 PROCEDURE — 83735 ASSAY OF MAGNESIUM: CPT

## 2024-11-17 PROCEDURE — 30233N1 TRANSFUSION OF NONAUTOLOGOUS RED BLOOD CELLS INTO PERIPHERAL VEIN, PERCUTANEOUS APPROACH: ICD-10-PCS | Performed by: INTERNAL MEDICINE

## 2024-11-17 PROCEDURE — 85014 HEMATOCRIT: CPT

## 2024-11-17 PROCEDURE — 85018 HEMOGLOBIN: CPT

## 2024-11-17 PROCEDURE — 2700000000 HC OXYGEN THERAPY PER DAY

## 2024-11-17 PROCEDURE — 6360000002 HC RX W HCPCS: Performed by: NURSE PRACTITIONER

## 2024-11-17 PROCEDURE — 89220 SPUTUM SPECIMEN COLLECTION: CPT

## 2024-11-17 PROCEDURE — 36415 COLL VENOUS BLD VENIPUNCTURE: CPT

## 2024-11-17 PROCEDURE — P9016 RBC LEUKOCYTES REDUCED: HCPCS

## 2024-11-17 PROCEDURE — 2580000003 HC RX 258: Performed by: NURSE PRACTITIONER

## 2024-11-17 PROCEDURE — 6370000000 HC RX 637 (ALT 250 FOR IP): Performed by: NURSE PRACTITIONER

## 2024-11-17 PROCEDURE — 36600 WITHDRAWAL OF ARTERIAL BLOOD: CPT

## 2024-11-17 RX ORDER — FUROSEMIDE 10 MG/ML
20 INJECTION INTRAMUSCULAR; INTRAVENOUS ONCE
Status: COMPLETED | OUTPATIENT
Start: 2024-11-17 | End: 2024-11-17

## 2024-11-17 RX ORDER — OLANZAPINE 5 MG/1
5 TABLET, ORALLY DISINTEGRATING ORAL NIGHTLY
Status: DISCONTINUED | OUTPATIENT
Start: 2024-11-17 | End: 2024-11-21 | Stop reason: HOSPADM

## 2024-11-17 RX ORDER — GUAIFENESIN 200 MG/10ML
400 LIQUID ORAL EVERY 4 HOURS
Status: DISCONTINUED | OUTPATIENT
Start: 2024-11-17 | End: 2024-11-17

## 2024-11-17 RX ORDER — INSULIN LISPRO 100 [IU]/ML
0-8 INJECTION, SOLUTION INTRAVENOUS; SUBCUTANEOUS EVERY 6 HOURS
Status: DISCONTINUED | OUTPATIENT
Start: 2024-11-17 | End: 2024-11-20

## 2024-11-17 RX ORDER — SODIUM CHLORIDE 9 MG/ML
INJECTION, SOLUTION INTRAVENOUS PRN
Status: DISCONTINUED | OUTPATIENT
Start: 2024-11-17 | End: 2024-11-21 | Stop reason: HOSPADM

## 2024-11-17 RX ADMIN — PIPERACILLIN AND TAZOBACTAM 3375 MG: 3; .375 INJECTION, POWDER, LYOPHILIZED, FOR SOLUTION INTRAVENOUS at 09:31

## 2024-11-17 RX ADMIN — ENOXAPARIN SODIUM 40 MG: 100 INJECTION SUBCUTANEOUS at 09:02

## 2024-11-17 RX ADMIN — SODIUM CHLORIDE, PRESERVATIVE FREE 10 ML: 5 INJECTION INTRAVENOUS at 07:45

## 2024-11-17 RX ADMIN — FUROSEMIDE 20 MG: 10 INJECTION, SOLUTION INTRAMUSCULAR; INTRAVENOUS at 09:02

## 2024-11-17 RX ADMIN — PANTOPRAZOLE SODIUM 40 MG: 40 INJECTION, POWDER, FOR SOLUTION INTRAVENOUS at 07:45

## 2024-11-17 RX ADMIN — PIPERACILLIN AND TAZOBACTAM 3375 MG: 3; .375 INJECTION, POWDER, LYOPHILIZED, FOR SOLUTION INTRAVENOUS at 02:59

## 2024-11-17 RX ADMIN — OLANZAPINE 5 MG: 5 TABLET, ORALLY DISINTEGRATING ORAL at 19:45

## 2024-11-17 RX ADMIN — DEXMEDETOMIDINE HYDROCHLORIDE 0.8 MCG/KG/HR: 400 INJECTION INTRAVENOUS at 01:14

## 2024-11-17 RX ADMIN — PIPERACILLIN AND TAZOBACTAM 3375 MG: 3; .375 INJECTION, POWDER, LYOPHILIZED, FOR SOLUTION INTRAVENOUS at 17:29

## 2024-11-17 RX ADMIN — POTASSIUM BICARBONATE 40 MEQ: 782 TABLET, EFFERVESCENT ORAL at 11:51

## 2024-11-17 RX ADMIN — SODIUM CHLORIDE, PRESERVATIVE FREE 10 ML: 5 INJECTION INTRAVENOUS at 07:52

## 2024-11-17 ASSESSMENT — PAIN SCALES - WONG BAKER
WONGBAKER_NUMERICALRESPONSE: NO HURT

## 2024-11-17 ASSESSMENT — PULMONARY FUNCTION TESTS
PIF_VALUE: 28
PIF_VALUE: 11

## 2024-11-17 ASSESSMENT — PAIN SCALES - GENERAL
PAINLEVEL_OUTOF10: 0

## 2024-11-17 NOTE — CONSENT
Informed Consent for Blood Component Transfusion Note    I have discussed with the patient the rationale for blood component transfusion; its benefits in treating or preventing fatigue, organ damage, or death; and its risk which includes mild transfusion reactions, rare risk of blood borne infection, or more serious but rare reactions. I have discussed the alternatives to transfusion, including the risk and consequences of not receiving transfusion. The patient had an opportunity to ask questions and had agreed to proceed with transfusion of blood components.    Electronically signed by Mariusz Montemayor MD on 11/17/24 at 8:18 AM EST

## 2024-11-18 LAB
ALBUMIN SERPL-MCNC: 2.3 G/DL (ref 3.5–5)
ALBUMIN/GLOB SERPL: 0.8 (ref 1.1–2.2)
ALP SERPL-CCNC: 61 U/L (ref 45–117)
ALT SERPL-CCNC: 41 U/L (ref 12–78)
ANION GAP SERPL CALC-SCNC: 4 MMOL/L (ref 2–12)
AST SERPL-CCNC: 45 U/L (ref 15–37)
BASOPHILS # BLD: 0 K/UL (ref 0–0.1)
BASOPHILS NFR BLD: 0 % (ref 0–1)
BILIRUB SERPL-MCNC: 1.6 MG/DL (ref 0.2–1)
BUN SERPL-MCNC: 13 MG/DL (ref 6–20)
BUN/CREAT SERPL: 16 (ref 12–20)
CALCIUM SERPL-MCNC: 8.2 MG/DL (ref 8.5–10.1)
CHLORIDE SERPL-SCNC: 112 MMOL/L (ref 97–108)
CO2 SERPL-SCNC: 28 MMOL/L (ref 21–32)
CREAT SERPL-MCNC: 0.79 MG/DL (ref 0.55–1.02)
DIFFERENTIAL METHOD BLD: ABNORMAL
EOSINOPHIL # BLD: 0.3 K/UL (ref 0–0.4)
EOSINOPHIL NFR BLD: 3 % (ref 0–7)
ERYTHROCYTE [DISTWIDTH] IN BLOOD BY AUTOMATED COUNT: 17 % (ref 11.5–14.5)
GLOBULIN SER CALC-MCNC: 3 G/DL (ref 2–4)
GLUCOSE BLD STRIP.AUTO-MCNC: 146 MG/DL (ref 65–117)
GLUCOSE BLD STRIP.AUTO-MCNC: 148 MG/DL (ref 65–117)
GLUCOSE BLD STRIP.AUTO-MCNC: 183 MG/DL (ref 65–117)
GLUCOSE BLD STRIP.AUTO-MCNC: 75 MG/DL (ref 65–117)
GLUCOSE BLD STRIP.AUTO-MCNC: 77 MG/DL (ref 65–117)
GLUCOSE BLD STRIP.AUTO-MCNC: 84 MG/DL (ref 65–117)
GLUCOSE SERPL-MCNC: 78 MG/DL (ref 65–100)
HCT VFR BLD AUTO: 24.4 % (ref 35–47)
HGB BLD-MCNC: 7.7 G/DL (ref 11.5–16)
IMM GRANULOCYTES # BLD AUTO: 0 K/UL (ref 0–0.04)
IMM GRANULOCYTES NFR BLD AUTO: 0 % (ref 0–0.5)
LYMPHOCYTES # BLD: 1 K/UL (ref 0.8–3.5)
LYMPHOCYTES NFR BLD: 13 % (ref 12–49)
MAGNESIUM SERPL-MCNC: 2.1 MG/DL (ref 1.6–2.4)
MCH RBC QN AUTO: 30.7 PG (ref 26–34)
MCHC RBC AUTO-ENTMCNC: 31.6 G/DL (ref 30–36.5)
MCV RBC AUTO: 97.2 FL (ref 80–99)
MONOCYTES # BLD: 1.1 K/UL (ref 0–1)
MONOCYTES NFR BLD: 14 % (ref 5–13)
NEUTS SEG # BLD: 5.7 K/UL (ref 1.8–8)
NEUTS SEG NFR BLD: 70 % (ref 32–75)
NRBC # BLD: 0 K/UL (ref 0–0.01)
NRBC BLD-RTO: 0 PER 100 WBC
PHOSPHATE SERPL-MCNC: 2.6 MG/DL (ref 2.6–4.7)
PLATELET # BLD AUTO: 125 K/UL (ref 150–400)
PMV BLD AUTO: 10.6 FL (ref 8.9–12.9)
POTASSIUM SERPL-SCNC: 3.6 MMOL/L (ref 3.5–5.1)
PROT SERPL-MCNC: 5.3 G/DL (ref 6.4–8.2)
RBC # BLD AUTO: 2.51 M/UL (ref 3.8–5.2)
SERVICE CMNT-IMP: ABNORMAL
SERVICE CMNT-IMP: NORMAL
SODIUM SERPL-SCNC: 144 MMOL/L (ref 136–145)
WBC # BLD AUTO: 8.2 K/UL (ref 3.6–11)

## 2024-11-18 PROCEDURE — 6360000002 HC RX W HCPCS: Performed by: STUDENT IN AN ORGANIZED HEALTH CARE EDUCATION/TRAINING PROGRAM

## 2024-11-18 PROCEDURE — 97168 OT RE-EVAL EST PLAN CARE: CPT

## 2024-11-18 PROCEDURE — 80053 COMPREHEN METABOLIC PANEL: CPT

## 2024-11-18 PROCEDURE — 2700000000 HC OXYGEN THERAPY PER DAY

## 2024-11-18 PROCEDURE — 51798 US URINE CAPACITY MEASURE: CPT

## 2024-11-18 PROCEDURE — 36415 COLL VENOUS BLD VENIPUNCTURE: CPT

## 2024-11-18 PROCEDURE — 6370000000 HC RX 637 (ALT 250 FOR IP): Performed by: HOSPITALIST

## 2024-11-18 PROCEDURE — 2580000003 HC RX 258: Performed by: STUDENT IN AN ORGANIZED HEALTH CARE EDUCATION/TRAINING PROGRAM

## 2024-11-18 PROCEDURE — 6360000002 HC RX W HCPCS: Performed by: HOSPITALIST

## 2024-11-18 PROCEDURE — 2580000003 HC RX 258: Performed by: HOSPITALIST

## 2024-11-18 PROCEDURE — 51701 INSERT BLADDER CATHETER: CPT

## 2024-11-18 PROCEDURE — 97164 PT RE-EVAL EST PLAN CARE: CPT

## 2024-11-18 PROCEDURE — 6360000002 HC RX W HCPCS: Performed by: NURSE PRACTITIONER

## 2024-11-18 PROCEDURE — 84100 ASSAY OF PHOSPHORUS: CPT

## 2024-11-18 PROCEDURE — 82962 GLUCOSE BLOOD TEST: CPT

## 2024-11-18 PROCEDURE — 6360000002 HC RX W HCPCS: Performed by: INTERNAL MEDICINE

## 2024-11-18 PROCEDURE — 97530 THERAPEUTIC ACTIVITIES: CPT

## 2024-11-18 PROCEDURE — 2580000003 HC RX 258: Performed by: NURSE PRACTITIONER

## 2024-11-18 PROCEDURE — 83735 ASSAY OF MAGNESIUM: CPT

## 2024-11-18 PROCEDURE — 6370000000 HC RX 637 (ALT 250 FOR IP): Performed by: INTERNAL MEDICINE

## 2024-11-18 PROCEDURE — 85025 COMPLETE CBC W/AUTO DIFF WBC: CPT

## 2024-11-18 PROCEDURE — 2060000000 HC ICU INTERMEDIATE R&B

## 2024-11-18 RX ORDER — FUROSEMIDE 10 MG/ML
20 INJECTION INTRAMUSCULAR; INTRAVENOUS ONCE
Status: COMPLETED | OUTPATIENT
Start: 2024-11-18 | End: 2024-11-18

## 2024-11-18 RX ADMIN — PIPERACILLIN AND TAZOBACTAM 3375 MG: 3; .375 INJECTION, POWDER, LYOPHILIZED, FOR SOLUTION INTRAVENOUS at 18:30

## 2024-11-18 RX ADMIN — FUROSEMIDE 20 MG: 10 INJECTION, SOLUTION INTRAMUSCULAR; INTRAVENOUS at 12:11

## 2024-11-18 RX ADMIN — PIPERACILLIN AND TAZOBACTAM 3375 MG: 3; .375 INJECTION, POWDER, LYOPHILIZED, FOR SOLUTION INTRAVENOUS at 02:44

## 2024-11-18 RX ADMIN — PIPERACILLIN AND TAZOBACTAM 3375 MG: 3; .375 INJECTION, POWDER, LYOPHILIZED, FOR SOLUTION INTRAVENOUS at 11:45

## 2024-11-18 RX ADMIN — INSULIN LISPRO 2 UNITS: 100 INJECTION, SOLUTION INTRAVENOUS; SUBCUTANEOUS at 18:31

## 2024-11-18 RX ADMIN — FENTANYL CITRATE 25 MCG: 50 INJECTION INTRAMUSCULAR; INTRAVENOUS at 12:11

## 2024-11-18 RX ADMIN — SODIUM CHLORIDE, PRESERVATIVE FREE 10 ML: 5 INJECTION INTRAVENOUS at 20:45

## 2024-11-18 RX ADMIN — ENOXAPARIN SODIUM 40 MG: 100 INJECTION SUBCUTANEOUS at 08:38

## 2024-11-18 RX ADMIN — FENTANYL CITRATE 25 MCG: 50 INJECTION INTRAMUSCULAR; INTRAVENOUS at 22:39

## 2024-11-18 RX ADMIN — HYDRALAZINE HYDROCHLORIDE 10 MG: 20 INJECTION INTRAMUSCULAR; INTRAVENOUS at 08:51

## 2024-11-18 RX ADMIN — PANTOPRAZOLE SODIUM 40 MG: 40 INJECTION, POWDER, FOR SOLUTION INTRAVENOUS at 08:39

## 2024-11-18 RX ADMIN — FENTANYL CITRATE 25 MCG: 50 INJECTION INTRAMUSCULAR; INTRAVENOUS at 19:31

## 2024-11-18 RX ADMIN — POTASSIUM BICARBONATE 40 MEQ: 782 TABLET, EFFERVESCENT ORAL at 08:51

## 2024-11-18 RX ADMIN — APIXABAN 5 MG: 5 TABLET, FILM COATED ORAL at 20:56

## 2024-11-18 RX ADMIN — EZETIMIBE 10 MG: 10 TABLET ORAL at 18:31

## 2024-11-18 RX ADMIN — SODIUM CHLORIDE, PRESERVATIVE FREE 10 ML: 5 INJECTION INTRAVENOUS at 08:39

## 2024-11-18 ASSESSMENT — PAIN DESCRIPTION - DESCRIPTORS
DESCRIPTORS: ACHING
DESCRIPTORS: ACHING

## 2024-11-18 ASSESSMENT — PAIN DESCRIPTION - LOCATION
LOCATION: HIP
LOCATION: ABDOMEN

## 2024-11-18 ASSESSMENT — PAIN SCALES - GENERAL
PAINLEVEL_OUTOF10: 0
PAINLEVEL_OUTOF10: 0
PAINLEVEL_OUTOF10: 6
PAINLEVEL_OUTOF10: 0
PAINLEVEL_OUTOF10: 7
PAINLEVEL_OUTOF10: 0

## 2024-11-18 ASSESSMENT — PAIN DESCRIPTION - ORIENTATION
ORIENTATION: ANTERIOR
ORIENTATION: LEFT

## 2024-11-19 LAB
ABO + RH BLD: NORMAL
ALBUMIN SERPL-MCNC: 2.5 G/DL (ref 3.5–5)
ALBUMIN/GLOB SERPL: 1 (ref 1.1–2.2)
ALP SERPL-CCNC: 72 U/L (ref 45–117)
ALT SERPL-CCNC: 43 U/L (ref 12–78)
ANION GAP SERPL CALC-SCNC: 3 MMOL/L (ref 2–12)
AST SERPL-CCNC: 43 U/L (ref 15–37)
BASOPHILS # BLD: 0 K/UL (ref 0–0.1)
BASOPHILS NFR BLD: 0 % (ref 0–1)
BILIRUB SERPL-MCNC: 1.3 MG/DL (ref 0.2–1)
BLD PROD TYP BPU: NORMAL
BLD PROD TYP BPU: NORMAL
BLOOD BANK BLOOD PRODUCT EXPIRATION DATE: NORMAL
BLOOD BANK DISPENSE STATUS: NORMAL
BLOOD BANK DISPENSE STATUS: NORMAL
BLOOD BANK ISBT PRODUCT BLOOD TYPE: 6200
BLOOD BANK PRODUCT CODE: NORMAL
BLOOD BANK UNIT TYPE AND RH: NORMAL
BLOOD GROUP ANTIBODIES SERPL: NORMAL
BPU ID: NORMAL
BPU ID: NORMAL
BUN SERPL-MCNC: 19 MG/DL (ref 6–20)
BUN/CREAT SERPL: 20 (ref 12–20)
CALCIUM SERPL-MCNC: 8.1 MG/DL (ref 8.5–10.1)
CHLORIDE SERPL-SCNC: 108 MMOL/L (ref 97–108)
CO2 SERPL-SCNC: 32 MMOL/L (ref 21–32)
CREAT SERPL-MCNC: 0.93 MG/DL (ref 0.55–1.02)
CROSSMATCH RESULT: NORMAL
CROSSMATCH RESULT: NORMAL
DIFFERENTIAL METHOD BLD: ABNORMAL
EOSINOPHIL # BLD: 0.2 K/UL (ref 0–0.4)
EOSINOPHIL NFR BLD: 3 % (ref 0–7)
ERYTHROCYTE [DISTWIDTH] IN BLOOD BY AUTOMATED COUNT: 16.4 % (ref 11.5–14.5)
GLOBULIN SER CALC-MCNC: 2.6 G/DL (ref 2–4)
GLUCOSE BLD STRIP.AUTO-MCNC: 121 MG/DL (ref 65–117)
GLUCOSE BLD STRIP.AUTO-MCNC: 138 MG/DL (ref 65–117)
GLUCOSE BLD STRIP.AUTO-MCNC: 96 MG/DL (ref 65–117)
GLUCOSE SERPL-MCNC: 108 MG/DL (ref 65–100)
HCT VFR BLD AUTO: 26.3 % (ref 35–47)
HGB BLD-MCNC: 8.3 G/DL (ref 11.5–16)
IMM GRANULOCYTES # BLD AUTO: 0.1 K/UL (ref 0–0.04)
IMM GRANULOCYTES NFR BLD AUTO: 1 % (ref 0–0.5)
LYMPHOCYTES # BLD: 1 K/UL (ref 0.8–3.5)
LYMPHOCYTES NFR BLD: 11 % (ref 12–49)
MAGNESIUM SERPL-MCNC: 2.1 MG/DL (ref 1.6–2.4)
MCH RBC QN AUTO: 30.7 PG (ref 26–34)
MCHC RBC AUTO-ENTMCNC: 31.6 G/DL (ref 30–36.5)
MCV RBC AUTO: 97.4 FL (ref 80–99)
MONOCYTES # BLD: 1.2 K/UL (ref 0–1)
MONOCYTES NFR BLD: 14 % (ref 5–13)
NEUTS SEG # BLD: 6 K/UL (ref 1.8–8)
NEUTS SEG NFR BLD: 71 % (ref 32–75)
NRBC # BLD: 0 K/UL (ref 0–0.01)
NRBC BLD-RTO: 0 PER 100 WBC
PHOSPHATE SERPL-MCNC: 2.4 MG/DL (ref 2.6–4.7)
PLATELET # BLD AUTO: 166 K/UL (ref 150–400)
PMV BLD AUTO: 10.6 FL (ref 8.9–12.9)
POTASSIUM SERPL-SCNC: 3.6 MMOL/L (ref 3.5–5.1)
PROT SERPL-MCNC: 5.1 G/DL (ref 6.4–8.2)
RBC # BLD AUTO: 2.7 M/UL (ref 3.8–5.2)
SERVICE CMNT-IMP: ABNORMAL
SERVICE CMNT-IMP: ABNORMAL
SERVICE CMNT-IMP: NORMAL
SODIUM SERPL-SCNC: 143 MMOL/L (ref 136–145)
SPECIMEN EXP DATE BLD: NORMAL
UNIT DIVISION: 0
UNIT DIVISION: 0
UNIT ISSUE DATE/TIME: NORMAL
WBC # BLD AUTO: 8.5 K/UL (ref 3.6–11)

## 2024-11-19 PROCEDURE — 6360000002 HC RX W HCPCS: Performed by: HOSPITALIST

## 2024-11-19 PROCEDURE — 2580000003 HC RX 258: Performed by: NURSE PRACTITIONER

## 2024-11-19 PROCEDURE — 2580000003 HC RX 258: Performed by: STUDENT IN AN ORGANIZED HEALTH CARE EDUCATION/TRAINING PROGRAM

## 2024-11-19 PROCEDURE — 84100 ASSAY OF PHOSPHORUS: CPT

## 2024-11-19 PROCEDURE — 80053 COMPREHEN METABOLIC PANEL: CPT

## 2024-11-19 PROCEDURE — 97116 GAIT TRAINING THERAPY: CPT

## 2024-11-19 PROCEDURE — 6370000000 HC RX 637 (ALT 250 FOR IP): Performed by: HOSPITALIST

## 2024-11-19 PROCEDURE — 6360000002 HC RX W HCPCS: Performed by: NURSE PRACTITIONER

## 2024-11-19 PROCEDURE — 82962 GLUCOSE BLOOD TEST: CPT

## 2024-11-19 PROCEDURE — 85025 COMPLETE CBC W/AUTO DIFF WBC: CPT

## 2024-11-19 PROCEDURE — 2580000003 HC RX 258

## 2024-11-19 PROCEDURE — 2580000003 HC RX 258: Performed by: HOSPITALIST

## 2024-11-19 PROCEDURE — 97530 THERAPEUTIC ACTIVITIES: CPT

## 2024-11-19 PROCEDURE — 6360000002 HC RX W HCPCS

## 2024-11-19 PROCEDURE — 94761 N-INVAS EAR/PLS OXIMETRY MLT: CPT

## 2024-11-19 PROCEDURE — 97535 SELF CARE MNGMENT TRAINING: CPT

## 2024-11-19 PROCEDURE — 6370000000 HC RX 637 (ALT 250 FOR IP)

## 2024-11-19 PROCEDURE — 2060000000 HC ICU INTERMEDIATE R&B

## 2024-11-19 PROCEDURE — 6370000000 HC RX 637 (ALT 250 FOR IP): Performed by: INTERNAL MEDICINE

## 2024-11-19 PROCEDURE — 6370000000 HC RX 637 (ALT 250 FOR IP): Performed by: NURSE PRACTITIONER

## 2024-11-19 PROCEDURE — 83735 ASSAY OF MAGNESIUM: CPT

## 2024-11-19 PROCEDURE — 51701 INSERT BLADDER CATHETER: CPT

## 2024-11-19 RX ORDER — DOCUSATE SODIUM 100 MG/1
100 CAPSULE, LIQUID FILLED ORAL 2 TIMES DAILY
Status: DISCONTINUED | OUTPATIENT
Start: 2024-11-19 | End: 2024-11-21 | Stop reason: HOSPADM

## 2024-11-19 RX ADMIN — PANTOPRAZOLE SODIUM 40 MG: 40 INJECTION, POWDER, FOR SOLUTION INTRAVENOUS at 20:28

## 2024-11-19 RX ADMIN — SODIUM CHLORIDE, PRESERVATIVE FREE 10 ML: 5 INJECTION INTRAVENOUS at 20:29

## 2024-11-19 RX ADMIN — APIXABAN 5 MG: 5 TABLET, FILM COATED ORAL at 20:28

## 2024-11-19 RX ADMIN — ESCITALOPRAM OXALATE 5 MG: 10 TABLET ORAL at 09:26

## 2024-11-19 RX ADMIN — PANTOPRAZOLE SODIUM 40 MG: 40 INJECTION, POWDER, FOR SOLUTION INTRAVENOUS at 09:29

## 2024-11-19 RX ADMIN — SODIUM CHLORIDE, PRESERVATIVE FREE 10 ML: 5 INJECTION INTRAVENOUS at 09:30

## 2024-11-19 RX ADMIN — APIXABAN 5 MG: 5 TABLET, FILM COATED ORAL at 09:26

## 2024-11-19 RX ADMIN — SODIUM CHLORIDE, PRESERVATIVE FREE 10 ML: 5 INJECTION INTRAVENOUS at 09:31

## 2024-11-19 RX ADMIN — OLANZAPINE 5 MG: 5 TABLET, ORALLY DISINTEGRATING ORAL at 20:28

## 2024-11-19 RX ADMIN — DOCUSATE SODIUM 100 MG: 100 CAPSULE, LIQUID FILLED ORAL at 20:28

## 2024-11-19 RX ADMIN — PIPERACILLIN AND TAZOBACTAM 3375 MG: 3; .375 INJECTION, POWDER, LYOPHILIZED, FOR SOLUTION INTRAVENOUS at 18:27

## 2024-11-19 RX ADMIN — PIPERACILLIN AND TAZOBACTAM 3375 MG: 3; .375 INJECTION, POWDER, LYOPHILIZED, FOR SOLUTION INTRAVENOUS at 03:02

## 2024-11-19 RX ADMIN — METOPROLOL SUCCINATE 12.5 MG: 25 TABLET, EXTENDED RELEASE ORAL at 09:25

## 2024-11-19 RX ADMIN — EZETIMIBE 10 MG: 10 TABLET ORAL at 18:21

## 2024-11-19 RX ADMIN — PIPERACILLIN AND TAZOBACTAM 3375 MG: 3; .375 INJECTION, POWDER, LYOPHILIZED, FOR SOLUTION INTRAVENOUS at 09:34

## 2024-11-20 LAB
ALBUMIN SERPL-MCNC: 2.4 G/DL (ref 3.5–5)
ALBUMIN/GLOB SERPL: 0.9 (ref 1.1–2.2)
ALP SERPL-CCNC: 69 U/L (ref 45–117)
ALT SERPL-CCNC: 41 U/L (ref 12–78)
ANION GAP SERPL CALC-SCNC: 3 MMOL/L (ref 2–12)
AST SERPL-CCNC: 43 U/L (ref 15–37)
BASOPHILS # BLD: 0 K/UL (ref 0–0.1)
BASOPHILS NFR BLD: 0 % (ref 0–1)
BILIRUB SERPL-MCNC: 1.6 MG/DL (ref 0.2–1)
BUN SERPL-MCNC: 17 MG/DL (ref 6–20)
BUN/CREAT SERPL: 20 (ref 12–20)
CALCIUM SERPL-MCNC: 8.3 MG/DL (ref 8.5–10.1)
CHLORIDE SERPL-SCNC: 106 MMOL/L (ref 97–108)
CO2 SERPL-SCNC: 33 MMOL/L (ref 21–32)
CREAT SERPL-MCNC: 0.87 MG/DL (ref 0.55–1.02)
DIFFERENTIAL METHOD BLD: ABNORMAL
EOSINOPHIL # BLD: 0.3 K/UL (ref 0–0.4)
EOSINOPHIL NFR BLD: 4 % (ref 0–7)
ERYTHROCYTE [DISTWIDTH] IN BLOOD BY AUTOMATED COUNT: 15.9 % (ref 11.5–14.5)
GLOBULIN SER CALC-MCNC: 2.6 G/DL (ref 2–4)
GLUCOSE BLD STRIP.AUTO-MCNC: 106 MG/DL (ref 65–117)
GLUCOSE BLD STRIP.AUTO-MCNC: 109 MG/DL (ref 65–117)
GLUCOSE BLD STRIP.AUTO-MCNC: 117 MG/DL (ref 65–117)
GLUCOSE SERPL-MCNC: 123 MG/DL (ref 65–100)
HCT VFR BLD AUTO: 26.3 % (ref 35–47)
HGB BLD-MCNC: 8.3 G/DL (ref 11.5–16)
IMM GRANULOCYTES # BLD AUTO: 0.1 K/UL (ref 0–0.04)
IMM GRANULOCYTES NFR BLD AUTO: 1 % (ref 0–0.5)
LYMPHOCYTES # BLD: 0.8 K/UL (ref 0.8–3.5)
LYMPHOCYTES NFR BLD: 12 % (ref 12–49)
MAGNESIUM SERPL-MCNC: 2 MG/DL (ref 1.6–2.4)
MCH RBC QN AUTO: 31.1 PG (ref 26–34)
MCHC RBC AUTO-ENTMCNC: 31.6 G/DL (ref 30–36.5)
MCV RBC AUTO: 98.5 FL (ref 80–99)
MONOCYTES # BLD: 1.2 K/UL (ref 0–1)
MONOCYTES NFR BLD: 17 % (ref 5–13)
NEUTS SEG # BLD: 4.7 K/UL (ref 1.8–8)
NEUTS SEG NFR BLD: 66 % (ref 32–75)
NRBC # BLD: 0 K/UL (ref 0–0.01)
NRBC BLD-RTO: 0 PER 100 WBC
PHOSPHATE SERPL-MCNC: 2.2 MG/DL (ref 2.6–4.7)
PLATELET # BLD AUTO: 178 K/UL (ref 150–400)
PMV BLD AUTO: 10.4 FL (ref 8.9–12.9)
POTASSIUM SERPL-SCNC: 3.7 MMOL/L (ref 3.5–5.1)
PROT SERPL-MCNC: 5 G/DL (ref 6.4–8.2)
RBC # BLD AUTO: 2.67 M/UL (ref 3.8–5.2)
SERVICE CMNT-IMP: NORMAL
SODIUM SERPL-SCNC: 142 MMOL/L (ref 136–145)
WBC # BLD AUTO: 7.1 K/UL (ref 3.6–11)

## 2024-11-20 PROCEDURE — 2580000003 HC RX 258: Performed by: STUDENT IN AN ORGANIZED HEALTH CARE EDUCATION/TRAINING PROGRAM

## 2024-11-20 PROCEDURE — 6370000000 HC RX 637 (ALT 250 FOR IP)

## 2024-11-20 PROCEDURE — 83735 ASSAY OF MAGNESIUM: CPT

## 2024-11-20 PROCEDURE — 6370000000 HC RX 637 (ALT 250 FOR IP): Performed by: INTERNAL MEDICINE

## 2024-11-20 PROCEDURE — 2580000003 HC RX 258: Performed by: HOSPITALIST

## 2024-11-20 PROCEDURE — 82962 GLUCOSE BLOOD TEST: CPT

## 2024-11-20 PROCEDURE — 2580000003 HC RX 258

## 2024-11-20 PROCEDURE — 97530 THERAPEUTIC ACTIVITIES: CPT

## 2024-11-20 PROCEDURE — 1100000000 HC RM PRIVATE

## 2024-11-20 PROCEDURE — 6360000002 HC RX W HCPCS: Performed by: HOSPITALIST

## 2024-11-20 PROCEDURE — 97116 GAIT TRAINING THERAPY: CPT

## 2024-11-20 PROCEDURE — 6360000002 HC RX W HCPCS

## 2024-11-20 PROCEDURE — 6370000000 HC RX 637 (ALT 250 FOR IP): Performed by: HOSPITALIST

## 2024-11-20 PROCEDURE — 85025 COMPLETE CBC W/AUTO DIFF WBC: CPT

## 2024-11-20 PROCEDURE — 94761 N-INVAS EAR/PLS OXIMETRY MLT: CPT

## 2024-11-20 PROCEDURE — 99024 POSTOP FOLLOW-UP VISIT: CPT | Performed by: PHYSICIAN ASSISTANT

## 2024-11-20 PROCEDURE — 80053 COMPREHEN METABOLIC PANEL: CPT

## 2024-11-20 PROCEDURE — 84100 ASSAY OF PHOSPHORUS: CPT

## 2024-11-20 PROCEDURE — 6370000000 HC RX 637 (ALT 250 FOR IP): Performed by: NURSE PRACTITIONER

## 2024-11-20 PROCEDURE — 97535 SELF CARE MNGMENT TRAINING: CPT

## 2024-11-20 RX ORDER — ACETAMINOPHEN 325 MG/1
650 TABLET ORAL EVERY 4 HOURS PRN
Status: DISCONTINUED | OUTPATIENT
Start: 2024-11-20 | End: 2024-11-20

## 2024-11-20 RX ORDER — ACETAMINOPHEN 325 MG/1
650 TABLET ORAL EVERY 6 HOURS PRN
Status: DISCONTINUED | OUTPATIENT
Start: 2024-11-20 | End: 2024-11-21 | Stop reason: HOSPADM

## 2024-11-20 RX ORDER — PANTOPRAZOLE SODIUM 40 MG/1
40 TABLET, DELAYED RELEASE ORAL
Status: DISCONTINUED | OUTPATIENT
Start: 2024-11-20 | End: 2024-11-21 | Stop reason: HOSPADM

## 2024-11-20 RX ORDER — ACETAMINOPHEN 650 MG/1
650 SUPPOSITORY RECTAL EVERY 6 HOURS PRN
Status: DISCONTINUED | OUTPATIENT
Start: 2024-11-20 | End: 2024-11-21 | Stop reason: HOSPADM

## 2024-11-20 RX ORDER — ACETAMINOPHEN 325 MG/1
650 TABLET ORAL EVERY 6 HOURS PRN
Status: DISCONTINUED | OUTPATIENT
Start: 2024-11-20 | End: 2024-11-20

## 2024-11-20 RX ADMIN — EZETIMIBE 10 MG: 10 TABLET ORAL at 16:53

## 2024-11-20 RX ADMIN — APIXABAN 5 MG: 5 TABLET, FILM COATED ORAL at 09:19

## 2024-11-20 RX ADMIN — METOPROLOL SUCCINATE 12.5 MG: 25 TABLET, EXTENDED RELEASE ORAL at 09:19

## 2024-11-20 RX ADMIN — SODIUM CHLORIDE, PRESERVATIVE FREE 10 ML: 5 INJECTION INTRAVENOUS at 09:26

## 2024-11-20 RX ADMIN — SODIUM CHLORIDE, PRESERVATIVE FREE 10 ML: 5 INJECTION INTRAVENOUS at 09:25

## 2024-11-20 RX ADMIN — ESCITALOPRAM OXALATE 5 MG: 10 TABLET ORAL at 09:19

## 2024-11-20 RX ADMIN — PIPERACILLIN AND TAZOBACTAM 3375 MG: 3; .375 INJECTION, POWDER, LYOPHILIZED, FOR SOLUTION INTRAVENOUS at 09:22

## 2024-11-20 RX ADMIN — PANTOPRAZOLE SODIUM 40 MG: 40 TABLET, DELAYED RELEASE ORAL at 16:53

## 2024-11-20 RX ADMIN — APIXABAN 5 MG: 5 TABLET, FILM COATED ORAL at 21:11

## 2024-11-20 RX ADMIN — PIPERACILLIN AND TAZOBACTAM 3375 MG: 3; .375 INJECTION, POWDER, LYOPHILIZED, FOR SOLUTION INTRAVENOUS at 03:04

## 2024-11-20 RX ADMIN — OLANZAPINE 5 MG: 5 TABLET, ORALLY DISINTEGRATING ORAL at 21:11

## 2024-11-20 RX ADMIN — DOCUSATE SODIUM 100 MG: 100 CAPSULE, LIQUID FILLED ORAL at 21:11

## 2024-11-20 RX ADMIN — LEVOTHYROXINE SODIUM 50 MCG: 0.05 TABLET ORAL at 06:00

## 2024-11-20 RX ADMIN — PANTOPRAZOLE SODIUM 40 MG: 40 INJECTION, POWDER, FOR SOLUTION INTRAVENOUS at 09:27

## 2024-11-20 RX ADMIN — DOCUSATE SODIUM 100 MG: 100 CAPSULE, LIQUID FILLED ORAL at 09:19

## 2024-11-20 RX ADMIN — ROSUVASTATIN CALCIUM 5 MG: 5 TABLET, FILM COATED ORAL at 21:11

## 2024-11-20 RX ADMIN — PIPERACILLIN AND TAZOBACTAM 3375 MG: 3; .375 INJECTION, POWDER, LYOPHILIZED, FOR SOLUTION INTRAVENOUS at 16:56

## 2024-11-20 RX ADMIN — ACETAMINOPHEN 650 MG: 325 TABLET ORAL at 19:37

## 2024-11-20 ASSESSMENT — PAIN SCALES - GENERAL
PAINLEVEL_OUTOF10: 0
PAINLEVEL_OUTOF10: 5
PAINLEVEL_OUTOF10: 3

## 2024-11-20 ASSESSMENT — PAIN DESCRIPTION - LOCATION
LOCATION: ABDOMEN
LOCATION: ABDOMEN

## 2024-11-20 ASSESSMENT — PAIN DESCRIPTION - DESCRIPTORS: DESCRIPTORS: ACHING

## 2024-11-20 NOTE — CONSULTS
[facility] CARDIOLOGY                    Cardiology Care Note     [x]Initial Encounter     []Follow-up    Patient Name: Radha Wynn - :1940 - MRN:[id number]  Primary Cardiologist: Sp Rizvi MD, Michelle Rothman MD  Consulting Cardiologist: Scott Lyons, DO     Reason for encounter: Resp failure, cardiac component?     HPI:       Radha Wynn is a 84 y.o. female with PMH significant for PAF s/p ablation last 2023, RBBB. HFpEF, HTN/labile BP, TRISTAN not compliant w/ CPAP, and HLD.      Pt presented to the ED after she had a fall at home, landed on her left hip and had ongoing pain since then, unable to ambulate. Found to have four-part intertrochanteric fx of left femur on xray, underwent left hip intramedullary nail insertion on . Also incidental finding of massive paraesophageal hernia on CT, general surgery following. Yesterday, pt became acutely more hypoxic and hypotensive, subsequently intubated on 24 due to worsening resp distress.     Of note,  at bedside reports she's been SOB of breath the last few months, no CP. He's unsure if she has discussed this with cardiology or not.     Subjective:      Radha Wynn remains intubated and sedated.      Assessment and Plan     Acute hypoxic and hypercapnic resp failure  - intubated, per intensivist  - possibly d/t massive paraesophageal hernia  - check echo to rule out CHF     2. SOB PTA, coronary calcifications on CTA, chronic HFpEF   - last cath in  without obstructive CAD  - check echo   - no plans for further invasive testing for now until pt more stable     3. PAF s/p ablation, chronic RBBB   - on metoprolol, Eliquis PTA  - resume as able    4. HTN hx, labile BP  - resume meds as able    5. TRISTAN not compliant w/ CPAP  - thought to be contributory to her dyspnea in the past        ____________________________________________________________    Cardiac testing  Cardiac cath 2022:   Cardiac Cath  22-L Main:  Med to 
     CRITICAL CARE NOTE      Name: Radha Wynn   : 1940   MRN: [id number]   Date: 2024      REASON FOR ICU ADMISSION:  Acute Respiratory Failure     PRINCIPAL ICU DIAGNOSIS   Massive paraesophageal hernia  Intertrochanteric left femur fracture s/p repair  Acute hypoxic/hypercapnic respiratory failure  Acute kidney injury  Lactic acidosis    BRIEF PATIENT SUMMARY   83 y/o female admitted for left femur fracture s/p repair (), massive paraesophageal hernia on CT (), subsequent increased upper abdominal pain post op. Course complicated after patient became acutely hypoxic and unresponsive.  Per report she subsequently became more alert, with supplemental oxygen, CT demonstrated interval increase in gastric distention, with plan for NG placement.  ABG ph 7.15, Co2 68,  She then had a second episode of acute unresponsiveness requiring emergent intubation.    11/15:  Remains intubated. Not following commands    COMPREHENSIVE ASSESSMENT & PLAN:SYSTEM BASED   Acute hypoxic respiratory failure with hypercapnia requiring mechanical ventilation (-current)   Acute hypoxia/hypercapnia likely d/t poor ventilatory mechanics in setting of massive paraesophageal hernia  CXR small effusions cardiomegaly and interstitial edema, CTA chest- neg PE  Lung protective ventilation with goal plateau pressure < 30, driving pressure < 15   CT chest with no evidence of PNA but pt possibly developing aspiration pneumonia secondary to aspiration from large hiatal hernia  Zosyn added  Continue Precedex, fentanyl PRN  SBT this AM - limited by small volumes overnight    Massive paraesophageal hernia  CT A/P: paraesophageal hernia, with interval increase gastric distention  Underwent reduction of incarcerated stomach and colon, paraesophageal hernia repair 11/15    Intertrochanteric left femur fracture s/p repair ()  Continue SCDS with Lovenox 40 mg daily x 30 days  Plan per ortho    Acute kidney injury  CR 
Nutrition Education    Educated on Nissen diet  Learners: Patient and Family  Readiness: Acceptance  Method: Explanation  Response: Demonstrated Understanding  Contact name and number provided.    Terrie De La Rosa MS, RD, [facility]  Ext: [phone], or via [employer]      
morning with fluid bolus  Goal urine output > 0.5 cc/kg/hr, Strict I/Os, avoid nephrotoxins    Glycemic Control  SSI, Serial glucose checks  Blood Sugar Goal 120-180, Avoid hypo/hyperglycemia      CODE STATUS   Code Status:FULL    SUBJECTIVE   Review of Systems   Unable to perform ROS: Intubated          OBJECTIVE   Physical Exam  Vitals and nursing note reviewed.   Constitutional:       Comments: Intubated   HENT:      Head: Normocephalic and atraumatic.      Nose: Nose normal.   Eyes:      Pupils: Pupils are equal, round, and reactive to light.      Comments: Pupils pinpoint bilaterally   Cardiovascular:      Rate and Rhythm: Normal rate and regular rhythm.      Pulses: Normal pulses.      Heart sounds: Normal heart sounds.   Musculoskeletal:         General: Normal range of motion.      Cervical back: Normal range of motion and neck supple.   Skin:     General: Skin is warm and dry.   Neurological:      Comments: Sedated on MV   Psychiatric:      Comments: Unable to assess d/t acuity          Labs and Data: Reviewed 11/15/24  Medications: Reviewed 11/15/24  Imaging: Reviewed 11/15/24      BP (!) 86/52   Pulse 51   Temp 98.5 °F (36.9 °C) (Bladder)   Resp 18   Ht 1.575 m (5' 2\")   Wt 77.6 kg (171 lb)   LMP  (LMP Unknown)   SpO2 97%   BMI 31.28 kg/m²      Temp (24hrs), Av.1 °F (36.7 °C), Min:97.2 °F (36.2 °C), Max:99.5 °F (37.5 °C)           Intake/Output:     Intake/Output Summary (Last 24 hours) at 11/15/2024 0749  Last data filed at 11/15/2024 0607  Gross per 24 hour   Intake 5607.53 ml   Output 1165 ml   Net 4442.53 ml       Imaging    No valid procedures specified.       CRITICAL CARE DOCUMENTATION  I had a face to face encounter with the patient, reviewed and interpreted patient data including clinical events, labs, images, vital signs, I/O's, and examined patient.  I have discussed the case and the plan and management of the patient's care with the consulting services, the bedside nurses and the 
respiratory therapist.      NOTE OF PERSONAL INVOLVEMENT IN CARE   This patient has a high probability of imminent, clinically significant deterioration, which requires the highest level of preparedness to intervene urgently. I participated in the decision-making and personally managed or directed the management of the following life and organ supporting interventions that required my frequent assessment to treat or prevent imminent deterioration.    I personally spent 70 minutes of critical care time.  This is time spent at this critically ill patient's bedside actively involved in patient care as well as the coordination of care.  This does not include any procedural time which has been billed separately.    CHELSIE Terry - NP   Critical Care Medicine  [facility]      
muscle use  Musc: Left leg: No ecchymosis.  No erythema.  Skin intact. SILT.  +DP and PT pulses.  Thigh is soft and compressible.  No knee effusion.  No pain with cervical ROM.  No pain with left and right shoulder ROM.     Skin: No skin breakdown noted. Skin warm, pink, dry  Neuro: Cranial nerves are grossly intact, no focal motor weakness, follows commands appropriately   Psych: Good insight, oriented to person, place and time, alert    Imaging Review: EXAM: XR FEMUR LEFT (MIN 2 VIEWS)  ACC#: [id number]     INDICATION: left IT fracture, []     COMPARISON: Left hip radiographs 12 November 2024     FINDINGS: Two views of the left femur demonstrate the comminuted  intertrochanteric fracture of the proximal left femur. No additional acute  fracture deformity is identified. The left knee appears aligned. No foreign body  is seen in the soft tissues.     IMPRESSION:  Acute, comminuted intertrochanteric fracture of the proximal left  femur. No additional fracture deformity is identified.    EXAM: XR HIP 2-3 VW W PELVIS LEFT     INDICATION: fall, hip pain.     COMPARISON: None.     FINDINGS: AP view of the pelvis and 2 oblique views of the left hip demonstrate  comminuted intertrochanteric fracture with additional fractures of greater and  lesser trochanters. There is mild separation of the lesser trochanter from the  underlying bone and there is impaction of the intertrochanteric femur measuring  approximately 2 cm. No dislocation is demonstrated. Bones are moderately to  severely osteopenic. No substantial hip arthrosis or SI joint arthrosis is  shown. There is mild to moderate lower lumbar spondylosis noted.     IMPRESSION:  4 part intertrochanteric fracture of left femur as above    Labs:   Recent Results (from the past 24 hour(s))   Extra Tubes Hold    Collection Time: 11/12/24  1:30 PM   Result Value Ref Range    Specimen HOld RED,DK GREEN     Comment:        Add-on orders for these samples will be processed based 
(HCC)  Active Problems:    HTN (hypertension), benign    Acquired hypothyroidism    PAF (paroxysmal atrial fibrillation) (HCC)    Anxiety    S/P ablation of atrial fibrillation    Paraesophageal hernia    Hyperlipidemia    Coronary artery calcification seen on CT scan    Acute respiratory failure with hypoxia  Resolved Problems:    * No resolved hospital problems. *       See above narrative for full detail.

## 2024-11-20 NOTE — DISCHARGE INSTR - DIET
NUTRITION GUIDELINES AFTER NISSEN FUNDOPLICATION    General guidelines:  Following a special diet after surgery is necessary for healing.  Your diet will progress in stages beginning with liquids and slowly advancing to a soft diet.  Eating six to eight small, frequent meals per day is recommended to ensure adequate nutrient intake and to prevent feeling too full (distention).     Tips for tolerating food:  ? Sit upright while eating.  ? Remain upright for 20 minutes after eating.  ? Sip fluids slowly. Avoid using a straw to prevent too much gas.  ? Once on solids, chew food well and eat slowly.  ? Avoid caffeine, carbonated beverages, and alcohol.  ? Do not chew gum    Stage 1 - Clear liquid diet  This diet will begin while you are in the hospital. It is recommended to follow a clear liquid diet for 3 days after surgery.    Food Allowed:  o Apple Juice  o Cranberry Juice  o White Grape Juice  o Broth or consommé  o Jell-0  o Decaf (without milk  o Italian Ice/Popsicles    Stage 2 - Full Liquids  After clear liquids are tolerated you may advance to full liquids. It is recommended to follow on this diet for 1 week. You may continue to include foods allowed on the clear liquid diet.    Food allowed:  o Milk  o Cream of wheat or rice  o Strained cream soups  o Ice cream/ sherbet/ custard/ pudding  o Yogurt (without fruit seeds)  o Milkshakes/ frappe  o Fruit and vegetable juices    Once on full liquids it is recommended to add a supplement.  (Boost, Ensure, [place] Instant Breakfast etc.) Specific amount of supplement can be reviewed with your dietitian.      Stage 3 - Blenderized Diet  The next stage of the diet introduces additional food in a puree / smooth texture. Foods that are blenderized require minimum chewing to allow easier swallowing. You may continue to consume items allowed on clear and full liquid diets. It is recommended to follow this stage for 4 weeks.    Foods to add into diet:  o Pureed meats  o

## 2024-11-20 NOTE — OP NOTE
[facility]          [id number] [place]  [address]                            OPERATIVE REPORT      PATIENT NAME: DOROTHY ARCEO             : 1940  MED REC NO: [id number]                       ROOM: [id number]  ACCOUNT NO: [id number]                       ADMIT DATE: 2024  PROVIDER: Burt Bangura MD    DATE OF SERVICE:  2024    PREOPERATIVE DIAGNOSES:  Paraesophageal hernia.    POSTOPERATIVE DIAGNOSES:  Paraesophageal hernia.    PROCEDURES PERFORMED:  Robotic-assisted reduction of incarcerated stomach and colon, paraesophageal hernia repair with Toupet fundoplication, partial omentectomy.    SURGEON:  Burt Bangura MD    ASSISTANT:  Nelia Dennis; Falguni Carrillo.    ANESTHESIA:  General.    ESTIMATED BLOOD LOSS:  150 mL.    SPECIMENS REMOVED:  Hernia sac and part of omentum.    INTRAOPERATIVE FINDINGS:  At the time of surgery, a superficial infection is present  with fluid consistent with infection. Other findings, type 4 paraesophageal hernia with extensive omental fat and incarcerated transverse colon.     COMPLICATIONS:  None.    IMPLANTS:  ***    INDICATIONS:  The patient is an 84-year-old female who presented in extremis overnight requiring intubation for airway protection.  She was found to have some level of aspiration, radiologic evidence for aspiration.  More concerning, the patient had a clear change in her paraesophageal and her hernia contents with the stomach now completely obstructed with air-fluid level of the chest.  She was taken to the operating room for surgical management.    DESCRIPTION OF PROCEDURE:  Consent was obtained.  She was taken to operating room and placed in supine position.  SCDs were turned on and working.  After successful induction of general endotracheal anesthesia, abdomen was prepped and draped in the usual sterile fashion.  A time-out was performed per protocol.  The abdomen was entered at

## 2024-11-21 VITALS
WEIGHT: 171 LBS | BODY MASS INDEX: 31.47 KG/M2 | SYSTOLIC BLOOD PRESSURE: 114 MMHG | OXYGEN SATURATION: 100 % | HEART RATE: 63 BPM | RESPIRATION RATE: 15 BRPM | DIASTOLIC BLOOD PRESSURE: 59 MMHG | HEIGHT: 62 IN | TEMPERATURE: 97.9 F

## 2024-11-21 LAB
ALBUMIN SERPL-MCNC: 2.2 G/DL (ref 3.5–5)
ALBUMIN/GLOB SERPL: 0.7 (ref 1.1–2.2)
ALP SERPL-CCNC: 68 U/L (ref 45–117)
ALT SERPL-CCNC: 36 U/L (ref 12–78)
ANION GAP SERPL CALC-SCNC: 2 MMOL/L (ref 2–12)
AST SERPL-CCNC: 36 U/L (ref 15–37)
BASOPHILS # BLD: 0 K/UL (ref 0–0.1)
BASOPHILS NFR BLD: 1 % (ref 0–1)
BILIRUB SERPL-MCNC: 1.2 MG/DL (ref 0.2–1)
BUN SERPL-MCNC: 15 MG/DL (ref 6–20)
BUN/CREAT SERPL: 19 (ref 12–20)
CALCIUM SERPL-MCNC: 8.5 MG/DL (ref 8.5–10.1)
CHLORIDE SERPL-SCNC: 108 MMOL/L (ref 97–108)
CO2 SERPL-SCNC: 33 MMOL/L (ref 21–32)
CREAT SERPL-MCNC: 0.79 MG/DL (ref 0.55–1.02)
DIFFERENTIAL METHOD BLD: ABNORMAL
EOSINOPHIL # BLD: 0.3 K/UL (ref 0–0.4)
EOSINOPHIL NFR BLD: 5 % (ref 0–7)
ERYTHROCYTE [DISTWIDTH] IN BLOOD BY AUTOMATED COUNT: 15.4 % (ref 11.5–14.5)
GLOBULIN SER CALC-MCNC: 3 G/DL (ref 2–4)
GLUCOSE SERPL-MCNC: 98 MG/DL (ref 65–100)
HCT VFR BLD AUTO: 24.4 % (ref 35–47)
HGB BLD-MCNC: 7.7 G/DL (ref 11.5–16)
IMM GRANULOCYTES # BLD AUTO: 0 K/UL (ref 0–0.04)
IMM GRANULOCYTES NFR BLD AUTO: 1 % (ref 0–0.5)
LYMPHOCYTES # BLD: 1.1 K/UL (ref 0.8–3.5)
LYMPHOCYTES NFR BLD: 19 % (ref 12–49)
MAGNESIUM SERPL-MCNC: 2.1 MG/DL (ref 1.6–2.4)
MCH RBC QN AUTO: 31 PG (ref 26–34)
MCHC RBC AUTO-ENTMCNC: 31.6 G/DL (ref 30–36.5)
MCV RBC AUTO: 98.4 FL (ref 80–99)
MONOCYTES # BLD: 1.1 K/UL (ref 0–1)
MONOCYTES NFR BLD: 19 % (ref 5–13)
NEUTS SEG # BLD: 3.1 K/UL (ref 1.8–8)
NEUTS SEG NFR BLD: 55 % (ref 32–75)
NRBC # BLD: 0 K/UL (ref 0–0.01)
NRBC BLD-RTO: 0 PER 100 WBC
PHOSPHATE SERPL-MCNC: 2.6 MG/DL (ref 2.6–4.7)
PLATELET # BLD AUTO: 176 K/UL (ref 150–400)
PMV BLD AUTO: 10.4 FL (ref 8.9–12.9)
POTASSIUM SERPL-SCNC: 3.4 MMOL/L (ref 3.5–5.1)
PROT SERPL-MCNC: 5.2 G/DL (ref 6.4–8.2)
RBC # BLD AUTO: 2.48 M/UL (ref 3.8–5.2)
SODIUM SERPL-SCNC: 143 MMOL/L (ref 136–145)
WBC # BLD AUTO: 5.6 K/UL (ref 3.6–11)

## 2024-11-21 PROCEDURE — 6370000000 HC RX 637 (ALT 250 FOR IP)

## 2024-11-21 PROCEDURE — 6370000000 HC RX 637 (ALT 250 FOR IP): Performed by: HOSPITALIST

## 2024-11-21 PROCEDURE — 2580000003 HC RX 258: Performed by: STUDENT IN AN ORGANIZED HEALTH CARE EDUCATION/TRAINING PROGRAM

## 2024-11-21 PROCEDURE — 6360000002 HC RX W HCPCS: Performed by: HOSPITALIST

## 2024-11-21 PROCEDURE — 2700000000 HC OXYGEN THERAPY PER DAY

## 2024-11-21 PROCEDURE — 84100 ASSAY OF PHOSPHORUS: CPT

## 2024-11-21 PROCEDURE — 2580000003 HC RX 258: Performed by: HOSPITALIST

## 2024-11-21 PROCEDURE — APPNB30 APP NON BILLABLE TIME 0-30 MINS: Performed by: NURSE PRACTITIONER

## 2024-11-21 PROCEDURE — 6370000000 HC RX 637 (ALT 250 FOR IP): Performed by: INTERNAL MEDICINE

## 2024-11-21 PROCEDURE — 6360000002 HC RX W HCPCS: Performed by: INTERNAL MEDICINE

## 2024-11-21 PROCEDURE — 80053 COMPREHEN METABOLIC PANEL: CPT

## 2024-11-21 PROCEDURE — 94761 N-INVAS EAR/PLS OXIMETRY MLT: CPT

## 2024-11-21 PROCEDURE — 83735 ASSAY OF MAGNESIUM: CPT

## 2024-11-21 PROCEDURE — 36415 COLL VENOUS BLD VENIPUNCTURE: CPT

## 2024-11-21 PROCEDURE — 85025 COMPLETE CBC W/AUTO DIFF WBC: CPT

## 2024-11-21 RX ORDER — ACETAMINOPHEN 325 MG/1
650 TABLET ORAL EVERY 6 HOURS PRN
Qty: 120 TABLET | Refills: 3 | Status: SHIPPED
Start: 2024-11-21

## 2024-11-21 RX ORDER — METOPROLOL SUCCINATE 25 MG/1
12.5 TABLET, EXTENDED RELEASE ORAL DAILY
Qty: 30 TABLET | Refills: 3 | Status: SHIPPED
Start: 2024-11-22

## 2024-11-21 RX ORDER — FUROSEMIDE 10 MG/ML
20 INJECTION INTRAMUSCULAR; INTRAVENOUS ONCE
Status: COMPLETED | OUTPATIENT
Start: 2024-11-21 | End: 2024-11-21

## 2024-11-21 RX ORDER — PANTOPRAZOLE SODIUM 40 MG/1
40 TABLET, DELAYED RELEASE ORAL
Qty: 30 TABLET | Refills: 3 | Status: SHIPPED
Start: 2024-11-21

## 2024-11-21 RX ADMIN — ESCITALOPRAM OXALATE 5 MG: 10 TABLET ORAL at 09:47

## 2024-11-21 RX ADMIN — SODIUM CHLORIDE, PRESERVATIVE FREE 10 ML: 5 INJECTION INTRAVENOUS at 09:48

## 2024-11-21 RX ADMIN — CHOLECALCIFEROL TAB 125 MCG (5000 UNIT) 5000 UNITS: 125 TAB at 09:47

## 2024-11-21 RX ADMIN — APIXABAN 5 MG: 5 TABLET, FILM COATED ORAL at 09:47

## 2024-11-21 RX ADMIN — VALSARTAN 160 MG: 80 TABLET ORAL at 09:47

## 2024-11-21 RX ADMIN — PIPERACILLIN AND TAZOBACTAM 3375 MG: 3; .375 INJECTION, POWDER, LYOPHILIZED, FOR SOLUTION INTRAVENOUS at 03:11

## 2024-11-21 RX ADMIN — METOPROLOL SUCCINATE 12.5 MG: 25 TABLET, EXTENDED RELEASE ORAL at 09:47

## 2024-11-21 RX ADMIN — DOCUSATE SODIUM 100 MG: 100 CAPSULE, LIQUID FILLED ORAL at 09:46

## 2024-11-21 RX ADMIN — PANTOPRAZOLE SODIUM 40 MG: 40 TABLET, DELAYED RELEASE ORAL at 05:42

## 2024-11-21 RX ADMIN — LEVOTHYROXINE SODIUM 50 MCG: 0.05 TABLET ORAL at 05:42

## 2024-11-21 RX ADMIN — SODIUM CHLORIDE, PRESERVATIVE FREE 10 ML: 5 INJECTION INTRAVENOUS at 09:47

## 2024-11-21 RX ADMIN — ACETAMINOPHEN 650 MG: 325 TABLET ORAL at 09:57

## 2024-11-21 RX ADMIN — FUROSEMIDE 20 MG: 10 INJECTION, SOLUTION INTRAMUSCULAR; INTRAVENOUS at 11:35

## 2024-11-21 ASSESSMENT — PAIN DESCRIPTION - LOCATION: LOCATION: ABDOMEN

## 2024-11-21 ASSESSMENT — PAIN SCALES - GENERAL: PAINLEVEL_OUTOF10: 3

## 2024-11-21 NOTE — PLAN OF CARE
Problem: Discharge Planning  Goal: Discharge to home or other facility with appropriate resources  11/14/2024 1225 by Fabby Varela, RN  Outcome: Progressing  11/13/2024 2248 by Krystin Bettencourt RN  Outcome: Progressing  Flowsheets (Taken 11/13/2024 2004)  Discharge to home or other facility with appropriate resources:   Identify barriers to discharge with patient and caregiver   Arrange for needed discharge resources and transportation as appropriate     Problem: Pain  Goal: Verbalizes/displays adequate comfort level or baseline comfort level  11/14/2024 1225 by Fabby Varela, RN  Outcome: Progressing  11/13/2024 2248 by Krystin Bettencourt RN  Outcome: Progressing  Flowsheets (Taken 11/13/2024 2004)  Verbalizes/displays adequate comfort level or baseline comfort level:   Encourage patient to monitor pain and request assistance   Assess pain using appropriate pain scale   Administer analgesics based on type and severity of pain and evaluate response   Implement non-pharmacological measures as appropriate and evaluate response   Consider cultural and social influences on pain and pain management   Notify Licensed Independent Practitioner if interventions unsuccessful or patient reports new pain     Problem: Skin/Tissue Integrity  Goal: Absence of new skin breakdown  Description: 1.  Monitor for areas of redness and/or skin breakdown  2.  Assess vascular access sites hourly  3.  Every 4-6 hours minimum:  Change oxygen saturation probe site  4.  Every 4-6 hours:  If on nasal continuous positive airway pressure, respiratory therapy assess nares and determine need for appliance change or resting period.  11/14/2024 1225 by Fabby Varela, RN  Outcome: Progressing  11/13/2024 2248 by Krystin Bettencourt RN  Outcome: Progressing     Problem: Safety - Adult  Goal: Free from fall injury  11/14/2024 1225 by Fabby Varela, RN  Outcome: Progressing  11/13/2024 2248 by Krystin Bettencourt, RN  Outcome: Progressing     Problem: ABCDS Injury 
  Problem: Discharge Planning  Goal: Discharge to home or other facility with appropriate resources  Outcome: HH/HSPC Progressing     Problem: Pain  Goal: Verbalizes/displays adequate comfort level or baseline comfort level  Outcome: HH/HSPC Progressing     Problem: Skin/Tissue Integrity  Goal: Absence of new skin breakdown  Description: 1.  Monitor for areas of redness and/or skin breakdown  2.  Assess vascular access sites hourly  3.  Every 4-6 hours minimum:  Change oxygen saturation probe site  4.  Every 4-6 hours:  If on nasal continuous positive airway pressure, respiratory therapy assess nares and determine need for appliance change or resting period.  Outcome: HH/HSPC Progressing     Problem: Safety - Adult  Goal: Free from fall injury  Outcome: HH/HSPC Progressing     Problem: ABCDS Injury Assessment  Goal: Absence of physical injury  Outcome: HH/HSPC Progressing     Problem: Nutrition Deficit:  Goal: Optimize nutritional status  Outcome: HH/HSPC Progressing     Problem: Respiratory - Adult  Goal: Achieves optimal ventilation and oxygenation  Outcome: HH/HSPC Progressing     
  Problem: Discharge Planning  Goal: Discharge to home or other facility with appropriate resources  Outcome: Progressing     Problem: Pain  Goal: Verbalizes/displays adequate comfort level or baseline comfort level  Outcome: Progressing     Problem: Skin/Tissue Integrity  Goal: Absence of new skin breakdown  Description: 1.  Monitor for areas of redness and/or skin breakdown  2.  Assess vascular access sites hourly  3.  Every 4-6 hours minimum:  Change oxygen saturation probe site  4.  Every 4-6 hours:  If on nasal continuous positive airway pressure, respiratory therapy assess nares and determine need for appliance change or resting period.  Outcome: Progressing     Problem: Safety - Adult  Goal: Free from fall injury  Outcome: Progressing     
  Problem: Discharge Planning  Goal: Discharge to home or other facility with appropriate resources  Outcome: Progressing     Problem: Pain  Goal: Verbalizes/displays adequate comfort level or baseline comfort level  Outcome: Progressing     Problem: Skin/Tissue Integrity  Goal: Absence of new skin breakdown  Description: 1.  Monitor for areas of redness and/or skin breakdown  2.  Assess vascular access sites hourly  3.  Every 4-6 hours minimum:  Change oxygen saturation probe site  4.  Every 4-6 hours:  If on nasal continuous positive airway pressure, respiratory therapy assess nares and determine need for appliance change or resting period.  Outcome: Progressing     Problem: Safety - Adult  Goal: Free from fall injury  Outcome: Progressing     Problem: ABCDS Injury Assessment  Goal: Absence of physical injury  Outcome: Progressing     Problem: Nutrition Deficit:  Goal: Optimize nutritional status  Outcome: Progressing     Problem: Respiratory - Adult  Goal: Achieves optimal ventilation and oxygenation  Outcome: Progressing     
  Problem: Discharge Planning  Goal: Discharge to home or other facility with appropriate resources  Outcome: Progressing  Flowsheets (Taken 11/13/2024 2004)  Discharge to home or other facility with appropriate resources:   Identify barriers to discharge with patient and caregiver   Arrange for needed discharge resources and transportation as appropriate     Problem: Pain  Goal: Verbalizes/displays adequate comfort level or baseline comfort level  Outcome: Progressing  Flowsheets (Taken 11/13/2024 2004)  Verbalizes/displays adequate comfort level or baseline comfort level:   Encourage patient to monitor pain and request assistance   Assess pain using appropriate pain scale   Administer analgesics based on type and severity of pain and evaluate response   Implement non-pharmacological measures as appropriate and evaluate response   Consider cultural and social influences on pain and pain management   Notify Licensed Independent Practitioner if interventions unsuccessful or patient reports new pain     Problem: Skin/Tissue Integrity  Goal: Absence of new skin breakdown  Description: 1.  Monitor for areas of redness and/or skin breakdown  2.  Assess vascular access sites hourly  3.  Every 4-6 hours minimum:  Change oxygen saturation probe site  4.  Every 4-6 hours:  If on nasal continuous positive airway pressure, respiratory therapy assess nares and determine need for appliance change or resting period.  Outcome: Progressing     Problem: Safety - Adult  Goal: Free from fall injury  Outcome: Progressing     Problem: ABCDS Injury Assessment  Goal: Absence of physical injury  Outcome: Progressing     
  Problem: Discharge Planning  Goal: Discharge to home or other facility with appropriate resources  Outcome: Progressing  Flowsheets (Taken 11/15/2024 1915)  Discharge to home or other facility with appropriate resources:   Identify barriers to discharge with patient and caregiver   Arrange for needed discharge resources and transportation as appropriate     Problem: Pain  Goal: Verbalizes/displays adequate comfort level or baseline comfort level  Outcome: Progressing     Problem: Skin/Tissue Integrity  Goal: Absence of new skin breakdown  Description: 1.  Monitor for areas of redness and/or skin breakdown  2.  Assess vascular access sites hourly  3.  Every 4-6 hours minimum:  Change oxygen saturation probe site  4.  Every 4-6 hours:  If on nasal continuous positive airway pressure, respiratory therapy assess nares and determine need for appliance change or resting period.  Outcome: Progressing     Problem: Safety - Adult  Goal: Free from fall injury  Outcome: Progressing     Problem: ABCDS Injury Assessment  Goal: Absence of physical injury  Outcome: Progressing     Problem: Discharge Planning  Goal: Discharge to home or other facility with appropriate resources  Outcome: Progressing  Flowsheets (Taken 11/15/2024 1915)  Discharge to home or other facility with appropriate resources:   Identify barriers to discharge with patient and caregiver   Arrange for needed discharge resources and transportation as appropriate     Problem: Pain  Goal: Verbalizes/displays adequate comfort level or baseline comfort level  Outcome: Progressing     Problem: Skin/Tissue Integrity  Goal: Absence of new skin breakdown  Description: 1.  Monitor for areas of redness and/or skin breakdown  2.  Assess vascular access sites hourly  3.  Every 4-6 hours minimum:  Change oxygen saturation probe site  4.  Every 4-6 hours:  If on nasal continuous positive airway pressure, respiratory therapy assess nares and determine need for appliance 
  Problem: Occupational Therapy - Adult  Goal: By Discharge: Performs self-care activities at highest level of function for planned discharge setting.  See evaluation for individualized goals.  Description: FUNCTIONAL STATUS PRIOR TO ADMISSION:  Patient lives with her son and , both of whom have their own medical complications and are unable to assist the patient. At baseline, patient was Independent with ADLs/IADLs.     Occupational Therapy Goals:  Initiated 11/14/2024; Re-evaluation 11/18/2024 s/p t/f to ICU, intubation, and hernia repair - goals updated below.   1.  Patient will perform grooming standing at the sink with Modified [place] within 7 day(s). Downgrade to SBA for one minute 11/18.  2.  Patient will perform lower body dressing with Modified [place]  and AE PRN within 7 day(s). Downgrade to min A 11/18.   3.  Patient will perform bathing with Modified [place] within 7 day(s). Downgrade to min A 11/18.   4.  Patient will perform toilet transfers with Modified [place]  within 7 day(s). Continue as written 11/18.   5.  Patient will perform all aspects of toileting with Modified [place] within 7 day(s). Continue as written 11/18.   6.  Patient will participate in upper extremity therapeutic exercise/activities with [place] for 15 minutes within 7 day(s).  Continue 11/18.   7.  Patient will utilize energy conservation techniques during functional activities with verbal cues within 7 day(s). Continue 11/18.     Outcome: Progressing     OCCUPATIONAL THERAPY TREATMENT  Patient: Radha Wynn (84 y.o. female)  Date: 11/20/2024  Primary Diagnosis: Anticoagulated [Z79.01]  Closed left hip fracture, initial encounter ([facility]) [S72.002A]  Atrial fibrillation, unspecified type ([facility]) [I48.91]  Procedure(s) (LRB):  ROBOT ASSISTED REDUCTION OF INCARCERATED STOMACH AND COLON,PARAESOPHAGEAL HERNIA REPAIR TOUPET FUNDOPLICATION,PARTIAL OMENENTECTOMY (N/A) 5 Days Post-Op   Precautions: 
  Problem: Occupational Therapy - Adult  Goal: By Discharge: Performs self-care activities at highest level of function for planned discharge setting.  See evaluation for individualized goals.  Description: FUNCTIONAL STATUS PRIOR TO ADMISSION:  Patient lives with her son and , both of whom have their own medical complications and are unable to assist the patient. At baseline, patient was Independent with ADLs/IADLs.     Occupational Therapy Goals:  Initiated 11/14/2024; Re-evaluation 11/18/2024 s/p t/f to ICU, intubation, and hernia repair - goals updated below.   1.  Patient will perform grooming standing at the sink with Modified [place] within 7 day(s). Downgrade to SBA for one minute 11/18.  2.  Patient will perform lower body dressing with Modified [place]  and AE PRN within 7 day(s). Downgrade to min A 11/18.   3.  Patient will perform bathing with Modified [place] within 7 day(s). Downgrade to min A 11/18.   4.  Patient will perform toilet transfers with Modified [place]  within 7 day(s). Continue as written 11/18.   5.  Patient will perform all aspects of toileting with Modified [place] within 7 day(s). Continue as written 11/18.   6.  Patient will participate in upper extremity therapeutic exercise/activities with [place] for 15 minutes within 7 day(s).  Continue 11/18.   7.  Patient will utilize energy conservation techniques during functional activities with verbal cues within 7 day(s). Continue 11/18.     Outcome: Progressing  OCCUPATIONAL THERAPY TREATMENT  Patient: Radha Wynn (84 y.o. female)  Date: 11/19/2024  Primary Diagnosis: Anticoagulated [Z79.01]  Closed left hip fracture, initial encounter ([facility]) [S72.002A]  Atrial fibrillation, unspecified type ([facility]) [I48.91]  Procedure(s) (LRB):  ROBOT ASSISTED REDUCTION OF INCARCERATED STOMACH AND COLON,PARAESOPHAGEAL HERNIA REPAIR TOUPET FUNDOPLICATION,PARTIAL OMENENTECTOMY (N/A) 4 Days Post-Op   Precautions: 
  Problem: Pain  Goal: Verbalizes/displays adequate comfort level or baseline comfort level  11/16/2024 0827 by Africa Augustin RN  Outcome: Progressing  Flowsheets (Taken 11/16/2024 0800)  Verbalizes/displays adequate comfort level or baseline comfort level:   Encourage patient to monitor pain and request assistance   Assess pain using appropriate pain scale  11/16/2024 0018 by Ethel Hook RN  Outcome: Progressing     Problem: Skin/Tissue Integrity  Goal: Absence of new skin breakdown  Description: 1.  Monitor for areas of redness and/or skin breakdown  2.  Assess vascular access sites hourly  3.  Every 4-6 hours minimum:  Change oxygen saturation probe site  4.  Every 4-6 hours:  If on nasal continuous positive airway pressure, respiratory therapy assess nares and determine need for appliance change or resting period.  11/16/2024 0827 by Africa Augustin RN  Outcome: Progressing  11/16/2024 0018 by Ethel Hook RN  Outcome: Progressing     Problem: Safety - Adult  Goal: Free from fall injury  11/16/2024 0827 by Africa Augustin RN  Outcome: Progressing  11/16/2024 0018 by Ethel Hook RN  Outcome: Progressing     Problem: ABCDS Injury Assessment  Goal: Absence of physical injury  11/16/2024 0827 by Africa Augustin RN  Outcome: Progressing  11/16/2024 0018 by Ethel Hook RN  Outcome: Progressing     Problem: Safety - Medical Restraint  Goal: Remains free of injury from restraints (Restraint for Interference with Medical Device)  Description: INTERVENTIONS:  1. Determine that other, less restrictive measures have been tried or would not be effective before applying the restraint  2. Evaluate the patient's condition at the time of restraint application  3. Inform patient/family regarding the reason for restraint  4. Q2H: Monitor safety, psychosocial status, comfort, nutrition and hydration  11/16/2024 0827 by Africa Augustin RN  Outcome: Progressing  Flowsheets (Taken 11/16/2024 0800)  Remains free of injury 
  Problem: Pain  Goal: Verbalizes/displays adequate comfort level or baseline comfort level  Outcome: Progressing     Problem: Skin/Tissue Integrity  Goal: Absence of new skin breakdown  Description: 1.  Monitor for areas of redness and/or skin breakdown  2.  Assess vascular access sites hourly  3.  Every 4-6 hours minimum:  Change oxygen saturation probe site  4.  Every 4-6 hours:  If on nasal continuous positive airway pressure, respiratory therapy assess nares and determine need for appliance change or resting period.  Outcome: Progressing     Problem: Safety - Adult  Goal: Free from fall injury  Outcome: Progressing     Problem: ABCDS Injury Assessment  Goal: Absence of physical injury  Outcome: Progressing     Problem: Nutrition Deficit:  Goal: Optimize nutritional status  Outcome: Progressing  Flowsheets (Taken 11/18/2024 0741 by Terrie De La Rosa RD)  Nutrient intake appropriate for improving, restoring, or maintaining nutritional needs: Assess nutritional status and recommend course of action     Problem: Respiratory - Adult  Goal: Achieves optimal ventilation and oxygenation  Outcome: Progressing  Flowsheets (Taken 11/18/2024 0800)  Achieves optimal ventilation and oxygenation: Assess for changes in respiratory status     
  Problem: Physical Therapy - Adult  Goal: By Discharge: Performs mobility at highest level of function for planned discharge setting.  See evaluation for individualized goals.  Description: FUNCTIONAL STATUS PRIOR TO ADMISSION: Patient was independent and active without use of DME. Patient denies additional falls aside from the fall leading to admission.    HOME SUPPORT PRIOR TO ADMISSION: The patient lived with her son and  who both have medical conditions of their own.    Physical Therapy Goals  Initiated 11/14/2024  1.  Patient will move from supine to sit and sit to supine and roll side to side in bed with supervision/set-up within 7 day(s).    2.  Patient will perform sit to stand with supervision/set-up within 7 day(s).  3.  Patient will transfer from bed to chair and chair to bed with supervision/set-up using the least restrictive device within 7 day(s).  4.  Patient will ambulate with contact guard assist for 25 feet with the least restrictive device within 7 day(s).   5.  Patient will ascend/descend 4 stairs with 1 handrail(s) with minimal assistance within 7 day(s).     Outcome: Not Progressing   PHYSICAL THERAPY TREATMENT    Patient: Radha Wynn (84 y.o. female)  Date: 11/16/2024  Diagnosis: Anticoagulated [Z79.01]  Closed left hip fracture, initial encounter ([facility]) [S72.002A]  Atrial fibrillation, unspecified type ([facility]) [I48.91] Intertrochanteric fracture of left femur ([facility])  Procedure(s) (LRB):  ROBOT ASSISTED REDUCTION OF INCARCERATED STOMACH AND COLON,PARAESOPHAGEAL HERNIA REPAIR TOUPET FUNDOPLICATION,PARTIAL OMENENTECTOMY (N/A) 1 Day Post-Op  Precautions: Weight Bearing   Left Lower Extremity Weight Bearing: Weight Bearing As Tolerated                  ASSESSMENT:  Since last PT treatment pt has experienced hernia paraesophageal hernia repair with reduction of incarcerated stomach and colon. She remains intubated on A/C with PEEP 5 and FiO2 30%. She arouses to voice, denies pain, and 
  Problem: Physical Therapy - Adult  Goal: By Discharge: Performs mobility at highest level of function for planned discharge setting.  See evaluation for individualized goals.  Description: FUNCTIONAL STATUS PRIOR TO ADMISSION: Patient was independent and active without use of DME. Patient denies additional falls aside from the fall leading to admission.    HOME SUPPORT PRIOR TO ADMISSION: The patient lived with her son and  who both have medical conditions of their own.    Physical Therapy Goals  Initiated 11/14/2024  1.  Patient will move from supine to sit and sit to supine and roll side to side in bed with supervision/set-up within 7 day(s).    2.  Patient will perform sit to stand with supervision/set-up within 7 day(s).  3.  Patient will transfer from bed to chair and chair to bed with supervision/set-up using the least restrictive device within 7 day(s).  4.  Patient will ambulate with contact guard assist for 25 feet with the least restrictive device within 7 day(s).   5.  Patient will ascend/descend 4 stairs with 1 handrail(s) with minimal assistance within 7 day(s).   11/14/2024 1542 by Manuel Rubin, PT  Outcome: Progressing     PHYSICAL THERAPY TREATMENT    Patient: Radha Wynn (84 y.o. female)  Date: 11/14/2024  Diagnosis: Anticoagulated [Z79.01]  Closed left hip fracture, initial encounter ([facility]) [S72.002A]  Atrial fibrillation, unspecified type ([facility]) [I48.91] Intertrochanteric fracture of left femur ([facility])  Procedure(s) (LRB):  LEFT HIP INTRAMEDULLARY NAIL (Left) 1 Day Post-Op  Precautions: Weight Bearing   Left Lower Extremity Weight Bearing: Weight Bearing As Tolerated                  ASSESSMENT:  Patient continues to benefit from skilled PT services and is slowly progressing towards goals. Patient able to progress supine > sit with CGA using bed rail and slightly elevated HOB, scoots forward with Min A to plant feet on floor. However, further mobility deferred due to asymptomatic OH and 
  Problem: Physical Therapy - Adult  Goal: By Discharge: Performs mobility at highest level of function for planned discharge setting.  See evaluation for individualized goals.  Description: FUNCTIONAL STATUS PRIOR TO ADMISSION: Patient was independent and active without use of DME. Patient denies additional falls aside from the fall leading to admission.    HOME SUPPORT PRIOR TO ADMISSION: The patient lived with her son and  who both have medical conditions of their own.    Physical Therapy Goals  Initiated 11/14/2024  1.  Patient will move from supine to sit and sit to supine and roll side to side in bed with supervision/set-up within 7 day(s).    2.  Patient will perform sit to stand with supervision/set-up within 7 day(s).  3.  Patient will transfer from bed to chair and chair to bed with supervision/set-up using the least restrictive device within 7 day(s).  4.  Patient will ambulate with contact guard assist for 25 feet with the least restrictive device within 7 day(s).   5.  Patient will ascend/descend 4 stairs with 1 handrail(s) with minimal assistance within 7 day(s).   11/16/2024 1619 by Nallely Miranda, PT  Outcome: Not Progressing     Problem: Physical Therapy - Adult  Goal: By Discharge: Performs mobility at highest level of function for planned discharge setting.  See evaluation for individualized goals.  Description: FUNCTIONAL STATUS PRIOR TO ADMISSION: Patient was independent and active without use of DME. Patient denies additional falls aside from the fall leading to admission.    HOME SUPPORT PRIOR TO ADMISSION: The patient lived with her son and  who both have medical conditions of their own.    Physical Therapy Goals  Initiated 11/14/2024  1.  Patient will move from supine to sit and sit to supine and roll side to side in bed with supervision/set-up within 7 day(s).    2.  Patient will perform sit to stand with supervision/set-up within 7 day(s).  3.  Patient will transfer 
  Problem: Physical Therapy - Adult  Goal: By Discharge: Performs mobility at highest level of function for planned discharge setting.  See evaluation for individualized goals.  Description: FUNCTIONAL STATUS PRIOR TO ADMISSION: Patient was independent and active without use of DME. Patient denies additional falls aside from the fall leading to admission.    HOME SUPPORT PRIOR TO ADMISSION: The patient lived with her son and  who both have medical conditions of their own.    Physical Therapy Goals  Initiated 11/14/2024  1.  Patient will move from supine to sit and sit to supine and roll side to side in bed with supervision/set-up within 7 day(s).    2.  Patient will perform sit to stand with supervision/set-up within 7 day(s).  3.  Patient will transfer from bed to chair and chair to bed with supervision/set-up using the least restrictive device within 7 day(s).  4.  Patient will ambulate with contact guard assist for 25 feet with the least restrictive device within 7 day(s).   5.  Patient will ascend/descend 4 stairs with 1 handrail(s) with minimal assistance within 7 day(s).   Outcome: Progressing     PHYSICAL THERAPY EVALUATION    Patient: Radha Wynn (84 y.o. female)  Date: 11/14/2024  Primary Diagnosis: Anticoagulated [Z79.01]  Closed left hip fracture, initial encounter ([facility]) [S72.002A]  Atrial fibrillation, unspecified type ([facility]) [I48.91]  Procedure(s) (LRB):  LEFT HIP INTRAMEDULLARY NAIL (Left) 1 Day Post-Op   Precautions: Restrictions/Precautions: Weight Bearing   Lower Extremity Weight Bearing Restrictions  Left Lower Extremity Weight Bearing: Weight Bearing As Tolerated                  ASSESSMENT :   DEFICITS/IMPAIRMENTS:   The patient is limited by decreased functional mobility, independence in ADLs, high-level IADLs, ROM, strength, activity tolerance, safety awareness, cognition, attention/concentration, coordination, balance, orthostatic hypotension, increased pain levels, and motor 
  Problem: Physical Therapy - Adult  Goal: By Discharge: Performs mobility at highest level of function for planned discharge setting.  See evaluation for individualized goals.  Description: FUNCTIONAL STATUS PRIOR TO ADMISSION: Patient was independent and active without use of DME. Patient denies additional falls aside from the fall leading to admission.    HOME SUPPORT PRIOR TO ADMISSION: The patient lived with her son and  who both have medical conditions of their own.    Physical Therapy Goals  Re-assessed 11/18 s/p hernia repair surgery, transfer to ICU intubated, and extubated on 11/17  1.  Patient will move from supine to sit and sit to supine and roll side to side in bed with minimal assistance within 7 day(s).    2.  Patient will perform sit to stand with minimal assistance within 7 day(s).  3.  Patient will transfer from bed to chair and chair to bed with minimal assistance using the least restrictive device within 7 day(s).  4.  Patient will ambulate with minimal assistance for 15 feet with the least restrictive device within 7 day(s).     Initiated 11/14/2024  1.  Patient will move from supine to sit and sit to supine and roll side to side in bed with supervision/set-up within 7 day(s).    2.  Patient will perform sit to stand with supervision/set-up within 7 day(s).  3.  Patient will transfer from bed to chair and chair to bed with supervision/set-up using the least restrictive device within 7 day(s).  4.  Patient will ambulate with contact guard assist for 25 feet with the least restrictive device within 7 day(s).   5.  Patient will ascend/descend 4 stairs with 1 handrail(s) with minimal assistance within 7 day(s).   Outcome: Progressing   PHYSICAL THERAPY RE-EVALUATION    Patient: Radha Wynn (84 y.o. female)  Date: 11/18/2024  Primary Diagnosis: Anticoagulated [Z79.01]  Closed left hip fracture, initial encounter ([facility]) [S72.002A]  Atrial fibrillation, unspecified type ([facility]) 
  Problem: Physical Therapy - Adult  Goal: By Discharge: Performs mobility at highest level of function for planned discharge setting.  See evaluation for individualized goals.  Description: FUNCTIONAL STATUS PRIOR TO ADMISSION: Patient was independent and active without use of DME. Patient denies additional falls aside from the fall leading to admission.    HOME SUPPORT PRIOR TO ADMISSION: The patient lived with her son and  who both have medical conditions of their own.    Physical Therapy Goals  Re-assessed 11/18 s/p hernia repair surgery, transfer to ICU intubated, and extubated on 11/17  1.  Patient will move from supine to sit and sit to supine and roll side to side in bed with minimal assistance within 7 day(s).    2.  Patient will perform sit to stand with minimal assistance within 7 day(s).  3.  Patient will transfer from bed to chair and chair to bed with minimal assistance using the least restrictive device within 7 day(s).  4.  Patient will ambulate with minimal assistance for 15 feet with the least restrictive device within 7 day(s).     Initiated 11/14/2024  1.  Patient will move from supine to sit and sit to supine and roll side to side in bed with supervision/set-up within 7 day(s).    2.  Patient will perform sit to stand with supervision/set-up within 7 day(s).  3.  Patient will transfer from bed to chair and chair to bed with supervision/set-up using the least restrictive device within 7 day(s).  4.  Patient will ambulate with contact guard assist for 25 feet with the least restrictive device within 7 day(s).   5.  Patient will ascend/descend 4 stairs with 1 handrail(s) with minimal assistance within 7 day(s).   Outcome: Progressing   PHYSICAL THERAPY TREATMENT    Patient: Radha Wynn (84 y.o. female)  Date: 11/19/2024  Diagnosis: Anticoagulated [Z79.01]  Closed left hip fracture, initial encounter ([facility]) [S72.002A]  Atrial fibrillation, unspecified type ([facility]) [I48.91] Intertrochanteric 
  Problem: Physical Therapy - Adult  Goal: By Discharge: Performs mobility at highest level of function for planned discharge setting.  See evaluation for individualized goals.  Description: FUNCTIONAL STATUS PRIOR TO ADMISSION: Patient was independent and active without use of DME. Patient denies additional falls aside from the fall leading to admission.    HOME SUPPORT PRIOR TO ADMISSION: The patient lived with her son and  who both have medical conditions of their own.    Physical Therapy Goals  Re-assessed 11/18 s/p hernia repair surgery, transfer to ICU intubated, and extubated on 11/17  1.  Patient will move from supine to sit and sit to supine and roll side to side in bed with minimal assistance within 7 day(s).    2.  Patient will perform sit to stand with minimal assistance within 7 day(s).  3.  Patient will transfer from bed to chair and chair to bed with minimal assistance using the least restrictive device within 7 day(s).  4.  Patient will ambulate with minimal assistance for 15 feet with the least restrictive device within 7 day(s).     Initiated 11/14/2024  1.  Patient will move from supine to sit and sit to supine and roll side to side in bed with supervision/set-up within 7 day(s).    2.  Patient will perform sit to stand with supervision/set-up within 7 day(s).  3.  Patient will transfer from bed to chair and chair to bed with supervision/set-up using the least restrictive device within 7 day(s).  4.  Patient will ambulate with contact guard assist for 25 feet with the least restrictive device within 7 day(s).   5.  Patient will ascend/descend 4 stairs with 1 handrail(s) with minimal assistance within 7 day(s).   Outcome: Progressing   PHYSICAL THERAPY TREATMENT    Patient: Radha Wynn (84 y.o. female)  Date: 11/20/2024  Diagnosis: Anticoagulated [Z79.01]  Closed left hip fracture, initial encounter ([facility]) [S72.002A]  Atrial fibrillation, unspecified type ([facility]) [I48.91] Intertrochanteric 
  Problem: Safety - Medical Restraint  Goal: Remains free of injury from restraints (Restraint for Interference with Medical Device)  Description: INTERVENTIONS:  1. Determine that other, less restrictive measures have been tried or would not be effective before applying the restraint  2. Evaluate the patient's condition at the time of restraint application  3. Inform patient/family regarding the reason for restraint  4. Q2H: Monitor safety, psychosocial status, comfort, nutrition and hydration  11/16/2024 0042 by Neisha Elaine, RN  Outcome: Progressing  11/16/2024 0040 by Neisha Elaine RN  Outcome: Progressing  Flowsheets (Taken 11/16/2024 0000 by Ethel Hook, RN)  Remains free of injury from restraints (restraint for interference with medical device): Determine that other, less restrictive measures have been tried or would not be effective before applying the restraint     
  Problem: Safety - Medical Restraint  Goal: Remains free of injury from restraints (Restraint for Interference with Medical Device)  Description: INTERVENTIONS:  1. Determine that other, less restrictive measures have been tried or would not be effective before applying the restraint  2. Evaluate the patient's condition at the time of restraint application  3. Inform patient/family regarding the reason for restraint  4. Q2H: Monitor safety, psychosocial status, comfort, nutrition and hydration  11/17/2024 0915 by Africa Augustin RN  Outcome: HH/HSPC Resolved Met  11/17/2024 0915 by Africa Augustin RN  Outcome: Progressing  Flowsheets (Taken 11/17/2024 0800)  Remains free of injury from restraints (restraint for interference with medical device): Determine that other, less restrictive measures have been tried or would not be effective before applying the restraint  11/16/2024 2055 by Lucina Porter  Outcome: Progressing  Flowsheets  Taken 11/16/2024 2000 by Lucina Porter  Remains free of injury from restraints (restraint for interference with medical device): Determine that other, less restrictive measures have been tried or would not be effective before applying the restraint  Taken 11/16/2024 1800 by Africa Augustin RN  Remains free of injury from restraints (restraint for interference with medical device): Determine that other, less restrictive measures have been tried or would not be effective before applying the restraint  Taken 11/16/2024 1600 by Africa Augustin RN  Remains free of injury from restraints (restraint for interference with medical device): Determine that other, less restrictive measures have been tried or would not be effective before applying the restraint  Taken 11/16/2024 1400 by Africa Augustin RN  Remains free of injury from restraints (restraint for interference with medical device): Determine that other, less restrictive measures have been tried or would not be 
  Problem: Safety - Medical Restraint  Goal: Remains free of injury from restraints (Restraint for Interference with Medical Device)  Description: INTERVENTIONS:  1. Determine that other, less restrictive measures have been tried or would not be effective before applying the restraint  2. Evaluate the patient's condition at the time of restraint application  3. Inform patient/family regarding the reason for restraint  4. Q2H: Monitor safety, psychosocial status, comfort, nutrition and hydration  Outcome: Progressing  Flowsheets (Taken 11/14/2024 2200)  Remains free of injury from restraints (restraint for interference with medical device):   Determine that other, less restrictive measures have been tried or would not be effective before applying the restraint   Evaluate the patient's condition at the time of restraint application   Inform patient/family regarding the reason for restraint   Every 2 hours: Monitor safety, psychosocial status, comfort, nutrition and hydration     
Medical Device)  Description: INTERVENTIONS:  1. Determine that other, less restrictive measures have been tried or would not be effective before applying the restraint  2. Evaluate the patient's condition at the time of restraint application  3. Inform patient/family regarding the reason for restraint  4. Q2H: Monitor safety, psychosocial status, comfort, nutrition and hydration  11/15/2024 0907 by Jenifer Maharaj, RN  Outcome: Progressing  11/15/2024 0109 by Hermelinda Reid, RN  Outcome: Progressing  Flowsheets (Taken 11/14/2024 2200)  Remains free of injury from restraints (restraint for interference with medical device):   Determine that other, less restrictive measures have been tried or would not be effective before applying the restraint   Evaluate the patient's condition at the time of restraint application   Inform patient/family regarding the reason for restraint   Every 2 hours: Monitor safety, psychosocial status, comfort, nutrition and hydration     
mentation and behavior   Position to facilitate oxygenation and minimize respiratory effort   Oxygen supplementation based on oxygen saturation or arterial blood gases  11/17/2024 0715 by Anjum Bartlett, RT  Outcome: Progressing     
None  ADL Assistance: Independent  Homemaking Assistance: Independent  Ambulation Assistance: Independent  Transfer Assistance: Independent  Active : Yes  Mode of Transportation: Car  Occupation: Retired      EXAMINATION OF PERFORMANCE DEFICITS:    Cognitive/Behavioral Status:  Orientation  Overall Orientation Status: Within Functional Limits  Cognition  Overall Cognitive Status: Exceptions  Following Commands: Follows one step commands with repetition;Follows one step commands with increased time  Attention Span: Attends with cues to redirect  Problem Solving: Decreased awareness of errors;Assistance required to generate solutions;Assistance required to implement solutions  Insights: Decreased awareness of deficits  Cognition Comment: patient with difficulty with word finding, taking increased time to remember place in her story    Skin: incision on L hip, s/p IM nail    Edema: some swelling in LLE    Hearing:   Hearing  Hearing: Within functional limits    Vision/Perceptual:          Vision  Vision: Within Functional Limits       Range of Motion:   AROM: Within functional limits (except LLE s/p IM nail)  PROM: Within functional limits      Strength:  Strength: Within functional limits (except LLE s/p IM nail)      Coordination:  Coordination: Within functional limits            Tone & Sensation:   Tone: Normal  Sensation: Intact          Functional Mobility and Transfers for ADLs:    Bed Mobility:     Bed Mobility Training  Bed Mobility Training: Yes  Interventions: Verbal cues;Safety awareness training  Supine to Sit: Minimum assistance  Sit to Supine: Minimum assistance;Moderate assistance;Assist X2    Transfers:      Transfer Training  Transfer Training: Yes  Interventions: Safety awareness training;Verbal cues;Tactile cues  Sit to Stand: Minimum assistance;Assist X2  Stand to Sit: Minimum assistance;Assist X2  Bed to Chair: Minimum assistance;Assist X2                     Balance:      Balance  Sitting: 
Topher Arana, Carlos Manuel Vargas, -PAC “6-Clicks” Functional Assessment Scores Predict Acute Care Hospital Discharge Destination, Physical Therapy, Volume 94, Issue 9, 1 September 2014, Pages 5040-5055, [email or web address]    Pain Rating:  Pt reporting minimal pain in L hip due to hip fracture repair (IM nailing), reporting 3/10   Pain Intervention(s):   rest, repositioning, and pain is at a level acceptable to the patient    Activity Tolerance:   Fair , requires rest breaks, and desaturates with activity and requires oxygen; tachycardia  Please refer to the flowsheet for vital signs taken during this treatment.    After treatment:   Patient left in no apparent distress sitting up in chair, Call bell within reach, Bed/ chair alarm activated, and Caregiver / family present    COMMUNICATION/EDUCATION:   The patient's plan of care was discussed with: physical therapist and registered nurse    Patient Education  Education Given To: Patient  Education Provided: Role of Therapy;Plan of Care;Precautions;Transfer Training;Energy Conservation;Fall Prevention Strategies;Equipment  Education Method: Verbal  Barriers to Learning: Cognition  Education Outcome: Verbalized understanding;Continued education needed    Thank you for this referral.  Kiana Luis OT  Minutes: 22

## 2024-11-21 NOTE — CARE COORDINATION
11/13/2024  11:37 AM  Care Management Progress Note    Reason for Admission:   Anticoagulated [Z79.01]  Closed left hip fracture, initial encounter ([facility]) [S72.002A]  Atrial fibrillation, unspecified type ([facility]) [I48.91]  Procedure(s) (LRB):  LEFT HIP INTRAMEDULLARY NAIL (Left)  Day of Surgery    Patient Admission Status: Inpatient  Date Admitted to INP: 11/12/24 []NA - OBS/Outpatient  RUR: Readmission Risk Score: 14.9    Hospitalization in the last 30 days (Readmission):  No        Transition of care plan:  Awaiting medical clearance and DC order. Ortho following, and pt scheduled for IM nailing today. Therapy to evaluate afterwards.   TBD pending therapy reqs following surgery. If SNF is recommended, auth is required.   Date IM given: 11/12/24 []NA  Outpatient follow-up.  Discharge transport: TBD    
11/14/2024  3:36 PM  CM attempted to meet with pt re dispo. Pt not available as she is being assessed for low BP. CM to reattempt.   
11/15/2024  8:33 AM  CM handoff provided to assigned CM for SARAY A4Giselle Calloway for continuity of care.     Care Management Progress Note    Reason for Admission:   Anticoagulated [Z79.01]  Closed left hip fracture, initial encounter ([facility]) [S72.002A]  Atrial fibrillation, unspecified type ([facility]) [I48.91]  Procedure(s) (LRB):  LEFT HIP INTRAMEDULLARY NAIL (Left)  2 Days Post-Op    Patient Admission Status: Inpatient  Date Admitted to INP: 11/12/24 []NA - OBS/Outpatient  RUR: Readmission Risk Score: 15    Hospitalization in the last 30 days (Readmission):  No        Transition of care plan:  Awaiting medical clearance and DC order. Pt transferred to ICU. Ortho following. Therapy treating.   SNF - CM notified of SNF recommendation. Preferences needed. Auth will be required.   Date IM given: 11/13/24  []NA  Outpatient follow-up.  Discharge transport: TBD - stretcher likely.         11/12/24 6889   Service Assessment   Patient Orientation Alert and Oriented   Cognition Alert   History Provided By Patient   Primary Caregiver Self   Support Systems Spouse/Significant Other;Children   Patient's Healthcare Decision Maker is: Legal Next of Kin   PCP Verified by CM Yes   Last Visit to PCP Within last 3 months   Prior Functional Level Independent in ADLs/IADLs   Current Functional Level Assistance with the following:  (TBD- curently cannot walk due to fractured hip)   Can patient return to prior living arrangement Unknown at present   Ability to make needs known: Good   Family able to assist with home care needs: No   Would you like for me to discuss the discharge plan with any other family members/significant others, and if so, who? No   Financial Resources Medicare   Social/Functional History   Lives With Spouse;Son   Type of Home House   Home Layout Two level   Home Access Stairs to enter with rails   Entrance Stairs - Number of Steps 4   Entrance Stairs - Rails Both   Bathroom Equipment None   Home Equipment None   ADL 
11/15/24  11:34 AM    Care Management Progress Note    CM received handoff from previous CM.     Reason for Admission:   Anticoagulated [Z79.01]  Closed left hip fracture, initial encounter ([facility]) [S72.002A]  Atrial fibrillation, unspecified type ([facility]) [I48.91]  Procedure(s) (LRB):  LEFT HIP INTRAMEDULLARY NAIL (Left)  2 Days Post-Op    Patient Admission Status: Inpatient  RUR: 15%  Hospitalization in the last 30 days (Readmission):  No        Transition of care plan:  Ongoing medical management- Intubated, General surgery consulted for possible surgery today   Discharge plan:   Likely will need SNF- need choices once stable  Discharge plan communicated with patient and/or discharge caregiver: Yes    Date 1st IMM letter given: 11/12/24  Outpatient follow-up.  Transport at discharge: TBD    Patient reviewed in IDR. Patient is now intubated, has a massive paraesophageal hernia. General surgery and GI consulted. CM following for medical stability- will need SNF options. Patients insurance requires authorization.       VIOLETA Sauceda  [facility]      Available via [employer]      
11/18/24  4:07 PM    Care Management Progress Note    Reason for Admission:   Anticoagulated [Z79.01]  Closed left hip fracture, initial encounter ([facility]) [S72.002A]  Atrial fibrillation, unspecified type ([facility]) [I48.91]  Procedure(s) (LRB):  ROBOT ASSISTED REDUCTION OF INCARCERATED STOMACH AND COLON,PARAESOPHAGEAL HERNIA REPAIR TOUPET FUNDOPLICATION,PARTIAL OMENENTECTOMY (N/A)  3 Days Post-Op    Patient Admission Status: Inpatient  RUR: 18%  Hospitalization in the last 30 days (Readmission):  No        Transition of care plan:  Ongoing medical management- General surgery consulted, PT,OT  Discharge plan: SNF- provided list to patient and family to review- needs choices  Discharge plan communicated with patient and/or discharge caregiver: Yes    Date 1st IMM letter given: 11/12/24  Outpatient follow-up.  Transport at discharge: TBD    CM met with patient and patients spouse to discuss SNF recommendation- they are reviewing the list. CM to follow up for choices.    Giselle Glass MSW  [facility]      Available via [employer]      
3:05 PM  11/19/24    Care Management Progress Note    Reason for Admission:   Anticoagulated [Z79.01]  Closed left hip fracture, initial encounter ([facility]) [S72.002A]  Atrial fibrillation, unspecified type ([facility]) [I48.91]  Procedure(s) (LRB):  ROBOT ASSISTED REDUCTION OF INCARCERATED STOMACH AND COLON,PARAESOPHAGEAL HERNIA REPAIR TOUPET FUNDOPLICATION,PARTIAL OMENENTECTOMY (N/A)  4 Days Post-Op    Patient Admission Status: Inpatient  Date Admitted to INP: 11/12    RUR: Readmission Risk Score: 18.5    Hospitalization in the last 30 days (Readmission):  No        Transition of care plan:  Ongoing medical management- pt discussed during IDR; general surgery and therapy following.  Anticipated discharge plan: short term SNF rehab- CM met face to face with pt and her , they have reviewed SNF list and their preferences are [facility] or [facility]. CM [facility]; awaiting responses.  Date IM given: 11/12   Outpatient follow-up.  Discharge transport: TBD    CM will continue to follow and assist with discharge needs.    VIOLETA Zapata    
Care Management Progress Note        Reason for Admission:   Anticoagulated [Z79.01]  Closed left hip fracture, initial encounter ([facility]) [S72.002A]  Atrial fibrillation, unspecified type ([facility]) [I48.91]  Procedure(s) (LRB):  ROBOT ASSISTED REDUCTION OF INCARCERATED STOMACH AND COLON,PARAESOPHAGEAL HERNIA REPAIR TOUPET FUNDOPLICATION,PARTIAL OMENENTECTOMY (N/A)  5 Days Post-Op        Patient Admission Status: Inpatient  RUR: 18%  Hospitalization in the last 30 days (Readmission):  No        Transition of care plan:  Received patient as a transfer from [facility]. Surgery has signed off.     Dispo: SNF. SNF preferences were [facility] and [facility]. [facility] has accepted and [facility] did not have a bed available. CM relayed information to patient and  at bedside. They would like to move forward with [facility]. JEFF has notified the facility and selected the SNF in Epic.   If SNF :  Date FOC offered: Wednesday 11/20.  Accepting facility: [facility]. Bed is available Thursday 11/21 pending insurance authorization.   Date authorization started with reference number: JEFF has faxed clinicals to [facility] auth line : [phone].  Date authorization received and expires:     Discharge plan communicated with patient and/or discharge caregiver: Yes.       Date 1st IMM letter given: 11/12/24.    Outpatient follow-up.    Transport at discharge: Pt's  will transport her if she is stable on room air. Otherwise she will need wc transport with o2 as she does not qualify for stretcher transport.       ___________________________________________   Ashley Sharma RN Case Manager  11/20/2024   4:07 PM       
Yes   Mode of Transportation Car   Occupation Retired   Discharge Planning   Type of Residence House   Living Arrangements Spouse/Significant Other;Children   Current Services Prior To Admission None   Potential Assistance Purchasing Medications No  (Uses [facility])   Patient expects to be discharged to: House   Services At/After Discharge    Resource Information Provided? No   Mode of Transport at Discharge Other (see comment)  (Spouse will transport at dc)   Confirm Follow Up Transport Family     Comments: Patient lives with her spouse and adult son in a two level home with 4 entry steps and 13 steps to the 2nd floor. The patient is normally independent with all ADLs and iADLS and drives.     CM following for dc needs- patient to have surgery on 11/13/24    Discharge Concerns: []Yes [x]No []Unknown   Describe:    Financial concerns/barriers: []Yes, explain: [x]No []Unknown/Not discussed  __________________________________________________________________________    Insurer:   Active Insurance as of 11/12/2024       Primary Coverage       Payor Plan Insurance Group Employer/Plan Group    HUMANA MEDICARE HUMANA CHOICE-PPO MEDICARE [id number]       Payor Plan Address Payor Plan Phone Number Payor Plan Fax Number Effective Dates    [address] [address]   1/1/2021 - None Entered    [place] [address]         Subscriber Name Subscriber Birth Date Member ID       DOROTHY ARCEO 1940 [id number]                     PCP: Neena Hammer MD   Address: [address] [place] [address] / [facility] [address]   Phone number: [phone]    Pharmacy:   [id number] - [place], [place] - [address] - P [phone] - F [phone]  [address]  [facility] [address]  Phone: [phone] Fax: [phone]    [facility] - [address] - P [phone] - F [phone]  [address]  [facility] [address]  Phone: [phone] Fax: [phone]    [place] Transport: (P) 
only those applicable:  Medication:  []Medications are available at the facility  []IV Antibiotics    []Controlled Substance - hard copies available sent.  []Weekly Labs    Equipment:  []CPAP/BiPAP  []Wound Vacuum  []Saunders or Urinary Device  []PICC/Central Line  []Nebulizer  []Ventilator    Treatment:  []Isolation (for MRSA, VRE, etc.)  []Surgical Drain Management  []Tracheostomy Care  []Dressing Changes  []Dialysis with transportation  []PEG Care  []Oxygen  []Daily Weights for Heart Failure    Dietary:  []Any diet limitations  []Tube Feedings   []Total Parenteral Management (TPN)    Financial Resources:    []UAI Completed and copy given to facility or patient/family.     []A screening has previously been completed by external provider; therefore, facility must request UAI directly from external provider.    [] Private pay individual who will not become   financially eligible for Medicaid within 6 months from admission to a [facility].     [] Individual refused to have screening conducted, or patient reports intent to return home following rehab.       Advanced Care Plan:  []Surrogate Decision Maker of Care  []POA  []Communicated Code Status and copy sent.    Other:               ___________________________________________   Ashley Sharma RN Case Manager  11/21/2024   12:37 PM

## 2024-11-21 NOTE — DISCHARGE SUMMARY
Hospitalist Discharge Summary     Patient ID:  Radha Wynn  [id number]  84 y.o.  1940    Admit date: 11/12/2024    Discharge date and time: 11/21/2024    Admission Diagnoses: Anticoagulated [Z79.01]  Closed left hip fracture, initial encounter ([facility]) [S72.002A]  Atrial fibrillation, unspecified type ([facility]) [I48.91]    Discharge Diagnoses:    Principal Problem:    Intertrochanteric fracture of left femur ([facility])  Active Problems:    HTN (hypertension), benign    Acquired hypothyroidism    PAF (paroxysmal atrial fibrillation) ([facility])    Anxiety    S/P ablation of atrial fibrillation    Paraesophageal hernia    Hyperlipidemia    Coronary artery calcification seen on CT scan    Acute respiratory failure with hypoxia    Closed left hip fracture, initial encounter ([facility])  Resolved Problems:    * No resolved hospital problems. *         Hospital Course:     Patient is a 84-year-old female with a history of hypertension, hyperlipidemia, paroxysmal atrial fibrillation comes to the hospital after having a fall at home. She was found to have a L intertrochanteric femur fracture and underwent IM nail on 11/13. However, her post-operative course was complicated by hypoxia and respiratory arrest, which was attributed to a massive hiatal hernia. Hospital course has been as follows:      Acute respiratory failure with hypoxia and hypercapnea: this was due to the massive paraesophageal hernia, possible aspiration. Intubated 11/14 and extubated 11/17. She has mild fluid overload and required a dose of furosemide. Volume status should continue to be monitored as she may need ongoing prn dosing.      Massive paraesophageal hernia POA: she was reviewed by general surgery who did a robotic assisted reduction of incarcerated stomach and colon, paraesophageal hernia repair toupet fundoplication and partial omentectomy on 11/15. She has done well post operatively.                - Nissen diet x 2 months post-op               - BID

## 2024-11-21 NOTE — PROGRESS NOTES
[facility]  [address] [place] [address]  [phone]      Hospitalist  Progress Note      NAME:       Radha Wynn   :        1940  MRM:        [id number]    Date of service: 2024      Subjective: Patient seen and examined by me. Patient admitted with a left femur fracture. 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 Had surgery 11/15. She has remained stable post extubation. A little confused but not in distress         Objective:    Vital Signs:    BP (!) 139/57   Pulse 79   Temp 97.9 °F (36.6 °C) (Oral)   Resp 30   Ht 1.575 m (5' 2.01\")   Wt 77.6 kg (171 lb)   LMP  (LMP Unknown)   SpO2 96%   BMI 31.27 kg/m²        Intake/Output Summary (Last 24 hours) at 2024 1159  Last data filed at 2024 0800  Gross per 24 hour   Intake 465.61 ml   Output 780 ml   Net -314.39 ml        Current inpatient medications reviewed:  Current Facility-Administered Medications   Medication Dose Route Frequency    furosemide (LASIX) injection 20 mg  20 mg IntraVENous Once    insulin lispro (HUMALOG,ADMELOG) injection vial 0-8 Units  0-8 Units SubCUTAneous Q6H    0.9 % sodium chloride infusion   IntraVENous PRN    OLANZapine zydis (ZYPREXA) disintegrating tablet 5 mg  5 mg Oral Nightly    acetaminophen (TYLENOL) suppository 650 mg  650 mg Rectal Q6H PRN    piperacillin-tazobactam (ZOSYN) 3,375 mg in sodium chloride 0.9 % 50 mL IVPB (mini-bag)  3,375 mg IntraVENous Q8H    fentaNYL (SUBLIMAZE) injection 25 mcg  25 mcg IntraVENous Q2H PRN    pantoprazole (PROTONIX) 40 mg in sodium chloride (PF) 0.9 % 10 mL injection  40 mg IntraVENous Daily    0.9 % sodium chloride infusion   IntraVENous PRN    lidocaine (XYLOCAINE) 2 % uro-jet   Topical Once    sodium chloride flush 0.9 % injection 5-40 mL  5-40 mL IntraVENous 2 times per day    sodium chloride flush 0.9 % injection 5-40 mL  5-40 mL IntraVENous 
                                                             [facility]ARA ANGELO [facility]  [address] [place], [place] [id number] [phone]      Hospitalist  Progress Note      NAME:       Radha Wynn   :        1940  MRM:        [id number]    Date of service: 2024      Subjective: Patient seen and examined by me. Patient admitted with a left femur fracture. 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 Had surgery 11/15. She is awake and alert. Able to nod to questions. Discussed with her nurse and intensivist.        Objective:    Vital Signs:    BP (!) 99/56   Pulse 67   Temp 100.4 °F (38 °C) (Bladder) Comment: Adding ice packs  Resp 23   Ht 1.575 m (5' 2.01\")   Wt 77.6 kg (171 lb)   LMP  (LMP Unknown)   SpO2 96%   BMI 31.27 kg/m²        Intake/Output Summary (Last 24 hours) at 2024 0819  Last data filed at 2024 0817  Gross per 24 hour   Intake 691.17 ml   Output 920 ml   Net -228.83 ml        Current inpatient medications reviewed:  Current Facility-Administered Medications   Medication Dose Route Frequency    insulin lispro (HUMALOG,ADMELOG) injection vial 0-8 Units  0-8 Units SubCUTAneous Q6H    0.9 % sodium chloride infusion   IntraVENous PRN    acetaminophen (TYLENOL) suppository 650 mg  650 mg Rectal Q6H PRN    piperacillin-tazobactam (ZOSYN) 3,375 mg in sodium chloride 0.9 % 50 mL IVPB (mini-bag)  3,375 mg IntraVENous Q8H    fentaNYL (SUBLIMAZE) injection 25 mcg  25 mcg IntraVENous Q2H PRN    fentaNYL (SUBLIMAZE) infusion 1000 mcg/100mL  25 mcg/hr IntraVENous Continuous    pantoprazole (PROTONIX) 40 mg in sodium chloride (PF) 0.9 % 10 mL injection  40 mg IntraVENous Daily    0.9 % sodium chloride infusion   IntraVENous PRN    oxymetazoline (AFRIN) 0.05 % nasal spray 2 spray  2 spray Each Nostril Once    lidocaine (XYLOCAINE) 2 % uro-jet   Topical Once    dexmedeTOMIDine (PRECEDEX) 400 mcg in sodium chloride 0.9 % 100 mL infusion  0.1-1.5 
                                                             [facility]ARA ANGELO [facility]  [address] [place] [address]  [phone]      Hospitalist  Progress Note      NAME:       Radha Wynn   :        1940  MRM:        [id number]    Date of service: 2024      Subjective: Patient seen and examined by me. Patient admitted with a left femur fracture. 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 No acute events overnight.  at bedside. Stomach feels ok, breathing ok; only complaints is of L knee swelling and pain.     Objective:    Vital Signs:    BP (!) 140/64   Pulse 72   Temp 97.9 °F (36.6 °C) (Oral)   Resp 18   Ht 1.575 m (5' 2.01\")   Wt 77.6 kg (171 lb)   LMP  (LMP Unknown)   SpO2 91%   BMI 31.27 kg/m²        Intake/Output Summary (Last 24 hours) at 2024 1335  Last data filed at 2024 1108  Gross per 24 hour   Intake --   Output 500 ml   Net -500 ml        Current inpatient medications reviewed:  Current Facility-Administered Medications   Medication Dose Route Frequency    pantoprazole (PROTONIX) 40 mg in sodium chloride (PF) 0.9 % 10 mL injection  40 mg IntraVENous BID    docusate sodium (COLACE) capsule 100 mg  100 mg Oral BID    apixaban (ELIQUIS) tablet 5 mg  5 mg Oral BID    0.9 % sodium chloride infusion   IntraVENous PRN    OLANZapine zydis (ZYPREXA) disintegrating tablet 5 mg  5 mg Oral Nightly    acetaminophen (TYLENOL) suppository 650 mg  650 mg Rectal Q6H PRN    piperacillin-tazobactam (ZOSYN) 3,375 mg in sodium chloride 0.9 % 50 mL IVPB (mini-bag)  3,375 mg IntraVENous Q8H    fentaNYL (SUBLIMAZE) injection 25 mcg  25 mcg IntraVENous Q2H PRN    0.9 % sodium chloride infusion   IntraVENous PRN    lidocaine (XYLOCAINE) 2 % uro-jet   Topical Once    sodium chloride flush 0.9 % injection 5-40 mL  5-40 mL IntraVENous 2 times per day    sodium chloride flush 0.9 % injection 5-40 mL  5-40 mL IntraVENous PRN    0.9 % sodium chloride 
                                                             SARA ANGELO [facility]  [address] [place] [address]  [phone]      Hospitalist  Progress Note      NAME:       Radha Wynn   :        1940  MRM:        [id number]    Date of service: 2024      Subjective: Patient seen and examined by me. Patient admitted with a left femur fracture. She has a massive paraesophageal hernia which is giving her upper abdominal discomfort. No vomiting. No chest discomfort.      Objective:    Vital Signs:    BP (!) 106/48   Pulse 75   Temp 97.5 °F (36.4 °C) (Oral)   Resp 15   Ht 1.575 m (5' 2\")   Wt 77.6 kg (171 lb)   LMP  (LMP Unknown)   SpO2 96%   BMI 31.28 kg/m²        Intake/Output Summary (Last 24 hours) at 2024 0916  Last data filed at 2024 0910  Gross per 24 hour   Intake 20 ml   Output 575 ml   Net -555 ml        Current inpatient medications reviewed:  Current Facility-Administered Medications   Medication Dose Route Frequency    sodium chloride flush 0.9 % injection 5-40 mL  5-40 mL IntraVENous 2 times per day    sodium chloride flush 0.9 % injection 5-40 mL  5-40 mL IntraVENous PRN    0.9 % sodium chloride infusion   IntraVENous PRN    ondansetron (ZOFRAN-ODT) disintegrating tablet 4 mg  4 mg Oral Q8H PRN    Or    ondansetron (ZOFRAN) injection 4 mg  4 mg IntraVENous Q6H PRN    enoxaparin (LOVENOX) injection 40 mg  40 mg SubCUTAneous Daily    pantoprazole (PROTONIX) 40 mg in sodium chloride (PF) 0.9 % 10 mL injection  40 mg IntraVENous Daily    0.9 % sodium chloride infusion   IntraVENous Continuous    sodium chloride flush 0.9 % injection 5-40 mL  5-40 mL IntraVENous 2 times per day    sodium chloride flush 0.9 % injection 5-40 mL  5-40 mL IntraVENous PRN    0.9 % sodium chloride infusion   IntraVENous PRN    potassium chloride (KLOR-CON) extended release tablet 40 mEq  40 mEq Oral PRN    Or    potassium bicarb-citric acid (EFFER-K) 
                                                             SARA ANGELO [facility]  [address] [place] [address]  [phone]      Hospitalist  Progress Note      NAME:       Radha Wynn   :        1940  MRM:        [id number]    Date of service: 11/15/2024      Subjective: Patient seen and examined by me. Patient admitted with a left femur fracture. 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 Had been having intermittent hypoxia that recovered promptly with rest. However, her respiratory status worsened  that required mechanical ventilation. She is currently sedated. Discussed with Dr Bangura  and again today as well as intensivist and nursing staff.      Objective:    Vital Signs:    BP (!) 86/52   Pulse 51   Temp 98.5 °F (36.9 °C) (Bladder)   Resp 18   Ht 1.575 m (5' 2\")   Wt 77.6 kg (171 lb)   LMP  (LMP Unknown)   SpO2 97%   BMI 31.28 kg/m²        Intake/Output Summary (Last 24 hours) at 11/15/2024 0833  Last data filed at 11/15/2024 0607  Gross per 24 hour   Intake 5607.53 ml   Output 865 ml   Net 4742.53 ml        Current inpatient medications reviewed:  Current Facility-Administered Medications   Medication Dose Route Frequency    pantoprazole (PROTONIX) 40 mg in sodium chloride (PF) 0.9 % 10 mL injection  40 mg IntraVENous Daily    potassium phosphate 30 mmol in sodium chloride 0.9 % 500 mL IVPB  30 mmol IntraVENous Once    oxymetazoline (AFRIN) 0.05 % nasal spray 2 spray  2 spray Each Nostril Once    lidocaine (XYLOCAINE) 2 % uro-jet   Topical Once    dexmedeTOMIDine (PRECEDEX) 400 mcg in sodium chloride 0.9 % 100 mL infusion  0.1-1.5 mcg/kg/hr IntraVENous Continuous    insulin lispro (HUMALOG,ADMELOG) injection vial 0-8 Units  0-8 Units SubCUTAneous Q6H    fentaNYL (SUBLIMAZE) infusion 1000 mcg/100mL   mcg/hr IntraVENous Continuous    And    fentaNYL (SUBLIMAZE) bolus from bag  50 mcg IntraVENous Q30 Min PRN    lactated ringers 
        Orthopaedic Progress Note  Post Op day: 3 days left hip  1 day paraesophageal hernia repair    November 16, 2024 10:53 PM     Patient: Radha Wynn MRN: [id number]  SSN: [id number]    YOB: 1940  Age: 84 y.o.  Sex: female      Admit date:  11/12/2024  Date of Surgery:    Procedures:  Procedure(s):  ROBOT ASSISTED REDUCTION OF INCARCERATED STOMACH AND COLON,PARAESOPHAGEAL HERNIA REPAIR TOUPET FUNDOPLICATION,PARTIAL OMENENTECTOMY  left hip IM nail  Admitting Physician:  Skye Lowery MD   Surgeon:  Surgeons and Role:     * Burt Bangura MD - Primary    Consulting Physician(s): Treatment Team:   Mariusz Montemayor MD Graupman, Matthew S, PA Reynolds, James P, MD Traynham, Tammy, MINDA Rice, Burt Ly MD Lyons, MD Maria Luisa Pina, Theresa Clinton RN McArthur, Cindy Vivian Morgan, Michael, RN    SUBJECTIVE:     Radha Wynn is a 84 y.o. female is 3 days s/p left hip IM nail and 1 day s/p ROBOT ASSISTED REDUCTION OF INCARCERATED STOMACH AND COLON,PARAESOPHAGEAL HERNIA REPAIR TOUPET FUNDOPLICATION,PARTIAL OMENENTECTOMY.  Remains intubated.  Family in the room.  Less sedated today.  Responding to stimuli and voice.      OBJECTIVE:       Physical Exam:  General: Intubated.  Responsive today.  Respiratory: Intubated  Neurological:  Neurovascular exam limited due to intubated, but moving UE and LE.     Musculoskeletal: Calves soft, supple, non-tender upon palpation.    Dressing/Wound:  Clean, dry and intact. No significant erythema or swelling.      Vital Signs:      Patient Vitals for the past 8 hrs:   BP Temp Temp src Pulse Resp SpO2 Height   11/16/24 2045 114/62 -- -- 62 16 98 % --   11/16/24 2030 127/65 -- -- 62 16 98 % --   11/16/24 2015 111/66 -- -- 63 16 97 % --   11/16/24 2000 (!) 89/66 100.2 °F (37.9 °C) Bladder 64 21 91 % --   11/16/24 1945 98/68 -- -- 63 26 97 % --   11/16/24 1939 -- -- -- 63 16 96 % --   11/16/24 1930 133/75 -- -- 62 16 97 
     CRITICAL CARE NOTE      Name: Radha Wynn   : 1940   MRN: [id number]   Date: 2024      REASON FOR ICU ADMISSION:  Acute Respiratory Failure     PRINCIPAL ICU DIAGNOSIS   Massive paraesophageal hernia  Intertrochanteric left femur fracture s/p repair  Acute hypoxic/hypercapnic respiratory failure  Acute kidney injury  Lactic acidosis    BRIEF PATIENT SUMMARY   85 y/o female admitted for left femur fracture s/p repair (), massive paraesophageal hernia on CT (), subsequent increased upper abdominal pain post op. Course complicated after patient became acutely hypoxic and unresponsive.  Per report she subsequently became more alert, with supplemental oxygen, CT demonstrated interval increase in gastric distention, with plan for NG placement.  ABG ph 7.15, Co2 68,  She then had a second episode of acute unresponsiveness requiring emergent intubation.    11/15:  Remains intubated. Not following commands    : Did not tolerate SBT yesterday. Remains intubated. PS 10/5 this morning.  Later, tolerated SBT and extubated.    : delirious overnight. Required zyprexa. Hemodynamics stable.    COMPREHENSIVE ASSESSMENT & PLAN:SYSTEM BASED   Acute hypoxic respiratory failure with hypercapnia requiring mechanical ventilation (-current)   Acute hypoxia/hypercapnia likely d/t poor ventilatory mechanics in setting of massive paraesophageal hernia  Mental status significantly improved and patient did well with SBT.  Extubated to nasal cannula.  Continue Zosyn empirically x 3 more days.  Recommend incentive spirometry  Out of bed to chair as tolerated  Delirium precautions    Massive paraesophageal hernia  CT A/P: paraesophageal hernia - Underwent reduction of incarcerated stomach and colon, paraesophageal hernia repair  and recovering well.  NG output is minimal.  Okay to use ice chips.  Defer to surgery to start diet and remove NG tube.    Intertrochanteric left femur fracture s/p 
     CRITICAL CARE NOTE      Name: Radha Wynn   : 1940   MRN: [id number]   Date: 2024      REASON FOR ICU ADMISSION:  Acute Respiratory Failure     PRINCIPAL ICU DIAGNOSIS   Massive paraesophageal hernia  Intertrochanteric left femur fracture s/p repair  Acute hypoxic/hypercapnic respiratory failure  Acute kidney injury  Lactic acidosis    BRIEF PATIENT SUMMARY   85 y/o female admitted for left femur fracture s/p repair (), massive paraesophageal hernia on CT (), subsequent increased upper abdominal pain post op. Course complicated after patient became acutely hypoxic and unresponsive.  Per report she subsequently became more alert, with supplemental oxygen, CT demonstrated interval increase in gastric distention, with plan for NG placement.  ABG ph 7.15, Co2 68,  She then had a second episode of acute unresponsiveness requiring emergent intubation.    11/15:  Remains intubated. Not following commands    : Did not tolerate SBT yesterday. Remains intubated. PS 10/5 this morning.  Later, tolerated SBT and extubated.    COMPREHENSIVE ASSESSMENT & PLAN:SYSTEM BASED   Acute hypoxic respiratory failure with hypercapnia requiring mechanical ventilation (-current)   Acute hypoxia/hypercapnia likely d/t poor ventilatory mechanics in setting of massive paraesophageal hernia  Mental status significantly improved and patient did well with SBT.  Extubated to nasal cannula.  Continue Zosyn  Recommend incentive spirometry  Out of bed to chair as tolerated  Off of sedation    Massive paraesophageal hernia  CT A/P: paraesophageal hernia - Underwent reduction of incarcerated stomach and colon, paraesophageal hernia repair  and recovering well.  NG output is minimal.  Okay to use ice chips.  Defer to surgery to start diet and remove NG tube.    Intertrochanteric left femur fracture s/p repair ()  Continue SCDS with Lovenox 40 mg daily x 30 days  Plan per ortho    Acute kidney 
     Orthopaedic Progress Note  Post Op day: 2 Days Post-Op    November 15, 2024 3:25 PM     Patient: Radha Wynn MRN: [id number]  SSN: [id number]    YOB: 1940  Age: 84 y.o.  Sex: female      Admit date:  2024   Procedures:  Procedure(s):  LEFT HIP INTRAMEDULLARY NAIL  Admitting Physician:  Skye Lowery MD   Surgeon:  Surgeons and Role:     * Eduardo Dela Cruz MD - Primary    Consulting Physician(s): Treatment Team:   Mariusz Montemayor MD Graupman, Matthew S, PA Reynolds, James P, MD Traynham, Tammy, MINDA Rice, Kathy Maharaj, Jenifer, Kiana Machuca OT Gallagher, Amanda J, Burt Colon MD Lyons, Christopher D, MD Locke, Kristie    SUBJECTIVE:     Radha Wynn is a 84 y.o. female is 2 Days Post-Op s/p Procedure(s):  LEFT HIP INTRAMEDULLARY NAIL.  Remains intubated.   is at the bedside.       OBJECTIVE:       Physical Exam:  General: Sedated and intubated.    Respiratory: Intubated  Neurological:  Neurovascular exam within normal limits.  Motor: + DF/PF.   Musculoskeletal: Calves soft, supple, non-tender upon palpation.    Dressing/Wound:  Clean, dry and intact. No significant erythema or swelling.      Vital Signs:      Patient Vitals for the past 8 hrs:   BP Temp Temp src Pulse Resp SpO2 Height Weight   11/15/24 1500 (!) 144/72 -- -- 50 19 -- -- --   11/15/24 1400 (!) 141/64 -- -- 52 18 98 % -- --   11/15/24 1300 (!) 143/72 -- -- 57 18 97 % -- --   11/15/24 1209 126/76 -- -- -- -- -- 1.575 m (5' 2\") 77.6 kg (171 lb)   11/15/24 1200 130/73 97.9 °F (36.6 °C) Bladder 51 20 97 % -- --   11/15/24 1100 126/76 -- -- 54 19 -- -- --   11/15/24 1000 (!) 140/67 -- -- 50 18 96 % -- --   11/15/24 0900 136/71 -- -- 53 18 97 % -- --   11/15/24 0800 108/69 98.1 °F (36.7 °C) Bladder 55 19 99 % -- --   11/15/24 0739 -- -- -- -- -- -- 1.575 m (5' 2\") --                                          Temp (24hrs), Av.1 °F (36.7 °C), Min:97.2 °F (36.2 °C), Max:99.5 °F (37.5 
  ICU rotation/ Daily Progress Note    24 Hour Events: NAEO    SUBJECTIVE: intubated and sedated, blinks eyes, not following other commands    OBJECTIVE:    Vitals:   Vitals:    11/15/24 0721   BP:    Pulse: 51   Resp: 18   Temp:    SpO2: 97%       Physical Exam:  General: No acute distress  Respiratory: Clear lung fields bilaterally, no wheeze, rales  Cardiovascular: Regular rate, rhythm no murmurs gallops  GI: Nondistended. + bowel sounds. Nontender.  Extremities: No LE edema.   Skin: Warm, dry.  Neuro: Blinks eyes, no other commands followed    Inpatient Medications  Current Facility-Administered Medications   Medication Dose Route Frequency    pantoprazole (PROTONIX) 40 mg in sodium chloride (PF) 0.9 % 10 mL injection  40 mg IntraVENous Daily    potassium phosphate 30 mmol in sodium chloride 0.9 % 500 mL IVPB  30 mmol IntraVENous Once    oxymetazoline (AFRIN) 0.05 % nasal spray 2 spray  2 spray Each Nostril Once    lidocaine (XYLOCAINE) 2 % uro-jet   Topical Once    dexmedeTOMIDine (PRECEDEX) 400 mcg in sodium chloride 0.9 % 100 mL infusion  0.1-1.5 mcg/kg/hr IntraVENous Continuous    insulin lispro (HUMALOG,ADMELOG) injection vial 0-8 Units  0-8 Units SubCUTAneous Q6H    fentaNYL (SUBLIMAZE) infusion 1000 mcg/100mL   mcg/hr IntraVENous Continuous    And    fentaNYL (SUBLIMAZE) bolus from bag  50 mcg IntraVENous Q30 Min PRN    lactated ringers bolus 500 mL  500 mL IntraVENous Once    sodium chloride flush 0.9 % injection 5-40 mL  5-40 mL IntraVENous 2 times per day    sodium chloride flush 0.9 % injection 5-40 mL  5-40 mL IntraVENous PRN    0.9 % sodium chloride infusion   IntraVENous PRN    ondansetron (ZOFRAN-ODT) disintegrating tablet 4 mg  4 mg Oral Q8H PRN    Or    ondansetron (ZOFRAN) injection 4 mg  4 mg IntraVENous Q6H PRN    enoxaparin (LOVENOX) injection 40 mg  40 mg SubCUTAneous Daily    sodium chloride flush 0.9 % injection 5-40 mL  5-40 mL IntraVENous 2 times per day    sodium chloride flush 
  Physician Progress Note      PATIENT:               DOROTHY ARCEO  CSN #:                  [id number]  :                       1940  ADMIT DATE:       2024 1:13 PM  DISCH DATE:  RESPONDING  PROVIDER #:        Tico SAMANIEGO          QUERY TEXT:    Pt admitted with Left hip fracture. Pt noted to have osteopenia in XR Hip  &   osteoporosis in PMH  . If possible, please document in progress notes and   discharge summary if you are evaluating and/or treating any of the following:    The medical record reflects the following:  Risk Factors: osteopenia ,osteoporosis, fall, Age 84 female  Clinical Indicators: ED  84-year-old female arrives [facility] via EMS   with injury to her left hip.  Had a ground-level fall.  Landed on her left   hip.  Orthopedics consult  Comminuted, impacted, and mildly displaced left   intertrochanteric femur fracture.  past medical history of osteoporosis.  XR hip  Bones are moderately to severely osteopenic    Treatment: Orthopedics consult , S/p left hip IM nail, vitamin D 125 MCG (5000   UT) CAPS capsule, X- Ray    Thank you  Nithya Llamas [facility] CDS  Options provided:  -- Pathological Left hip fracture  due to osteopenia following fall which   would not usually break a normal, healthy bone  -- Osteoporotic  Left hip fracture following fall which would not usually   break a normal, healthy bone  -- Traumatic  Left hip fracture  -- Other - I will add my own diagnosis  -- Disagree - Not applicable / Not valid  -- Disagree - Clinically unable to determine / Unknown  -- Refer to Clinical Documentation Reviewer    PROVIDER RESPONSE TEXT:    This patient has a pathological Left hip fracture due to osteopenia following   fall which would not usually break a normal, healthy bone.    Query created by: Nithya Melissa on 2024 1:13 AM      Electronically signed by:  Tico SAMANIEGO 11/15/2024 8:09 AM          
  Physician Progress Note      PATIENT:               DOROTHY ARCEO  CSN #:                  [id number]  :                       1940  ADMIT DATE:       2024 1:13 PM  DISCH DATE:  RESPONDING  PROVIDER #:        Mariusz Montemayor MD          QUERY TEXT:    Patient admitted with Intertrochanteric fracture of left femur. Noted to Mild   malnutrition in nutrition PN 11/15 . If possible, please document in progress   notes and discharge summary if you are evaluating and /or treating any of the   following:    The medical record reflects the following:  Risk Factors: paraesophageal hernia, Age 84 female, MOLLY  Clinical Indicators: Nutrition PN 11/15  Malnutrition Assessment:  Malnutrition Status:  Mild malnutrition (11/15/24 1031)  Context:  Acute Illness  Findings of the 6 clinical characteristics of malnutrition:  Energy Intake:  No decrease in energy intake  Weight Loss:  No weight loss  Body Fat Loss:  No body fat loss  Muscle Mass Loss:  No muscle mass loss  Fluid Accumulation:  Mild Generalized   Strength:  Not Performed  Current BMI (kg/m2): 31.3  Treatment: Initiate nutrition support, Nutrition consult      Thank You  Nithya SOLERS CDS    ASPEN Criteria:    https://aspenjournals.onlinelibrary.michaud.com/doi/full/10.1177/861364262085771  5  Options provided:  -- Protein calorie malnutrition mild  -- Other - I will add my own diagnosis  -- Disagree - Not applicable / Not valid  -- Disagree - Clinically unable to determine / Unknown  -- Refer to Clinical Documentation Reviewer    PROVIDER RESPONSE TEXT:    This patient has mild protein calorie malnutrition.    Query created by: Nithya Melissa on 2024 2:37 AM      Electronically signed by:  Mariusz Montemayor MD 2024 1:41 PM          
 attended rounds in ICU as part of interdisciplinary team where patient's ongoing care was discussed. Please contact [facility] for further referrals.    Rev Rochelle Humphrey MDiv, BCC  To lora wright: [phone]  
0449- AT bedside with RN and NP.  Ventilator settings from AC/VC to PS 10/5 & 30%.  Per NP orders. Patient alert, awake and following commands. BBS equal. No distress noted @ this time.  RT will continue to monitor pt.  
0815: Patient was placed on spontaneous for SBT. Was on this for 30 min and failed due to pulling low volumes. Placed back on a rate and 25 mcg of fent was started.  
0830-Bladder scan 110mL.    31180-Gvqtfb IDR, PO meds unheld, Lasix ordered, encouraged IS and OOB with PT/OT.    1200-Patient up to chair with PT/OT and walker.  Chair alarm in place.    1300-GS at bedside.  Removed NGT and patient placed on clear liquid diet.  Ok to move out of ICU per their standpoint.  Dr. Montemayor aware.  Patient passed bedside swallow evaluation before PO intake.    1400-Patient 1L NC.    1500-Patient sating 100% on RA.  
0832: Extubated patient to 4 L NC  
1840: Patient becoming noticeably more agitated in the last hour. Made the intensivist aware. Placed in a stat ABG.  
1937- Surgical team at bedside. Handoff provided.   2320- Pt back from OR. Respiratory at bedside.   
2110 TRANSFER - OUT REPORT:    Verbal report given to IMCU RN on Radha Wynn  being transferred to [facility] for routine progression of patient care       Report consisted of patient's Situation, Background, Assessment and   Recommendations(SBAR).     Information from the following report(s) Nurse Handoff Report, ED Encounter Summary, ED SBAR, Adult Overview, Surgery Report, Intake/Output, MAR, Recent Results, Med Rec Status, and Cardiac Rhythm SR  was reviewed with the receiving nurse.    Lines:   Peripheral IV 11/14/24 Proximal;Right;Anterior Forearm (Active)   Site Assessment Clean, dry & intact 11/18/24 1930   Line Status Infusing 11/18/24 1930   Line Care Connections checked and tightened 11/18/24 1930   Phlebitis Assessment No symptoms 11/18/24 1930   Infiltration Assessment 0 11/18/24 1930   Alcohol Cap Used Yes 11/18/24 1930   Dressing Status Clean, dry & intact 11/18/24 1930   Dressing Type Transparent 11/18/24 1930   Dressing Intervention New 11/15/24 1600       Extended Dwell Peripherial IV 11/14/24 Right Cephalic (Active)   Criteria for Appropriate Use Limited/no vessel suitable for conventional peripheral access 11/18/24 1930   Site Assessment Clean, dry & intact 11/18/24 1930   Phlebitis Assessment No symptoms 11/18/24 1930   Infiltration Assessment 0 11/18/24 1930   Line Status Capped 11/18/24 1930   Line Care Connections checked and tightened 11/18/24 1930   Alcohol Cap Used Yes 11/18/24 1930   Date of Last Dressing Change 11/14/24 11/18/24 1930   Dressing Type Transparent w/CHG gel 11/18/24 1930   Dressing Status Clean, dry & intact 11/18/24 1930   Dressing Intervention New 11/15/24 0000        Opportunity for questions and clarification was provided.      Patient transported with:  Monitor, O2 @ 2lpm, Registered Nurse, and Tech  
2318- Pt. Back from OR. And on ventilator with previous settings resumed.  BBS equal. Pt. Sedated   @ this time.  Family @ bedside.  RN @ bedside. RT will continue to monitor pt.  
8790  Reached out to NP regarding pt retaining 742 mL of urine. MP ordered for a straight cath.     9956  Reached out to NP regarding bladder scanner showing pt retaining 488mL of urine. NP ordered a straight cath.     
Assessment / Plan:   POD4 dV assisted reduction of incarcerated stomach, paraesophageal hernia repair, partial omentectomy by Dr. Bangura    Advanced diet to full liquids  Will need to remain on Nissen diet / full liquids for 2 weeks post op  Mobilize, PT/OT   Awaiting return of bowel function    Marcela Benites PA-C  [facility]  Office:  [phone]  Fax:  [phone]        General Surgery Daily Progress Note      Patient: Radha Wynn MRN: [id number]  SSN: [id number]    YOB: 1940  Age: 84 y.o.  Sex: female      Admit Date: 11/12/2024 for intertrochanteric fracture of left femur    Subjective:   Patient seen and examined. Looks better today. Tolerating clears. Got up with PT/OT today.    Current Facility-Administered Medications   Medication Dose Route Frequency    apixaban (ELIQUIS) tablet 5 mg  5 mg Oral BID    insulin lispro (HUMALOG,ADMELOG) injection vial 0-8 Units  0-8 Units SubCUTAneous Q6H    0.9 % sodium chloride infusion   IntraVENous PRN    OLANZapine zydis (ZYPREXA) disintegrating tablet 5 mg  5 mg Oral Nightly    acetaminophen (TYLENOL) suppository 650 mg  650 mg Rectal Q6H PRN    piperacillin-tazobactam (ZOSYN) 3,375 mg in sodium chloride 0.9 % 50 mL IVPB (mini-bag)  3,375 mg IntraVENous Q8H    fentaNYL (SUBLIMAZE) injection 25 mcg  25 mcg IntraVENous Q2H PRN    pantoprazole (PROTONIX) 40 mg in sodium chloride (PF) 0.9 % 10 mL injection  40 mg IntraVENous Daily    0.9 % sodium chloride infusion   IntraVENous PRN    lidocaine (XYLOCAINE) 2 % uro-jet   Topical Once    sodium chloride flush 0.9 % injection 5-40 mL  5-40 mL IntraVENous 2 times per day    sodium chloride flush 0.9 % injection 5-40 mL  5-40 mL IntraVENous PRN    0.9 % sodium chloride infusion   IntraVENous PRN    ondansetron (ZOFRAN-ODT) disintegrating tablet 4 mg  4 mg Oral Q8H PRN    Or    ondansetron (ZOFRAN) injection 4 mg  4 mg IntraVENous Q6H PRN    sodium chloride flush 0.9 % injection 5-40 mL  
Assessment / Plan:   POD5 dV assisted reduction of incarcerated stomach, paraesophageal hernia repair, partial omentectomy by Dr. Bangura. Bowel function resumed.    Nissen diet 2 months post op  BID PPI  Consult dietician for recs   Doing well from surgical standpoint, okay for discharge when medically ready, hopefully tomorrow  General surgery will sign off, please call if further questions arise     Marcela Benites PA-C  [facility]  Office:  [phone]  Fax:  [phone]        General Surgery Daily Progress Note      Patient: Radha Wynn MRN: [id number]  SSN: [id number]    YOB: 1940  Age: 84 y.o.  Sex: female      Admit Date: 11/12/2024 for intertrochanteric fracture of left femur    Subjective:   Patient seen and examined. Tolerated diet. +BM. Doing better.     Current Facility-Administered Medications   Medication Dose Route Frequency    pantoprazole (PROTONIX) 40 mg in sodium chloride (PF) 0.9 % 10 mL injection  40 mg IntraVENous BID    docusate sodium (COLACE) capsule 100 mg  100 mg Oral BID    apixaban (ELIQUIS) tablet 5 mg  5 mg Oral BID    0.9 % sodium chloride infusion   IntraVENous PRN    OLANZapine zydis (ZYPREXA) disintegrating tablet 5 mg  5 mg Oral Nightly    acetaminophen (TYLENOL) suppository 650 mg  650 mg Rectal Q6H PRN    piperacillin-tazobactam (ZOSYN) 3,375 mg in sodium chloride 0.9 % 50 mL IVPB (mini-bag)  3,375 mg IntraVENous Q8H    fentaNYL (SUBLIMAZE) injection 25 mcg  25 mcg IntraVENous Q2H PRN    0.9 % sodium chloride infusion   IntraVENous PRN    lidocaine (XYLOCAINE) 2 % uro-jet   Topical Once    sodium chloride flush 0.9 % injection 5-40 mL  5-40 mL IntraVENous 2 times per day    sodium chloride flush 0.9 % injection 5-40 mL  5-40 mL IntraVENous PRN    0.9 % sodium chloride infusion   IntraVENous PRN    ondansetron (ZOFRAN-ODT) disintegrating tablet 4 mg  4 mg Oral Q8H PRN    Or    ondansetron (ZOFRAN) injection 4 mg  4 mg IntraVENous Q6H PRN    
Attempted to call family member at this number to return call [phone]. No answer at this time. Will try later.   
Called [facility] and gave report to Eduardo. SBAR given, medications, allergies, history reviewed, continuation of care discussed, and most recent vital signs reported. Opportunity for questions and clarification offered. [facility] to transport at 1500.   
Comprehensive Nutrition Assessment    Type and Reason for Visit:  Initial    Nutrition Recommendations/Plan:   Obtain measured weight  Continue NGT to LIS   Replete phos     Malnutrition Assessment:  Malnutrition Status:  Mild malnutrition (11/15/24 1031)    Context:  Acute Illness     Findings of the 6 clinical characteristics of malnutrition:  Energy Intake:  No decrease in energy intake  Weight Loss:  No weight loss     Body Fat Loss:  No body fat loss     Muscle Mass Loss:  No muscle mass loss    Fluid Accumulation:  Mild Generalized   Strength:  Not Performed    Nutrition Assessment:     Patient is an 84 year old female admitted with Anticoagulated [Z79.01]  Closed left hip fracture, initial encounter ([facility]) [S72.002A]  Atrial fibrillation, unspecified type ([facility]) [I48.91]. She  has a past medical history of DDD (degenerative disc disease), Gastroesophageal reflux disease without esophagitis, GERD (gastroesophageal reflux disease), Hiatal hernia, HTN (hypertension), benign, Hypothyroidism, TRISTAN (obstructive sleep apnea), Paroxysmal atrial fibrillation ([facility]), and Uterine prolapse.  Intubated, NGT in place to LIS with 600 mL out so far this AM. POD2 left hip nail. Surgery consulted for paraesophageal hernia - hopeful to go down today. Needs TTE prior.  at bedside provides  information. Reports her #; obtained bedscale weight with items on bed at 213#. Generalized edema may be contributing to weight elevated from baseline, but will use stated weight for calculations. NKFA. No chewing/swallowing problems at baseline.  reports good PO intake PTA. Patient afebrile. No BM yet this admission. Currently requiring 1 mcg precedex, fent @ 75. Labs reviewed - phos @ 0.9.    If cleared to start TF and remains intubated;  Trickle Feeds Vital 1.0 kcal @ 20 mL/hour  FWF 50 mL q 3 hours  Provide 4 Prosource/day, flush with 15 mL H2O before and after    Trickle feeds at goal 20 mL/hour provide 440 mL; 440 
Comprehensive Nutrition Assessment    Type and Reason for Visit:  Reassess    Nutrition Recommendations/Plan:   NGT in place; clamped  - NPO day 4   Deferring to surgery for diet advancement     Malnutrition Assessment:  Malnutrition Status:  At risk for malnutrition (11/18/24 1217)    Context:  Acute Illness     Findings of the 6 clinical characteristics of malnutrition:  Energy Intake:  Mild decrease in energy intake (x 4 days)  Weight Loss:  No weight loss     Body Fat Loss:  No body fat loss     Muscle Mass Loss:  No muscle mass loss    Fluid Accumulation:  No fluid accumulation Generalized   Strength:  Not Performed    Nutrition Assessment:     11/18: Follow up. Patient extubated yesterday, NGT remains in place. Had been to suction with only 100 out this AM, plan to clamp now. No BM this admission. No drips. Lytes WDL. Per IDR discussion, potential for YSS today to provide some nutrition - deferring to general surgery for diet advancement. No complaints from patient at this time.     Nutrition Related Findings:      Wound Type: Surgical Incision (proximal L hip)     Last BM:    Edema: Left lower extremity   Edema Generalized: None           LLE Edema: Other (comment) (swelling from surgery)    Nutr. Labs:    Lab Results   Component Value Date    CREATININE 0.79 11/18/2024    BUN 13 11/18/2024     11/18/2024    K 3.6 11/18/2024     (H) 11/18/2024    CO2 28 11/18/2024       Lab Results   Component Value Date/Time    POCGLU 77 11/18/2024 12:04 PM    POCGLU 75 11/18/2024 05:57 AM    POCGLU 84 11/18/2024 12:08 AM    POCGLU 99 11/17/2024 04:18 PM    POCGLU 99 11/17/2024 11:26 AM    POCGLU 116 11/17/2024 07:35 AM        No results found for: \"LABA1C\", \"TMW4MTCV\"    Lab Results   Component Value Date/Time    MG 2.1 11/18/2024 05:56 AM       Lab Results   Component Value Date    CALCIUM 8.2 (L) 11/18/2024    PHOS 2.6 11/18/2024       Lab Results   Component Value Date    TRIG 63 06/25/2024       Nutr. 
Department of General Surgery - Adult  Surgical Service [facility]  Attending Progress Note      SUBJECTIVE:  Alert / Responsive    OBJECTIVE      Physical    VITALS:  BP (!) 100/51   Pulse 70   Temp 99.3 °F (37.4 °C) (Bladder)   Resp 21   Ht 1.575 m (5' 2.01\")   Wt 77.6 kg (171 lb)   LMP  (LMP Unknown)   SpO2 99%   BMI 31.27 kg/m²   INTAKE/OUTPUT:    Intake/Output Summary (Last 24 hours) at 11/17/2024 0743  Last data filed at 11/17/2024 0651  Gross per 24 hour   Intake 959.96 ml   Output 905 ml   Net 54.96 ml     ABDOMEN:  soft, NT / ND, Incisions look good    Data  CBC:   Lab Results   Component Value Date/Time    WBC 6.4 11/17/2024 04:05 AM    RBC 2.12 11/17/2024 04:05 AM    HGB 6.8 11/17/2024 04:05 AM    HCT 20.8 11/17/2024 04:05 AM    MCV 98.1 11/17/2024 04:05 AM    MCH 32.1 11/17/2024 04:05 AM    MCHC 32.7 11/17/2024 04:05 AM    RDW 14.6 11/17/2024 04:05 AM     11/17/2024 04:05 AM    MPV 10.7 11/17/2024 04:05 AM     BMP:    Lab Results   Component Value Date/Time     11/17/2024 04:05 AM    K 3.6 11/17/2024 04:05 AM     11/17/2024 04:05 AM    CO2 26 11/17/2024 04:05 AM    BUN 16 11/17/2024 04:05 AM    CREATININE 0.82 11/17/2024 04:05 AM    CALCIUM 7.8 11/17/2024 04:05 AM    GFRAA >60 09/09/2022 01:43 AM    LABGLOM 71 11/17/2024 04:05 AM    LABGLOM 51 04/29/2024 02:22 PM    GLUCOSE 129 11/17/2024 04:05 AM       Current Inpatient Medications    Current Facility-Administered Medications: insulin lispro (HUMALOG,ADMELOG) injection vial 0-8 Units, 0-8 Units, SubCUTAneous, Q6H  acetaminophen (TYLENOL) suppository 650 mg, 650 mg, Rectal, Q6H PRN  [COMPLETED] piperacillin-tazobactam (ZOSYN) 4,500 mg in sodium chloride 0.9 % 100 mL IVPB (mini-bag), 4,500 mg, IntraVENous, Once **FOLLOWED BY** piperacillin-tazobactam (ZOSYN) 3,375 mg in sodium chloride 0.9 % 50 mL IVPB (mini-bag), 3,375 mg, IntraVENous, Q8H  fentaNYL (SUBLIMAZE) injection 25 mcg, 25 mcg, IntraVENous, Q2H PRN  fentaNYL (SUBLIMAZE) 
Department of General Surgery - Adult  Surgical Service [facility]  Attending Progress Note      SUBJECTIVE:  Intubated / Responsive    OBJECTIVE      Physical    VITALS:  /72   Pulse 67   Temp (!) 101.1 °F (38.4 °C) (Bladder) Comment: NP notified  Resp 18   Ht 1.575 m (5' 2\")   Wt 77.6 kg (171 lb)   LMP  (LMP Unknown)   SpO2 93%   BMI 31.28 kg/m²   INTAKE/OUTPUT:    Intake/Output Summary (Last 24 hours) at 11/16/2024 0748  Last data filed at 11/16/2024 0618  Gross per 24 hour   Intake 1925.81 ml   Output 2105 ml   Net -179.19 ml     ABDOMEN:  soft, ND . Incisions look good    Data  CBC:   Lab Results   Component Value Date/Time    WBC 7.7 11/16/2024 03:32 AM    RBC 2.44 11/16/2024 03:32 AM    HGB 7.7 11/16/2024 03:32 AM    HCT 24.0 11/16/2024 03:32 AM    MCV 98.4 11/16/2024 03:32 AM    MCH 31.6 11/16/2024 03:32 AM    MCHC 32.1 11/16/2024 03:32 AM    RDW 14.3 11/16/2024 03:32 AM     11/16/2024 03:32 AM    MPV 10.6 11/16/2024 03:32 AM     BMP:    Lab Results   Component Value Date/Time     11/16/2024 03:32 AM    K 4.0 11/16/2024 03:32 AM     11/16/2024 03:32 AM    CO2 25 11/16/2024 03:32 AM    BUN 22 11/16/2024 03:32 AM    CREATININE 0.94 11/16/2024 03:32 AM    CALCIUM 8.5 11/16/2024 03:32 AM    GFRAA >60 09/09/2022 01:43 AM    LABGLOM 61 11/16/2024 03:32 AM    LABGLOM 51 04/29/2024 02:22 PM    GLUCOSE 143 11/16/2024 03:32 AM       Current Inpatient Medications    Current Facility-Administered Medications: acetaminophen (TYLENOL) suppository 650 mg, 650 mg, Rectal, Q6H PRN  potassium phosphate 20 mmol in sodium chloride 0.9 % 500 mL IVPB, 20 mmol, IntraVENous, Once  [COMPLETED] piperacillin-tazobactam (ZOSYN) 4,500 mg in sodium chloride 0.9 % 100 mL IVPB (mini-bag), 4,500 mg, IntraVENous, Once **FOLLOWED BY** piperacillin-tazobactam (ZOSYN) 3,375 mg in sodium chloride 0.9 % 50 mL IVPB (mini-bag), 3,375 mg, IntraVENous, Q8H  fentaNYL (SUBLIMAZE) injection 25 mcg, 25 mcg, IntraVENous, Q2H 
Discussed patient's CT findings with Dr. Schultz.  Will see in AM.     Burt Bangura MD    
Notified Dr. Montemayor regarding coming to see the Patient at the bedside. PT started to work with Patient again and the Patient began to desat to the low 60's. Patient was then placed on a non rebreather. Once the patient calmed down, patient was then placed back on 2 liters NC and o2 sats came back to 97% on 2 liters. The order was to get EKG, Trop, Port CXR and to stop IV fluids.    
Orthopedic NP Progress Note  Post Op Day: 1 Day Post-Op    November 14, 2024 10:07 AM     Radha Wynn    Attending Physician: Treatment Team:   Mariusz Montemayor MD Graupman, Matthew S, PA Reynolds, James P, MD Traynham, Tammy, MINDA Rice, Kathy  Avery, MINDA Arcos, Ellen Rubin, Manuel, PT  Zulma, Daylin, OT     Vital Signs:    Patient Vitals for the past 8 hrs:   BP Temp Temp src Pulse Resp SpO2   11/14/24 0918 99/65 -- -- -- -- 100 %   11/14/24 0916 (!) 89/44 -- -- -- -- --   11/14/24 0914 (!) 95/34 -- -- -- -- --   11/14/24 0912 (!) 101/37 -- -- -- -- --   11/14/24 0910 (!) 86/50 -- -- -- -- --   11/14/24 0905 (!) 124/57 -- -- -- -- --   11/14/24 0853 (!) 106/48 97.5 °F (36.4 °C) Oral 75 15 96 %   11/14/24 0744 -- -- -- 74 -- --   11/14/24 0442 (!) 106/50 97.7 °F (36.5 °C) Oral 72 16 93 %   11/14/24 0421 (!) 104/50 97.5 °F (36.4 °C) -- 76 -- --          Intake/Output:  11/14 0701 - 11/14 1900  In: 10 [I.V.:10]  Out: 300 [Urine:300]  11/12 1901 - 11/14 0700  In: 10 [I.V.:10]  Out: 1275 [Urine:1275]    Pain Control:        LAB:    Recent Labs     11/14/24 0148   HCT 27.0*   HGB 8.4*     Lab Results   Component Value Date/Time     11/13/2024 03:16 AM    K 4.6 11/13/2024 03:16 AM     11/13/2024 03:16 AM    CO2 29 11/13/2024 03:16 AM    BUN 16 11/13/2024 03:16 AM       Subjective:  Radha Wynn is a 84 y.o. female s/p a  Procedure(s):  LEFT HIP INTRAMEDULLARY NAIL   Procedure(s):  LEFT HIP INTRAMEDULLARY NAIL. Tolerating diet. Pain is well managed, pt has been up with PT today and did well        Objective: General: alert, cooperative, no distress.    Neuro/Vascular: CNS Intact.  Sensation stable. Brisk cap refill, 2+ pulses UE/LE  Musculoskeletal:    LLE - expected post op edema, + DF/PF, NVI   Skin: warm and dry   Dressing - clean, dry and intact.              PT/OT:   Gait:                      Assessment:    s/p Procedure(s):  LEFT HIP INTRAMEDULLARY NAIL    Principal 
Orthopedic NP Progress Note  Post Op Day: 6 Days Post-Op    November 21, 2024 12:55 PM     Radha Wynn    Attending Physician: Treatment Team:   Jeff Nails MD Graupman, Matthew S, PA Reynolds, James P, MD Traynham, Tammy, RN Johnson, Bryan A, MD Gibbs, Jessica, RN Debois, Ashley Varela, MINDA Munson, Ellen Luis, Kiana, SARAH Bradley, Eneida MAYA, PTA     Vital Signs:    Patient Vitals for the past 8 hrs:   BP Temp Temp src Pulse Resp SpO2   11/21/24 1112 (!) 114/59 97.9 °F (36.6 °C) Oral 63 15 100 %   11/21/24 0824 (!) 169/72 97.3 °F (36.3 °C) Oral 64 16 99 %          Intake/Output:  No intake/output data recorded.  11/19 1901 - 11/21 0700  In: 641.9 [P.O.:400]  Out: 450 [Urine:450]    Pain Control:        LAB:    Recent Labs     11/21/24  0312   HCT 24.4*   HGB 7.7*     Lab Results   Component Value Date/Time     11/21/2024 03:12 AM    K 3.4 11/21/2024 03:12 AM     11/21/2024 03:12 AM    CO2 33 11/21/2024 03:12 AM    BUN 15 11/21/2024 03:12 AM       Subjective:  Radha Wynn is a 84 y.o. female s/p a  Procedure(s):  ROBOT ASSISTED REDUCTION OF INCARCERATED STOMACH AND COLON,PARAESOPHAGEAL HERNIA REPAIR TOUPET FUNDOPLICATION,PARTIAL OMENENTECTOMY   Procedure(s):  ROBOT ASSISTED REDUCTION OF INCARCERATED STOMACH AND COLON,PARAESOPHAGEAL HERNIA REPAIR TOUPET FUNDOPLICATION,PARTIAL OMENENTECTOMY. Tolerating diet. Resting in bed no c/o's       Objective: General: alert, cooperative, no distress.    Neuro/Vascular: CNS Intact.  Sensation stable. Brisk cap refill, 2+ pulses UE/LE  Musculoskeletal:    LLE - general ROM limited by pain, knee ROM has improved slightly 0-80 today, no erythema noted, effusion improved slightly, of note large area of ecchymosis noted to distal posterior thigh, NVI  Skin: warm and dry   Dressing - clean, dry and intact.              PT/OT:   Gait:                      Assessment:    s/p Procedure(s):  ROBOT ASSISTED REDUCTION OF INCARCERATED STOMACH AND 
Orthopedics is aware of the patient.  Patient with nondisplaced and comminuted left intertrochanteric femur fracture.  Admit to hospitalist- appreciate admission and medical management.  Plan for left hip IM nail once medically cleared- likely tomorrow.  Patient with history of Afib s/p ablation on 5 mg eliquis BID.  NPO after midnight.      Preoperative labs ordered.    Preoperative analgesia management ordered.        Tico Qureshi PA-C  Orthopaedic Surgery PA  [facility] [place]  [facility]      
Patient Fall protocol  Yellow arm band applied to patient.  Yellow non-skid socks placed on Patient.    Bed in low position, all side rails up, call bell in reach.  Patient and family instructed on \"Call Don't Fall\" Protocol   -Use your call bell, wait for assistance, staff (not Family) will assist you to get up and move about.  Patient and Family verbalize understanding of Fall Precautions and the \" Call Don't Fall\" Protocol    
Physical Therapy:  0831  Chart reviewed. Noted per chart review patient to receive RBC today due to low Hgb 6.8.  We will defer PT session today and follow up tomorrow.  Thank you    Amy Lombardo, PT, DPT, CLT    
Physical/Occupational Therapy Note    Chart reviewed and events of yesterday evening noted.  Patient became hypoxic and ultimately required intubation, remains intubated and sedated.  Will defer therapy sessions at this time and follow when appropriate.    Marisol Gerardo MS, PT  
RT @ bedside with NP Stephanei and RN. Previous settings resumed because pt. Desating and Tachypneic per NP orders.  RT will continue  to monitor pt. Pt. Tolerating current settings well .     
RT @ bedside. PT. Alert, awake.  RN's &  NP Stephanie also @ bedside . PT. Following commands and has passed SAT.  PT. Mode switched to PS10/5 & 30 % per NP Stephanie orders.  BBS equal and vitals are stable. HR-74, RR-16< Spo2-100%. RT will continue to monitor pt.  
Rapid Called at 1923    Responded to RRT at 1926. Pt unresponsive. Per primary RN Sop2 in the 40's.  NR @ 15L on patient spo2 100%    2000: PT to CT  2025: Pt to ICU    2100: Pt intubated after episode of unresponsiveness.     Provider at bedside: YES  Interventions ordered: Labs, EKG, and ABG  Sepsis Suspected: No  Transfer to Higher Level of Care: yes  Blood Glucose: 242     Vitals:    11/14/24 2245   BP: (!) 107/59   Pulse: 72   Resp: 20   Temp:    SpO2: 100%        Rapid Ended at 2230  RRT RN assisted with transport to accepting unit Yes.    Ashish Rucker, RN  
Rapid response was called by Charge RN Emilee. Patients Spouse called her into room to say that his Wife (the Patient was having SOB). Per the Charge RN, the Patient was found to be sating at low 47% , on 2 liters per NC. Rapid response was called.  Multiple labs were drawn, ABG, EKG and Vitals done. Patient was taken to CT and then to the ICU by Charge on night shift.   
Spiritual Care Partner Volunteer visited patient at [facility] in [facility] on 11/13/2024   Documented by:  Chaplain Mariusz Otero M.Div., [facility].  For  support, please call Spiritual Health Team @ [phone])    
Spiritual Health History and Assessment/Progress Note  [facility]    Initial Encounter,  , Adjustment to illness,      Name: Radha Wynn MRN: [id number]    Age: 84 y.o.     Sex: female   Language: English   Sikh: Quaker   Intertrochanteric fracture of left femur (HCC)     Date: 11/18/2024            Total Time Calculated: 16 min              Spiritual Assessment began in [facility] A4 INTENSIVE CARE UNIT        Referral/Consult From: Rounding   Encounter Overview/Reason: Initial Encounter  Service Provided For: Patient and family together    Trish, Belief, Meaning:   Patient identifies as spiritual, is connected with a trish tradition or spiritual practice, and has beliefs or practices that help with coping during difficult times  Family/Friends identify as spiritual, are connected with a trish tradition or spiritual practice, and have beliefs or practices that help with coping during difficult times      Importance and Influence:  Patient has spiritual/personal beliefs that influence decisions regarding their health  Family/Friends have spiritual/personal beliefs that influence decisions regarding the patient's health    Community:  Patient is connected with a spiritual community and feels well-supported. Support system includes: Spouse/Partner  Family/Friends are connected with a spiritual community: and feel well-supported. Support system includes: Spouse/Partner    Assessment and Plan of Care:     Patient Interventions include: Facilitated expression of thoughts and feelings, Explored spiritual coping/struggle/distress, and Other: Provided spiritual presence and active listening as Ms Wynn shared about her current health issues. Acknowledged her feelings and concerns and offered words of support. She requested that  have prayer for healing on her behalf; had prayer as requested and assured her of ongoing  availability for support.  Family/Friends Interventions include: 
The folowing BP's have been reported to Dr. Montemayor. Holding All BP meds. 86/50 101/37 92/34 89/44 99/65 and 93/60    
Oral -- -- --   24 1945 (!) 156/88 -- -- 88 (!) 31 90 %   24 1830 (!) 146/67 -- -- 86 23 (!) 89 %   24 1815 (!) 149/66 -- -- 82 25 93 %   24 1800 (!) 159/73 -- -- 83 24 93 %   24 1745 -- 100 °F (37.8 °C) -- 96 21 (!) 88 %   24 1730 (!) 145/70 -- -- 85 19 94 %   24 1715 -- -- -- 86 25 94 %   24 1700 (!) 133/91 -- -- 89 26 96 %   24 1645 (!) 156/73 -- -- 94 28 98 %   24 1630 121/61 -- -- 79 24 90 %   24 1615 112/80 -- -- 80 21 (!) 88 %   24 1600 (!) 128/113 -- -- 85 21 92 %   24 1545 125/72 -- -- 85 26 (!) 89 %   24 1530 126/88 97.8 °F (36.6 °C) Oral 83 24 90 %   24 1515 (!) 125/54 -- -- 81 24 92 %   24 1500 127/70 -- -- 84 16 90 %   24 1445 (!) 142/69 -- -- 82 27 94 %   24 1430 126/72 -- -- 79 24 95 %   24 1415 124/75 -- -- 77 28 96 %                                          Temp (24hrs), Av.8 °F (37.7 °C), Min:97.8 °F (36.6 °C), Max:100.4 °F (38 °C)      Labs:        Recent Labs     24  1356   HCT 25.0*   HGB 8.2*     Lab Results   Component Value Date/Time     2024 01:56 PM    K 3.9 2024 01:56 PM     2024 01:56 PM    CO2 27 2024 01:56 PM    BUN 15 2024 01:56 PM       PT/OT:                Patient mobility                         ASSESSMENT / PLAN:   Principal Problem:    Intertrochanteric fracture of left femur ([facility])  Active Problems:    HTN (hypertension), benign    Acquired hypothyroidism    PAF (paroxysmal atrial fibrillation) ([facility])    Anxiety    S/P ablation of atrial fibrillation    Paraesophageal hernia    Hyperlipidemia    Coronary artery calcification seen on CT scan    Acute respiratory failure with hypoxia    Closed left hip fracture, initial encounter ([facility])  Resolved Problems:    * No resolved hospital problems. *               1. S/P Left hip IM nail POD 4  2. Acute respiratory failure with hypoxia  3. Massive paraesophageal hernia s/p 
160 mg Oral Daily    escitalopram (LEXAPRO) tablet 5 mg  5 mg Oral Daily    ezetimibe (ZETIA) tablet 10 mg  10 mg Oral QPM    levothyroxine (SYNTHROID) tablet 50 mcg  50 mcg Oral QAM AC    metoprolol succinate (TOPROL XL) extended release tablet 12.5 mg  12.5 mg Oral Daily    rosuvastatin (CRESTOR) tablet 5 mg  5 mg Oral Once per day on Monday Wednesday Friday    vitamin D3 (CHOLECALCIFEROL) tablet 5,000 Units  5,000 Units Oral Daily    hydrALAZINE (APRESOLINE) injection 10 mg  10 mg IntraVENous Q6H PRN        Lab Data Reviewed: (see below)  Lab Review:     Recent Labs     11/12/24  1330 11/13/24  0316   WBC 4.5 6.0   HGB 10.7* 9.4*   HCT 32.7* 29.3*    135*     Recent Labs     11/12/24  1330 11/13/24  0316    140   K 3.3* 4.6   * 109*   CO2 28 29   BUN 14 16   ALT 18  --    INR 1.8* 1.2*     Lab Results   Component Value Date/Time    GLUCPOC 116 09/07/2020 02:26 AM     No results for input(s): \"PH\", \"PCO2\", \"PO2\", \"HCO3\", \"FIO2\" in the last 72 hours.  Recent Labs     11/12/24  1330 11/13/24  0316   INR 1.8* 1.2*     [unfilled]    I have reviewed notes of prior 24hr.    Other pertinent lab:     Total time: -35- minutes. I personally saw and examined the patient during this time period.  Greater than 50% of this time was spent in counseling and coordination of care.    I personally reviewed chart, notes, data and current medications in the medical record.  I have personally examined and treated the patient at bedside during this period.                 Care Plan discussed with: Patient, Nursing Staff, and >50% of time spent in counseling and coordination of care    Discussed:  Care Plan    Prophylaxis:      Disposition:  SNF/LTC           ___________________________________________________    Attending Physician: Abhilash Schultz MD      
worsens, please page.  2. DISPO: SNF  3. PT/OT: WBAT LLE  4. DVT ppx: Eliquis 5 mg BID and SCDs.         Signed By:  DANETTE Parker    [facility] [place]  [facility]      
(LASIX) injection 20 mg (Completed)    furosemide (LASIX) injection 20 mg (Completed)    Other Relevant Orders    Echo (TTE) complete (PRN contrast/bubble/strain/3D) (Completed)            
surgery who did a robotic assisted reduction of incarcerated stomach and colon, paraesophageal hernia repair toupet fundoplication and partial omentectomy on 11/15. She has done well post operatively. Now on clears. Surgery to guide on diet advancement. Continue IV Pantoprazole.      Intertrochanteric fracture of left femur POA: this was the initial reason for admission following a fall. Seen by Orthopedic surgery who did a left hip intramedullary nail insertion 11/13. Continue IV dilaudid PRN severe pain, OxyCodone PRN moderate pain, DVT prophylaxis. PT, OT as above.       Coronary artery calcification seen on CT scan / Ascending AA POA: incidental finding on CT. Had a cath in 7/2022. An Echocardiogram done showed an EF of 60-65% with normal wall motion. Seen by cardiology. No testing planned at this time. She remains asymptomatic     PAF (paroxysmal atrial fibrillation) / S/P ablation of atrial fibrillation POA: rate controlled. Continue Metoprolol and Apixaban.       Anemia - likely multifactorial from expected blood loss from surgery, dilutional. She is sp transfusion with 1u pRBCs. Hgb stable at 8.3.      HTN (hypertension), benign /  Hyperlipidemia POA: BP stable. Continue Metoprolol. Resume Crestor when fully tolerating diet         Acquired hypothyroidism POA: recent TSH 7.22. Continue Levothyroxine.      Anxiety POA: has episodic confusion. Continue Lexapro, Zyprexa    Care Plan discussed with: Nursing    Prophylaxis:  Apixaban and H2B/PPI                Expected Disposition: SNF    PCP:      Neena Hammer MD     I have personally examined and treated the patient at bedside during this period. To assist coordination of care and communication with nursing and staff, this note may be preliminary early in the day, but finalized by end of the day.         ___________________________________________________    Attending Physician:   Mariusz Montemayor MD     
MD Sim

## 2024-11-22 LAB
BACTERIA SPEC CULT: NORMAL
BACTERIA SPEC CULT: NORMAL
SERVICE CMNT-IMP: NORMAL
SERVICE CMNT-IMP: NORMAL

## 2024-12-09 RX ORDER — CANDESARTAN 16 MG/1
16 TABLET ORAL DAILY
Qty: 90 TABLET | Refills: 0 | Status: SHIPPED | OUTPATIENT
Start: 2024-12-09

## 2025-02-21 NOTE — TELEPHONE ENCOUNTER
Verified patient with two types of identifiers.  Called patient to see if she is continuing to see Dr. Heath, as he would need to fill her order for candesartan.  She is no longer seeing Dr. Heath.  Discussed scheduling patient with new general cardiologist.  Scheduled patient to see Dr. Andre in April.    Informed patient that she is scheduled to see Ally FLETCHER on 3/25; she seemed confused and thought she was scheduled with Sara FLETCHER.  Informed her that Sara is not back to the office at that time.  Patient states that she is concerned about her blood pressure.  She states that her candesartan does not control her blood pressure all day.  She states that she did not know that Toprol was on her list, and she is not taking clonidine.  She says that she is almost out of hydralazine; informed her that this was discontinued when she was discharged from hospital back in November.    Informed patient that I would call on Monday to get an update of her blood pressure readings and further discuss her follow up appointments.  Patient verbalized understanding and will call with any other questions.

## 2025-02-25 ENCOUNTER — TELEPHONE (OUTPATIENT)
Age: 85
End: 2025-02-25

## 2025-02-25 RX ORDER — CANDESARTAN 16 MG/1
16 TABLET ORAL DAILY
Qty: 90 TABLET | Refills: 0 | OUTPATIENT
Start: 2025-02-25

## 2025-03-04 ENCOUNTER — APPOINTMENT (OUTPATIENT)
Facility: HOSPITAL | Age: 85
End: 2025-03-04
Payer: MEDICARE

## 2025-03-04 ENCOUNTER — HOSPITAL ENCOUNTER (EMERGENCY)
Facility: HOSPITAL | Age: 85
Discharge: HOME | End: 2025-03-05
Attending: EMERGENCY MEDICINE
Payer: MEDICARE

## 2025-03-04 DIAGNOSIS — R51.9 ACUTE NONINTRACTABLE HEADACHE, UNSPECIFIED HEADACHE TYPE: ICD-10-CM

## 2025-03-04 DIAGNOSIS — I10 HYPERTENSION, UNSPECIFIED TYPE: Primary | ICD-10-CM

## 2025-03-04 LAB
ALBUMIN SERPL-MCNC: 3.7 G/DL (ref 3.5–5)
ALBUMIN/GLOB SERPL: 0.9 (ref 1.1–2.2)
ALP SERPL-CCNC: 82 U/L (ref 45–117)
ALT SERPL-CCNC: 19 U/L (ref 12–78)
ANION GAP SERPL CALC-SCNC: 6 MMOL/L (ref 2–12)
AST SERPL-CCNC: 52 U/L (ref 15–37)
BILIRUB SERPL-MCNC: 0.6 MG/DL (ref 0.2–1)
BUN SERPL-MCNC: 15 MG/DL (ref 6–20)
BUN/CREAT SERPL: 20 (ref 12–20)
CALCIUM SERPL-MCNC: 10.1 MG/DL (ref 8.5–10.1)
CHLORIDE SERPL-SCNC: 109 MMOL/L (ref 97–108)
CO2 SERPL-SCNC: 24 MMOL/L (ref 21–32)
CREAT SERPL-MCNC: 0.74 MG/DL (ref 0.55–1.02)
GLOBULIN SER CALC-MCNC: 3.9 G/DL (ref 2–4)
GLUCOSE SERPL-MCNC: 126 MG/DL (ref 65–100)
NT PRO BNP: 899 PG/ML
POTASSIUM SERPL-SCNC: 5.3 MMOL/L (ref 3.5–5.1)
PROT SERPL-MCNC: 7.6 G/DL (ref 6.4–8.2)
SODIUM SERPL-SCNC: 139 MMOL/L (ref 136–145)
TROPONIN I SERPL HS-MCNC: 10 NG/L (ref 0–51)

## 2025-03-04 PROCEDURE — 99284 EMERGENCY DEPT VISIT MOD MDM: CPT

## 2025-03-04 PROCEDURE — 83880 ASSAY OF NATRIURETIC PEPTIDE: CPT

## 2025-03-04 PROCEDURE — 70450 CT HEAD/BRAIN W/O DYE: CPT

## 2025-03-04 PROCEDURE — 93005 ELECTROCARDIOGRAM TRACING: CPT | Performed by: EMERGENCY MEDICINE

## 2025-03-04 PROCEDURE — 36415 COLL VENOUS BLD VENIPUNCTURE: CPT

## 2025-03-04 PROCEDURE — 84484 ASSAY OF TROPONIN QUANT: CPT

## 2025-03-04 PROCEDURE — 85025 COMPLETE CBC W/AUTO DIFF WBC: CPT

## 2025-03-04 PROCEDURE — 80053 COMPREHEN METABOLIC PANEL: CPT

## 2025-03-04 ASSESSMENT — PAIN - FUNCTIONAL ASSESSMENT: PAIN_FUNCTIONAL_ASSESSMENT: 0-10

## 2025-03-04 ASSESSMENT — PAIN DESCRIPTION - LOCATION: LOCATION: HEAD

## 2025-03-04 ASSESSMENT — PAIN SCALES - GENERAL: PAINLEVEL_OUTOF10: 2

## 2025-03-05 VITALS
RESPIRATION RATE: 29 BRPM | TEMPERATURE: 98.2 F | DIASTOLIC BLOOD PRESSURE: 75 MMHG | HEART RATE: 87 BPM | OXYGEN SATURATION: 96 % | SYSTOLIC BLOOD PRESSURE: 165 MMHG

## 2025-03-05 LAB
BASOPHILS # BLD: 0.02 K/UL (ref 0–0.1)
BASOPHILS NFR BLD: 0.4 % (ref 0–1)
DIFFERENTIAL METHOD BLD: ABNORMAL
EKG ATRIAL RATE: 91 BPM
EKG DIAGNOSIS: NORMAL
EKG P AXIS: 76 DEGREES
EKG P-R INTERVAL: 148 MS
EKG Q-T INTERVAL: 386 MS
EKG QRS DURATION: 130 MS
EKG QTC CALCULATION (BAZETT): 474 MS
EKG R AXIS: 82 DEGREES
EKG T AXIS: 17 DEGREES
EKG VENTRICULAR RATE: 91 BPM
EOSINOPHIL # BLD: 0.2 K/UL (ref 0–0.4)
EOSINOPHIL NFR BLD: 3.7 % (ref 0–7)
ERYTHROCYTE [DISTWIDTH] IN BLOOD BY AUTOMATED COUNT: 13.7 % (ref 11.5–14.5)
HCT VFR BLD AUTO: 36.4 % (ref 35–47)
HGB BLD-MCNC: 12 G/DL (ref 11.5–16)
IMM GRANULOCYTES # BLD AUTO: 0.01 K/UL (ref 0–0.04)
IMM GRANULOCYTES NFR BLD AUTO: 0.2 % (ref 0–0.5)
LYMPHOCYTES # BLD: 1.68 K/UL (ref 0.8–3.5)
LYMPHOCYTES NFR BLD: 30.8 % (ref 12–49)
MCH RBC QN AUTO: 32.6 PG (ref 26–34)
MCHC RBC AUTO-ENTMCNC: 33 G/DL (ref 30–36.5)
MCV RBC AUTO: 98.9 FL (ref 80–99)
MONOCYTES # BLD: 0.5 K/UL (ref 0–1)
MONOCYTES NFR BLD: 9.2 % (ref 5–13)
NEUTS SEG # BLD: 3.04 K/UL (ref 1.8–8)
NEUTS SEG NFR BLD: 55.7 % (ref 32–75)
NRBC # BLD: 0 K/UL (ref 0–0.01)
NRBC BLD-RTO: 0 PER 100 WBC
PLATELET # BLD AUTO: 172 K/UL (ref 150–400)
PMV BLD AUTO: 11.3 FL (ref 8.9–12.9)
RBC # BLD AUTO: 3.68 M/UL (ref 3.8–5.2)
WBC # BLD AUTO: 5.5 K/UL (ref 3.6–11)

## 2025-03-05 PROCEDURE — 93010 ELECTROCARDIOGRAM REPORT: CPT | Performed by: INTERNAL MEDICINE

## 2025-03-05 RX ORDER — HYDRALAZINE HYDROCHLORIDE 25 MG/1
50 TABLET, FILM COATED ORAL 3 TIMES DAILY
Qty: 90 TABLET | Refills: 0 | Status: SHIPPED | OUTPATIENT
Start: 2025-03-05

## 2025-03-05 RX ORDER — ACETAMINOPHEN 500 MG
1000 TABLET ORAL
Status: DISCONTINUED | OUTPATIENT
Start: 2025-03-05 | End: 2025-03-05 | Stop reason: HOSPADM

## 2025-03-05 NOTE — ED NOTES
I have reviewed discharge instructions with the patient and son.  Opportunity for questions and clarification was provided.  The patient and son verbalized understanding.  Patient discharged out of the ED via W/C with no difficulty and in stable condition.

## 2025-03-05 NOTE — ED TRIAGE NOTES
Patient ambulated to triage with cane.   Patient reports high blood pressure reading at home and headpain. Took BP medications prior to arrival @2100.  patient concern about   Bilateral fluid in legs.

## 2025-03-05 NOTE — ED PROVIDER NOTES
and DIFFERENTIAL DIAGNOSIS/MDM:   Vitals:    Vitals:    03/04/25 2258 03/04/25 2302   BP:  (!) 162/86   Pulse: 93    Resp: 20    SpO2: 94%            Medical Decision Making  Amount and/or Complexity of Data Reviewed  Labs: ordered.  Radiology: ordered.  ECG/medicine tests: ordered.            REASSESSMENT      Progress Note:  Results, treatment, and follow up plan have been discussed with patient/.  Questions were answered.   Saqib Chacon MD  1:04 AM        CONSULTS:  None    PROCEDURES:  Unless otherwise noted below, none     Procedures      FINAL IMPRESSION      Assessment/plan: 85-year-old with a history of hypertension, A-fib, and others.  She presents with complaints of elevated blood pressures and a headache.  Differential diagnosis includes intracerebral hemorrhage, ACS, anxiety, elevated blood pressure related to recently stopping hydralazine, and others.  Reassuring appearance/exam with stable vital signs.  CBC okay.  CMP remarkable for a potassium of 5.3 (hemolyzed sample).  Troponin okay.  proBNP is elevated at 829.  Head CT shows no acute process.  Home with hydralazine and PCP/cards follow-up.  Return precautions. Saqib Chacon MD  1:05 AM      DISPOSITION/PLAN   DISPOSITION        PATIENT REFERRED TO:  No follow-up provider specified.    DISCHARGE MEDICATIONS:  New Prescriptions    No medications on file         (Please note that portions of this note were completed with a voice recognition program.  Efforts were made to edit the dictations but occasionally words are mis-transcribed.)    Saqib Chacon MD (electronically signed)  Emergency Attending Physician / Physician Assistant / Nurse Practitioner             Saqib Chacon MD  03/05/25 0105

## 2025-03-20 NOTE — PROGRESS NOTES
Cardiac Electrophysiology Office Follow-up    NAME: Radha Wynn   :  1940  MRM:  [id number]    Date:  3/25/2025         Assessment and Plan:     1. HTN (hypertension), benign  -     EKG 12 Lead  2. Anticoagulation adequate  -     EKG 12 Lead  3. PAF (paroxysmal atrial fibrillation) (HCC)  -     EKG 12 Lead             Persistent atrial fibrillation  PAF now progressive to persistent Afib.  History of prior A. fib ablation in 2018 with Dr. Hernandez with reported unable to achieve complete block in the LS PV.    - Due to wide QRS of greater than 150 ms we stopped flecainide   - Continue Eliquis 5 mg twice daily for thromboembolic prophylaxis  -Subsequently s/p PVI, GP ablation, roof-line ablation, PWI, CTI ablation (23-[facility])  - I'm pleased to hear how well the patient has done post ablation. We spoke in great detail regarding the importance of multidisciplinary management of AFib and the role of risk factors modification in preventing recurrent AF including focusing on diet, weight loss, control of blood pressure, glycemic control, TRISTAN diagnosis and treatment, and medication compliance.  - history of iodine allergy, limits Amiodarone therapy  - Remains in normal sinus rhythm   - no longer on Multaq, stopped on 2023  -2-week monitor from 2024 demonstrated no AF, HR 46-91 bpm, ave 62 bpm  - Follow-up with me in October for annual visit with one week monitor prior       Anticoagulation  Denies of any bleeding issues. Know to contact clinic with any bleeding side effects (BRBPR, melena, hemotysis, hematuria).  - Continue Eliquis 5 mg twice daily                 Hypertension  Significant polypharmacy, labile   - Continue candesartan and hydralazine  - /80 at home before today's appt     Abnormal Lung Exam/ Exp Wheezes  - Possible COPD, hx smoking  - She sees her PCP next week. I told her to ask if she has COPD and needs inhalers or pulm referral         Subjective:     ulysses Soto 85

## 2025-03-25 ENCOUNTER — OFFICE VISIT (OUTPATIENT)
Age: 85
End: 2025-03-25
Payer: MEDICARE

## 2025-03-25 VITALS
DIASTOLIC BLOOD PRESSURE: 80 MMHG | WEIGHT: 148 LBS | HEART RATE: 74 BPM | SYSTOLIC BLOOD PRESSURE: 176 MMHG | OXYGEN SATURATION: 94 % | RESPIRATION RATE: 20 BRPM | HEIGHT: 62 IN | BODY MASS INDEX: 27.23 KG/M2

## 2025-03-25 DIAGNOSIS — I48.0 PAF (PAROXYSMAL ATRIAL FIBRILLATION) (HCC): ICD-10-CM

## 2025-03-25 DIAGNOSIS — Z79.01 ANTICOAGULATION ADEQUATE: ICD-10-CM

## 2025-03-25 DIAGNOSIS — I10 HTN (HYPERTENSION), BENIGN: Primary | ICD-10-CM

## 2025-03-25 PROCEDURE — 1125F AMNT PAIN NOTED PAIN PRSNT: CPT | Performed by: NURSE PRACTITIONER

## 2025-03-25 PROCEDURE — 1123F ACP DISCUSS/DSCN MKR DOCD: CPT | Performed by: NURSE PRACTITIONER

## 2025-03-25 PROCEDURE — 3077F SYST BP >= 140 MM HG: CPT | Performed by: NURSE PRACTITIONER

## 2025-03-25 PROCEDURE — 3079F DIAST BP 80-89 MM HG: CPT | Performed by: NURSE PRACTITIONER

## 2025-03-25 PROCEDURE — 1036F TOBACCO NON-USER: CPT | Performed by: NURSE PRACTITIONER

## 2025-03-25 PROCEDURE — 1159F MED LIST DOCD IN RCRD: CPT | Performed by: NURSE PRACTITIONER

## 2025-03-25 PROCEDURE — 1090F PRES/ABSN URINE INCON ASSESS: CPT | Performed by: NURSE PRACTITIONER

## 2025-03-25 PROCEDURE — G8400 PT W/DXA NO RESULTS DOC: HCPCS | Performed by: NURSE PRACTITIONER

## 2025-03-25 PROCEDURE — G8417 CALC BMI ABV UP PARAM F/U: HCPCS | Performed by: NURSE PRACTITIONER

## 2025-03-25 PROCEDURE — 99214 OFFICE O/P EST MOD 30 MIN: CPT | Performed by: NURSE PRACTITIONER

## 2025-03-25 PROCEDURE — G8427 DOCREV CUR MEDS BY ELIG CLIN: HCPCS | Performed by: NURSE PRACTITIONER

## 2025-04-16 ENCOUNTER — OFFICE VISIT (OUTPATIENT)
Age: 85
End: 2025-04-16
Payer: MEDICARE

## 2025-04-16 VITALS
DIASTOLIC BLOOD PRESSURE: 78 MMHG | HEART RATE: 75 BPM | SYSTOLIC BLOOD PRESSURE: 142 MMHG | OXYGEN SATURATION: 98 % | BODY MASS INDEX: 27.07 KG/M2 | HEIGHT: 62 IN

## 2025-04-16 DIAGNOSIS — E03.9 ACQUIRED HYPOTHYROIDISM: ICD-10-CM

## 2025-04-16 DIAGNOSIS — Z79.01 ANTICOAGULATION ADEQUATE: ICD-10-CM

## 2025-04-16 DIAGNOSIS — I35.1: ICD-10-CM

## 2025-04-16 DIAGNOSIS — Z98.890 S/P ABLATION OF ATRIAL FIBRILLATION: ICD-10-CM

## 2025-04-16 DIAGNOSIS — I77.810 MILD DILATION OF ASCENDING AORTA: ICD-10-CM

## 2025-04-16 DIAGNOSIS — I34.0 MR (MITRAL REGURGITATION): ICD-10-CM

## 2025-04-16 DIAGNOSIS — I10 HTN (HYPERTENSION), BENIGN: ICD-10-CM

## 2025-04-16 DIAGNOSIS — Z86.79 S/P ABLATION OF ATRIAL FIBRILLATION: ICD-10-CM

## 2025-04-16 PROCEDURE — 3078F DIAST BP <80 MM HG: CPT | Performed by: INTERNAL MEDICINE

## 2025-04-16 PROCEDURE — 1126F AMNT PAIN NOTED NONE PRSNT: CPT | Performed by: INTERNAL MEDICINE

## 2025-04-16 PROCEDURE — 1036F TOBACCO NON-USER: CPT | Performed by: INTERNAL MEDICINE

## 2025-04-16 PROCEDURE — G8428 CUR MEDS NOT DOCUMENT: HCPCS | Performed by: INTERNAL MEDICINE

## 2025-04-16 PROCEDURE — 1090F PRES/ABSN URINE INCON ASSESS: CPT | Performed by: INTERNAL MEDICINE

## 2025-04-16 PROCEDURE — G8400 PT W/DXA NO RESULTS DOC: HCPCS | Performed by: INTERNAL MEDICINE

## 2025-04-16 PROCEDURE — G8417 CALC BMI ABV UP PARAM F/U: HCPCS | Performed by: INTERNAL MEDICINE

## 2025-04-16 PROCEDURE — 99214 OFFICE O/P EST MOD 30 MIN: CPT | Performed by: INTERNAL MEDICINE

## 2025-04-16 PROCEDURE — 3077F SYST BP >= 140 MM HG: CPT | Performed by: INTERNAL MEDICINE

## 2025-04-16 PROCEDURE — 1123F ACP DISCUSS/DSCN MKR DOCD: CPT | Performed by: INTERNAL MEDICINE

## 2025-04-16 RX ORDER — FUROSEMIDE 20 MG/1
20 TABLET ORAL DAILY PRN
COMMUNITY

## 2025-04-16 ASSESSMENT — PATIENT HEALTH QUESTIONNAIRE - PHQ9
SUM OF ALL RESPONSES TO PHQ QUESTIONS 1-9: 0
2. FEELING DOWN, DEPRESSED OR HOPELESS: NOT AT ALL
SUM OF ALL RESPONSES TO PHQ QUESTIONS 1-9: 0
1. LITTLE INTEREST OR PLEASURE IN DOING THINGS: NOT AT ALL
SUM OF ALL RESPONSES TO PHQ QUESTIONS 1-9: 0
SUM OF ALL RESPONSES TO PHQ QUESTIONS 1-9: 0

## 2025-04-16 NOTE — PROGRESS NOTES
Chief Complaint   Patient presents with    Atrial Fibrillation    Hypertension     Vitals:    04/16/25 1026 04/16/25 1045   BP: (!) 146/78 (!) 142/78   BP Site: Left Upper Arm    Patient Position: Sitting    Pulse: 75    SpO2: 98%    Height: 1.575 m (5' 2\")         Chest pain denied   SOB denied   Palpitations denied   Swelling in hands/feet denied   Dizziness denied   Recent hospital stays denied   Refills denied     PCP increase candasartan to 32 mg but she is still taking 16 mg

## 2025-04-16 NOTE — PROGRESS NOTES
Patient: Radha Wynn  : 1940    Primary Cardiologist:Dr. ELEAZAR Andre  EP Cardiologist:Dr. ARNOL Rothman   Primary [facility] cardiologist:   PCP: Neena Hammer MD    Today's Date: 2025    ED visit 3/2025:  Elevated BP.    ? Memory issues - thinks it is worse since her orthopedic surgeries.    Candesartan 16, hydralazine prn - not taking frequently  Has lost weight.    ASSESSMENT AND PLAN:     Assessment and Plan:   PAF--->persistent AF  PAF now progressive to persistent Afib.    History of prior A. fib ablation in 2018 with Dr. Hernandez with reported unable to achieve complete block in the LS PV.    Subsequently s/p PVI, GP ablation, roof-line ablation, PWI, CTI ablation (23-[facility])  - Due to wide QRS of greater than 150 ms we stopped flecainide   - Continue Eliquis 5 mg twice daily for thromboembolic prophylaxis  - history of iodine allergy, limits Amiodarone therapy  - no longer on Multaq, stopped on 2023  - Remains in normal sinus rhythm   - no longer on Multaq, stopped on 2023  -2-week monitor from 2024 demonstrated no AF, HR 46-91 bpm, ave 62 bpm  24    ECHO (TTE) COMPLETE (PRN CONTRAST/BUBBLE/STRAIN/3D) 11/15/2024  2:54 PM (Final)    Interpretation Summary    Left Ventricle: Normal left ventricular systolic function with a visually estimated EF of 60 - 65%. Left ventricle size is normal. Mildly increased wall thickness. Findings consistent with mild concentric hypertrophy. Normal wall motion. Abnormal diastolic function.    Aortic Valve: Mild regurgitation.    Mitral Valve: Mild annular calcification at the anterior leaflet. Mildly thickened leaflets. Mild regurgitation.    Tricuspid Valve: Mild regurgitation. Unable to assess RVSP.    Left Atrium: Left atrium is dilated. LA Vol Index A/L is 31 mL/m2.    Aorta: Not well visualized. Normal sized aortic root. Mildly dilated ascending aorta.    Image quality is technically difficult. Technically difficult study and technically

## 2025-05-15 RX ORDER — ROSUVASTATIN CALCIUM 5 MG/1
5 TABLET, COATED ORAL
Qty: 90 TABLET | Refills: 3 | Status: SHIPPED | OUTPATIENT
Start: 2025-05-16

## 2025-05-15 NOTE — TELEPHONE ENCOUNTER
Per verbal order from Dr. Destiny Mcneill  Last appt: 4/16/2025     Future Appointments   Date Time Provider Department Center   10/17/2025  3:00 PM Ally Duran APRN - NP [facility] AMB   4/6/2026 11:00 AM [facility] ECHO 1 [facility]   4/22/2026 11:00 AM Destiny Mcneill MD [facility] AM[facility]       Requested Prescriptions     Signed Prescriptions Disp Refills    rosuvastatin (CRESTOR) 5 MG tablet 90 tablet 3     Sig: Take 1 tablet by mouth three times a week     Authorizing Provider: DESTINY MCNEILL     Ordering User: BEV GRAHAM

## 2025-07-31 ENCOUNTER — TELEPHONE (OUTPATIENT)
Age: 85
End: 2025-07-31

## 2025-09-05 ENCOUNTER — TELEPHONE (OUTPATIENT)
Age: 85
End: 2025-09-05

## (undated) DEVICE — SUTURE VICRYL + SZ 2-0 L36IN ABSRB UD L36MM CT-1 1/2 CIR VCP945H

## (undated) DEVICE — WASTE KIT - ST MARY: Brand: MEDLINE INDUSTRIES, INC.

## (undated) DEVICE — GLIDESHEATH SLENDER ACCESS KIT: Brand: GLIDESHEATH SLENDER

## (undated) DEVICE — TUBING PRSS MON L6IN PVC M FEM CONN

## (undated) DEVICE — 3M™ TEGADERM™ TRANSPARENT FILM DRESSING FRAME STYLE, 1628, 6 IN X 8 IN (15 CM X 20 CM), 10/CT 8CT/CASE: Brand: 3M™ TEGADERM™

## (undated) DEVICE — SUTURE V-LOC 180 SZ 3-0 L6IN ABSRB GRN V-20 L26MM 1/2 CIR VLOCL0604

## (undated) DEVICE — BANDAGE COHESIVE L5YDXW6IN M TAN CO FLX

## (undated) DEVICE — SUPPORT WRST AD W3.5XL9IN DIA14.5IN ART SFT ADJ HK AND LOOP

## (undated) DEVICE — PRECISION PIN TAPERED: Brand: GAMMA

## (undated) DEVICE — PAD GROUNDING ADLT ADH FOIL 9FT CORD UNIV

## (undated) DEVICE — ARM DRAPE

## (undated) DEVICE — GLOVE ORTHO 8   MSG9480

## (undated) DEVICE — 1LYRTR 16FR10ML100%SIL UMS SNP: Brand: MEDLINE INDUSTRIES, INC.

## (undated) DEVICE — DRESSING BORDERED ADH GZ UNIV GEN USE 8INX4IN AND 6INX2IN

## (undated) DEVICE — SUTURE VICRYL + SZ 0 L36IN ABSRB VLT L40MM CT 1/2 CIR TAPR VCP358H

## (undated) DEVICE — ELECTRODE,RADIOTRANSLUCENT,FOAM,5PK: Brand: MEDLINE

## (undated) DEVICE — SYRINGE MED 10ML RED POLYCARB BRL FIX M LUER CONN FLAT GRP

## (undated) DEVICE — SOLUTION IRRIG 1000ML 0.9% SOD CHL USP POUR PLAS BTL

## (undated) DEVICE — PERCLOSE PROGLIDE™ SUTURE-MEDIATED CLOSURE SYSTEM: Brand: PERCLOSE PROGLIDE™

## (undated) DEVICE — TUBING PRSS MON L12IN PVC RIG NONEXPANDING M TO FEM CONN

## (undated) DEVICE — MARKER SKIN PREP-RESISTANT ST: Brand: MEDLINE INDUSTRIES, INC.

## (undated) DEVICE — VESSEL SEALER EXTEND: Brand: ENDOWRIST

## (undated) DEVICE — 6619 2 PTNT ISO SYS INCISE AREA&LT;(&GT;&&LT;)&GT;P: Brand: STERI-DRAPE™ IOBAN™ 2

## (undated) DEVICE — PRESSURE MONITORING SET: Brand: TRUWAVE

## (undated) DEVICE — ROBOTIC GENERAL-SFMC: Brand: MEDLINE INDUSTRIES, INC.

## (undated) DEVICE — TUBING PMP FOR CARTO SYS SMARTABLATE

## (undated) DEVICE — BASIC GENERAL-SFMC: Brand: MEDLINE INDUSTRIES, INC.

## (undated) DEVICE — AIRSEAL 8 MM ACCESS PORT AND LOW PROFILE OBTURATOR WITH BLADELESS OPTICAL TIP, 120 MM LENGTH: Brand: AIRSEAL

## (undated) DEVICE — KIT POS FOAM HANA TBL

## (undated) DEVICE — ROSEN CURVED WIRE GUIDE: Brand: ROSEN

## (undated) DEVICE — 3M™ IOBAN™ 2 ANTIMICROBIAL INCISE DRAPE 6650EZ: Brand: IOBAN™ 2

## (undated) DEVICE — SUTURE MONOCRYL SZ 3-0 L27IN ABSRB UD PS-2 3/8 CIR REV CUT NDL MCP427H

## (undated) DEVICE — SPONGE LAP W18XL18IN WHT COT 4 PLY FLD STRUNG RADPQ DISP ST 2 PER PACK

## (undated) DEVICE — 4-PORT MANIFOLD: Brand: NEPTUNE 2

## (undated) DEVICE — SUTURE VICRYL + SZ 0 L27IN ABSRB VLT L26MM UR-6 5/8 CIR VCP603H

## (undated) DEVICE — DRESSING ALG W4XL8IN AG FOAM SUPERABSORBENT SIL ANTIMIC

## (undated) DEVICE — TISSUE RETRIEVAL SYSTEM: Brand: INZII RETRIEVAL SYSTEM

## (undated) DEVICE — Z DUPLICATE USE 2103554 VALVE HEMOSTATIC BLEEDBK CTRL COPILOT

## (undated) DEVICE — K-WIRE

## (undated) DEVICE — SUTURE MONOCRYL + SZ 4-0 L27IN ABSRB UD L19MM PS-2 3/8 CIR MCP426H

## (undated) DEVICE — INTENDED TO SUPPORT AND MAINTAIN THE POSITION OF AN ANESTHETIZED PATIENT DURING SURGERY: Brand: LINDY TRACTION BOOT LINER

## (undated) DEVICE — HEART CATH-MRMC: Brand: MEDLINE INDUSTRIES, INC.

## (undated) DEVICE — GLOVE SURG SZ 8 L12IN FNGR THK79MIL GRN LTX FREE

## (undated) DEVICE — C-ARM: Brand: UNBRANDED

## (undated) DEVICE — TRI-LUMEN FILTERED TUBE SET WITH ACTIVATED CHARCOAL FILTER: Brand: AIRSEAL

## (undated) DEVICE — HI-TORQUE VERSACORE MODIFIED J GUIDE WIRE SYSTEM 260 CM: Brand: HI-TORQUE VERSACORE

## (undated) DEVICE — CATHETER MAP D CRV 3-3-3-3-3 MM SPC GALAXY OCTARAY

## (undated) DEVICE — ELECTRODE PT RET AD L9FT HI MOIST COND ADH HYDRGEL CORDED

## (undated) DEVICE — Device

## (undated) DEVICE — CABLE CATH L2.7M CONNECTS TO CARTO 3 SYS PIU FOR LASSO ECO

## (undated) DEVICE — ROYAL SILK SURGICAL GOWN, XXL: Brand: CONVERTORS

## (undated) DEVICE — PINNACLE INTRODUCER SHEATH: Brand: PINNACLE

## (undated) DEVICE — PENROSE TUBING RADIOPAQUE: Brand: ARGYLE

## (undated) DEVICE — 3M™ TEGADERM™ TRANSPARENT FILM DRESSING FRAME STYLE, 1626W, 4 IN X 4-3/4 IN (10 CM X 12 CM), 50/CT 4CT/CASE: Brand: 3M™ TEGADERM™

## (undated) DEVICE — SUTURE ETHIBOND EXCEL SZ 0 L36IN NONABSORBABLE GRN SH L26MM X524H

## (undated) DEVICE — LIQUIBAND RAPID ADHESIVE 36/CS 0.8ML: Brand: MEDLINE

## (undated) DEVICE — PROVE COVER: Brand: UNBRANDED

## (undated) DEVICE — PAD,NON-ADHERENT,3X8,STERILE,LF,1/PK: Brand: MEDLINE

## (undated) DEVICE — MEDI-TRACE CADENCE ADULT, DEFIBRILLATION ELECTRODE -RTS  (10 PR/PK) - PHYSIO-CONTROL: Brand: MEDI-TRACE CADENCE

## (undated) DEVICE — CATHETER KIT JL4 JR4 5FR 100CM 145 PGTL DXTERITY

## (undated) DEVICE — CABLE CATH L10FT RED PIN CONN 34-34 FOR THERMOCOOL

## (undated) DEVICE — CATHETER US 8FR L90CM GRN TIP OVERLAY FOR GE-VIVID I VIVID

## (undated) DEVICE — Device: Brand: OMNIWIRE PRESSURE GUIDE WIRE

## (undated) DEVICE — BLADELESS OBTURATOR: Brand: WECK VISTA

## (undated) DEVICE — DISPOSABLE GRASPER CARTRIDGE: Brand: DIRECT DRIVE REPOSABLE GRASPERS

## (undated) DEVICE — INTRODUCER SHTH 11FR CANN L23CM DIL TIP 45MM YEL W/O MINI

## (undated) DEVICE — SOLUTION IV 1000ML 0.9% SOD CHL PH 5 INJ USP VIAFLX PLAS

## (undated) DEVICE — DRESSING FOAM W4XL10IN AG SIL ADH ANTIMIC POSTOP OPTIFOAM

## (undated) DEVICE — PATCH REF EXT FOR CARTO 3 SYS (EA = 6 PACKS)

## (undated) DEVICE — VALVE ANGIO ID0.11IN HEMSTAT MTL GUID WIRE INSRT TOOL AND

## (undated) DEVICE — CATH GUID COR EB35 6FR 100CM -- LAUNCHER

## (undated) DEVICE — Device: Brand: NRG TRANSSEPTAL NEEDLE

## (undated) DEVICE — SUTURE MONOCRYL SZ 4-0 L27IN ABSRB UD L19MM PS-2 1/2 CIR PRIM Y426H

## (undated) DEVICE — GOWN,SIRUS,NONRNF,SETINSLV,2XL,18/CS: Brand: MEDLINE

## (undated) DEVICE — HEART CATH-SFMC: Brand: MEDLINE INDUSTRIES, INC.

## (undated) DEVICE — TIP COVER ACCESSORY

## (undated) DEVICE — Device: Brand: RFP-100A CONNECTOR CABLE

## (undated) DEVICE — SOLUTION IRRIG 1000ML STRL H2O USP PLAS POUR BTL

## (undated) DEVICE — TR BAND RADIAL ARTERY COMPRESSION DEVICE: Brand: TR BAND

## (undated) DEVICE — SEAL

## (undated) DEVICE — CATHETER ABLAT 8FR L115CM 1-6-2MM SPC TIP 3.5MM DF CRV

## (undated) DEVICE — COVER CATH ACUNAV ULTRASOUND 5X72IN ANTI STATIC

## (undated) DEVICE — CANISTER, RIGID, 3000CC: Brand: MEDLINE INDUSTRIES, INC.

## (undated) DEVICE — CATHETER EP 7FR L115CM 2-8-2MM SPC TIP 2MM 10 ELECTRD F L

## (undated) DEVICE — SHEATH INTRO 8.5FR L71CM 8.5FR DIL GWIRE L180CM DIA0.032IN

## (undated) DEVICE — TAPE,CLOTH/SILK,CURAD,3"X10YD,LF,40/CS: Brand: CURAD